# Patient Record
Sex: FEMALE | Race: ASIAN | NOT HISPANIC OR LATINO | Employment: OTHER | URBAN - METROPOLITAN AREA
[De-identification: names, ages, dates, MRNs, and addresses within clinical notes are randomized per-mention and may not be internally consistent; named-entity substitution may affect disease eponyms.]

---

## 2017-02-08 ENCOUNTER — HOSPITAL ENCOUNTER (OUTPATIENT)
Dept: NEUROLOGY | Facility: AMBULATORY SURGERY CENTER | Age: 67
Discharge: HOME | End: 2017-02-08
Payer: COMMERCIAL

## 2017-02-08 DIAGNOSIS — G56.03 CARPAL TUNNEL SYNDROME, BILATERAL: ICD-10-CM

## 2017-02-08 PROCEDURE — 95886 MUSC TEST DONE W/N TEST COMP: CPT

## 2017-02-08 PROCEDURE — 95911 NRV CNDJ TEST 9-10 STUDIES: CPT

## 2017-02-09 ENCOUNTER — TRANSCRIBE ORDERS (OUTPATIENT)
Dept: ADMINISTRATIVE | Facility: HOSPITAL | Age: 67
End: 2017-02-09

## 2017-02-09 ENCOUNTER — ALLSCRIPTS OFFICE VISIT (OUTPATIENT)
Dept: OTHER | Facility: OTHER | Age: 67
End: 2017-02-09

## 2017-02-09 DIAGNOSIS — G56.03 BILATERAL CARPAL TUNNEL SYNDROME: ICD-10-CM

## 2017-02-09 DIAGNOSIS — M48.061 SPINAL STENOSIS, LUMBAR REGION, WITHOUT NEUROGENIC CLAUDICATION: Primary | ICD-10-CM

## 2017-02-09 DIAGNOSIS — M54.50 LOW BACK PAIN: ICD-10-CM

## 2017-02-09 DIAGNOSIS — M25.512 PAIN IN LEFT SHOULDER: ICD-10-CM

## 2017-02-09 DIAGNOSIS — Z00.00 ENCOUNTER FOR GENERAL ADULT MEDICAL EXAMINATION WITHOUT ABNORMAL FINDINGS: ICD-10-CM

## 2017-02-09 DIAGNOSIS — M75.41 IMPINGEMENT SYNDROME OF RIGHT SHOULDER: ICD-10-CM

## 2017-02-09 DIAGNOSIS — M48.061 SPINAL STENOSIS OF LUMBAR REGION: ICD-10-CM

## 2017-02-09 DIAGNOSIS — M25.511 PAIN IN RIGHT SHOULDER: ICD-10-CM

## 2017-02-09 DIAGNOSIS — M51.36 OTHER INTERVERTEBRAL DISC DEGENERATION, LUMBAR REGION: ICD-10-CM

## 2017-02-17 ENCOUNTER — HOSPITAL ENCOUNTER (OUTPATIENT)
Dept: RADIOLOGY | Facility: HOSPITAL | Age: 67
Discharge: HOME | End: 2017-02-17
Attending: NEUROLOGICAL SURGERY
Payer: COMMERCIAL

## 2017-02-17 DIAGNOSIS — M48.061 SPINAL STENOSIS OF LUMBAR REGION: ICD-10-CM

## 2017-02-17 PROCEDURE — 72148 MRI LUMBAR SPINE W/O DYE: CPT

## 2017-02-21 ENCOUNTER — HOSPITAL ENCOUNTER (OUTPATIENT)
Dept: RADIOLOGY | Facility: HOSPITAL | Age: 67
Discharge: HOME | End: 2017-02-21
Attending: NEUROLOGICAL SURGERY
Payer: COMMERCIAL

## 2017-02-21 ENCOUNTER — HOSPITAL ENCOUNTER (OUTPATIENT)
Dept: RADIOLOGY | Facility: HOSPITAL | Age: 67
Discharge: HOME | End: 2017-02-21
Attending: ORTHOPAEDIC SURGERY
Payer: COMMERCIAL

## 2017-02-21 ENCOUNTER — ALLSCRIPTS OFFICE VISIT (OUTPATIENT)
Dept: OTHER | Facility: OTHER | Age: 67
End: 2017-02-21

## 2017-02-21 ENCOUNTER — TRANSCRIBE ORDERS (OUTPATIENT)
Dept: RADIOLOGY | Facility: HOSPITAL | Age: 67
End: 2017-02-21

## 2017-02-21 DIAGNOSIS — M25.511 PAIN IN RIGHT SHOULDER: ICD-10-CM

## 2017-02-21 DIAGNOSIS — M51.36 DEGENERATION OF LUMBAR INTERVERTEBRAL DISC: Primary | ICD-10-CM

## 2017-02-21 DIAGNOSIS — M25.512 PAIN IN LEFT SHOULDER: ICD-10-CM

## 2017-02-21 DIAGNOSIS — M48.061 SPINAL STENOSIS, LUMBAR REGION, WITHOUT NEUROGENIC CLAUDICATION: ICD-10-CM

## 2017-02-21 DIAGNOSIS — M48.061 SPINAL STENOSIS OF LUMBAR REGION: ICD-10-CM

## 2017-02-21 PROCEDURE — 73030 X-RAY EXAM OF SHOULDER: CPT

## 2017-02-21 PROCEDURE — 72114 X-RAY EXAM L-S SPINE BENDING: CPT

## 2017-02-23 ENCOUNTER — ALLSCRIPTS OFFICE VISIT (OUTPATIENT)
Dept: OTHER | Facility: OTHER | Age: 67
End: 2017-02-23

## 2017-03-03 ENCOUNTER — GENERIC CONVERSION - ENCOUNTER (OUTPATIENT)
Dept: OTHER | Facility: OTHER | Age: 67
End: 2017-03-03

## 2017-03-06 ENCOUNTER — APPOINTMENT (OUTPATIENT)
Dept: LAB | Facility: HOSPITAL | Age: 67
End: 2017-03-06
Attending: NEUROLOGICAL SURGERY
Payer: COMMERCIAL

## 2017-03-06 DIAGNOSIS — M51.36 OTHER INTERVERTEBRAL DISC DEGENERATION, LUMBAR REGION: ICD-10-CM

## 2017-03-06 DIAGNOSIS — M48.061 SPINAL STENOSIS OF LUMBAR REGION: ICD-10-CM

## 2017-03-06 DIAGNOSIS — Z00.00 ENCOUNTER FOR GENERAL ADULT MEDICAL EXAMINATION WITHOUT ABNORMAL FINDINGS: ICD-10-CM

## 2017-03-06 DIAGNOSIS — G56.03 BILATERAL CARPAL TUNNEL SYNDROME: ICD-10-CM

## 2017-03-06 DIAGNOSIS — M54.50 LOW BACK PAIN: ICD-10-CM

## 2017-03-06 LAB
ABO GROUP BLD: NORMAL
ALBUMIN SERPL BCP-MCNC: 3.7 G/DL (ref 3.5–5)
ALP SERPL-CCNC: 64 U/L (ref 46–116)
ALT SERPL W P-5'-P-CCNC: 25 U/L (ref 12–78)
ANION GAP SERPL CALCULATED.3IONS-SCNC: 5 MMOL/L (ref 4–13)
APTT PPP: 28 SECONDS (ref 24–36)
AST SERPL W P-5'-P-CCNC: 11 U/L (ref 5–45)
BASOPHILS # BLD AUTO: 0.02 THOUSANDS/ΜL (ref 0–0.1)
BASOPHILS NFR BLD AUTO: 0 % (ref 0–1)
BILIRUB SERPL-MCNC: 0.41 MG/DL (ref 0.2–1)
BILIRUB UR QL STRIP: NEGATIVE
BLD GP AB SCN SERPL QL: NEGATIVE
BUN SERPL-MCNC: 24 MG/DL (ref 5–25)
CALCIUM SERPL-MCNC: 9.2 MG/DL (ref 8.3–10.1)
CHLORIDE SERPL-SCNC: 108 MMOL/L (ref 100–108)
CLARITY UR: CLEAR
CO2 SERPL-SCNC: 31 MMOL/L (ref 21–32)
COLOR UR: YELLOW
CREAT SERPL-MCNC: 0.69 MG/DL (ref 0.6–1.3)
EOSINOPHIL # BLD AUTO: 0.08 THOUSAND/ΜL (ref 0–0.61)
EOSINOPHIL NFR BLD AUTO: 1 % (ref 0–6)
ERYTHROCYTE [DISTWIDTH] IN BLOOD BY AUTOMATED COUNT: 14.7 % (ref 11.6–15.1)
EST. AVERAGE GLUCOSE BLD GHB EST-MCNC: 117 MG/DL
GFR SERPL CREATININE-BSD FRML MDRD: >60 ML/MIN/1.73SQ M
GLUCOSE SERPL-MCNC: 99 MG/DL (ref 65–140)
GLUCOSE UR STRIP-MCNC: NEGATIVE MG/DL
HBA1C MFR BLD: 5.7 % (ref 4.2–6.3)
HCT VFR BLD AUTO: 41.4 % (ref 34.8–46.1)
HGB BLD-MCNC: 13.6 G/DL (ref 11.5–15.4)
HGB UR QL STRIP.AUTO: NEGATIVE
INR PPP: 0.92 (ref 0.86–1.16)
KETONES UR STRIP-MCNC: NEGATIVE MG/DL
LEUKOCYTE ESTERASE UR QL STRIP: NEGATIVE
LYMPHOCYTES # BLD AUTO: 1.41 THOUSANDS/ΜL (ref 0.6–4.47)
LYMPHOCYTES NFR BLD AUTO: 19 % (ref 14–44)
MCH RBC QN AUTO: 29.6 PG (ref 26.8–34.3)
MCHC RBC AUTO-ENTMCNC: 32.9 G/DL (ref 31.4–37.4)
MCV RBC AUTO: 90 FL (ref 82–98)
MONOCYTES # BLD AUTO: 0.33 THOUSAND/ΜL (ref 0.17–1.22)
MONOCYTES NFR BLD AUTO: 4 % (ref 4–12)
NEUTROPHILS # BLD AUTO: 5.7 THOUSANDS/ΜL (ref 1.85–7.62)
NEUTS SEG NFR BLD AUTO: 76 % (ref 43–75)
NITRITE UR QL STRIP: NEGATIVE
NRBC BLD AUTO-RTO: 0 /100 WBCS
PH UR STRIP.AUTO: 6 [PH] (ref 4.5–8)
PLATELET # BLD AUTO: 336 THOUSANDS/UL (ref 149–390)
PMV BLD AUTO: 9.8 FL (ref 8.9–12.7)
POTASSIUM SERPL-SCNC: 4.4 MMOL/L (ref 3.5–5.3)
PROT SERPL-MCNC: 7.2 G/DL (ref 6.4–8.2)
PROT UR STRIP-MCNC: NEGATIVE MG/DL
PROTHROMBIN TIME: 12.5 SECONDS (ref 12–14.3)
RBC # BLD AUTO: 4.6 MILLION/UL (ref 3.81–5.12)
RH BLD: POSITIVE
SODIUM SERPL-SCNC: 144 MMOL/L (ref 136–145)
SP GR UR STRIP.AUTO: 1.03 (ref 1–1.03)
UROBILINOGEN UR QL STRIP.AUTO: 0.2 E.U./DL
WBC # BLD AUTO: 7.55 THOUSAND/UL (ref 4.31–10.16)

## 2017-03-06 PROCEDURE — 86850 RBC ANTIBODY SCREEN: CPT

## 2017-03-06 PROCEDURE — 83036 HEMOGLOBIN GLYCOSYLATED A1C: CPT

## 2017-03-06 PROCEDURE — 80053 COMPREHEN METABOLIC PANEL: CPT

## 2017-03-06 PROCEDURE — 85730 THROMBOPLASTIN TIME PARTIAL: CPT

## 2017-03-06 PROCEDURE — 81003 URINALYSIS AUTO W/O SCOPE: CPT

## 2017-03-06 PROCEDURE — 85610 PROTHROMBIN TIME: CPT

## 2017-03-06 PROCEDURE — 87081 CULTURE SCREEN ONLY: CPT

## 2017-03-06 PROCEDURE — 86901 BLOOD TYPING SEROLOGIC RH(D): CPT

## 2017-03-06 PROCEDURE — 85025 COMPLETE CBC W/AUTO DIFF WBC: CPT

## 2017-03-06 PROCEDURE — 86900 BLOOD TYPING SEROLOGIC ABO: CPT

## 2017-03-06 PROCEDURE — 36415 COLL VENOUS BLD VENIPUNCTURE: CPT

## 2017-03-07 ENCOUNTER — GENERIC CONVERSION - ENCOUNTER (OUTPATIENT)
Dept: OTHER | Facility: OTHER | Age: 67
End: 2017-03-07

## 2017-03-07 ENCOUNTER — OFFICE VISIT (OUTPATIENT)
Dept: LAB | Facility: HOSPITAL | Age: 67
End: 2017-03-07
Attending: NEUROLOGICAL SURGERY
Payer: COMMERCIAL

## 2017-03-07 DIAGNOSIS — M51.36 DEGENERATION OF LUMBAR INTERVERTEBRAL DISC: ICD-10-CM

## 2017-03-07 DIAGNOSIS — M48.061 SPINAL STENOSIS, LUMBAR REGION, WITHOUT NEUROGENIC CLAUDICATION: ICD-10-CM

## 2017-03-07 LAB
ATRIAL RATE: 91 BPM
MRSA NOSE QL CULT: NORMAL
P AXIS: 67 DEGREES
PR INTERVAL: 168 MS
QRS AXIS: 14 DEGREES
QRSD INTERVAL: 74 MS
QT INTERVAL: 362 MS
QTC INTERVAL: 445 MS
T WAVE AXIS: 80 DEGREES
VENTRICULAR RATE: 91 BPM

## 2017-03-07 PROCEDURE — 93005 ELECTROCARDIOGRAM TRACING: CPT

## 2017-03-13 ENCOUNTER — ALLSCRIPTS OFFICE VISIT (OUTPATIENT)
Dept: OTHER | Facility: OTHER | Age: 67
End: 2017-03-13

## 2017-03-23 ENCOUNTER — TRANSCRIBE ORDERS (OUTPATIENT)
Dept: ADMINISTRATIVE | Facility: HOSPITAL | Age: 67
End: 2017-03-23

## 2017-03-23 ENCOUNTER — ALLSCRIPTS OFFICE VISIT (OUTPATIENT)
Dept: OTHER | Facility: OTHER | Age: 67
End: 2017-03-23

## 2017-03-23 DIAGNOSIS — M25.511 PAIN IN RIGHT SHOULDER: ICD-10-CM

## 2017-03-23 DIAGNOSIS — M25.511 ACUTE PAIN OF RIGHT SHOULDER: Primary | ICD-10-CM

## 2017-03-24 ENCOUNTER — HOSPITAL ENCOUNTER (OUTPATIENT)
Dept: RADIOLOGY | Facility: HOSPITAL | Age: 67
Discharge: HOME | End: 2017-03-24
Attending: ORTHOPAEDIC SURGERY
Payer: COMMERCIAL

## 2017-03-24 DIAGNOSIS — M25.511 PAIN IN RIGHT SHOULDER: ICD-10-CM

## 2017-03-24 PROCEDURE — 73221 MRI JOINT UPR EXTREM W/O DYE: CPT

## 2017-03-30 ENCOUNTER — ALLSCRIPTS OFFICE VISIT (OUTPATIENT)
Dept: OTHER | Facility: OTHER | Age: 67
End: 2017-03-30

## 2017-04-02 ENCOUNTER — ANESTHESIA EVENT (OUTPATIENT)
Dept: PERIOP | Facility: HOSPITAL | Age: 67
DRG: 460 | End: 2017-04-02
Payer: COMMERCIAL

## 2017-04-03 ENCOUNTER — HOSPITAL ENCOUNTER (INPATIENT)
Facility: HOSPITAL | Age: 67
LOS: 3 days | DRG: 460 | End: 2017-04-06
Attending: NEUROLOGICAL SURGERY | Admitting: NEUROLOGICAL SURGERY
Payer: COMMERCIAL

## 2017-04-03 ENCOUNTER — APPOINTMENT (OUTPATIENT)
Dept: RADIOLOGY | Facility: HOSPITAL | Age: 67
DRG: 460 | End: 2017-04-03
Payer: COMMERCIAL

## 2017-04-03 ENCOUNTER — ANESTHESIA (OUTPATIENT)
Dept: PERIOP | Facility: HOSPITAL | Age: 67
DRG: 460 | End: 2017-04-03
Payer: COMMERCIAL

## 2017-04-03 DIAGNOSIS — M51.36 OTHER INTERVERTEBRAL DISC DEGENERATION, LUMBAR REGION: ICD-10-CM

## 2017-04-03 DIAGNOSIS — M43.16 SPONDYLOLISTHESIS OF LUMBAR REGION: ICD-10-CM

## 2017-04-03 DIAGNOSIS — M48.061 SPINAL STENOSIS OF LUMBAR REGION: Primary | ICD-10-CM

## 2017-04-03 PROBLEM — M47.9 SPONDYLOSIS: Status: ACTIVE | Noted: 2017-04-03

## 2017-04-03 PROBLEM — M41.9 SCOLIOSIS: Status: ACTIVE | Noted: 2017-04-03

## 2017-04-03 LAB
ABO GROUP BLD: NORMAL
BLD GP AB SCN SERPL QL: NEGATIVE
GLUCOSE SERPL-MCNC: 170 MG/DL (ref 65–140)
PLATELET # BLD AUTO: 275 THOUSANDS/UL (ref 149–390)
PMV BLD AUTO: 9.6 FL (ref 8.9–12.7)
RH BLD: POSITIVE

## 2017-04-03 PROCEDURE — 85049 AUTOMATED PLATELET COUNT: CPT | Performed by: NEUROLOGICAL SURGERY

## 2017-04-03 PROCEDURE — 00NY0ZZ RELEASE LUMBAR SPINAL CORD, OPEN APPROACH: ICD-10-PCS | Performed by: NEUROLOGICAL SURGERY

## 2017-04-03 PROCEDURE — 86901 BLOOD TYPING SEROLOGIC RH(D): CPT | Performed by: NEUROLOGICAL SURGERY

## 2017-04-03 PROCEDURE — C1713 ANCHOR/SCREW BN/BN,TIS/BN: HCPCS | Performed by: NEUROLOGICAL SURGERY

## 2017-04-03 PROCEDURE — 72100 X-RAY EXAM L-S SPINE 2/3 VWS: CPT

## 2017-04-03 PROCEDURE — 86900 BLOOD TYPING SEROLOGIC ABO: CPT | Performed by: NEUROLOGICAL SURGERY

## 2017-04-03 PROCEDURE — 82948 REAGENT STRIP/BLOOD GLUCOSE: CPT

## 2017-04-03 PROCEDURE — 95940 IONM IN OPERATNG ROOM 15 MIN: CPT | Performed by: NEUROLOGICAL SURGERY

## 2017-04-03 PROCEDURE — 88304 TISSUE EXAM BY PATHOLOGIST: CPT | Performed by: NEUROLOGICAL SURGERY

## 2017-04-03 PROCEDURE — 0HB6XZZ EXCISION OF BACK SKIN, EXTERNAL APPROACH: ICD-10-PCS | Performed by: NEUROLOGICAL SURGERY

## 2017-04-03 PROCEDURE — 86850 RBC ANTIBODY SCREEN: CPT | Performed by: NEUROLOGICAL SURGERY

## 2017-04-03 PROCEDURE — 0SG10A1 FUSION 2-4 L JT W INTBD FUS DEV, POST APPR P COL, OPEN: ICD-10-PCS | Performed by: NEUROLOGICAL SURGERY

## 2017-04-03 DEVICE — SCREW 54840006545 MAS 6.5X45 CC
Type: IMPLANTABLE DEVICE | Site: SPINE LUMBAR | Status: FUNCTIONAL
Brand: CD HORIZON® SPINAL SYSTEM

## 2017-04-03 DEVICE — DBM 006005 PROGENIX PLUS 5CC
Type: IMPLANTABLE DEVICE | Site: SPINE LUMBAR | Status: FUNCTIONAL
Brand: PROGENIX® PUTTY AND PROGENIX® PLUS

## 2017-04-03 RX ORDER — CHLORHEXIDINE GLUCONATE 0.12 MG/ML
15 RINSE ORAL ONCE
Status: COMPLETED | OUTPATIENT
Start: 2017-04-03 | End: 2017-04-03

## 2017-04-03 RX ORDER — METHOCARBAMOL 750 MG/1
750 TABLET, FILM COATED ORAL 4 TIMES DAILY PRN
Status: DISCONTINUED | OUTPATIENT
Start: 2017-04-03 | End: 2017-04-06 | Stop reason: HOSPADM

## 2017-04-03 RX ORDER — SODIUM CHLORIDE, SODIUM LACTATE, POTASSIUM CHLORIDE, CALCIUM CHLORIDE 600; 310; 30; 20 MG/100ML; MG/100ML; MG/100ML; MG/100ML
100 INJECTION, SOLUTION INTRAVENOUS CONTINUOUS
Status: DISCONTINUED | OUTPATIENT
Start: 2017-04-03 | End: 2017-04-04

## 2017-04-03 RX ORDER — OXYCODONE HYDROCHLORIDE 5 MG/1
5 TABLET ORAL EVERY 4 HOURS PRN
Status: DISCONTINUED | OUTPATIENT
Start: 2017-04-03 | End: 2017-04-06 | Stop reason: HOSPADM

## 2017-04-03 RX ORDER — FENTANYL CITRATE 50 UG/ML
25 INJECTION, SOLUTION INTRAMUSCULAR; INTRAVENOUS
Status: DISCONTINUED | OUTPATIENT
Start: 2017-04-03 | End: 2017-04-03

## 2017-04-03 RX ORDER — PREGABALIN 75 MG/1
150 CAPSULE ORAL ONCE
Status: COMPLETED | OUTPATIENT
Start: 2017-04-03 | End: 2017-04-03

## 2017-04-03 RX ORDER — MELATONIN
2000 DAILY
Status: DISCONTINUED | OUTPATIENT
Start: 2017-04-04 | End: 2017-04-06 | Stop reason: HOSPADM

## 2017-04-03 RX ORDER — SODIUM CHLORIDE, SODIUM LACTATE, POTASSIUM CHLORIDE, CALCIUM CHLORIDE 600; 310; 30; 20 MG/100ML; MG/100ML; MG/100ML; MG/100ML
125 INJECTION, SOLUTION INTRAVENOUS CONTINUOUS
Status: DISCONTINUED | OUTPATIENT
Start: 2017-04-03 | End: 2017-04-04

## 2017-04-03 RX ORDER — BUPIVACAINE HYDROCHLORIDE AND EPINEPHRINE 5; 5 MG/ML; UG/ML
INJECTION, SOLUTION EPIDURAL; INTRACAUDAL; PERINEURAL AS NEEDED
Status: DISCONTINUED | OUTPATIENT
Start: 2017-04-03 | End: 2017-04-03 | Stop reason: HOSPADM

## 2017-04-03 RX ORDER — EPHEDRINE SULFATE 50 MG/ML
INJECTION, SOLUTION INTRAVENOUS AS NEEDED
Status: DISCONTINUED | OUTPATIENT
Start: 2017-04-03 | End: 2017-04-03 | Stop reason: SURG

## 2017-04-03 RX ORDER — SUCCINYLCHOLINE CHLORIDE 20 MG/ML
INJECTION INTRAMUSCULAR; INTRAVENOUS AS NEEDED
Status: DISCONTINUED | OUTPATIENT
Start: 2017-04-03 | End: 2017-04-03 | Stop reason: SURG

## 2017-04-03 RX ORDER — ALBUMIN, HUMAN INJ 5% 5 %
SOLUTION INTRAVENOUS CONTINUOUS PRN
Status: DISCONTINUED | OUTPATIENT
Start: 2017-04-03 | End: 2017-04-03

## 2017-04-03 RX ORDER — METOCLOPRAMIDE HYDROCHLORIDE 5 MG/ML
10 INJECTION INTRAMUSCULAR; INTRAVENOUS ONCE AS NEEDED
Status: DISCONTINUED | OUTPATIENT
Start: 2017-04-03 | End: 2017-04-03 | Stop reason: HOSPADM

## 2017-04-03 RX ORDER — BISACODYL 10 MG
10 SUPPOSITORY, RECTAL RECTAL DAILY PRN
Status: DISCONTINUED | OUTPATIENT
Start: 2017-04-03 | End: 2017-04-06 | Stop reason: HOSPADM

## 2017-04-03 RX ORDER — SODIUM CHLORIDE 9 MG/ML
100 INJECTION, SOLUTION INTRAVENOUS CONTINUOUS
Status: DISCONTINUED | OUTPATIENT
Start: 2017-04-03 | End: 2017-04-04

## 2017-04-03 RX ORDER — DIAZEPAM 2 MG/1
2 TABLET ORAL 3 TIMES DAILY PRN
Status: DISCONTINUED | OUTPATIENT
Start: 2017-04-03 | End: 2017-04-04

## 2017-04-03 RX ORDER — PROPOFOL 10 MG/ML
INJECTION, EMULSION INTRAVENOUS AS NEEDED
Status: DISCONTINUED | OUTPATIENT
Start: 2017-04-03 | End: 2017-04-03 | Stop reason: SURG

## 2017-04-03 RX ORDER — HEPARIN SODIUM 5000 [USP'U]/ML
5000 INJECTION, SOLUTION INTRAVENOUS; SUBCUTANEOUS EVERY 8 HOURS SCHEDULED
Status: DISCONTINUED | OUTPATIENT
Start: 2017-04-04 | End: 2017-04-06 | Stop reason: HOSPADM

## 2017-04-03 RX ORDER — CLONAZEPAM 0.5 MG/1
0.5 TABLET ORAL
Status: DISCONTINUED | OUTPATIENT
Start: 2017-04-03 | End: 2017-04-06 | Stop reason: HOSPADM

## 2017-04-03 RX ORDER — ONDANSETRON 2 MG/ML
4 INJECTION INTRAMUSCULAR; INTRAVENOUS EVERY 6 HOURS PRN
Status: DISCONTINUED | OUTPATIENT
Start: 2017-04-03 | End: 2017-04-05

## 2017-04-03 RX ORDER — LIDOCAINE HYDROCHLORIDE 10 MG/ML
INJECTION, SOLUTION INFILTRATION; PERINEURAL AS NEEDED
Status: DISCONTINUED | OUTPATIENT
Start: 2017-04-03 | End: 2017-04-03 | Stop reason: SURG

## 2017-04-03 RX ORDER — OXYCODONE HCL 10 MG/1
10 TABLET, FILM COATED, EXTENDED RELEASE ORAL ONCE
Status: COMPLETED | OUTPATIENT
Start: 2017-04-03 | End: 2017-04-03

## 2017-04-03 RX ORDER — PROPOFOL 10 MG/ML
INJECTION, EMULSION INTRAVENOUS CONTINUOUS PRN
Status: DISCONTINUED | OUTPATIENT
Start: 2017-04-03 | End: 2017-04-03

## 2017-04-03 RX ORDER — MAGNESIUM HYDROXIDE 1200 MG/15ML
LIQUID ORAL AS NEEDED
Status: DISCONTINUED | OUTPATIENT
Start: 2017-04-03 | End: 2017-04-03 | Stop reason: HOSPADM

## 2017-04-03 RX ORDER — ACETAMINOPHEN 325 MG/1
975 TABLET ORAL EVERY 8 HOURS SCHEDULED
Status: DISCONTINUED | OUTPATIENT
Start: 2017-04-03 | End: 2017-04-06 | Stop reason: HOSPADM

## 2017-04-03 RX ORDER — GABAPENTIN 100 MG/1
100 CAPSULE ORAL 3 TIMES DAILY
Status: DISCONTINUED | OUTPATIENT
Start: 2017-04-03 | End: 2017-04-06 | Stop reason: HOSPADM

## 2017-04-03 RX ORDER — PAROXETINE 10 MG/1
10 TABLET, FILM COATED ORAL EVERY MORNING
Status: DISCONTINUED | OUTPATIENT
Start: 2017-04-04 | End: 2017-04-06 | Stop reason: HOSPADM

## 2017-04-03 RX ORDER — FENTANYL CITRATE 50 UG/ML
INJECTION, SOLUTION INTRAMUSCULAR; INTRAVENOUS AS NEEDED
Status: DISCONTINUED | OUTPATIENT
Start: 2017-04-03 | End: 2017-04-03 | Stop reason: SURG

## 2017-04-03 RX ORDER — DOCUSATE SODIUM 100 MG/1
100 CAPSULE, LIQUID FILLED ORAL 2 TIMES DAILY
Status: DISCONTINUED | OUTPATIENT
Start: 2017-04-03 | End: 2017-04-06 | Stop reason: HOSPADM

## 2017-04-03 RX ORDER — SODIUM CHLORIDE 9 MG/ML
INJECTION, SOLUTION INTRAVENOUS CONTINUOUS PRN
Status: DISCONTINUED | OUTPATIENT
Start: 2017-04-03 | End: 2017-04-03

## 2017-04-03 RX ORDER — KETOROLAC TROMETHAMINE 30 MG/ML
INJECTION, SOLUTION INTRAMUSCULAR; INTRAVENOUS AS NEEDED
Status: DISCONTINUED | OUTPATIENT
Start: 2017-04-03 | End: 2017-04-03 | Stop reason: SURG

## 2017-04-03 RX ORDER — DIAZEPAM 2 MG/1
2 TABLET ORAL 3 TIMES DAILY
Status: DISCONTINUED | OUTPATIENT
Start: 2017-04-03 | End: 2017-04-03

## 2017-04-03 RX ORDER — ACETAMINOPHEN 325 MG/1
975 TABLET ORAL ONCE
Status: COMPLETED | OUTPATIENT
Start: 2017-04-03 | End: 2017-04-03

## 2017-04-03 RX ORDER — LIDOCAINE HYDROCHLORIDE AND EPINEPHRINE 10; 10 MG/ML; UG/ML
INJECTION, SOLUTION INFILTRATION; PERINEURAL AS NEEDED
Status: DISCONTINUED | OUTPATIENT
Start: 2017-04-03 | End: 2017-04-03 | Stop reason: HOSPADM

## 2017-04-03 RX ORDER — ONDANSETRON 2 MG/ML
INJECTION INTRAMUSCULAR; INTRAVENOUS AS NEEDED
Status: DISCONTINUED | OUTPATIENT
Start: 2017-04-03 | End: 2017-04-03 | Stop reason: SURG

## 2017-04-03 RX ORDER — OXYCODONE HYDROCHLORIDE 10 MG/1
10 TABLET ORAL EVERY 4 HOURS PRN
Status: DISCONTINUED | OUTPATIENT
Start: 2017-04-03 | End: 2017-04-06 | Stop reason: HOSPADM

## 2017-04-03 RX ORDER — MIDAZOLAM HYDROCHLORIDE 1 MG/ML
INJECTION INTRAMUSCULAR; INTRAVENOUS AS NEEDED
Status: DISCONTINUED | OUTPATIENT
Start: 2017-04-03 | End: 2017-04-03 | Stop reason: SURG

## 2017-04-03 RX ORDER — ONDANSETRON 2 MG/ML
4 INJECTION INTRAMUSCULAR; INTRAVENOUS EVERY 4 HOURS PRN
Status: DISCONTINUED | OUTPATIENT
Start: 2017-04-03 | End: 2017-04-03 | Stop reason: HOSPADM

## 2017-04-03 RX ORDER — SODIUM CHLORIDE, SODIUM LACTATE, POTASSIUM CHLORIDE, CALCIUM CHLORIDE 600; 310; 30; 20 MG/100ML; MG/100ML; MG/100ML; MG/100ML
INJECTION, SOLUTION INTRAVENOUS CONTINUOUS PRN
Status: DISCONTINUED | OUTPATIENT
Start: 2017-04-03 | End: 2017-04-03

## 2017-04-03 RX ADMIN — SODIUM CHLORIDE: 0.9 INJECTION, SOLUTION INTRAVENOUS at 08:00

## 2017-04-03 RX ADMIN — HYDROMORPHONE HYDROCHLORIDE 1 MG: 1 INJECTION, SOLUTION INTRAMUSCULAR; INTRAVENOUS; SUBCUTANEOUS at 09:55

## 2017-04-03 RX ADMIN — MIDAZOLAM HYDROCHLORIDE 2 MG: 1 INJECTION, SOLUTION INTRAMUSCULAR; INTRAVENOUS at 07:42

## 2017-04-03 RX ADMIN — ALBUMIN HUMAN: 0.05 INJECTION, SOLUTION INTRAVENOUS at 10:09

## 2017-04-03 RX ADMIN — SUCCINYLCHOLINE CHLORIDE 120 MG: 20 INJECTION, SOLUTION INTRAMUSCULAR; INTRAVENOUS at 07:54

## 2017-04-03 RX ADMIN — ACETAMINOPHEN 975 MG: 325 TABLET, FILM COATED ORAL at 15:23

## 2017-04-03 RX ADMIN — SODIUM CHLORIDE, SODIUM LACTATE, POTASSIUM CHLORIDE, AND CALCIUM CHLORIDE: .6; .31; .03; .02 INJECTION, SOLUTION INTRAVENOUS at 10:33

## 2017-04-03 RX ADMIN — DIAZEPAM 2 MG: 2 TABLET ORAL at 16:51

## 2017-04-03 RX ADMIN — CEFAZOLIN SODIUM 1000 MG: 1 SOLUTION INTRAVENOUS at 23:53

## 2017-04-03 RX ADMIN — ALBUMIN HUMAN: 0.05 INJECTION, SOLUTION INTRAVENOUS at 08:38

## 2017-04-03 RX ADMIN — DEXAMETHASONE SODIUM PHOSPHATE 10 MG: 10 INJECTION INTRAMUSCULAR; INTRAVENOUS at 07:54

## 2017-04-03 RX ADMIN — PREGABALIN 150 MG: 75 CAPSULE ORAL at 06:51

## 2017-04-03 RX ADMIN — DEXMEDETOMIDINE HYDROCHLORIDE 0.2 MCG/KG/HR: 100 INJECTION, SOLUTION INTRAVENOUS at 07:56

## 2017-04-03 RX ADMIN — HYDROMORPHONE HYDROCHLORIDE 0.5 MG: 1 INJECTION, SOLUTION INTRAMUSCULAR; INTRAVENOUS; SUBCUTANEOUS at 11:27

## 2017-04-03 RX ADMIN — SODIUM CHLORIDE 100 ML/HR: 0.9 INJECTION, SOLUTION INTRAVENOUS at 13:30

## 2017-04-03 RX ADMIN — SODIUM CHLORIDE, SODIUM LACTATE, POTASSIUM CHLORIDE, AND CALCIUM CHLORIDE: .6; .31; .03; .02 INJECTION, SOLUTION INTRAVENOUS at 07:15

## 2017-04-03 RX ADMIN — OXYCODONE HYDROCHLORIDE 10 MG: 10 TABLET ORAL at 23:53

## 2017-04-03 RX ADMIN — FENTANYL CITRATE 50 MCG: 50 INJECTION, SOLUTION INTRAMUSCULAR; INTRAVENOUS at 08:53

## 2017-04-03 RX ADMIN — ONDANSETRON 4 MG: 2 INJECTION INTRAMUSCULAR; INTRAVENOUS at 11:26

## 2017-04-03 RX ADMIN — PROPOFOL 100 MCG/KG/MIN: 10 INJECTION, EMULSION INTRAVENOUS at 07:56

## 2017-04-03 RX ADMIN — GABAPENTIN 100 MG: 100 CAPSULE ORAL at 21:13

## 2017-04-03 RX ADMIN — EPHEDRINE SULFATE 10 MG: 50 INJECTION, SOLUTION INTRAMUSCULAR; INTRAVENOUS; SUBCUTANEOUS at 08:06

## 2017-04-03 RX ADMIN — OXYCODONE HYDROCHLORIDE 10 MG: 10 TABLET, FILM COATED, EXTENDED RELEASE ORAL at 06:51

## 2017-04-03 RX ADMIN — ACETAMINOPHEN 975 MG: 325 TABLET, FILM COATED ORAL at 06:51

## 2017-04-03 RX ADMIN — KETOROLAC TROMETHAMINE 15 MG: 30 INJECTION, SOLUTION INTRAMUSCULAR at 11:26

## 2017-04-03 RX ADMIN — EPHEDRINE SULFATE 5 MG: 50 INJECTION, SOLUTION INTRAMUSCULAR; INTRAVENOUS; SUBCUTANEOUS at 08:27

## 2017-04-03 RX ADMIN — GABAPENTIN 100 MG: 100 CAPSULE ORAL at 16:51

## 2017-04-03 RX ADMIN — CHLORHEXIDINE GLUCONATE 15 ML: 1.2 RINSE ORAL at 06:51

## 2017-04-03 RX ADMIN — PROPOFOL 200 MG: 10 INJECTION, EMULSION INTRAVENOUS at 07:54

## 2017-04-03 RX ADMIN — CEFAZOLIN SODIUM 2000 MG: 2 SOLUTION INTRAVENOUS at 08:30

## 2017-04-03 RX ADMIN — REMIFENTANIL HYDROCHLORIDE 0.2 MCG/KG/MIN: 1 INJECTION, POWDER, LYOPHILIZED, FOR SOLUTION INTRAVENOUS at 07:56

## 2017-04-03 RX ADMIN — FENTANYL CITRATE 50 MCG: 50 INJECTION, SOLUTION INTRAMUSCULAR; INTRAVENOUS at 07:54

## 2017-04-03 RX ADMIN — CEFAZOLIN SODIUM 1000 MG: 1 SOLUTION INTRAVENOUS at 16:51

## 2017-04-03 RX ADMIN — CLONAZEPAM 0.5 MG: 0.5 TABLET ORAL at 21:13

## 2017-04-03 RX ADMIN — LIDOCAINE HYDROCHLORIDE 50 MG: 10 INJECTION, SOLUTION INFILTRATION; PERINEURAL at 07:54

## 2017-04-03 RX ADMIN — ACETAMINOPHEN 975 MG: 325 TABLET, FILM COATED ORAL at 21:13

## 2017-04-03 RX ADMIN — SODIUM CHLORIDE 100 ML/HR: 0.9 INJECTION, SOLUTION INTRAVENOUS at 22:53

## 2017-04-04 ENCOUNTER — APPOINTMENT (INPATIENT)
Dept: RADIOLOGY | Facility: HOSPITAL | Age: 67
DRG: 460 | End: 2017-04-04
Attending: NEUROLOGICAL SURGERY
Payer: COMMERCIAL

## 2017-04-04 LAB
ANION GAP SERPL CALCULATED.3IONS-SCNC: 6 MMOL/L (ref 4–13)
BUN SERPL-MCNC: 14 MG/DL (ref 5–25)
CALCIUM SERPL-MCNC: 7.9 MG/DL (ref 8.3–10.1)
CHLORIDE SERPL-SCNC: 111 MMOL/L (ref 100–108)
CO2 SERPL-SCNC: 27 MMOL/L (ref 21–32)
CREAT SERPL-MCNC: 0.56 MG/DL (ref 0.6–1.3)
ERYTHROCYTE [DISTWIDTH] IN BLOOD BY AUTOMATED COUNT: 14.6 % (ref 11.6–15.1)
GFR SERPL CREATININE-BSD FRML MDRD: >60 ML/MIN/1.73SQ M
GLUCOSE SERPL-MCNC: 131 MG/DL (ref 65–140)
HCT VFR BLD AUTO: 30.7 % (ref 34.8–46.1)
HGB BLD-MCNC: 10.1 G/DL (ref 11.5–15.4)
MCH RBC QN AUTO: 29.6 PG (ref 26.8–34.3)
MCHC RBC AUTO-ENTMCNC: 32.9 G/DL (ref 31.4–37.4)
MCV RBC AUTO: 90 FL (ref 82–98)
PLATELET # BLD AUTO: 292 THOUSANDS/UL (ref 149–390)
PMV BLD AUTO: 9.6 FL (ref 8.9–12.7)
POTASSIUM SERPL-SCNC: 4.1 MMOL/L (ref 3.5–5.3)
RBC # BLD AUTO: 3.41 MILLION/UL (ref 3.81–5.12)
SODIUM SERPL-SCNC: 144 MMOL/L (ref 136–145)
WBC # BLD AUTO: 11.55 THOUSAND/UL (ref 4.31–10.16)

## 2017-04-04 PROCEDURE — 72100 X-RAY EXAM L-S SPINE 2/3 VWS: CPT

## 2017-04-04 PROCEDURE — G8987 SELF CARE CURRENT STATUS: HCPCS

## 2017-04-04 PROCEDURE — G8988 SELF CARE GOAL STATUS: HCPCS

## 2017-04-04 PROCEDURE — 97116 GAIT TRAINING THERAPY: CPT

## 2017-04-04 PROCEDURE — 97535 SELF CARE MNGMENT TRAINING: CPT

## 2017-04-04 PROCEDURE — 80048 BASIC METABOLIC PNL TOTAL CA: CPT | Performed by: NEUROLOGICAL SURGERY

## 2017-04-04 PROCEDURE — G8979 MOBILITY GOAL STATUS: HCPCS

## 2017-04-04 PROCEDURE — 97166 OT EVAL MOD COMPLEX 45 MIN: CPT

## 2017-04-04 PROCEDURE — G8978 MOBILITY CURRENT STATUS: HCPCS

## 2017-04-04 PROCEDURE — 85027 COMPLETE CBC AUTOMATED: CPT | Performed by: NEUROLOGICAL SURGERY

## 2017-04-04 PROCEDURE — 97163 PT EVAL HIGH COMPLEX 45 MIN: CPT

## 2017-04-04 RX ADMIN — VITAMIN D, TAB 1000IU (100/BT) 2000 UNITS: 25 TAB at 09:19

## 2017-04-04 RX ADMIN — METHOCARBAMOL 750 MG: 750 TABLET ORAL at 16:23

## 2017-04-04 RX ADMIN — ACETAMINOPHEN 975 MG: 325 TABLET, FILM COATED ORAL at 21:38

## 2017-04-04 RX ADMIN — GABAPENTIN 100 MG: 100 CAPSULE ORAL at 21:38

## 2017-04-04 RX ADMIN — DOCUSATE SODIUM 100 MG: 100 CAPSULE, LIQUID FILLED ORAL at 09:19

## 2017-04-04 RX ADMIN — OXYCODONE HYDROCHLORIDE 10 MG: 10 TABLET ORAL at 16:23

## 2017-04-04 RX ADMIN — GABAPENTIN 100 MG: 100 CAPSULE ORAL at 09:18

## 2017-04-04 RX ADMIN — ACETAMINOPHEN 975 MG: 325 TABLET, FILM COATED ORAL at 05:24

## 2017-04-04 RX ADMIN — HEPARIN SODIUM 5000 UNITS: 5000 INJECTION, SOLUTION INTRAVENOUS; SUBCUTANEOUS at 21:39

## 2017-04-04 RX ADMIN — OXYCODONE HYDROCHLORIDE 10 MG: 10 TABLET ORAL at 12:18

## 2017-04-04 RX ADMIN — PAROXETINE HYDROCHLORIDE 10 MG: 10 TABLET, FILM COATED ORAL at 09:19

## 2017-04-04 RX ADMIN — ACETAMINOPHEN 975 MG: 325 TABLET, FILM COATED ORAL at 13:52

## 2017-04-04 RX ADMIN — GABAPENTIN 100 MG: 100 CAPSULE ORAL at 16:23

## 2017-04-04 RX ADMIN — OXYCODONE HYDROCHLORIDE 5 MG: 5 TABLET ORAL at 05:24

## 2017-04-04 RX ADMIN — METHOCARBAMOL 750 MG: 750 TABLET ORAL at 21:38

## 2017-04-04 RX ADMIN — CLONAZEPAM 0.5 MG: 0.5 TABLET ORAL at 21:38

## 2017-04-04 RX ADMIN — DOCUSATE SODIUM 100 MG: 100 CAPSULE, LIQUID FILLED ORAL at 21:38

## 2017-04-04 RX ADMIN — HEPARIN SODIUM 5000 UNITS: 5000 INJECTION, SOLUTION INTRAVENOUS; SUBCUTANEOUS at 13:52

## 2017-04-04 RX ADMIN — METHOCARBAMOL 750 MG: 750 TABLET ORAL at 09:19

## 2017-04-04 RX ADMIN — OXYCODONE HYDROCHLORIDE 10 MG: 10 TABLET ORAL at 23:55

## 2017-04-05 ENCOUNTER — APPOINTMENT (INPATIENT)
Dept: OCCUPATIONAL THERAPY | Facility: HOSPITAL | Age: 67
DRG: 460 | End: 2017-04-05
Payer: COMMERCIAL

## 2017-04-05 LAB
ERYTHROCYTE [DISTWIDTH] IN BLOOD BY AUTOMATED COUNT: 15.2 % (ref 11.6–15.1)
HCT VFR BLD AUTO: 33.3 % (ref 34.8–46.1)
HGB BLD-MCNC: 10.6 G/DL (ref 11.5–15.4)
MCH RBC QN AUTO: 29.1 PG (ref 26.8–34.3)
MCHC RBC AUTO-ENTMCNC: 31.8 G/DL (ref 31.4–37.4)
MCV RBC AUTO: 92 FL (ref 82–98)
PLATELET # BLD AUTO: 300 THOUSANDS/UL (ref 149–390)
PMV BLD AUTO: 9.7 FL (ref 8.9–12.7)
RBC # BLD AUTO: 3.64 MILLION/UL (ref 3.81–5.12)
WBC # BLD AUTO: 8.88 THOUSAND/UL (ref 4.31–10.16)

## 2017-04-05 PROCEDURE — 85027 COMPLETE CBC AUTOMATED: CPT | Performed by: PHYSICIAN ASSISTANT

## 2017-04-05 PROCEDURE — 97530 THERAPEUTIC ACTIVITIES: CPT

## 2017-04-05 PROCEDURE — 97116 GAIT TRAINING THERAPY: CPT

## 2017-04-05 RX ADMIN — METHOCARBAMOL 750 MG: 750 TABLET ORAL at 06:10

## 2017-04-05 RX ADMIN — OXYCODONE HYDROCHLORIDE 10 MG: 10 TABLET ORAL at 19:30

## 2017-04-05 RX ADMIN — ACETAMINOPHEN 975 MG: 325 TABLET, FILM COATED ORAL at 14:00

## 2017-04-05 RX ADMIN — METHOCARBAMOL 750 MG: 750 TABLET ORAL at 21:43

## 2017-04-05 RX ADMIN — CLONAZEPAM 0.5 MG: 0.5 TABLET ORAL at 21:40

## 2017-04-05 RX ADMIN — ACETAMINOPHEN 975 MG: 325 TABLET, FILM COATED ORAL at 06:09

## 2017-04-05 RX ADMIN — HEPARIN SODIUM 5000 UNITS: 5000 INJECTION, SOLUTION INTRAVENOUS; SUBCUTANEOUS at 13:58

## 2017-04-05 RX ADMIN — DOCUSATE SODIUM 100 MG: 100 CAPSULE, LIQUID FILLED ORAL at 08:18

## 2017-04-05 RX ADMIN — ACETAMINOPHEN 975 MG: 325 TABLET, FILM COATED ORAL at 21:39

## 2017-04-05 RX ADMIN — PAROXETINE HYDROCHLORIDE 10 MG: 10 TABLET, FILM COATED ORAL at 08:18

## 2017-04-05 RX ADMIN — OXYCODONE HYDROCHLORIDE 10 MG: 10 TABLET ORAL at 06:08

## 2017-04-05 RX ADMIN — GABAPENTIN 100 MG: 100 CAPSULE ORAL at 17:05

## 2017-04-05 RX ADMIN — DOCUSATE SODIUM 100 MG: 100 CAPSULE, LIQUID FILLED ORAL at 17:05

## 2017-04-05 RX ADMIN — HEPARIN SODIUM 5000 UNITS: 5000 INJECTION, SOLUTION INTRAVENOUS; SUBCUTANEOUS at 06:10

## 2017-04-05 RX ADMIN — GABAPENTIN 100 MG: 100 CAPSULE ORAL at 21:40

## 2017-04-05 RX ADMIN — VITAMIN D, TAB 1000IU (100/BT) 2000 UNITS: 25 TAB at 08:18

## 2017-04-05 RX ADMIN — HEPARIN SODIUM 5000 UNITS: 5000 INJECTION, SOLUTION INTRAVENOUS; SUBCUTANEOUS at 21:40

## 2017-04-05 RX ADMIN — OXYCODONE HYDROCHLORIDE 10 MG: 10 TABLET ORAL at 11:17

## 2017-04-05 RX ADMIN — GABAPENTIN 100 MG: 100 CAPSULE ORAL at 08:18

## 2017-04-06 ENCOUNTER — HOSPITAL ENCOUNTER (INPATIENT)
Facility: HOSPITAL | Age: 67
LOS: 9 days | Discharge: HOME | DRG: 949 | End: 2017-04-15
Attending: PHYSICAL MEDICINE & REHABILITATION | Admitting: PHYSICAL MEDICINE & REHABILITATION
Payer: COMMERCIAL

## 2017-04-06 VITALS
SYSTOLIC BLOOD PRESSURE: 131 MMHG | RESPIRATION RATE: 18 BRPM | TEMPERATURE: 97.9 F | DIASTOLIC BLOOD PRESSURE: 62 MMHG | WEIGHT: 192.9 LBS | HEART RATE: 81 BPM | HEIGHT: 61 IN | BODY MASS INDEX: 36.42 KG/M2 | OXYGEN SATURATION: 99 %

## 2017-04-06 DIAGNOSIS — M48.061 SPINAL STENOSIS OF LUMBAR REGION AT MULTIPLE LEVELS: Primary | ICD-10-CM

## 2017-04-06 DIAGNOSIS — D62 ACUTE BLOOD LOSS ANEMIA: ICD-10-CM

## 2017-04-06 PROCEDURE — 97535 SELF CARE MNGMENT TRAINING: CPT

## 2017-04-06 PROCEDURE — 97166 OT EVAL MOD COMPLEX 45 MIN: CPT

## 2017-04-06 PROCEDURE — 97116 GAIT TRAINING THERAPY: CPT

## 2017-04-06 PROCEDURE — 97530 THERAPEUTIC ACTIVITIES: CPT

## 2017-04-06 RX ORDER — CLONAZEPAM 0.5 MG/1
0.5 TABLET ORAL
Status: DISCONTINUED | OUTPATIENT
Start: 2017-04-06 | End: 2017-04-14

## 2017-04-06 RX ORDER — METHOCARBAMOL 750 MG/1
750 TABLET, FILM COATED ORAL 4 TIMES DAILY PRN
Status: CANCELLED | OUTPATIENT
Start: 2017-04-06

## 2017-04-06 RX ORDER — BISACODYL 10 MG
10 SUPPOSITORY, RECTAL RECTAL DAILY PRN
Status: DISCONTINUED | OUTPATIENT
Start: 2017-04-06 | End: 2017-04-14

## 2017-04-06 RX ORDER — CLONAZEPAM 0.5 MG/1
0.5 TABLET ORAL
Status: CANCELLED | OUTPATIENT
Start: 2017-04-06

## 2017-04-06 RX ORDER — HEPARIN SODIUM 5000 [USP'U]/ML
5000 INJECTION, SOLUTION INTRAVENOUS; SUBCUTANEOUS EVERY 8 HOURS SCHEDULED
Status: CANCELLED | OUTPATIENT
Start: 2017-04-06

## 2017-04-06 RX ORDER — DOCUSATE SODIUM 100 MG/1
100 CAPSULE, LIQUID FILLED ORAL 2 TIMES DAILY
Status: DISCONTINUED | OUTPATIENT
Start: 2017-04-06 | End: 2017-04-14

## 2017-04-06 RX ORDER — ACETAMINOPHEN 325 MG/1
975 TABLET ORAL EVERY 8 HOURS SCHEDULED
Status: CANCELLED | OUTPATIENT
Start: 2017-04-06

## 2017-04-06 RX ORDER — PAROXETINE 10 MG/1
10 TABLET, FILM COATED ORAL EVERY MORNING
Status: CANCELLED | OUTPATIENT
Start: 2017-04-07

## 2017-04-06 RX ORDER — DOCUSATE SODIUM 100 MG/1
100 CAPSULE, LIQUID FILLED ORAL 2 TIMES DAILY
Status: CANCELLED | OUTPATIENT
Start: 2017-04-06

## 2017-04-06 RX ORDER — BISACODYL 10 MG
10 SUPPOSITORY, RECTAL RECTAL DAILY PRN
Status: CANCELLED | OUTPATIENT
Start: 2017-04-06

## 2017-04-06 RX ORDER — ACETAMINOPHEN 325 MG/1
975 TABLET ORAL EVERY 8 HOURS SCHEDULED
Status: DISCONTINUED | OUTPATIENT
Start: 2017-04-06 | End: 2017-04-15 | Stop reason: HOSPADM

## 2017-04-06 RX ORDER — HEPARIN SODIUM 5000 [USP'U]/ML
5000 INJECTION, SOLUTION INTRAVENOUS; SUBCUTANEOUS EVERY 8 HOURS SCHEDULED
Status: DISCONTINUED | OUTPATIENT
Start: 2017-04-06 | End: 2017-04-15 | Stop reason: HOSPADM

## 2017-04-06 RX ORDER — OXYCODONE HYDROCHLORIDE 10 MG/1
10 TABLET ORAL EVERY 4 HOURS PRN
Status: DISCONTINUED | OUTPATIENT
Start: 2017-04-06 | End: 2017-04-15 | Stop reason: HOSPADM

## 2017-04-06 RX ORDER — METHOCARBAMOL 500 MG/1
500 TABLET, FILM COATED ORAL EVERY 6 HOURS PRN
Status: DISCONTINUED | OUTPATIENT
Start: 2017-04-06 | End: 2017-04-15 | Stop reason: HOSPADM

## 2017-04-06 RX ORDER — OXYCODONE HYDROCHLORIDE 10 MG/1
10 TABLET ORAL EVERY 4 HOURS PRN
Status: CANCELLED | OUTPATIENT
Start: 2017-04-06

## 2017-04-06 RX ORDER — MELATONIN
2000 DAILY
Status: DISCONTINUED | OUTPATIENT
Start: 2017-04-07 | End: 2017-04-15 | Stop reason: HOSPADM

## 2017-04-06 RX ORDER — PAROXETINE HYDROCHLORIDE 20 MG/1
10 TABLET, FILM COATED ORAL EVERY MORNING
Status: DISCONTINUED | OUTPATIENT
Start: 2017-04-07 | End: 2017-04-15 | Stop reason: HOSPADM

## 2017-04-06 RX ORDER — OXYCODONE HYDROCHLORIDE 5 MG/1
5 TABLET ORAL EVERY 4 HOURS PRN
Status: DISCONTINUED | OUTPATIENT
Start: 2017-04-06 | End: 2017-04-15 | Stop reason: HOSPADM

## 2017-04-06 RX ORDER — MELATONIN
2000 DAILY
Status: CANCELLED | OUTPATIENT
Start: 2017-04-07

## 2017-04-06 RX ORDER — GABAPENTIN 100 MG/1
100 CAPSULE ORAL 3 TIMES DAILY
Status: CANCELLED | OUTPATIENT
Start: 2017-04-06

## 2017-04-06 RX ADMIN — ACETAMINOPHEN 975 MG: 325 TABLET, FILM COATED ORAL at 05:13

## 2017-04-06 RX ADMIN — HEPARIN SODIUM 5000 UNITS: 5000 INJECTION, SOLUTION INTRAVENOUS; SUBCUTANEOUS at 05:13

## 2017-04-06 RX ADMIN — DOCUSATE SODIUM 100 MG: 100 CAPSULE, LIQUID FILLED ORAL at 07:55

## 2017-04-06 RX ADMIN — HEPARIN SODIUM 5000 UNITS: 5000 INJECTION, SOLUTION INTRAVENOUS; SUBCUTANEOUS at 15:22

## 2017-04-06 RX ADMIN — ACETAMINOPHEN 975 MG: 325 TABLET, FILM COATED ORAL at 21:45

## 2017-04-06 RX ADMIN — OXYCODONE HYDROCHLORIDE 10 MG: 10 TABLET ORAL at 19:32

## 2017-04-06 RX ADMIN — VITAMIN D, TAB 1000IU (100/BT) 2000 UNITS: 25 TAB at 07:55

## 2017-04-06 RX ADMIN — DOCUSATE SODIUM 100 MG: 100 CAPSULE, LIQUID FILLED ORAL at 17:37

## 2017-04-06 RX ADMIN — ACETAMINOPHEN 975 MG: 325 TABLET, FILM COATED ORAL at 15:22

## 2017-04-06 RX ADMIN — HEPARIN SODIUM 5000 UNITS: 5000 INJECTION, SOLUTION INTRAVENOUS; SUBCUTANEOUS at 21:44

## 2017-04-06 RX ADMIN — GABAPENTIN 100 MG: 100 CAPSULE ORAL at 07:56

## 2017-04-06 RX ADMIN — OXYCODONE HYDROCHLORIDE 5 MG: 5 TABLET ORAL at 07:55

## 2017-04-06 RX ADMIN — CLONAZEPAM 0.5 MG: 0.5 TABLET ORAL at 21:45

## 2017-04-06 RX ADMIN — PAROXETINE HYDROCHLORIDE 10 MG: 10 TABLET, FILM COATED ORAL at 07:56

## 2017-04-07 ENCOUNTER — GENERIC CONVERSION - ENCOUNTER (OUTPATIENT)
Dept: OTHER | Facility: OTHER | Age: 67
End: 2017-04-07

## 2017-04-07 PROCEDURE — 97162 PT EVAL MOD COMPLEX 30 MIN: CPT

## 2017-04-07 PROCEDURE — 97112 NEUROMUSCULAR REEDUCATION: CPT

## 2017-04-07 PROCEDURE — 97535 SELF CARE MNGMENT TRAINING: CPT

## 2017-04-07 PROCEDURE — 97110 THERAPEUTIC EXERCISES: CPT

## 2017-04-07 PROCEDURE — 97530 THERAPEUTIC ACTIVITIES: CPT

## 2017-04-07 RX ADMIN — ACETAMINOPHEN 975 MG: 325 TABLET, FILM COATED ORAL at 05:48

## 2017-04-07 RX ADMIN — HEPARIN SODIUM 5000 UNITS: 5000 INJECTION, SOLUTION INTRAVENOUS; SUBCUTANEOUS at 14:32

## 2017-04-07 RX ADMIN — OXYCODONE HYDROCHLORIDE 10 MG: 10 TABLET ORAL at 12:17

## 2017-04-07 RX ADMIN — DOCUSATE SODIUM 100 MG: 100 CAPSULE, LIQUID FILLED ORAL at 08:54

## 2017-04-07 RX ADMIN — HEPARIN SODIUM 5000 UNITS: 5000 INJECTION, SOLUTION INTRAVENOUS; SUBCUTANEOUS at 05:48

## 2017-04-07 RX ADMIN — ACETAMINOPHEN 975 MG: 325 TABLET, FILM COATED ORAL at 21:22

## 2017-04-07 RX ADMIN — OXYCODONE HYDROCHLORIDE 10 MG: 10 TABLET ORAL at 21:22

## 2017-04-07 RX ADMIN — ACETAMINOPHEN 975 MG: 325 TABLET, FILM COATED ORAL at 14:31

## 2017-04-07 RX ADMIN — CLONAZEPAM 0.5 MG: 0.5 TABLET ORAL at 21:22

## 2017-04-07 RX ADMIN — OXYCODONE HYDROCHLORIDE 10 MG: 10 TABLET ORAL at 07:14

## 2017-04-07 RX ADMIN — PAROXETINE HYDROCHLORIDE 10 MG: 20 TABLET, FILM COATED ORAL at 08:52

## 2017-04-07 RX ADMIN — DOCUSATE SODIUM 100 MG: 100 CAPSULE, LIQUID FILLED ORAL at 18:38

## 2017-04-07 RX ADMIN — HEPARIN SODIUM 5000 UNITS: 5000 INJECTION, SOLUTION INTRAVENOUS; SUBCUTANEOUS at 21:22

## 2017-04-07 RX ADMIN — VITAMIN D, TAB 1000IU (100/BT) 2000 UNITS: 25 TAB at 08:54

## 2017-04-08 PROCEDURE — 97535 SELF CARE MNGMENT TRAINING: CPT

## 2017-04-08 PROCEDURE — 97112 NEUROMUSCULAR REEDUCATION: CPT

## 2017-04-08 PROCEDURE — 97530 THERAPEUTIC ACTIVITIES: CPT

## 2017-04-08 RX ADMIN — HEPARIN SODIUM 5000 UNITS: 5000 INJECTION, SOLUTION INTRAVENOUS; SUBCUTANEOUS at 14:32

## 2017-04-08 RX ADMIN — DOCUSATE SODIUM 100 MG: 100 CAPSULE, LIQUID FILLED ORAL at 10:01

## 2017-04-08 RX ADMIN — ACETAMINOPHEN 975 MG: 325 TABLET, FILM COATED ORAL at 05:37

## 2017-04-08 RX ADMIN — DOCUSATE SODIUM 100 MG: 100 CAPSULE, LIQUID FILLED ORAL at 18:57

## 2017-04-08 RX ADMIN — ACETAMINOPHEN 975 MG: 325 TABLET, FILM COATED ORAL at 21:30

## 2017-04-08 RX ADMIN — VITAMIN D, TAB 1000IU (100/BT) 2000 UNITS: 25 TAB at 10:00

## 2017-04-08 RX ADMIN — ACETAMINOPHEN 975 MG: 325 TABLET, FILM COATED ORAL at 14:31

## 2017-04-08 RX ADMIN — HEPARIN SODIUM 5000 UNITS: 5000 INJECTION, SOLUTION INTRAVENOUS; SUBCUTANEOUS at 05:39

## 2017-04-08 RX ADMIN — CLONAZEPAM 0.5 MG: 0.5 TABLET ORAL at 21:30

## 2017-04-08 RX ADMIN — OXYCODONE HYDROCHLORIDE 5 MG: 5 TABLET ORAL at 21:31

## 2017-04-08 RX ADMIN — OXYCODONE HYDROCHLORIDE 10 MG: 10 TABLET ORAL at 12:35

## 2017-04-08 RX ADMIN — PAROXETINE HYDROCHLORIDE 10 MG: 20 TABLET, FILM COATED ORAL at 10:01

## 2017-04-08 RX ADMIN — HEPARIN SODIUM 5000 UNITS: 5000 INJECTION, SOLUTION INTRAVENOUS; SUBCUTANEOUS at 21:30

## 2017-04-08 RX ADMIN — METHOCARBAMOL 500 MG: 500 TABLET ORAL at 10:04

## 2017-04-09 PROCEDURE — 97110 THERAPEUTIC EXERCISES: CPT

## 2017-04-09 PROCEDURE — 97530 THERAPEUTIC ACTIVITIES: CPT

## 2017-04-09 PROCEDURE — 97112 NEUROMUSCULAR REEDUCATION: CPT

## 2017-04-09 RX ADMIN — ACETAMINOPHEN 975 MG: 325 TABLET, FILM COATED ORAL at 14:03

## 2017-04-09 RX ADMIN — ACETAMINOPHEN 975 MG: 325 TABLET, FILM COATED ORAL at 06:06

## 2017-04-09 RX ADMIN — METHOCARBAMOL 500 MG: 500 TABLET ORAL at 02:32

## 2017-04-09 RX ADMIN — HEPARIN SODIUM 5000 UNITS: 5000 INJECTION, SOLUTION INTRAVENOUS; SUBCUTANEOUS at 14:03

## 2017-04-09 RX ADMIN — ACETAMINOPHEN 975 MG: 325 TABLET, FILM COATED ORAL at 22:20

## 2017-04-09 RX ADMIN — HEPARIN SODIUM 5000 UNITS: 5000 INJECTION, SOLUTION INTRAVENOUS; SUBCUTANEOUS at 22:20

## 2017-04-09 RX ADMIN — DOCUSATE SODIUM 100 MG: 100 CAPSULE, LIQUID FILLED ORAL at 17:27

## 2017-04-09 RX ADMIN — DOCUSATE SODIUM 100 MG: 100 CAPSULE, LIQUID FILLED ORAL at 09:32

## 2017-04-09 RX ADMIN — METHOCARBAMOL 500 MG: 500 TABLET ORAL at 09:34

## 2017-04-09 RX ADMIN — OXYCODONE HYDROCHLORIDE 5 MG: 5 TABLET ORAL at 22:19

## 2017-04-09 RX ADMIN — VITAMIN D, TAB 1000IU (100/BT) 2000 UNITS: 25 TAB at 09:32

## 2017-04-09 RX ADMIN — METHOCARBAMOL 500 MG: 500 TABLET ORAL at 22:20

## 2017-04-09 RX ADMIN — CLONAZEPAM 0.5 MG: 0.5 TABLET ORAL at 22:19

## 2017-04-09 RX ADMIN — PAROXETINE HYDROCHLORIDE 10 MG: 20 TABLET, FILM COATED ORAL at 09:31

## 2017-04-09 RX ADMIN — HEPARIN SODIUM 5000 UNITS: 5000 INJECTION, SOLUTION INTRAVENOUS; SUBCUTANEOUS at 06:06

## 2017-04-10 LAB
ANION GAP SERPL CALCULATED.3IONS-SCNC: 7 MMOL/L (ref 4–13)
BASOPHILS # BLD AUTO: 0.02 THOUSANDS/ΜL (ref 0–0.1)
BASOPHILS NFR BLD AUTO: 0 % (ref 0–1)
BUN SERPL-MCNC: 19 MG/DL (ref 5–25)
CALCIUM SERPL-MCNC: 8.7 MG/DL (ref 8.3–10.1)
CHLORIDE SERPL-SCNC: 103 MMOL/L (ref 100–108)
CO2 SERPL-SCNC: 29 MMOL/L (ref 21–32)
CREAT SERPL-MCNC: 0.62 MG/DL (ref 0.6–1.3)
EOSINOPHIL # BLD AUTO: 0.44 THOUSAND/ΜL (ref 0–0.61)
EOSINOPHIL NFR BLD AUTO: 5 % (ref 0–6)
ERYTHROCYTE [DISTWIDTH] IN BLOOD BY AUTOMATED COUNT: 15 % (ref 11.6–15.1)
GFR SERPL CREATININE-BSD FRML MDRD: >60 ML/MIN/1.73SQ M
GLUCOSE SERPL-MCNC: 98 MG/DL (ref 65–140)
HCT VFR BLD AUTO: 34 % (ref 34.8–46.1)
HGB BLD-MCNC: 10.8 G/DL (ref 11.5–15.4)
LYMPHOCYTES # BLD AUTO: 1.59 THOUSANDS/ΜL (ref 0.6–4.47)
LYMPHOCYTES NFR BLD AUTO: 18 % (ref 14–44)
MCH RBC QN AUTO: 29.1 PG (ref 26.8–34.3)
MCHC RBC AUTO-ENTMCNC: 31.8 G/DL (ref 31.4–37.4)
MCV RBC AUTO: 92 FL (ref 82–98)
MONOCYTES # BLD AUTO: 0.86 THOUSAND/ΜL (ref 0.17–1.22)
MONOCYTES NFR BLD AUTO: 10 % (ref 4–12)
NEUTROPHILS # BLD AUTO: 5.72 THOUSANDS/ΜL (ref 1.85–7.62)
NEUTS SEG NFR BLD AUTO: 67 % (ref 43–75)
NRBC BLD AUTO-RTO: 0 /100 WBCS
PLATELET # BLD AUTO: 374 THOUSANDS/UL (ref 149–390)
PMV BLD AUTO: 9.8 FL (ref 8.9–12.7)
POTASSIUM SERPL-SCNC: 4.2 MMOL/L (ref 3.5–5.3)
RBC # BLD AUTO: 3.71 MILLION/UL (ref 3.81–5.12)
SODIUM SERPL-SCNC: 139 MMOL/L (ref 136–145)
WBC # BLD AUTO: 8.68 THOUSAND/UL (ref 4.31–10.16)

## 2017-04-10 PROCEDURE — 80048 BASIC METABOLIC PNL TOTAL CA: CPT | Performed by: PHYSICIAN ASSISTANT

## 2017-04-10 PROCEDURE — 85025 COMPLETE CBC W/AUTO DIFF WBC: CPT | Performed by: PHYSICIAN ASSISTANT

## 2017-04-10 PROCEDURE — 97110 THERAPEUTIC EXERCISES: CPT

## 2017-04-10 PROCEDURE — 97112 NEUROMUSCULAR REEDUCATION: CPT

## 2017-04-10 PROCEDURE — 97110 THERAPEUTIC EXERCISES: CPT | Performed by: STUDENT IN AN ORGANIZED HEALTH CARE EDUCATION/TRAINING PROGRAM

## 2017-04-10 PROCEDURE — 97530 THERAPEUTIC ACTIVITIES: CPT | Performed by: PHYSICAL THERAPIST

## 2017-04-10 PROCEDURE — 97116 GAIT TRAINING THERAPY: CPT

## 2017-04-10 PROCEDURE — 97535 SELF CARE MNGMENT TRAINING: CPT

## 2017-04-10 PROCEDURE — 97116 GAIT TRAINING THERAPY: CPT | Performed by: PHYSICAL THERAPIST

## 2017-04-10 PROCEDURE — 97530 THERAPEUTIC ACTIVITIES: CPT | Performed by: STUDENT IN AN ORGANIZED HEALTH CARE EDUCATION/TRAINING PROGRAM

## 2017-04-10 PROCEDURE — 97530 THERAPEUTIC ACTIVITIES: CPT

## 2017-04-10 PROCEDURE — 97110 THERAPEUTIC EXERCISES: CPT | Performed by: PHYSICAL THERAPIST

## 2017-04-10 RX ADMIN — METHOCARBAMOL 500 MG: 500 TABLET ORAL at 20:40

## 2017-04-10 RX ADMIN — DOCUSATE SODIUM 100 MG: 100 CAPSULE, LIQUID FILLED ORAL at 09:53

## 2017-04-10 RX ADMIN — ACETAMINOPHEN 975 MG: 325 TABLET, FILM COATED ORAL at 21:53

## 2017-04-10 RX ADMIN — ACETAMINOPHEN 975 MG: 325 TABLET, FILM COATED ORAL at 05:38

## 2017-04-10 RX ADMIN — OXYCODONE HYDROCHLORIDE 5 MG: 5 TABLET ORAL at 12:13

## 2017-04-10 RX ADMIN — CLONAZEPAM 0.5 MG: 0.5 TABLET ORAL at 21:52

## 2017-04-10 RX ADMIN — PAROXETINE HYDROCHLORIDE 10 MG: 20 TABLET, FILM COATED ORAL at 09:53

## 2017-04-10 RX ADMIN — DOCUSATE SODIUM 100 MG: 100 CAPSULE, LIQUID FILLED ORAL at 17:33

## 2017-04-10 RX ADMIN — HEPARIN SODIUM 5000 UNITS: 5000 INJECTION, SOLUTION INTRAVENOUS; SUBCUTANEOUS at 21:52

## 2017-04-10 RX ADMIN — HEPARIN SODIUM 5000 UNITS: 5000 INJECTION, SOLUTION INTRAVENOUS; SUBCUTANEOUS at 13:49

## 2017-04-10 RX ADMIN — VITAMIN D, TAB 1000IU (100/BT) 2000 UNITS: 25 TAB at 09:53

## 2017-04-10 RX ADMIN — METHOCARBAMOL 500 MG: 500 TABLET ORAL at 06:45

## 2017-04-10 RX ADMIN — HEPARIN SODIUM 5000 UNITS: 5000 INJECTION, SOLUTION INTRAVENOUS; SUBCUTANEOUS at 05:38

## 2017-04-10 RX ADMIN — OXYCODONE HYDROCHLORIDE 10 MG: 10 TABLET ORAL at 20:40

## 2017-04-10 RX ADMIN — ACETAMINOPHEN 975 MG: 325 TABLET, FILM COATED ORAL at 13:48

## 2017-04-11 PROCEDURE — 97110 THERAPEUTIC EXERCISES: CPT

## 2017-04-11 PROCEDURE — 97535 SELF CARE MNGMENT TRAINING: CPT

## 2017-04-11 PROCEDURE — 97116 GAIT TRAINING THERAPY: CPT

## 2017-04-11 PROCEDURE — 97530 THERAPEUTIC ACTIVITIES: CPT

## 2017-04-11 RX ORDER — GABAPENTIN 100 MG/1
100 CAPSULE ORAL
Status: DISCONTINUED | OUTPATIENT
Start: 2017-04-11 | End: 2017-04-14

## 2017-04-11 RX ADMIN — HEPARIN SODIUM 5000 UNITS: 5000 INJECTION, SOLUTION INTRAVENOUS; SUBCUTANEOUS at 21:36

## 2017-04-11 RX ADMIN — PAROXETINE HYDROCHLORIDE 10 MG: 20 TABLET, FILM COATED ORAL at 09:08

## 2017-04-11 RX ADMIN — HEPARIN SODIUM 5000 UNITS: 5000 INJECTION, SOLUTION INTRAVENOUS; SUBCUTANEOUS at 06:26

## 2017-04-11 RX ADMIN — DOCUSATE SODIUM 100 MG: 100 CAPSULE, LIQUID FILLED ORAL at 17:36

## 2017-04-11 RX ADMIN — CLONAZEPAM 0.5 MG: 0.5 TABLET ORAL at 21:36

## 2017-04-11 RX ADMIN — GABAPENTIN 100 MG: 100 CAPSULE ORAL at 21:36

## 2017-04-11 RX ADMIN — VITAMIN D, TAB 1000IU (100/BT) 2000 UNITS: 25 TAB at 09:09

## 2017-04-11 RX ADMIN — HEPARIN SODIUM 5000 UNITS: 5000 INJECTION, SOLUTION INTRAVENOUS; SUBCUTANEOUS at 14:34

## 2017-04-11 RX ADMIN — METHOCARBAMOL 500 MG: 500 TABLET ORAL at 21:42

## 2017-04-11 RX ADMIN — METHOCARBAMOL 500 MG: 500 TABLET ORAL at 12:34

## 2017-04-11 RX ADMIN — OXYCODONE HYDROCHLORIDE 5 MG: 5 TABLET ORAL at 14:33

## 2017-04-11 RX ADMIN — ACETAMINOPHEN 975 MG: 325 TABLET, FILM COATED ORAL at 06:26

## 2017-04-11 RX ADMIN — DOCUSATE SODIUM 100 MG: 100 CAPSULE, LIQUID FILLED ORAL at 09:09

## 2017-04-11 RX ADMIN — ACETAMINOPHEN 975 MG: 325 TABLET, FILM COATED ORAL at 14:33

## 2017-04-11 RX ADMIN — ACETAMINOPHEN 975 MG: 325 TABLET, FILM COATED ORAL at 21:36

## 2017-04-12 PROCEDURE — 97530 THERAPEUTIC ACTIVITIES: CPT | Performed by: OCCUPATIONAL THERAPY ASSISTANT

## 2017-04-12 PROCEDURE — 97110 THERAPEUTIC EXERCISES: CPT | Performed by: OCCUPATIONAL THERAPY ASSISTANT

## 2017-04-12 PROCEDURE — 97112 NEUROMUSCULAR REEDUCATION: CPT

## 2017-04-12 PROCEDURE — 97530 THERAPEUTIC ACTIVITIES: CPT

## 2017-04-12 PROCEDURE — 97116 GAIT TRAINING THERAPY: CPT

## 2017-04-12 RX ADMIN — ACETAMINOPHEN 975 MG: 325 TABLET, FILM COATED ORAL at 05:48

## 2017-04-12 RX ADMIN — HEPARIN SODIUM 5000 UNITS: 5000 INJECTION, SOLUTION INTRAVENOUS; SUBCUTANEOUS at 05:48

## 2017-04-12 RX ADMIN — METHOCARBAMOL 500 MG: 500 TABLET ORAL at 22:04

## 2017-04-12 RX ADMIN — ACETAMINOPHEN 975 MG: 325 TABLET, FILM COATED ORAL at 14:01

## 2017-04-12 RX ADMIN — OXYCODONE HYDROCHLORIDE 10 MG: 10 TABLET ORAL at 22:04

## 2017-04-12 RX ADMIN — CLONAZEPAM 0.5 MG: 0.5 TABLET ORAL at 21:18

## 2017-04-12 RX ADMIN — GABAPENTIN 100 MG: 100 CAPSULE ORAL at 21:18

## 2017-04-12 RX ADMIN — OXYCODONE HYDROCHLORIDE 5 MG: 5 TABLET ORAL at 08:23

## 2017-04-12 RX ADMIN — HEPARIN SODIUM 5000 UNITS: 5000 INJECTION, SOLUTION INTRAVENOUS; SUBCUTANEOUS at 21:19

## 2017-04-12 RX ADMIN — DOCUSATE SODIUM 100 MG: 100 CAPSULE, LIQUID FILLED ORAL at 08:24

## 2017-04-12 RX ADMIN — VITAMIN D, TAB 1000IU (100/BT) 2000 UNITS: 25 TAB at 08:24

## 2017-04-12 RX ADMIN — HEPARIN SODIUM 5000 UNITS: 5000 INJECTION, SOLUTION INTRAVENOUS; SUBCUTANEOUS at 14:02

## 2017-04-12 RX ADMIN — METHOCARBAMOL 500 MG: 500 TABLET ORAL at 14:01

## 2017-04-12 RX ADMIN — ACETAMINOPHEN 975 MG: 325 TABLET, FILM COATED ORAL at 21:17

## 2017-04-12 RX ADMIN — PAROXETINE HYDROCHLORIDE 10 MG: 20 TABLET, FILM COATED ORAL at 08:24

## 2017-04-13 PROCEDURE — 97112 NEUROMUSCULAR REEDUCATION: CPT

## 2017-04-13 PROCEDURE — 97530 THERAPEUTIC ACTIVITIES: CPT

## 2017-04-13 PROCEDURE — 97110 THERAPEUTIC EXERCISES: CPT

## 2017-04-13 PROCEDURE — 97537 COMMUNITY/WORK REINTEGRATION: CPT

## 2017-04-13 RX ADMIN — CLONAZEPAM 0.5 MG: 0.5 TABLET ORAL at 21:32

## 2017-04-13 RX ADMIN — VITAMIN D, TAB 1000IU (100/BT) 2000 UNITS: 25 TAB at 08:48

## 2017-04-13 RX ADMIN — OXYCODONE HYDROCHLORIDE 5 MG: 5 TABLET ORAL at 10:10

## 2017-04-13 RX ADMIN — HEPARIN SODIUM 5000 UNITS: 5000 INJECTION, SOLUTION INTRAVENOUS; SUBCUTANEOUS at 05:24

## 2017-04-13 RX ADMIN — PAROXETINE HYDROCHLORIDE 10 MG: 20 TABLET, FILM COATED ORAL at 08:48

## 2017-04-13 RX ADMIN — OXYCODONE HYDROCHLORIDE 10 MG: 10 TABLET ORAL at 21:37

## 2017-04-13 RX ADMIN — ACETAMINOPHEN 975 MG: 325 TABLET, FILM COATED ORAL at 21:32

## 2017-04-13 RX ADMIN — HEPARIN SODIUM 5000 UNITS: 5000 INJECTION, SOLUTION INTRAVENOUS; SUBCUTANEOUS at 21:33

## 2017-04-13 RX ADMIN — DOCUSATE SODIUM 100 MG: 100 CAPSULE, LIQUID FILLED ORAL at 08:48

## 2017-04-13 RX ADMIN — GABAPENTIN 100 MG: 100 CAPSULE ORAL at 21:32

## 2017-04-13 RX ADMIN — METHOCARBAMOL 500 MG: 500 TABLET ORAL at 16:18

## 2017-04-13 RX ADMIN — HEPARIN SODIUM 5000 UNITS: 5000 INJECTION, SOLUTION INTRAVENOUS; SUBCUTANEOUS at 13:56

## 2017-04-13 RX ADMIN — METHOCARBAMOL 500 MG: 500 TABLET ORAL at 10:10

## 2017-04-13 RX ADMIN — ACETAMINOPHEN 975 MG: 325 TABLET, FILM COATED ORAL at 13:56

## 2017-04-13 RX ADMIN — ACETAMINOPHEN 975 MG: 325 TABLET, FILM COATED ORAL at 05:24

## 2017-04-14 PROCEDURE — 97110 THERAPEUTIC EXERCISES: CPT | Performed by: OCCUPATIONAL THERAPY ASSISTANT

## 2017-04-14 PROCEDURE — 97535 SELF CARE MNGMENT TRAINING: CPT | Performed by: OCCUPATIONAL THERAPY ASSISTANT

## 2017-04-14 PROCEDURE — 97110 THERAPEUTIC EXERCISES: CPT

## 2017-04-14 PROCEDURE — 97530 THERAPEUTIC ACTIVITIES: CPT

## 2017-04-14 RX ORDER — CLONAZEPAM 0.5 MG/1
0.5 TABLET ORAL
COMMUNITY
End: 2017-04-15 | Stop reason: HOSPADM

## 2017-04-14 RX ORDER — CLONAZEPAM 0.5 MG/1
0.5 TABLET ORAL
Status: COMPLETED | OUTPATIENT
Start: 2017-04-14 | End: 2017-04-14

## 2017-04-14 RX ORDER — OXYCODONE HYDROCHLORIDE 5 MG/1
TABLET ORAL
Qty: 20 TABLET | Refills: 0 | Status: SHIPPED | OUTPATIENT
Start: 2017-04-15 | End: 2018-02-08

## 2017-04-14 RX ORDER — GABAPENTIN 100 MG/1
100 CAPSULE ORAL
Status: COMPLETED | OUTPATIENT
Start: 2017-04-14 | End: 2017-04-14

## 2017-04-14 RX ADMIN — OXYCODONE HYDROCHLORIDE 5 MG: 5 TABLET ORAL at 14:56

## 2017-04-14 RX ADMIN — HEPARIN SODIUM 5000 UNITS: 5000 INJECTION, SOLUTION INTRAVENOUS; SUBCUTANEOUS at 14:56

## 2017-04-14 RX ADMIN — CLONAZEPAM 0.5 MG: 0.5 TABLET ORAL at 21:20

## 2017-04-14 RX ADMIN — OXYCODONE HYDROCHLORIDE 5 MG: 5 TABLET ORAL at 09:51

## 2017-04-14 RX ADMIN — ACETAMINOPHEN 975 MG: 325 TABLET, FILM COATED ORAL at 14:55

## 2017-04-14 RX ADMIN — HEPARIN SODIUM 5000 UNITS: 5000 INJECTION, SOLUTION INTRAVENOUS; SUBCUTANEOUS at 05:33

## 2017-04-14 RX ADMIN — DOCUSATE SODIUM 100 MG: 100 CAPSULE, LIQUID FILLED ORAL at 09:51

## 2017-04-14 RX ADMIN — ACETAMINOPHEN 975 MG: 325 TABLET, FILM COATED ORAL at 21:20

## 2017-04-14 RX ADMIN — GABAPENTIN 100 MG: 100 CAPSULE ORAL at 21:20

## 2017-04-14 RX ADMIN — HEPARIN SODIUM 5000 UNITS: 5000 INJECTION, SOLUTION INTRAVENOUS; SUBCUTANEOUS at 21:20

## 2017-04-14 RX ADMIN — METHOCARBAMOL 500 MG: 500 TABLET ORAL at 21:20

## 2017-04-14 RX ADMIN — VITAMIN D, TAB 1000IU (100/BT) 2000 UNITS: 25 TAB at 09:51

## 2017-04-14 RX ADMIN — PAROXETINE HYDROCHLORIDE 10 MG: 20 TABLET, FILM COATED ORAL at 09:51

## 2017-04-14 RX ADMIN — OXYCODONE HYDROCHLORIDE 10 MG: 10 TABLET ORAL at 21:21

## 2017-04-14 RX ADMIN — ACETAMINOPHEN 975 MG: 325 TABLET, FILM COATED ORAL at 05:33

## 2017-04-15 VITALS
SYSTOLIC BLOOD PRESSURE: 121 MMHG | HEART RATE: 84 BPM | HEIGHT: 61 IN | DIASTOLIC BLOOD PRESSURE: 58 MMHG | WEIGHT: 181.44 LBS | RESPIRATION RATE: 20 BRPM | TEMPERATURE: 98 F | BODY MASS INDEX: 34.26 KG/M2 | OXYGEN SATURATION: 97 %

## 2017-04-15 RX ADMIN — PAROXETINE HYDROCHLORIDE 10 MG: 20 TABLET, FILM COATED ORAL at 08:10

## 2017-04-15 RX ADMIN — HEPARIN SODIUM 5000 UNITS: 5000 INJECTION, SOLUTION INTRAVENOUS; SUBCUTANEOUS at 05:39

## 2017-04-15 RX ADMIN — ACETAMINOPHEN 975 MG: 325 TABLET, FILM COATED ORAL at 05:39

## 2017-04-15 RX ADMIN — VITAMIN D, TAB 1000IU (100/BT) 2000 UNITS: 25 TAB at 08:11

## 2017-04-17 ENCOUNTER — APPOINTMENT (OUTPATIENT)
Dept: PHYSICAL THERAPY | Facility: REHABILITATION | Age: 67
End: 2017-04-17
Payer: COMMERCIAL

## 2017-04-17 ENCOUNTER — ALLSCRIPTS OFFICE VISIT (OUTPATIENT)
Dept: OTHER | Facility: OTHER | Age: 67
End: 2017-04-17

## 2017-04-17 DIAGNOSIS — M75.41 IMPINGEMENT SYNDROME OF RIGHT SHOULDER: ICD-10-CM

## 2017-04-17 PROCEDURE — 97110 THERAPEUTIC EXERCISES: CPT

## 2017-04-17 PROCEDURE — 97162 PT EVAL MOD COMPLEX 30 MIN: CPT

## 2017-04-20 ENCOUNTER — GENERIC CONVERSION - ENCOUNTER (OUTPATIENT)
Dept: OTHER | Facility: OTHER | Age: 67
End: 2017-04-20

## 2017-04-20 ENCOUNTER — OFFICE VISIT (OUTPATIENT)
Dept: PHYSICAL THERAPY | Facility: REHABILITATION | Age: 67
End: 2017-04-20
Payer: COMMERCIAL

## 2017-04-20 PROCEDURE — 97140 MANUAL THERAPY 1/> REGIONS: CPT

## 2017-04-20 PROCEDURE — 97112 NEUROMUSCULAR REEDUCATION: CPT

## 2017-04-23 LAB
ATRIAL RATE: 91 BPM
P AXIS: 67 DEGREES
PR INTERVAL: 168 MS
QRS AXIS: 14 DEGREES
QRSD INTERVAL: 74 MS
QT INTERVAL: 362 MS
QTC INTERVAL: 445 MS
T WAVE AXIS: 80 DEGREES
VENTRICULAR RATE: 91 BPM

## 2017-04-24 ENCOUNTER — APPOINTMENT (OUTPATIENT)
Dept: PHYSICAL THERAPY | Facility: REHABILITATION | Age: 67
End: 2017-04-24
Payer: COMMERCIAL

## 2017-04-24 PROCEDURE — 97112 NEUROMUSCULAR REEDUCATION: CPT

## 2017-04-27 ENCOUNTER — APPOINTMENT (OUTPATIENT)
Dept: PHYSICAL THERAPY | Facility: REHABILITATION | Age: 67
End: 2017-04-27
Payer: COMMERCIAL

## 2017-04-27 PROCEDURE — 97112 NEUROMUSCULAR REEDUCATION: CPT

## 2017-05-01 ENCOUNTER — APPOINTMENT (OUTPATIENT)
Dept: PHYSICAL THERAPY | Facility: REHABILITATION | Age: 67
End: 2017-05-01
Payer: COMMERCIAL

## 2017-05-01 PROCEDURE — 97112 NEUROMUSCULAR REEDUCATION: CPT

## 2017-05-04 ENCOUNTER — APPOINTMENT (OUTPATIENT)
Dept: PHYSICAL THERAPY | Facility: REHABILITATION | Age: 67
End: 2017-05-04
Payer: COMMERCIAL

## 2017-05-04 PROCEDURE — 97110 THERAPEUTIC EXERCISES: CPT

## 2017-05-04 PROCEDURE — 97112 NEUROMUSCULAR REEDUCATION: CPT

## 2017-05-08 ENCOUNTER — APPOINTMENT (OUTPATIENT)
Dept: PHYSICAL THERAPY | Facility: REHABILITATION | Age: 67
End: 2017-05-08
Payer: COMMERCIAL

## 2017-05-08 PROCEDURE — 97112 NEUROMUSCULAR REEDUCATION: CPT

## 2017-05-08 PROCEDURE — 97110 THERAPEUTIC EXERCISES: CPT

## 2017-05-11 ENCOUNTER — APPOINTMENT (OUTPATIENT)
Dept: PHYSICAL THERAPY | Facility: REHABILITATION | Age: 67
End: 2017-05-11
Payer: COMMERCIAL

## 2017-05-11 PROCEDURE — 97112 NEUROMUSCULAR REEDUCATION: CPT

## 2017-05-11 PROCEDURE — 97110 THERAPEUTIC EXERCISES: CPT

## 2017-05-15 ENCOUNTER — APPOINTMENT (OUTPATIENT)
Dept: LAB | Facility: HOSPITAL | Age: 67
End: 2017-05-15
Attending: PHYSICAL MEDICINE & REHABILITATION
Payer: COMMERCIAL

## 2017-05-15 ENCOUNTER — TRANSCRIBE ORDERS (OUTPATIENT)
Dept: LAB | Facility: HOSPITAL | Age: 67
End: 2017-05-15

## 2017-05-15 ENCOUNTER — HOSPITAL ENCOUNTER (OUTPATIENT)
Dept: RADIOLOGY | Facility: HOSPITAL | Age: 67
Discharge: HOME | End: 2017-05-15
Attending: PHYSICAL MEDICINE & REHABILITATION
Payer: COMMERCIAL

## 2017-05-15 ENCOUNTER — APPOINTMENT (OUTPATIENT)
Dept: PHYSICAL THERAPY | Facility: REHABILITATION | Age: 67
End: 2017-05-15
Payer: COMMERCIAL

## 2017-05-15 DIAGNOSIS — Z98.890 S/P LUMBAR LAMINECTOMY: Primary | ICD-10-CM

## 2017-05-15 DIAGNOSIS — Z98.890 S/P LUMBAR LAMINECTOMY: ICD-10-CM

## 2017-05-15 DIAGNOSIS — D62 ACUTE BLOOD LOSS ANEMIA: ICD-10-CM

## 2017-05-15 LAB
ERYTHROCYTE [DISTWIDTH] IN BLOOD BY AUTOMATED COUNT: 14.1 % (ref 11.6–15.1)
HCT VFR BLD AUTO: 40.1 % (ref 34.8–46.1)
HGB BLD-MCNC: 12.7 G/DL (ref 11.5–15.4)
MCH RBC QN AUTO: 29.1 PG (ref 26.8–34.3)
MCHC RBC AUTO-ENTMCNC: 31.7 G/DL (ref 31.4–37.4)
MCV RBC AUTO: 92 FL (ref 82–98)
PLATELET # BLD AUTO: 417 THOUSANDS/UL (ref 149–390)
PMV BLD AUTO: 9.9 FL (ref 8.9–12.7)
RBC # BLD AUTO: 4.36 MILLION/UL (ref 3.81–5.12)
WBC # BLD AUTO: 7.27 THOUSAND/UL (ref 4.31–10.16)

## 2017-05-15 PROCEDURE — 97112 NEUROMUSCULAR REEDUCATION: CPT

## 2017-05-15 PROCEDURE — 36415 COLL VENOUS BLD VENIPUNCTURE: CPT

## 2017-05-15 PROCEDURE — 85027 COMPLETE CBC AUTOMATED: CPT

## 2017-05-15 PROCEDURE — 97110 THERAPEUTIC EXERCISES: CPT

## 2017-05-15 PROCEDURE — 72100 X-RAY EXAM L-S SPINE 2/3 VWS: CPT

## 2017-05-18 ENCOUNTER — ALLSCRIPTS OFFICE VISIT (OUTPATIENT)
Dept: OTHER | Facility: OTHER | Age: 67
End: 2017-05-18

## 2017-05-18 ENCOUNTER — APPOINTMENT (OUTPATIENT)
Dept: PHYSICAL THERAPY | Facility: REHABILITATION | Age: 67
End: 2017-05-18
Payer: COMMERCIAL

## 2017-05-18 DIAGNOSIS — Z48.89 OTHER SPECIFIED AFTERCARE FOLLOWING SURGERY: ICD-10-CM

## 2017-05-18 PROCEDURE — 97112 NEUROMUSCULAR REEDUCATION: CPT

## 2017-05-18 PROCEDURE — 97110 THERAPEUTIC EXERCISES: CPT

## 2017-05-22 ENCOUNTER — APPOINTMENT (OUTPATIENT)
Dept: PHYSICAL THERAPY | Facility: REHABILITATION | Age: 67
End: 2017-05-22
Payer: COMMERCIAL

## 2017-05-22 PROCEDURE — 97112 NEUROMUSCULAR REEDUCATION: CPT

## 2017-05-22 PROCEDURE — 97110 THERAPEUTIC EXERCISES: CPT

## 2017-05-25 ENCOUNTER — APPOINTMENT (OUTPATIENT)
Dept: PHYSICAL THERAPY | Facility: REHABILITATION | Age: 67
End: 2017-05-25
Payer: COMMERCIAL

## 2017-05-25 PROCEDURE — 97110 THERAPEUTIC EXERCISES: CPT

## 2017-05-25 PROCEDURE — 97112 NEUROMUSCULAR REEDUCATION: CPT

## 2017-05-30 ENCOUNTER — APPOINTMENT (OUTPATIENT)
Dept: PHYSICAL THERAPY | Facility: REHABILITATION | Age: 67
End: 2017-05-30
Payer: COMMERCIAL

## 2017-05-30 PROCEDURE — 97110 THERAPEUTIC EXERCISES: CPT

## 2017-05-30 PROCEDURE — 97112 NEUROMUSCULAR REEDUCATION: CPT

## 2017-06-01 ENCOUNTER — APPOINTMENT (OUTPATIENT)
Dept: PHYSICAL THERAPY | Facility: REHABILITATION | Age: 67
End: 2017-06-01
Payer: COMMERCIAL

## 2017-06-01 PROCEDURE — 97112 NEUROMUSCULAR REEDUCATION: CPT

## 2017-06-01 PROCEDURE — 97110 THERAPEUTIC EXERCISES: CPT

## 2017-06-05 ENCOUNTER — APPOINTMENT (OUTPATIENT)
Dept: PHYSICAL THERAPY | Facility: REHABILITATION | Age: 67
End: 2017-06-05
Payer: COMMERCIAL

## 2017-06-05 PROCEDURE — 97112 NEUROMUSCULAR REEDUCATION: CPT

## 2017-06-05 PROCEDURE — 97110 THERAPEUTIC EXERCISES: CPT

## 2017-06-08 ENCOUNTER — APPOINTMENT (OUTPATIENT)
Dept: PHYSICAL THERAPY | Facility: REHABILITATION | Age: 67
End: 2017-06-08
Payer: COMMERCIAL

## 2017-06-08 PROCEDURE — 97110 THERAPEUTIC EXERCISES: CPT

## 2017-06-08 PROCEDURE — 97112 NEUROMUSCULAR REEDUCATION: CPT

## 2017-06-12 ENCOUNTER — APPOINTMENT (OUTPATIENT)
Dept: PHYSICAL THERAPY | Facility: REHABILITATION | Age: 67
End: 2017-06-12
Payer: COMMERCIAL

## 2017-06-12 PROCEDURE — 97110 THERAPEUTIC EXERCISES: CPT

## 2017-06-12 PROCEDURE — 97112 NEUROMUSCULAR REEDUCATION: CPT

## 2017-06-15 ENCOUNTER — APPOINTMENT (OUTPATIENT)
Dept: PHYSICAL THERAPY | Facility: REHABILITATION | Age: 67
End: 2017-06-15
Payer: COMMERCIAL

## 2017-06-15 PROCEDURE — 97112 NEUROMUSCULAR REEDUCATION: CPT

## 2017-06-15 PROCEDURE — 97110 THERAPEUTIC EXERCISES: CPT

## 2017-06-19 ENCOUNTER — APPOINTMENT (OUTPATIENT)
Dept: PHYSICAL THERAPY | Facility: REHABILITATION | Age: 67
End: 2017-06-19
Payer: COMMERCIAL

## 2017-06-19 PROCEDURE — 97110 THERAPEUTIC EXERCISES: CPT

## 2017-06-19 PROCEDURE — 97112 NEUROMUSCULAR REEDUCATION: CPT

## 2017-06-22 ENCOUNTER — APPOINTMENT (OUTPATIENT)
Dept: PHYSICAL THERAPY | Facility: REHABILITATION | Age: 67
End: 2017-06-22
Payer: COMMERCIAL

## 2017-06-22 ENCOUNTER — ALLSCRIPTS OFFICE VISIT (OUTPATIENT)
Dept: OTHER | Facility: OTHER | Age: 67
End: 2017-06-22

## 2017-06-22 DIAGNOSIS — Z48.89 OTHER SPECIFIED AFTERCARE FOLLOWING SURGERY: ICD-10-CM

## 2017-06-22 PROCEDURE — 97110 THERAPEUTIC EXERCISES: CPT

## 2017-06-22 PROCEDURE — 97112 NEUROMUSCULAR REEDUCATION: CPT

## 2017-06-26 ENCOUNTER — APPOINTMENT (OUTPATIENT)
Dept: PHYSICAL THERAPY | Facility: REHABILITATION | Age: 67
End: 2017-06-26
Payer: COMMERCIAL

## 2017-06-26 PROCEDURE — 97110 THERAPEUTIC EXERCISES: CPT

## 2017-06-26 PROCEDURE — 97112 NEUROMUSCULAR REEDUCATION: CPT

## 2017-06-28 ENCOUNTER — GENERIC CONVERSION - ENCOUNTER (OUTPATIENT)
Dept: OTHER | Facility: OTHER | Age: 67
End: 2017-06-28

## 2017-06-29 ENCOUNTER — APPOINTMENT (OUTPATIENT)
Dept: PHYSICAL THERAPY | Facility: REHABILITATION | Age: 67
End: 2017-06-29
Payer: COMMERCIAL

## 2017-06-29 PROCEDURE — 97110 THERAPEUTIC EXERCISES: CPT

## 2017-06-29 PROCEDURE — 97112 NEUROMUSCULAR REEDUCATION: CPT

## 2017-06-30 ENCOUNTER — GENERIC CONVERSION - ENCOUNTER (OUTPATIENT)
Dept: OTHER | Facility: OTHER | Age: 67
End: 2017-06-30

## 2017-07-03 ENCOUNTER — GENERIC CONVERSION - ENCOUNTER (OUTPATIENT)
Dept: OTHER | Facility: OTHER | Age: 67
End: 2017-07-03

## 2017-07-03 ENCOUNTER — APPOINTMENT (OUTPATIENT)
Dept: PHYSICAL THERAPY | Facility: REHABILITATION | Age: 67
End: 2017-07-03
Payer: COMMERCIAL

## 2017-07-03 PROCEDURE — 97112 NEUROMUSCULAR REEDUCATION: CPT

## 2017-07-03 PROCEDURE — 97110 THERAPEUTIC EXERCISES: CPT

## 2017-07-05 ENCOUNTER — APPOINTMENT (OUTPATIENT)
Dept: PHYSICAL THERAPY | Facility: CLINIC | Age: 67
End: 2017-07-05
Payer: COMMERCIAL

## 2017-07-05 PROCEDURE — L3010 FOOT LONGITUDINAL ARCH SUPPO: HCPCS

## 2017-07-06 ENCOUNTER — APPOINTMENT (OUTPATIENT)
Dept: PHYSICAL THERAPY | Facility: REHABILITATION | Age: 67
End: 2017-07-06
Payer: COMMERCIAL

## 2017-07-06 PROCEDURE — 97164 PT RE-EVAL EST PLAN CARE: CPT

## 2017-07-06 PROCEDURE — 97112 NEUROMUSCULAR REEDUCATION: CPT

## 2017-07-06 PROCEDURE — 97110 THERAPEUTIC EXERCISES: CPT

## 2017-07-10 ENCOUNTER — APPOINTMENT (OUTPATIENT)
Dept: PHYSICAL THERAPY | Facility: REHABILITATION | Age: 67
End: 2017-07-10
Payer: COMMERCIAL

## 2017-07-10 PROCEDURE — 97140 MANUAL THERAPY 1/> REGIONS: CPT

## 2017-07-10 PROCEDURE — 97112 NEUROMUSCULAR REEDUCATION: CPT

## 2017-07-10 PROCEDURE — 97110 THERAPEUTIC EXERCISES: CPT

## 2017-07-13 ENCOUNTER — APPOINTMENT (OUTPATIENT)
Dept: PHYSICAL THERAPY | Facility: REHABILITATION | Age: 67
End: 2017-07-13
Payer: COMMERCIAL

## 2017-07-13 PROCEDURE — 97110 THERAPEUTIC EXERCISES: CPT

## 2017-07-13 PROCEDURE — 97112 NEUROMUSCULAR REEDUCATION: CPT

## 2017-07-17 ENCOUNTER — APPOINTMENT (OUTPATIENT)
Dept: PHYSICAL THERAPY | Facility: REHABILITATION | Age: 67
End: 2017-07-17
Payer: COMMERCIAL

## 2017-07-17 PROCEDURE — 97112 NEUROMUSCULAR REEDUCATION: CPT

## 2017-07-17 PROCEDURE — 97110 THERAPEUTIC EXERCISES: CPT

## 2017-07-17 PROCEDURE — 97530 THERAPEUTIC ACTIVITIES: CPT

## 2017-07-20 ENCOUNTER — APPOINTMENT (OUTPATIENT)
Dept: PHYSICAL THERAPY | Facility: REHABILITATION | Age: 67
End: 2017-07-20
Payer: COMMERCIAL

## 2017-07-20 PROCEDURE — 97140 MANUAL THERAPY 1/> REGIONS: CPT

## 2017-07-20 PROCEDURE — 97110 THERAPEUTIC EXERCISES: CPT

## 2017-07-20 PROCEDURE — 97530 THERAPEUTIC ACTIVITIES: CPT

## 2017-07-20 PROCEDURE — 97112 NEUROMUSCULAR REEDUCATION: CPT

## 2017-07-24 ENCOUNTER — APPOINTMENT (OUTPATIENT)
Dept: PHYSICAL THERAPY | Facility: REHABILITATION | Age: 67
End: 2017-07-24
Payer: COMMERCIAL

## 2017-07-24 PROCEDURE — 97112 NEUROMUSCULAR REEDUCATION: CPT

## 2017-07-24 PROCEDURE — 97140 MANUAL THERAPY 1/> REGIONS: CPT

## 2017-07-24 PROCEDURE — 97110 THERAPEUTIC EXERCISES: CPT

## 2017-07-24 PROCEDURE — 97530 THERAPEUTIC ACTIVITIES: CPT

## 2017-07-27 ENCOUNTER — APPOINTMENT (OUTPATIENT)
Dept: PHYSICAL THERAPY | Facility: CLINIC | Age: 67
End: 2017-07-27
Payer: COMMERCIAL

## 2017-07-27 ENCOUNTER — APPOINTMENT (OUTPATIENT)
Dept: PHYSICAL THERAPY | Facility: REHABILITATION | Age: 67
End: 2017-07-27
Payer: COMMERCIAL

## 2017-07-31 ENCOUNTER — APPOINTMENT (OUTPATIENT)
Dept: PHYSICAL THERAPY | Facility: REHABILITATION | Age: 67
End: 2017-07-31
Payer: COMMERCIAL

## 2017-07-31 PROCEDURE — 97112 NEUROMUSCULAR REEDUCATION: CPT

## 2017-07-31 PROCEDURE — 97110 THERAPEUTIC EXERCISES: CPT

## 2017-07-31 PROCEDURE — 97530 THERAPEUTIC ACTIVITIES: CPT

## 2017-08-02 ENCOUNTER — APPOINTMENT (OUTPATIENT)
Dept: LAB | Facility: HOSPITAL | Age: 67
End: 2017-08-02
Payer: COMMERCIAL

## 2017-08-02 ENCOUNTER — TRANSCRIBE ORDERS (OUTPATIENT)
Dept: LAB | Facility: HOSPITAL | Age: 67
End: 2017-08-02

## 2017-08-02 DIAGNOSIS — Z00.8 HEALTH EXAMINATION IN POPULATION SURVEY: Primary | ICD-10-CM

## 2017-08-02 DIAGNOSIS — Z00.8 HEALTH EXAMINATION IN POPULATION SURVEY: ICD-10-CM

## 2017-08-02 LAB
CHOLEST SERPL-MCNC: 183 MG/DL (ref 50–200)
EST. AVERAGE GLUCOSE BLD GHB EST-MCNC: 128 MG/DL
HBA1C MFR BLD: 6.1 % (ref 4.2–6.3)
HDLC SERPL-MCNC: 51 MG/DL (ref 40–60)
LDLC SERPL CALC-MCNC: 110 MG/DL (ref 0–100)
TRIGL SERPL-MCNC: 110 MG/DL

## 2017-08-02 PROCEDURE — 36415 COLL VENOUS BLD VENIPUNCTURE: CPT

## 2017-08-02 PROCEDURE — 83036 HEMOGLOBIN GLYCOSYLATED A1C: CPT

## 2017-08-02 PROCEDURE — 80061 LIPID PANEL: CPT

## 2017-08-07 ENCOUNTER — APPOINTMENT (OUTPATIENT)
Dept: PHYSICAL THERAPY | Facility: REHABILITATION | Age: 67
End: 2017-08-07
Payer: COMMERCIAL

## 2017-08-07 PROCEDURE — 97112 NEUROMUSCULAR REEDUCATION: CPT

## 2017-08-07 PROCEDURE — 97530 THERAPEUTIC ACTIVITIES: CPT

## 2017-08-07 PROCEDURE — 97110 THERAPEUTIC EXERCISES: CPT

## 2017-08-10 ENCOUNTER — APPOINTMENT (OUTPATIENT)
Dept: PHYSICAL THERAPY | Facility: REHABILITATION | Age: 67
End: 2017-08-10
Payer: COMMERCIAL

## 2017-08-10 PROCEDURE — 97110 THERAPEUTIC EXERCISES: CPT

## 2017-08-10 PROCEDURE — 97112 NEUROMUSCULAR REEDUCATION: CPT

## 2017-08-21 ENCOUNTER — APPOINTMENT (OUTPATIENT)
Dept: PHYSICAL THERAPY | Facility: REHABILITATION | Age: 67
End: 2017-08-21
Payer: COMMERCIAL

## 2017-08-21 PROCEDURE — 97112 NEUROMUSCULAR REEDUCATION: CPT

## 2017-08-21 PROCEDURE — 97110 THERAPEUTIC EXERCISES: CPT

## 2017-08-24 ENCOUNTER — APPOINTMENT (OUTPATIENT)
Dept: PHYSICAL THERAPY | Facility: REHABILITATION | Age: 67
End: 2017-08-24
Payer: COMMERCIAL

## 2017-08-28 ENCOUNTER — APPOINTMENT (OUTPATIENT)
Dept: PHYSICAL THERAPY | Facility: REHABILITATION | Age: 67
End: 2017-08-28
Payer: COMMERCIAL

## 2017-08-28 ENCOUNTER — GENERIC CONVERSION - ENCOUNTER (OUTPATIENT)
Dept: OTHER | Facility: OTHER | Age: 67
End: 2017-08-28

## 2017-08-28 PROCEDURE — 97110 THERAPEUTIC EXERCISES: CPT

## 2017-08-31 ENCOUNTER — APPOINTMENT (OUTPATIENT)
Dept: PHYSICAL THERAPY | Facility: REHABILITATION | Age: 67
End: 2017-08-31
Payer: COMMERCIAL

## 2017-09-22 ENCOUNTER — ALLSCRIPTS OFFICE VISIT (OUTPATIENT)
Dept: OTHER | Facility: OTHER | Age: 67
End: 2017-09-22

## 2017-09-22 DIAGNOSIS — Z12.31 ENCOUNTER FOR SCREENING MAMMOGRAM FOR MALIGNANT NEOPLASM OF BREAST: ICD-10-CM

## 2017-10-06 ENCOUNTER — ALLSCRIPTS OFFICE VISIT (OUTPATIENT)
Dept: OTHER | Facility: OTHER | Age: 67
End: 2017-10-06

## 2017-12-07 ENCOUNTER — ALLSCRIPTS OFFICE VISIT (OUTPATIENT)
Dept: OTHER | Facility: OTHER | Age: 67
End: 2017-12-07

## 2017-12-07 DIAGNOSIS — E78.5 HYPERLIPIDEMIA: ICD-10-CM

## 2017-12-07 DIAGNOSIS — M62.838 OTHER MUSCLE SPASM: ICD-10-CM

## 2017-12-07 DIAGNOSIS — M46.1 SACROILIITIS, NOT ELSEWHERE CLASSIFIED (HCC): ICD-10-CM

## 2017-12-07 DIAGNOSIS — Z98.1 ARTHRODESIS STATUS: ICD-10-CM

## 2017-12-07 DIAGNOSIS — Z12.11 ENCOUNTER FOR SCREENING FOR MALIGNANT NEOPLASM OF COLON: ICD-10-CM

## 2017-12-07 DIAGNOSIS — E66.3 OVERWEIGHT: ICD-10-CM

## 2017-12-07 DIAGNOSIS — M45.9 ANKYLOSING SPONDYLITIS OF SITE IN SPINE (HCC): ICD-10-CM

## 2017-12-07 DIAGNOSIS — M79.7 FIBROMYALGIA: ICD-10-CM

## 2017-12-18 ENCOUNTER — TRANSCRIBE ORDERS (OUTPATIENT)
Dept: RADIOLOGY | Facility: HOSPITAL | Age: 67
End: 2017-12-18

## 2017-12-18 ENCOUNTER — HOSPITAL ENCOUNTER (OUTPATIENT)
Dept: RADIOLOGY | Facility: HOSPITAL | Age: 67
Discharge: HOME | End: 2017-12-18
Attending: NEUROLOGICAL SURGERY
Payer: COMMERCIAL

## 2017-12-18 DIAGNOSIS — Z48.89 OTHER SPECIFIED AFTERCARE FOLLOWING SURGERY: ICD-10-CM

## 2017-12-18 PROCEDURE — 72114 X-RAY EXAM L-S SPINE BENDING: CPT

## 2017-12-21 ENCOUNTER — ALLSCRIPTS OFFICE VISIT (OUTPATIENT)
Dept: OTHER | Facility: OTHER | Age: 67
End: 2017-12-21

## 2017-12-22 ENCOUNTER — APPOINTMENT (OUTPATIENT)
Dept: LAB | Facility: HOSPITAL | Age: 67
End: 2017-12-22
Attending: PHYSICAL MEDICINE & REHABILITATION
Payer: COMMERCIAL

## 2017-12-22 ENCOUNTER — GENERIC CONVERSION - ENCOUNTER (OUTPATIENT)
Dept: OTHER | Facility: OTHER | Age: 67
End: 2017-12-22

## 2017-12-22 DIAGNOSIS — Z98.1 ARTHRODESIS STATUS: ICD-10-CM

## 2017-12-22 DIAGNOSIS — M62.838 OTHER MUSCLE SPASM: ICD-10-CM

## 2017-12-22 DIAGNOSIS — M45.9 ANKYLOSING SPONDYLITIS OF SITE IN SPINE (HCC): ICD-10-CM

## 2017-12-22 DIAGNOSIS — M79.7 FIBROMYALGIA: ICD-10-CM

## 2017-12-22 DIAGNOSIS — E78.5 HYPERLIPIDEMIA: ICD-10-CM

## 2017-12-22 DIAGNOSIS — Z12.11 ENCOUNTER FOR SCREENING FOR MALIGNANT NEOPLASM OF COLON: ICD-10-CM

## 2017-12-22 DIAGNOSIS — E66.3 OVERWEIGHT: ICD-10-CM

## 2017-12-22 LAB
ALBUMIN SERPL BCP-MCNC: 3.6 G/DL (ref 3.5–5)
ALP SERPL-CCNC: 53 U/L (ref 46–116)
ALT SERPL W P-5'-P-CCNC: 24 U/L (ref 12–78)
ANION GAP SERPL CALCULATED.3IONS-SCNC: 5 MMOL/L (ref 4–13)
AST SERPL W P-5'-P-CCNC: 18 U/L (ref 5–45)
BASOPHILS # BLD AUTO: 0.03 THOUSANDS/ΜL (ref 0–0.1)
BASOPHILS NFR BLD AUTO: 1 % (ref 0–1)
BILIRUB SERPL-MCNC: 0.41 MG/DL (ref 0.2–1)
BILIRUB UR QL STRIP: NEGATIVE
BUN SERPL-MCNC: 24 MG/DL (ref 5–25)
CALCIUM SERPL-MCNC: 8.7 MG/DL (ref 8.3–10.1)
CHLORIDE SERPL-SCNC: 110 MMOL/L (ref 100–108)
CHOLEST SERPL-MCNC: 163 MG/DL (ref 50–200)
CK MB SERPL-MCNC: 2 % (ref 0–2.5)
CK MB SERPL-MCNC: 3.4 NG/ML (ref 0–5)
CK SERPL-CCNC: 172 U/L (ref 26–192)
CLARITY UR: CLEAR
CO2 SERPL-SCNC: 29 MMOL/L (ref 21–32)
COLOR UR: YELLOW
CREAT SERPL-MCNC: 0.64 MG/DL (ref 0.6–1.3)
EOSINOPHIL # BLD AUTO: 0.15 THOUSAND/ΜL (ref 0–0.61)
EOSINOPHIL NFR BLD AUTO: 3 % (ref 0–6)
ERYTHROCYTE [DISTWIDTH] IN BLOOD BY AUTOMATED COUNT: 14.7 % (ref 11.6–15.1)
GFR SERPL CREATININE-BSD FRML MDRD: 94 ML/MIN/1.73SQ M
GLUCOSE P FAST SERPL-MCNC: 87 MG/DL (ref 65–99)
GLUCOSE UR STRIP-MCNC: NEGATIVE MG/DL
HCT VFR BLD AUTO: 40.7 % (ref 34.8–46.1)
HDLC SERPL-MCNC: 54 MG/DL (ref 40–60)
HGB BLD-MCNC: 13.1 G/DL (ref 11.5–15.4)
HGB UR QL STRIP.AUTO: NEGATIVE
KETONES UR STRIP-MCNC: NEGATIVE MG/DL
LDLC SERPL CALC-MCNC: 96 MG/DL (ref 0–100)
LEUKOCYTE ESTERASE UR QL STRIP: NEGATIVE
LYMPHOCYTES # BLD AUTO: 1.29 THOUSANDS/ΜL (ref 0.6–4.47)
LYMPHOCYTES NFR BLD AUTO: 23 % (ref 14–44)
MCH RBC QN AUTO: 28.7 PG (ref 26.8–34.3)
MCHC RBC AUTO-ENTMCNC: 32.2 G/DL (ref 31.4–37.4)
MCV RBC AUTO: 89 FL (ref 82–98)
MONOCYTES # BLD AUTO: 0.3 THOUSAND/ΜL (ref 0.17–1.22)
MONOCYTES NFR BLD AUTO: 5 % (ref 4–12)
NEUTROPHILS # BLD AUTO: 3.81 THOUSANDS/ΜL (ref 1.85–7.62)
NEUTS SEG NFR BLD AUTO: 68 % (ref 43–75)
NITRITE UR QL STRIP: NEGATIVE
NRBC BLD AUTO-RTO: 0 /100 WBCS
PH UR STRIP.AUTO: 6.5 [PH] (ref 4.5–8)
PLATELET # BLD AUTO: 336 THOUSANDS/UL (ref 149–390)
PMV BLD AUTO: 9.9 FL (ref 8.9–12.7)
POTASSIUM SERPL-SCNC: 4.3 MMOL/L (ref 3.5–5.3)
PROT SERPL-MCNC: 7.1 G/DL (ref 6.4–8.2)
PROT UR STRIP-MCNC: NEGATIVE MG/DL
RBC # BLD AUTO: 4.57 MILLION/UL (ref 3.81–5.12)
SODIUM SERPL-SCNC: 144 MMOL/L (ref 136–145)
SP GR UR STRIP.AUTO: 1.03 (ref 1–1.03)
TRIGL SERPL-MCNC: 66 MG/DL
UROBILINOGEN UR QL STRIP.AUTO: 0.2 E.U./DL
WBC # BLD AUTO: 5.59 THOUSAND/UL (ref 4.31–10.16)

## 2017-12-22 PROCEDURE — 85025 COMPLETE CBC W/AUTO DIFF WBC: CPT

## 2017-12-22 PROCEDURE — 82085 ASSAY OF ALDOLASE: CPT

## 2017-12-22 PROCEDURE — 36415 COLL VENOUS BLD VENIPUNCTURE: CPT

## 2017-12-22 PROCEDURE — 82550 ASSAY OF CK (CPK): CPT

## 2017-12-22 PROCEDURE — 82553 CREATINE MB FRACTION: CPT

## 2017-12-22 PROCEDURE — 81003 URINALYSIS AUTO W/O SCOPE: CPT

## 2017-12-22 PROCEDURE — 80053 COMPREHEN METABOLIC PANEL: CPT

## 2017-12-22 PROCEDURE — 80061 LIPID PANEL: CPT

## 2017-12-26 LAB — ALDOLASE SERPL-CCNC: 8.4 U/L (ref 3.3–10.3)

## 2017-12-27 ENCOUNTER — ALLSCRIPTS OFFICE VISIT (OUTPATIENT)
Dept: OTHER | Facility: OTHER | Age: 67
End: 2017-12-27

## 2017-12-27 ENCOUNTER — GENERIC CONVERSION - ENCOUNTER (OUTPATIENT)
Dept: OTHER | Facility: OTHER | Age: 67
End: 2017-12-27

## 2018-01-04 NOTE — PROGRESS NOTES
Assessment   1  Sacroiliitis (720 2) (M46 1)    Plan    · *1 - [facility]  *  Status: Active  Requested for: [id number]   Ordered; For: Sacroiliitis; Ordered By: Romana Boozer Performed:  Due: [id number]  Care Summary provided  : Yes    Discussion/Summary      The patient is overall improving with physical therapy but this continues to be necessary  She does have left SI joint pain consistent with sacroiliitis  If physical therapy alone is insufficient in addressing for him, then as our joint injections would be appropriate  therapy injections if not better with PT  Chief Complaint   Patient presents for 6 month follow up with Lspine Xrays s/p Open L2-5 Decompressive lumbar laminectomy with pedicle screw and parish fixation fusion With intraoperative monitoring by Dr Brayan Carrington on 4/3/17 (8 months out)      History of Present Illness   Patient is a 14-year-old female who presented on 2/9/17 for evaluation of back pain  The patient has been previously evaluated for her severe back pain in the past with our office  Past evaluation has included spine MRI  Past treatment has included physical therapy  Past procedures have included epidural injection  had severe intractable right leg pain and increasing discomfort with walking giving her the diagnosis of neurogenic claudication  She had spinal stenosis as well as foraminal stenosis with scoliosis and mechanical back pain  She had failed conservative measures  It is for each of these reasons that Dr Brayan Carrington recommended surgical intervention  4/3/17, patient underwent an Open L2-5 Decompressive lumbar laminectomy with pedicle screw and parish fixation fusion With intraoperative monitoring by Dr Brayan Carrington  seen for 2 weeks POV on 4/17/17 with nurse for incision check  She reported that she had some occasional pain across her low back and into her buttocks and down her legs  Overall, she felt much better since the surgery   She was just d/c'd from [facility] on Saturday 4/15/17 and was about to start outpatient PT  She is compliant with her brace and is walking with a roller walker  She denies any incisional issues, other than a scant amount of serosanguineous drainage, or fevers  was seen on 5/18/17 by Dr Juan Jose Carrera for 6 weeks POV  She was doing well  Her ambulation still required a walker  Dr Juan Jose Carrera recommended course of physical therapy to work on her gait  presented for follow up after PT on 6/22/17  She was continuing to improve  We recommended she continue PT and follow up in 6 months with Xrays  Rodolfo Mcfarlane presents with complaints of occasional episodes of left lower back pain Episodes started about 3 weeks ago (into the groin area)      Associated symptoms include no spasm,-- no stiffness,-- no fever,-- no night sweats,-- no abdominal pain,-- no general malaise,-- no weight loss,-- no arm numbness,-- no arm weakness,-- no leg weakness,-- no urinary incontinence,-- no fecal incontinence,-- no urinary retention-- and-- no rash localized to the area of pain       leg numbness (anterior right leg)  Review of Systems        Constitutional: No fever, no chills, feels well, no tiredness, no recent weight gain or weight loss  Eyes: No complaints of eye pain, no red eyes, no eyesight problems, no discharge, no dry eyes, no itching of eyes  ENT: no complaints of earache, no loss of hearing, no nose bleeds, no nasal discharge, no sore throat, no hoarseness  Cardiovascular: No complaints of slow heart rate, no fast heart rate, no chest pain, no palpitations, no leg claudication, no lower extremity edema  Respiratory: No complaints of shortness of breath, no wheezing, no cough, no SOB on exertion, no orthopnea, no PND  Gastrointestinal: No complaints of abdominal pain, no constipation, no nausea or vomiting, no diarrhea, no bloody stools        Genitourinary: No complaints of dysuria, no incontinence, no pelvic pain, no dysmenorrhea, no vaginal discharge or bleeding  Musculoskeletal: lower back pain, but-- as noted in HPI  Integumentary: No complaints of skin rash or lesions, no itching, no skin wounds, no breast pain or lump  Neurological: numbness-- and-- tingling, but-- as noted in HPI,-- no limb weakness-- and-- no difficulty walking  Psychiatric: Not suicidal, no sleep disturbance, no anxiety or depression, no change in personality, no emotional problems  Endocrine: No complaints of proptosis, no hot flashes, no muscle weakness, no deepening of the voice, no feelings of weakness  Hematologic/Lymphatic: No complaints of swollen glands, no swollen glands in the neck, does not bleed easily, does not bruise easily  ROS reviewed  Active Problems   1  Ankylosing spondylitis (720 0) (M45 9)   2  Chronic bilateral low back pain without sciatica (724 2,338 29) (M54 5,G89 29)   3  Colon cancer screening (V76 51) (Z12 11)   4  Degeneration of intervertebral disc of lumbar region (722 52) (M51 36)   5  Disability examination (V68 01) (Z02 71)   6  Encounter for gynecological examination without abnormal finding (V72 31) (Z01 419)   7  Encounter for postoperative care related to surgical joint fusion (V58 78,V45 4)     (Z48 89,Z98 1)   8  Encounter for screening mammogram for breast cancer (V76 12) (Z12 31)   9  Fibromyalgia (729 1) (M79 7)   10  Generalized anxiety disorder (300 02) (F41 1)   11  Left shoulder pain (719 41) (M25 512)   12  Lumbar neuritis (724 4) (M54 16)   13  Muscle spasm (728 85) (M62 838)   14  Neurogenic claudication (724 03) (M48 062)   15  Right shoulder pain (719 41) (M25 511)   16  Spinal stenosis, lumbar region, with neurogenic claudication (724 03) (M48 062)   17  Spondylolisthesis of lumbar region (738 4) (M43 16)    Past Medical History   1  Fibromyalgia (729 1) (M79 7)   2  History of Heme positive stool (792 1) (R19 5)   3   History of High cholesterol (272 0) (E78 00)   4  History of Hip pain (719 45) (M25 559)   5  History of bronchitis (V12 69) (Z87 09)   6  History of low back pain (V13 59) (Z87 39)   7  History of spinal stenosis (V13 59) (Z87 39)   8  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)   9  History of vitamin D deficiency (V12 1) (Z86 39)   10  History of Impingement syndrome of left shoulder (726 2) (M75 42)   11  History of Impingement syndrome of right shoulder (726 2) (M75 41)   12  History of Left leg pain (729 5) (M79 605)   13  History of Long term use of drug (V58 69) (Z79 899)   14  No pertinent past medical history   15  History of Screening for breast cancer (V76 10) (Z12 31)   16  History of Visit for routine gyn exam (V72 31) (Z01 419)     The active problems and past medical history were reviewed and updated today  Surgical History   1  History of Cervical Conization   2  History of  Section   3  History of Dilation And Curettage     The surgical history was reviewed and updated today  Family History   Mother    1  Family history of malignant neoplasm (V16 9) (Z80 9)   2  Family history of malignant neoplasm of female breast (V16 3) (Z80 3)   3  Family history of myocardial infarction (V17 3) (Z82 49)  Father    4  Family history of cerebrovascular accident (CVA) (V17 1) (Z82 3)  Son    5  Family history of depression (V17 0) (Z81 8)  Sister    10  Family history of Endometrial cancer   7  Family history of malignant neoplasm of uterus (V16 49) (Z80 49)  Brother    8  Family history of malignant neoplasm (V16 9) (Z80 9)  Maternal Aunt    9  Family history of malignant neoplasm of female breast (V16 3) (Z80 3)  Other    10  Family history of malignant neoplasm of female breast (V16 3) (Z80 3)   11  Family history of malignant neoplasm of uterus (V16 49) (Z80 49)  Family History    12  Family history of arthritis (V17 7) (Z82 61)   13  Family history of High cholesterol     The family history was reviewed and updated today  Social History    · Caffeine use (V49 89) (F15 90)   · Currently sexually active   · Does not exercise (V69 0) (Z72 3)   · Denied: History of Drug use   · Employed   ·    · Never a smoker   · No alcohol use   · No drug use   · Non-smoker (V49 89) (Z78 9)   · Denied: History of Social alcohol use   · Three children  The social history was reviewed and updated today  Current Meds    1  Aspirin 81 MG TABS; 1 TAB DAILY; Therapy: (Ольга Sanchez) to Recorded   2  Gabapentin 100 MG Oral Capsule; TAKE 1 CAPSULE 3 times daily; Therapy: 20Mdb9566 to (Last Aditi Laws)  Requested for: 84OWG7415 Ordered   3  KlonoPIN TABS; TAKE 1 TABLET Bedtime PRN; Therapy: (Ольга Sanchez) to Recorded   4  Methocarbamol 750 MG Oral Tablet; TAKE 1 TABLET Every 6 hours PRN muscle     spasms; Therapy: 10Tzw2489 to (Evaluate:10Jun2017)  Requested for: 47AQS6402; Last     Rx:[id number] Ordered   5  Mobic 15 MG Oral Tablet; TAKE 1 TABLET DAILY; Therapy: (Ольга Sanchez) to Recorded   6  PARoxetine HCl - 10 MG Oral Tablet; TAKE 1 TABLET DAILY; Therapy: 24Tah7416 to (Evaluate:23Mar2018)  Requested for: 52Jrc2966; Last     Rx:04Qik8254 Ordered   7  TraMADol HCl - 50 MG Oral Tablet; TAKE 1 TABLET TWICE DAILY AS NEEDED; Therapy: 32Wpm0481 to (Evaluate:98Eoz3350); Last Rx:99Atx1690 Ordered   8  Vitamin D CAPS; TAKE 1 CAPSULE Daily Recorded     The medication list was reviewed and updated today  Allergies   1  No Known Drug Allergies    Vitals   Vital Signs    Recorded: 21Dec2017 04:29PM   Temperature 98 1 F   Heart Rate 72   Respiration 16   Systolic 622   Diastolic 69   Height 5 ft 1 in   Weight 190 lb    BMI Calculated 35 9   BSA Calculated 1 85   Pain Scale 0     Physical Exam    (pain to palpation over the sacroiliac joint on the left  This reproduces the pain that she complains of)      Mental Status: Alert and Oriented x3  -- Memory is intact  -- Attentive   -- Speech is articulate and fluent  -- Knowledge and vocabulary consistent with education  -- Grossly nonfocal  -- Judgment and insight: Normal  -- Mood and affect: Normal        Cranial Nerve Exam:  2nd cranial nerve: Normal with no noted deficit  -- 3rd, 4th, and 6th cranial nerves: PERRLA and EOMI without later gaze nystagmus  -- 7th cranial nerve: Face symmetrical at grimace and at rest  -- 8th cranial nerve: Grossly intact to finger rub bilaterally  -- 11th cranial nerve: Shoulder shrug equal bilaterally  Motor System - Upper Extremities: Muscle strength: 5/5 bilaterally  Motor System - Lower Extremities:      Muscle strength: Abnormal  -- 5/5 except in the left lower extremity which appears to be pain limited  -- Muscle tone: Normal bilaterally  -- Muscle Bulk: Normal Muscle bulk bilaterally  Reflexes:      Reflexes: Abnormal  -- Absent in the lower extremities  Coordination:      Coordination: Abnormal  -- Poor  Involuntary movements: Abnormal involuntary movements were observed  -- there is none on exam today  Gait and Station:      Gait and station: Abnormal  -- She ambulates with a hobbled gait But this is improved  Constitutional General appearance: No acute distress, well appearing and well nourished  Results/Data   Diagnostic Studies Reviewed [facility]:      X-ray Review x-rays of the lumbar sacral spine demonstrates the instrumentation to be a good position there is no abnormal movement on flexion and extension films  Future Appointments      Date/Time Provider Specialty Site   02/08/2018 04:00 PM Cortes Yap Me, DO [place] [address]   12/28/2018 08:00 AM TRIXIE Jacobson   Internal Medicine [facility] INTERNAL MED     Signatures    Electronically signed by : TRIXIE Yusuf ; Darren  3 2018 11:47PM EST                       (Author)

## 2018-01-10 NOTE — MISCELLANEOUS
History of Present Illness  TCM Communication [facility]: The patient is being contacted for follow-up after hospitalization  She was hospitalized at [facility]  The dates of hospitalization:, date of admission: 4/3/17, date of discharge: 4/6/17  Diagnosis: Lumbar Stenosis  She was discharged to a rehabilitation center  She did not schedule a follow up appointment  Communication performed and completed by Raquel Elizabeth   HPI: This is a duplicate VENANCIO      Active Problems    1  Ankylosing spondylitis (720 0) (M45 9)   2  Anxiety (300 00) (F41 9)   3  Carpal tunnel syndrome, bilateral upper limbs (354 0) (G56 03)   4  Chronic bilateral low back pain without sciatica (724 2,338 29) (M54 5,G89 29)   5  Degeneration of intervertebral disc of lumbar region (722 52) (M51 36)   6  Fibromyalgia (729 1) (M79 7)   7  Generalized anxiety disorder (300 02) (F41 1)   8  Hyperlipidemia (272 4) (E78 5)   9  Left shoulder pain (719 41) (M25 512)   10  Neurogenic claudication (724 03) (M48 06)   11  Overweight (278 02) (E66 3)   12  Right shoulder pain (719 41) (M25 511)   13  Spinal stenosis, lumbar region, with neurogenic claudication (724 03) (M48 06)   14  Spondylolisthesis of lumbar region (738 4) (M43 16)   15  Tear of right supraspinatus tendon, subsequent encounter (V58 89,840 6) (N40 803M)    Past Medical History    1  History of Encounter for gynecological examination without abnormal finding (V72 31)   (Z01 419)   2  Fibromyalgia (729 1) (M79 7)   3  History of Heme positive stool (792 1) (R19 5)   4  History of High cholesterol (272 0) (E78 00)   5  History of Hip pain (719 45) (M25 559)   6  History of bronchitis (V12 69) (Z87 09)   7  History of low back pain (V13 59) (Z87 39)   8  History of spinal stenosis (V13 59) (Z87 39)   9  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)   10  History of vitamin D deficiency (V12 1) (Z86 39)   11  History of Impingement syndrome of left shoulder (726 2) (M75 42)   12  History of Impingement syndrome of right shoulder (726 2) (M75 41)   13  History of Left leg pain (729 5) (M79 605)   14  History of Long term use of drug (V58 69) (Z79 899)   15  No pertinent past medical history   16  History of Screening for breast cancer (V76 10) (Z12 39)   17  History of Visit for routine gyn exam (V72 31) (Z01 419)    Surgical History    1  History of Cervical Conization   2  History of  Section   3  History of Dilation And Curettage    Family History  Mother    1  Family history of malignant neoplasm (V16 9) (Z80 9)   2  Family history of malignant neoplasm of female breast (V16 3) (Z80 3)   3  Family history of myocardial infarction (V17 3) (Z82 49)  Father    4  Family history of cerebrovascular accident (CVA) (V17 1) (Z82 3)  Son    5  Family history of depression (V17 0) (Z81 8)  Sister    10  Family history of Endometrial cancer   7  Family history of malignant neoplasm of uterus (V16 49) (Z80 49)  Brother    8  Family history of malignant neoplasm (V16 9) (Z80 9)  Maternal Aunt    9  Family history of malignant neoplasm of female breast (V16 3) (Z80 3)  Other    10  Family history of malignant neoplasm of female breast (V16 3) (Z80 3)   11  Family history of malignant neoplasm of uterus (V16 49) (Z80 49)  Family History    12  Family history of arthritis (V17 7) (Z82 61)   13  Family history of High cholesterol    Social History    · Caffeine use (V49 89) (F15 90)   · Currently sexually active   · Does not exercise (V69 0) (Z72 3)   · Employed   ·    · Never a smoker   · No drug use   · Non-smoker (V49 89) (Z78 9)   · Denied: History of Social alcohol use   · Three children    Current Meds   1  Aspirin 81 MG Oral Tablet Delayed Release; TAKE 1 TABLET DAILY; Therapy: (Recorded:93Bqh1974) to Recorded   2  KlonoPIN TABS; TAKE 1 TABLET 3 TIMES DAILY AS NEEDED; Therapy: (Recorded:2017) to Recorded   3  Mobic 15 MG Oral Tablet; TAKE 1 TABLET DAILY;    Therapy: (Recorded:12Aob8468) to Recorded   4  PARoxetine HCl - 10 MG Oral Tablet; TAKE 1 TABLET DAILY; Therapy: 97Wgz3298 to (Evaluate:09Wsn3709)  Requested for: 62Kfz7581; Last   Rx:21Jhn1396 Ordered   5  TraMADol HCl - 50 MG Oral Tablet; TAKE 1 TABLET TWICE DAILY AS NEEDED; Therapy: 75Pmk8294 to (Evaluate:02Lml9296); Last Rx:04Cnf9108 Ordered   6  Vitamin D CAPS; TAKE 1 CAPSULE Daily Recorded    Allergies    1  No Known Drug Allergies    Future Appointments    Date/Time Provider Specialty Site   09/22/2017 09:00 AM TRIXIE Weiss  Obstetrics/Gynecology [facility] AT [facility]   12/27/2017 08:00 AM TRIXIE Norman  Internal Medicine [facility] INTERNAL MED   05/18/2017 09:30 AM TRIXEI Armas  Neurosurgery [facility]     Signatures   Electronically signed by :  Emir Roman, ; Apr 13 2017 10:25AM EST                       (Author)    Electronically signed by : TRIXIE Daigle ; May  1 2017 12:08PM EST                       (Author)

## 2018-01-10 NOTE — MISCELLANEOUS
Message  Return to work or school:   Jen Rdz is under my professional care  She was seen in my office on October 6th 2017  She is able to return to work on  as scheduled    She is able to perform activities of daily living without limitations  Weight Bearing Status: Full Weight-Bearing  No bending over pts  in procedure room due to lumbar fusuion  No lifting of linen bags,until next evaluation, unlimited sitting with breaks 40 to 45 min as needed        Signatures   Electronically signed by : Nicole Rubi DO; Oct  6 2017  3:05PM EST                       (Author)

## 2018-01-11 ENCOUNTER — HOSPITAL ENCOUNTER (OUTPATIENT)
Dept: RADIOLOGY | Age: 68
Discharge: HOME | End: 2018-01-11
Payer: COMMERCIAL

## 2018-01-11 DIAGNOSIS — Z12.31 ENCOUNTER FOR SCREENING MAMMOGRAM FOR MALIGNANT NEOPLASM OF BREAST: ICD-10-CM

## 2018-01-11 PROCEDURE — 77067 SCR MAMMO BI INCL CAD: CPT

## 2018-01-11 PROCEDURE — 77063 BREAST TOMOSYNTHESIS BI: CPT

## 2018-01-11 NOTE — PROGRESS NOTES
Assessment    1  Anxiety (300 00) (F41 9)   2  Chronic bilateral low back pain without sciatica (724 2,338 29) (M54 5,G89 29)   3  Carpal tunnel syndrome, bilateral upper limbs (354 0) (G56 03)   4  Overweight (278 02) (E66 3)   5  Right shoulder pain (719 41) (M25 511)   6  Left shoulder pain (719 41) (M25 512)   7  Fibromyalgia (729 1) (M79 7)   8  Generalized anxiety disorder (300 02) (F41 1)    Plan  Carpal tunnel syndrome, bilateral upper limbs    · EMG TWO EXTREMITIES WITH OR W/O RELATED PARASPINAL AREAS; Status:Hold  For - Scheduling; Requested for:23Fxg1846;   TWO : RUE  ONE : LUE  Chronic bilateral low back pain without sciatica    · 1 - Cedrick WEBSTER, Ene Camilo  (Neurosurgery) Physician Referral  Consult Only: the expectation  is that the referring provider will communicate back to the patient on treatment options  Evaluation and Treatment: the expectation is that the referred to provider will  communicate back to the patient on treatment options  Status: Active  Requested for:  39Tbd8576  Care Summary provided  : Yes  Fibromyalgia    · TraMADol HCl - 50 MG Oral Tablet; TAKE 1 TABLET TWICE DAILY AS NEEDED  Health Maintenance    · EKG/ECG- POC; Status:Active - Perform Order; Requested for:54Itg8844;   Long term use of drug    · (1) URINALYSIS w URINE C/S REFLEX (will reflex a microscopy if leukocytes, occult  blood, or nitrites are not within normal limits); Status:Active; Requested for:91Whe7811;   Right shoulder pain    · 1 - Kelvin WEBSTER, Katty Torres  (Orthopedic Surgery) Physician Referral  Consult Only: the  expectation is that the referring provider will communicate back to the patient on  treatment options  Evaluation and Treatment: the expectation is that the referred to  provider will communicate back to the patient on treatment options    Status: Active   Requested for: 67Kdw2051  Care Summary provided  : Yes  Unlinked    · Paxil TABS (PARoxetine HCl)    I've asked the patient to have an EMG study of both hands to evaluate for possible carpal tunnel  In addition I've asked her to have a urinalysis to come plead her laboratory testing for this he examination  She is recently had a CBC chemistry profile lipid profile and hemoglobin A1c value  Placed a consultation and to Dr Forrest Flowers neurosurgical department at [place] [address] for evaluation of her low back pain and also placed a consultation for Dr Duke Morrison the orthopedic department to evaluate the patient's shoulder pain bilaterally  Chief Complaint  patient is here for a yearly physical       History of Present Illness  HM, Adult Female: The patient is being seen for a health maintenance evaluation  The last health maintenance visit was 1 year(s) ago  Social History: Household members include spouse  She is   Work status: working full time  The patient has never smoked cigarettes  She reports rare alcohol use  She has never used illicit drugs  General Health: The patient's health since the last visit is described as good  She has regular dental visits  She denies vision problems  She denies hearing loss  Immunizations status: up to date  Lifestyle:  She consumes a diverse and healthy diet  She has weight concerns  She exercises regularly  She does not use tobacco  She denies alcohol use  She denies drug use  Reproductive health: the patient is postmenopausal    Screening: cancer screening reviewed and current  metabolic screening reviewed and current  risk screening reviewed and current  HPI: This 71-year-old female patient presents today for an annual health maintenance examination  She has symptoms of numbness pins and needles in her hands bilaterally  She is been experiences for a period approximately 2 months  It is frequently worse in the morning when she awakens  She also has bilateral shoulder pain which appears to be arthritic in nature  She is using Mobic 15 mg daily without much benefit   She did have injections in her shoulders in the past by Dr Brenie Boss with good results however her symptoms have returned  She's been successful at losing approximately 12 pounds since her physical last year  She has a history of bilateral retinal tears and is followed by Dr Kristen Bill at the [facility] retinal group  She continues to have fibromyalgia symptoms  She also continues to have symptoms of insomnia  She tried melatonin without any benefit  I've recommended that she try 25 mg of Benadryl at bedtime  She also continues to have chronic low back pain and has previously seen Dr Nataliia Hale of the neurosurgical department at [facility]  He is interested in seeing him for a follow-up visit and evaluation  Review of Systems    Constitutional: recent 12 lb weight loss  ENT: no complaints of earache, no loss of hearing, no nose bleeds, no nasal discharge, no sore throat, no hoarseness  Cardiovascular: No complaints of slow heart rate, no fast heart rate, no chest pain, no palpitations, no leg claudication, no lower extremity edema  Respiratory: No complaints of shortness of breath, no wheezing, no cough, no SOB on exertion, no orthopnea, no PND  Neurological: numbness, tingling and Bilateral numbness and tingling in the hands, but as noted in HPI  Psychiatric: Not suicidal, no sleep disturbance, no anxiety or depression, no change in personality, no emotional problems  Endocrine: No complaints of proptosis, no hot flashes, no muscle weakness, no deepening of the voice, no feelings of weakness  Hematologic/Lymphatic: No complaints of swollen glands, no swollen glands in the neck, does not bleed easily, does not bruise easily  Active Problems    1  Ankylosing spondylitis (720 0) (M45 9)   2  Bronchitis (490) (J40)   3  Fibromyalgia (729 1) (M79 7)   4  Generalized anxiety disorder (300 02) (F41 1)   5  Hyperlipidemia (272 4) (E78 5)   6  Overweight (278 02) (E66 3)   7   Right shoulder pain (719 41) (M25 511)    Past Medical History    · History of Encounter for gynecological examination without abnormal finding (V72 31)  (Z01 419)   · Fibromyalgia (729 1) (M79 7)   · History of Heme positive stool (792 1) (R19 5)   · History of High cholesterol (272 0) (E78 00)   · History of Hip pain (719 45) (M25 559)   · History of low back pain (V13 59) (Z87 39)   · History of spinal stenosis (V13 59) (Z87 39)   · 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)   · History of vitamin D deficiency (V12 1) (Z86 39)   · History of Left leg pain (729 5) (M79 605)   · No pertinent past medical history   · History of Screening for breast cancer (V76 10) (Z12 39)   · History of Visit for routine gyn exam (V72 31) (Z01 419)    Surgical History    · History of Cervical Conization   · History of  Section    Family History  Mother    · Family history of malignant neoplasm (V16 9) (Z80 9)   · Family history of malignant neoplasm of female breast (V16 3) (Z80 3)   · Family history of myocardial infarction (V17 3) (Z80 55)  Father    · Family history of cerebrovascular accident (CVA) (V17 1) (Z80 1)  Son    · Family history of depression (V17 0) (Z81 8)  Sister    · Family history of Endometrial cancer   · Family history of malignant neoplasm of uterus (V16 49) (Z80 49)  Brother    · Family history of malignant neoplasm (V16 9) (Z80 9)  Maternal Aunt    · Family history of malignant neoplasm of female breast (V16 3) (Z80 3)  Other    · Family history of malignant neoplasm of female breast (V16 3) (Z80 3)   · Family history of malignant neoplasm of uterus (V16 49) (Z80 49)  Family History    · Family history of arthritis (V17 7) (Z82 61)   · Family history of High cholesterol    Social History    · Caffeine use (V49 89) (F15 90)   · Currently sexually active   · Does not exercise (V69 0) (Z72 3)   · Employed   · R N    · Four children   ·    · No drug use   · Non-smoker (V49 89) (Z78 9)   · Social alcohol use (Z78 9)    Current Meds   1  KlonoPIN TABS; Therapy: (Recorded:30Jan2016) to Recorded   2  Mobic 15 MG Oral Tablet; Therapy: (Recorded:16Mar2016) to Recorded   3  MoviPrep 100 GM Oral Solution Reconstituted; USE AS DIRECTED; Therapy: 09FGI4916 to (Evaluate:27Sep2016)  Requested for: 40RTG6179; Last   Rx:26Sep2016 Ordered   4  Paxil TABS; Therapy: (Recorded:30Jan2016) to Recorded   5  TraMADol HCl - 50 MG Oral Tablet; TAKE 1 TABLET TWICE DAILY AS NEEDED; Last   Rx:21Nov2016 Ordered   6  Vitamin D CAPS; Therapy: (Recorded:21Sep2016) to Recorded    Allergies    1  No Known Drug Allergies    Vitals   Recorded: 20Dec2016 08:00AM   Temperature 97 4 F   Heart Rate 84   Respiration 16   Systolic 697   Diastolic 84   Height 5 ft 1 in   Weight 175 lb 6 08 oz   BMI Calculated 33 14   BSA Calculated 1 78   O2 Saturation 98     Physical Exam    Constitutional   General appearance: No acute distress, well appearing and well nourished  Head and Face   Head and face: Normal     Eyes   Conjunctiva and lids: No swelling, erythema or discharge  Pupils and irises: Equal, round, reactive to light  Ophthalmoscopic examination: Normal fundi and optic discs  Ears, Nose, Mouth, and Throat   External inspection of ears and nose: Normal     Otoscopic examination: Tympanic membranes translucent with normal light reflex  Canals patent without erythema  Nasal mucosa, septum, and turbinates: Normal without edema or erythema  Lips, teeth, and gums: Normal, good dentition  Oropharynx: Normal with no erythema, edema, exudate or lesions  Neck   Neck: Supple, symmetric, trachea midline, no masses  Thyroid: Normal, no thyromegaly  Pulmonary   Respiratory effort: No increased work of breathing or signs of respiratory distress  Percussion of chest: Normal     Auscultation of lungs: Clear to auscultation  Cardiovascular   Auscultation of heart: Normal rate and rhythm, normal S1 and S2, no murmurs      Carotid pulses: 2+ bilaterally  Pedal pulses: 2+ bilaterally  Peripheral vascular exam: Normal     Examination of extremities for edema and/or varicosities: Normal     Abdomen   Abdomen: Non-tender, no masses  Liver and spleen: No hepatomegaly or splenomegaly  Lymphatic   Palpation of lymph nodes in neck: No lymphadenopathy  Musculoskeletal   Gait and station: Normal     Digits and nails: Normal without clubbing or cyanosis  Joints, bones, and muscles: Normal     Range of motion: Normal     Stability: Normal     Muscle strength/tone: Normal     Skin   Skin and subcutaneous tissue: Normal without rashes or lesions  Neurologic   Cranial nerves: Cranial nerves II-XII intact  Cortical function: Normal mental status  Reflexes: 2+ and symmetric  Coordination: Normal finger to nose and heel to shin  Psychiatric   Judgment and insight: Normal     Orientation to person, place, and time: Normal     Recent and remote memory: Intact  Mood and affect: Normal        Future Appointments    Date/Time Provider Specialty Site   09/22/2017 09:00 AM TRIXIE Collins  Obstetrics/Gynecology [facility] OB/GYN AT [facility]   12/27/2017 08:00 AM TRIXIE Hoskins   Internal Medicine [address] MED     Signatures   Electronically signed by : TRIXIE Moses ; Dec 20 2016  1:59PM EST                       (Author)

## 2018-01-12 ENCOUNTER — GENERIC CONVERSION - ENCOUNTER (OUTPATIENT)
Dept: GYNECOLOGY | Facility: CLINIC | Age: 68
End: 2018-01-12

## 2018-01-12 ENCOUNTER — APPOINTMENT (OUTPATIENT)
Dept: PHYSICAL THERAPY | Facility: REHABILITATION | Age: 68
End: 2018-01-12
Payer: COMMERCIAL

## 2018-01-12 VITALS
OXYGEN SATURATION: 96 % | WEIGHT: 178.25 LBS | TEMPERATURE: 98.6 F | RESPIRATION RATE: 16 BRPM | HEIGHT: 61 IN | DIASTOLIC BLOOD PRESSURE: 62 MMHG | BODY MASS INDEX: 33.65 KG/M2 | SYSTOLIC BLOOD PRESSURE: 108 MMHG | HEART RATE: 109 BPM

## 2018-01-12 VITALS
HEART RATE: 100 BPM | TEMPERATURE: 98.8 F | BODY MASS INDEX: 35.75 KG/M2 | DIASTOLIC BLOOD PRESSURE: 88 MMHG | WEIGHT: 189.2 LBS | RESPIRATION RATE: 16 BRPM | SYSTOLIC BLOOD PRESSURE: 132 MMHG

## 2018-01-12 VITALS — SYSTOLIC BLOOD PRESSURE: 123 MMHG | HEART RATE: 103 BPM | DIASTOLIC BLOOD PRESSURE: 88 MMHG

## 2018-01-12 DIAGNOSIS — M46.1 SACROILIITIS, NOT ELSEWHERE CLASSIFIED (HCC): ICD-10-CM

## 2018-01-12 PROCEDURE — 97112 NEUROMUSCULAR REEDUCATION: CPT

## 2018-01-12 PROCEDURE — G8991 OTHER PT/OT GOAL STATUS: HCPCS

## 2018-01-12 PROCEDURE — 97140 MANUAL THERAPY 1/> REGIONS: CPT

## 2018-01-12 PROCEDURE — 97161 PT EVAL LOW COMPLEX 20 MIN: CPT

## 2018-01-12 PROCEDURE — G8990 OTHER PT/OT CURRENT STATUS: HCPCS

## 2018-01-13 VITALS
DIASTOLIC BLOOD PRESSURE: 83 MMHG | WEIGHT: 178.5 LBS | SYSTOLIC BLOOD PRESSURE: 130 MMHG | HEART RATE: 76 BPM | HEIGHT: 61 IN | BODY MASS INDEX: 33.7 KG/M2

## 2018-01-13 VITALS
WEIGHT: 191.25 LBS | SYSTOLIC BLOOD PRESSURE: 118 MMHG | BODY MASS INDEX: 36.11 KG/M2 | HEIGHT: 61 IN | DIASTOLIC BLOOD PRESSURE: 62 MMHG

## 2018-01-13 VITALS
RESPIRATION RATE: 16 BRPM | HEART RATE: 92 BPM | HEIGHT: 61 IN | BODY MASS INDEX: 33.07 KG/M2 | TEMPERATURE: 97.9 F | SYSTOLIC BLOOD PRESSURE: 121 MMHG | DIASTOLIC BLOOD PRESSURE: 77 MMHG | WEIGHT: 175.13 LBS

## 2018-01-13 NOTE — CONSULTS
Assessment    1  Neurogenic claudication (724 03) (M48 06)    Plan  Neurogenic claudication    · * MRI LUMBAR SPINE WO CONTRAST; Status:Need Information - Financial  Authorization; Requested for:23Gij7105;    Perform:[facility]; IAR:65NHS7111; Last Updated By:Rhina Clemens; 2/9/2017 9:29:34 AM;Ordered;  For:Neurogenic claudication; Ordered By:Catracho Berrios;   · XR SPINE LUMBAR COMPLETE W BENDING MINIMUM 6 VIEWS; Status:Active; Requested for:56Shl3190;    Perform:[facility]; DTQ:27IDT0064; Ordered;  For:Neurogenic claudication; Ordered By:Catracho Berrios;   · Follow Up After Tests Complete Evaluation and Treatment  Follow-up  Status: Hold For -  Scheduling  Requested for: 48CVW2469   Ordered; For: Neurogenic claudication; Ordered By: Shauna Castellano Performed:  Due: 60BKL3964    Discussion/Summary    This is a 79-year-old female with neurogenic claudication and spinal stenosis  She has had relatively positive results from epidural steroid injections  She has had no recent studies  Contemporaneous imaging studies are necessary in the evaluation of persons with neurogenic claudication and chronic low back pain  I've ordered the studies and we'll see her back following the completion of these studies  The patient has the current Goals: Improve ambulation and reduce pain  The patent has the current Barriers: No recent studies  Neurogenic claudication  Spondylolisthesis in the past       Chief Complaint  Patient presents for initial consult regarding Lower Back Pain      History of Present Illness  The patient is being seen for an initial evaluation of back pain  The patient has been previously evaluated for her severe back pain in the past with our office  Past evaluation has included spine MRI  Past treatment has included physical therapy  Past procedures have included epidural injection   (She was last seen in our office back on 7/20/2009 at which point she had tried the physical therapy and injections but she had recurrence of her symptoms  MRI Lspine reported there had been some moderate progression of both her spondylolisthesis and the resultant foraminal and central canal narrowing  There was little doubt that this abnormalities were the cause of her discomfort  There appeared to be disk protrusion at L3-4 to the left side, but she had no specific symptoms related to this  We had discussed several options for her like trying injections again  We did tough lightly on surgical interventions  The intervention approach would be different between Dr Torrie Mckeon and Dr Wes Stanford so she was advised to speak with Dr Wes Stanford as well  At that time she decided to try the epidural injections and at the conclusion she would return for a visit) (She has been taking Tramadol 50 mg BID and Mobic 15 mg QD which takes the edge off)     Symptoms:  back stiffness, but no decreased spine range of motion, no lower extremity numbness, no lower extremity tingling and no lower extremity weakness    The patient presents with complaints of constant episodes of bilateral lower back pain, radiating to the right buttock, bilateral thigh and bilateral lower leg  Symptoms are worsening  Review of Systems    Constitutional: No fever, no chills, feels well, no tiredness, no recent weight gain or weight loss  Eyes: No complaints of eye pain, no red eyes, no eyesight problems, no discharge, no dry eyes, no itching of eyes  ENT: no complaints of earache, no loss of hearing, no nose bleeds, no nasal discharge, no sore throat, no hoarseness  Cardiovascular: No complaints of slow heart rate, no fast heart rate, no chest pain, no palpitations, no leg claudication, no lower extremity edema  Respiratory: No complaints of shortness of breath, no wheezing, no cough, no SOB on exertion, no orthopnea, no PND  Gastrointestinal: No complaints of abdominal pain, no constipation, no nausea or vomiting, no diarrhea, no bloody stools  Genitourinary: urinary urgency, but no incontinence (only if she does not make it in time)  Musculoskeletal: limb pain and lower back pain, but as noted in HPI  Integumentary: No complaints of skin rash or lesions, no itching, no skin wounds, no breast pain or lump  Neurological: No complaints of headache, no confusion, no convulsions, no numbness, no dizziness or fainting, no tingling, no limb weakness, no difficulty walking  Psychiatric: sleep disturbances (part of it because of the pain)  Endocrine: No complaints of proptosis, no hot flashes, no muscle weakness, no deepening of the voice, no feelings of weakness  Hematologic/Lymphatic: No complaints of swollen glands, no swollen glands in the neck, does not bleed easily, does not bruise easily  Active Problems    1  Ankylosing spondylitis (720 0) (M45 9)   2  Anxiety (300 00) (F41 9)   3  Bronchitis (490) (J40)   4  Carpal tunnel syndrome, bilateral upper limbs (354 0) (G56 03)   5  Chronic bilateral low back pain without sciatica (724 2,338 29) (M54 5,G89 29)   6  Fibromyalgia (729 1) (M79 7)   7  Generalized anxiety disorder (300 02) (F41 1)   8  Hyperlipidemia (272 4) (E78 5)   9  Left shoulder pain (719 41) (M25 512)   10  Long term use of drug (V58 69) (Z79 899)   11  Overweight (278 02) (E66 3)   12  Right shoulder pain (719 41) (M25 511)    Past Medical History    1  History of Encounter for gynecological examination without abnormal finding (V72 31)   (Z01 419)   2  Fibromyalgia (729 1) (M79 7)   3  History of Heme positive stool (792 1) (R19 5)   4  History of High cholesterol (272 0) (E78 00)   5  History of Hip pain (719 45) (M25 559)   6  History of low back pain (V13 59) (Z87 39)   7  History of spinal stenosis (V13 59) (Z87 39)   8  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)   9  History of vitamin D deficiency (V12 1) (Z86 39)   10  History of Left leg pain (729 5) (M79 605)   11  No pertinent past medical history   12  History of Screening for breast cancer (V76 10) (Z12 39)   13  History of Visit for routine gyn exam (V72 31) (Z01 419)    Surgical History    1  History of Cervical Conization   2  History of  Section   3  History of Dilation And Curettage    Family History  Mother    1  Family history of malignant neoplasm (V16 9) (Z80 9)   2  Family history of malignant neoplasm of female breast (V16 3) (Z80 3)   3  Family history of myocardial infarction (V17 3) (Z82 49)  Father    4  Family history of cerebrovascular accident (CVA) (V17 1) (Z82 3)  Son    5  Family history of depression (V17 0) (Z81 8)  Sister    10  Family history of Endometrial cancer   7  Family history of malignant neoplasm of uterus (V16 49) (Z80 49)  Brother    8  Family history of malignant neoplasm (V16 9) (Z80 9)  Maternal Aunt    9  Family history of malignant neoplasm of female breast (V16 3) (Z80 3)  Other    10  Family history of malignant neoplasm of female breast (V16 3) (Z80 3)   11  Family history of malignant neoplasm of uterus (V16 49) (Z80 49)  Family History    12  Family history of arthritis (V17 7) (Z82 61)   13  Family history of High cholesterol    Social History    · Caffeine use (V49 89) (F15 90)   · Currently sexually active   · Does not exercise (V69 0) (Z72 3)   · Employed   ·    · No drug use   · Non-smoker (V49 89) (Z78 9)   · Denied: History of Social alcohol use   · Three children    Current Meds   1  Aspirin 81 MG Oral Tablet Delayed Release; TAKE 1 TABLET DAILY; Therapy: (Recorded:2017) to Recorded   2  KlonoPIN TABS; TAKE 1 TABLET 3 TIMES DAILY AS NEEDED; Therapy: (Recorded:2017) to Recorded   3  Mobic 15 MG Oral Tablet; TAKE 1 TABLET DAILY; Therapy: (Recorded:2017) to Recorded   4  PARoxetine HCl - 10 MG Oral Tablet; TAKE 1 TABLET DAILY; Therapy: 79Amd3579 to (Evaluate:73Prr7170)  Requested for: 07Jnq5651; Last   Rx:61Wzq0552 Ordered   5   TraMADol HCl - 50 MG Oral Tablet; TAKE 1 TABLET TWICE DAILY AS NEEDED; Therapy: 86Exw0818 to (Evaluate:10Its0488); Last Rx:69Dhq5766 Ordered   6  Vitamin D CAPS; Therapy: (Recorded:59Jlz2786) to Recorded    Allergies    1  No Known Drug Allergies    Vitals  Vital Signs    Recorded: 44RVL3047 08:41AM   Temperature 97 9 F   Heart Rate 92   Respiration 16   Systolic 542   Diastolic 77   Height 5 ft 1 in   Weight 175 lb 2 oz   BMI Calculated 33 09   BSA Calculated 1 79   Pain Scale 3     Physical Exam     Mental Status: Alert and Oriented x3  Memory is intact  Attentive  Speech is articulate and fluent  Knowledge and vocabulary consistent with education  Grossly nonfocal   Judgment and insight: Normal   Mood and affect: Normal     Cranial Nerve Exam:  2nd cranial nerve: Normal with no noted deficit  3rd, 4th, and 6th cranial nerves: PERRLA and EOMI without later gaze nystagmus  5th cranial nerve: Normal with no noted deficit  7th cranial nerve: Face symmetrical at grimace and at rest   8th cranial nerve: Grossly intact to finger rub bilaterally  11th cranial nerve: Shoulder shrug equal bilaterally  Motor System - Upper Extremities: Muscle strength: 5/5 bilaterally  Muscle tone: Normal bilaterally  Muscle Bulk: Normal Muscle bulk bilaterally  Motor System - Lower Extremities:   Muscle strength: Abnormal   4/5 globally  Muscle tone: Normal bilaterally  Muscle Bulk: Normal Muscle bulk bilaterally  Reflexes:   Reflexes: Abnormal   Absent in the lower extremities  Coordination:   Coordination: Abnormal   Poor  Involuntary movements: Abnormal involuntary movements were observed  Complains of restless leg syndrome and abnormal leg cramps  Sensory:   Sensation: Abnormal   Patchy sensory losses with the right side being worse than the left deep vibratory is reduced globally in the lower extremities but worse on the right than the left    Gait and Station:   Gait and station: Abnormal   She ambulates with a hobbled gait she has difficulty stepping onto a foot stool  Constitutional General appearance: No acute distress, well appearing and well nourished  Results/Data    I personally reviewed the She has no recent studies a study from 2009 is reviewed  in detail with the patient  MRI Review An MRI of the LS spine from 2009 is carefully reviewed  This demonstrates spondylolisthesis at the L3-4 and L4-5 levels with done and see of the nerve roots consistent with symptomatic spinal stenosis  There are no recent studies  Future Appointments    Date/Time Provider Specialty Site   09/22/2017 09:00 AM TRIXIE Hermosillo  Obstetrics/Gynecology [facility] OB[facility]GYN AT [facility]   12/27/2017 08:00 AM TRIXIE Young   Internal Medicine [facility] INTERNAL MED     Signatures   Electronically signed by : Gerhardt Matt, M D ; Feb 9 2017  9:33AM EST                       (Author)

## 2018-01-13 NOTE — CONSULTS
Assessment   1  Tear of right supraspinatus tendon, subsequent encounter (V58 89,840 6) (S46 811D)  2  Spondylolisthesis of lumbar region (738 4) (M43 16)  3  Disability examination (V68 01) (Z02 71)  4  S/P lumbar spinal fusion (V45 4) (Z98 1)    S/P Lumbar fusion L-2-5 with permanent impairments of LROM,  Mild residual Right L-5 radiculopathy with sensory changes but intact motor  Plan  S/P lumbar spinal fusion    · Follow-up visit in 2 months Evaluation and Treatment  Follow-up  Status: Hold For -  Scheduling  Requested for: 61FIV1424    Discussion/Summary  Impression: low back pain, radiculopathy, spondylolisthesis and s/p fusion  Treatment plan includes activity modification and exercise regimen  Patient discussion: discussed with the patient  Chief Complaint  10/6 consult form NS for functio eval       History of Present Illness  10/6 73 yo female s/p lumbar fusion L-2 to L-5 fusion for stenosis with RLE pain April 2017  Reports RLE pain improved but persists with numbness from right knee distally   reports gait has improved with PTx  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 [employer]   normal duty includes pushing stretchers, pt  boosts , some light materials handeling, does not include assisting with procedures but has been asked to work in [employer]   but needs some formal restrictions, she is concerned about prolonged forward flexion  Currently has 20 # lifting restriction from Neurosurgery  Formal PTx has finished  incidentally has CTS bilat and complete right RTC tear both of which surgery has been recommended  Currently she reports she is doing nothing different than she did before surgery but not lifting linen bags which others do   concern is for prolonged bending over pts  in proc rooms  She does NOT lift 75# as her job description lists  1        1 Amended By: Odell Charles; Oct 06 2017 3:08 PM EST    Active Problems   1  Ankylosing spondylitis (720 0) (M45 9)  2   Anxiety (300 00) (F41 9)  3  Carpal tunnel syndrome, bilateral upper limbs (354 0) (G56 03)  4  Chronic bilateral low back pain without sciatica (724 2,338 29) (M54 5,G89 29)  5  Degeneration of intervertebral disc of lumbar region (722 52) (M51 36)  6  Encounter for gynecological examination without abnormal finding (V72 31) (Z01 419)  7  Encounter for postoperative care related to surgical joint fusion (V58 78,V45 4)   (Z48 89,Z98 1)  8  Encounter for screening mammogram for breast cancer (V76 12) (Z12 31)  9  Fibromyalgia (729 1) (M79 7)  10  Generalized anxiety disorder (300 02) (F41 1)  11  Hyperlipidemia (272 4) (E78 5)  12  Left shoulder pain (719 41) (M25 512)  13  Muscle spasm (728 85) (M62 838)  14  Neurogenic claudication (724 03) (M48 062)  15  Overweight (278 02) (E66 3)  16  Right shoulder pain (719 41) (M25 511)  17  Spinal stenosis, lumbar region, with neurogenic claudication (724 03) (M48 062)  18  Spondylolisthesis of lumbar region (738 4) (M43 16)  19  Tear of right supraspinatus tendon, subsequent encounter (V58 89,840 6) (K83 259F)    Past Medical History   1  Fibromyalgia (729 1) (M79 7)  2  History of Heme positive stool (792 1) (R19 5)  3  History of High cholesterol (272 0) (E78 00)  4  History of Hip pain (719 45) (M25 559)  5  History of bronchitis (V12 69) (Z87 09)  6  History of low back pain (V13 59) (Z87 39)  7  History of spinal stenosis (V13 59) (Z87 39)  8  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)  9  History of vitamin D deficiency (V12 1) (Z86 39)  10  History of Impingement syndrome of left shoulder (726 2) (M75 42)  11  History of Impingement syndrome of right shoulder (726 2) (M75 41)  12  History of Left leg pain (729 5) (M79 605)  13  History of Long term use of drug (V58 69) (Z79 317)  14  No pertinent past medical history  15  History of Screening for breast cancer (V76 10) (Z12 31)  16   History of Visit for routine gyn exam (V72 31) (Z01 008)    The active problems and past medical history were reviewed and updated today  Surgical History   1  History of Cervical Conization  2  History of  Section  3  History of Dilation And Curettage    The surgical history was reviewed and updated today  Family History  Mother   1  Family history of malignant neoplasm (V16 9) (Z80 9)  2  Family history of malignant neoplasm of female breast (V16 3) (Z80 3)  3  Family history of myocardial infarction (V17 3) (Z82 49)  Father   4  Family history of cerebrovascular accident (CVA) (V17 1) (Z82 3)  Son   5  Family history of depression (V17 0) (Z81 8)  Sister   10  Family history of Endometrial cancer  7  Family history of malignant neoplasm of uterus (V16 49) (Z80 49)  Brother   8  Family history of malignant neoplasm (V16 9) (Z80 9)  Maternal Aunt   9  Family history of malignant neoplasm of female breast (V16 3) (Z80 3)  Other   10  Family history of malignant neoplasm of female breast (V16 3) (Z80 3)  11  Family history of malignant neoplasm of uterus (V16 49) (Z80 49)  Family History   12  Family history of arthritis (V17 7) (Z82 61)  13  Family history of High cholesterol    The family history was reviewed and updated today  Social History    · Caffeine use (V49 89) (F15 90)   · Currently sexually active   · Does not exercise (V69 0) (Z72 3)   · Denied: History of Drug use   · Employed   ·    · Never a smoker   · No alcohol use   · No drug use   · Non-smoker (V49 89) (Z78 9)   · Denied: History of Social alcohol use   · Three children  The social history was reviewed and updated today  Current Meds  1  Aspirin 81 MG TABS; Therapy: (Recorded:2017) to Recorded  2  Gabapentin 100 MG Oral Capsule; TAKE 1 CAPSULE 3 times daily; Therapy: 74Luv2369 to (Last Krzysztofmijohn Morales)  Requested for: 83NYG2765 Ordered  3  KlonoPIN TABS; TAKE 1 TABLET 3 TIMES DAILY AS NEEDED; Therapy: (Recorded:05Lof4610) to Recorded  4   Methocarbamol 750 MG Oral Tablet; TAKE 1 TABLET Every 6 hours PRN muscle   spasms; Therapy: 54Bli8173 to (Evaluate:10Jun2017)  Requested for: 72ZTI0142; Last   Rx:90Cxm1292 Ordered  5  PARoxetine HCl - 10 MG Oral Tablet; TAKE 1 TABLET DAILY; Therapy: 46Yun1128 to (Evaluate:23Mar2018)  Requested for: 30Eja3305; Last   Rx:50Jyz4997 Ordered  6  TraMADol HCl - 50 MG Oral Tablet; TAKE 1 TABLET TWICE DAILY AS NEEDED; Therapy: 33Cgi8145 to (Evaluate:72Lbn3779); Last Rx:48Qli5994 Ordered  7  Vitamin D CAPS; TAKE 1 CAPSULE Daily Recorded    The medication list was reviewed and updated today  Allergies   1  No Known Drug Allergies    Vitals  Signs   Recorded: 01VYR1112 96:57LV   Systolic: 643  Diastolic: 62  Height: 5 ft 1 in  Weight: 191 lb 4 00 oz  BMI Calculated: 36 14  BSA Calculated: 1 85    Physical Exam      Lumbosacral Spine:   ROM: true flexion about 5 degrees, rotation nuil, extension 10 degrees  Flexion was restricted  Extension was restricted  Rotation to the left was restricted  Rotation to the right was restricted  Special Tests:  negative Straight Leg Raise and negative Mathew test    Constitutional - General appearance: Abnormal  overweight  Neurologic - Cranial nerves: Normal  Reflexes: Abnormal  Deep tendon reflexes: 1+ right biceps, 1+ left biceps, 1+ right triceps, 1+ left triceps, 1+ right brachioradialis, 1+ left brachioradialis, 1+ right patella, 1+ left patella, 1+ right ankle jerk, 1+ left ankle jerk and Montesinos negative  no ankle clonus on the right and no ankle clonus on the left  Sensation: Abnormal diminished right L-5 dermatome  Psychiatric - Orientation to person, place, and time: Normal  Mood and affect: Normal       Results/Data    X-ray Review stable plain films  Provider Comments    Pt  advised she has to "forever squat: not bend trunk  Future Appointments    Date/Time Provider Specialty Site   12/27/2017 08:00 AM TRIXIE Hoskins   Internal Medicine [facility] INTERNAL MED   12/21/2017 04:00 PM Yash TRIXIE Espinal   Neurosurgery [facility]     Signatures   Electronically signed by : Chintan Robertson DO; Oct  6 2017  2:57PM EST                       (Author)    Electronically signed by : Chintan Robertson DO; Oct  6 2017  3:08PM EST                       (Author)

## 2018-01-14 VITALS
DIASTOLIC BLOOD PRESSURE: 64 MMHG | BODY MASS INDEX: 35.54 KG/M2 | OXYGEN SATURATION: 96 % | WEIGHT: 188.25 LBS | HEART RATE: 76 BPM | HEIGHT: 61 IN | SYSTOLIC BLOOD PRESSURE: 108 MMHG

## 2018-01-14 VITALS
HEART RATE: 73 BPM | WEIGHT: 181.38 LBS | BODY MASS INDEX: 34.27 KG/M2 | SYSTOLIC BLOOD PRESSURE: 131 MMHG | DIASTOLIC BLOOD PRESSURE: 77 MMHG

## 2018-01-15 VITALS
RESPIRATION RATE: 16 BRPM | WEIGHT: 184 LBS | HEIGHT: 61 IN | BODY MASS INDEX: 34.74 KG/M2 | HEART RATE: 83 BPM | SYSTOLIC BLOOD PRESSURE: 131 MMHG | DIASTOLIC BLOOD PRESSURE: 73 MMHG | TEMPERATURE: 98.2 F

## 2018-01-15 NOTE — MISCELLANEOUS
History of Present Illness  TCM Communication [facility]: The patient is being contacted for follow-up after hospitalization  She was hospitalized at [facility]  The dates of hospitalization:, date of admission: 4/3/17, date of discharge: 4/6/17  Diagnosis: Lumbar Stenosis  She was discharged to a rehabilitation center  She did not schedule a follow up appointment  Communication performed and completed by Narcisa Glez   HPI: Patient transferred to [facility] for inpatient rehab  Active Problems    1  Ankylosing spondylitis (720 0) (M45 9)   2  Anxiety (300 00) (F41 9)   3  Carpal tunnel syndrome, bilateral upper limbs (354 0) (G56 03)   4  Chronic bilateral low back pain without sciatica (724 2,338 29) (M54 5,G89 29)   5  Degeneration of intervertebral disc of lumbar region (722 52) (M51 36)   6  Fibromyalgia (729 1) (M79 7)   7  Generalized anxiety disorder (300 02) (F41 1)   8  Hyperlipidemia (272 4) (E78 5)   9  Left shoulder pain (719 41) (M25 512)   10  Neurogenic claudication (724 03) (M48 06)   11  Overweight (278 02) (E66 3)   12  Right shoulder pain (719 41) (M25 511)   13  Spinal stenosis, lumbar region, with neurogenic claudication (724 03) (M48 06)   14  Spondylolisthesis of lumbar region (738 4) (M43 16)   15  Tear of right supraspinatus tendon, subsequent encounter (V58 89,840 6) (C79 198D)    Past Medical History    1  History of Encounter for gynecological examination without abnormal finding (V72 31)   (Z01 419)   2  Fibromyalgia (729 1) (M79 7)   3  History of Heme positive stool (792 1) (R19 5)   4  History of High cholesterol (272 0) (E78 00)   5  History of Hip pain (719 45) (M25 559)   6  History of bronchitis (V12 69) (Z87 09)   7  History of low back pain (V13 59) (Z87 39)   8  History of spinal stenosis (V13 59) (Z87 39)   9  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)   10  History of vitamin D deficiency (V12 1) (Z86 39)   11   History of Impingement syndrome of left shoulder (726 2) (M75 42)   12  History of Impingement syndrome of right shoulder (726 2) (M75 41)   13  History of Left leg pain (729 5) (M79 605)   14  History of Long term use of drug (V58 69) (Z79 899)   15  No pertinent past medical history   16  History of Screening for breast cancer (V76 10) (Z12 39)   17  History of Visit for routine gyn exam (V72 31) (Z01 419)    Surgical History    1  History of Cervical Conization   2  History of  Section   3  History of Dilation And Curettage    Family History  Mother    1  Family history of malignant neoplasm (V16 9) (Z80 9)   2  Family history of malignant neoplasm of female breast (V16 3) (Z80 3)   3  Family history of myocardial infarction (V17 3) (Z82 49)  Father    4  Family history of cerebrovascular accident (CVA) (V17 1) (Z82 3)  Son    5  Family history of depression (V17 0) (Z81 8)  Sister    10  Family history of Endometrial cancer   7  Family history of malignant neoplasm of uterus (V16 49) (Z80 49)  Brother    8  Family history of malignant neoplasm (V16 9) (Z80 9)  Maternal Aunt    9  Family history of malignant neoplasm of female breast (V16 3) (Z80 3)  Other    10  Family history of malignant neoplasm of female breast (V16 3) (Z80 3)   11  Family history of malignant neoplasm of uterus (V16 49) (Z80 49)  Family History    12  Family history of arthritis (V17 7) (Z82 61)   13  Family history of High cholesterol    Social History    · Caffeine use (V49 89) (F15 90)   · Currently sexually active   · Does not exercise (V69 0) (Z72 3)   · Employed   ·    · Never a smoker   · No drug use   · Non-smoker (V49 89) (Z78 9)   · Denied: History of Social alcohol use   · Three children    Current Meds   1  Aspirin 81 MG Oral Tablet Delayed Release; TAKE 1 TABLET DAILY; Therapy: (Recorded:70Zxm1244) to Recorded   2  KlonoPIN TABS; TAKE 1 TABLET 3 TIMES DAILY AS NEEDED; Therapy: (Recorded:2017) to Recorded   3   Mobic 15 MG Oral Tablet; TAKE 1 TABLET DAILY; Therapy: (Recorded:57Ldk7019) to Recorded   4  PARoxetine HCl - 10 MG Oral Tablet; TAKE 1 TABLET DAILY; Therapy: 14Gts6557 to (Evaluate:49Qdu1979)  Requested for: 99Ttg3491; Last   Rx:42Qpi6863 Ordered   5  TraMADol HCl - 50 MG Oral Tablet; TAKE 1 TABLET TWICE DAILY AS NEEDED; Therapy: 18Gfy9945 to (Evaluate:30Srw5976); Last Rx:28Hek6967 Ordered   6  Vitamin D CAPS; TAKE 1 CAPSULE Daily Recorded    Allergies    1  No Known Drug Allergies    Future Appointments    Date/Time Provider Specialty Site   09/22/2017 09:00 AM TRIXIE Sears  Obstetrics/Gynecology [facility]   12/27/2017 08:00 AM TRIXIE Tobar  Internal Medicine [facility] INTERNAL MED   05/18/2017 09:30 AM TRIXIE Hurt  Neurosurgery [facility]     Signatures   Electronically signed by :  Saray Roth, ; Apr 13 2017 10:22AM EST                       (Author)    Electronically signed by : TRIXIE Clarke ; May  1 2017 12:08PM EST                       (Author)

## 2018-01-15 NOTE — PROGRESS NOTES
Chief Complaint  Patient presents post L2-L5 open decompressive lumbar laminectomy with pedicle screw and parish fixation fusion; removal skin tag mid back  History of Present Illness  HPI: Patient presents for 2 week POV for incision check with her   She reports that she has some occasional pain across her low back and into her buttocks and down her legs  Overall, she feels much better since the surgery  She was just [facility]c'd from [facility] on Saturday and is about to start outpatient PT today  She is compliant with her brace and is walking with a roller walker  She denies any incisional issues, other than a scant amount of serosanguineous drainage, or fevers  Active Problems    1  Ankylosing spondylitis (720 0) (M45 9)   2  Anxiety (300 00) (F41 9)   3  Carpal tunnel syndrome, bilateral upper limbs (354 0) (G56 03)   4  Chronic bilateral low back pain without sciatica (724 2,338 29) (M54 5,G89 29)   5  Degeneration of intervertebral disc of lumbar region (722 52) (M51 36)   6  Fibromyalgia (729 1) (M79 7)   7  Generalized anxiety disorder (300 02) (F41 1)   8  Hyperlipidemia (272 4) (E78 5)   9  Left shoulder pain (719 41) (M25 512)   10  Neurogenic claudication (724 03) (M48 06)   11  Overweight (278 02) (E66 3)   12  Right shoulder pain (719 41) (M25 511)   13  Spinal stenosis, lumbar region, with neurogenic claudication (724 03) (M48 06)   14  Spondylolisthesis of lumbar region (738 4) (M43 16)   15  Tear of right supraspinatus tendon, subsequent encounter (V58 89,840 6) (O58 116W)    Current Meds   1  KlonoPIN TABS; TAKE 1 TABLET 3 TIMES DAILY AS NEEDED; Therapy: (Recorded:35Oat2057) to Recorded   2  PARoxetine HCl - 10 MG Oral Tablet; TAKE 1 TABLET DAILY; Therapy: 73Flv4030 to (Evaluate:53Tdd1771)  Requested for: 36Loo8283; Last   Rx:65Wop3703 Ordered   3  TraMADol HCl - 50 MG Oral Tablet; TAKE 1 TABLET TWICE DAILY AS NEEDED; Therapy: 40Kik5863 to (Evaluate:22Cky2982);  Last Rx:95Zlr2019 Ordered   4  Vitamin D CAPS; TAKE 1 CAPSULE Daily Recorded    Allergies    1  No Known Drug Allergies    Vitals  Signs    Temperature: 98 4 F  Respiration: 16  Systolic: 480  Diastolic: 82  Height: 5 ft 1 in  Weight: 184 lb   BMI Calculated: 34 77  BSA Calculated: 1 82    Procedure  Procedure: suture removal  The wound was located on the (low back)  Wound Exam: There was mild erythema around the wound edges  Procedure Note: 1 sutures were removed, 33 staples were removed  Dressing: a sterile dressing was placed  Complications:  there were no complications  Plan  Muscle spasm    · Gabapentin 100 MG Oral Capsule; TAKE 1 CAPSULE 3 times daily   · Methocarbamol 750 MG Oral Tablet; TAKE 1 TABLET Every 6 hours PRN muscle  spasms    Discussion/Summary    Reviewed incision care with patient and  including daily observation for s/s infection including: increased erythema, edema, drainage, dehiscence of incision or fever >101  Advised to continue cleansing area with mild soap and water and pat dry  Not to apply any creams, lotions or ointments and not to submerge in any water  She is to continue wearing the brace and maintaining activity restrictions until cleared by the surgeon  She was educated on the importance of using proper body mechanics  Incision with scant amount of dried blood  Area was cleansed with NSS and was not actively bleeding  Advised them to keep an eye on the incision and call if anything worsens or changes  They are to call the office with any questions or concerns or if any incisional issues or fevers arise  Verified date/time/location of her upcoming POV and reminded her to complete x-ray 2-3 days prior  They verbalized understanding  Future Appointments    Date/Time Provider Specialty Site   09/22/2017 09:00 AM TRIXIE Das  Obstetrics/Gynecology [facility] AT [facility]   12/27/2017 08:00 AM TRIXIE Mayers   Internal Medicine [facility] INTERNAL [facility] 05/18/2017 09:30 AM TRIXIE Armas   Neurosurgery [facility]     Signatures   Electronically signed by : Shirley Cam RN; Apr 17 2017  9:42AM EST                       (Author)    Electronically signed by : TRIXIE Carrillo ; Apr 20 2017  2:17PM EST                       (Author)

## 2018-01-15 NOTE — PROGRESS NOTES
Assessment    1  Ankylosing spondylitis (720 0) (M45 9)   2  Neurogenic claudication (724 03) (M48 06)   3  Spinal stenosis, lumbar region, with neurogenic claudication (724 03) (M48 06)   4  Degeneration of intervertebral disc of lumbar region (722 52) (M51 36)    Plan   Degeneration of intervertebral disc of lumbar region (722 52) (M51 36)        Schedule Surgery Treatment  Procedure Open decompressive lumbar laminectomy L2-L5 with pedicle screw and parish fixation fusion  Status: Hold For - Scheduling  Requested for: [id number]  Ordered; For: Degeneration of intervertebral disc of lumbar region;  Ordered By: Jennifer Mayorga  Performed:   Due: 07NZU0758     Discussion/Summary    This is a 73-year-old female with worsening, symptomatic spinal stenosis  She has failed conservative measures  I recommended the following surgical intervention: Open decompressive lumbar laminectomy L2-L5 with pedicle screw and parish fixation fusion  The patient has the current Goals: Ambulate without pain  The patent has the current Barriers: Severe lumbar spinal stenosis and spondylolisthesis  In a detailed way, I spoke about the risks, possibility of complication, indications and alternatives to care  The patient asked informed questions reflecting understanding  All questions were answered  They stated a desire to proceed with the above plan  Chief Complaint  Patient presents for routine follow up with MRI Lspine and Lspine Xrays  Dx: Neurogenic claudication      History of Present Illness  Patient is a 73-year-old female who presented on 2/9/17 for evaluation of back pain  The patient has been previously evaluated for her severe back pain in the past with our office  Past evaluation has included spine MRI  Past treatment has included physical therapy  Past procedures have included epidural injection      She was last seen in our office back on 7/20/2009 at which point she had tried the physical therapy and injections but she had recurrence of her symptoms  MRI Lspine reported there had been some moderate progression of both her spondylolisthesis and the resultant foraminal and central canal narrowing  There was little doubt that this abnormalities were the cause of her discomfort  There appeared to be disk protrusion at L3-4 to the left side, but she had no specific symptoms related to this  We had discussed several options for her like trying injections again  We did tough lightly on surgical interventions  The intervention approach would be different between Dr Carmelo Walters and Dr Hortensia Ramos so she was advised to speak with Dr Hortensia Ramos as well  At that time she decided to try the epidural injections and at the conclusion she would return for a visit) (She has been taking Tramadol 50 mg BID and Mobic 15 mg QD which takes the edge off  She has neurogenic claudication and spinal stenosis  She has had relatively positive results from epidural steroid injections  There was no updated study  Contemporaneous imaging studies are necessary in the evaluation of persons with neurogenic claudication and chronic low back pain  Today, she complaints of intermittent lower back pain in the center going into the buttocks  She reports a lot of stiffness in her lower back  No pain down the leg but she does have weakness of the right leg  Denies numbness and tingling  Review of Systems    Constitutional: No fever, no chills, feels well, no tiredness, no recent weight gain or weight loss  Eyes: No complaints of eye pain, no red eyes, no eyesight problems, no discharge, no dry eyes, no itching of eyes  ENT: no complaints of earache, no loss of hearing, no nose bleeds, no nasal discharge, no sore throat, no hoarseness  Cardiovascular: No complaints of slow heart rate, no fast heart rate, no chest pain, no palpitations, no leg claudication, no lower extremity edema     Respiratory: No complaints of shortness of breath, no wheezing, no cough, no SOB on exertion, no orthopnea, no PND  Gastrointestinal: No complaints of abdominal pain, no constipation, no nausea or vomiting, no diarrhea, no bloody stools  Genitourinary: No complaints of dysuria, no incontinence, no pelvic pain, no dysmenorrhea, no vaginal discharge or bleeding  Musculoskeletal: lower back pain, but as noted in HPI and no limb pain  Integumentary: No complaints of skin rash or lesions, no itching, no skin wounds, no breast pain or lump  Neurological: limb weakness (right leg), but as noted in HPI, no numbness, no tingling and no difficulty walking  Psychiatric: Not suicidal, no sleep disturbance, no anxiety or depression, no change in personality, no emotional problems  Endocrine: No complaints of proptosis, no hot flashes, no muscle weakness, no deepening of the voice, no feelings of weakness  Hematologic/Lymphatic: No complaints of swollen glands, no swollen glands in the neck, does not bleed easily, does not bruise easily  Active Problems     1  Anxiety (300 00) (F41 9)   2  Carpal tunnel syndrome, bilateral upper limbs (354 0) (G56 03)   3  Fibromyalgia (729 1) (M79 7)   4  Hyperlipidemia (272 4) (E78 5)   5  Left shoulder pain (719 41) (M25 512)   6  Neurogenic claudication (724 03) (M48 06)    Generalized anxiety disorder (300 02) (F41 1)       Ankylosing spondylitis (720 0) (M45 9)       Overweight (278 02) (E66 3)       Bronchitis (490) (J40)       Chronic bilateral low back pain without sciatica (724 2) (M54 5)       Long term use of drug (V58 69) (Z79 899)       Impingement syndrome of left shoulder (726 2) (M75 42)       Impingement syndrome of right shoulder (726 2) (M75 41)        Right shoulder pain (719 41) (M25 511)          Past Medical History    1  History of Encounter for gynecological examination without abnormal finding (V72 31)   (Z01 419)   2  Fibromyalgia (729 1) (M79 7)   3  History of Heme positive stool (792 1) (R19 5)   4   History of High cholesterol (272 0) (E78 00)   5  History of Hip pain (719 45) (M25 559)   6  History of low back pain (V13 59) (Z87 39)   7  History of spinal stenosis (V13 59) (Z87 39)   8  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)   9  History of vitamin D deficiency (V12 1) (Z86 39)   10  History of Left leg pain (729 5) (M79 605)   11  No pertinent past medical history   12  History of Screening for breast cancer (V76 10) (Z12 39)   13  History of Visit for routine gyn exam (V72 31) (Z01 419)    The active problems and past medical history were reviewed and updated today  Surgical History    1  History of Cervical Conization   2  History of  Section   3  History of Dilation And Curettage    The surgical history was reviewed and updated today  Family History  Mother    1  Family history of malignant neoplasm (V16 9) (Z80 9)   2  Family history of malignant neoplasm of female breast (V16 3) (Z80 3)   3  Family history of myocardial infarction (V17 3) (Z82 49)  Father    4  Family history of cerebrovascular accident (CVA) (V17 1) (Z82 3)  Son    5  Family history of depression (V17 0) (Z81 8)  Sister    10  Family history of Endometrial cancer   7  Family history of malignant neoplasm of uterus (V16 49) (Z80 49)  Brother    8  Family history of malignant neoplasm (V16 9) (Z80 9)  Maternal Aunt    9  Family history of malignant neoplasm of female breast (V16 3) (Z80 3)  Other    10  Family history of malignant neoplasm of female breast (V16 3) (Z80 3)   11  Family history of malignant neoplasm of uterus (V16 49) (Z80 49)  Family History    12  Family history of arthritis (V17 7) (Z82 61)   13  Family history of High cholesterol    The family history was reviewed and updated today         Social History    · Caffeine use (V49 89) (F15 90)   · Currently sexually active   · Does not exercise (V69 0) (Z72 3)   · Employed   ·    · No drug use   · Non-smoker (V49 89) (Z78 9)   · Denied: History of Social alcohol use   · Three children  The social history was reviewed and updated today  Current Meds   1  Aspirin 81 MG Oral Tablet Delayed Release; TAKE 1 TABLET DAILY; Therapy: (Recorded:09Feb2017) to Recorded   2  KlonoPIN TABS; TAKE 1 TABLET 3 TIMES DAILY AS NEEDED; Therapy: (Recorded:09Feb2017) to Recorded   3  Mobic 15 MG Oral Tablet; TAKE 1 TABLET DAILY; Therapy: (Recorded:09Feb2017) to Recorded   4  PARoxetine HCl - 10 MG Oral Tablet; TAKE 1 TABLET DAILY; Therapy: 51Mdt1775 to (Evaluate:77Cpd5239)  Requested for: 99Dqk1396; Last   Rx:69Vpn2005 Ordered   5  TraMADol HCl - 50 MG Oral Tablet; TAKE 1 TABLET TWICE DAILY AS NEEDED; Therapy: 20Dec2016 to (Evaluate:12Ntk6527); Last Rx:75Fpc0849 Ordered   6  Vitamin D CAPS; TAKE 1 CAPSULE Daily Recorded    The medication list was reviewed and updated today  Allergies    1  No Known Drug Allergies    Vitals  Vital Signs    Recorded: 23Feb2017 11:33AM   Temperature 98 4 F   Heart Rate 88   Respiration 16   Systolic 476   Diastolic 70   Height 5 ft 1 in   Weight 176 lb    BMI Calculated 33 25   BSA Calculated 1 78   O2 Saturation 0   Pain Scale 0     Physical Exam   (Copied from previous exam for completeness of note)   Mental Status: Alert and Oriented x3  Memory is intact  Attentive  Speech is articulate and fluent  Knowledge and vocabulary consistent with education  Grossly nonfocal   Judgment and insight: Normal   Mood and affect: Normal     Cranial Nerve Exam:  2nd cranial nerve: Normal with no noted deficit  3rd, 4th, and 6th cranial nerves: PERRLA and EOMI without later gaze nystagmus  5th cranial nerve: Normal with no noted deficit  7th cranial nerve: Face symmetrical at grimace and at rest   8th cranial nerve: Grossly intact to finger rub bilaterally  11th cranial nerve: Shoulder shrug equal bilaterally  Motor System - Upper Extremities: Muscle strength: 5/5 bilaterally  Muscle tone: Normal bilaterally    Muscle Bulk: Normal Muscle bulk bilaterally  Motor System - Lower Extremities:   Muscle strength: Abnormal   4/5 globally  Muscle tone: Normal bilaterally  Muscle Bulk: Normal Muscle bulk bilaterally  Reflexes:   Reflexes: Abnormal   Absent in the lower extremities  Coordination:   Coordination: Abnormal   Poor  Involuntary movements: Abnormal involuntary movements were observed  Complains of restless leg syndrome and abnormal leg cramps  Sensory:   Sensation: Abnormal   Patchy sensory losses with the right side being worse than the left deep vibratory is reduced globally in the lower extremities but worse on the right than the left  Gait and Station:   Gait and station: Abnormal   She ambulates with a hobbled gait she has difficulty stepping onto a foot stool  Constitutional General appearance: No acute distress, well appearing and well nourished  Results/Data  * MRI LUMBAR SPINE WO CONTRAST [id number] 05:19PM Otto Mead Order Number: [id number]    - Patient Instructions: To schedule this appointment, please contact Central Scheduling at [phone]  SCHEDULED TYRA ORA  [facility] [place] 2/17/17 @@ 5:30PM  PLEASE ARRIVE 15 MINUTES EARLY  SG10     Test Name Result Flag Reference   MRI LUMBAR SPINE WO CONTRAST (Report)     This is a summary report  The complete report is available in the patient's medical record  If you cannot access the medical record, please contact the sending organization for a detailed fax or copy  MRI LUMBAR SPINE WITHOUT CONTRAST     INDICATION: 72-year-old female, chronic low back and right leg pain and weakness   COMPARISON: 5/28/2009 MRI     TECHNIQUE: Sagittal T1, sagittal T2, sagittal inversion recovery, axial T1 and axial T2, coronal T2       IMAGE QUALITY: Diagnostic     FINDINGS:     ALIGNMENT:    Interval development of minimal dextroscoliosis, apex at L3-4  No compression fracture  No spondylolysis or spondylolisthesis        MARROW SIGNAL: Normal marrow signal is identified within the visualized bony structures  No discrete marrow lesion  DISTAL CORD AND CONUS: Normal size and signal within the distal cord and conus  The conus ends at the L1 level  There is increased degree of redundancy of the nerve roots of the cauda equina related to multilevel spinal stenosis which is most    pronounced at L4-5     PARASPINAL SOFT TISSUES: Paraspinal soft tissues are unremarkable  SACRUM: Normal signal within the sacrum  No evidence of insufficiency or stress fracture  LOWER THORACIC DISC SPACES: Normal disc height and signal  No disc herniation, canal stenosis or foraminal narrowing  LUMBAR DISC SPACES:        L1-L2: Mild degenerative disc and facet disease and bulging annulus, no stenosis     L2-L3: Progressive moderate degenerative disc and facet disease, grade 1 anterolisthesis, moderate central canal stenosis, small to moderate broad left central disc herniation, probable left L3 nerve root encroachment     L3-L4: Progressive moderate to severe degenerative disc and facet disease, persistent grade 1 anterolisthesis, diminished broad central disc protrusion, persistent moderate central canal and bilateral foraminal stenosis with possible bilateral L3 and L4   nerve root encroachment     L4-L5: Progressive moderate to severe degenerative disc and facet disease, persistent grade 1 anterolisthesis, progressive moderate to severe central canal and bilateral foraminal stenosis, moderate bilateral lateral recess stenosis, persistent but    diminished small broad left foraminal disc protrusion   Combination producing possible bilateral L4 and L5 nerve root encroachment     L5-S1: Persistent mild degenerative disc and facet disease, no stenosis       IMPRESSION:   Progressive multilevel degenerative spondylosis     Development of grade 1 anterolisthesis, progressive moderate central canal stenosis, development of small to moderate broad left central disc herniation L2-3     Persisting grade 1 anterolisthesis, persistent moderate central canal and bilateral foraminal stenosis, diminished broad central disc protrusion L3-4     Persistent grade 1 anterolisthesis, progressive moderate to severe central canal and bilateral foraminal stenosis, moderate bilateral lateral recess stenosis, diminished small broad left foraminal disc protrusion L4-5       ##sigslh##sigslh       Workstation performed: [id number]     Signed by:   Edson Bergeron MD   2/20/17         Diagnostic Studies Reviewed [facility]:   I personally reviewed the MRI of the lumbosacral spine in detail with the patient  MRI Review A current MRI of the LS spine is reviewed and compared with a study from 2009  The current study demonstrates worsening of severe degenerative disc disease at the L34 and L4-5 regions  There is degenerative disc disease with facet hypertrophy and consequent spinal and lateral recess stenosis at L2-3 also  Each of these has cause redundancy of the nerve roots cephalad to the region of severe compression  Future Appointments    Date/Time Provider Specialty Site   09/22/2017 09:00 AM TRIXIE Macdonald  Obstetrics/Gynecology [facility] OB/GYN AT [facility]   03/30/2017 02:40 PM TRIXIE Anthony  Orthopedic Surgery [facility] ORTH [facility]   12/27/2017 08:00 AM TRIXIE Larsen  Internal Medicine [facility] INTERNAL MED   04/17/2017 09:00 AM NeuroSX, Nursetwo Schedule  [facility] NEUROSURGICAL [facility]   03/28/2017 04:00 PM TRIXIE Hurd  Neurosurgery [facility] NEUROSURGICAL [facility]   04/03/2017 07:45 AM TRIXIE Hurd  [address]   05/18/2017 09:30 AM TRIXIE Hurd  Neurosurgery [address] [facility]     Surgery Scheduling Form    Location: [place]   Confirmation Number:   Procedure Date: 4/3/17   Requested Time:   Surgeon: Linden Whitmore  Co-Surgeon:   [facility] Required: Yes   Bed: Med/Surg Bed Required  Anesthesia: General      PROCEDURE DETAILS   Procedure: Open decompressive lumbar laminectomy L2-L5 with pedicle screw and parish fixation fusion  Laterality/Level: Level: L 2-5  Anticipated frozen section: NO    Pathology: No specimen  CPT Code(s): D4901109, [id number], P2839166 (X2), Z3537327, [id number], 42790   Pre-Op Diagnosis: spinal stenosis, lumbar, neurogenic claudication, degeneration of intervertebral disc of lumbar region  Dx Code(s): M48 06, M51 36    Length of Procedure: 150 mins  Equipment: Microscope, Midas Manan and Aquamantis  Equipment Needs: Motors (tceMEP), EMG, SSEP, Impulse Monitoring Confirmation # : [id number]  Implants/Representative: [employer]   Is the patient able to walk up a flight of stairs, walk up a hill or do heavy housework WITHOUT having chest pain or shortness of breath? REGISTRATION & FINANCIAL CLEARANCE    FA Initials:   Insurance: [employer] Number: [id number] Group Number:     PRE-ADMISSION TESTING/CLINICAL INFORMATION   PAT Location:   [facility] Comments: [facility] - WEEK OF 3/13/17  Type & Screen needed  Communication Barrier:   Primary Care Physician: CONSTANTINE  CONSULTS NEEDED:   Anesthesia Consult: No Anesthesia consult needed   Medical Consult: YES,Medical consult with CONSTANTINE    Cardiac Consult: No Cardiac consult needed No Other consult needed     ALLERGIES AND ALERTS   Latex Allergy: NO   Penicillin Allergy: NO   Malignant Hyperthermia: NO   Diabetic Patient: NO   Height: 5'1"  Weight: 79 83 KG  COMMENTS   Scheduling Information Provided by: OSMANI  IN OFFICE USE   Urgency:   Craniotomy Details:   Lab Studies Ordered: Full Labs Ordered, Medically Cleared   [place] - WEEK OF 3/20/17  Films   Bracing: Reinforced Corset     Bone Stimulator   Signatures   Electronically signed by : TRIXIE Thompson ; Mar 27 2017  4:06PM EST                       (Author)

## 2018-01-16 ENCOUNTER — APPOINTMENT (OUTPATIENT)
Dept: PHYSICAL THERAPY | Facility: REHABILITATION | Age: 68
End: 2018-01-16
Payer: COMMERCIAL

## 2018-01-16 NOTE — RESULT NOTES
Verified Results  (1) THIN PREP PAP WITH IMAGING [id number] 12:01PM Kelsea Cordova     Test Name Result Flag Reference   LAB AP CASE REPORT (Report)     Gynecologic Cytology Report            Case: [id number]                  Authorizing Provider: Blake Nguyen MD     Collected:      09/21/2016 1201        First Screen:     FARAZ Rubio    Received:      09/23/2016 1201        Specimen:  LIQUID-BASED PAP, SCREENING, Cervix   HPV HIGH RISK RESULT (Report)     HPV, High Risk: HPV NEG, HPV16 NEG, HPV18 NEG      Other High Risk HPV Negative, HPV 16 Negative, HPV 18 Negative  HPV types: 16,18,31,33,35,39,45,51,52,56,58,59,66 and 68 DNA are undetectable or below the pre-set threshold  Roche?s FDA approved Vishal 4800 is utilized with strict adherence to the ?s instruction  manual to test for the presence of High-Risk HPV DNA, as well as HPV 16 and HPV 18  This instrument  has been validated by our laboratory and/or by the   A negative result does not preclude the presence of HPV infection because results depend on adequate  specimen collection, absence of inhibitors and sufficient DNA to be detected  Additionally, HPV negative  results are not intended to prevent women from proceeding to colposcopy if clinically warranted  Positive HPV test results indicate the presence of any one or more of the high risk types, but since patients  are often co-infected with low-risk types it does not rule out the presence of low-risk types in patients  with mixed infections  LAB AP GYN PRIMARY INTERPRETATION      Negative for intraepithelial lesion or malignancy  Electronically signed by FARAZ Rubio on 9/27/2016 at 1:07 PM   LAB AP GYN SPECIMEN ADEQUACY      Satisfactory for evaluation  (See note)   LAB AP GYN NOTE      No endocervical cells identified   It is difficult to differentiate between   squamous metaplastic cells and parabasal cells in atrophic specimens due   to numerous causes including menopause, postpartum changes and   progestational agents  LAB AP GYN ADDITIONAL INFORMATION (Report)     [facility] FDA approved ,  and ThinPrep Imaging System are   utilized with strict adherence to the 's instruction manual to   prepare gynecologic and non-gynecologic cytology specimens for the   production of ThinPrep slides as well as for gynecologic ThinPrep imaging  These processes have been validated by our laboratory and/or by the     The Pap test is not a diagnostic procedure and should not be used as the   sole means to detect cervical cancer  It is only a screening procedure to   aid in the detection of cervical cancer and its precursors  Both   false-negative and false-positive results have been experienced  Your   patient's test result should be interpreted in this context together with   the history and clinical findings     LAB AP LMP

## 2018-01-16 NOTE — RESULT NOTES
Verified Results  MAMMO SCREENING BILATERAL W 3D & CAD [id number] 07:20AM [facility] Order Number: [id number]    - Patient Instructions: To schedule this appointment, please contact Central Scheduling at [phone]  Do not wear any perfume, powder, lotion or deodorant on breast or underarm area  Please bring your doctors order, referral (if needed) and insurance information with you on the day of the test  Failure to bring this information may result in this test being rescheduled  Arrive 15 minutes prior to your appointment time to register  On the day of your test, please bring any prior mammogram or breast studies with you that were not performed at a [facility]  Failure to bring prior exams may result in your test needing to be rescheduled   Order Number: [id number]    - Patient Instructions: To schedule this appointment, please contact Central Scheduling at [phone]  Do not wear any perfume, powder, lotion or deodorant on breast or underarm area  Please bring your doctors order, referral (if needed) and insurance information with you on the day of the test  Failure to bring this information may result in this test being rescheduled  Arrive 15 minutes prior to your appointment time to register  On the day of your test, please bring any prior mammogram or breast studies with you that were not performed at a [facility]  Failure to bring prior exams may result in your test needing to be rescheduled  Test Name Result Flag Reference   MAMMO SCREENING BILATERAL W 3D & CAD (Report)     Patient History:   Patient is postmenopausal    Family history of premenopausal breast cancer in maternal aunt at   age 36, breast cancer in mother at age 67, endometrial cancer in   sister at age 61, prostate cancer in brother at age 58, and    breast cancer in niece at age 46  Patient has never smoked  Patient's BMI is 35 9       Reason for exam: screening (asymptomatic)  Mammo Screening Bilateral W DBT and CAD: December 27, 2016 -    Check In #: [de-identified]   2D/3D Procedure   3D views: Bilateral MLO view(s) were taken  2D views: Bilateral CC view(s) were taken  Technologist: Ashok Burkitt, R T (R)(M)   Prior study comparison: December 7, 2015, digital bilateral    screening mammogram, performed at [address] [place] [address]  December 5, 2014, digital bilateral screening mammogram,    performed at [facility]  December 5, 2013,    digital bilateral screening mammogram, performed at [facility]  November 30, 2012, digital bilateral screening   mammogram, performed at [facility]  November 30, 2011, digital bilateral screening mammogram, performed at [address] [place] [address]  November 29, 2010, digital bilateral    screening mammogram, performed at [facility]  The breast tissue is almost entirely fat  A combination of    mediolateral oblique 3D tomographic slices as well as standard    two-dimensional orthogonal images were obtained  No new dominant soft tissue mass, architectural distortion or    suspicious calcifications are noted  The skin and nipple    contours are within normal limits  No evidence of malignancy  No significant changes   when compared with prior studies  ASSESSMENT: BiRad:1 - Negative     Recommendation:   Routine screening mammogram of both breasts in 1 year  8-10% of cancers will be missed on mammography  Management of a    palpable abnormality must be based on clinical grounds  Patients    will be notified of their results via letter from our facility  Accredited by [employer] of [employer] and FDA       Transcription Location: LITO Liu 98: [id number]     Risk Value(s):   Tyrer-Cuzick 10 Year: 11 027%, Tyrer-Cuzick Lifetime: 22 769%,    Myriad Table: 2 6%, BLUE 5 Year: 3 6%, NCI Lifetime: 13 8%, MRS    : Based on personal and/or family history,    consideration of hereditary risk assessment may be warranted     Signed by:   Zeke Chisholm MD   12/27/16

## 2018-01-17 NOTE — MISCELLANEOUS
Message   Recorded as Task   Date: 06/28/2017 12:10 PM, Created By: Matias Rivera   Task Name: Call Back   Assigned To: Ольга Coronado   Regarding Patient: Coty Erwin, Status: Active   Comment:    Ольга Coronado - 28 Jun 2017 12:10 PM     TASK CREATED  Pt inquired if she had any restrictions with her return to work  she is following [employer] gradual return schedule but was not sure if she had limitations  Verified with Dilip Marshallberg that there are no restrictions  she was sent back to regular duties just on a graduated return  This was revealed to her in a message and she was encouraged to call back with any questions          Signatures   Electronically signed by : Edith Gaffney, ; Jun 28 2017 12:11PM EST                       (Author)

## 2018-01-17 NOTE — RESULT NOTES
Verified Results  (1) TISSUE EXAM [id number] 12:41PM Jessa Patrick     Test Name Result Flag Reference   LAB AP CASE REPORT (Report)     Surgical Pathology Report             Case: [id number]                   Authorizing Provider: Deb Mcfarland MD      Collected:      10/07/2016 1241        Ordering Location:   [address] [place] [address]   Received:      10/10/2016 [facility]                               Pathologist:      Mony Maya MD                                Specimens:  A) - Stomach, Antrum Biopsy - Gastritis                                B) - Polyp, Colorectal, Ascending colon polyp                             C) - Polyp, Colorectal, Sigmoid polyp   LAB AP FINAL DIAGNOSIS (Report)     A  Stomach, Antrum Biopsy - Gastritis, biopsy:   -Benign gastric-oxyntoantral type mucosa with mild chronic inflammation   and reactive surface foveolar epithelial changes consistent with reactive/   chemical gastritis   -No evidence of ulceration   -No evidence of dysplasia or malignancy   -No H Pylori organisms identified on immunohistochemical stained slide  Control reacted appropriately  B  Polyp, Colorectal, Ascending colon polyp:  -Tubular adenoma  -No evidence of high grade dysplasia or malignancy seen  C  Polyp, Colorectal, Sigmoid polyp :  -Hyperplastic polyp, mildly inflammed  Electronically signed by Mony Maya MD on 10/11/2016 at 2:09 PM   LAB AP NOTE      Interpretation performed at [address] [place] [address]   LAB [address] [place] [address] (Report)     These tests were developed and their performance characteristics   determined by [facility] or [facility]  They may not be cleared or approved by the [place]  Food and   Drug Administration  The FDA has determined that such clearance or   approval is not necessary  These tests are used for clinical purposes     They should not be regarded as investigational or for research  This   laboratory has been approved by [facility], designated as a high-complexity   laboratory and is qualified to perform these tests  LAB AP GROSS DESCRIPTION (Report)     A  The specimen is received in formalin, labeled with the patient's name   and hospital number, and is designated antrum biopsy-gastritis, is a   single irregularly shaped fragment of tan soft tissue measuring 0 4 cm in   greatest dimension  Entirely submitted  One cassette  B  The specimen is received in formalin, labeled with the patient's name   and hospital number, and is designated ascending colon polyp, are two   irregularly shaped fragments of tan soft tissue each measuring 0 3 cm in   greatest dimension  Entirely submitted  One cassette  C  The specimen is received in formalin, labeled with the patient's name   and hospital number, and is designated sigmoid polyp, is a single   irregularly shaped fragment of tan soft tissue measuring 0 3 cm in   greatest dimension  Entirely submitted  One cassette  Note: The estimated total formalin fixation time based upon information   provided by the submitting clinician and the standard processing schedule   is over 72 hours        RLR   LAB AP CLINICAL INFORMATION Cold biopsy     Cold biopsy       Plan  Heme positive stool    · Endoscopy Capsule Esophagus Through Ileum; Status:Hold For - Scheduling;  Requested for:22Oct2016;

## 2018-01-18 ENCOUNTER — APPOINTMENT (OUTPATIENT)
Dept: PHYSICAL THERAPY | Facility: REHABILITATION | Age: 68
End: 2018-01-18
Payer: COMMERCIAL

## 2018-01-18 PROCEDURE — 97110 THERAPEUTIC EXERCISES: CPT

## 2018-01-18 PROCEDURE — 97112 NEUROMUSCULAR REEDUCATION: CPT

## 2018-01-18 NOTE — PROGRESS NOTES
Assessment    1  Ankylosing spondylitis (720 0) (M45 9)   2  Spondylolisthesis of lumbar region (738 4) (M43 16)   3  Spinal stenosis, lumbar region, with neurogenic claudication (724 03) (M48 06)    Plan    · Schedule Surgery Treatment  Procedure Open L2-5 Decompressive laminectomy  andpedicle screw fixation fusion With intraoperative monitoring  Status: Hold For  - Scheduling  Requested for: 67Nmo3871   Ordered; For: Spinal stenosis, lumbar region, with neurogenic claudication, Spondylolisthesis of lumbar region; Ordered By: Karthikeyan Tee Performed:  Due: 21Apr2017    Discussion/Summary    This is a 68-year-old female with severe intractable right leg pain and increasing discomfort with walking giving her the diagnosis of neurogenic claudication  She has spinal stenosis as well as foraminal stenosis with scoliosis and mechanical back pain  She has failed conservative measures  It is for each of these reasons that I've recommended the following surgical intervention: Open L2-5 Decompressive laminectomy andpedicle screw fixation fusion With intraoperative monitoring  In a detailed way, I spoke about the risks, possibility of complication, indications and alternatives to care  The patient asked informed questions reflecting understanding  All questions were answered  They stated a desire to proceed with the above plan  Chief Complaint  Patient presents for follow up to rediscuss surgical procedure with MRI Lspine and Lspine Xrays  Dx: Degeneration of intervertebral disc of lumbar region      History of Present Illness  Patient is a 68-year-old female who presented on 2/9/17 for evaluation of back pain  The patient has been previously evaluated for her severe back pain in the past with our office  Past evaluation has included spine MRI  Past treatment has included physical therapy  Past procedures have included epidural injection      She was last seen in our office back on 7/20/2009 at which point she had tried the physical therapy and injections but she had recurrence of her symptoms  MRI Lspine reported there had been some moderate progression of both her spondylolisthesis and the resultant foraminal and central canal narrowing  There was little doubt that this abnormalities were the cause of her discomfort  There appeared to be disk protrusion at L3-4 to the left side, but she had no specific symptoms related to this  We had discussed several options for her like trying injections again  We did tough lightly on surgical interventions  The intervention approach would be different between Dr Scarlett Myles and Dr Rom Rey so she was advised to speak with Dr Rom Rey as well  At that time she decided to try the epidural injections and at the conclusion she would return for a visit  She has been taking Tramadol 50 mg BID and Mobic 15 mg QD which takes the edge off  She has neurogenic claudication and spinal stenosis  She has had relatively positive results from epidural steroid injections  There was no updated study  Contemporaneous imaging studies are necessary in the evaluation of persons with neurogenic claudication and chronic low back pain  Because of patientÃ¢â¬â¢s worsening spinal stenosis, recommended an Open decompressive lumbar laminectomy L2-L5 with pedicle screw and parish fixation fusion  Today, she complains of intermittent lower back pain in the center going into the buttocks  She reports a lot of stiffness in her lower back  No pain down the leg but she does have weakness of the right leg  Denies numbness and tingling  The only new problem she reports is that of a torn rotator cuff on the right  It is to be noted that during surgery her arm is to be laid on her side and not above her head  Review of Systems    Constitutional: No fever, no chills, feels well, no tiredness, no recent weight gain or weight loss     Eyes: No complaints of eye pain, no red eyes, no eyesight problems, no discharge, no dry eyes, no itching of eyes  ENT: no complaints of earache, no loss of hearing, no nose bleeds, no nasal discharge, no sore throat, no hoarseness  Cardiovascular: No complaints of slow heart rate, no fast heart rate, no chest pain, no palpitations, no leg claudication, no lower extremity edema  Respiratory: No complaints of shortness of breath, no wheezing, no cough, no SOB on exertion, no orthopnea, no PND  Gastrointestinal: No complaints of abdominal pain, no constipation, no nausea or vomiting, no diarrhea, no bloody stools  Genitourinary: No complaints of dysuria, no incontinence, no pelvic pain, no dysmenorrhea, no vaginal discharge or bleeding  Musculoskeletal: lower back pain, but as noted in HPI and no limb pain  Integumentary: No complaints of skin rash or lesions, no itching, no skin wounds, no breast pain or lump  Neurological: limb weakness, but as noted in HPI, no numbness, no tingling and no difficulty walking  Psychiatric: Not suicidal, no sleep disturbance, no anxiety or depression, no change in personality, no emotional problems  Endocrine: No complaints of proptosis, no hot flashes, no muscle weakness, no deepening of the voice, no feelings of weakness  Hematologic/Lymphatic: No complaints of swollen glands, no swollen glands in the neck, does not bleed easily, does not bruise easily  Active Problems    1  Ankylosing spondylitis (720 0) (M45 9)   2  Anxiety (300 00) (F41 9)   3  Carpal tunnel syndrome, bilateral upper limbs (354 0) (G56 03)   4  Chronic bilateral low back pain without sciatica (724 2,338 29) (M54 5,G89 29)   5  Degeneration of intervertebral disc of lumbar region (722 52) (M51 36)   6  Fibromyalgia (729 1) (M79 7)   7  Generalized anxiety disorder (300 02) (F41 1)   8  Hyperlipidemia (272 4) (E78 5)   9  Left shoulder pain (719 41) (M25 512)   10  Neurogenic claudication (724 03) (M48 06)   11  Overweight (278 02) (E66 3)   12   Right shoulder pain (719 41) (M25 511)   13  Spinal stenosis, lumbar region, with neurogenic claudication (724 03) (M48 06)   14  Tear of right supraspinatus tendon, subsequent encounter (V58 89,840 6) (V08 140W)    Past Medical History    1  History of Encounter for gynecological examination without abnormal finding (V72 31)   (Z01 419)   2  Fibromyalgia (729 1) (M79 7)   3  History of Heme positive stool (792 1) (R19 5)   4  History of High cholesterol (272 0) (E78 00)   5  History of Hip pain (719 45) (M25 559)   6  History of bronchitis (V12 69) (Z87 09)   7  History of low back pain (V13 59) (Z87 39)   8  History of spinal stenosis (V13 59) (Z87 39)   9  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)   10  History of vitamin D deficiency (V12 1) (Z86 39)   11  History of Impingement syndrome of left shoulder (726 2) (M75 42)   12  History of Impingement syndrome of right shoulder (726 2) (M75 41)   13  History of Left leg pain (729 5) (M79 605)   14  History of Long term use of drug (V58 69) (Z79 899)   15  No pertinent past medical history   16  History of Screening for breast cancer (V76 10) (Z12 39)   17  History of Visit for routine gyn exam (V72 31) (Z01 419)    The active problems and past medical history were reviewed and updated today  Surgical History    1  History of Cervical Conization   2  History of  Section   3  History of Dilation And Curettage    The surgical history was reviewed and updated today  Family History  Mother    1  Family history of malignant neoplasm (V16 9) (Z80 9)   2  Family history of malignant neoplasm of female breast (V16 3) (Z80 3)   3  Family history of myocardial infarction (V17 3) (Z82 49)  Father    4  Family history of cerebrovascular accident (CVA) (V17 1) (Z82 3)  Son    5  Family history of depression (V17 0) (Z81 8)  Sister    10  Family history of Endometrial cancer   7  Family history of malignant neoplasm of uterus (V16 49) (Z80 49)  Brother    8  Family history of malignant neoplasm (V16 9) (Z80 9)  Maternal Aunt    9  Family history of malignant neoplasm of female breast (V16 3) (Z80 3)  Other    10  Family history of malignant neoplasm of female breast (V16 3) (Z80 3)   11  Family history of malignant neoplasm of uterus (V16 49) (Z80 49)  Family History    12  Family history of arthritis (V17 7) (Z82 61)   13  Family history of High cholesterol    The family history was reviewed and updated today  Social History    · Caffeine use (V49 89) (F15 90)   · Currently sexually active   · Does not exercise (V69 0) (Z72 3)   · Employed   ·    · Never a smoker   · No drug use   · Non-smoker (V49 89) (Z78 9)   · Denied: History of Social alcohol use   · Three children    Current Meds   1  Aspirin 81 MG Oral Tablet Delayed Release; TAKE 1 TABLET DAILY; Therapy: (Recorded:19Prk8711) to Recorded   2  KlonoPIN TABS; TAKE 1 TABLET 3 TIMES DAILY AS NEEDED; Therapy: (Recorded:09Feb2017) to Recorded   3  Mobic 15 MG Oral Tablet; TAKE 1 TABLET DAILY; Therapy: (Recorded:09Feb2017) to Recorded   4  PARoxetine HCl - 10 MG Oral Tablet; TAKE 1 TABLET DAILY; Therapy: 51Gan0512 to (Evaluate:39Uav5899)  Requested for: 51Dgd2471; Last   Rx:32Abh2967 Ordered   5  TraMADol HCl - 50 MG Oral Tablet; TAKE 1 TABLET TWICE DAILY AS NEEDED; Therapy: 30Nfw1108 to (Evaluate:44Kgs1460); Last Rx:53Eul1101 Ordered   6  Vitamin D CAPS; TAKE 1 CAPSULE Daily Recorded    The medication list was reviewed and updated today  Allergies    1  No Known Drug Allergies    Vitals  Vital Signs    Recorded: 94VPO8698 04:32PM   Temperature 98 5 F   Heart Rate 91   Respiration 16   Systolic 650   Diastolic 601   Height 5 ft 1 in   Weight 181 lb    BMI Calculated 34 2   BSA Calculated 1 81   Pain Scale 6     Physical Exam   (Copied from previous exam for completeness of note)   Mental Status: Alert and Oriented x3  Memory is intact  Attentive  Speech is articulate and fluent  Knowledge and vocabulary consistent with education  Grossly nonfocal   Judgment and insight: Normal   Mood and affect: Normal     Cranial Nerve Exam:  2nd cranial nerve: Normal with no noted deficit  3rd, 4th, and 6th cranial nerves: PERRLA and EOMI without later gaze nystagmus  5th cranial nerve: Normal with no noted deficit  7th cranial nerve: Face symmetrical at grimace and at rest   8th cranial nerve: Grossly intact to finger rub bilaterally  11th cranial nerve: Shoulder shrug equal bilaterally  Motor System - Upper Extremities: Muscle strength: 5/5 bilaterally  Muscle tone: Normal bilaterally  Muscle Bulk: Normal Muscle bulk bilaterally  Motor System - Lower Extremities:   Muscle strength: Abnormal   4/5 globally  Muscle tone: Normal bilaterally  Muscle Bulk: Normal Muscle bulk bilaterally  Reflexes:   Reflexes: Abnormal   Absent in the lower extremities  Coordination:   Coordination: Abnormal   Poor  Involuntary movements: Abnormal involuntary movements were observed  Complains of restless leg syndrome and abnormal leg cramps  Sensory:   Sensation: Abnormal   Patchy sensory losses with the right side being worse than the left deep vibratory is reduced globally in the lower extremities but worse on the right than the left  Gait and Station:   Gait and station: Abnormal   She ambulates with a hobbled gait she has difficulty stepping onto a foot stool  Constitutional General appearance: No acute distress, well appearing and well nourished  Results/Data  Diagnostic Studies Reviewed Neurosurger [facility]:   I personally reviewed the X-rays of the lumbosacral spine in detail with the patient  X-ray Review X-rays of the lumbosacral spine are carefully reviewed  These demonstrate A levoscoliosis with small degrees of spondylolisthesis nerve which are severe  Future Appointments    Date/Time Provider Specialty Site   09/22/2017 09:00 AM TRIXIE Barker   Obstetrics/Gynecology [facility]   12/27/2017 08:00 AM TRIXIE Kim  Internal Medicine [facility] INTERNAL MED   04/17/2017 09:00 AM NeuroSX, Nursetwo Schedule  [facility] NEUROSURGICAL [facility]   05/18/2017 09:30 AM TRIXIE Cao   Neurosurgery [facility]     Signatures   Electronically signed by : TRIXIE Tineo ; Apr 11 2017  9:38AM EST                       (Author)

## 2018-01-22 VITALS
RESPIRATION RATE: 16 BRPM | HEIGHT: 61 IN | TEMPERATURE: 98.4 F | SYSTOLIC BLOOD PRESSURE: 138 MMHG | DIASTOLIC BLOOD PRESSURE: 82 MMHG | WEIGHT: 184 LBS | BODY MASS INDEX: 34.74 KG/M2

## 2018-01-22 VITALS
HEIGHT: 61 IN | BODY MASS INDEX: 33.23 KG/M2 | WEIGHT: 176 LBS | SYSTOLIC BLOOD PRESSURE: 122 MMHG | DIASTOLIC BLOOD PRESSURE: 70 MMHG | TEMPERATURE: 98.4 F | RESPIRATION RATE: 16 BRPM | HEART RATE: 88 BPM

## 2018-01-22 VITALS
RESPIRATION RATE: 16 BRPM | HEART RATE: 91 BPM | WEIGHT: 181 LBS | SYSTOLIC BLOOD PRESSURE: 151 MMHG | BODY MASS INDEX: 34.17 KG/M2 | HEIGHT: 61 IN | DIASTOLIC BLOOD PRESSURE: 100 MMHG | TEMPERATURE: 98.5 F

## 2018-01-22 VITALS
WEIGHT: 189 LBS | HEIGHT: 61 IN | SYSTOLIC BLOOD PRESSURE: 132 MMHG | HEART RATE: 81 BPM | OXYGEN SATURATION: 98 % | BODY MASS INDEX: 35.68 KG/M2 | DIASTOLIC BLOOD PRESSURE: 80 MMHG | TEMPERATURE: 97.7 F

## 2018-01-22 VITALS
HEART RATE: 104 BPM | HEIGHT: 61 IN | DIASTOLIC BLOOD PRESSURE: 90 MMHG | BODY MASS INDEX: 37.19 KG/M2 | WEIGHT: 197 LBS | SYSTOLIC BLOOD PRESSURE: 140 MMHG

## 2018-01-23 ENCOUNTER — APPOINTMENT (OUTPATIENT)
Dept: PHYSICAL THERAPY | Facility: REHABILITATION | Age: 68
End: 2018-01-23
Payer: COMMERCIAL

## 2018-01-23 VITALS
TEMPERATURE: 98.1 F | RESPIRATION RATE: 16 BRPM | SYSTOLIC BLOOD PRESSURE: 129 MMHG | WEIGHT: 190 LBS | BODY MASS INDEX: 35.87 KG/M2 | HEIGHT: 61 IN | DIASTOLIC BLOOD PRESSURE: 69 MMHG | HEART RATE: 72 BPM

## 2018-01-23 PROCEDURE — 97112 NEUROMUSCULAR REEDUCATION: CPT

## 2018-01-23 PROCEDURE — 97110 THERAPEUTIC EXERCISES: CPT

## 2018-01-23 NOTE — RESULT NOTES
Verified Results  (1) CK (CPK) 77Isl0843 10:10AM Jen Martinez Order Number: [id number]     Test Name Result Flag Reference   CK (CPK) 172 U/L     CK MB FRACTION 3 4 ng/mL  0 0-5 0   CK-MB INDEX 2 0 %  0 0-2 5

## 2018-01-23 NOTE — MISCELLANEOUS
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

## 2018-01-23 NOTE — PROGRESS NOTES
Assessment    1  S/P lumbar spinal fusion (V45 4) (Z98 1)   2  Lumbar neuritis (724 4) (M54 16)   3  Muscle spasm (728 85) (R60 250)    Plan  Muscle spasm    · (1) ALDOLASE; Status:Active; Requested for:44Khd6667;    · (1) CK (CPK); Status:Active; Requested for:88Mfk6351;   S/P lumbar spinal fusion    · Follow-up visit in 2 months Evaluation and Treatment  Follow-up  Status: Hold For -  Scheduling  Requested for: 99XCK7409    Discussion/Summary  The patient has the current Goals: Lose weight  Impression: low back pain and radiculopathy  Currently, the condition is unchanged  The diagnostic plan includes blood tests for cramps   will call if abnormal  There are no changes in medication management  Treatment plan includes activity modification and advise losing weight  Patient discussion: discussed with the patient, Look into the [address]  Susie Verdin MD book is "Eat [employer] Live"  Chief Complaint  10/6 consult form NS for functio eval    12/7 fu for functional assessment  History of Present Illness  10/6 75 yo female s/p lumbar fusion L-2 to L-5 fusion for stenosis with RLE pain April 2017  Reports RLE pain improved but persists with numbness from right knee distally   reports gait has improved with PTx  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 [employer]   normal duty includes pushing stretchers, pt  boosts , some light materials handeling, does not include assisting with procedures but has been asked to work in [employer]   but needs some formal restrictions, she is concerned about prolonged forward flexion  Currently has 20 # lifting restriction from Neurosurgery  Formal PTx has finished  incidentally has CTS bilat and complete right RTC tear both of which surgery has been recommended  Currently she reports she is doing nothing different than she did before surgery but not lifting linen bags which others do   concern is for prolonged bending over pts  in proc rooms   She does NOT lift 75# as her job description lists  12/7 /17 fu   question has arisen regarding work restrictions length   she reports she is doing her usual job as "clinical coordinator" has some back pain if on feet long periods, also having vague LLQ numbness and tingling  Continues with right anterior lowr leg numbness    NOT in calf  Seen by [facility] at [facility] and physician agreed with restrictions  Review of Systems    Gastrointestinal: no complaints of abdominal pain, no constipation, no nausea or diarrhea, no vomiting, no bloody stools  Genitourinary: no complaints of dysuria, no incontinence  Musculoskeletal: as noted in HPI  Neurological: nocturnal cramps  ROS reviewed  Active Problems    1  Ankylosing spondylitis (720 0) (M45 9)   2  Anxiety (300 00) (F41 9)   3  Carpal tunnel syndrome, bilateral upper limbs (354 0) (G56 03)   4  Chronic bilateral low back pain without sciatica (724 2,338 29) (M54 5,G89 29)   5  Degeneration of intervertebral disc of lumbar region (722 52) (M51 36)   6  Disability examination (V68 01) (Z02 71)   7  Encounter for gynecological examination without abnormal finding (V72 31) (Z01 419)   8  Encounter for postoperative care related to surgical joint fusion (V58 78,V45 4)   (Z48 89,Z98 1)   9  Encounter for screening mammogram for breast cancer (V76 12) (Z12 31)   10  Fibromyalgia (729 1) (M79 7)   11  Generalized anxiety disorder (300 02) (F41 1)   12  Hyperlipidemia (272 4) (E78 5)   13  Left shoulder pain (719 41) (M25 512)   14  Muscle spasm (728 85) (M62 838)   15  Neurogenic claudication (724 03) (M48 062)   16  Overweight (278 02) (E66 3)   17  Right shoulder pain (719 41) (M25 511)   18  S/P lumbar spinal fusion (V45 4) (Z98 1)   19  Spinal stenosis, lumbar region, with neurogenic claudication (724 03) (M48 062)   20  Spondylolisthesis of lumbar region (738 4) (M43 16)   21   Tear of right supraspinatus tendon, subsequent encounter (V58 89,840 6) (P04 025O)    Past Medical History    1  Fibromyalgia (729 1) (M79 7)   2  History of Heme positive stool (792 1) (R19 5)   3  History of High cholesterol (272 0) (E78 00)   4  History of Hip pain (719 45) (M25 559)   5  History of bronchitis (V12 69) (Z87 09)   6  History of low back pain (V13 59) (Z87 39)   7  History of spinal stenosis (V13 59) (Z87 39)   8  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)   9  History of vitamin D deficiency (V12 1) (Z86 39)   10  History of Impingement syndrome of left shoulder (726 2) (M75 42)   11  History of Impingement syndrome of right shoulder (726 2) (M75 41)   12  History of Left leg pain (729 5) (M79 605)   13  History of Long term use of drug (V58 69) (Z79 899)   14  No pertinent past medical history   15  History of Screening for breast cancer (V76 10) (Z12 31)   16  History of Visit for routine gyn exam (V72 31) (Z01 419)    The active problems and past medical history were reviewed and updated today  Surgical History    1  History of Cervical Conization   2  History of  Section   3  History of Dilation And Curettage    The surgical history was reviewed and updated today  Family History  Mother    1  Family history of malignant neoplasm (V16 9) (Z80 9)   2  Family history of malignant neoplasm of female breast (V16 3) (Z80 3)   3  Family history of myocardial infarction (V17 3) (Z82 49)  Father    4  Family history of cerebrovascular accident (CVA) (V17 1) (Z82 3)  Son    5  Family history of depression (V17 0) (Z81 8)  Sister    10  Family history of Endometrial cancer   7  Family history of malignant neoplasm of uterus (V16 49) (Z80 49)  Brother    8  Family history of malignant neoplasm (V16 9) (Z80 9)  Maternal Aunt    9  Family history of malignant neoplasm of female breast (V16 3) (Z80 3)  Other    10  Family history of malignant neoplasm of female breast (V16 3) (Z80 3)   11  Family history of malignant neoplasm of uterus (V16 49) (Z80 49)  Family History    12  Family history of arthritis (V17 7) (Z82 61)   13  Family history of High cholesterol    The family history was reviewed and updated today  Social History    · Caffeine use (V49 89) (F15 90)   · Currently sexually active   · Does not exercise (V69 0) (Z72 3)   · Denied: History of Drug use   · Employed   ·    · Never a smoker   · No alcohol use   · No drug use   · Non-smoker (V49 89) (Z78 9)   · Denied: History of Social alcohol use   · Three children  The social history was reviewed and is unchanged  Current Meds   1  Aspirin 81 MG TABS; Therapy: (Recorded:06Oct2017) to Recorded   2  Gabapentin 100 MG Oral Capsule; TAKE 1 CAPSULE 3 times daily; Therapy: 01Ffa1032 to (Last Shital Miramontes)  Requested for: 50ZPX0040 Ordered   3  KlonoPIN TABS; TAKE 1 TABLET 3 TIMES DAILY AS NEEDED; Therapy: (Recorded:92Mfh3668) to Recorded   4  Methocarbamol 750 MG Oral Tablet; TAKE 1 TABLET Every 6 hours PRN muscle   spasms; Therapy: 86Qkn8409 to (Evaluate:10Jun2017)  Requested for: 54ZZZ0220; Last   Rx:57Awb1050 Ordered   5  PARoxetine HCl - 10 MG Oral Tablet; TAKE 1 TABLET DAILY; Therapy: 33Kff3204 to (Evaluate:23Mar2018)  Requested for: 04Enc7398; Last   Rx:26Yhs4298 Ordered   6  TraMADol HCl - 50 MG Oral Tablet; TAKE 1 TABLET TWICE DAILY AS NEEDED; Therapy: 75Ykg9637 to (Evaluate:59Bwb5410); Last Rx:86Tcy8081 Ordered   7  Vitamin D CAPS; TAKE 1 CAPSULE Daily Recorded    The medication list was reviewed and updated today  Allergies    1  No Known Drug Allergies    Vitals  Signs   Recorded: 49UCO1449 03:32PM   Heart Rate: 915  Systolic: 897  Diastolic: 90  Height: 5 ft 1 in  Weight: 197 lb   BMI Calculated: 37 22  BSA Calculated: 1 88    Physical Exam    Constitutional   General appearance: Abnormal   obese  Lumbar/Sacral Spine examination demonstrates Lumbosacral Spine:       Results/Data  I personally reviewed the films/images/results in the office today  My interpretation follows     X-ray Review has right hip OA  Future Appointments    Date/Time Provider Specialty Site   12/27/2017 08:00 AM TRIXIE Godinez  Internal Medicine [facility] INTERNAL MED   12/21/2017 04:00 PM TRIXIE Hathaway   Neurosurgery [facility]     Signatures   Electronically signed by : Althea Spear DO; Dec  7 2017  3:58PM EST                       (Author)

## 2018-01-23 NOTE — RESULT NOTES
Verified Results  (1) ALDOLASE 07Xbe3226 10:10AM Herrera Jovel   TW Order Number: [id number]     Test Name Result Flag Reference   ALDOLASE 8 4 U/L  3 3 - 10 3   Performed at:  [address]  [id number]  : Shwetha Moreno MD, Phone:  [phone]

## 2018-01-23 NOTE — PROGRESS NOTES
Assessment    1  Overweight (278 02) (E66 3)   2  S/P lumbar spinal fusion (V45 4) (Z98 1)   3  Sacroiliitis (720 2) (M46 1)   · left   4  Hyperlipidemia (272 4) (E78 5)   5  Carpal tunnel syndrome, bilateral upper limbs (354 0) (G56 03)   6  Tear of right supraspinatus tendon, subsequent encounter (V58 89,840 6) (S46 811D)   7  Myalgia (729 1) (M79 1)   8  Anxiety (300 00) (F41 9)    Plan  Carpal tunnel syndrome, bilateral upper limbs    · 1 - Bon Ferrer MD  (Orthopedic Surgery) Co-Management  *bilateral capel tunnel   Status: Hold For - Scheduling  Requested for: 59Ilh1300  Care Summary provided  : Yes    1  Status post lumbar spinal fusion surgery with sacral ileitis symptoms at this point  Follow up with physiatry for symptom management  Patient is encouraged to continue with regular walking exercise and consideration of aquatic therapy  2   Patient is overweight recommend consideration for weight reduction through calorie consumption reduction and regular exercise of walking nature  3   Hyperlipidemia good control of a lipid profile utilizing lifestyle management  Patient is presently on no statin medications  4   Myalgias symptoms suspect this could be due to physical deconditioning or possibly a coenzyme Q10 deficiency of asked the patient to start coenzyme Q10 100 milligrams daily for period of 30 days  5   Bilateral carpal tunnel syndrome with decreased sensation in both hands and slight decrease in muscle strength recommend orthopedic consultation with Dr Mark García for consideration of surgical correction  6   Right shoulder rotator cuff muscle tear recommend consideration for surgical intervention once low back pain has improved  7   History of anxiety disorder continue on paroxetine 10 milligrams daily symptoms are controlled with this dose of medication no apparent side effects       Discussion/Summary    In summary the patient presents today for an annual health maintenance examination we performed an exam +reviewed her most recent blood work  In addition reviewed the appropriate screening tests including mammogram annual pelvic examination and colonoscopy every 10 years  Patient is advised to continue on her present medications with a follow-up complete physical in 1 year  She was provided with a consultation for Dr Michael sadler the Orthopedic Department at [facility] for consideration of correction of her bilateral carpal tunnel syndrome  Chief Complaint  Pt is here today for a physical w/ labs and EKG  History of Present Illness  HM, Adult Female: The patient is being seen for a health maintenance evaluation  The last health maintenance visit was 1 year(s) ago  Social History: Household members include spouse  She is   Work status: working full time  The patient has never smoked cigarettes  She reports rare alcohol use  She has never used illicit drugs  General Health: The patient's health since the last visit is described as good  She has regular dental visits  She denies vision problems  She denies hearing loss  Immunizations status: up to date  Lifestyle:  She consumes a diverse and healthy diet  She has weight concerns  She does not exercise regularly  She does not use tobacco  She denies alcohol use  She denies drug use  Screening:   HPI: This is a routine health maintenance examination for this 68-year-old female patient  She presents today having undergone lumbar spinal fusion surgery earlier this year  The outcome was quite satisfactory until she started developing some low back pain again  This has been diagnosed as sacral ileitis  She is under treatment by physiatry for the symptoms  She also has a rotator cuff tear in the right shoulder and hand anticipates the need for surgical repair of this in the future  She also has bilateral carpal tunnel syndrome with decreased sensation in both hands   She is trying to return to work 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  She is having difficulty moving patient has on and off stretchers due to her back discomfort  Review of Systems    Constitutional: recent 14 14 pounds over the past year lb weight gain  Eyes: No complaints of eye pain, no red eyes, no eyesight problems, no discharge, no dry eyes, no itching of eyes  ENT: no complaints of earache, no loss of hearing, no nose bleeds, no nasal discharge, no sore throat, no hoarseness  Cardiovascular: No complaints of slow heart rate, no fast heart rate, no chest pain, no palpitations, no leg claudication, no lower extremity edema  Respiratory: No complaints of shortness of breath, no wheezing, no cough, no SOB on exertion, no orthopnea, no PND  Gastrointestinal: No complaints of abdominal pain, no constipation, no nausea or vomiting, no diarrhea, no bloody stools  Genitourinary: No complaints of dysuria, no incontinence, no pelvic pain, no dysmenorrhea, no vaginal discharge or bleeding  Musculoskeletal: Low back pain, but as noted in HPI  Integumentary: No complaints of skin rash or lesions, no itching, no skin wounds, no breast pain or lump  Neurological: as noted in HPI  Endocrine: No complaints of proptosis, no hot flashes, no muscle weakness, no deepening of the voice, no feelings of weakness  Hematologic/Lymphatic: No complaints of swollen glands, no swollen glands in the neck, does not bleed easily, does not bruise easily  Active Problems    1  Ankylosing spondylitis (720 0) (M45 9)   2  Carpal tunnel syndrome, bilateral upper limbs (354 0) (G56 03)   3  Chronic bilateral low back pain without sciatica (724 2,338 29) (M54 5,G89 29)   4  Colon cancer screening (V76 51) (Z12 11)   5  Degeneration of intervertebral disc of lumbar region (722 52) (M51 36)   6  Disability examination (V68 01) (Z02 71)   7   Encounter for gynecological examination without abnormal finding (V72 31) (Z01 419)   8  Encounter for postoperative care related to surgical joint fusion (V58 78,V45 4)   (Z48 89,Z98 1)   9  Encounter for screening mammogram for breast cancer (V76 12) (Z12 31)   10  Fibromyalgia (729 1) (M79 7)   11  Generalized anxiety disorder (300 02) (F41 1)   12  Hyperlipidemia (272 4) (E78 5)   13  Left shoulder pain (719 41) (M25 512)   14  Lumbar neuritis (724 4) (M54 16)   15  Muscle spasm (728 85) (M62 838)   16  Neurogenic claudication (724 03) (M48 062)   17  Overweight (278 02) (E66 3)   18  Right shoulder pain (719 41) (M25 511)   19  S/P lumbar spinal fusion (V45 4) (Z98 1)   20  Sacroiliitis (720 2) (M46 1)   21  Spinal stenosis, lumbar region, with neurogenic claudication (724 03) (M48 062)   22  Spondylolisthesis of lumbar region (738 4) (M43 16)   23   Tear of right supraspinatus tendon, subsequent encounter (V58 89,840 6) (D73 418L)    Past Medical History    · History of Anxiety (300 00) (F41 9)   · Fibromyalgia (729 1) (M79 7)   · History of Heme positive stool (792 1) (R19 5)   · History of High cholesterol (272 0) (E78 00)   · History of Hip pain (719 45) (M25 559)   · History of bronchitis (V12 69) (Z87 09)   · History of low back pain (V13 59) (Z87 39)   · History of spinal stenosis (V13 59) (Z87 39)   · 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)   · History of vitamin D deficiency (V12 1) (Z86 39)   · History of Impingement syndrome of left shoulder (726 2) (M75 42)   · History of Impingement syndrome of right shoulder (726 2) (M75 41)   · History of Left leg pain (729 5) (M79 605)   · History of Long term use of drug (V58 69) (Z79 899)   · No pertinent past medical history   · History of Screening for breast cancer (V76 10) (Z12 31)   · History of Visit for routine gyn exam (V72 31) (Z01 419)    Surgical History    · History of Cervical Conization   · History of  Section   · History of Dilation And Curettage    Family History  Mother    · Family history of malignant neoplasm (V16 9) (Z80 9)   · Family history of malignant neoplasm of female breast (V16 3) (Z80 3)   · Family history of myocardial infarction (V17 3) (Z80 55)  Father    · Family history of cerebrovascular accident (CVA) (V17 1) (Z80 1)  Son    · Family history of depression (V17 0) (Z81 8)  Sister    · Family history of Endometrial cancer   · Family history of malignant neoplasm of uterus (V16 49) (Z80 49)  Brother    · Family history of malignant neoplasm (V16 9) (Z80 9)  Maternal Aunt    · Family history of malignant neoplasm of female breast (V16 3) (Z80 3)  Other    · Family history of malignant neoplasm of female breast (V16 3) (Z80 3)   · Family history of malignant neoplasm of uterus (V16 49) (Z80 49)  Family History    · Family history of arthritis (V17 7) (Z82 61)   · Family history of High cholesterol    Social History    · Caffeine use (V49 89) (F15 90)   · Currently sexually active   · Does not exercise (V69 0) (Z72 3)   · Denied: History of Drug use   · Employed   · R N    ·    · Never a smoker   · No alcohol use   · No drug use   · Non-smoker (V49 89) (Z78 9)   · Denied: History of Social alcohol use   · Three children    Current Meds   1  Aspirin 81 MG TABS; 1 TAB DAILY; Therapy: (Marva Perez) to Recorded   2  Gabapentin 100 MG Oral Capsule; TAKE 1 CAPSULE 3 times daily; Therapy: 02Ksu7824 to (Last Sorin Decent)  Requested for: 78BLL0388 Ordered   3  KlonoPIN TABS; TAKE 1 TABLET Bedtime PRN; Therapy: (Marva Perez) to Recorded   4  Methocarbamol 750 MG Oral Tablet; TAKE 1 TABLET Every 6 hours PRN muscle   spasms; Therapy: 52Ofr7429 to (Evaluate:10Jun2017)  Requested for: 11ION2636; Last   Rx:08Ylv0523 Ordered   5  Mobic 15 MG Oral Tablet; TAKE 1 TABLET DAILY; Therapy: (Marva Perez) to Recorded   6  PARoxetine HCl - 10 MG Oral Tablet; TAKE 1 TABLET DAILY;    Therapy: 13Jxx6982 to (Evaluate:23Mar2018)  Requested for: 44Swx5019; Last   Lew Youssef Ordered   7  TraMADol HCl - 50 MG Oral Tablet; TAKE 1 TABLET TWICE DAILY AS NEEDED; Therapy: 40Tya0301 to (Evaluate:03Mrp0932); Last Rx:83Qya1681 Ordered   8  Vitamin D CAPS; TAKE 1 CAPSULE Daily Recorded    Allergies    1  No Known Drug Allergies    Vitals   Recorded: 08Xcd6166 08:14AM Recorded: 19AIH5117 07:52AM   Temperature  97 7 F   Heart Rate  81   Systolic 111 636   Diastolic 80 90   Height  5 ft 1 in   Weight  189 lb    BMI Calculated  35 71   BSA Calculated  1 84   O2 Saturation  98     Physical Exam    Constitutional   General appearance: No acute distress, well appearing and well nourished  Head and Face   Head and face: Normal     Eyes   Conjunctiva and lids: No swelling, erythema or discharge  Pupils and irises: Equal, round, reactive to light  Ophthalmoscopic examination: Normal fundi and optic discs  Ears, Nose, Mouth, and Throat   External inspection of ears and nose: Normal     Otoscopic examination: Tympanic membranes translucent with normal light reflex  Canals patent without erythema  Nasal mucosa, septum, and turbinates: Normal without edema or erythema  Lips, teeth, and gums: Normal, good dentition  Oropharynx: Normal with no erythema, edema, exudate or lesions  Neck   Neck: Supple, symmetric, trachea midline, no masses  Thyroid: Normal, no thyromegaly  Pulmonary   Respiratory effort: No increased work of breathing or signs of respiratory distress  Percussion of chest: Normal     Auscultation of lungs: Clear to auscultation  Cardiovascular   Auscultation of heart: Normal rate and rhythm, normal S1 and S2, no murmurs  Carotid pulses: 2+ bilaterally  Abdominal aorta: Normal     Pedal pulses: 2+ bilaterally  Peripheral vascular exam: Normal     Examination of extremities for edema and/or varicosities: Normal     Abdomen   Abdomen: Non-tender, no masses  Liver and spleen: No hepatomegaly or splenomegaly     Lymphatic   Palpation of lymph nodes in neck: No lymphadenopathy  Musculoskeletal   Gait and station: Normal     Digits and nails: Normal without clubbing or cyanosis  Joints, bones, and muscles: Normal     Stability: Normal     Muscle strength/tone: Normal     Skin   Skin and subcutaneous tissue: Normal without rashes or lesions  Palpation of skin and subcutaneous tissue: Normal turgor  Neurologic   Cranial nerves: Cranial nerves II-XII intact  Cortical function: Normal mental status  Reflexes: Abnormal   Decreased deep tendon reflexes in both lower extremities  Coordination: Normal finger to nose and heel to shin  Psychiatric   Judgment and insight: Normal     Orientation to person, place, and time: Normal     Recent and remote memory: Intact  Mood and affect: Normal        Future Appointments    Date/Time Provider Specialty Site   02/08/2018 04:00 PM Kyra Yap DO [facility] 82   12/28/2018 08:00 AM TRIXIE Hoskins   Internal Medicine [facility] INTERNAL MED     Signatures   Electronically signed by : TRIXIE Moses ; Dec 27 2017  1:00PM EST                       (Author)

## 2018-01-25 ENCOUNTER — OFFICE VISIT (OUTPATIENT)
Dept: PHYSICAL THERAPY | Facility: REHABILITATION | Age: 68
End: 2018-01-25
Payer: COMMERCIAL

## 2018-01-25 DIAGNOSIS — M46.1 SACROILIITIS, NOT ELSEWHERE CLASSIFIED (HCC): Primary | ICD-10-CM

## 2018-01-25 PROCEDURE — 97140 MANUAL THERAPY 1/> REGIONS: CPT | Performed by: PHYSICAL THERAPIST

## 2018-01-25 PROCEDURE — 97110 THERAPEUTIC EXERCISES: CPT | Performed by: PHYSICAL THERAPIST

## 2018-01-25 PROCEDURE — 97112 NEUROMUSCULAR REEDUCATION: CPT | Performed by: PHYSICAL THERAPIST

## 2018-01-25 NOTE — PROGRESS NOTES
Daily Note     Today's date: 2018  Patient name: Caleb Lowe  : 1950  MRN: [id number]  Referring provider: Doug Pérez MD  Dx:   Encounter Diagnosis   Name Primary?  Sacroiliitis, not elsewhere classified ([facility] 75 ) Yes                  Subjective: Patient reports that her left lower lumbar region is continuing to be irritating  Objective: See treatment diary below  Precautions: L2-5 laminectomy with fusion, R RTC tear, fibromyalgia    Daily Treatment Diary     Manual  18            Gr  5 upslip correction x1            Gr  5 left hip distraction x1            Left PI correction x1                                          Exercise Diary  18          bike 10 min  TA with cuff 10 x 10 sec  TA cuff BKFO 2 x 10          TA cuff march 2 x 10          right side glide wall 1 x 10          Supine hip abd  2x10, 5 sec OTB                                                          Modalities                                                         Assessment: Tolerated treatment fair  Patient demonstrated fatigue post treatment  She continues to present with left lateral shift and pain when leaning to her left  Slight improvement in symptoms with manual intervention  Plan: Continue per plan of care

## 2018-01-30 ENCOUNTER — OFFICE VISIT (OUTPATIENT)
Dept: PHYSICAL THERAPY | Facility: REHABILITATION | Age: 68
End: 2018-01-30
Payer: COMMERCIAL

## 2018-01-30 DIAGNOSIS — M46.1 SACROILIITIS, NOT ELSEWHERE CLASSIFIED (HCC): Primary | ICD-10-CM

## 2018-01-30 PROCEDURE — 97530 THERAPEUTIC ACTIVITIES: CPT

## 2018-01-30 PROCEDURE — 97110 THERAPEUTIC EXERCISES: CPT

## 2018-01-30 PROCEDURE — 97112 NEUROMUSCULAR REEDUCATION: CPT

## 2018-01-30 NOTE — PROGRESS NOTES
Daily Note     Today's date: 2018  Patient name: Char Jensen  : 1950  MRN: [id number]  Referring provider: Michael Gasca MD  Dx:   Encounter Diagnosis   Name Primary?  Sacroiliitis, not elsewhere classified ([facility] ) Yes                  Subjective: Patient reports that she is feeling much better than LV  Objective: See treatment diary below  Precautions: L2-5 laminectomy with fusion, R RTC tear, fibromyalgia    Daily Treatment Diary     Manual  18          Gr  5 upslip correction NP          Gr  5 left hip distraction NP          Left PI correction NP                                    Exercise Diary  18          bike 10 min  TA with cuff 10 x 10 sec  TA cuff BKFO 2 x 10          TA cuff march 2 x 10          right side glide wall 1 x 10          Supine hip abd  2x10, 5 sec OTB          sidelying hip hikers 5" 2x10          Pball marching 2x10                                    Modalities                                                 Assessment: No increase in pain during or post tx  Pt did well with new TE added today  Plan: Continue per plan of care

## 2018-02-01 ENCOUNTER — OFFICE VISIT (OUTPATIENT)
Dept: PHYSICAL THERAPY | Facility: REHABILITATION | Age: 68
End: 2018-02-01
Payer: COMMERCIAL

## 2018-02-01 DIAGNOSIS — M46.1 SACROILIITIS, NOT ELSEWHERE CLASSIFIED (HCC): Primary | ICD-10-CM

## 2018-02-01 PROCEDURE — 97112 NEUROMUSCULAR REEDUCATION: CPT

## 2018-02-01 PROCEDURE — 97110 THERAPEUTIC EXERCISES: CPT

## 2018-02-01 NOTE — PROGRESS NOTES
Daily Note     Today's date: 2018  Patient name: Eladio Parks  : 1950  MRN: [id number]  Referring provider: Jeannette Zamudio MD  Dx:   Encounter Diagnosis   Name Primary?  Sacroiliitis, not elsewhere classified ([facility] [place] [facility] ) Yes                  Subjective: Patient reports that she worked today, states her back feels tired but not painful  1:1 with PTA 30 min indep TE remaining    Objective: See treatment diary below  Precautions: L2-5 laminectomy with fusion, R RTC tear, fibromyalgia    Daily Treatment Diary     Manual  18 2/        Gr  5 upslip correction NP NP        Gr  5 left hip distraction NP NP        Left PI correction NP NP                                Exercise Diary  18       bike 10 min  10 min       TA with cuff 10 x 10 sec  10"x10       TA cuff BKFO 2 x 10 2x10       TA cuff march 2 x 10 2x10       LEFT side glide wall 1 x 10 10x       Supine hip abd  2x10, 5 sec OTB 5" 2x10  OTB       sidelying hip hikers 5" 2x10 5" 2x10       Pball marching 2x10 30                             Modalities                                                 Assessment:  No increased pain during or post tx  Plan: Continue per plan of care

## 2018-02-05 ENCOUNTER — OFFICE VISIT (OUTPATIENT)
Dept: PHYSICAL THERAPY | Facility: REHABILITATION | Age: 68
End: 2018-02-05
Payer: COMMERCIAL

## 2018-02-05 DIAGNOSIS — M46.1 SACROILIITIS, NOT ELSEWHERE CLASSIFIED (HCC): Primary | ICD-10-CM

## 2018-02-05 PROCEDURE — 97110 THERAPEUTIC EXERCISES: CPT

## 2018-02-05 PROCEDURE — 97112 NEUROMUSCULAR REEDUCATION: CPT

## 2018-02-05 NOTE — PROGRESS NOTES
Daily Note     Today's date: 2018  Patient name: Char Jensen  : 1950  MRN: [id number]  Referring provider: Michael Gasca MD  Dx:   Encounter Diagnosis   Name Primary?  [facility] elsewhere [facility] ([facility] Yes                  Subjective: Patient reports that her back has been feeling really good  Objective: See treatment diary below  Precautions: L2-5 laminectomy with fusion, R RTC tear, fibromyalgia    Daily Treatment Diary     Manual  18        Gr  5 upslip correction NP NP        Gr  5 left hip distraction NP NP        Left PI correction NP NP                                Exercise Diary  18 2 2/5      bike 10 min  10 min 10 min      TA with cuff 10 x 10 sec  10"x10 10"x10      TA cuff BKFO 2 x 10 2x10 2x10      TA cuff march 2 x 10 2x10 2x10      LEFT side glide wall 1 x 10 10x 10x      Supine hip abd  2x10, 5 sec OTB 5" 2x10  OTB 5" 2x10 OTB      sidelying hip hikers 5" 2x10 5" 2x10 5" 2x10      Pball marching 2x10 30       Multifidus press   OTB 3" 2x10                   Modalities                                                 Assessment:  Pt is progressing well  Plan: Continue per plan of care

## 2018-02-06 ENCOUNTER — OFFICE VISIT (OUTPATIENT)
Dept: PHYSICAL THERAPY | Facility: REHABILITATION | Age: 68
End: 2018-02-06
Payer: COMMERCIAL

## 2018-02-06 DIAGNOSIS — M46.1 SACROILIITIS, NOT ELSEWHERE CLASSIFIED (HCC): Primary | ICD-10-CM

## 2018-02-06 PROCEDURE — 97112 NEUROMUSCULAR REEDUCATION: CPT

## 2018-02-06 PROCEDURE — 97110 THERAPEUTIC EXERCISES: CPT

## 2018-02-06 NOTE — PROGRESS NOTES
Daily Note     Today's date: 2018  Patient name: Rogelio Sykes  : 1950  MRN: [id number]  Referring provider: Trent Tam MD  Dx:   Encounter Diagnosis   Name Primary?  [facility]roili[facility], not elsewhere classified ([facility] ) Yes                  Subjective: Patient states that overall is feeling pretty good  Objective: See treatment diary below  Precautions: L2-5 laminectomy with fusion, R RTC tear, fibromyalgia    Daily Treatment Diary     Manual  18        Gr  5 upslip correction NP NP        Gr  5 left hip distraction NP NP        Left PI correction NP NP                                Exercise Diary  18 2 26     bike 10 min  10 min 10 min 10 min     TA with cuff 10 x 10 sec  10"x10 10"x10 10"x10     TA cuff BKFO 2 x 10 2x10 2x10 2x10     TA cuff march 2 x 10 2x10 2x10 2x10     LEFT side glide wall 1 x 10 10x 10x NP     Supine hip abd  2x10, 5 sec OTB 5" 2x10  OTB 5" 2x10 OTB 5" 2x10 OTB     sidelying hip hikers 5" 2x10 5" 2x10 5" 2x10 5" 2x10     Pball marching 2x10 30  30     Multifidus press   OTB 3" 2x10 OTB 3" 2x10                  Modalities                                                 Assessment:  VCs to correct TE form  Plan: Continue per plan of care

## 2018-02-08 ENCOUNTER — OFFICE VISIT (OUTPATIENT)
Dept: PAIN MEDICINE | Facility: CLINIC | Age: 68
End: 2018-02-08
Payer: COMMERCIAL

## 2018-02-08 ENCOUNTER — APPOINTMENT (OUTPATIENT)
Dept: PHYSICAL THERAPY | Facility: REHABILITATION | Age: 68
End: 2018-02-08
Payer: COMMERCIAL

## 2018-02-08 VITALS
DIASTOLIC BLOOD PRESSURE: 88 MMHG | HEART RATE: 100 BPM | WEIGHT: 194 LBS | SYSTOLIC BLOOD PRESSURE: 138 MMHG | HEIGHT: 61 IN | BODY MASS INDEX: 36.63 KG/M2

## 2018-02-08 DIAGNOSIS — M96.1 LUMBAR POST-LAMINECTOMY SYNDROME: ICD-10-CM

## 2018-02-08 DIAGNOSIS — M43.16 SPONDYLOLISTHESIS OF LUMBAR REGION: Primary | ICD-10-CM

## 2018-02-08 DIAGNOSIS — Z02.71 DISABILITY EXAMINATION: ICD-10-CM

## 2018-02-08 PROCEDURE — 99213 OFFICE O/P EST LOW 20 MIN: CPT | Performed by: PHYSICAL MEDICINE & REHABILITATION

## 2018-02-08 RX ORDER — TRAMADOL HYDROCHLORIDE 50 MG/1
50 TABLET ORAL 2 TIMES DAILY PRN
Refills: 4 | COMMUNITY
Start: 2018-01-07 | End: 2018-07-19 | Stop reason: SDUPTHER

## 2018-02-08 RX ORDER — MELOXICAM 15 MG/1
1 TABLET ORAL DAILY
COMMUNITY
End: 2020-12-02 | Stop reason: SDUPTHER

## 2018-02-08 RX ORDER — GABAPENTIN 100 MG/1
100 CAPSULE ORAL 3 TIMES DAILY
Refills: 5 | COMMUNITY
Start: 2018-01-18 | End: 2018-08-03 | Stop reason: SDUPTHER

## 2018-02-08 RX ORDER — METHOCARBAMOL 750 MG/1
TABLET, FILM COATED ORAL EVERY 6 HOURS
COMMUNITY
Start: 2017-04-17 | End: 2018-10-11 | Stop reason: SDUPTHER

## 2018-02-08 RX ORDER — CLONAZEPAM 0.5 MG/1
0.5 TABLET ORAL
Refills: 4 | COMMUNITY
Start: 2017-12-07

## 2018-02-08 NOTE — PROGRESS NOTES
Assessment/Plan:      Diagnoses and all orders for this visit:    Spondylolisthesis of lumbar region    Disability examination          Subjective:     Patient ID: Pineda Hill is a 74 y o  female  HPI  Allscripts notes:  1  S/P lumbar spinal fusion (V45 4) (Z98 1)   2  Lumbar neuritis (724 4) (M54 16)   3  Muscle spasm (728 85) (R26 581)     Plan  Muscle spasm    · (1) ALDOLASE; Status:Active; Requested for:[id number];    · (1) CK (CPK); Status:Active; Requested for:[id number];   S/P lumbar spinal fusion    · Follow-up visit in 2 months Evaluation and Treatment  Follow-up  Status: Hold For -  Scheduling  Requested for: [id number]     Discussion/Summary  The patient has the current Goals: Lose weight  Impression: low back pain and radiculopathy  Currently, the condition is unchanged  The diagnostic plan includes blood tests for cramps   will call if abnormal  There are no changes in medication management  Treatment plan includes activity modification and advise losing weight  Patient discussion: discussed with the patient, Look into the [address]  Kel Linda MD book is "Eat [employer] Live"  Chief Complaint  10/6 consult form [facility] for functio eval    12/7 fu for functional assessment  History of Present Illness  10/6 75 yo female s/p lumbar fusion L-2 to L-5 fusion for stenosis with RLE pain April 2017  Reports RLE pain improved but persists with numbness from right knee distally   reports gait has improved with PTx  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 [employer]   normal duty includes pushing stretchers, pt  boosts , some light materials handeling, does not include assisting with procedures but has been asked to work in [employer]   but needs some formal restrictions, she is concerned about prolonged forward flexion  Currently has 20 # lifting restriction from Neurosurgery  Formal PTx has finished   incidentally has CTS bilat and complete right RTC tear both of which surgery has been recommended  Currently she reports she is doing nothing different than she did before surgery but not lifting linen bags which others do   concern is for prolonged bending over pts  in proc rooms  She does NOT lift 75# as her job description lists  12/7 /17 fu   question has arisen regarding work restrictions length   she reports she is doing her usual job as "clinical coordinator" has some back pain if on feet long periods, also having vague LLQ numbness and tingling  Continues with right anterior lowr leg numbness    NOT in calf  Seen by [facility] at [facility] and physician agreed with restrictions  2/8/18 fu after blood tests   all normal has gabapentin and uses methocarbamol littlle, her restrictions have been officially accepted ta work  Review of Systems   Respiratory: Negative  Gastrointestinal: Negative  Musculoskeletal: Positive for back pain and myalgias  Neurological: Positive for numbness  Objective:     Physical Exam   Constitutional: She is oriented to person, place, and time  She appears well-developed and well-nourished  No distress  Neurological: She is alert and oriented to person, place, and time  Reflex Scores:       Tricep reflexes are 1+ on the right side and 1+ on the left side  Bicep reflexes are 1+ on the right side and 1+ on the left side  Brachioradialis reflexes are 1+ on the right side and 1+ on the left side  Patellar reflexes are 1+ on the right side and 1+ on the left side  Achilles reflexes are 1+ on the right side and 1+ on the left side

## 2018-02-08 NOTE — LETTER
February 8, 2018     Wale Colindres MD  [address]  [address] [facility], [address] [place] [address],[address]    Patient: Shalom Jaquez   YOB: 1950   Date of Visit: 2/8/2018       Dear Dr Veronica Ortiz:    Thank you for referring Shalom Jaquez to me for evaluation  Below are the relevant portions of my assessment and plan of care  Diagnoses and all orders for this visit:    Spondylolisthesis of lumbar region    Disability examination        If you have questions, please do not hesitate to call me  I look forward to following [facility] along with you           Sincerely,        Alcon Casey, DO        CC: No Recipients

## 2018-02-12 ENCOUNTER — OFFICE VISIT (OUTPATIENT)
Dept: PHYSICAL THERAPY | Facility: REHABILITATION | Age: 68
End: 2018-02-12
Payer: COMMERCIAL

## 2018-02-12 DIAGNOSIS — M46.1 SACROILIITIS, NOT ELSEWHERE CLASSIFIED (HCC): Primary | ICD-10-CM

## 2018-02-12 PROCEDURE — 97112 NEUROMUSCULAR REEDUCATION: CPT | Performed by: PHYSICAL THERAPIST

## 2018-02-12 PROCEDURE — 97110 THERAPEUTIC EXERCISES: CPT | Performed by: PHYSICAL THERAPIST

## 2018-02-12 NOTE — PROGRESS NOTES
Daily Note     Today's date: 2018  Patient name: Umang Pacheco  : 1950  MRN: [id number]  Referring provider: Owen Cheadle, MD  Dx:   Encounter Diagnosis   Name Primary?  Sacroiliitis, not elsewhere classified ([facility] ) Yes       Start Time: 0730          Subjective: Patient states that her back has been doing better but she did not do as much walking over the weekend as she usually does  Objective: See treatment diary below  Precautions: L2-5 laminectomy with fusion, R RTC tear, fibromyalgia    Daily Treatment Diary     Manual  18        Gr  5 upslip correction NP NP        Gr  5 left hip distraction NP NP        Left PI correction NP NP                                Exercise Diary  18 2    bike 10 min  10 min 10 min 10 min 10 min  TA with cuff 10 x 10 sec  10"x10 10"x10 10"x10 10x 10"    TA BKFO, 1 leg straight 2 x 10 2x10 2x10 2x10 2 x 10    TA cuff march 2 x 10 2x10 2x10 2x10 3 x 10    LEFT side glide wall (r  Hip to wall) 1 x 10 10x 10x NP 10x    Supine hip abd  2x10, 5 sec OTB 5" 2x10  OTB 5" 2x10 OTB 5" 2x10 OTB 2 x 10, 5" GTB    sidelying hip hikers 5" 2x10 5" 2x10 5" 2x10 5" 2x10 RESUME    Pball marching 2x10 30 NP 30 RESUME    Multifidus press   OTB 3" 2x10 OTB 3" 2x10 OTB, 2 x 10, 3 "    Back walk CC     2x10, 20#    Clam shell LLE     2 x 10, 5"        Modalities                                 Assessment: Patient reported achiness in left lumbar region with multifidus press  Good tolerance to progression in standing strengthening exercises  Plan: Continue per plan of care

## 2018-02-13 ENCOUNTER — APPOINTMENT (OUTPATIENT)
Dept: PHYSICAL THERAPY | Facility: REHABILITATION | Age: 68
End: 2018-02-13
Payer: COMMERCIAL

## 2018-02-15 ENCOUNTER — OFFICE VISIT (OUTPATIENT)
Dept: PHYSICAL THERAPY | Facility: REHABILITATION | Age: 68
End: 2018-02-15
Payer: COMMERCIAL

## 2018-02-15 DIAGNOSIS — M46.1 SACROILIITIS, NOT ELSEWHERE CLASSIFIED (HCC): Primary | ICD-10-CM

## 2018-02-15 PROCEDURE — 97140 MANUAL THERAPY 1/> REGIONS: CPT

## 2018-02-15 PROCEDURE — 97112 NEUROMUSCULAR REEDUCATION: CPT

## 2018-02-15 PROCEDURE — 97110 THERAPEUTIC EXERCISES: CPT

## 2018-02-15 NOTE — PROGRESS NOTES
Daily Note     Today's date: 2/15/2018  Patient name: Seth Briseno  : 1950  MRN: [id number]  Referring provider: Jennifer Mayorga MD  Dx:   Encounter Diagnosis   Name Primary?  [facility]roili[facility], not elsewhere classified ([facility] ) Yes                  Subjective: Patient reports left glute med pain due to sleeping on the couch  Objective: See treatment diary below  Precautions: L2-5 laminectomy with fusion, R RTC tear, fibromyalgia    Daily Treatment Diary     Manual  1/30/18 2/1 2/15       Gr  5 upslip correction NP NP        Gr  5 left hip distraction NP NP        Left PI correction NP NP        IASTM   5min       IR/ER   manual           Exercise Diary  2/6 2/12 2/15     bike 10 min 10 min  10 min     TA with cuff 10"x10 10x 10" 10" x10     TA BKFO, 1 leg straight 2x10 2 x 10 2x10     TA cuff march 2x10 3 x 10 3x10     LEFT side glide wall (r  Hip to wall) NP 10x 10x     Supine hip abd  5" 2x10 OTB 2 x 10, 5" GTB NP     sidelying hip hikers 5" 2x10 RESUME 5" 2x10     Pball marching 30 RESUME 30     Multifidus press OTB 3" 2x10 OTB, 2 x 10, 3 " NP     Back walk CC  2x10, 20# 20# 2x10     Clam shell LLE  2 x 10, 5" 5" 2x10         Modalities                                 Assessment: Pt reports increased pain with CC walking  Plan: Continue per plan of care

## 2018-02-20 ENCOUNTER — OFFICE VISIT (OUTPATIENT)
Dept: PHYSICAL THERAPY | Facility: REHABILITATION | Age: 68
End: 2018-02-20
Payer: COMMERCIAL

## 2018-02-20 DIAGNOSIS — M46.1 SACROILIITIS, NOT ELSEWHERE CLASSIFIED (HCC): Primary | ICD-10-CM

## 2018-02-20 PROCEDURE — 97140 MANUAL THERAPY 1/> REGIONS: CPT | Performed by: PHYSICAL MEDICINE & REHABILITATION

## 2018-02-20 PROCEDURE — 97112 NEUROMUSCULAR REEDUCATION: CPT | Performed by: PHYSICAL MEDICINE & REHABILITATION

## 2018-02-20 PROCEDURE — 97110 THERAPEUTIC EXERCISES: CPT | Performed by: PHYSICAL MEDICINE & REHABILITATION

## 2018-02-20 NOTE — PROGRESS NOTES
Daily Note     Today's date: 2018  Patient name: Carlos Enrique Aj  : 1950  MRN: [id number]  Referring provider: Wilbert Campa MD  Dx:   Encounter Diagnosis   Name Primary?  Sacroiliitis, not elsewhere classified ([facility] ) Yes                  Subjective: Patient presents with soreness through left SI area, intermittent extension to left groin  Objective: See treatment diary below  Precautions: L2-5 laminectomy with fusion, R RTC tear, fibromyalgia    Daily Treatment Diary     Manual  1/30/18 2/1 2/15 2/20      Gr  5 upslip correction NP NP        Gr  5 left hip distraction NP NP        Left PI correction NP NP        IASTM   5min 8' manual      IR/ER   manual           Exercise Diary  2/6 2/12 2/15 2/20    bike 10 min 10 min  10 min 10'    TA with cuff 10"x10 10x 10" 10" x10 10x10"    TA BKFO, 1 leg straight 2x10 2 x 10 2x10 2x10 ea    TA cuff march 2x10 3 x 10 3x10 3x10 ea    LEFT side glide wall (r  Hip to wall) NP 10x 10x 10x    Supine hip abd  5" 2x10 OTB 2 x 10, 5" GTB NP NP    sidelying hip hikers 5" 2x10 RESUME 5" 2x10 20x5"    Pball marching 30 RESUME 30 30x    Multifidus press OTB 3" 2x10 OTB, 2 x 10, 3 " NP OTB 2x10, 3"    Back walk CC  2x10, 20# 20# 2x10 NP    Clam shell LLE  2 x 10, 5" 5" 2x10 20x5"        Modalities                                 Assessment: Patient demonstrates fair tolerance to intervention as charted, increased pain with transfers, standing multifidus press provokes left groin pain despite pelvic stabilization cueing  Increased TTP and irritation through piriformis muscle belly  Plan: Continue per plan of care

## 2018-02-22 ENCOUNTER — OFFICE VISIT (OUTPATIENT)
Dept: PHYSICAL THERAPY | Facility: REHABILITATION | Age: 68
End: 2018-02-22
Payer: COMMERCIAL

## 2018-02-22 DIAGNOSIS — M46.1 SACROILIITIS, NOT ELSEWHERE CLASSIFIED (HCC): Primary | ICD-10-CM

## 2018-02-22 PROCEDURE — 97140 MANUAL THERAPY 1/> REGIONS: CPT

## 2018-02-22 PROCEDURE — 97112 NEUROMUSCULAR REEDUCATION: CPT

## 2018-02-22 PROCEDURE — 97110 THERAPEUTIC EXERCISES: CPT

## 2018-02-22 NOTE — PROGRESS NOTES
Daily Note     Today's date: 2018  Patient name: Jun Pinedo  : 1950  MRN: [id number]  Referring provider: Castillo Willis MD  Dx:   Encounter Diagnosis   Name Primary?  Sacroiliitis, not elsewhere classified ([facility] ) Yes                  Subjective: Patient has c/o left groin pain  Objective: See treatment diary below  Precautions: L2-5 laminectomy with fusion, R RTC tear, fibromyalgia    Daily Treatment Diary     Manual  1/30/18 2/1 2/15 2/20 2/22     Gr  5 upslip correction NP NP   SIJ grade 5 FB     Gr  5 left hip distraction NP NP   LAD grade 5 FB     Left PI correction NP NP        IASTM   5min 8' manual      IR/ER   manual           Exercise Diary  2/6 2/12 2/15 2/20 2/22   bike 10 min 10 min  10 min 10' 10'   TA with cuff 10"x10 10x 10" 10" x10 10x10" 10"x10   TA BKFO, 1 leg straight 2x10 2 x 10 2x10 2x10 ea 2x10 ea   TA cuff march 2x10 3 x 10 3x10 3x10 ea 3x10ea   LEFT side glide wall (r  Hip to wall) NP 10x 10x 10x NP   Supine hip abd  5" 2x10 OTB 2 x 10, 5" GTB NP NP NP   sidelying hip hikers 5" 2x10 RESUME 5" 2x10 20x5" 5" x20   Pball marching 30 RESUME 30 30x NP   Multifidus press OTB 3" 2x10 OTB, 2 x 10, 3 " NP OTB 2x10, 3" NP   Back walk CC  2x10, 20# 20# 2x10 NP HOLD   Clam shell LLE  2 x 10, 5" 5" 2x10 20x5" 5" x20   Prone ER     2x10   Prone heel squeezes     5" 2x10   Seated ER     5" 2x10       Modalities                                 Assessment: Patient reports minor posterior low back pain post tx, pain in her groin is decreased post session  Plan: Continue per plan of care

## 2018-02-27 ENCOUNTER — OFFICE VISIT (OUTPATIENT)
Dept: PHYSICAL THERAPY | Facility: REHABILITATION | Age: 68
End: 2018-02-27
Payer: COMMERCIAL

## 2018-02-27 DIAGNOSIS — M46.1 SACROILIITIS, NOT ELSEWHERE CLASSIFIED (HCC): Primary | ICD-10-CM

## 2018-02-27 PROCEDURE — 97140 MANUAL THERAPY 1/> REGIONS: CPT | Performed by: PHYSICAL THERAPIST

## 2018-02-27 PROCEDURE — 97112 NEUROMUSCULAR REEDUCATION: CPT | Performed by: PHYSICAL THERAPIST

## 2018-02-27 NOTE — PROGRESS NOTES
Daily Note     Today's date: 2018  Patient name: Carolina Arshad  : 1950  MRN: [id number]  Referring provider: Grupo Watson MD  Dx:   Encounter Diagnosis   Name Primary?  Sacroiliitis, not elsewhere classified ([facility] ) Yes     Start Time: 1700  Stop Time: 1800  Total time in clinic (min): 60 minutes    Subjective: Patient reports slight gluteal discomfort entering treatment today  She reports being on her feet more than usual     Objective: See treatment diary below  Precautions: L2-5 laminectomy with fusion, R RTC tear, fibromyalgia    Daily Treatment Diary     Manual  1/30/18 2/1 2/15 2/20 2/22 2/27    Gr  5 upslip correction NP NP   SIJ grade 5 FB     Gr  5 left hip distraction NP NP   LAD grade 5 FB     Left PI correction NP NP        IASTM   5min 8' manual  8' glute    IR/ER   manual   5 min  Exercise Diary  2/6 2/12 2/15 2/20 2/22 2/27   bike 10 min 10 min  10 min 10' 10' 10'   TA with cuff 10"x10 10x 10" 10" x10 10x10" 10"x10 HEP   TA BKFO, 1 leg straight 2x10 2 x 10 2x10 2x10 ea 2x10 ea 2 x 10   TA cuff march 2x10 3 x 10 3x10 3x10 ea 3x10ea 3 x 10   LEFT side glide wall (r  Hip to wall) NP 10x 10x 10x NP    Supine hip abd  5" 2x10 OTB 2 x 10, 5" GTB NP NP NP    sidelying hip hikers 5" 2x10 RESUME 5" 2x10 20x5" 5" x20 5" x 20   Pball marching 30 RESUME 30 30x NP NP   Multifidus press OTB 3" 2x10 OTB, 2 x 10, 3 " NP OTB 2x10, 3" NP NP   Back walk CC  2x10, 20# 20# 2x10 NP HOLD HELD   Clam shell LLE  2 x 10, 5" 5" 2x10 20x5" 5" x20 5" x 20   Prone ER     2x10 2 x 10   Prone hip extn, knee bent     5" 2x10 5" 2x10   Seated ER     5" 2x10 5" 2x10   Standing RLE elevated left glute squeeze      4 x 20"       Modalities                                 Assessment: Patient reported slight ache with treatment today  Moderate tissue irritation left glute medius today  Plan: Continue per plan of care

## 2018-02-28 NOTE — RESULT NOTES
Verified Results  MAMMO SCREENING BILATERAL W 3D & CAD [id number] 03:01PM Crystal Cook Order Number: [id number]    - Patient Instructions: To schedule this appointment, please contact Central Scheduling at [phone]  Do not wear any perfume, powder, lotion or deodorant on breast or underarm area  Please bring your doctors order, referral (if needed) and insurance information with you on the day of the test  Failure to bring this information may result in this test being rescheduled  Arrive 15 minutes prior to your appointment time to register  On the day of your test, please bring any prior mammogram or breast studies with you that were not performed at a [facility]  Failure to bring prior exams may result in your test needing to be rescheduled  Test Name Result Flag Reference   MAMMO SCREENING BILATERAL W 3D & CAD (Report)     Patient History:   Patient is postmenopausal    Family history of breast cancer at age 67 in mother,    premenopausal breast cancer at age 36 in maternal aunt, breast    cancer at age 46 in niece, prostate cancer at age 58 in brother,    endometrial cancer at age 61 in sister  No Hormone Replacement Therapy   Patient has never smoked  Patient's BMI is 35 9  Reason for exam: screening, asymptomatic  Mammo Screening Bilateral W DBT and CAD: January 11, 2018 - Check   In #: [de-identified]   2D/3D Procedure   3D views: Bilateral MLO view(s) were taken  2D views: Bilateral CC view(s) were taken  Technologist: RT Almas(LITO)(M)   Prior study comparison: December 27, 2016, mammo screening    bilateral W DBT and CAD, performed at [place] [address]  December 7, 2015, digital bilateral screening    mammogram performed at [address] [place] [address]  December 5, 2014, digital bilateral screening mammogram performed at [address] [place] [address]   December 5, 2013, digital bilateral    screening mammogram performed at [facility]  [facility]  November 30, 2012, digital bilateral screening mammogram    performed at [address]  The breast tissue is almost entirely fat  No dominant soft tissue mass, architectural distortion or    suspicious calcifications are noted  The skin and nipple    structures are within normal limits  Scattered benign appearing    calcifications are noted  No mammographic evidence of malignancy  No    significant changes when compared with prior studies  ACR BI-RADSï¾® Assessments: BiRad:2 - Benign     Recommendation:   Routine screening mammogram of both breasts in 1 year  A    reminder letter will be scheduled  The patient is scheduled in a reminder system for screening    mammography  8-10% of cancers will be missed on mammography  Management of a    palpable abnormality must be based on clinical grounds  Patients    will be notified of their results via letter from our facility  Accredited by [employer] of Radiology and FDA  Transcription Location: [address] [place] [address]: [id number]     Risk Value(s):   Tyrer-Cuzick 10 Year: 10 700%, Tyrer-Cuzick Lifetime: 21 400%,    Myriad Table: 2 6%, BLUE 5 Year: 3 7%, NCI Lifetime: 13 4%, MRS    : Based on personal and/or family history,    consideration of hereditary risk assessment may be warranted     Signed by:   Facundo Nelson MD   1/12/18

## 2018-03-01 ENCOUNTER — OFFICE VISIT (OUTPATIENT)
Dept: PHYSICAL THERAPY | Facility: REHABILITATION | Age: 68
End: 2018-03-01
Payer: COMMERCIAL

## 2018-03-01 DIAGNOSIS — M46.1 SACROILIITIS, NOT ELSEWHERE CLASSIFIED (HCC): Primary | ICD-10-CM

## 2018-03-01 PROCEDURE — 97110 THERAPEUTIC EXERCISES: CPT

## 2018-03-01 PROCEDURE — 97530 THERAPEUTIC ACTIVITIES: CPT

## 2018-03-01 PROCEDURE — 97140 MANUAL THERAPY 1/> REGIONS: CPT

## 2018-03-01 PROCEDURE — 97112 NEUROMUSCULAR REEDUCATION: CPT

## 2018-03-01 NOTE — PROGRESS NOTES
Daily Note     Today's date: 3/1/2018  Patient name: Miley Cross  : 1950  MRN: [id number]  Referring provider: Dhiraj Bowman MD  Dx:   Encounter Diagnosis   Name Primary?  Sacroiliitis, not elsewhere classified ([facility] ) Yes                Subjective: Patient reports slight gluteal discomfort entering treatment today  She reports being on her feet more than usual     Objective: See treatment diary below  Precautions: L2-5 laminectomy with fusion, R RTC tear, fibromyalgia    Daily Treatment Diary     Manual  2/20 2/22 2/27 3/1     Gr  5 upslip correction  SIJ grade 5 FB       Gr  5 left hip distraction  LAD grade 5 FB       Left PI correction         IASTM 8' manual  8' glute      IR/ER   5 min  Exercise Diary  2/20 2/22 2/27 3/1     bike 10' 10' 10' 10'     TA with cuff 10x10" 10"x10 HEP      TA BKFO, 1 leg straight 2x10 ea 2x10 ea 2 x 10 2x10     TA cuff march 3x10 ea 3x10ea 3 x 10 3x10     LEFT side glide wall (r  Hip to wall) 10x NP  NP     Supine hip abd  NP NP  NP     sidelying hip hikers 20x5" 5" x20 5" x 20      Pball marching 30x NP NP NP     Multifidus press OTB 2x10, 3" NP NP NP     Clam shell LLE 20x5" 5" x20 5" x 20 5" x20     Prone ER  2x10 2 x 10      Prone hip extn, knee bent  5" 2x10 5" 2x10      Seated ER  5" 2x10 5" 2x10 5" 2x10     Standing RLE elevated left glute squeeze   4 x 20" 20" x4     Seated piriformis stretch    20"x4         Modalities                                 Assessment: Patient reports soreness post TE relieved by piriformis stretch    Plan: Continue per plan of care

## 2018-03-06 ENCOUNTER — OFFICE VISIT (OUTPATIENT)
Dept: PHYSICAL THERAPY | Facility: REHABILITATION | Age: 68
End: 2018-03-06
Payer: COMMERCIAL

## 2018-03-06 DIAGNOSIS — M46.1 SACROILIITIS, NOT ELSEWHERE CLASSIFIED (HCC): Primary | ICD-10-CM

## 2018-03-06 PROCEDURE — 97110 THERAPEUTIC EXERCISES: CPT | Performed by: PHYSICAL THERAPIST

## 2018-03-06 PROCEDURE — 97112 NEUROMUSCULAR REEDUCATION: CPT | Performed by: PHYSICAL THERAPIST

## 2018-03-06 NOTE — PROGRESS NOTES
Daily Note     Today's date: 3/6/2018  Patient name: Jordyn Ellison  : 1950  MRN: [id number]  Referring provider: Endy Lopez MD  Dx:   Encounter Diagnosis     ICD-10-CM    1  Sacroiliitis, not elsewhere classified ([facility] ) M46 1      1 on 1 with PT for 40 minutes  Start Time: 1700  Stop Time: 1800  Total time in clinic (min): 60 minutes    Subjective: Patient reports that her back has been doing better and is definitely better over the weekend but she has noticed a slight improvement in her symptoms with change in shoes  Shoes reports occasional numb feeling in her left groin with walking that stays for only a short period of time  Objective: See treatment diary below      Assessment: Tolerated treatment well  Patient demonstrated fatigue post treatment  Improved tolerance to treatment with reporting of increased gluteal activation  Plan: Continue PT as per plan of care and patient tolerance  Precautions: L2-5 laminectomy with fusion, R RTC tear, fibromyalgia     Daily Treatment Diary      Manual   3/6       Gr  5 upslip correction   SIJ grade 5 FB           Gr  5 left hip distraction   LAD grade 5 FB           Left PI correction               IASTM 8' manual   8' glute         IR/ER     5 min                Exercise Diary  2/20 2/22 2/27 3/1  3/6     bike 10' 10' 10' 10'  10'     TA with cuff 10x10" 10"x10 HEP    4x30"     TA cuff march 3x10 ea 3x10ea 3 x 10 3x10  NP     LEFT side glide wall (r  Hip to wall) 10x NP   NP HEP     Supine hip abd   NP NP   NP   2x10, 5" OTB    sidelying hip hikers 20x5" 5" x20 5" x 20        Pball marching 30x NP NP NP       Multifidus press OTB 2x10, 3" NP NP NP       Clam shell LLE 20x5" 5" x20 5" x 20 5" x20       Prone ER   2x10 2 x 10         Prone hip extn, knee bent   5" 2x10 5" 2x10    2x10 with glute set     Seated ER   5" 2x10 5" 2x10 5" 2x10  5" 2x10     Standing RLE elevated left glute squeeze     4 x 20" 20" x4  NP    Seated piriformis stretch       20"x4 HEP      Supine SLR abd      3x10    Self MFR     NV          Modalities

## 2018-03-08 ENCOUNTER — EVALUATION (OUTPATIENT)
Dept: PHYSICAL THERAPY | Facility: REHABILITATION | Age: 68
End: 2018-03-08
Payer: COMMERCIAL

## 2018-03-08 DIAGNOSIS — M46.1 SACROILIITIS, NOT ELSEWHERE CLASSIFIED (HCC): Primary | ICD-10-CM

## 2018-03-08 PROCEDURE — 97112 NEUROMUSCULAR REEDUCATION: CPT | Performed by: PHYSICAL THERAPIST

## 2018-03-08 PROCEDURE — 97140 MANUAL THERAPY 1/> REGIONS: CPT | Performed by: PHYSICAL THERAPIST

## 2018-03-08 PROCEDURE — 97110 THERAPEUTIC EXERCISES: CPT | Performed by: PHYSICAL THERAPIST

## 2018-03-08 NOTE — PROGRESS NOTES
Daily Note     Today's date: 3/8/2018  Patient name: Pineda Hill  : 1950  MRN: [id number]  Referring provider: Jason Herrmann MD  Dx:   Encounter Diagnosis     ICD-10-CM    1  Sacroiliitis, not elsewhere classified ([facility] ) M46 1                 Subjective: Patient reports slight achiness today in groin region  Objective: See treatment diary below      Assessment: Absence of symptoms reported post treatment  Moderate Left iliopsoas restriction present  Plan: Continue PT as per plan of care and patient tolerance  Precautions: L2-5 laminectomy with fusion, R RTC tear, fibromyalgia     Daily Treatment Diary      Manual  2/20 2/22 2/27 3/6  3/8     Gr  5 upslip correction   SIJ grade 5 FB           Gr  5 left hip distraction   LAD grade 5 FB           Left Iliopsoas release         5 min      IASTM 8' manual   8' glute   5 min  glute     IR/ER     5 min     5 min            Exercise Diary  2/20 2/22 2/27 3/1  3/6  3/8   bike 10' 10' 10' 10'  10'  NP   TA with cuff 10x10" 10"x10 HEP    4x30"     TA cuff march 3x10 ea 3x10ea 3 x 10 3x10  NP  3x10   LEFT side glide wall (r  Hip to wall) 10x NP   NP HEP     Supine hip abd  NP NP   NP   2x10, 5" OTB   2x10, 5" OTB   sidelying hip hikers 20x5" 5" x20 5" x 20        Treadmill 5 min   30x NP NP NP   Left glute focus   Multifidus press OTB 2x10, 3" NP NP NP       Clam shell LLE 20x5" 5" x20 5" x 20 5" x20       Prone ER   2x10 2 x 10         Prone hip extn, knee bent   5" 2x10 5" 2x10    2x10 with glute set   2x10 with glute set   Seated ER   5" 2x10 5" 2x10 5" 2x10  5" 2x10  5", 2 x 10   Standing RLE elevated left glute squeeze     4 x 20" 20" x4  NP 5x15"   Seated piriformis stretch       20"x4 HEP      Supine SLR abd      3x10 3x10   Self MFR     NV          Modalities

## 2018-03-13 ENCOUNTER — EVALUATION (OUTPATIENT)
Dept: PHYSICAL THERAPY | Facility: REHABILITATION | Age: 68
End: 2018-03-13
Payer: COMMERCIAL

## 2018-03-13 DIAGNOSIS — M46.1 SACROILIITIS, NOT ELSEWHERE CLASSIFIED (HCC): Primary | ICD-10-CM

## 2018-03-13 PROCEDURE — 97140 MANUAL THERAPY 1/> REGIONS: CPT | Performed by: PHYSICAL THERAPIST

## 2018-03-13 PROCEDURE — 97110 THERAPEUTIC EXERCISES: CPT | Performed by: PHYSICAL THERAPIST

## 2018-03-13 PROCEDURE — 97112 NEUROMUSCULAR REEDUCATION: CPT | Performed by: PHYSICAL THERAPIST

## 2018-03-13 NOTE — PROGRESS NOTES
Daily Note     Today's date: 3/13/2018  Patient name: Char Jensen  : 1950  MRN: [id number]  Referring provider: Quinn Brown MD  Dx:   Encounter Diagnosis     ICD-10-CM    1  Sacroiliitis, not elsewhere classified ([facility] ) M46 1      Start Time: 1167  Stop Time: 1805  Total time in clinic (min): 50 minutes  Subjective Evaluation    Pain  Current pain ratin  At best pain ratin  At worst pain rating: 3  Quality: tight          Subjective: Patient reports doing well today  Reports slight gluteal ache but no pain  Objective: See treatment diary below      Precautions: L2-5 laminectomy with fusion, R RTC tear, fibromyalgia     Daily Treatment Diary      Manual  2/20 2/22 2/27 3/6  3/8  3/13   Gr  5 upslip correction   SIJ grade 5 FB           Gr  5 left hip distraction   LAD grade 5 FB           Left Iliopsoas release         5 min      IASTM 8' manual   8' glute   5 min  glute FB   IR/ER     5 min     5 min            Exercise Diary  2/20 2/22 2/27 3/1  3/6  3/8 3/13   bike 10' 10' 10' 10'  10'  NP 5 min  TA with cuff 10x10" 10"x10 HEP    4x30"   4x20"   TA cuff march 3x10 ea 3x10ea 3 x 10 3x10  NP  3x10    LEFT side glide wall (r  Hip to wall) 10x NP   NP HEP      Supine hip abd  NP NP   NP   2x10, 5" OTB   2x10, 5" OTB    sidelying hip hikers 20x5" 5" x20 5" x 20         Treadmill 5 min  30x NP NP NP   Left glute focus 5'   Multifidus press OTB 2x10, 3" NP NP NP        Clam shell LLE 20x5" 5" x20 5" x 20 5" x20        Prone ER/IR   2x10 2 x 10       2x10   Prone hip extn, knee bent   5" 2x10 5" 2x10    2x10 with glute set   2x10 with glute set    Seated ER   5" 2x10 5" 2x10 5" 2x10  5" 2x10  5", 2 x 10 5", 2 x 10   Lateral step down     4 x 20" 20" x4  NP 5x15" 2x10   Seated piriformis stretch       20"x4 HEP       Stand hip abd  3x10 3x10 2x10 ea  YTB   Ant   Step up     NV  2x5       LE exam    GAIT: Compensated trendelenberg LLE  Squat assess: Good           MMT         AROM    Hip L       R        L           R   Flex  4+ 5 WNL WNL   Extn  4 5 WNL WNL   Abd  4 5 WNL WNL   Add  5 5 WNL WNL   IR  4 5 WNL WNL   ER  4 5 WNL WNL          G  Max 4 5     G  Med  Iliop  MMT    Knee         L        R   Flex  5 5   Extn  5 5                     MMT    Ankle       L        R   PF 5 5   DF  5 5   DF bent knee     EHL 5 5     Palpation: Glute med  TTP       Hip:    hip abd/lat rot = +  Transverse abdominis: Bent knee fall out= Fair         Assessment: Good tolerance to treatment today  Improved gluteal activation noted and patient was able to ascend stairs reciprocally with slight difficulty  She is making good gains and will continue to benefit from treatment to address functional deficits  Plan: Continue PT for 4 weeks, 2 visits per week progressing to HEP only

## 2018-03-13 NOTE — LETTER
April 3, 2018    Clarissa Castrejon MD   [address] [address]    Patient: Koby Santana   YOB: 1950   Date of Visit: 3/13/2018     Encounter Diagnosis     ICD-10-CM    1  Sacroiliitis, not elsewhere classified [facility]) M46 1        Dear Dr Pelayo Loud:    Please review the attached Plan of Care from [facility] recent visit  Please verify that you agree therapy should continue by signing the attached document and sending it back to our office  If you have any questions or concerns, please don't hesitate to call  Sincerely,    Brenna Fields, PT      Referring Provider:      I certify that I have read the below Plan of Care and certify the need for these services furnished under this plan of treatment while under my care  Clarissa Castrejon MD  [address]: [phone]          Daily Note     Today's date: 3/13/2018  Patient name: Koby Santana  : 1950  MRN: [id number]  Referring provider: Barry Pickard MD  Dx:   Encounter Diagnosis     ICD-10-CM    1  Sacroili[facility] not elsewhere [facility] ) M46 1      Start Time: 5466  Stop Time: 1805  Total time in clinic (min): 50 minutes  Subjective Evaluation    Pain  Current pain ratin  At best pain ratin  At worst pain rating: 3  Quality: tight          Subjective: Patient reports doing well today  Reports slight gluteal ache but no pain  Objective: See treatment diary below      Precautions: L2-5 laminectomy with fusion, R RTC tear, fibromyalgia     Daily Treatment Diary      Manual   3/6  3/8  3/13   Gr  5 upslip correction   SIJ grade 5 FB           Gr  5 left hip distraction   LAD grade 5 FB           Left Iliopsoas release         5 min      IASTM 8' manual   8' glute   5 min  glute FB   IR/ER     5 min     5 min            Exercise Diary  2/20 2/22 2/27 3/1  3/6  3/8 3/13   bike 10' 10' 10' 10'  10'  NP 5 min     TA with cuff 10x10" 10"x10 HEP    4x30"   4x20"   TA cuff march 3x10 ea 3x10ea 3 x 10 3x10  NP  3x10    LEFT side glide wall (r  Hip to wall) 10x NP   NP HEP      Supine hip abd  NP NP   NP   2x10, 5" OTB   2x10, 5" OTB    sidelying hip hikers 20x5" 5" x20 5" x 20         Treadmill 5 min  30x NP NP NP   Left glute focus 5'   Multifidus press OTB 2x10, 3" NP NP NP        Clam shell LLE 20x5" 5" x20 5" x 20 5" x20        Prone ER/IR   2x10 2 x 10       2x10   Prone hip extn, knee bent   5" 2x10 5" 2x10    2x10 with glute set   2x10 with glute set    Seated ER   5" 2x10 5" 2x10 5" 2x10  5" 2x10  5", 2 x 10 5", 2 x 10   Lateral step down     4 x 20" 20" x4  NP 5x15" 2x10   Seated piriformis stretch       20"x4 HEP       Stand hip abd  3x10 3x10 2x10 ea  YTB   Ant  Step up     NV  2x5       LE exam    GAIT: Compensated trendelenberg LLE  Squat assess: Good           MMT         AROM    Hip       L       R        L           R   Flex  4+ 5 WNL WNL   Extn  4 5 WNL WNL   Abd  4 5 WNL WNL   Add  5 5 WNL WNL   IR  4 5 WNL WNL   ER  4 5 WNL WNL          G  Max 4 5     G  Med  Iliop  MMT    Knee         L        R   Flex  5 5   Extn  5 5                     MMT    Ankle       L        R   PF 5 5   DF  5 5   DF bent knee     EHL 5 5     Palpation: Glute med  TTP       Hip:    hip abd/lat rot = +  Transverse abdominis: Bent knee fall out= Fair         Assessment: Good tolerance to treatment today  Improved gluteal activation noted and patient was able to ascend stairs reciprocally with slight difficulty  She is making good gains and will continue to benefit from treatment to address functional deficits  Plan: Continue PT for 4 weeks, 2 visits per week progressing to HEP only

## 2018-03-15 ENCOUNTER — OFFICE VISIT (OUTPATIENT)
Dept: PHYSICAL THERAPY | Facility: REHABILITATION | Age: 68
End: 2018-03-15
Payer: COMMERCIAL

## 2018-03-15 DIAGNOSIS — M46.1 SACROILIITIS, NOT ELSEWHERE CLASSIFIED (HCC): Primary | ICD-10-CM

## 2018-03-15 PROCEDURE — 97140 MANUAL THERAPY 1/> REGIONS: CPT

## 2018-03-15 PROCEDURE — 97110 THERAPEUTIC EXERCISES: CPT

## 2018-03-15 PROCEDURE — 97112 NEUROMUSCULAR REEDUCATION: CPT

## 2018-03-15 NOTE — PROGRESS NOTES
Daily Note     Today's date: 3/15/2018  Patient name: Rogelio Sykes  : 1950  MRN: [id number]  Referring provider: Emerita Alejandra MD  Dx:   Encounter Diagnosis     ICD-10-CM    1  Sacroiliitis, not elsewhere classified ([facility] ) M46 1                   Subjective: Patient reports having some posterior Right knee pain today  1:1 40 min indep TE remaining    Objective: See treatment diary below    Precautions: L2-5 laminectomy with fusion, R RTC tear, fibromyalgia     Daily Treatment Diary      Manual  2/27 3/6  3/8  3/13    Gr  5 upslip correction            Gr  5 left hip distraction            Left Iliopsoas release     5 min       IASTM 8' glute   5 min  glute FB    IR/ER 5 min     5 min             Exercise Diary  2/27 3/1  3/6  3/8 3/13 3/15   bike 10' 10'  10'  NP 5 min  5 min   TA with cuff HEP    4x30"   4x20" 5" 2x10   TA cuff march 3 x 10 3x10  NP  3x10      LEFT side glide wall (r  Hip to wall)   NP HEP    HEP HEP   Supine hip abd    NP   2x10, 5" OTB   2x10, 5" OTB      sidelying hip hikers 5" x 20        5"x20 5" x20   Treadmill 5 min  NP NP   Left glute focus 5' 5'   Multifidus press NP NP          Clam shell LLE 5" x 20 5" x20      2x10 2x10   Prone ER/IR 2 x 10       2x10 2x10   Prone hip extn, knee bent 5" 2x10    2x10 with glute set   2x10 with glute set   2x10 w/ glute set   Seated ER 5" 2x10 5" 2x10  5" 2x10  5", 2 x 10 5", 2 x 10 5" 2x10   Lateral step down 4 x 20" 20" x4  NP 5x15" 2x10 NP   Seated piriformis stretch   20"x4 HEP      NP   Stand hip abd      3x10 3x10 2x10 ea  YTB 2x10 YTB   Ant  Step up     NV   2x5 NP            Assessment: No increased pain during or post tx  Plan: Continue per plan of care

## 2018-03-16 PROCEDURE — G8991 OTHER PT/OT GOAL STATUS: HCPCS | Performed by: PHYSICAL THERAPIST

## 2018-03-16 PROCEDURE — G8990 OTHER PT/OT CURRENT STATUS: HCPCS | Performed by: PHYSICAL THERAPIST

## 2018-03-27 ENCOUNTER — APPOINTMENT (OUTPATIENT)
Dept: PHYSICAL THERAPY | Facility: REHABILITATION | Age: 68
End: 2018-03-27
Payer: COMMERCIAL

## 2018-03-27 PROCEDURE — G8991 OTHER PT/OT GOAL STATUS: HCPCS | Performed by: PHYSICAL THERAPIST

## 2018-03-27 PROCEDURE — G8992 OTHER PT/OT  D/C STATUS: HCPCS | Performed by: PHYSICAL THERAPIST

## 2018-03-29 ENCOUNTER — OFFICE VISIT (OUTPATIENT)
Dept: PAIN MEDICINE | Facility: CLINIC | Age: 68
End: 2018-03-29
Payer: COMMERCIAL

## 2018-03-29 VITALS
BODY MASS INDEX: 35.87 KG/M2 | DIASTOLIC BLOOD PRESSURE: 80 MMHG | WEIGHT: 190 LBS | HEIGHT: 61 IN | SYSTOLIC BLOOD PRESSURE: 130 MMHG | HEART RATE: 88 BPM

## 2018-03-29 DIAGNOSIS — Z02.71 DISABILITY EXAMINATION: Primary | ICD-10-CM

## 2018-03-29 DIAGNOSIS — M48.061 SPINAL STENOSIS OF LUMBAR REGION AT MULTIPLE LEVELS: ICD-10-CM

## 2018-03-29 DIAGNOSIS — M43.16 SPONDYLOLISTHESIS OF LUMBAR REGION: ICD-10-CM

## 2018-03-29 PROCEDURE — 99213 OFFICE O/P EST LOW 20 MIN: CPT | Performed by: PHYSICAL MEDICINE & REHABILITATION

## 2018-03-29 NOTE — LETTER
March 29, 2018     Jayda Alvarez MD  [address]  [address] [place] [address], [address] [place] [address],[address]    Patient: Ryder Beck   YOB: 1950   Date of Visit: 3/29/2018       Dear Dr Verena Santizo:    Thank you for referring Ryder Beck to me for evaluation  Below are the relevant portions of my assessment and plan of care  Diagnoses and all orders for this visit:    Disability examination    Spinal stenosis of lumbar region at multiple levels    Spondylolisthesis of lumbar region        If you have questions, please do not hesitate to call me  I look forward to following Leonidas Baca along with you           Sincerely,        Katt Bah,         CC: No Recipients

## 2018-03-29 NOTE — PROGRESS NOTES
Assessment/Plan:      Diagnoses and all orders for this visit:    Disability examination    Spinal stenosis of lumbar region at multiple levels    Spondylolisthesis of lumbar region          Subjective:     Patient ID: Umang Pacheco is a 74 y o  female  HPI 3/29/18 fu for functional eval / work restrictions, last seen 2/8/18  Has completed Ptx  Review of Systems   Respiratory: Negative  Cardiovascular: Negative  Gastrointestinal: Negative  Genitourinary: Negative  Neurological: Positive for numbness  Objective:    Ptx Notes:   Progress Notes   Stanley Saint, PT (Physical Therapist) [facility]      Patient reports feeling better and that she is ready to be discharged           Assessment: No increased pain during or post tx          Plan: Continue per plan of care           Physical Exam   Musculoskeletal: She exhibits no deformity  Loss of lumbar flexibility did order   Neurological:   Reflex Scores:       Tricep reflexes are 1+ on the right side and 1+ on the left side  Bicep reflexes are 1+ on the right side and 1+ on the left side  Brachioradialis reflexes are 1+ on the right side and 1+ on the left side  Patellar reflexes are 1+ on the right side and 1+ on the left side  Achilles reflexes are 1+ on the right side and 1+ on the left side

## 2018-04-03 ENCOUNTER — EVALUATION (OUTPATIENT)
Dept: PHYSICAL THERAPY | Facility: REHABILITATION | Age: 68
End: 2018-04-03

## 2018-04-04 ENCOUNTER — OFFICE VISIT (OUTPATIENT)
Dept: OBGYN CLINIC | Facility: HOSPITAL | Age: 68
End: 2018-04-04
Payer: COMMERCIAL

## 2018-04-04 VITALS
HEIGHT: 61 IN | HEART RATE: 69 BPM | WEIGHT: 192.8 LBS | SYSTOLIC BLOOD PRESSURE: 120 MMHG | BODY MASS INDEX: 36.4 KG/M2 | DIASTOLIC BLOOD PRESSURE: 87 MMHG

## 2018-04-04 DIAGNOSIS — G56.03 CARPAL TUNNEL SYNDROME, BILATERAL: Primary | ICD-10-CM

## 2018-04-04 PROCEDURE — 99203 OFFICE O/P NEW LOW 30 MIN: CPT | Performed by: ORTHOPAEDIC SURGERY

## 2018-04-04 RX ORDER — LIDOCAINE HYDROCHLORIDE AND EPINEPHRINE 10; 10 MG/ML; UG/ML
20 INJECTION, SOLUTION INFILTRATION; PERINEURAL ONCE
Status: CANCELLED | OUTPATIENT
Start: 2018-04-26 | End: 2018-04-26

## 2018-04-04 RX ORDER — LIDOCAINE HYDROCHLORIDE AND EPINEPHRINE 10; 10 MG/ML; UG/ML
20 INJECTION, SOLUTION INFILTRATION; PERINEURAL ONCE
Status: CANCELLED | OUTPATIENT
Start: 2018-04-04 | End: 2018-04-04

## 2018-04-04 NOTE — H&P
ASSESSMENT/PLAN:    There are no diagnoses linked to this encounter  Assessment:   Bilateral carpal tunnel syndrome    Plan:   Bilateral endoscopic carpal tunnel release under local left then right    Follow Up: After surgery    To Do Next Visit:  Sutures out    General Discussions:       Operative Discussions:     Endoscopic Carpal Tunnel Release: The anatomy and physiology of carpal tunnel syndrome was discussed with the patient today  Increase pressure localized under the transverse carpal ligament can cause pain, numbness, tingling, or dysesthesias within the median nerve distribution as well as feelings of fatigue, clumsiness, or awkwardness  These symptoms typically occur at night and worse in the morning upon waking  Eventually, untreated carpal tunnel syndrome can result in weakness and permanent loss of muscle within the thenar compartment of the hand  Treatment options were discussed with the patient  Conservative treatment includes nocturnal resting splints to keep the nerve in a neutral position, ergonomic changes within the work or home environment, activity modification, and tendon gliding exercises  Steroid injections within the carpal canal can help a majority of patients, however this is often self-limited in a majority of patients  Surgical intervention to divide the transverse carpal ligament typically results in a long-lasting relief of the patient's complaints, with the recurrence rate of less than 1%  The patient has elected to undergo an endoscopic carpal tunnel release  The 2 incision technique was discussed with the patient, which results in approximately a two-week less recovery time, and less wound complications  In the postoperative period, light activities are allowed immediately, driving is allowed when narcotic medication has stopped, and the bandages may be removed and incision may get wet after 2 days    Heavy activities (lifting more than approximately 10 pounds) will be allowed after follow up appointment in 1-2 weeks  While the pain and discomfort in the hands generally improves rapidly, the numbness and tingling as well as the strength will slowly improve over weeks to months depending on the severity of the carpal tunnel syndrome  Pillar pain was discussed with the patient, which is typically a common but self-limiting condition  The risks of bleeding and infection from the surgery are less than 1%  Risk of recurrence is approximately 0 5%  The risks of nerve injury or nerve damage or damage to the blood vessels is approximately 1 in 1200  The patient has an understanding of the above mentioned discussion  The risks and benefits of the procedure were explained to the patient, which include, but are not limited to: Bleeding, infection, recurrence, pain, scar, damage to tendons, damage to nerves, and damage to blood vessels, failure to give desired results and complications related to anesthesia   These risks, along with alternative conservative treatment options, and postoperative protocols were voiced back and understood by the patient   All questions were answered to the patient's satisfaction   The patient agrees to comply with a standard postoperative protocol, and is willing to proceed   Education was provided via written and auditory forms   There were no barriers to learning  Written handouts regarding wound care, incision and scar care, and general preoperative information was provided to the patient   Prior to surgery, the patient may be requested to stop all anti-inflammatory medications   Prophylactic aspirin, Plavix, and Coumadin may be allowed to be continued   Medications including vitamin E , ginkgo, and fish oil are requested to be stopped approximately one week prior to surgery   Hypertensive medications and beta blockers, if taken, should be continued        _____________________________________________________  CHIEF COMPLAINT:  Chief Complaint   Patient presents with    Left Hand - Numbness, Pain    Right Hand - Pain, Numbness         SUBJECTIVE:  Joshua Henley is a 74y o  year old female who presents for evaluation bilateral hand numbness and tingling which left greater than right for approximately the last 2 years  She has not tried braces or supports, does awaken her from sleep and is worse at work  She is dropping objects  No fevers chills constitutional symptoms, numbness extends into the thumb, index, long finger  PAST MEDICAL HISTORY:  Past Medical History:   Diagnosis Date    [facility]     Anxiety     Arthritis     Back pain     Carpal tunnel syndrome     Fibromyalgia     Fibromyalgia, primary     Hyperlipidemia     under control since weight loss    RLS (restless legs syndrome)     Rotator cuff injury     right    Spinal stenosis     Spondylitis ([facility] )        PAST SURGICAL HISTORY:  Past Surgical History:   Procedure Laterality Date     SECTION      COLONOSCOPY      DILATION AND CURETTAGE OF UTERUS      MD ARTHRODESIS POSTERIOR/POSTEROLATERAL LUMBAR N/A 4/3/2017    Procedure: L2-L5 OPEN DECOMPRESSIVE LUMBAR LAMINECTOMY W/ PEDICLE SCREW AND NILDA FIXATION FUSION (IMPULSE); REMOVAL OF SKIN TAG MID BACK;  Surgeon: Gosia Holbrook MD;  [facility]: [facility];  Service: Neurosurgery    MD COLONOSCOPY FLX DX W/COLLJ SPEC WHEN PFRMD N/A 10/7/2016    Procedure: EGD AND COLONOSCOPY;  Surgeon: Anthony Giron MD;  Location: [facility];   Service: Gastroenterology    RETINAL LASER PROCEDURE      TUBAL LIGATION         FAMILY HISTORY:  Family History   Problem Relation Age of Onset    Arthritis Mother     Breast cancer Mother     Hypertension Mother     Heart attack Mother     Heart attack Father     Hypertension Father     Stroke Father     Arthritis Sister     Uterine cancer Sister     Heart attack Brother     Prostate cancer Brother     Arthritis Brother     Melanoma Brother        SOCIAL HISTORY:  Social History   Substance Use Topics    Smoking status: Never Smoker    Smokeless tobacco: Never Used    Alcohol use No       MEDICATIONS:    Current Outpatient Prescriptions:     aspirin 81 MG tablet, Take 1 tablet by mouth daily, Disp: , Rfl:     Cholecalciferol (VITAMIN D) 2000 UNITS CAPS, Take 1 tablet by mouth daily, Disp: , Rfl:     clonazePAM (KlonoPIN) 0 5 mg tablet, Take 0 5 mg by mouth daily at bedtime, Disp: , Rfl: 4    gabapentin (NEURONTIN) 100 mg capsule, 100 mg 3 (three) times a day, Disp: , Rfl: 5    meloxicam (MOBIC) 15 mg tablet, Take 1 tablet by mouth daily, Disp: , Rfl:     methocarbamol (ROBAXIN) 750 mg tablet, Take by mouth every 6 (six) hours, Disp: , Rfl:     PARoxetine (PAXIL) 10 mg tablet, Take 10 mg by mouth every morning, Disp: , Rfl:     traMADol (ULTRAM) 50 mg tablet, Take 50 mg by mouth 2 (two) times a day as needed, Disp: , Rfl: 4    ALLERGIES:  No Known Allergies    REVIEW OF SYSTEMS:  Pertinent items are noted in HPI  A comprehensive review of systems was negative      LABS:  HgA1c:   Lab Results   Component Value Date    HGBA1C 6 1 08/02/2017     BMP:   Lab Results   Component Value Date    GLUCOSE 98 04/10/2017    CALCIUM 8 7 12/22/2017     12/22/2017    K 4 3 12/22/2017    CO2 29 12/22/2017     (H) 12/22/2017    BUN 24 12/22/2017    CREATININE 0 64 12/22/2017       _____________________________________________________  PHYSICAL EXAMINATION:  General: well developed and well nourished, alert, oriented times 3 and appears comfortable  Psychiatric: Normal  HEENT: Trachea Midline, No torticollis  Cardiovascular: No discernable arrhythmia  Pulmonary: No wheezing or stridor  Skin: No masses, erthema, lacerations, fluctation, ulcerations  Neurovascular: Sensation intact to the Ulnar Nerve, Sensation Intact to the Radial Nerve, Decreased Sensation to  the Median Nerve, Motor Intact to the Ulnar Nerve, Motor Intact to the Radial Nerve, Weakness to the Median Nerve Abductor pollicis brevis rated as 4/5 with positive Tinel's and carpal tunnel compression test bilateral hands no evidence of atrophy and Pulses Intact    MUSCULOSKELETAL EXAMINATION:  Extremities:  Full range of motion, negative Spurling's, negative Lhermitte's, full strength C5 through T1 with full strength ulnar and radial nerve       _____________________________________________________  STUDIES REVIEWED:  EMG:  Bilateral hands reviewed demonstrates moderate carpal tunnel syndrome        PROCEDURES PERFORMED:  Procedures  No Procedures performed today

## 2018-04-04 NOTE — PROGRESS NOTES
ASSESSMENT/PLAN:    There are no diagnoses linked to this encounter  Assessment:   Bilateral carpal tunnel syndrome    Plan:   Bilateral endoscopic carpal tunnel release under local left then right    Follow Up: After surgery    To Do Next Visit:  Sutures out    General Discussions:       Operative Discussions:     Endoscopic Carpal Tunnel Release: The anatomy and physiology of carpal tunnel syndrome was discussed with the patient today  Increase pressure localized under the transverse carpal ligament can cause pain, numbness, tingling, or dysesthesias within the median nerve distribution as well as feelings of fatigue, clumsiness, or awkwardness  These symptoms typically occur at night and worse in the morning upon waking  Eventually, untreated carpal tunnel syndrome can result in weakness and permanent loss of muscle within the thenar compartment of the hand  Treatment options were discussed with the patient  Conservative treatment includes nocturnal resting splints to keep the nerve in a neutral position, ergonomic changes within the work or home environment, activity modification, and tendon gliding exercises  Steroid injections within the carpal canal can help a majority of patients, however this is often self-limited in a majority of patients  Surgical intervention to divide the transverse carpal ligament typically results in a long-lasting relief of the patient's complaints, with the recurrence rate of less than 1%  The patient has elected to undergo an endoscopic carpal tunnel release  The 2 incision technique was discussed with the patient, which results in approximately a two-week less recovery time, and less wound complications  In the postoperative period, light activities are allowed immediately, driving is allowed when narcotic medication has stopped, and the bandages may be removed and incision may get wet after 2 days    Heavy activities (lifting more than approximately 10 pounds) will be allowed after follow up appointment in 1-2 weeks  While the pain and discomfort in the hands generally improves rapidly, the numbness and tingling as well as the strength will slowly improve over weeks to months depending on the severity of the carpal tunnel syndrome  Pillar pain was discussed with the patient, which is typically a common but self-limiting condition  The risks of bleeding and infection from the surgery are less than 1%  Risk of recurrence is approximately 0 5%  The risks of nerve injury or nerve damage or damage to the blood vessels is approximately 1 in 1200  The patient has an understanding of the above mentioned discussion  The risks and benefits of the procedure were explained to the patient, which include, but are not limited to: Bleeding, infection, recurrence, pain, scar, damage to tendons, damage to nerves, and damage to blood vessels, failure to give desired results and complications related to anesthesia   These risks, along with alternative conservative treatment options, and postoperative protocols were voiced back and understood by the patient   All questions were answered to the patient's satisfaction   The patient agrees to comply with a standard postoperative protocol, and is willing to proceed   Education was provided via written and auditory forms   There were no barriers to learning  Written handouts regarding wound care, incision and scar care, and general preoperative information was provided to the patient   Prior to surgery, the patient may be requested to stop all anti-inflammatory medications   Prophylactic aspirin, Plavix, and Coumadin may be allowed to be continued   Medications including vitamin E , ginkgo, and fish oil are requested to be stopped approximately one week prior to surgery   Hypertensive medications and beta blockers, if taken, should be continued        _____________________________________________________  CHIEF COMPLAINT:  Chief Complaint   Patient presents with    Left Hand - Numbness, Pain    Right Hand - Pain, Numbness         SUBJECTIVE:  Britni Ortiz is a 74y o  year old female who presents for evaluation bilateral hand numbness and tingling which left greater than right for approximately the last 2 years  She has not tried braces or supports, does awaken her from sleep and is worse at work  She is dropping objects  No fevers chills constitutional symptoms, numbness extends into the thumb, index, long finger  PAST MEDICAL HISTORY:  Past Medical History:   Diagnosis Date    [facility]ylosing spondylitis ([facility] [address] )     Anxiety     Arthritis     Back pain     Carpal tunnel syndrome     Fibromyalgia     Fibromyalgia, primary     Hyperlipidemia     under control since weight loss    RLS (restless legs syndrome)     Rotator cuff injury     right    Spinal stenosis     Spondylitis ([facility] [address] )        PAST SURGICAL HISTORY:  Past Surgical History:   Procedure Laterality Date     SECTION      COLONOSCOPY      DILATION AND CURETTAGE OF UTERUS      TN ARTHRODESIS POSTERIOR/POSTEROLATERAL LUMBAR N/A 4/3/2017    Procedure: L2-L5 OPEN DECOMPRESSIVE LUMBAR LAMINECTOMY W/ PEDICLE SCREW AND NILDA FIXATION FUSION (IMPULSE); REMOVAL OF SKIN TAG MID BACK;  Surgeon: Michelle Shipman MD;  [facility];  Service: Neurosurgery    TN COLONOSCOPY FLX DX W/COLLJ SPEC WHEN PFRMD N/A 10/7/2016    Procedure: EGD AND COLONOSCOPY;  Surgeon: Lazara Lobo MD;  Location: [facility];   Service: Gastroenterology    RETINAL LASER PROCEDURE      TUBAL LIGATION         FAMILY HISTORY:  Family History   Problem Relation Age of Onset    Arthritis Mother     Breast cancer Mother     Hypertension Mother     Heart attack Mother     Heart attack Father     Hypertension Father     Stroke Father     Arthritis Sister     Uterine cancer Sister     Heart attack Brother     Prostate cancer Brother     Arthritis Brother     Melanoma Brother        SOCIAL HISTORY:  Social History   Substance Use Topics    Smoking status: Never Smoker    Smokeless tobacco: Never Used    Alcohol use No       MEDICATIONS:    Current Outpatient Prescriptions:     aspirin 81 MG tablet, Take 1 tablet by mouth daily, Disp: , Rfl:     Cholecalciferol (VITAMIN D) 2000 UNITS CAPS, Take 1 tablet by mouth daily, Disp: , Rfl:     clonazePAM (KlonoPIN) 0 5 mg tablet, Take 0 5 mg by mouth daily at bedtime, Disp: , Rfl: 4    gabapentin (NEURONTIN) 100 mg capsule, 100 mg 3 (three) times a day, Disp: , Rfl: 5    meloxicam (MOBIC) 15 mg tablet, Take 1 tablet by mouth daily, Disp: , Rfl:     methocarbamol (ROBAXIN) 750 mg tablet, Take by mouth every 6 (six) hours, Disp: , Rfl:     PARoxetine (PAXIL) 10 mg tablet, Take 10 mg by mouth every morning, Disp: , Rfl:     traMADol (ULTRAM) 50 mg tablet, Take 50 mg by mouth 2 (two) times a day as needed, Disp: , Rfl: 4    ALLERGIES:  No Known Allergies    REVIEW OF SYSTEMS:  Pertinent items are noted in HPI  A comprehensive review of systems was negative      LABS:  HgA1c:   Lab Results   Component Value Date    HGBA1C 6 1 08/02/2017     BMP:   Lab Results   Component Value Date    GLUCOSE 98 04/10/2017    CALCIUM 8 7 12/22/2017     12/22/2017    K 4 3 12/22/2017    CO2 29 12/22/2017     (H) 12/22/2017    BUN 24 12/22/2017    CREATININE 0 64 12/22/2017       _____________________________________________________  PHYSICAL EXAMINATION:  General: well developed and well nourished, alert, oriented times 3 and appears comfortable  Psychiatric: Normal  HEENT: Trachea Midline, No torticollis  Cardiovascular: No discernable arrhythmia  Pulmonary: No wheezing or stridor  Skin: No masses, erthema, lacerations, fluctation, ulcerations  Neurovascular: Sensation intact to the Ulnar Nerve, Sensation Intact to the Radial Nerve, Decreased Sensation to  the Median Nerve, Motor Intact to the Ulnar Nerve, Motor Intact to the Radial Nerve, Weakness to the Median Nerve Abductor pollicis brevis rated as 4/5 with positive Tinel's and carpal tunnel compression test bilateral hands no evidence of atrophy and Pulses Intact    MUSCULOSKELETAL EXAMINATION:  Extremities:  Full range of motion, negative Spurling's, negative Lhermitte's, full strength C5 through T1 with full strength ulnar and radial nerve       _____________________________________________________  STUDIES REVIEWED:  EMG:  Bilateral hands reviewed demonstrates moderate carpal tunnel syndrome        PROCEDURES PERFORMED:  Procedures  No Procedures performed today

## 2018-04-23 NOTE — PRE-PROCEDURE INSTRUCTIONS
Pre-Surgery Instructions:   Medication Instructions    Cholecalciferol (VITAMIN D) 2000 UNITS CAPS Patient was instructed by Physician and understands   clonazePAM (KlonoPIN) 0 5 mg tablet Patient was instructed by Physician and understands   gabapentin (NEURONTIN) 100 mg capsule Patient was instructed by Physician and understands   meloxicam (MOBIC) 15 mg tablet Patient was instructed by Physician and understands   methocarbamol (ROBAXIN) 750 mg tablet Patient was instructed by Physician and understands   PARoxetine (PAXIL) 10 mg tablet Patient was instructed by Physician and understands   traMADol (ULTRAM) 50 mg tablet Patient was instructed by Physician and understands  Pre shower with hibiclens as instructed by surgeon  You may drive yourself to the hospital   You may take your medications day of surgery  You may eat on the day of your surgery  You may have a dressing, please wear something that will fit over it

## 2018-04-25 RX ORDER — LIDOCAINE HYDROCHLORIDE AND EPINEPHRINE 10; 10 MG/ML; UG/ML
20 INJECTION, SOLUTION INFILTRATION; PERINEURAL ONCE
Status: CANCELLED | OUTPATIENT
Start: 2018-04-26

## 2018-04-26 ENCOUNTER — HOSPITAL ENCOUNTER (OUTPATIENT)
Facility: HOSPITAL | Age: 68
Setting detail: OUTPATIENT SURGERY
Discharge: HOME | End: 2018-04-26
Attending: ORTHOPAEDIC SURGERY | Admitting: ORTHOPAEDIC SURGERY
Payer: COMMERCIAL

## 2018-04-26 VITALS
TEMPERATURE: 99.5 F | SYSTOLIC BLOOD PRESSURE: 120 MMHG | OXYGEN SATURATION: 94 % | RESPIRATION RATE: 20 BRPM | HEART RATE: 85 BPM | DIASTOLIC BLOOD PRESSURE: 59 MMHG

## 2018-04-26 DIAGNOSIS — G56.03 CARPAL TUNNEL SYNDROME, BILATERAL: Primary | ICD-10-CM

## 2018-04-26 PROCEDURE — 29848 WRIST ENDOSCOPY/SURGERY: CPT | Performed by: ORTHOPAEDIC SURGERY

## 2018-04-26 RX ORDER — MAGNESIUM HYDROXIDE 1200 MG/15ML
LIQUID ORAL AS NEEDED
Status: DISCONTINUED | OUTPATIENT
Start: 2018-04-26 | End: 2018-04-26 | Stop reason: HOSPADM

## 2018-04-26 RX ORDER — HYDROCODONE BITARTRATE AND ACETAMINOPHEN 5; 325 MG/1; MG/1
1 TABLET ORAL EVERY 6 HOURS PRN
Qty: 10 TABLET | Refills: 0 | Status: SHIPPED | OUTPATIENT
Start: 2018-04-26 | End: 2018-05-04 | Stop reason: ALTCHOICE

## 2018-04-26 RX ORDER — LIDOCAINE HYDROCHLORIDE AND EPINEPHRINE 10; 10 MG/ML; UG/ML
20 INJECTION, SOLUTION INFILTRATION; PERINEURAL ONCE
Status: COMPLETED | OUTPATIENT
Start: 2018-04-26 | End: 2018-04-26

## 2018-04-26 RX ADMIN — LIDOCAINE HYDROCHLORIDE,EPINEPHRINE BITARTRATE 18 ML: 10; .01 INJECTION, SOLUTION INFILTRATION; PERINEURAL at 12:05

## 2018-04-26 NOTE — OP NOTE
OPERATIVE REPORT  PATIENT NAME: Abbey Martinez  :  1950  MRN: [id number]  Pt Location: [facility]    SURGERY DATE: 18    Surgeon(s) and Role:     * Micah Elena MD - Primary     * Felicia Plascencia PA-C - Assisting    Pre-Op Diagnosis:  Carpal tunnel syndrome, bilateral [G56 03]    Post-Op Diagnosis Codes:     * Carpal tunnel syndrome, bilateral [G56 03]    Procedure(s):  RELEASE CARPAL TUNNEL ENDOSCOPIC (Left)    Specimen(s):  * No orders in the log *    Estimated Blood Loss:   Minimal      Anesthesia Type:   Local    Operative Indications: The patient has a history of Carpal Tunnel Syndrome  left that was recalcitrant to conservative management  The decision was made to bring the patient to the operating room for Endoscopic Carpal Tunnel Release  left  Risks of the procedure were explained which include, but are not limited to bleeding; infection; damage to nerves, arteries,veins, tendons; scar; pain; need for reoperation; failure to give desired result; and risks of anaesthesia  All questions were answered to satisfaction and they were willing to proceed  Operative Findings:  Left carpal tunnel    Complications:   None    Procedure and Technique:  After the patient, site, and procedure were identified, the patient was brought into the operating room in a supine position  Local anaesthesia was adminstered in the preoperative holding area  A tourniquet was not used  The  left upper extremity was then prepped and drapped in a normal, sterile, orthopedic fashion  After reconfirmation of the patient, site, and surgical procedure, which was agreed upon by the entire surgical team, attention was turned to the left wrist   The sites of the proximal and distal incisions were marked    The erik of the proximal incision was placed horizontally at the midline of the wrist   The distal incision erik was longitudinal extending distally from the point of intersection of the line between the long finger and ring finger and the line along the distal border of the fully abducted thumb  The proximal incision was performed  Subcutaneous tissues were dissected  Then the transverse volar antebrachial fascia was perforated with a scalpel  The edges of the skin incision where retracted and the forearm fascia was incised for approximately 1 5 cm proximally with care taken to identify and protect the median nerve  Retractors were used to inspect the transverse carpal ligament distally  A curved Sharp dissector was used to glide under the transverse carpal ligament and superficial to the median nerve with confirmation via the washboard feeling  Then the curved Sharp was pushed into the palm toward the distal incision site  When the location of the distal skin erik was adequate, the distal incision was made  Then with retraction of the skin, further dissection and perforation of the palmar fascia was performed with the use of tenotomy scissors  The curved Sharp was guided from proximal to distal out the distal incisions without any twisting to allow for dilation of the tract  The curved Sharp was removed, and the cannula for the camera was inserted along the same tract, making sure to keep the alignment post on the cannula perpendicular to the plane of the hand without twisting  Then while keeping the wrist in extension, and holding the cannula of the camera in place, the wrist was placed on the hyperextension board  The scope was inserted distally, and a cotton-tip applicator was used proximally to clean the tract as well as the scope  A curved cutting knife was introduced from proximal to distal while keeping visualization with the use of the camera  Without twisting of the canula, the knife was used to cut the transverse carpal ligament completely, making sure there were no remnant fibers  Then after this was accomplished, the hand was removed from the extension block    Three maneuvers were used to confirm the full release of the transverse carpal ligament  First, the ease of twisting the trocar of the camera confirmed the release of the ligament  Second, the curved Sharp was introduced to make sure there were no remnant fibers that could be felt palmarly  Third, the scope was introduced again to visualize that the whole ligament was released proximally to distally  Additional confirmation of full release included retraction and inspection in the distal and proximal incisions to make sure there were no remnant fibers distally or proximally respectively  At the completion of the procedure, hemostasis was obtained with cautery and direct pressure  The wounds were copiously irrigated with sterile solution  The wounds were closed with Prolene  Sterile dressings were applied, including Xeroform, gauze, tweeners, webril, ACE  Please note, all sponge, needle, and instrument counts were correct prior to closure  Loupe magnification was utilized  The patient tolerated the procedure well       I was present for the entire procedure and A qualified resident physician was not available    Patient Disposition:  APU and hemodynamically stable    SIGNATURE: Steven Farrell MD  DATE: 04/26/18  TIME: 12:54 PM

## 2018-05-04 ENCOUNTER — OFFICE VISIT (OUTPATIENT)
Dept: OBGYN CLINIC | Facility: HOSPITAL | Age: 68
End: 2018-05-04

## 2018-05-04 VITALS
BODY MASS INDEX: 36.55 KG/M2 | WEIGHT: 193.6 LBS | SYSTOLIC BLOOD PRESSURE: 128 MMHG | HEIGHT: 61 IN | HEART RATE: 101 BPM | DIASTOLIC BLOOD PRESSURE: 85 MMHG

## 2018-05-04 DIAGNOSIS — G56.03 CARPAL TUNNEL SYNDROME, BILATERAL: Primary | ICD-10-CM

## 2018-05-04 PROCEDURE — 99024 POSTOP FOLLOW-UP VISIT: CPT | Performed by: ORTHOPAEDIC SURGERY

## 2018-05-04 NOTE — LETTER
May 4, 2018     Patient: Seamus Grant   YOB: 1950   Date of Visit: 5/4/2018       To Whom it May Concern:    Seamus Grant is under my professional care  She was seen in my office on 5/4/2018  Please excuse Anne Tamayo from work from the date of surgery of 4/26/18 until 5/7/18  Pt should abide by a 5lb lifting restriction with her L hand until her follow up appointment in 4 weeks  If you have any questions or concerns, please don't hesitate to call           Sincerely,          Glendale Apley, MD        CC: No Recipients

## 2018-05-04 NOTE — PROGRESS NOTES
Diagnoses and all orders for this visit:    Carpal tunnel syndrome, bilateral        Assessment:   s/p L ECTR on 4/26/18    Plan:   Pt was given a note to return to work on 5/7/18 with a 5 lb lifting restriction on her L hand  Pt was instructed that she may begin scar tissue massage on her L hand with lotion enriched with vitamin E  Follow Up:  4  week(s)    To Do Next Visit:         CHIEF COMPLAINT:  Chief Complaint   Patient presents with    Left Hand - Post-op         SUBJECTIVE:  Kimberley Grande is a 74y o  year old female who presents for follow up after Endoscopic Carpal Tunnel Release  left  Today patient has No Complaints  Pt states that her night pain has resolved since surgery as well as a majority of her numbness and tingling  She only c/o occasionnaly will still get "zinging" into her fingers but notices that this is less frequent         PHYSICAL EXAMINATION:  General: well developed and well nourished, alert, oriented times 3 and appears comfortable  Psychiatric: Normal    MUSCULOSKELETAL EXAMINATION:  Incision: Clean, dry, intact  Range of Motion: As expected, opposition intact and full composite fist possible  Neurovascular status: Neuro intact, good cap refill  Activity Restrictions: Restrictions: 5lb lifting restriction on L hand  Done today: Sutures out      STUDIES REVIEWED:  No Studies to review      PROCEDURES PERFORMED:  Procedures  No Procedures performed today   Scribe Attestation    I,:   Soren Hope am acting as a scribe while in the presence of the attending physician :        I,:   Shamir Aleman MD personally performed the services described in this documentation    as scribed in my presence :

## 2018-05-23 DIAGNOSIS — F32.A DEPRESSION, UNSPECIFIED DEPRESSION TYPE: Primary | ICD-10-CM

## 2018-05-23 RX ORDER — PAROXETINE 10 MG/1
TABLET, FILM COATED ORAL
Qty: 30 TABLET | Refills: 6 | Status: SHIPPED | OUTPATIENT
Start: 2018-05-23 | End: 2019-02-10 | Stop reason: SDUPTHER

## 2018-06-11 NOTE — PRE-PROCEDURE INSTRUCTIONS
Pre-Surgery Instructions:   Medication Instructions    aspirin 81 MG tablet Patient was instructed by Physician and understands   Cholecalciferol (VITAMIN D) 2000 UNITS CAPS Patient was instructed by Physician and understands   clonazePAM (KlonoPIN) 0 5 mg tablet Patient was instructed by Physician and understands   meloxicam (MOBIC) 15 mg tablet Patient was instructed by Physician and understands   methocarbamol (ROBAXIN) 750 mg tablet Patient was instructed by Physician and understands   PARoxetine (PAXIL) 10 mg tablet Patient was instructed by Physician and understands   traMADol (ULTRAM) 50 mg tablet Patient was instructed by Physician and understands  Pre shower with hibiclens as instructed by surgeon  You may drive yourself to the hospital   You may take your medications day of surgery  You may eat on the day of your surgery  You may have a dressing, please wear something that will fit over it

## 2018-06-14 ENCOUNTER — HOSPITAL ENCOUNTER (OUTPATIENT)
Facility: HOSPITAL | Age: 68
Setting detail: OUTPATIENT SURGERY
Discharge: HOME | End: 2018-06-14
Attending: ORTHOPAEDIC SURGERY | Admitting: ORTHOPAEDIC SURGERY
Payer: COMMERCIAL

## 2018-06-14 VITALS
RESPIRATION RATE: 18 BRPM | OXYGEN SATURATION: 97 % | TEMPERATURE: 97.7 F | SYSTOLIC BLOOD PRESSURE: 126 MMHG | HEART RATE: 85 BPM | DIASTOLIC BLOOD PRESSURE: 64 MMHG

## 2018-06-14 DIAGNOSIS — G56.03 CARPAL TUNNEL SYNDROME, BILATERAL: Primary | ICD-10-CM

## 2018-06-14 PROBLEM — Z98.890 S/P ENDOSCOPIC CARPAL TUNNEL RELEASE: Status: ACTIVE | Noted: 2018-04-04

## 2018-06-14 PROCEDURE — 29848 WRIST ENDOSCOPY/SURGERY: CPT | Performed by: ORTHOPAEDIC SURGERY

## 2018-06-14 RX ORDER — LIDOCAINE HYDROCHLORIDE AND EPINEPHRINE 10; 10 MG/ML; UG/ML
20 INJECTION, SOLUTION INFILTRATION; PERINEURAL ONCE
Status: COMPLETED | OUTPATIENT
Start: 2018-06-14 | End: 2018-06-14

## 2018-06-14 RX ORDER — HYDROCODONE BITARTRATE AND ACETAMINOPHEN 5; 325 MG/1; MG/1
1 TABLET ORAL EVERY 6 HOURS PRN
Qty: 10 TABLET | Refills: 0 | Status: SHIPPED | OUTPATIENT
Start: 2018-06-14 | End: 2018-12-28

## 2018-06-14 RX ORDER — HYDROCODONE BITARTRATE AND ACETAMINOPHEN 5; 325 MG/1; MG/1
1 TABLET ORAL EVERY 6 HOURS PRN
Qty: 10 TABLET | Refills: 0 | Status: SHIPPED | OUTPATIENT
Start: 2018-06-14 | End: 2018-06-14

## 2018-06-14 RX ADMIN — LIDOCAINE HYDROCHLORIDE,EPINEPHRINE BITARTRATE 16 ML: 10; .01 INJECTION, SOLUTION INFILTRATION; PERINEURAL at 13:05

## 2018-06-14 NOTE — OP NOTE
OPERATIVE REPORT  PATIENT NAME: Brittany Riley  :  1950  MRN: [id number]  Pt Location: [facility]    SURGERY DATE: 18    Surgeon(s) and Role:     * Neil Valiente MD - Primary     * Charlene Bailey - Assisting    Pre-Op Diagnosis:  Carpal tunnel syndrome, bilateral [G56 03]    Post-Op Diagnosis Codes:     * Carpal tunnel syndrome, bilateral [G56 03]    Procedure(s):  RELEASE CARPAL TUNNEL ENDOSCOPIC (Right)    Specimen(s):  * No orders in the log *    Estimated Blood Loss:   Minimal      Anesthesia Type:   Local    Operative Indications: The patient has a history of Carpal Tunnel Syndrome  right that was recalcitrant to conservative management  The decision was made to bring the patient to the operating room for Endoscopic Carpal Tunnel Release  right  Risks of the procedure were explained which include, but are not limited to bleeding; infection; damage to nerves, arteries,veins, tendons; scar; pain; need for reoperation; failure to give desired result; and risks of anaesthesia  All questions were answered to satisfaction and they were willing to proceed  Operative Findings:  Right carpal tunnel syndrome    Complications:   None    Procedure and Technique:  After the patient, site, and procedure were identified, the patient was brought into the operating room in a supine position  Local anaesthesia was adminstered in the preoperative holding area  A tourniquet was not used  The  right upper extremity was then prepped and drapped in a normal, sterile, orthopedic fashion  After reconfirmation of the patient, site, and surgical procedure, which was agreed upon by the entire surgical team, attention was turned to the right wrist   The sites of the proximal and distal incisions were marked    The erik of the proximal incision was placed horizontally at the midline of the wrist   The distal incision erik was longitudinal extending distally from the point of intersection of the line between the long finger and ring finger and the line along the distal border of the fully abducted thumb  The proximal incision was performed  Subcutaneous tissues were dissected  Then the transverse volar antebrachial fascia was perforated with a scalpel  The edges of the skin incision where retracted and the forearm fascia was incised for approximately 1 5 cm proximally with care taken to identify and protect the median nerve  Retractors were used to inspect the transverse carpal ligament distally  A curved Sharp dissector was used to glide under the transverse carpal ligament and superficial to the median nerve with confirmation via the washboard feeling  Then the curved Sharp was pushed into the palm toward the distal incision site  When the location of the distal skin erik was adequate, the distal incision was made  Then with retraction of the skin, further dissection and perforation of the palmar fascia was performed with the use of tenotomy scissors  The curved Sharp was guided from proximal to distal out the distal incisions without any twisting to allow for dilation of the tract  The curved Sharp was removed, and the cannula for the camera was inserted along the same tract, making sure to keep the alignment post on the cannula perpendicular to the plane of the hand without twisting  Then while keeping the wrist in extension, and holding the cannula of the camera in place, the wrist was placed on the hyperextension board  The scope was inserted distally, and a cotton-tip applicator was used proximally to clean the tract as well as the scope  A curved cutting knife was introduced from proximal to distal while keeping visualization with the use of the camera  Without twisting of the canula, the knife was used to cut the transverse carpal ligament completely, making sure there were no remnant fibers  Then after this was accomplished, the hand was removed from the extension block    Three maneuvers were used to confirm the full release of the transverse carpal ligament  First, the ease of twisting the trocar of the camera confirmed the release of the ligament  Second, the curved Sharp was introduced to make sure there were no remnant fibers that could be felt palmarly  Third, the scope was introduced again to visualize that the whole ligament was released proximally to distally  Additional confirmation of full release included retraction and inspection in the distal and proximal incisions to make sure there were no remnant fibers distally or proximally respectively  At the completion of the procedure, hemostasis was obtained with cautery and direct pressure  The wounds were copiously irrigated with sterile solution  The wounds were closed with Prolene  Sterile dressings were applied, including Xeroform, gauze, tweeners, webril, ACE  Please note, all sponge, needle, and instrument counts were correct prior to closure  Loupe magnification was utilized  The patient tolerated the procedure well       I was present for the entire procedure    Patient Disposition:  PACU  and hemodynamically stable    SIGNATURE: Pablito Bass MD  DATE: 06/14/18  TIME: 1:46 PM

## 2018-06-14 NOTE — H&P
H&P Exam - Orthopedics   Marcelle Oscar 74 y o  female MRN: [id number]  Unit/Bed#: OR POOL    Assessment/Plan   Assessment:  Right CTS  Plan:  Right ECTR to be performed today under local anesthesia  History of Present Illness   HPI:  Marcelle Oscar is a 74 y o  y o  female who presents with continued n/t in the right hand for ~2 years  + nocturnal symptoms  Worse at work  + weakness with dropping objects  Numbness extends to thumb, index and long finger      Historical Information  Review Of Systems:   · Skin: Normal  · Neuro: See HPI  · Musculoskeletal: See HPI  · 14 point review of systems negative except as stated above     Past Medical History:   Past Medical History:   Diagnosis Date    [facility]ylosing [facility]ondylitis ([facility] [address] )     Anxiety     LAST ASSESSED: 16    Arthritis     Back pain     Carpal tunnel syndrome     Fibromyalgia     LAST ASSESSED: 16    Fibromyalgia, primary     Heme positive stool     LAST ASSESSED: 10/22/16    High cholesterol     Hyperlipidemia     under control since weight loss    Impingement syndrome of left shoulder     LAST ASSESSED: 17    Impingement syndrome of right shoulder     LAST ASSESSED:     Long term use of drug     LAST ASSESSED: 16    No known health problems     NO PERTINENT PAST MEDICAL HX    RLS (restless legs syndrome)     Rotator cuff injury     right    Spinal stenosis     Spinal stenosis     Spondylitis ([facility]     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[facility] )     RESOLVED: 16    Vitamin D deficiency        Past Surgical History:   Past Surgical History:   Procedure Laterality Date    BACK SURGERY      CARPAL TUNNEL RELEASE Left     CERVICAL CONIZATION   W/ LASER      CERVICAL CONIZATION     SECTION      COLONOSCOPY      DILATION AND CURETTAGE OF UTERUS      HAND SURGERY      [place] ARTHRODESIS POSTERIOR/POSTEROLATERAL LUMBAR N/A 4/3/2017    Procedure: L2-L5 OPEN DECOMPRESSIVE LUMBAR LAMINECTOMY W/ PEDICLE SCREW AND NILDA FIXATION FUSION (IMPULSE); REMOVAL OF SKIN TAG MID BACK;  Surgeon: Millicent Broussard MD;  [facility];  Service: Neurosurgery    [place] COLONOSCOPY FLX DX W/COLLJ SPEC WHEN PFRMD N/A 10/7/2016    Procedure: EGD AND COLONOSCOPY;  Surgeon: Chely Schaffer MD;  [facility];   Service: Gastroenterology    [place] WRIST Claudia Credit LIG Left 4/26/2018    Procedure: RELEASE CARPAL TUNNEL ENDOSCOPIC;  Surgeon: Adam Moreno MD;  [facility];  Service: Orthopedics    RETINAL LASER PROCEDURE      TUBAL LIGATION         Family History:  Family history reviewed and non-contributory  Family History   Problem Relation Age of Onset    Arthritis Mother    Stephane Crump Breast cancer Mother     Hypertension Mother     Heart attack Mother         MYOCARDIAL INFARCTION    Cancer Mother     Heart attack Father     Hypertension Father     Stroke Father         Peggy Dumas (CVA)    Arthritis Sister     Uterine cancer Sister     Endometrial cancer Sister     Heart attack Brother     Prostate cancer Brother     Arthritis Brother     Melanoma Brother     Cancer Brother     Arthritis Family     Hyperlipidemia Family     Depression Son     Breast cancer Maternal Aunt     Breast cancer Other     Uterine cancer Other        Social History:  Social History     Social History    Marital status: /Civil Union     Spouse name: N/A    Number of children: 3    Years of education: N/A     Occupational History    R N       EMPLOYED     Social History Main Topics    Smoking status: Never Smoker    Smokeless tobacco: Never Used    Alcohol use No    Drug use: No    Sexual activity: Yes     Other Topics Concern    None     Social History Narrative    CAFFEINE USE    DOES NOT EXERCISE           Allergies:   No Known Allergies        Labs:    0  Lab Value Date/Time   HCT 40 7 12/22/2017 1010   HCT 40 1 05/15/2017 1135   HCT 34 0 (L) 04/10/2017 0543   HCT 39 9 11/28/2015 1120   HCT 41 0 11/28/2014 0851   HGB 13 1 12/22/2017 1010   HGB 12 7 05/15/2017 1135   HGB 10 8 (L) 04/10/2017 0543   HGB 12 8 11/28/2015 1120   HGB 13 6 11/28/2014 0851   INR 0 92 03/06/2017 1111   WBC 5 59 12/22/2017 1010   WBC 7 27 05/15/2017 1135   WBC 8 68 04/10/2017 0543   WBC 5 04 11/28/2015 1120   WBC 6 85 11/28/2014 0851   ESR 19 10/24/2016 1021   ESR 18 10/19/2015 1001   CRP <3 0 10/24/2016 1021   CRP <3 0 10/19/2015 1001       Meds:    Current Facility-Administered Medications:     lidocaine-epinephrine (XYLOCAINE/EPINEPHRINE) 1 %-1:100,000 injection 20 mL, 20 mL, Infiltration, Once, Sallie Alva MD    Blood Culture:   No results found for: BLOODCX    Wound Culture:   No results found for: WOUNDCULT    Ins and Outs:  No intake/output data recorded  Physical Exam  /84   Pulse 66   Temp 97 7 °F (36 5 °C) (Oral)   Resp 18   SpO2 97%   Gen: Alert and oriented to person, place, time  HEENT: EOMI, eyes clear, moist mucus membranes, hearing intact  Respiratory: Bilateral chest rise  No audible wheezing found  Cardiovascular: Regular Rate and Rhythm  Abdomen: soft nontender/nondistended  Ortho Exam: FROM wrist  Neuro Exam: + tinels and + compression testing at right carpal tunnel   4/5 APB weakness    Lab Results: Reviewed  Imaging: Reviewed

## 2018-06-22 ENCOUNTER — OFFICE VISIT (OUTPATIENT)
Dept: OBGYN CLINIC | Facility: HOSPITAL | Age: 68
End: 2018-06-22

## 2018-06-22 VITALS
SYSTOLIC BLOOD PRESSURE: 122 MMHG | HEIGHT: 61 IN | HEART RATE: 83 BPM | DIASTOLIC BLOOD PRESSURE: 87 MMHG | WEIGHT: 193.4 LBS | BODY MASS INDEX: 36.51 KG/M2

## 2018-06-22 DIAGNOSIS — Z98.890 S/P ENDOSCOPIC CARPAL TUNNEL RELEASE: Primary | ICD-10-CM

## 2018-06-22 PROCEDURE — 99024 POSTOP FOLLOW-UP VISIT: CPT | Performed by: ORTHOPAEDIC SURGERY

## 2018-06-22 NOTE — LETTER
June 22, 2018     Patient: Brea Simpson   YOB: 1950   Date of Visit: 6/22/2018       To Whom it May Concern:    Brea Simpson is under my professional care  She was seen in my office on 6/22/2018  She may return to work with limitations on 6/25/18  Pt has a 5 lb lifting restriction in regards to her R hand until 7/9/18  If you have any questions or concerns, please don't hesitate to call           Sincerely,          Pablito Bass MD        CC: No Recipients

## 2018-06-22 NOTE — PROGRESS NOTES
Diagnoses and all orders for this visit:    S/P endoscopic carpal tunnel release        Assessment:   S/p Endoscopic Carpal Tunnel Syndrome  Right on 6/14/18    Plan:   Resume activities as tolerated    Follow Up:  PRN    To Do Next Visit:         CHIEF COMPLAINT:  Chief Complaint   Patient presents with    Right Hand - Post-op         SUBJECTIVE:  Modesta Marroquin is a 74y o  year old female who presents for follow up after Endoscopic Carpal Tunnel Release  Right on 6/14/18  Today patient has No Complaints  Pt states that her numbness and tingling and pain has completely resolved since sx         PHYSICAL EXAMINATION:  General: well developed and well nourished, alert, oriented times 3 and appears comfortable  Psychiatric: Normal    MUSCULOSKELETAL EXAMINATION:  Incision: healed  Range of Motion: opposition intact and full composite fist possible  Neurovascular status: Neuro intact, good cap refill  Activity Restrictions: No restrictions  Done today: Sutures out      STUDIES REVIEWED:  No Studies to review      PROCEDURES PERFORMED:  Procedures  No Procedures performed today   Scribe Attestation    I,:   Abel Vidal am acting as a scribe while in the presence of the attending physician :        I,:   Edward Eagle MD personally performed the services described in this documentation    as scribed in my presence :

## 2018-07-05 ENCOUNTER — OFFICE VISIT (OUTPATIENT)
Dept: PAIN MEDICINE | Facility: CLINIC | Age: 68
End: 2018-07-05
Payer: COMMERCIAL

## 2018-07-05 VITALS
HEART RATE: 88 BPM | DIASTOLIC BLOOD PRESSURE: 90 MMHG | WEIGHT: 193 LBS | HEIGHT: 61 IN | BODY MASS INDEX: 36.44 KG/M2 | SYSTOLIC BLOOD PRESSURE: 120 MMHG

## 2018-07-05 DIAGNOSIS — Z02.71 DISABILITY EXAMINATION: ICD-10-CM

## 2018-07-05 DIAGNOSIS — E55.9 HYPOVITAMINOSIS D: ICD-10-CM

## 2018-07-05 DIAGNOSIS — M47.816 SPONDYLOSIS OF LUMBAR REGION WITHOUT MYELOPATHY OR RADICULOPATHY: Primary | ICD-10-CM

## 2018-07-05 PROBLEM — M43.16 SPONDYLOLISTHESIS OF LUMBAR REGION: Status: RESOLVED | Noted: 2017-04-03 | Resolved: 2018-07-05

## 2018-07-05 PROBLEM — M45.9 ANKYLOSING SPONDYLITIS (HCC): Status: RESOLVED | Noted: 2018-07-05 | Resolved: 2018-07-05

## 2018-07-05 PROBLEM — M45.9 ANKYLOSING SPONDYLITIS (HCC): Status: ACTIVE | Noted: 2018-07-05

## 2018-07-05 PROCEDURE — 99213 OFFICE O/P EST LOW 20 MIN: CPT | Performed by: PHYSICAL MEDICINE & REHABILITATION

## 2018-07-05 NOTE — PATIENT INSTRUCTIONS
1  Blood test need to be fasting will call with results  2  :Eat To Live" diet by Dr Kacey Betancur

## 2018-07-05 NOTE — LETTER
July 5, 2018     Joseph Hobson MD  [address]  [address] [place] [address], [address] [place] [address],[address]    Patient: Devi Benson   YOB: 1950   Date of Visit: 7/5/2018       Dear Dr Aleisha Palacios:    Thank you for referring Devi Benson to me for evaluation  Below are the relevant portions of my assessment and plan of care  Diagnoses and all orders for this visit:    Spondylosis of lumbar region without myelopathy or radiculopathy    Disability examination        If you have questions, please do not hesitate to call me  I look forward to following Yifan Rivas along with you           Sincerely,        Joseline Morgan DO        CC: No Recipients

## 2018-07-05 NOTE — PROGRESS NOTES
Assessment/Plan:      Diagnoses and all orders for this visit:    Spondylosis of lumbar region without myelopathy or radiculopathy    Disability examination          Subjective:     Patient ID: Zoila Pierre is a 79 y o  female  HPI  80 yo female last seen 3/29/18  Has returned to work after ECTR surgery  Report LBP after walking several blocks, tolerates shopping, steps bother her  Review of Systems   Respiratory: Positive for cough  Gastrointestinal: Negative  Genitourinary: Negative  Musculoskeletal: Positive for back pain  Neurological: Negative for numbness  Objective:  Ortho notes:  Assessment:   s/p L ECTR on 4/26/18     Plan:   Pt was given a note to return to work on 5/7/18 with a 5 lb lifting restriction on her L hand  Pt was instructed that she may begin scar tissue massage on her L hand with lotion enriched with vitamin E           Physical Exam   Constitutional: She appears well-developed  HENT:   Head: Normocephalic  Eyes: Pupils are equal, round, and reactive to light  Musculoskeletal:        Lumbar back: She exhibits decreased range of motion, tenderness and bony tenderness (Left sacroiliac)  Neurological: No cranial nerve deficit  Coordination normal    Reflex Scores:       Tricep reflexes are 1+ on the right side and 1+ on the left side  Bicep reflexes are 1+ on the right side and 1+ on the left side  Brachioradialis reflexes are 1+ on the right side and 1+ on the left side  Patellar reflexes are 1+ on the right side and 1+ on the left side  Achilles reflexes are 1+ on the right side and 1+ on the left side

## 2018-07-13 ENCOUNTER — OFFICE VISIT (OUTPATIENT)
Dept: OBGYN CLINIC | Facility: HOSPITAL | Age: 68
End: 2018-07-13

## 2018-07-13 VITALS
DIASTOLIC BLOOD PRESSURE: 78 MMHG | BODY MASS INDEX: 35.16 KG/M2 | HEIGHT: 61 IN | HEART RATE: 91 BPM | WEIGHT: 186.2 LBS | SYSTOLIC BLOOD PRESSURE: 116 MMHG

## 2018-07-13 DIAGNOSIS — Z98.890 S/P ENDOSCOPIC CARPAL TUNNEL RELEASE: Primary | ICD-10-CM

## 2018-07-13 PROCEDURE — 99024 POSTOP FOLLOW-UP VISIT: CPT | Performed by: ORTHOPAEDIC SURGERY

## 2018-07-13 NOTE — PROGRESS NOTES
Assessment:   S/P: Status post bilateral endoscopic carpal tunnel release doing well    Plan: Today, the patient is doing well  The patient has no active restrictions, can return back to all activities to their tolerance  I will be happy to see the patient back on an as-needed basis if any problems or issues happen to arise  To Do Next Visit:          CHIEF COMPLAINT:  Chief Complaint   Patient presents with    Right Hand - Post-op    Left Hand - Post-op         SUBJECTIVE:  Cate Gray is a 79y o  year old female who presents for follow up after  Bilateral endoscopic carpal tunnel release with complete resolution of symptoms    She is happy    PHYSICAL EXAMINATION:  General: well developed and well nourished, alert, oriented times 3 and appears comfortable  Psychiatric: Normal    MUSCULOSKELETAL EXAMINATION:  Incision: healed  Range of Motion: As expected, opposition intact and full composite fist possible  Neurovascular status: Neuro intact, good cap refill         STUDIES REVIEWED:  No Studies to review      PROCEDURES PERFORMED:  Procedures  No Procedures performed today

## 2018-07-19 DIAGNOSIS — Z78.9: Primary | ICD-10-CM
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
## 2018-07-19 DIAGNOSIS — G89.29 CHRONIC BACK PAIN, UNSPECIFIED BACK LOCATION, UNSPECIFIED BACK PAIN LATERALITY: Primary | ICD-10-CM

## 2018-07-19 DIAGNOSIS — M54.9 CHRONIC BACK PAIN, UNSPECIFIED BACK LOCATION, UNSPECIFIED BACK PAIN LATERALITY: Primary | ICD-10-CM

## 2018-07-19 RX ORDER — AMOXICILLIN AND CLAVULANATE POTASSIUM 875; 125 MG/1; MG/1
1 TABLET, FILM COATED ORAL EVERY 12 HOURS SCHEDULED
Qty: 14 TABLET | Refills: 0 | Status: SHIPPED | OUTPATIENT
Start: 2018-07-19 | End: 2018-07-26

## 2018-07-19 RX ORDER — TRAMADOL HYDROCHLORIDE 50 MG/1
50 TABLET ORAL 2 TIMES DAILY PRN
Qty: 30 TABLET | Refills: 0 | Status: CANCELLED | OUTPATIENT
Start: 2018-07-19

## 2018-07-19 RX ORDER — TRAMADOL HYDROCHLORIDE 50 MG/1
50 TABLET ORAL 2 TIMES DAILY PRN
Qty: 30 TABLET | Refills: 0 | Status: SHIPPED | OUTPATIENT
Start: 2018-07-19 | End: 2018-10-09 | Stop reason: SDUPTHER

## 2018-08-03 DIAGNOSIS — G62.9 NEUROPATHY: Primary | ICD-10-CM

## 2018-08-03 RX ORDER — GABAPENTIN 100 MG/1
CAPSULE ORAL
Qty: 90 CAPSULE | Refills: 5 | Status: SHIPPED | OUTPATIENT
Start: 2018-08-03 | End: 2019-02-10 | Stop reason: SDUPTHER

## 2018-09-08 ENCOUNTER — APPOINTMENT (OUTPATIENT)
Dept: LAB | Facility: HOSPITAL | Age: 68
End: 2018-09-08
Attending: PHYSICAL MEDICINE & REHABILITATION
Payer: COMMERCIAL

## 2018-09-08 ENCOUNTER — APPOINTMENT (OUTPATIENT)
Dept: LAB | Facility: HOSPITAL | Age: 68
End: 2018-09-08
Payer: COMMERCIAL

## 2018-09-08 ENCOUNTER — TRANSCRIBE ORDERS (OUTPATIENT)
Dept: LAB | Facility: HOSPITAL | Age: 68
End: 2018-09-08

## 2018-09-08 DIAGNOSIS — E55.9 HYPOVITAMINOSIS D: ICD-10-CM

## 2018-09-08 DIAGNOSIS — Z00.8 HEALTH EXAMINATION IN POPULATION SURVEY: Primary | ICD-10-CM

## 2018-09-08 DIAGNOSIS — Z00.8 HEALTH EXAMINATION IN POPULATION SURVEY: ICD-10-CM

## 2018-09-08 LAB
25(OH)D3 SERPL-MCNC: 18.7 NG/ML (ref 30–100)
CHOLEST SERPL-MCNC: 167 MG/DL (ref 50–200)
EST. AVERAGE GLUCOSE BLD GHB EST-MCNC: 120 MG/DL
HBA1C MFR BLD: 5.8 % (ref 4.2–6.3)
HDLC SERPL-MCNC: 56 MG/DL (ref 40–60)
LDLC SERPL CALC-MCNC: 99 MG/DL (ref 0–100)
NONHDLC SERPL-MCNC: 111 MG/DL
TRIGL SERPL-MCNC: 61 MG/DL

## 2018-09-08 PROCEDURE — 82306 VITAMIN D 25 HYDROXY: CPT

## 2018-09-08 PROCEDURE — 80061 LIPID PANEL: CPT

## 2018-09-08 PROCEDURE — 83036 HEMOGLOBIN GLYCOSYLATED A1C: CPT

## 2018-09-08 PROCEDURE — 36415 COLL VENOUS BLD VENIPUNCTURE: CPT

## 2018-09-10 DIAGNOSIS — E55.9 VITAMIN D DEFICIENCY: Primary | ICD-10-CM

## 2018-09-10 RX ORDER — CHOLECALCIFEROL (VITAMIN D3) 1250 MCG
50000 CAPSULE ORAL WEEKLY
Qty: 8 CAPSULE | Refills: 0 | Status: SHIPPED | OUTPATIENT
Start: 2018-09-10 | End: 2018-12-28

## 2018-10-03 PROBLEM — E55.9 VITAMIN D DEFICIENCY: Status: ACTIVE | Noted: 2018-10-03

## 2018-10-09 DIAGNOSIS — G89.29 CHRONIC BACK PAIN, UNSPECIFIED BACK LOCATION, UNSPECIFIED BACK PAIN LATERALITY: ICD-10-CM

## 2018-10-09 DIAGNOSIS — M54.9 CHRONIC BACK PAIN, UNSPECIFIED BACK LOCATION, UNSPECIFIED BACK PAIN LATERALITY: ICD-10-CM

## 2018-10-09 RX ORDER — TRAMADOL HYDROCHLORIDE 50 MG/1
50 TABLET ORAL 2 TIMES DAILY PRN
Qty: 30 TABLET | Refills: 0 | Status: SHIPPED | OUTPATIENT
Start: 2018-10-09 | End: 2018-12-02 | Stop reason: SDUPTHER

## 2018-10-09 NOTE — PROGRESS NOTES
Assessment/Plan:      Diagnoses and all orders for this visit:    Disability examination    S/P endoscopic carpal tunnel release    Spinal stenosis of lumbar region at multiple levels        M*Modal software was used to dictate this note  It may contain errors with dictating incorrect words/spelling  Please contact provider directly with any questions  Subjective:     Patient ID: Meka Betancur is a 79 y o  female  HPI Last seen 18  Presents today for follow-up and to review lab work ordered at last visit  Has since been placed on Vit D supplementation  Has been standing more and having increased cramps I legs and takes methocarbamol for this  Notes new Sx of burning pain in left groin with standing       PAST MEDICAL HISTORY  Past Medical History:   Diagnosis Date    [facility] [address] [facility]     Anxiety     LAST ASSESSED: 16    Arthritis     Back pain     Carpal tunnel syndrome     Fibromyalgia     LAST ASSESSED: 16    Fibromyalgia, primary     Heme positive stool     LAST ASSESSED: 10/22/16    High cholesterol     Hyperlipidemia     under control since weight loss    Impingement syndrome of left shoulder     LAST ASSESSED: 17    Impingement syndrome of right shoulder     LAST ASSESSED:     Long term use of drug     LAST ASSESSED: 16    No known health problems     NO PERTINENT PAST MEDICAL HX    RLS (restless legs syndrome)     Rotator cuff injury     right    Spinal stenosis     Spinal stenosis     Spondylitis ([facility] [address] [facility]     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[facility] [address] )     RESOLVED: 16    Vitamin D deficiency      PAST SURGICAL HISTORY  Past Surgical History:   Procedure Laterality Date    BACK SURGERY      CARPAL TUNNEL RELEASE Left     CERVICAL CONIZATION   W/ LASER      CERVICAL CONIZATION     SECTION      COLONOSCOPY      DILATION AND CURETTAGE OF UTERUS      HAND SURGERY      [place] ARTHRODESIS POSTERIOR/POSTEROLATERAL LUMBAR N/A 4/3/2017 Procedure: L2-L5 OPEN DECOMPRESSIVE LUMBAR LAMINECTOMY W/ PEDICLE SCREW AND NILDA FIXATION FUSION (IMPULSE); REMOVAL OF SKIN TAG MID BACK;  Surgeon: Yolanda Courtney MD;  [facility];  Service: Neurosurgery    [place] COLONOSCOPY FLX DX W/COLLJ SPEC WHEN PFRMD N/A 10/7/2016    Procedure: EGD AND COLONOSCOPY;  Surgeon: Jaxson Amato MD;  [facility];   Service: Gastroenterology    LA WRIST Charmel Jad LIG Left 4/26/2018    Procedure: RELEASE CARPAL TUNNEL ENDOSCOPIC;  Surgeon: Riddhi Mendoza MD;  [facility];  Service: Orthopedics    LA WRIST Charmel Jad LIG Right 6/14/2018    Procedure: RELEASE CARPAL TUNNEL ENDOSCOPIC;  Surgeon: Riddhi Mendoza MD;  [facility];  Service: Orthopedics    RETINAL LASER PROCEDURE      TUBAL LIGATION         FAMILY HISTORY  Family History   Problem Relation Age of Onset    Arthritis Mother     Breast cancer Mother     Hypertension Mother     Heart attack Mother         MYOCARDIAL INFARCTION    Cancer Mother     Heart attack Father     Hypertension Father     Stroke Father         Sung Specking (CVA)    Arthritis Sister     Uterine cancer Sister     Endometrial cancer Sister     Heart attack Brother     Prostate cancer Brother     Arthritis Brother     Melanoma Brother     Cancer Brother     Arthritis Family     Hyperlipidemia Family     Depression Son     Breast cancer Maternal Aunt     Breast cancer Other     Uterine cancer Other      HOME MEDICATIONS  Current Outpatient Prescriptions   Medication Sig Dispense Refill    aspirin 81 MG tablet Take 1 tablet by mouth daily      Cholecalciferol (VITAMIN D) 2000 UNITS CAPS Take 1 tablet by mouth daily      Cholecalciferol (VITAMIN D3) 72252 units CAPS Take 1 capsule (50,000 Units total) by mouth once a week for 8 doses 8 capsule 0    clonazePAM (KlonoPIN) 0 5 mg tablet Take 0 5 mg by mouth daily at bedtime  4    gabapentin (NEURONTIN) 100 mg capsule TAKE 1 CAPSULE 3 TIMES DAILY 90 capsule 5    HYDROcodone-acetaminophen (NORCO) 5-325 mg per tablet Take 1 tablet by mouth every 6 (six) hours as needed for pain for up to 10 doses Max Daily Amount: 4 tablets 10 tablet 0    meloxicam (MOBIC) 15 mg tablet Take 1 tablet by mouth daily      methocarbamol (ROBAXIN) 750 mg tablet Take by mouth every 6 (six) hours      PARoxetine (PAXIL) 10 mg tablet TAKE 1 TABLET DAILY  30 tablet 6    traMADol (ULTRAM) 50 mg tablet Take 1 tablet (50 mg total) by mouth 2 (two) times a day as needed for moderate pain 30 tablet 0     No current facility-administered medications for this visit  ALLERGIES  Patient has no known allergies  SOCIAL HISTORY  History   Alcohol Use No     History   Drug Use No     History   Smoking Status    Never Smoker   Smokeless Tobacco    Never Used     Review of Systems   Respiratory: Negative  Cardiovascular: Negative  Gastrointestinal: Negative  Genitourinary: Negative  Musculoskeletal: Positive for myalgias (Calf cramps with standing)  Neurological: Positive for numbness ( sensory disturbance in the left groin with standing)  Objective: There were no vitals filed for this visit  Labs:  Appointment on 09/08/2018   Component Date Value Ref Range Status    Vit D, 25-Hydroxy 09/08/2018 18 7* 30 0 - 100 0 ng/mL Final    Cholesterol 09/08/2018 167  50 - 200 mg/dL Final      Cholesterol:       Desirable         <200 mg/dl       Borderline         200-239 mg/dl       High              >239           Triglycerides 09/08/2018 61  <=150 mg/dL Final      Triglyceride:     Normal          <150 mg/dl     Borderline High 150-199 mg/dl     High            200-499 mg/dl        Very High       >499 mg/dl    Specimen collection should occur prior to N-Acetylcysteine or Metamizole administration due to the potential for falsely depressed results      HDL, Direct 09/08/2018 56  40 - 60 mg/dL Final      HDL Cholesterol:     High    >60 mg/dL       Low     <41 mg/dL  Specimen collection should occur prior to Metamizole administration due to the potential for falsley depressed results   LDL Calculated 09/08/2018 99  0 - 100 mg/dL Final      LDL Cholesterol:     Optimal           <100 mg/dl     Near Optimal      100-129 mg/dl     Above Optimal       Borderline High 130-159 mg/dl       High            160-189 mg/dl       Very High       >189 mg/dl         This screening LDL is a calculated result  It does not have the accuracy of the Direct Measured LDL in the monitoring of patients with hyperlipidemia and/or statin therapy  Direct Measure LDL (XHJ077) must be ordered separately in these patients   Non-HDL-Chol (CHOL-HDL) 09/08/2018 111  mg/dl Final    Hemoglobin A1C 09/08/2018 5 8  4 2 - 6 3 % Final    EAG 09/08/2018 120  mg/dl Final      Physical Exam   Constitutional: She appears well-developed and well-nourished  No distress  HENT:   Head: Normocephalic  Musculoskeletal:        Right shoulder: She exhibits decreased range of motion, tenderness and deformity  She exhibits no bony tenderness and no swelling  Erich's maneuver difficult to perform does reproduce some back pain but there is marked hip flexor tightness adductor tightness rectus femoris tightness and external rotator tightness  Neurological: She is alert  She displays no atrophy and no tremor  No cranial nerve deficit  She exhibits normal muscle tone  She displays no seizure activity  Reflex Scores:       Tricep reflexes are 1+ on the right side and 1+ on the left side  Bicep reflexes are 1+ on the right side and 1+ on the left side  Brachioradialis reflexes are 1+ on the right side and 1+ on the left side  Patellar reflexes are 1+ on the right side and 1+ on the left side  Achilles reflexes are 1+ on the right side and 1+ on the left side  Skin: She is not diaphoretic

## 2018-10-11 ENCOUNTER — OFFICE VISIT (OUTPATIENT)
Dept: PAIN MEDICINE | Facility: CLINIC | Age: 68
End: 2018-10-11
Payer: COMMERCIAL

## 2018-10-11 VITALS
BODY MASS INDEX: 36.25 KG/M2 | WEIGHT: 192 LBS | SYSTOLIC BLOOD PRESSURE: 140 MMHG | HEIGHT: 61 IN | DIASTOLIC BLOOD PRESSURE: 80 MMHG | HEART RATE: 96 BPM

## 2018-10-11 DIAGNOSIS — Z02.71 DISABILITY EXAMINATION: Primary | ICD-10-CM

## 2018-10-11 DIAGNOSIS — M79.18 MYOFASCIAL PAIN SYNDROME: ICD-10-CM

## 2018-10-11 DIAGNOSIS — Z98.890 S/P ENDOSCOPIC CARPAL TUNNEL RELEASE: ICD-10-CM

## 2018-10-11 DIAGNOSIS — M48.061 SPINAL STENOSIS OF LUMBAR REGION AT MULTIPLE LEVELS: ICD-10-CM

## 2018-10-11 PROCEDURE — 99213 OFFICE O/P EST LOW 20 MIN: CPT | Performed by: PHYSICAL MEDICINE & REHABILITATION

## 2018-10-11 RX ORDER — METHOCARBAMOL 750 MG/1
750 TABLET, FILM COATED ORAL EVERY 6 HOURS PRN
Qty: 100 TABLET | Refills: 0 | Status: SHIPPED | OUTPATIENT
Start: 2018-10-11 | End: 2019-04-02 | Stop reason: SDUPTHER

## 2018-10-11 NOTE — PATIENT INSTRUCTIONS
1   Physical therapy  2  Renew methocarbamol as needed for muscle relaxation      3   Finish all a doses of vitamin D follow that with 2000 units vitamin-D daily

## 2018-10-11 NOTE — LETTER
October 11, 2018     Isaias Daniel MD  [address]  [address] [facility], [address] [place] [address]    Patient: Prince Aguilar   YOB: 1950   Date of Visit: 10/11/2018       Dear Dr Kendell Mccollum:    Thank you for referring Prince Aguilar to me for evaluation  Below are the relevant portions of my assessment and plan of care  If you have questions, please do not hesitate to call me  I look forward to following Juan Alberto Lopez along with you           Sincerely,        Angel Roa,         CC: No Recipients

## 2018-11-05 ENCOUNTER — EVALUATION (OUTPATIENT)
Dept: PHYSICAL THERAPY | Facility: REHABILITATION | Age: 68
End: 2018-11-05
Payer: COMMERCIAL

## 2018-11-05 DIAGNOSIS — M54.16 LUMBAR RADICULOPATHY: Primary | ICD-10-CM

## 2018-11-05 PROCEDURE — G8991 OTHER PT/OT GOAL STATUS: HCPCS | Performed by: PHYSICAL THERAPIST

## 2018-11-05 PROCEDURE — 97161 PT EVAL LOW COMPLEX 20 MIN: CPT | Performed by: PHYSICAL THERAPIST

## 2018-11-05 PROCEDURE — 97110 THERAPEUTIC EXERCISES: CPT | Performed by: PHYSICAL THERAPIST

## 2018-11-05 PROCEDURE — 97140 MANUAL THERAPY 1/> REGIONS: CPT | Performed by: PHYSICAL THERAPIST

## 2018-11-05 PROCEDURE — G8990 OTHER PT/OT CURRENT STATUS: HCPCS | Performed by: PHYSICAL THERAPIST

## 2018-11-05 NOTE — PROGRESS NOTES
PT Evaluation     Today's date: 2018  Patient name: Dalton Mcclain  : 1950  MRN: [id number]  Referring provider: Odell Charles DO  Dx:   Encounter Diagnosis     ICD-10-CM    1  Lumbar radiculopathy M54 16                   Assessment  Impairments: abnormal coordination, abnormal muscle firing, abnormal muscle tone, abnormal or restricted ROM, abnormal movement, activity intolerance, difficulty understanding, impaired physical strength, lacks appropriate home exercise program and pain with function    Symptom irritability: moderate  Assessment details: Dalton Mcclain is a pleasant 79 y o  female who presents with B/L back pain history of L2-L5 fusion  Patient reported that she feels that the R leg is getting tight  And stiff  She reported difficulty with stairs  Patient  Will be getting a hip Xray in the near future  patient is taking Vit D supplements  Mms relaxer and tradol PRN  ONLY ABLE TO WALK SHORT DISTANCES  She reported AM stiffness and difficulty walking but after 30min she improved  Patent reported that she would like to have less pain and will further distances and have improved mobility of the R hip and leg     Understanding of Dx/Px/POC: good   Prognosis: good    Goals  Short Term:  Pt will report decreased levels of pain by at least 2 subjective ratings in 4 weeks  Pt will demonstrate improved ROM by at least 10 degrees in 4 weeks  Pt will demonstrate improved strength by 1/2 grade  Long Term:   Pt will be independent in their HEP in 8 weeks  Pt will be be pain free with IADL's  Pt will demonstrate improved FOTO, >65    Plan  Patient would benefit from: skilled PT  Planned modality interventions: thermotherapy: hydrocollator packs  Planned therapy interventions: activity modification, joint mobilization, manual therapy, motor coordination training, neuromuscular re-education, patient education, self care, therapeutic activities, therapeutic exercise, graded activity, home exercise program and behavior modification  Treatment plan discussed with: patient  Plan details: Prognosis above is given PT services 2x/week tapering to 1x/week over the next 2 months and home program adherence  Subjective Evaluation    Pain  At best pain ratin  At worst pain ratin  Location: L-S  pain           Objective     General Comments     Lumbar Comments  Special test                Hip/SI joint:   scour=    -   tani = -     capsular mobility= -          long axis distraction (hip) = decreased        SLS=  -      obers= medial hip pain           piriformis test=-            P4 test=             Gaenslen's test=-       Distraction=   -           Active SLR= -           Active SLR with stabilization =     -       Leg length =               Sacral Thrust=-   fifi finger=  - S/L si joint compression= -       LUMBAR tests:  SLR = -  slump= -  PA mob= not tested     traction= -     prone instability test=  -           femoral nerve traction=  -  No TTP L-S or SIJ     Hip MMT 4- flex 3/5 abd  TTP along the ITB      HIP OA pattern tightness loss of IR/flex and abd  Painful hip adduction  Reflexes absent B/L LE   Lower extremity reflexes:   Lower extremity myotomes intact B/L LE   Sensation:  Altered medial shin                 Precautions: L-S fusion L2-L5    Daily Treatment Diary     Manual  11  5 18            ISTM wit hthe stick ITB  MJ                                                                    Exercise Diary  11 5            Bike  5'min            hamstring stretch  10''10            Hip ER  10''x10                                                                                                                                                                                                                                             Modalities

## 2018-11-08 ENCOUNTER — OFFICE VISIT (OUTPATIENT)
Dept: PHYSICAL THERAPY | Facility: OTHER | Age: 68
End: 2018-11-08
Payer: COMMERCIAL

## 2018-11-08 DIAGNOSIS — M54.16 LUMBAR RADICULOPATHY: Primary | ICD-10-CM

## 2018-11-08 PROCEDURE — 97110 THERAPEUTIC EXERCISES: CPT | Performed by: PHYSICAL THERAPIST

## 2018-11-08 PROCEDURE — G8990 OTHER PT/OT CURRENT STATUS: HCPCS | Performed by: PHYSICAL THERAPIST

## 2018-11-08 PROCEDURE — 97112 NEUROMUSCULAR REEDUCATION: CPT | Performed by: PHYSICAL THERAPIST

## 2018-11-08 PROCEDURE — G8991 OTHER PT/OT GOAL STATUS: HCPCS | Performed by: PHYSICAL THERAPIST

## 2018-11-08 PROCEDURE — 97140 MANUAL THERAPY 1/> REGIONS: CPT | Performed by: PHYSICAL THERAPIST

## 2018-11-08 NOTE — PROGRESS NOTES
Daily Note     Today's date: 2018  Patient name: Prince Aguilar  : 1950  MRN: [id number]  Referring provider: Daquan Douglas DO  Dx:   Encounter Diagnosis     ICD-10-CM    1  Lumbar radiculopathy M54 16                   Subjective: good tolerance to initial treatment slow profession is the plan to strengthen the hips  Objective: See treatment diary below      Assessment: Tolerated treatment well  Patient demonstrated fatigue post treatment      Plan: Continue per plan of care                Precautions: L-S fusion L2-L5    Daily Treatment Diary     Manual  11 8 18            ISTM wit hthe stick ITB  MJ                                                                    Exercise Diary  11 8            Bike  5'min            hamstring stretch  10''10            Hip ER  10''x10            Hip ADD 5''x10             minisquats  10x2             SLR std  15 each abd ext             Std hip flexor stretch  5''x10            clamshell 20                                                                                                                                                                             Modalities

## 2018-11-12 ENCOUNTER — OFFICE VISIT (OUTPATIENT)
Dept: PHYSICAL THERAPY | Facility: REHABILITATION | Age: 68
End: 2018-11-12
Payer: COMMERCIAL

## 2018-11-12 DIAGNOSIS — M54.16 LUMBAR RADICULOPATHY: Primary | ICD-10-CM

## 2018-11-12 PROCEDURE — 97110 THERAPEUTIC EXERCISES: CPT

## 2018-11-12 PROCEDURE — 97112 NEUROMUSCULAR REEDUCATION: CPT

## 2018-11-12 PROCEDURE — 97140 MANUAL THERAPY 1/> REGIONS: CPT

## 2018-11-12 NOTE — PROGRESS NOTES
Daily Note     Today's date: 2018  Patient name: Prince Aguilar  : 1950  MRN: [id number]  Referring provider: Daquan Douglas DO  Dx:   Encounter Diagnosis     ICD-10-CM    1  Lumbar radiculopathy M54 16               Subjective: Pt reports that she notes decreased but continued stiffness in R>L hip since LV  She reports daily compliance to HEP  She reports significant relief following TPR to glutes  Objective: See treatment diary below    Precautions: L-S fusion L2-L5    Daily Treatment Diary   Manual             IASTM with the stick ITB  MJ SH           TPR glute/piriformis  SH             Exercise Diary             Bike  5'min 10'           hamstring stretch  10''10 longsit 30"x3 ea           Hip ER  10''x10 ---           Hip ADD 5''x10  5"x15           minisquats  10x2  20x           Standing hip abd and ext 15x ea 20x ea           Std hip flexor stretch  5''x10 20"x5 ea           clamshell 20  5"x20 ea           Piriformis stretch  30"x3 ea           Reverse clams  5"x20 ea           Prone quad stretch  30"x3 ea                                       Modalities                             Assessment: Tolerated treatment well  Patient demonstrated fatigue post treatment, exhibited good technique with therapeutic exercises and would benefit from continued PT to improve hip extensibility and decrease pain  Plan: Continue per plan of care  Progress treatment as tolerated        Modesta Montana PTA

## 2018-11-15 ENCOUNTER — APPOINTMENT (OUTPATIENT)
Dept: PHYSICAL THERAPY | Facility: OTHER | Age: 68
End: 2018-11-15
Payer: COMMERCIAL

## 2018-11-19 ENCOUNTER — OFFICE VISIT (OUTPATIENT)
Dept: PHYSICAL THERAPY | Facility: OTHER | Age: 68
End: 2018-11-19
Payer: COMMERCIAL

## 2018-11-19 DIAGNOSIS — M54.16 LUMBAR RADICULOPATHY: Primary | ICD-10-CM

## 2018-11-19 PROCEDURE — 97140 MANUAL THERAPY 1/> REGIONS: CPT | Performed by: PHYSICAL THERAPIST

## 2018-11-19 PROCEDURE — 97112 NEUROMUSCULAR REEDUCATION: CPT | Performed by: PHYSICAL THERAPIST

## 2018-11-19 PROCEDURE — 97110 THERAPEUTIC EXERCISES: CPT | Performed by: PHYSICAL THERAPIST

## 2018-11-19 NOTE — PROGRESS NOTES
Daily Note     Today's date: 2018  Patient name: Michaelle Llamas  : 1950  MRN: [id number]  Referring provider: Albino Stephenson DO  Dx:   Encounter Diagnosis     ICD-10-CM    1  Lumbar radiculopathy M54 16               Subjective: patient continues to progress well  Mild pan today  Added step ups which she is challeged with at home  Objective: See treatment diary below    Precautions: L-S fusion L2-L5    Daily Treatment Diary   Manual            IASTM with the stick ITB  MJ SH MJ          TPR glute/piriformis  SH MJ            Exercise Diary   11 19          Bike  5'min 10' 8min          hamstring stretch  10''10 longsit 30"x3 ea 30''x3           Hip ER  10''x10 ---           Hip ADD 5''x10  5"x15 5''x15           minisquats  10x2  20x 20x           Standing hip abd and ext 15x ea 20x ea 20x           Std hip flexor stretch  5''x10 20"x5 ea 20''x5           clamshell 20  5"x20 ea 5;;x20x          Piriformis stretch  30"x3 ea 30''x          Reverse clams  5"x20 ea           Prone quad stretch  30"x3 ea Manual           LAQ    #2 25x           4in    10x             Modalities                             Assessment: Tolerated treatment well  Patient demonstrated fatigue post treatment, exhibited good technique with therapeutic exercises and would benefit from continued PT to improve hip extensibility and decrease pain  Plan: Continue per plan of care  Progress treatment as tolerated        Chikis Conner, PT

## 2018-11-23 ENCOUNTER — OFFICE VISIT (OUTPATIENT)
Dept: PHYSICAL THERAPY | Facility: OTHER | Age: 68
End: 2018-11-23
Payer: COMMERCIAL

## 2018-11-23 DIAGNOSIS — M54.16 LUMBAR RADICULOPATHY: Primary | ICD-10-CM

## 2018-11-23 PROCEDURE — 97110 THERAPEUTIC EXERCISES: CPT | Performed by: PHYSICAL THERAPIST

## 2018-11-23 PROCEDURE — 97140 MANUAL THERAPY 1/> REGIONS: CPT | Performed by: PHYSICAL THERAPIST

## 2018-11-23 PROCEDURE — 97112 NEUROMUSCULAR REEDUCATION: CPT | Performed by: PHYSICAL THERAPIST

## 2018-11-23 NOTE — PROGRESS NOTES
Daily Note     Today's date: 2018  Patient name: Nina Arevalo  : 1950  MRN: [id number]  Referring provider: Soham Oakes DO  Dx:   Encounter Diagnosis     ICD-10-CM    1  Lumbar radiculopathy M54 16               Subjective: patient continues to progress well  Mild pan today  Added step ups which she is challeged with at home  Objective: See treatment diary below    Precautions: L-S fusion L2-L5    Daily Treatment Diary   Manual   11 23         IASTM with the stick ITB  MJ SH MJ MJ         TPR glute/piriformis  SH MJ MJ           Exercise Diary   11 23         Bike  5'min 10' 8min 10min          hamstring stretch  10''10 longsit 30"x3 ea 30''x3  30''x4  Seated          Hip ER  10''x10 ---  resume NV          Hip ADD 5''x10  5"x15 5''x15  5''x15          minisquats  10x2  20x 20x  20x         Standing hip abd and ext 15x ea 20x ea 20x  20         Std hip flexor stretch  5''x10 20"x5 ea 20''x5  20''x5          clamshell 20  5"x20 ea 5;;x20x ytb 20ea          Piriformis stretch  30"x3 ea 30''x 30''x4          Reverse clams  5"x20 ea           Prone quad stretch  30"x3 ea Manual  Manual          LAQ    #2 25x                        4in    10x  6in 10x each            Modalities                             Assessment: Tolerated treatment well  Patient demonstrated fatigue post treatment, exhibited good technique with therapeutic exercises and would benefit from continued PT to improve hip extensibility and decrease pain  Plan: Continue per plan of care  Progress treatment as tolerated        Ailyn Stone, PT

## 2018-11-26 ENCOUNTER — OFFICE VISIT (OUTPATIENT)
Dept: PHYSICAL THERAPY | Facility: REHABILITATION | Age: 68
End: 2018-11-26
Payer: COMMERCIAL

## 2018-11-26 DIAGNOSIS — M54.16 LUMBAR RADICULOPATHY: Primary | ICD-10-CM

## 2018-11-26 PROCEDURE — 97110 THERAPEUTIC EXERCISES: CPT | Performed by: PHYSICAL THERAPIST

## 2018-11-26 PROCEDURE — 97140 MANUAL THERAPY 1/> REGIONS: CPT | Performed by: PHYSICAL THERAPIST

## 2018-11-26 PROCEDURE — 97112 NEUROMUSCULAR REEDUCATION: CPT | Performed by: PHYSICAL THERAPIST

## 2018-11-26 NOTE — PROGRESS NOTES
Daily Note     Today's date: 2018  Patient name: Rogelio Sykes  : 1950  MRN: [id number]  Referring provider: Elke Garcia DO  Dx:   Encounter Diagnosis     ICD-10-CM    1  Lumbar radiculopathy M54 16               Subjective: patient continues to progress well  Mild osborn today  [progressed SLR std very fatguied with std exercises/     Objective: See treatment diary below    Precautions: history  L-S fusion L2-L5    Daily Treatment Diary   Manual   11  11 23 11 26        IASTM with the stick ITB  MJ SH MJ MJ MJ        TPR glute/piriformis  SH MJ MJ MJ          Exercise Diary   11  11 23 11 26        Bike  5'min 10' 8min 10min  10min         hamstring stretch  10''10 longsit 30"x3 ea 30''x3  30''x4  Seated  30'x4         Hip ER  10''x10 ---  resume NV          Hip ADD 5''x10  5"x15 5''x15  5''x15  5''x145         minisquats  10x2  20x 20x  20x 20         Standing hip abd and ext and flex  15x ea 20x ea 20x  20 15ea flex/abd/ext         Std hip flexor stretch  5''x10 20"x5 ea 20''x5  20''x5  20''x5         clamshell 20  5"x20 ea 5;;x20x ytb 20ea  otb 20ea         Piriformis stretch  30"x3 ea 30''x 30''x4  30''x4         Reverse clams  5"x20 ea           Prone quad stretch  30"x3 ea Manual  Manual          LAQ    #2 25x   #225                      4in    10x  6in 10x each  4in 15x           Modalities                             Assessment: Tolerated treatment well  Patient demonstrated fatigue post treatment, exhibited good technique with therapeutic exercises and would benefit from continued PT to improve hip extensibility and decrease pain  Plan: Continue per plan of care  Progress treatment as tolerated        Jethro Calvillo PT

## 2018-11-29 ENCOUNTER — OFFICE VISIT (OUTPATIENT)
Dept: PHYSICAL THERAPY | Facility: REHABILITATION | Age: 68
End: 2018-11-29
Payer: COMMERCIAL

## 2018-11-29 DIAGNOSIS — M54.16 LUMBAR RADICULOPATHY: Primary | ICD-10-CM

## 2018-11-29 PROCEDURE — 97110 THERAPEUTIC EXERCISES: CPT | Performed by: PHYSICAL THERAPIST

## 2018-11-29 PROCEDURE — 97140 MANUAL THERAPY 1/> REGIONS: CPT | Performed by: PHYSICAL THERAPIST

## 2018-11-29 PROCEDURE — 97112 NEUROMUSCULAR REEDUCATION: CPT | Performed by: PHYSICAL THERAPIST

## 2018-11-29 NOTE — PROGRESS NOTES
Daily Note     Today's date: 2018  Patient name: Micah Jackson  : 1950  MRN: [id number]  Referring provider: Luis Alberto Willingham DO  Dx:   Encounter Diagnosis     ICD-10-CM    1  Lumbar radiculopathy M54 16               Subjective: patient continues to progress well  Mild osborn today  [progressed SLR std very fatguied with std exercises/ added multifitus pressess today  Objective: See treatment diary below    Precautions: history  L-S fusion L2-L5    Daily Treatment Diary   Manual  11  11 19 11 23 11 26 11 28       IASTM with the stick ITB  MJ SH MJ MJ MJ MJ       TPR glute/piriformis  SH MJ MJ MJ NP          Exercise Diary   11 19 11 23 11 26 1 28       Bike  5'min 10' 8min 10min  10min  10min        hamstring stretch  10''10 longsit 30"x3 ea 30''x3  30''x4  Seated  30'x4  30''x4        Hip ER  10''x10 ---  resume NV   rsume       Hip ADD 5''x10  5"x15 5''x15  5''x15  5''x145  5''x15        minisquats  10x2  20x 20x  20x 20  20        Standing hip abd and ext and flex  15x ea 20x ea 20x  20 15ea flex/abd/ext  15ea flex/abd/ext        Std hip flexor stretch  5''x10 20"x5 ea 20''x5  20''x5  20''x5         clamshell 20  5"x20 ea 5;;x20x ytb 20ea  otb 20ea         Piriformis stretch  30"x3 ea 30''x 30''x4  30''x4         Reverse clams  5"x20 ea           Prone quad stretch  30"x3 ea Manual  Manual          LAQ    #2 25x   #225  #3 25                     4in    10x  6in 10x each  4in 15x  6in 10x          Modalities                             Assessment: Tolerated treatment well  Patient demonstrated fatigue post treatment, exhibited good technique with therapeutic exercises and would benefit from continued PT to improve hip extensibility and decrease pain  Plan: Continue per plan of care  Progress treatment as tolerated        Shayy Mittal, PT

## 2018-12-02 DIAGNOSIS — M54.9 CHRONIC BACK PAIN, UNSPECIFIED BACK LOCATION, UNSPECIFIED BACK PAIN LATERALITY: ICD-10-CM

## 2018-12-02 DIAGNOSIS — G89.29 CHRONIC BACK PAIN, UNSPECIFIED BACK LOCATION, UNSPECIFIED BACK PAIN LATERALITY: ICD-10-CM

## 2018-12-03 ENCOUNTER — OFFICE VISIT (OUTPATIENT)
Dept: PHYSICAL THERAPY | Facility: REHABILITATION | Age: 68
End: 2018-12-03
Payer: COMMERCIAL

## 2018-12-03 DIAGNOSIS — M54.16 LUMBAR RADICULOPATHY: Primary | ICD-10-CM

## 2018-12-03 PROCEDURE — 97110 THERAPEUTIC EXERCISES: CPT | Performed by: PHYSICAL THERAPIST

## 2018-12-03 PROCEDURE — 97112 NEUROMUSCULAR REEDUCATION: CPT | Performed by: PHYSICAL THERAPIST

## 2018-12-03 PROCEDURE — 97140 MANUAL THERAPY 1/> REGIONS: CPT | Performed by: PHYSICAL THERAPIST

## 2018-12-03 RX ORDER — TRAMADOL HYDROCHLORIDE 50 MG/1
50 TABLET ORAL 2 TIMES DAILY PRN
Qty: 30 TABLET | Refills: 0 | Status: SHIPPED | OUTPATIENT
Start: 2018-12-03 | End: 2018-12-05 | Stop reason: SDUPTHER

## 2018-12-03 NOTE — PROGRESS NOTES
Daily Note     Today's date: 12/3/2018  Patient name: Kleber Maurer  : 1950  MRN: [id number]  Referring provider: Aurea Shipman DO  Dx:   Encounter Diagnosis     ICD-10-CM    1  Lumbar radiculopathy M54 16               Subjective:back to work today some increased pain  Progress NV if able  Objective: See treatment diary below    Precautions: history  L-S fusion L2-L5    Daily Treatment Diary   Manual  11  11 19 11 23 11 26 11 28 12 3      IASTM with the stick ITB  MJ SH MJ MJ MJ MJ MJ      TPR glute/piriformis  SH MJ MJ MJ NP  MJ        Exercise Diary   11 19 11 23 11 26 11 28 12 3       Bike  5'min 10' 8min 10min  10min  10min  10min        hamstring stretch  10''10 longsit 30"x3 ea 30''x3  30''x4  Seated  30'x4  30''x4  30''x4        Hip ER  10''x10 ---  resume NV   rsume rsume       Hip ADD 5''x10  5"x15 5''x15  5''x15  5''x145  5''x15  5''x15        minisquats  10x2  20x 20x  20x 20  20  20        Standing hip abd and ext and flex  15x ea 20x ea 20x  20 15ea flex/abd/ext  15ea flex/abd/ext  10 #3 flex/abd         Std hip flexor stretch  5''x10 20"x5 ea 20''x5  20''x5  20''x5   20''x5        clamshell 20  5"x20 ea 5;;x20x ytb 20ea  otb 20ea   gtb 20        Piriformis stretch  30"x3 ea 30''x 30''x4  30''x4   30'';x4        Reverse clams  5"x20 ea            Prone quad stretch  30"x3 ea Manual  Manual           LAQ    #2 25x   #225  #3 25  #3 25                      4in    10x  6in 10x each  4in 15x  6in 10x  4in 10x          Modalities                           Assessment: Tolerated treatment well  Patient demonstrated fatigue post treatment, exhibited good technique with therapeutic exercises and would benefit from continued PT to improve hip extensibility and decrease pain  Plan: Continue per plan of care  Progress treatment as tolerated        Wilian Reeder, PT

## 2018-12-05 DIAGNOSIS — M54.9 CHRONIC BACK PAIN, UNSPECIFIED BACK LOCATION, UNSPECIFIED BACK PAIN LATERALITY: ICD-10-CM

## 2018-12-05 DIAGNOSIS — G89.29 CHRONIC BACK PAIN, UNSPECIFIED BACK LOCATION, UNSPECIFIED BACK PAIN LATERALITY: ICD-10-CM

## 2018-12-05 RX ORDER — TRAMADOL HYDROCHLORIDE 50 MG/1
50 TABLET ORAL 2 TIMES DAILY PRN
Qty: 30 TABLET | Refills: 0 | Status: SHIPPED | OUTPATIENT
Start: 2018-12-05 | End: 2019-02-13 | Stop reason: SDUPTHER

## 2018-12-06 ENCOUNTER — APPOINTMENT (OUTPATIENT)
Dept: PHYSICAL THERAPY | Facility: OTHER | Age: 68
End: 2018-12-06
Payer: COMMERCIAL

## 2018-12-10 ENCOUNTER — OFFICE VISIT (OUTPATIENT)
Dept: PHYSICAL THERAPY | Facility: REHABILITATION | Age: 68
End: 2018-12-10
Payer: COMMERCIAL

## 2018-12-10 ENCOUNTER — TELEPHONE (OUTPATIENT)
Dept: NEUROSURGERY | Facility: CLINIC | Age: 68
End: 2018-12-10

## 2018-12-10 DIAGNOSIS — M54.16 LUMBAR RADICULOPATHY: Primary | ICD-10-CM

## 2018-12-10 PROCEDURE — 97140 MANUAL THERAPY 1/> REGIONS: CPT | Performed by: PHYSICAL THERAPIST

## 2018-12-10 PROCEDURE — 97112 NEUROMUSCULAR REEDUCATION: CPT | Performed by: PHYSICAL THERAPIST

## 2018-12-10 PROCEDURE — 97110 THERAPEUTIC EXERCISES: CPT | Performed by: PHYSICAL THERAPIST

## 2018-12-10 NOTE — PROGRESS NOTES
Daily Note     Today's date: 2018  Patient name: Jade Cordero  : 1950  MRN: [id number]  Referring provider: Love Boykin DO  Dx:   Encounter Diagnosis     ICD-10-CM    1  Lumbar radiculopathy M54 16               Subjective:improved symptoms today  Some clicking noted in the back not present during PT  Not felt with marching in place or ambulation today  Objective: See treatment diary below    Precautions: history  L-S fusion L2-L5    Daily Treatment Diary   Manual  11  18  11 19 11 23 11 26 11 28 12 10      IASTM with the stick ITB  MJ SH MJ MJ MJ MJ MJ      TPR glute/piriformis  SH MJ MJ MJ NP  MJ        Exercise Diary   11 19 11 23 11 26 11 28 12 10       Bike  5'min 10' 8min 10min  10min  10min  10min        hamstring stretch  10''10 longsit 30"x3 ea 30''x3  30''x4  Seated  30'x4  30''x4  30''x4        Hip ER  10''x10 ---  resume NV   rsume rsume       Hip ADD 5''x10  5"x15 5''x15  5''x15  5''x145  5''x15  5''x15        minisquats  10x2  20x 20x  20x 20  20  20        Standing hip abd and ext and flex  15x ea 20x ea 20x  20 15ea flex/abd/ext  15ea flex/abd/ext  10 #3 flex/abd         Std hip flexor stretch  5''x10 20"x5 ea 20''x5  20''x5  20''x5   20''x5        clamshell 20  5"x20 ea 5;;x20x ytb 20ea  otb 20ea   gtb 20        Piriformis stretch  30"x3 ea 30''x 30''x4  30''x4   30'';x4        Reverse clams  5"x20 ea            Prone quad stretch  30"x3 ea Manual  Manual           LAQ    #2 25x   #225  #3 25  #3 25                      4in    10x  6in 10x each  4in 15x  6in 10x  4in 10x          Modalities                           Assessment: Tolerated treatment well  Patient demonstrated fatigue post treatment, exhibited good technique with therapeutic exercises and would benefit from continued PT to improve hip extensibility and decrease pain  Plan: Continue per plan of care  Progress treatment as tolerated        Flaquita Pate, PT

## 2018-12-10 NOTE — TELEPHONE ENCOUNTER
Pt questions a click she hears in her back at times, but does not experience pain  Discussed with Luis Amador, and in formed her it is most likely a muscle or ligament sliding over a bony protuberance or the hardware  She stated an understanding

## 2018-12-13 ENCOUNTER — OFFICE VISIT (OUTPATIENT)
Dept: PHYSICAL THERAPY | Facility: OTHER | Age: 68
End: 2018-12-13
Payer: COMMERCIAL

## 2018-12-13 DIAGNOSIS — M54.16 LUMBAR RADICULOPATHY: Primary | ICD-10-CM

## 2018-12-13 PROCEDURE — 97140 MANUAL THERAPY 1/> REGIONS: CPT

## 2018-12-13 PROCEDURE — 97112 NEUROMUSCULAR REEDUCATION: CPT

## 2018-12-13 PROCEDURE — 97110 THERAPEUTIC EXERCISES: CPT

## 2018-12-13 NOTE — PROGRESS NOTES
Daily Note     Today's date: 2018  Patient name: Umang Pacheco  : 1950  MRN: [id number]  Referring provider: Julieth Glasgow DO  Dx:   Encounter Diagnosis     ICD-10-CM    1  Lumbar radiculopathy M54 16                   Subjective: Pt reports feeling stiff this evening  Denies radicular pain at arrival        Objective: See treatment diary below      Precautions: history  L-S fusion L2-L5     Daily Treatment Diary   Manual  11 8 18  11 19 11 23 11 26 11 28 12 10  12 13       IASTM with the stick ITB  MJ SH MJ MJ MJ MJ MJ  JULISSA       TPR glute/piriformis   SH MJ MJ MJ NP  MJ  JULISSA          Exercise Diary  11 8  11 19 11 23 11 26 11 28 12 10  12 13         Bike  5'min 10' 8min 10min  10min  10min  10min  10 min          hamstring stretch  10''10 longsit 30"x3 ea 30''x3  30''x4  Seated  30'x4  30''x4  30''x4  30''x4          Hip ER  10''x10 ---   resume NV    rsume rsume esume nv          Hip ADD 5''x10  5"x15 5''x15  5''x15  5''x145  5''x15  5''x15   5''x15         minisquats  10x2  20x 20x  20x 20  20  20   20         Standing hip abd and ext and flex  15x ea 20x ea 20x  20 15ea flex/abd/ext  15ea flex/abd/ext  10 #3 flex/abd   10 3# flex/abd          Std hip flexor stretch  5''x10 20"x5 ea 20''x5  20''x5  20''x5    20''x5  20''x5           clamshell 20  5"x20 ea 5;;x20x ytb 20ea  otb 20ea    gtb 20   gtb 20         Piriformis stretch   30"x3 ea 30''x 30''x4  30''x4    30'';x4   30''x4         Reverse clams   5"x20 ea                     Prone quad stretch   30"x3 ea Manual  Manual                  LAQ      #2 25x    #225  #3 25  #3 25  3# 25                                    4in      10x  6in 10x each  4in 15x  6in 10x  4in 10x   4in x 10            Modalities                                                ,           Assessment: Tolerated treatment well  Patient would benefit from continued PT For improved strength, flexibility and overall function  Less stiffness noted post treatment  Challenged appropriately with TE program        Plan: Continue per plan of care

## 2018-12-17 ENCOUNTER — OFFICE VISIT (OUTPATIENT)
Dept: PHYSICAL THERAPY | Facility: REHABILITATION | Age: 68
End: 2018-12-17
Payer: COMMERCIAL

## 2018-12-17 DIAGNOSIS — M54.16 LUMBAR RADICULOPATHY: Primary | ICD-10-CM

## 2018-12-17 PROCEDURE — 97110 THERAPEUTIC EXERCISES: CPT | Performed by: PHYSICAL THERAPIST

## 2018-12-17 PROCEDURE — 97112 NEUROMUSCULAR REEDUCATION: CPT | Performed by: PHYSICAL THERAPIST

## 2018-12-17 PROCEDURE — 97140 MANUAL THERAPY 1/> REGIONS: CPT | Performed by: PHYSICAL THERAPIST

## 2018-12-17 NOTE — PROGRESS NOTES
Daily Note     Today's date: 2018  Patient name: Micah Jackson  : 1950  MRN: [id number]  Referring provider: Luis Alberto Willingham DO  Dx:   Encounter Diagnosis     ICD-10-CM    1  Lumbar radiculopathy M54 16                   Subjective: Pt reports feeling stiff this evening  Denies radicular pain at arrival        Objective: See treatment diary below      Precautions: history  L-S fusion L2-L5     Daily Treatment Diary   Manual  11  18  11 19 11 23 11 26 11 28 12 10  12 13       IASTM with the stick ITB  MJ SH MJ MJ MJ MJ MJ  JULISSA       TPR glute/piriformis   SH MJ MJ MJ NP  MJ  JULISSA          Exercise Diary  11 8  11 19 11 23 11 26 11 28 12 10  12 17         Bike  5'min 10' 8min 10min  10min  10min  10min  10 min          hamstring stretch  10''10 longsit 30"x3 ea 30''x3  30''x4  Seated  30'x4  30''x4  30''x4  30''x4          multifitus press        15 gtb          Hip ADD 5''x10  5"x15 5''x15  5''x15  5''x145  5''x15  5''x15           minisquats  10x2  20x 20x  20x 20  20  20   20         Standing hip abd and ext and flex  15x ea 20x ea 20x  20 15ea flex/abd/ext  15ea flex/abd/ext  10 #3 flex/abd   10 3# flex/abd          Std hip flexor stretch  5''x10 20"x5 ea 20''x5  20''x5  20''x5    20''x5  NP           clamshell 20  5"x20 ea 5;;x20x ytb 20ea  otb 20ea    gtb 20   gtb 20         Piriformis stretch   30"x3 ea 30''x 30''x4  30''x4    30'';x4   30''x4         Reverse clams   5"x20 ea                     Prone quad stretch   30"x3 ea Manual  Manual                  LAQ      #2 25x    #225  #3 25  #3 25  3# 25          Rows/ LPD                gtb 20ea          4in      10x  6in 10x each  4in 15x  6in 10x  4in 10x   6in x 10            Modalities                                                ,           Assessment: Tolerated treatment well  Patient would benefit from continued PT For improved strength, flexibility and overall function  Less stiffness noted post treatment   Challenged appropriately with TE program        Plan: Continue per plan of care

## 2018-12-20 ENCOUNTER — OFFICE VISIT (OUTPATIENT)
Dept: PHYSICAL THERAPY | Facility: OTHER | Age: 68
End: 2018-12-20
Payer: COMMERCIAL

## 2018-12-20 DIAGNOSIS — M54.16 LUMBAR RADICULOPATHY: Primary | ICD-10-CM

## 2018-12-20 PROCEDURE — 97112 NEUROMUSCULAR REEDUCATION: CPT

## 2018-12-20 PROCEDURE — 97140 MANUAL THERAPY 1/> REGIONS: CPT

## 2018-12-20 PROCEDURE — 97110 THERAPEUTIC EXERCISES: CPT

## 2018-12-20 NOTE — PROGRESS NOTES
Daily Note     Today's date: 2018  Patient name: Umang Pacheco  : 1950  MRN: [id number]  Referring provider: Julieth Glasgow DO  Dx:   Encounter Diagnosis     ICD-10-CM    1  Lumbar radiculopathy M54 16               1 on 1 with PTA    Subjective: Pt reports 3/10 LBP and glute pain  Objective: See treatment diary below      Precautions: history  L-S fusion L2-L5     Daily Treatment Diary   Manual  11 8 18  11 19 11 23 11 26 11 28 12 10  12 13     IASTM with the stick ITB  MJ SH MJ MJ MJ MJ MJ [facility]    TPR glute/piriformis   SH MJ MJ MJ NP  MJ  JULISSA  MP      Exercise Diary  11 8  11 19 11 23 11 26 11 28 12 10  12 17     Bike  5'min 10' 8min 10min  10min  10min  10min  10 min      hamstring stretch  10''10 longsit 30"x3 ea 30''x3  30''x4  Seated  30'x4  30''x4  30''x4  30''x4   30"x4 ea    multifitus press        15 gtb      Hip ADD 5''x10  5"x15 5''x15  5''x15  5''x145  5''x15  5''x15       minisquats  10x2  20x 20x  20x 20  20  20   20 20    Standing hip abd and ext and flex  15x ea 20x ea 20x  20 15ea flex/abd/ext  15ea flex/abd/ext  10 #3 flex/abd   10 3# flex/abd  10x 3# b/l    Std hip flexor stretch  5''x10 20"x5 ea 20''x5  20''x5  20''x5    20''x5  NP       clamshell 20  5"x20 ea 5;;x20x ytb 20ea  otb 20ea    gtb 20   gtb 20 gtb x20    Piriformis stretch   30"x3 ea 30''x 30''x4  30''x4    30'';x4   30''x4  30"x4   Reverse clams   5"x20 ea                 Prone quad stretch   30"x3 ea Manual  Manual              LAQ      #2 25x    #225  #3 25  #3 25  3# 25      Rows/ LPD                gtb 20ea  GTB x20 ea     4in      10x  6in 10x each  4in 15x  6in 10x  4in 10x   6in x 10  6"x10      Modalities                                                      Assessment: Tolerated treatment well  Patient continues to have TTP throughout b/l glut med during TPR  Good tolerance to current POC, however fatigued   Patient would benefit from continued PT       Plan: Continue per plan of care

## 2018-12-24 PROCEDURE — G8991 OTHER PT/OT GOAL STATUS: HCPCS | Performed by: PHYSICAL THERAPIST

## 2018-12-24 PROCEDURE — G8990 OTHER PT/OT CURRENT STATUS: HCPCS | Performed by: PHYSICAL THERAPIST

## 2018-12-26 PROCEDURE — G8990 OTHER PT/OT CURRENT STATUS: HCPCS | Performed by: PHYSICAL THERAPIST

## 2018-12-26 PROCEDURE — G8991 OTHER PT/OT GOAL STATUS: HCPCS | Performed by: PHYSICAL THERAPIST

## 2018-12-27 ENCOUNTER — OFFICE VISIT (OUTPATIENT)
Dept: PHYSICAL THERAPY | Facility: OTHER | Age: 68
End: 2018-12-27
Payer: COMMERCIAL

## 2018-12-27 ENCOUNTER — APPOINTMENT (OUTPATIENT)
Dept: PHYSICAL THERAPY | Facility: REHABILITATION | Age: 68
End: 2018-12-27
Payer: COMMERCIAL

## 2018-12-27 DIAGNOSIS — M54.16 LUMBAR RADICULOPATHY: Primary | ICD-10-CM

## 2018-12-27 PROCEDURE — 97110 THERAPEUTIC EXERCISES: CPT | Performed by: PHYSICAL THERAPIST

## 2018-12-27 PROCEDURE — 97140 MANUAL THERAPY 1/> REGIONS: CPT | Performed by: PHYSICAL THERAPIST

## 2018-12-27 PROCEDURE — 97112 NEUROMUSCULAR REEDUCATION: CPT | Performed by: PHYSICAL THERAPIST

## 2018-12-27 NOTE — PROGRESS NOTES
Daily Note     Today's date: 2018  Patient name: Rogelio Sykes  : 1950  MRN: [id number]  Referring provider: Elke Garcia DO  Dx:   Encounter Diagnosis     ICD-10-CM    1  Lumbar radiculopathy M54 16               1 on 1 with PT      Subjective: all goals met at this time D/C from PT at this time  Reviewed HEP and  Goals  FOTO improved  Objective: See treatment diary below      Precautions: history  L-S fusion L2-L5     Daily Treatment Diary   Manual  11  18  11 19 11 23 11 26 11 28 12 10  12 13     IASTM with the stick ITB  MJ SH MJ MJ MJ MJ MJ  JULISSA MMJ   TPR glute/piriformis   SH MJ MJ MJ NP  MJ  JULISSA  MMJ      Exercise Diary   11 19 11 23 11 26 11 28 12 10  12 17     Bike  5'min 10' 8min 10min  10min  10min  10min  10 min      hamstring stretch  10''10 longsit 30"x3 ea 30''x3  30''x4  Seated  30'x4  30''x4  30''x4  30''x4   30"x4 ea    multifitus press        15 gtb      Hip ADD 5''x10  5"x15 5''x15  5''x15  5''x145  5''x15  5''x15       minisquats  10x2  20x 20x  20x 20  20  20   20 20    Standing hip abd and ext and flex  15x ea 20x ea 20x  20 15ea flex/abd/ext  15ea flex/abd/ext  10 #3 flex/abd   10 3# flex/abd  10x 3# b/l    Std hip flexor stretch  5''x10 20"x5 ea 20''x5  20''x5  20''x5    20''x5  NP       clamshell 20  5"x20 ea 5;;x20x ytb 20ea  otb 20ea    gtb 20   gtb 20 gtb x20    Piriformis stretch   30"x3 ea 30''x 30''x4  30''x4    30'';x4   30''x4  30"x4   Reverse clams   5"x20 ea                 Prone quad stretch   30"x3 ea Manual  Manual              LAQ      #2 25x    #225  #3 25  #3 25  3# 25      Rows/ LPD                gtb 20ea  GTB x20 ea     4in      10x  6in 10x each  4in 15x  6in 10x  4in 10x   6in x 10  6"x10      Modalities                                                      Assessment: Tolerated treatment well  Patient continues to have TTP throughout b/l glut med during TPR  Good tolerance to current POC, however fatigued  Patient would benefit from continued PT     Subjective Evaluation    Pain  At best pain ratin  At worst pain rating: 3  Stiffness in am or after sitting  Location: L-S  pain           Objective     General Comments     Lumbar Comments  Special test                Hip/SI joint:   scour=    -   tani = -     capsular mobility= -          long axis distraction (hip) = decreased        SLS=  -      obers= medial hip pain           piriformis test=-            P4 test=             Gaenslen's test=-       Distraction=   -           Active SLR= -           Active SLR with stabilization =     -       Leg length =               Sacral Thrust=-   fifi finger=  - S/L si joint compression= -       LUMBAR tests:  SLR = -  slump= -  PA mob= not tested     traction= -     prone instability test=  -           femoral nerve traction=  -  No TTP L-S or SIJ     Hip MMT 4 flex 4/5 abd  TTP along the ITB      HIP OA pattern tightness loss of IR/flex and abd  Painful hip adduction  Reflexes absent B/L LE   Lower extremity reflexes:   Lower extremity myotomes intact B/L LE   Sensation:  Altered medial shin         Knee ext R 4/5 L 5/5               Plan: Continue per plan of care

## 2018-12-28 ENCOUNTER — OFFICE VISIT (OUTPATIENT)
Dept: INTERNAL MEDICINE CLINIC | Facility: CLINIC | Age: 68
End: 2018-12-28
Payer: COMMERCIAL

## 2018-12-28 VITALS
HEART RATE: 84 BPM | TEMPERATURE: 98.5 F | DIASTOLIC BLOOD PRESSURE: 82 MMHG | OXYGEN SATURATION: 98 % | SYSTOLIC BLOOD PRESSURE: 134 MMHG | HEIGHT: 61 IN | WEIGHT: 190 LBS | BODY MASS INDEX: 35.87 KG/M2

## 2018-12-28 DIAGNOSIS — R00.2 PALPITATIONS: ICD-10-CM

## 2018-12-28 DIAGNOSIS — M54.50 CHRONIC BILATERAL LOW BACK PAIN WITHOUT SCIATICA: Primary | ICD-10-CM

## 2018-12-28 DIAGNOSIS — Z12.11 COLON CANCER SCREENING: ICD-10-CM

## 2018-12-28 DIAGNOSIS — Z23 NEED FOR PNEUMOCOCCAL VACCINATION: ICD-10-CM

## 2018-12-28 DIAGNOSIS — Z79.899 LONG TERM USE OF DRUG: ICD-10-CM

## 2018-12-28 DIAGNOSIS — G89.29 CHRONIC BILATERAL LOW BACK PAIN WITHOUT SCIATICA: Primary | ICD-10-CM

## 2018-12-28 DIAGNOSIS — E55.9 VITAMIN D DEFICIENCY: ICD-10-CM

## 2018-12-28 PROCEDURE — 99397 PER PM REEVAL EST PAT 65+ YR: CPT | Performed by: INTERNAL MEDICINE

## 2018-12-28 PROCEDURE — 90670 PCV13 VACCINE IM: CPT | Performed by: INTERNAL MEDICINE

## 2018-12-28 PROCEDURE — 93000 ELECTROCARDIOGRAM COMPLETE: CPT | Performed by: INTERNAL MEDICINE

## 2018-12-28 PROCEDURE — 90471 IMMUNIZATION ADMIN: CPT | Performed by: INTERNAL MEDICINE

## 2018-12-29 NOTE — ASSESSMENT & PLAN NOTE
Documented history of vitamin-D deficiency the patient has taken 01164 units of vitamin-D weekly for period of 8 weeks follow-up testing has been requested

## 2018-12-29 NOTE — ASSESSMENT & PLAN NOTE
Chronic low back pain recommend continuation of medications including Neurontin 100 mg 3 times a day tramadol twice a day p r n  and meloxicam 15 mg daily  She also occasionally uses Robaxin 750 mg p r n  for muscle spasms  Consideration for aqua therapy was discussed with the patient I think would be most likely very beneficial if she gets regular exercise in this form

## 2018-12-29 NOTE — PROGRESS NOTES
Assessment/Plan:    Chronic bilateral low back pain  Chronic low back pain recommend continuation of medications including Neurontin 100 mg 3 times a day tramadol twice a day p r n  and meloxicam 15 mg daily  She also occasionally uses Robaxin 750 mg p r n  for muscle spasms  Consideration for aqua therapy was discussed with the patient I think would be most likely very beneficial if she gets regular exercise in this form  Vitamin D deficiency  Documented history of vitamin-D deficiency the patient has taken 77882 units of vitamin-D weekly for period of 8 weeks follow-up testing has been requested  Diagnoses and all orders for this visit:    Long term use of drug  -     CBC and differential; Future  -     Comprehensive metabolic panel; Future  -     UA w Reflex to Microscopic w Reflex to Culture -Lab Collect; Future    Colon cancer screening  -     Occult Blood, Fecal Immunochemical; Future    Vitamin D deficiency  -     Vitamin D 25 hydroxy; Future    Need for pneumococcal vaccination  -     PNEUMOCOCCAL CONJUGATE VACCINE 13-VALENT GREATER THAN 6 MONTHS    Palpitations  -     POCT ECG    Chronic bilateral low back pain without sciatica        Subjective:      Patient ID: Michaelle Llamas is a 79 y o  female  This is an annual physical examination review of blood work and electrocardiogram for this 63-year-old female patient  She is now retired from her profession of nursing and enjoying life with her family  She has had ongoing problems with low back pain having had surgery in the past year for this condition  She continues on medications for pain control  There is no indication of any abuse of these medications at the present time  We did discuss physical therapy in the form of aqua therapy which may be beneficial for her to work on exercising her muscles and conditioning her lower back muscles      Routine blood work has been requested including a CBC metabolic profile and follow-up vitamin-D level   Will review the results of the studies with the patient when they are completed  The following portions of the patient's history were reviewed and updated as appropriate:   She  has a past medical history of Ankylosing spondylitis ([facility] [address] ); Anxiety; Arthritis; Back pain; Carpal tunnel syndrome; Fibromyalgia; Fibromyalgia, primary; Heme positive stool; High cholesterol; Hyperlipidemia; Impingement syndrome of left shoulder; Impingement syndrome of right shoulder; Long term use of drug; No known health problems; RLS (restless legs syndrome); Rotator cuff injury; Spinal stenosis; Spinal stenosis; Spondylitis ([facility] [address] ); Spondyloarthritis; and Vitamin D deficiency  She   Patient Active Problem List    Diagnosis Date Noted    Chronic bilateral low back pain 2018    Vitamin D deficiency 10/03/2018    S/P endoscopic carpal tunnel release 2018    Spinal stenosis of lumbar region at multiple levels 2017    Spondylosis 2017    Scoliosis 2017     She  has a past surgical history that includes  section; Tubal ligation; Dilation and curettage of uterus; Retinal laser procedure; Colonoscopy; pr colonoscopy flx dx w/collj spec when pfrmd (N/A, 10/7/2016); pr arthrodesis posterior/posterolateral lumbar (N/A, 4/3/2017); pr wrist arthroscop,release xvers lig (Left, 2018); Cervical conization w/ laser; Back surgery; Hand surgery; Carpal tunnel release (Left); and pr wrist arthroscop,release xvers lig (Right, 2018)  Her family history includes Arthritis in her brother, family, mother, and sister; Breast cancer in her maternal aunt, mother, and other; Cancer in her brother and mother; Depression in her son; Endometrial cancer in her sister; Heart attack in her brother, father, and mother; Hyperlipidemia in her family;  Hypertension in her father and mother; Melanoma in her brother; Prostate cancer in her brother; Stroke in her father; Uterine cancer in her other and sister  She  reports that she has never smoked  She has never used smokeless tobacco  She reports that she does not drink alcohol or use drugs  Current Outpatient Prescriptions   Medication Sig Dispense Refill    aspirin 81 MG tablet Take 1 tablet by mouth daily      Cholecalciferol (VITAMIN D) 2000 UNITS CAPS Take 1 tablet by mouth daily      clonazePAM (KlonoPIN) 0 5 mg tablet Take 0 5 mg by mouth daily at bedtime  4    gabapentin (NEURONTIN) 100 mg capsule TAKE 1 CAPSULE 3 TIMES DAILY 90 capsule 5    meloxicam (MOBIC) 15 mg tablet Take 1 tablet by mouth daily      methocarbamol (ROBAXIN) 750 mg tablet Take 1 tablet (750 mg total) by mouth every 6 (six) hours as needed for muscle spasms 100 tablet 0    PARoxetine (PAXIL) 10 mg tablet TAKE 1 TABLET DAILY  30 tablet 6    traMADol (ULTRAM) 50 mg tablet Take 1 tablet (50 mg total) by mouth 2 (two) times a day as needed for moderate pain 30 tablet 0     No current facility-administered medications for this visit       Review of Systems   Musculoskeletal: Positive for back pain  All other systems reviewed and are negative  Objective:      /82   Pulse 84   Temp 98 5 °F (36 9 °C) (Tympanic)   Ht 5' 1" (1 549 m)   Wt 86 2 kg (190 lb)   SpO2 98%   BMI 35 90 kg/m²          Physical Exam   Constitutional: She is oriented to person, place, and time  Vital signs are normal  She appears well-developed and well-nourished  She is cooperative  No distress  HENT:   Right Ear: Hearing, tympanic membrane, external ear and ear canal normal    Left Ear: Hearing, tympanic membrane, external ear and ear canal normal    Nose: Nose normal  No mucosal edema  Mouth/Throat: Uvula is midline, oropharynx is clear and moist and mucous membranes are normal    Eyes: Pupils are equal, round, and reactive to light  Conjunctivae and lids are normal  Right eye exhibits no discharge  Left eye exhibits no discharge  Neck: No JVD present   Carotid bruit is not present  No tracheal deviation present  No thyromegaly present  Cardiovascular: Normal rate, regular rhythm, normal heart sounds and intact distal pulses  No murmur heard  Pulmonary/Chest: Effort normal and breath sounds normal  No respiratory distress  She has no wheezes  She has no rales  She exhibits no tenderness  Abdominal: Soft  Normal appearance and bowel sounds are normal  She exhibits no distension and no mass  There is no tenderness  There is no rebound and no guarding  Musculoskeletal: Normal range of motion  She exhibits no edema  Lymphadenopathy:     She has no cervical adenopathy  Neurological: She is alert and oriented to person, place, and time  She displays abnormal reflex  Skin: Skin is warm, dry and intact  No rash noted  She is not diaphoretic  No erythema  No pallor  Psychiatric: She has a normal mood and affect  Her speech is normal and behavior is normal  Judgment and thought content normal  Cognition and memory are normal    Vitals reviewed

## 2018-12-31 ENCOUNTER — HOSPITAL ENCOUNTER (OUTPATIENT)
Dept: RADIOLOGY | Facility: HOSPITAL | Age: 68
Discharge: HOME | End: 2018-12-31
Attending: INTERNAL MEDICINE
Payer: COMMERCIAL

## 2018-12-31 ENCOUNTER — APPOINTMENT (OUTPATIENT)
Dept: LAB | Facility: HOSPITAL | Age: 68
End: 2018-12-31
Attending: INTERNAL MEDICINE
Payer: COMMERCIAL

## 2018-12-31 ENCOUNTER — TRANSCRIBE ORDERS (OUTPATIENT)
Dept: RADIOLOGY | Facility: HOSPITAL | Age: 68
End: 2018-12-31

## 2018-12-31 DIAGNOSIS — M54.40 LOW BACK PAIN WITH SCIATICA, SCIATICA LATERALITY UNSPECIFIED, UNSPECIFIED BACK PAIN LATERALITY, UNSPECIFIED CHRONICITY: Primary | ICD-10-CM

## 2018-12-31 DIAGNOSIS — E55.9 VITAMIN D DEFICIENCY, UNSPECIFIED: ICD-10-CM

## 2018-12-31 DIAGNOSIS — M25.559 ARTHRALGIA OF HIP, UNSPECIFIED LATERALITY: ICD-10-CM

## 2018-12-31 DIAGNOSIS — R53.83 OTHER FATIGUE: ICD-10-CM

## 2018-12-31 DIAGNOSIS — M54.40 LOW BACK PAIN WITH SCIATICA, SCIATICA LATERALITY UNSPECIFIED, UNSPECIFIED BACK PAIN LATERALITY, UNSPECIFIED CHRONICITY: ICD-10-CM

## 2018-12-31 DIAGNOSIS — M25.569 ARTHRALGIA OF KNEE, UNSPECIFIED LATERALITY: ICD-10-CM

## 2018-12-31 DIAGNOSIS — E55.9 VITAMIN D DEFICIENCY: ICD-10-CM

## 2018-12-31 DIAGNOSIS — Z79.899 LONG TERM USE OF DRUG: ICD-10-CM

## 2018-12-31 LAB
25(OH)D3 SERPL-MCNC: 49.9 NG/ML (ref 30–100)
ALBUMIN SERPL BCP-MCNC: 3.7 G/DL (ref 3.5–5)
ALP SERPL-CCNC: 65 U/L (ref 46–116)
ALT SERPL W P-5'-P-CCNC: 25 U/L (ref 12–78)
ANION GAP SERPL CALCULATED.3IONS-SCNC: 6 MMOL/L (ref 4–13)
AST SERPL W P-5'-P-CCNC: 16 U/L (ref 5–45)
BASOPHILS # BLD AUTO: 0.03 THOUSANDS/ΜL (ref 0–0.1)
BASOPHILS NFR BLD AUTO: 1 % (ref 0–1)
BILIRUB SERPL-MCNC: 0.51 MG/DL (ref 0.2–1)
BILIRUB UR QL STRIP: NEGATIVE
BUN SERPL-MCNC: 19 MG/DL (ref 5–25)
CALCIUM SERPL-MCNC: 9.1 MG/DL (ref 8.3–10.1)
CHLORIDE SERPL-SCNC: 106 MMOL/L (ref 100–108)
CLARITY UR: CLEAR
CO2 SERPL-SCNC: 28 MMOL/L (ref 21–32)
COLOR UR: YELLOW
CREAT SERPL-MCNC: 0.75 MG/DL (ref 0.6–1.3)
CRP SERPL QL: 4.2 MG/L
EOSINOPHIL # BLD AUTO: 0.14 THOUSAND/ΜL (ref 0–0.61)
EOSINOPHIL NFR BLD AUTO: 2 % (ref 0–6)
ERYTHROCYTE [DISTWIDTH] IN BLOOD BY AUTOMATED COUNT: 13.7 % (ref 11.6–15.1)
ERYTHROCYTE [SEDIMENTATION RATE] IN BLOOD: 16 MM/HOUR (ref 0–20)
GFR SERPL CREATININE-BSD FRML MDRD: 83 ML/MIN/1.73SQ M
GLUCOSE P FAST SERPL-MCNC: 98 MG/DL (ref 65–99)
GLUCOSE UR STRIP-MCNC: NEGATIVE MG/DL
HCT VFR BLD AUTO: 43.2 % (ref 34.8–46.1)
HGB BLD-MCNC: 13.9 G/DL (ref 11.5–15.4)
HGB UR QL STRIP.AUTO: NEGATIVE
IMM GRANULOCYTES # BLD AUTO: 0.02 THOUSAND/UL (ref 0–0.2)
IMM GRANULOCYTES NFR BLD AUTO: 0 % (ref 0–2)
KETONES UR STRIP-MCNC: NEGATIVE MG/DL
LEUKOCYTE ESTERASE UR QL STRIP: NEGATIVE
LYMPHOCYTES # BLD AUTO: 1.28 THOUSANDS/ΜL (ref 0.6–4.47)
LYMPHOCYTES NFR BLD AUTO: 22 % (ref 14–44)
MCH RBC QN AUTO: 29 PG (ref 26.8–34.3)
MCHC RBC AUTO-ENTMCNC: 32.2 G/DL (ref 31.4–37.4)
MCV RBC AUTO: 90 FL (ref 82–98)
MONOCYTES # BLD AUTO: 0.4 THOUSAND/ΜL (ref 0.17–1.22)
MONOCYTES NFR BLD AUTO: 7 % (ref 4–12)
NEUTROPHILS # BLD AUTO: 3.85 THOUSANDS/ΜL (ref 1.85–7.62)
NEUTS SEG NFR BLD AUTO: 68 % (ref 43–75)
NITRITE UR QL STRIP: NEGATIVE
NRBC BLD AUTO-RTO: 0 /100 WBCS
PH UR STRIP.AUTO: 5 [PH] (ref 4.5–8)
PLATELET # BLD AUTO: 367 THOUSANDS/UL (ref 149–390)
PMV BLD AUTO: 9.8 FL (ref 8.9–12.7)
POTASSIUM SERPL-SCNC: 4.2 MMOL/L (ref 3.5–5.3)
PROT SERPL-MCNC: 7.4 G/DL (ref 6.4–8.2)
PROT UR STRIP-MCNC: NEGATIVE MG/DL
RBC # BLD AUTO: 4.79 MILLION/UL (ref 3.81–5.12)
SODIUM SERPL-SCNC: 140 MMOL/L (ref 136–145)
SP GR UR STRIP.AUTO: 1.02 (ref 1–1.03)
UROBILINOGEN UR QL STRIP.AUTO: 0.2 E.U./DL
WBC # BLD AUTO: 5.72 THOUSAND/UL (ref 4.31–10.16)

## 2018-12-31 PROCEDURE — 85652 RBC SED RATE AUTOMATED: CPT

## 2018-12-31 PROCEDURE — 73521 X-RAY EXAM HIPS BI 2 VIEWS: CPT

## 2018-12-31 PROCEDURE — 81003 URINALYSIS AUTO W/O SCOPE: CPT

## 2018-12-31 PROCEDURE — 73564 X-RAY EXAM KNEE 4 OR MORE: CPT

## 2018-12-31 PROCEDURE — 36415 COLL VENOUS BLD VENIPUNCTURE: CPT

## 2018-12-31 PROCEDURE — 82306 VITAMIN D 25 HYDROXY: CPT

## 2018-12-31 PROCEDURE — 86140 C-REACTIVE PROTEIN: CPT

## 2018-12-31 PROCEDURE — 80053 COMPREHEN METABOLIC PANEL: CPT

## 2018-12-31 PROCEDURE — 85025 COMPLETE CBC W/AUTO DIFF WBC: CPT

## 2019-02-10 DIAGNOSIS — G62.9 NEUROPATHY: ICD-10-CM

## 2019-02-10 DIAGNOSIS — F32.A DEPRESSION, UNSPECIFIED DEPRESSION TYPE: ICD-10-CM

## 2019-02-11 RX ORDER — PAROXETINE 10 MG/1
TABLET, FILM COATED ORAL
Qty: 30 TABLET | Refills: 6 | Status: SHIPPED | OUTPATIENT
Start: 2019-02-11 | End: 2019-09-20 | Stop reason: SDUPTHER

## 2019-02-11 RX ORDER — GABAPENTIN 100 MG/1
CAPSULE ORAL
Qty: 90 CAPSULE | Refills: 5 | Status: SHIPPED | OUTPATIENT
Start: 2019-02-11 | End: 2019-08-07 | Stop reason: SDUPTHER

## 2019-02-13 DIAGNOSIS — G89.29 CHRONIC BACK PAIN, UNSPECIFIED BACK LOCATION, UNSPECIFIED BACK PAIN LATERALITY: ICD-10-CM

## 2019-02-13 DIAGNOSIS — M54.9 CHRONIC BACK PAIN, UNSPECIFIED BACK LOCATION, UNSPECIFIED BACK PAIN LATERALITY: ICD-10-CM

## 2019-02-13 RX ORDER — TRAMADOL HYDROCHLORIDE 50 MG/1
50 TABLET ORAL 2 TIMES DAILY PRN
Qty: 60 TABLET | Refills: 0 | Status: SHIPPED | OUTPATIENT
Start: 2019-02-13 | End: 2019-03-29 | Stop reason: SDUPTHER

## 2019-03-29 DIAGNOSIS — M54.9 CHRONIC BACK PAIN, UNSPECIFIED BACK LOCATION, UNSPECIFIED BACK PAIN LATERALITY: ICD-10-CM

## 2019-03-29 DIAGNOSIS — G89.29 CHRONIC BACK PAIN, UNSPECIFIED BACK LOCATION, UNSPECIFIED BACK PAIN LATERALITY: ICD-10-CM

## 2019-04-01 RX ORDER — TRAMADOL HYDROCHLORIDE 50 MG/1
50 TABLET ORAL 2 TIMES DAILY PRN
Qty: 60 TABLET | Refills: 0 | Status: SHIPPED | OUTPATIENT
Start: 2019-04-01 | End: 2019-05-29 | Stop reason: SDUPTHER

## 2019-04-02 ENCOUNTER — TELEPHONE (OUTPATIENT)
Dept: OBGYN CLINIC | Facility: HOSPITAL | Age: 69
End: 2019-04-02

## 2019-04-02 ENCOUNTER — OFFICE VISIT (OUTPATIENT)
Dept: PAIN MEDICINE | Facility: CLINIC | Age: 69
End: 2019-04-02
Payer: COMMERCIAL

## 2019-04-02 VITALS — HEIGHT: 61 IN | WEIGHT: 190 LBS | HEART RATE: 80 BPM | BODY MASS INDEX: 35.87 KG/M2

## 2019-04-02 DIAGNOSIS — M48.061 SPINAL STENOSIS OF LUMBAR REGION AT MULTIPLE LEVELS: Primary | ICD-10-CM

## 2019-04-02 DIAGNOSIS — G89.29 CHRONIC PAIN OF RIGHT KNEE: ICD-10-CM

## 2019-04-02 DIAGNOSIS — M79.18 MYOFASCIAL PAIN SYNDROME: ICD-10-CM

## 2019-04-02 DIAGNOSIS — M25.561 CHRONIC PAIN OF RIGHT KNEE: ICD-10-CM

## 2019-04-02 DIAGNOSIS — M53.3 SACROILIAC JOINT PAIN: ICD-10-CM

## 2019-04-02 DIAGNOSIS — M25.551 PAIN OF RIGHT HIP JOINT: ICD-10-CM

## 2019-04-02 DIAGNOSIS — M16.11 OSTEOARTHRITIS OF RIGHT HIP, UNSPECIFIED OSTEOARTHRITIS TYPE: ICD-10-CM

## 2019-04-02 PROBLEM — E55.9 VITAMIN D DEFICIENCY: Status: RESOLVED | Noted: 2018-10-03 | Resolved: 2019-04-02

## 2019-04-02 PROCEDURE — 99213 OFFICE O/P EST LOW 20 MIN: CPT | Performed by: PHYSICIAN ASSISTANT

## 2019-04-02 RX ORDER — METHOCARBAMOL 750 MG/1
750 TABLET, FILM COATED ORAL EVERY 6 HOURS PRN
Qty: 100 TABLET | Refills: 0 | Status: SHIPPED | OUTPATIENT
Start: 2019-04-02 | End: 2019-05-08 | Stop reason: SDUPTHER

## 2019-04-16 ENCOUNTER — OFFICE VISIT (OUTPATIENT)
Dept: OBGYN CLINIC | Facility: HOSPITAL | Age: 69
End: 2019-04-16
Payer: COMMERCIAL

## 2019-04-16 VITALS
WEIGHT: 190 LBS | HEART RATE: 92 BPM | SYSTOLIC BLOOD PRESSURE: 119 MMHG | BODY MASS INDEX: 35.87 KG/M2 | HEIGHT: 61 IN | DIASTOLIC BLOOD PRESSURE: 77 MMHG

## 2019-04-16 DIAGNOSIS — M16.11 PRIMARY OSTEOARTHRITIS OF ONE HIP, RIGHT: Primary | ICD-10-CM

## 2019-04-16 PROCEDURE — 99203 OFFICE O/P NEW LOW 30 MIN: CPT | Performed by: ORTHOPAEDIC SURGERY

## 2019-04-16 PROCEDURE — 20610 DRAIN/INJ JOINT/BURSA W/O US: CPT | Performed by: ORTHOPAEDIC SURGERY

## 2019-04-16 RX ORDER — BETAMETHASONE SODIUM PHOSPHATE AND BETAMETHASONE ACETATE 3; 3 MG/ML; MG/ML
12 INJECTION, SUSPENSION INTRA-ARTICULAR; INTRALESIONAL; INTRAMUSCULAR; SOFT TISSUE
Status: COMPLETED | OUTPATIENT
Start: 2019-04-16 | End: 2019-04-16

## 2019-04-16 RX ADMIN — BETAMETHASONE SODIUM PHOSPHATE AND BETAMETHASONE ACETATE 12 MG: 3; 3 INJECTION, SUSPENSION INTRA-ARTICULAR; INTRALESIONAL; INTRAMUSCULAR; SOFT TISSUE at 09:07

## 2019-04-24 ENCOUNTER — TRANSCRIBE ORDERS (OUTPATIENT)
Dept: RADIOLOGY | Facility: HOSPITAL | Age: 69
End: 2019-04-24

## 2019-04-24 ENCOUNTER — HOSPITAL ENCOUNTER (OUTPATIENT)
Dept: RADIOLOGY | Facility: HOSPITAL | Age: 69
Discharge: HOME | End: 2019-04-24
Payer: COMMERCIAL

## 2019-04-24 DIAGNOSIS — M16.11 PRIMARY OSTEOARTHRITIS OF ONE HIP, RIGHT: ICD-10-CM

## 2019-04-24 PROCEDURE — 20610 DRAIN/INJ JOINT/BURSA W/O US: CPT

## 2019-04-24 PROCEDURE — 77002 NEEDLE LOCALIZATION BY XRAY: CPT

## 2019-04-24 RX ORDER — LIDOCAINE HYDROCHLORIDE 10 MG/ML
20 INJECTION, SOLUTION INFILTRATION; PERINEURAL
Status: COMPLETED | OUTPATIENT
Start: 2019-04-24 | End: 2019-04-24

## 2019-04-24 RX ORDER — BUPIVACAINE HYDROCHLORIDE 2.5 MG/ML
10 INJECTION, SOLUTION EPIDURAL; INFILTRATION; INTRACAUDAL
Status: COMPLETED | OUTPATIENT
Start: 2019-04-24 | End: 2019-04-24

## 2019-04-24 RX ORDER — METHYLPREDNISOLONE ACETATE 80 MG/ML
80 INJECTION, SUSPENSION INTRA-ARTICULAR; INTRALESIONAL; INTRAMUSCULAR; SOFT TISSUE
Status: COMPLETED | OUTPATIENT
Start: 2019-04-24 | End: 2019-04-24

## 2019-04-24 RX ADMIN — LIDOCAINE HYDROCHLORIDE 2 ML: 10 INJECTION, SOLUTION INFILTRATION; PERINEURAL at 13:07

## 2019-04-24 RX ADMIN — IOHEXOL 3 ML: 300 INJECTION, SOLUTION INTRAVENOUS at 13:05

## 2019-04-24 RX ADMIN — BUPIVACAINE HYDROCHLORIDE 3 ML: 2.5 INJECTION, SOLUTION EPIDURAL; INFILTRATION; INTRACAUDAL; PERINEURAL at 13:06

## 2019-04-24 RX ADMIN — METHYLPREDNISOLONE ACETATE 80 MG: 80 INJECTION, SUSPENSION INTRA-ARTICULAR; INTRALESIONAL; INTRAMUSCULAR; SOFT TISSUE at 13:08

## 2019-05-01 ENCOUNTER — HOSPITAL ENCOUNTER (OUTPATIENT)
Dept: RADIOLOGY | Age: 69
Discharge: HOME | End: 2019-05-01
Payer: COMMERCIAL

## 2019-05-01 VITALS — HEIGHT: 61 IN | WEIGHT: 189 LBS | BODY MASS INDEX: 35.68 KG/M2

## 2019-05-01 DIAGNOSIS — Z12.31 ENCOUNTER FOR SCREENING MAMMOGRAM FOR MALIGNANT NEOPLASM OF BREAST: ICD-10-CM

## 2019-05-01 PROCEDURE — 77063 BREAST TOMOSYNTHESIS BI: CPT

## 2019-05-01 PROCEDURE — 77067 SCR MAMMO BI INCL CAD: CPT

## 2019-05-08 DIAGNOSIS — M48.061 SPINAL STENOSIS OF LUMBAR REGION AT MULTIPLE LEVELS: ICD-10-CM

## 2019-05-08 DIAGNOSIS — M79.18 MYOFASCIAL PAIN SYNDROME: ICD-10-CM

## 2019-05-09 RX ORDER — METHOCARBAMOL 750 MG/1
750 TABLET, FILM COATED ORAL EVERY 6 HOURS PRN
Qty: 100 TABLET | Refills: 0 | Status: SHIPPED | OUTPATIENT
Start: 2019-05-09 | End: 2019-06-25 | Stop reason: SDUPTHER

## 2019-05-21 PROBLEM — Z12.31 ENCOUNTER FOR SCREENING MAMMOGRAM FOR MALIGNANT NEOPLASM OF BREAST: Status: ACTIVE | Noted: 2019-05-21

## 2019-05-21 PROBLEM — Z01.419 ENCOUNTER FOR ANNUAL ROUTINE GYNECOLOGICAL EXAMINATION: Status: ACTIVE | Noted: 2019-05-21

## 2019-05-22 ENCOUNTER — ANNUAL EXAM (OUTPATIENT)
Dept: GYNECOLOGY | Facility: CLINIC | Age: 69
End: 2019-05-22
Payer: MEDICARE

## 2019-05-22 VITALS
WEIGHT: 189 LBS | BODY MASS INDEX: 35.68 KG/M2 | SYSTOLIC BLOOD PRESSURE: 122 MMHG | DIASTOLIC BLOOD PRESSURE: 70 MMHG | HEIGHT: 61 IN | HEART RATE: 73 BPM

## 2019-05-22 DIAGNOSIS — Z12.31 ENCOUNTER FOR SCREENING MAMMOGRAM FOR MALIGNANT NEOPLASM OF BREAST: ICD-10-CM

## 2019-05-22 DIAGNOSIS — Z01.419 ENCOUNTER FOR ANNUAL ROUTINE GYNECOLOGICAL EXAMINATION: Primary | ICD-10-CM

## 2019-05-22 PROCEDURE — G0101 CA SCREEN;PELVIC/BREAST EXAM: HCPCS | Performed by: OBSTETRICS & GYNECOLOGY

## 2019-05-29 DIAGNOSIS — M54.9 CHRONIC BACK PAIN, UNSPECIFIED BACK LOCATION, UNSPECIFIED BACK PAIN LATERALITY: ICD-10-CM

## 2019-05-29 DIAGNOSIS — G89.29 CHRONIC BACK PAIN, UNSPECIFIED BACK LOCATION, UNSPECIFIED BACK PAIN LATERALITY: ICD-10-CM

## 2019-05-29 RX ORDER — TRAMADOL HYDROCHLORIDE 50 MG/1
50 TABLET ORAL 2 TIMES DAILY PRN
Qty: 60 TABLET | Refills: 0 | Status: SHIPPED | OUTPATIENT
Start: 2019-05-29 | End: 2019-08-02 | Stop reason: SDUPTHER

## 2019-06-25 DIAGNOSIS — M48.061 SPINAL STENOSIS OF LUMBAR REGION AT MULTIPLE LEVELS: ICD-10-CM

## 2019-06-25 DIAGNOSIS — M79.18 MYOFASCIAL PAIN SYNDROME: ICD-10-CM

## 2019-06-25 RX ORDER — METHOCARBAMOL 750 MG/1
750 TABLET, FILM COATED ORAL EVERY 6 HOURS PRN
Qty: 100 TABLET | Refills: 0 | Status: SHIPPED | OUTPATIENT
Start: 2019-06-25

## 2019-06-26 ENCOUNTER — OFFICE VISIT (OUTPATIENT)
Dept: OBGYN CLINIC | Facility: HOSPITAL | Age: 69
End: 2019-06-26
Payer: COMMERCIAL

## 2019-06-26 VITALS
HEART RATE: 112 BPM | BODY MASS INDEX: 35.67 KG/M2 | SYSTOLIC BLOOD PRESSURE: 114 MMHG | DIASTOLIC BLOOD PRESSURE: 75 MMHG | HEIGHT: 61 IN | WEIGHT: 188.93 LBS

## 2019-06-26 DIAGNOSIS — M16.11 PRIMARY OSTEOARTHRITIS OF ONE HIP, RIGHT: Primary | ICD-10-CM

## 2019-06-26 DIAGNOSIS — M17.11 PRIMARY OSTEOARTHRITIS OF RIGHT KNEE: ICD-10-CM

## 2019-06-26 PROCEDURE — 99213 OFFICE O/P EST LOW 20 MIN: CPT | Performed by: ORTHOPAEDIC SURGERY

## 2019-07-24 ENCOUNTER — TRANSCRIBE ORDERS (OUTPATIENT)
Dept: RADIOLOGY | Facility: HOSPITAL | Age: 69
End: 2019-07-24

## 2019-07-24 ENCOUNTER — HOSPITAL ENCOUNTER (OUTPATIENT)
Dept: RADIOLOGY | Facility: HOSPITAL | Age: 69
Discharge: HOME | End: 2019-07-24
Attending: ORTHOPAEDIC SURGERY | Admitting: RADIOLOGY
Payer: COMMERCIAL

## 2019-07-24 DIAGNOSIS — M16.11 PRIMARY OSTEOARTHRITIS OF ONE HIP, RIGHT: ICD-10-CM

## 2019-07-24 PROCEDURE — 20610 DRAIN/INJ JOINT/BURSA W/O US: CPT

## 2019-07-24 PROCEDURE — 77002 NEEDLE LOCALIZATION BY XRAY: CPT

## 2019-07-24 RX ORDER — BUPIVACAINE HYDROCHLORIDE 2.5 MG/ML
30 INJECTION, SOLUTION EPIDURAL; INFILTRATION; INTRACAUDAL
Status: COMPLETED | OUTPATIENT
Start: 2019-07-24 | End: 2019-07-24

## 2019-07-24 RX ORDER — METHYLPREDNISOLONE ACETATE 80 MG/ML
80 INJECTION, SUSPENSION INTRA-ARTICULAR; INTRALESIONAL; INTRAMUSCULAR; SOFT TISSUE
Status: COMPLETED | OUTPATIENT
Start: 2019-07-24 | End: 2019-07-24

## 2019-07-24 RX ORDER — LIDOCAINE HYDROCHLORIDE 10 MG/ML
20 INJECTION, SOLUTION INFILTRATION; PERINEURAL
Status: COMPLETED | OUTPATIENT
Start: 2019-07-24 | End: 2019-07-24

## 2019-07-24 RX ADMIN — METHYLPREDNISOLONE ACETATE 80 MG: 80 INJECTION, SUSPENSION INTRA-ARTICULAR; INTRALESIONAL; INTRAMUSCULAR; SOFT TISSUE at 15:00

## 2019-07-24 RX ADMIN — BUPIVACAINE HYDROCHLORIDE 2 ML: 2.5 INJECTION, SOLUTION EPIDURAL; INFILTRATION; INTRACAUDAL at 15:00

## 2019-07-24 RX ADMIN — IOHEXOL 3 ML: 300 INJECTION, SOLUTION INTRAVENOUS at 15:00

## 2019-07-24 RX ADMIN — LIDOCAINE HYDROCHLORIDE 2 ML: 10 INJECTION, SOLUTION INFILTRATION; PERINEURAL at 15:00

## 2019-07-26 ENCOUNTER — TELEPHONE (OUTPATIENT)
Dept: PAIN MEDICINE | Facility: CLINIC | Age: 69
End: 2019-07-26

## 2019-07-26 NOTE — TELEPHONE ENCOUNTER
Patient left voice message on call center answering machine, to schedule appt, [facility] for patient to return call to office    cb # [phone]

## 2019-08-02 DIAGNOSIS — G89.29 CHRONIC BACK PAIN, UNSPECIFIED BACK LOCATION, UNSPECIFIED BACK PAIN LATERALITY: ICD-10-CM

## 2019-08-02 DIAGNOSIS — M54.9 CHRONIC BACK PAIN, UNSPECIFIED BACK LOCATION, UNSPECIFIED BACK PAIN LATERALITY: ICD-10-CM

## 2019-08-02 NOTE — PROGRESS NOTES
Assessment/Plan:      Diagnoses and all orders for this visit:    Chronic bilateral low back pain without sciatica    Neuropathy  -     gabapentin (NEURONTIN) 100 mg capsule; Take 1 capsule (100 mg total) by mouth 3 (three) times a day    Spinal stenosis of lumbar region at multiple levels      Discussion: 80 yo female presenting for follow up of chronic back pain  Was encouraged to continue HEP and weight loss as well as follow up with orthopedics as scheduled  Will refill gabapentin 100 mg t i d  as advised of therapeutic dosing rage should neuropathic pain worsen we could consider an increase  She will call for methocarbamol refills as needed  She will follow up in 6 months or sooner if needed  The patient was advised that muscle relaxant medications have very important potential side effects including dry mouth, dizziness, and drowsiness/fatigue  She was asked to stop medication if she experiences any adverse side effects, and additionally advised to avoid alcohol, and driving/operating heavy machinery while taking this medication  The patient expresses understanding of these issues and questions were answered  Subjective:     Patient ID: Zoila Pierre is a 81 y o  female  HPI 3 month follow up for chronic back pain  At last visit ordered aqua therapy for complaints of hip and knee pain which was completed through [facility]  Additionally saw Dr Alex Briseno for complaints of joint pain with known OA and completed right hip injection with benefit  Was advised she would be candidate for surgery but is planning on holding off until the fall  Has been actively losing weight through HEP and diet modification  Low back is 'okay' continues to complain of AM stiffness improved with movement  Right knee continues to be bothersome  Currently taking methocarbamol as needed for spasm but does not take daily and does not require refill today   Additionally taking gabapentin 300 mg daily for neuropathy and requests refill today  PAST MEDICAL HISTORY  Past Medical History:   Diagnosis Date    Ankylosing spondylitis ([facility] [address] )     Anxiety     LAST ASSESSED: 16    Arthritis     Back pain     Carpal tunnel syndrome     Fibromyalgia     LAST ASSESSED: 16    Fibromyalgia, primary     Heme positive stool     LAST ASSESSED: 10/22/16    High cholesterol     Hyperlipidemia     under control since weight loss    Impingement syndrome of left shoulder     LAST ASSESSED: 17    Impingement syndrome of right shoulder     LAST ASSESSED:     Long term use of drug     LAST ASSESSED: 16    No known health problems     NO PERTINENT PAST MEDICAL HX    RLS (restless legs syndrome)     Rotator cuff injury     right    Spinal stenosis     Spinal stenosis     Spondylitis ([facility] [address] )     Spondyloarthritis     RESOLVED: 16    Vitamin D deficiency      PAST SURGICAL HISTORY  Past Surgical History:   Procedure Laterality Date    BACK SURGERY      CARPAL TUNNEL RELEASE Left     CERVICAL CONIZATION   W/ LASER      CERVICAL CONIZATION     SECTION      COLONOSCOPY      DILATION AND CURETTAGE OF UTERUS      FL INJECTION RIGHT HIP (NON ARTHROGRAM)  2019    FL INJECTION RIGHT HIP (NON ARTHROGRAM)  2019    HAND SURGERY      WI ARTHRODESIS POSTERIOR/POSTEROLATERAL LUMBAR N/A 4/3/2017    Procedure: L2-L5 OPEN DECOMPRESSIVE LUMBAR LAMINECTOMY W/ PEDICLE SCREW AND NILDA FIXATION FUSION (IMPULSE); REMOVAL OF SKIN TAG MID BACK;  Surgeon: Silvia Guerin MD;  [facility];  Service: Neurosurgery    WI COLONOSCOPY FLX DX W/COLLJ SPEC WHEN PFRMD N/A 10/7/2016    Procedure: EGD AND COLONOSCOPY;  Surgeon: Christian Corrales MD;  Location: [facility];   Service: Gastroenterology    WI WRIST Murleen Salm LIG Left 2018    Procedure: RELEASE CARPAL TUNNEL ENDOSCOPIC;  Surgeon: Neil Valiente MD;  [facility];  Service: Orthopedics    WI WRIST [facility] Antoinette Guajardo LIG Right 6/14/2018    Procedure: RELEASE CARPAL TUNNEL ENDOSCOPIC;  Surgeon: Claus Ogden MD;  [facility];  Service: Orthopedics    RETINAL LASER PROCEDURE      TUBAL LIGATION         FAMILY HISTORY  Family History   Problem Relation Age of Onset    Arthritis Mother     Breast cancer Mother 67    Hypertension Mother     Heart attack Mother         MYOCARDIAL INFARCTION    Heart attack Father     Hypertension Father     Stroke Father         Gennaro Dapper (CVA)    Arthritis Sister     Uterine cancer Sister     Endometrial cancer Sister 61    Heart attack Brother     Prostate cancer Brother 58    Arthritis Brother     Melanoma Brother     Cancer Brother     Arthritis Family     Hyperlipidemia Family     Depression Son     Breast cancer Maternal Aunt 36    No Known Problems Daughter     Other Brother         hunting accident (shot)    Melanoma Brother     Prostate cancer Brother     Heart attack Brother     Stroke Brother     Arthritis Sister     Heart attack Sister     No Known Problems Son     Lung cancer Maternal Uncle      HOME MEDICATIONS  Current Outpatient Medications   Medication Sig Dispense Refill    aspirin 81 MG tablet Take 1 tablet by mouth daily      Cholecalciferol (VITAMIN D) 2000 UNITS CAPS Take 1 tablet by mouth daily      clonazePAM (KlonoPIN) 0 5 mg tablet Take 0 5 mg by mouth daily at bedtime  4    clotrimazole 1 % external solution 5 drops to the left ear BID for 7 days 30 mL 0    gabapentin (NEURONTIN) 100 mg capsule Take 1 capsule (100 mg total) by mouth 3 (three) times a day 90 capsule 2    meloxicam (MOBIC) 15 mg tablet Take 1 tablet by mouth daily      methocarbamol (ROBAXIN) 750 mg tablet TAKE 1 TABLET (750 MG TOTAL) BY MOUTH EVERY 6 (SIX) HOURS AS NEEDED FOR MUSCLE SPASMS 100 tablet 0    PARoxetine (PAXIL) 10 mg tablet TAKE 1 TABLET DAILY   30 tablet 6    traMADol (ULTRAM) 50 mg tablet Take 1 tablet (50 mg total) by mouth 2 (two) times a day as needed for moderate pain 60 tablet 0     No current facility-administered medications for this visit  ALLERGIES  Patient has no known allergies  SOCIAL HISTORY  Social History     Substance and Sexual Activity   Alcohol Use No     Social History     Substance and Sexual Activity   Drug Use No     Social History     Tobacco Use   Smoking Status Never Smoker   Smokeless Tobacco Never Used       Review of Systems   Constitutional: Negative for chills, diaphoresis, fever and unexpected weight change  HENT: Negative for trouble swallowing  Eyes: Negative for visual disturbance  Respiratory: Negative for shortness of breath  Cardiovascular: Negative for chest pain and leg swelling  Gastrointestinal: Negative for constipation and diarrhea  Endocrine: Negative for cold intolerance and heat intolerance  Genitourinary: Negative for decreased urine volume, difficulty urinating and urgency  Musculoskeletal: Positive for arthralgias, back pain and gait problem  Negative for myalgias, neck pain and neck stiffness  Skin: Negative for color change and rash  Allergic/Immunologic: Negative for immunocompromised state  Neurological: Negative for dizziness, syncope and light-headedness  Psychiatric/Behavioral: Positive for sleep disturbance (restless leg )  Objective:  Vitals:    08/07/19 1146   BP: 112/80   Pulse: 84       Imaging:  No images are attached to the encounter      Labs:  Appointment on 12/31/2018   Component Date Value Ref Range Status    WBC 12/31/2018 5 72  4 31 - 10 16 Thousand/uL Final    RBC 12/31/2018 4 79  3 81 - 5 12 Million/uL Final    Hemoglobin 12/31/2018 13 9  11 5 - 15 4 g/dL Final    Hematocrit 12/31/2018 43 2  34 8 - 46 1 % Final    MCV 12/31/2018 90  82 - 98 fL Final    MCH 12/31/2018 29 0  26 8 - 34 3 pg Final    MCHC 12/31/2018 32 2  31 4 - 37 4 g/dL Final    RDW 12/31/2018 13 7  11 6 - 15 1 % Final    MPV 12/31/2018 9 8 8 9 - 12 7 fL Final    Platelets 71/78/3516 367  149 - 390 Thousands/uL Final    nRBC 12/31/2018 0  /100 WBCs Final    Neutrophils Relative 12/31/2018 68  43 - 75 % Final    Immat GRANS % 12/31/2018 0  0 - 2 % Final    Lymphocytes Relative 12/31/2018 22  14 - 44 % Final    Monocytes Relative 12/31/2018 7  4 - 12 % Final    Eosinophils Relative 12/31/2018 2  0 - 6 % Final    Basophils Relative 12/31/2018 1  0 - 1 % Final    Neutrophils Absolute 12/31/2018 3 85  1 85 - 7 62 Thousands/µL Final    Immature Grans Absolute 12/31/2018 0 02  0 00 - 0 20 Thousand/uL Final    Lymphocytes Absolute 12/31/2018 1 28  0 60 - 4 47 Thousands/µL Final    Monocytes Absolute 12/31/2018 0 40  0 17 - 1 22 Thousand/µL Final    Eosinophils Absolute 12/31/2018 0 14  0 00 - 0 61 Thousand/µL Final    Basophils Absolute 12/31/2018 0 03  0 00 - 0 10 Thousands/µL Final    Sodium 12/31/2018 140  136 - 145 mmol/L Final    Potassium 12/31/2018 4 2  3 5 - 5 3 mmol/L Final    Chloride 12/31/2018 106  100 - 108 mmol/L Final    CO2 12/31/2018 28  21 - 32 mmol/L Final    ANION GAP 12/31/2018 6  4 - 13 mmol/L Final    BUN 12/31/2018 19  5 - 25 mg/dL Final    Creatinine 12/31/2018 0 75  0 60 - 1 30 mg/dL Final    Standardized to IDMS reference method    Glucose, Fasting 12/31/2018 98  65 - 99 mg/dL Final      Specimen collection should occur prior to Sulfasalazine administration due to the potential for falsely depressed results  Specimen collection should occur prior to Sulfapyridine administration due to the potential for falsely elevated results   Calcium 12/31/2018 9 1  8 3 - 10 1 mg/dL Final    AST 12/31/2018 16  5 - 45 U/L Final      Specimen collection should occur prior to Sulfasalazine administration due to the potential for falsely depressed results       ALT 12/31/2018 25  12 - 78 U/L Final      Specimen collection should occur prior to Sulfasalazine and/or Sulfapyridine administration due to the potential for falsely depressed results   Alkaline Phosphatase 12/31/2018 65  46 - 116 U/L Final    Total Protein 12/31/2018 7 4  6 4 - 8 2 g/dL Final    Albumin 12/31/2018 3 7  3 5 - 5 0 g/dL Final    Total Bilirubin 12/31/2018 0 51  0 20 - 1 00 mg/dL Final    eGFR 12/31/2018 83  ml/min/1 73sq m Final    Color, UA 12/31/2018 Yellow   Final    Clarity, UA 12/31/2018 Clear   Final    Specific Gravity, UA 12/31/2018 1 020  1 003 - 1 030 Final    pH, UA 12/31/2018 5 0  4 5 - 8 0 Final    Leukocytes, UA 12/31/2018 Negative  Negative Final    Nitrite, UA 12/31/2018 Negative  Negative Final    Protein, UA 12/31/2018 Negative  Negative mg/dl Final    Glucose, UA 12/31/2018 Negative  Negative mg/dl Final    Ketones, UA 12/31/2018 Negative  Negative mg/dl Final    Urobilinogen, UA 12/31/2018 0 2  0 2, 1 0 E U /dl E U /dl Final    Bilirubin, UA 12/31/2018 Negative  Negative Final    Blood, UA 12/31/2018 Negative  Negative Final    Vit D, 25-Hydroxy 12/31/2018 49 9  30 0 - 100 0 ng/mL Final    Sed Rate 12/31/2018 16  0 - 20 mm/hour Final    CRP 12/31/2018 4 2* <3 0 mg/L Final        Physical Exam   Constitutional: She is oriented to person, place, and time  She appears well-developed and well-nourished  No distress  HENT:   Head: Normocephalic and atraumatic  Eyes: Pupils are equal, round, and reactive to light  Conjunctivae are normal    Neck: Neck supple  Cardiovascular: Intact distal pulses  Pulmonary/Chest: Effort normal  No respiratory distress  Musculoskeletal:        Lumbar back: She exhibits decreased range of motion, tenderness and bony tenderness  TTP R>L SIJ    Neurological: She is alert and oriented to person, place, and time  She has normal strength  She displays no atrophy and normal reflexes  No cranial nerve deficit or sensory deficit  She exhibits normal muscle tone  Gait abnormal  Coordination normal    Skin: Skin is warm, dry and intact  No rash noted  She is not diaphoretic  No cyanosis   No pallor  Nails show no clubbing  Psychiatric: She has a normal mood and affect  Her behavior is normal  Judgment and thought content normal  She is attentive  Vitals reviewed

## 2019-08-04 RX ORDER — TRAMADOL HYDROCHLORIDE 50 MG/1
50 TABLET ORAL 2 TIMES DAILY PRN
Qty: 60 TABLET | Refills: 0 | Status: SHIPPED | OUTPATIENT
Start: 2019-08-04 | End: 2019-08-26 | Stop reason: SDUPTHER

## 2019-08-07 ENCOUNTER — OFFICE VISIT (OUTPATIENT)
Dept: PAIN MEDICINE | Facility: CLINIC | Age: 69
End: 2019-08-07
Payer: COMMERCIAL

## 2019-08-07 VITALS
DIASTOLIC BLOOD PRESSURE: 80 MMHG | HEIGHT: 61 IN | HEART RATE: 84 BPM | WEIGHT: 177 LBS | SYSTOLIC BLOOD PRESSURE: 112 MMHG | BODY MASS INDEX: 33.42 KG/M2

## 2019-08-07 DIAGNOSIS — G62.9 NEUROPATHY: ICD-10-CM

## 2019-08-07 DIAGNOSIS — M48.061 SPINAL STENOSIS OF LUMBAR REGION AT MULTIPLE LEVELS: ICD-10-CM

## 2019-08-07 DIAGNOSIS — G89.29 CHRONIC BILATERAL LOW BACK PAIN WITHOUT SCIATICA: Primary | ICD-10-CM

## 2019-08-07 DIAGNOSIS — M54.50 CHRONIC BILATERAL LOW BACK PAIN WITHOUT SCIATICA: Primary | ICD-10-CM

## 2019-08-07 PROCEDURE — 99214 OFFICE O/P EST MOD 30 MIN: CPT | Performed by: PHYSICIAN ASSISTANT

## 2019-08-07 RX ORDER — GABAPENTIN 100 MG/1
100 CAPSULE ORAL 3 TIMES DAILY
Qty: 90 CAPSULE | Refills: 2 | Status: SHIPPED | OUTPATIENT
Start: 2019-08-07 | End: 2019-11-27 | Stop reason: SDUPTHER

## 2019-08-07 NOTE — PATIENT INSTRUCTIONS
Gabapentin (By mouth)   Gabapentin (yumiko-a-PEN-tin)  Treats seizures and pain caused by shingles  Brand Name(s): ACTIVE-PAC with Gabapentin, Convenience Jovany, Cyclo/Jered 10/300 Pack, FusePaq Fanatrex, Jered-V, Gralise, [address] Pack, Neurontin, SmartRx Jered Kit   There may be other brand names for this medicine  When This Medicine Should Not Be Used: This medicine is not right for everyone  Do not use it if you had an allergic reaction to gabapentin  How to Use This Medicine:   Capsule, Liquid, Tablet  · Take your medicine as directed  Your dose may need to be changed several times to find what works best for you  If you have epilepsy, do not allow more than 12 hours to pass between doses  · Capsule: Swallow the capsule whole with plenty of water  Do not open, crush, or chew it  · Gralise® tablet: Swallow the tablet whole   Do not crush, break, or chew it  · Neurontin® tablet: If you break a tablet into 2 pieces, use the second half as your next dose  If you don't use it within 28 days, throw it away  · Measure the oral liquid medicine with a marked measuring spoon, oral syringe, or medicine cup  · This medicine should come with a Medication Guide  Ask your pharmacist for a copy if you do not have one  · Missed dose: Take a dose as soon as you remember  If it is almost time for your next dose, wait until then and take a regular dose  Do not take extra medicine to make up for a missed dose  · Store the medicine in a closed container at room temperature, away from heat, moisture, and direct light  Store the Neurontin® oral liquid in the refrigerator  Do not freeze  Drugs and Foods to Avoid:   Ask your doctor or pharmacist before using any other medicine, including over-the-counter medicines, vitamins, and herbal products  · Some medicines can affect how gabapentin works   Tell your doctor if you also use any of the following:   ¨ Hydrocodone  ¨ Morphine  · If you take an antacid, wait at least 2 hours before you take gabapentin  · Tell your doctor if you use anything else that makes you sleepy  Some examples are allergy medicine, narcotic pain medicine, and alcohol  Warnings While Using This Medicine:   · Tell your doctor if you are pregnant or breastfeeding, or if you have kidney problems or are receiving dialysis  Tell your doctor if you have a history of depression or mental health problems  · This medicine may increase depression or thoughts of suicide  Tell your doctor right away if you start to feel more depressed or think about hurting yourself  · This medicine may cause a serious allergic reaction called multiorgan hypersensitivity, which can damage organs and be life-threatening  · Do not stop using this medicine suddenly  Your doctor will need to slowly decrease your dose before you stop it completely  If you take this medicine to prevent seizures, your seizures may return or occur more often if you stop this medicine suddenly  · This medicine may make you dizzy or drowsy  Do not drive or do anything else that could be dangerous until you know how this medicine affects you  · Tell any doctor or dentist who treats you that you are using this medicine  This medicine may affect certain medical test results  · Your doctor will check your progress and the effects of this medicine at regular visits  Keep all appointments  · Keep all medicine out of the reach of children  Never share your medicine with anyone    Possible Side Effects While Using This Medicine:   Call your doctor right away if you notice any of these side effects:  · Allergic reaction: Itching or hives, swelling in your face or hands, swelling or tingling in your mouth or throat, chest tightness, trouble breathing  · Behavior problems, aggression, restlessness, trouble concentrating, moodiness (especially in children)  · Blistering, peeling, red skin rash  · Change in how much or how often you urinate, bloody or cloudy urine,  · Chest pain, fast heartbeat, trouble breathing  · Dark urine or pale stools, nausea, vomiting, loss of appetite, stomach pain, yellow skin or eyes  · Fever, rash, swollen or tender glands in the neck, armpit, or groin  · Problems with coordination, shakiness, unsteadiness  · Rapid weight gain, swelling in your hands, ankles, or feet  · Unusual moods or behaviors, thoughts of hurting yourself, feeling depressed  If you notice these less serious side effects, talk with your doctor:   · Dizziness, drowsiness, sleepiness, tiredness  If you notice other side effects that you think are caused by this medicine, tell your doctor  Call your doctor for medical advice about side effects  You may report side effects to FDA at 0-904-FDA-1396  © 2017 [employer] Information is for End User's use only and may not be sold, redistributed or otherwise used for commercial purposes  The above information is an  only  It is not intended as medical advice for individual conditions or treatments  Talk to your doctor, nurse or pharmacist before following any medical regimen to see if it is safe and effective for you

## 2019-08-26 DIAGNOSIS — G89.29 CHRONIC BACK PAIN, UNSPECIFIED BACK LOCATION, UNSPECIFIED BACK PAIN LATERALITY: ICD-10-CM

## 2019-08-26 DIAGNOSIS — M54.9 CHRONIC BACK PAIN, UNSPECIFIED BACK LOCATION, UNSPECIFIED BACK PAIN LATERALITY: ICD-10-CM

## 2019-08-26 RX ORDER — TRAMADOL HYDROCHLORIDE 50 MG/1
50 TABLET ORAL 2 TIMES DAILY PRN
Qty: 80 TABLET | Refills: 0 | Status: SHIPPED | OUTPATIENT
Start: 2019-08-26 | End: 2019-10-23 | Stop reason: SDUPTHER

## 2019-09-20 DIAGNOSIS — F32.A DEPRESSION, UNSPECIFIED DEPRESSION TYPE: ICD-10-CM

## 2019-09-20 RX ORDER — PAROXETINE 10 MG/1
TABLET, FILM COATED ORAL
Qty: 30 TABLET | Refills: 6 | Status: SHIPPED | OUTPATIENT
Start: 2019-09-20 | End: 2020-09-11 | Stop reason: SDUPTHER

## 2019-10-03 PROCEDURE — 87186 SC STD MICRODIL/AGAR DIL: CPT | Performed by: PHYSICIAN ASSISTANT

## 2019-10-03 PROCEDURE — 87077 CULTURE AEROBIC IDENTIFY: CPT | Performed by: PHYSICIAN ASSISTANT

## 2019-10-03 PROCEDURE — 87205 SMEAR GRAM STAIN: CPT | Performed by: PHYSICIAN ASSISTANT

## 2019-10-03 PROCEDURE — 87102 FUNGUS ISOLATION CULTURE: CPT | Performed by: PHYSICIAN ASSISTANT

## 2019-10-03 PROCEDURE — 87070 CULTURE OTHR SPECIMN AEROBIC: CPT | Performed by: PHYSICIAN ASSISTANT

## 2019-10-03 PROCEDURE — 87147 CULTURE TYPE IMMUNOLOGIC: CPT | Performed by: PHYSICIAN ASSISTANT

## 2019-10-08 ENCOUNTER — OFFICE VISIT (OUTPATIENT)
Dept: OBGYN CLINIC | Facility: HOSPITAL | Age: 69
End: 2019-10-08
Payer: COMMERCIAL

## 2019-10-08 VITALS
HEART RATE: 101 BPM | DIASTOLIC BLOOD PRESSURE: 84 MMHG | WEIGHT: 177.03 LBS | SYSTOLIC BLOOD PRESSURE: 138 MMHG | HEIGHT: 61 IN | BODY MASS INDEX: 33.42 KG/M2

## 2019-10-08 DIAGNOSIS — M17.11 PRIMARY OSTEOARTHRITIS OF RIGHT KNEE: ICD-10-CM

## 2019-10-08 DIAGNOSIS — M16.11 PRIMARY OSTEOARTHRITIS OF ONE HIP, RIGHT: Primary | ICD-10-CM

## 2019-10-08 DIAGNOSIS — Z51.89 AFTER CARE: Primary | ICD-10-CM

## 2019-10-08 PROCEDURE — 20610 DRAIN/INJ JOINT/BURSA W/O US: CPT | Performed by: ORTHOPAEDIC SURGERY

## 2019-10-08 PROCEDURE — 99213 OFFICE O/P EST LOW 20 MIN: CPT | Performed by: ORTHOPAEDIC SURGERY

## 2019-10-08 RX ORDER — FERROUS SULFATE TAB EC 324 MG (65 MG FE EQUIVALENT) 324 (65 FE) MG
324 TABLET DELAYED RESPONSE ORAL
Qty: 60 TABLET | Refills: 0 | Status: SHIPPED | OUTPATIENT
Start: 2019-10-08 | End: 2019-12-12 | Stop reason: ALTCHOICE

## 2019-10-08 RX ORDER — LIDOCAINE HYDROCHLORIDE 10 MG/ML
2 INJECTION, SOLUTION INFILTRATION; PERINEURAL
Status: COMPLETED | OUTPATIENT
Start: 2019-10-08 | End: 2019-10-08

## 2019-10-08 RX ORDER — FOLIC ACID 1 MG/1
1 TABLET ORAL DAILY
Qty: 30 TABLET | Refills: 0 | Status: SHIPPED | OUTPATIENT
Start: 2019-10-08 | End: 2019-12-12 | Stop reason: ALTCHOICE

## 2019-10-08 RX ORDER — BUPIVACAINE HYDROCHLORIDE 2.5 MG/ML
2 INJECTION, SOLUTION INFILTRATION; PERINEURAL
Status: COMPLETED | OUTPATIENT
Start: 2019-10-08 | End: 2019-10-08

## 2019-10-08 RX ORDER — GABAPENTIN 300 MG/1
300 CAPSULE ORAL ONCE
Status: CANCELLED | OUTPATIENT
Start: 2019-10-08 | End: 2019-10-08

## 2019-10-08 RX ORDER — BETAMETHASONE SODIUM PHOSPHATE AND BETAMETHASONE ACETATE 3; 3 MG/ML; MG/ML
12 INJECTION, SUSPENSION INTRA-ARTICULAR; INTRALESIONAL; INTRAMUSCULAR; SOFT TISSUE
Status: COMPLETED | OUTPATIENT
Start: 2019-10-08 | End: 2019-10-08

## 2019-10-08 RX ORDER — SODIUM CHLORIDE, SODIUM LACTATE, POTASSIUM CHLORIDE, CALCIUM CHLORIDE 600; 310; 30; 20 MG/100ML; MG/100ML; MG/100ML; MG/100ML
125 INJECTION, SOLUTION INTRAVENOUS CONTINUOUS
Status: CANCELLED | OUTPATIENT
Start: 2019-10-08

## 2019-10-08 RX ORDER — CEFAZOLIN SODIUM 2 G/50ML
2000 SOLUTION INTRAVENOUS ONCE
Status: CANCELLED | OUTPATIENT
Start: 2019-10-08 | End: 2019-10-08

## 2019-10-08 RX ORDER — ACETAMINOPHEN 325 MG/1
975 TABLET ORAL ONCE
Status: CANCELLED | OUTPATIENT
Start: 2019-10-08 | End: 2019-10-08

## 2019-10-08 RX ORDER — CHLORHEXIDINE GLUCONATE 0.12 MG/ML
15 RINSE ORAL ONCE
Status: CANCELLED | OUTPATIENT
Start: 2019-10-08 | End: 2019-10-08

## 2019-10-08 RX ORDER — ASCORBIC ACID 500 MG
500 TABLET ORAL DAILY
Qty: 30 TABLET | Refills: 0 | Status: SHIPPED | OUTPATIENT
Start: 2019-10-08 | End: 2019-12-12 | Stop reason: ALTCHOICE

## 2019-10-08 RX ADMIN — BUPIVACAINE HYDROCHLORIDE 2 ML: 2.5 INJECTION, SOLUTION INFILTRATION; PERINEURAL at 09:14

## 2019-10-08 RX ADMIN — LIDOCAINE HYDROCHLORIDE 2 ML: 10 INJECTION, SOLUTION INFILTRATION; PERINEURAL at 09:14

## 2019-10-08 RX ADMIN — BETAMETHASONE SODIUM PHOSPHATE AND BETAMETHASONE ACETATE 12 MG: 3; 3 INJECTION, SUSPENSION INTRA-ARTICULAR; INTRALESIONAL; INTRAMUSCULAR; SOFT TISSUE at 09:14

## 2019-10-08 NOTE — PROGRESS NOTES
Assessment:  1  Primary osteoarthritis of one hip, right     2  Primary osteoarthritis of right knee         Plan:  The patient was provided with right knee steroid injection  The patient will be scheduled for right total hip arthroplasty  We feel the patient will find significant relief per current symptoms, findings on imaging and exam   Risks, benefits, precautions and expectations were discussed  Risks include blood loss, potential infection and blood clots  Preoperative vitamins were prescribed  The patient should follow up after surgery  To do next visit:  Return for after surgery   The above stated was discussed in layman's terms and the patient expressed understanding  All questions were answered to the patient's satisfaction  Scribe Attestation    I,:   Jermaine Hutson am acting as a scribe while in the presence of the attending physician :        I,:   Paras Gordillo MD personally performed the services described in this documentation    as scribed in my presence :              Subjective:   Sumaya Olmedo is a 81 y o  female who presents for follow up of right hip pain and knee  She is s/p right IA hip steroid injection 7/24/19 with benefit for 2 months  Today she complains of right posterior, lateral and anterior groin and entire right knee pain  She rates the hip at 0-9/10 and right knee 0-9/10  Walking aggravates and can be difficult  Sitting alleviates  She does use tramadol 50mg 2x/day through Dr Montesinos  She is s/p lumbar surgery several years ago          Review of systems negative unless otherwise specified in HPI    Past Medical History:   Diagnosis Date    Ankylosing spondylitis ([facility] [address] )     Anxiety     LAST ASSESSED: 12/20/16    Arthritis     Back pain     Carpal tunnel syndrome     Fibromyalgia     LAST ASSESSED: 12/20/16    Fibromyalgia, primary     Heme positive stool     LAST ASSESSED: 10/22/16    High cholesterol     Hyperlipidemia     under control since weight loss    Impingement syndrome of left shoulder     LAST ASSESSED: 17    Impingement syndrome of right shoulder     LAST ASSESSED:     Long term use of drug     LAST ASSESSED: 16    No known health problems     NO PERTINENT PAST MEDICAL HX    RLS (restless legs syndrome)     Rotator cuff injury     right    Spinal stenosis     Spinal stenosis     Spondylitis (HCC)     Spondyloarthritis     RESOLVED: 16    Vitamin D deficiency        Past Surgical History:   Procedure Laterality Date    BACK SURGERY      CARPAL TUNNEL RELEASE Left     CERVICAL CONIZATION   W/ LASER      CERVICAL CONIZATION     SECTION      COLONOSCOPY      DILATION AND CURETTAGE OF UTERUS      FL INJECTION RIGHT HIP (NON ARTHROGRAM)  2019    FL INJECTION RIGHT HIP (NON ARTHROGRAM)  2019    HAND SURGERY      [place] ARTHRODESIS POSTERIOR/POSTEROLATERAL LUMBAR N/A 4/3/2017    Procedure: L2-L5 OPEN DECOMPRESSIVE LUMBAR LAMINECTOMY W/ PEDICLE SCREW AND NILDA FIXATION FUSION (IMPULSE); REMOVAL OF SKIN TAG MID BACK;  Surgeon: Deon Dolan MD;  [facility];  Service: Neurosurgery    [place] COLONOSCOPY FLX DX W/COLLJ SPEC WHEN PFRMD N/A 10/7/2016    Procedure: EGD AND COLONOSCOPY;  Surgeon: Catracho Valerio MD;  Location: [facility];   Service: Gastroenterology    [place] WRIST [place] LIG Left 2018    Procedure: RELEASE CARPAL TUNNEL ENDOSCOPIC;  Surgeon: Betina Gomez MD;  [facility];  Service: Orthopedics    AZ WRIST [place] LIG Right 2018    Procedure: RELEASE CARPAL TUNNEL ENDOSCOPIC;  Surgeon: Betina Gomez MD;  [facility];  Service: Orthopedics    RETINAL LASER PROCEDURE      TUBAL LIGATION         Family History   Problem Relation Age of Onset    Arthritis Mother     Breast cancer Mother 67    Hypertension Mother     Heart attack Mother         MYOCARDIAL INFARCTION    Heart attack Father     Hypertension Father     Stroke Father         Marcellus Spangler (CVA)    Arthritis Sister     Uterine cancer Sister     Endometrial cancer Sister 61    Heart attack Brother     Prostate cancer Brother 58    Arthritis Brother     Melanoma Brother     Cancer Brother     Arthritis Family     Hyperlipidemia Family     Depression Son     Breast cancer Maternal Aunt 36    No Known Problems Daughter     Other Brother         hunting accident (shot)    Melanoma Brother     Prostate cancer Brother     Heart attack Brother     Stroke Brother     Arthritis Sister     Heart attack Sister     No Known Problems Son     Lung cancer Maternal Uncle        Social History     Occupational History    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  Comment: EMPLOYED   Tobacco Use    Smoking status: Never Smoker    Smokeless tobacco: Never Used   Substance and Sexual Activity    Alcohol use: No    Drug use: No    Sexual activity: Yes     Partners: Male     Birth control/protection: None         Current Outpatient Medications:     aspirin 81 MG tablet, Take 1 tablet by mouth daily, Disp: , Rfl:     Cholecalciferol (VITAMIN D) 2000 UNITS CAPS, Take 1 tablet by mouth daily, Disp: , Rfl:     clonazePAM (KlonoPIN) 0 5 mg tablet, Take 0 5 mg by mouth daily at bedtime, Disp: , Rfl: 4    clotrimazole 1 % external solution, 5 drops to the left ear BID for 7 days, Disp: 30 mL, Rfl: 0    gabapentin (NEURONTIN) 100 mg capsule, Take 1 capsule (100 mg total) by mouth 3 (three) times a day, Disp: 90 capsule, Rfl: 2    meloxicam (MOBIC) 15 mg tablet, Take 1 tablet by mouth daily, Disp: , Rfl:     methocarbamol (ROBAXIN) 750 mg tablet, TAKE 1 TABLET (750 MG TOTAL) BY MOUTH EVERY 6 (SIX) HOURS AS NEEDED FOR MUSCLE SPASMS, Disp: 100 tablet, Rfl: 0    ofloxacin (FLOXIN) 0 3 % otic solution, Administer 5 drops into the left ear 2 (two) times a day, Disp: 5 mL, Rfl: 1    PARoxetine (PAXIL) 10 mg tablet, TAKE 1 TABLET DAILY  , Disp: 30 tablet, Rfl: 6   traMADol (ULTRAM) 50 mg tablet, Take 1 tablet (50 mg total) by mouth 2 (two) times a day as needed for moderate pain, Disp: 80 tablet, Rfl: 0    No Known Allergies         Vitals:    10/08/19 0857   BP: 138/84   Pulse: 101       Objective:  Physical exam  · General: Awake, Alert, Oriented  · Eyes: Pupils equal, round and reactive to light  · Heart: regular rate and rhythm  · Lungs: No audible wheezing  · Abdomen: soft                    Ortho Exam   Right hip:  No erythema or ecchymosis  No effusion or swelling  Normal strength  Apprehension with ROM  Pain with IR      Right knee:  TTP lateral joint line  Valgus alignment   No erythema or ecchymosis  No effusion or swelling  Normal strength  Good ROM with crepitus   Calf compartments soft and supple  Sensation intact  Toes are warm sensate and mobile        Diagnostics, reviewed and taken today if performed as documented:    None performed     Procedures, if performed today:    Large joint arthrocentesis: R knee  Date/Time: 10/8/2019 9:14 AM  Consent given by: patient  Site marked: site marked  Supporting Documentation  Indications: pain   Procedure Details  Location: knee - R knee  Preparation: Patient was prepped and draped in the usual sterile fashion  Needle size: 22 G  Ultrasound guidance: no  Approach: anterolateral  Medications administered: 12 mg betamethasone acetate-betamethasone sodium phosphate 6 (3-3) mg/mL; 2 mL bupivacaine 0 25 %; 2 mL lidocaine 1 %    Patient tolerance: patient tolerated the procedure well with no immediate complications  Dressing:  Sterile dressing applied            Portions of the record may have been created with voice recognition software  Occasional wrong word or "sound a like" substitutions may have occurred due to the inherent limitations of voice recognition software  Read the chart carefully and recognize, using context, where substitutions have occurred

## 2019-10-15 ENCOUNTER — EVALUATION (OUTPATIENT)
Dept: PHYSICAL THERAPY | Facility: REHABILITATION | Age: 69
End: 2019-10-15
Payer: MEDICARE

## 2019-10-15 ENCOUNTER — APPOINTMENT (OUTPATIENT)
Dept: LAB | Facility: HOSPITAL | Age: 69
End: 2019-10-15
Attending: ORTHOPAEDIC SURGERY
Payer: MEDICARE

## 2019-10-15 ENCOUNTER — PREP FOR PROCEDURE (OUTPATIENT)
Dept: OBGYN CLINIC | Facility: HOSPITAL | Age: 69
End: 2019-10-15

## 2019-10-15 DIAGNOSIS — M16.11 PRIMARY OSTEOARTHRITIS OF ONE HIP, RIGHT: ICD-10-CM

## 2019-10-15 DIAGNOSIS — M16.11 PRIMARY OSTEOARTHRITIS OF ONE HIP, RIGHT: Primary | ICD-10-CM

## 2019-10-15 DIAGNOSIS — Z01.812 PRE-OPERATIVE LABORATORY EXAMINATION: ICD-10-CM

## 2019-10-15 DIAGNOSIS — Z01.812 PRE-OPERATIVE LABORATORY EXAMINATION: Primary | ICD-10-CM

## 2019-10-15 LAB
ABO GROUP BLD: NORMAL
ALBUMIN SERPL BCP-MCNC: 3.5 G/DL (ref 3.5–5)
ALP SERPL-CCNC: 62 U/L (ref 46–116)
ALT SERPL W P-5'-P-CCNC: 18 U/L (ref 12–78)
ANION GAP SERPL CALCULATED.3IONS-SCNC: 10 MMOL/L (ref 4–13)
APTT PPP: 28 SECONDS (ref 23–37)
AST SERPL W P-5'-P-CCNC: 14 U/L (ref 5–45)
ATRIAL RATE: 65 BPM
BASOPHILS # BLD AUTO: 0.02 THOUSANDS/ΜL (ref 0–0.1)
BASOPHILS NFR BLD AUTO: 0 % (ref 0–1)
BILIRUB SERPL-MCNC: 0.3 MG/DL (ref 0.2–1)
BLD GP AB SCN SERPL QL: NEGATIVE
BUN SERPL-MCNC: 26 MG/DL (ref 5–25)
CALCIUM SERPL-MCNC: 9.3 MG/DL (ref 8.3–10.1)
CHLORIDE SERPL-SCNC: 105 MMOL/L (ref 100–108)
CO2 SERPL-SCNC: 24 MMOL/L (ref 21–32)
CREAT SERPL-MCNC: 0.74 MG/DL (ref 0.6–1.3)
CRP SERPL QL: 11.4 MG/L
EOSINOPHIL # BLD AUTO: 0.35 THOUSAND/ΜL (ref 0–0.61)
EOSINOPHIL NFR BLD AUTO: 4 % (ref 0–6)
ERYTHROCYTE [DISTWIDTH] IN BLOOD BY AUTOMATED COUNT: 14.6 % (ref 11.6–15.1)
EST. AVERAGE GLUCOSE BLD GHB EST-MCNC: 114 MG/DL
FERRITIN SERPL-MCNC: 134 NG/ML (ref 8–388)
GFR SERPL CREATININE-BSD FRML MDRD: 84 ML/MIN/1.73SQ M
GLUCOSE SERPL-MCNC: 73 MG/DL (ref 65–140)
HBA1C MFR BLD: 5.6 % (ref 4.2–6.3)
HCT VFR BLD AUTO: 42.9 % (ref 34.8–46.1)
HGB BLD-MCNC: 13.4 G/DL (ref 11.5–15.4)
IMM GRANULOCYTES # BLD AUTO: 0.04 THOUSAND/UL (ref 0–0.2)
IMM GRANULOCYTES NFR BLD AUTO: 1 % (ref 0–2)
INR PPP: 0.94 (ref 0.84–1.19)
IRON SATN MFR SERPL: 23 %
IRON SERPL-MCNC: 57 UG/DL (ref 50–170)
LYMPHOCYTES # BLD AUTO: 1.58 THOUSANDS/ΜL (ref 0.6–4.47)
LYMPHOCYTES NFR BLD AUTO: 19 % (ref 14–44)
MCH RBC QN AUTO: 28.8 PG (ref 26.8–34.3)
MCHC RBC AUTO-ENTMCNC: 31.2 G/DL (ref 31.4–37.4)
MCV RBC AUTO: 92 FL (ref 82–98)
MONOCYTES # BLD AUTO: 0.66 THOUSAND/ΜL (ref 0.17–1.22)
MONOCYTES NFR BLD AUTO: 8 % (ref 4–12)
NEUTROPHILS # BLD AUTO: 5.54 THOUSANDS/ΜL (ref 1.85–7.62)
NEUTS SEG NFR BLD AUTO: 68 % (ref 43–75)
NRBC BLD AUTO-RTO: 0 /100 WBCS
P AXIS: 55 DEGREES
PLATELET # BLD AUTO: 366 THOUSANDS/UL (ref 149–390)
PMV BLD AUTO: 10.4 FL (ref 8.9–12.7)
POTASSIUM SERPL-SCNC: 4.3 MMOL/L (ref 3.5–5.3)
PR INTERVAL: 160 MS
PROT SERPL-MCNC: 6.7 G/DL (ref 6.4–8.2)
PROTHROMBIN TIME: 12.2 SECONDS (ref 11.6–14.5)
QRS AXIS: -47 DEGREES
QRSD INTERVAL: 76 MS
QT INTERVAL: 388 MS
QTC INTERVAL: 403 MS
RBC # BLD AUTO: 4.66 MILLION/UL (ref 3.81–5.12)
RH BLD: POSITIVE
SODIUM SERPL-SCNC: 139 MMOL/L (ref 136–145)
SPECIMEN EXPIRATION DATE: NORMAL
T WAVE AXIS: 78 DEGREES
TIBC SERPL-MCNC: 246 UG/DL (ref 250–450)
VENTRICULAR RATE: 65 BPM
WBC # BLD AUTO: 8.19 THOUSAND/UL (ref 4.31–10.16)

## 2019-10-15 PROCEDURE — 93010 ELECTROCARDIOGRAM REPORT: CPT | Performed by: INTERNAL MEDICINE

## 2019-10-15 PROCEDURE — 86140 C-REACTIVE PROTEIN: CPT

## 2019-10-15 PROCEDURE — 36415 COLL VENOUS BLD VENIPUNCTURE: CPT

## 2019-10-15 PROCEDURE — 97161 PT EVAL LOW COMPLEX 20 MIN: CPT | Performed by: PHYSICAL THERAPIST

## 2019-10-15 PROCEDURE — 85610 PROTHROMBIN TIME: CPT

## 2019-10-15 PROCEDURE — 93005 ELECTROCARDIOGRAM TRACING: CPT

## 2019-10-15 PROCEDURE — 86901 BLOOD TYPING SEROLOGIC RH(D): CPT

## 2019-10-15 PROCEDURE — 86850 RBC ANTIBODY SCREEN: CPT

## 2019-10-15 PROCEDURE — 86900 BLOOD TYPING SEROLOGIC ABO: CPT

## 2019-10-15 PROCEDURE — 80053 COMPREHEN METABOLIC PANEL: CPT

## 2019-10-15 PROCEDURE — 83540 ASSAY OF IRON: CPT

## 2019-10-15 PROCEDURE — 85025 COMPLETE CBC W/AUTO DIFF WBC: CPT

## 2019-10-15 PROCEDURE — 82728 ASSAY OF FERRITIN: CPT

## 2019-10-15 PROCEDURE — 83036 HEMOGLOBIN GLYCOSYLATED A1C: CPT

## 2019-10-15 PROCEDURE — 85730 THROMBOPLASTIN TIME PARTIAL: CPT

## 2019-10-15 PROCEDURE — 83550 IRON BINDING TEST: CPT

## 2019-10-15 NOTE — PROGRESS NOTES
PT Evaluation     Today's date: 10/15/2019  Patient name: Temitope Dorsey  : 1950  MRN: [id number]  Referring provider: Susie Salvador MD  Dx:   Encounter Diagnosis     ICD-10-CM    1  Primary osteoarthritis of one hip, right M16 11 Ambulatory referral to Physical Therapy                  Assessment  Assessment details: Patient presents with pain, decreased hip ROM, decreased LE strength, and decreased function secondary to R hip OA  Pt is scheduled for R SHARIF on 19  Patient would benefit from skilled PT intervention post-operatively to address these issues and to maximize function  Thank you for the referral   Impairments: abnormal gait, abnormal or restricted ROM, activity intolerance, impaired balance, impaired physical strength, lacks appropriate home exercise program, pain with function and weight-bearing intolerance  Understanding of Dx/Px/POC: good   Prognosis: good    Goals  Short Term:  Pt will report decreased levels of pain by at least 2 subjective ratings in 4 weeks  Pt will demonstrate improved ROM by at least 10 degrees in 4 weeks  Pt will demonstrate improved strength by 1/2 grade MMT in 4 weeks  Long Term:   Pt will be independent in their HEP in 8 weeks  Pt will demonstrate improved FOTO, > (to be determined post-op)  Pt will be independent with all ADL's  Pt will be able to ambulate community distances with AD PRN  Plan  Plan details: Patient was educated in [employer]  All questions were answered to pt's satisfaction  Risk Assessment and Prediction Tool results reviewed  Patient reported functional outcome scores reviewed  Discussed DOS and patients questions were answered to patients satisfaction  Mobility/ROM results per above  Strength results per above  Balance/Gait (including Timed Up & Go) per above  Virtual Home Assessment was reviewed with patient   Home Preparation Checklist was reviewed with patient including identification of care partner and encouragement of single level set-up  Post-operative pain management expectations discussed to the patients satisfaction  Post-operative gait training for level ground, stairs, and car transfers was performed  Patient demonstrated competence with immediate post-operative home exercise program   Patient would benefit from: skilled physical therapy  Planned modality interventions: cryotherapy  Planned therapy interventions: manual therapy, neuromuscular re-education, balance/weight bearing training, patient education, flexibility, therapeutic exercise, therapeutic activities, gait training, home exercise program and transfer training  Frequency: 2x week  Duration in weeks: 12  Plan of Care beginning date: 10/15/2019  Plan of Care expiration date: 2020  Treatment plan discussed with: patient        Subjective Evaluation    History of Present Illness  Mechanism of injury: Pt is a 81 y o female with a c/o R hip pain for at least 6 months or more of progressive insidious onset  Pt had radiographs, which showed OA  Pt had 2 injections, which only provided relief for about 2 months  Pt has elected to have surgery 19  Pt is having the posterior approach SHARIF with Dr Yuni Braun  Pt reports pain/diffiuclty with ambulation (limited tolerance), squatting, donning socks/shoes on left, standing (limited tolerance), household chores (decreased tolerance), shopping (limited tolerance), steps (step to pattern), sleep, driving      Pain  At best pain ratin  At worst pain ratin  Location: right hip  Relieving factors: medications and rest    Treatments  Previous treatment: injection treatment  Patient Goals  Patient goals for therapy: decreased pain, increased motion, increased strength, independence with ADLs/IADLs, return to sport/leisure activities and improved balance          Objective     Active Range of Motion     Right Hip   Flexion: 60 degrees   Extension: 10 degrees   Abduction: 15 degrees     Additional Active Range of Motion Details  AROM measured in standing  Passive Range of Motion     Right Hip   Flexion: 90 degrees with pain  Abduction: 10 degrees with pain  External rotation (90/90): 35 degrees   Internal rotation (90/90): 10 degrees with pain    Strength/Myotome Testing     Right Hip   Planes of Motion   Flexion: 4    Right Knee   Flexion: 5  Extension: 4+    General Comments:      Hip Comments   TUG=11"  RAPT=9/12  Reviewed hip precautions for posterior approach and pt demonstrates a good understanding  Precautions: OA, Fibromyalgia, ankylosing spondylitis, spinal stenosis, lumbar fusion, follow hip precautions for posterior approach (No flexion past 90*, adduction or IR)        Daily Treatment Diary     Manual              R Hip PROM within precautions (No flex past 90*, ADD, or IR)                                                                     Exercise Diary              bike             Heel slides             SLR             bridges             Supine hip add squeeze             Clamshells w/pillow             Side stepping             Standing hip 3 way             Heel raises             Step ups             laq             HS curls             Gait training PRN             Biodex: LOS             Biodex:  RC                                                                                  Modalities              CP PRN

## 2019-10-15 NOTE — PRE-PROCEDURE INSTRUCTIONS
Pre-Surgery Instructions:   Medication Instructions    ascorbic acid (VITAMIN C) 500 mg tablet Instructed patient per Anesthesia Guidelines   aspirin 81 MG tablet Instructed patient per Anesthesia Guidelines   Cholecalciferol (VITAMIN D) 2000 UNITS CAPS Instructed patient per Anesthesia Guidelines   clonazePAM (KlonoPIN) 0 5 mg tablet Instructed patient per Anesthesia Guidelines   clotrimazole 1 % external solution Instructed patient per Anesthesia Guidelines   enoxaparin (LOVENOX) 40 mg/0 4 mL Instructed patient per Anesthesia Guidelines   ferrous sulfate 324 (65 Fe) mg Instructed patient per Anesthesia Guidelines   folic acid (FOLVITE) 1 mg tablet Instructed patient per Anesthesia Guidelines   gabapentin (NEURONTIN) 100 mg capsule Instructed patient per Anesthesia Guidelines   meloxicam (MOBIC) 15 mg tablet Instructed patient per Anesthesia Guidelines   methocarbamol (ROBAXIN) 750 mg tablet Instructed patient per Anesthesia Guidelines   PARoxetine (PAXIL) 10 mg tablet Instructed patient per Anesthesia Guidelines   polymyxin b-trimethoprim (POLYTRIM) ophthalmic solution Instructed patient per Anesthesia Guidelines   traMADol (ULTRAM) 50 mg tablet Instructed patient per Anesthesia Guidelines  REVIEWED  PRINTED SURGICAL INSTRUCTIONS WITH PATIENT , PATIENT VERBALIZED UNDERSTANDING  MEDICATIONS REVIEWED  NO VITAMINS OR NSAIDS ONE WEEK BEFORE SURGERY, NPO AFTER MIDNIGHT  SOAP AND SHOWER INSTRUCTIONS REVIEWED  Antidepressant Med Class     Continue to take this medication on your normal schedule  If this is an oral medication and you take it in the morning, then you may take this medicine with a sip of water  Antiepileptic Med Class     Continue to take this medication on your normal schedule  If this is an oral medication and you take it in the morning, then you may take this medicine with a sip of water    Benzodiazepine antagonist Med Class     If this medication is needed please continue to take on your normal schedule  If you take it in the morning, then you may take this medicine with a sip of water  ASA Med Class: Aspirin     Should be discontinued at least one week prior to planned operation, unless specifically stated otherwise by surgical service  Your Surgeon may have patient stop taking aspirin up to a week before surgery if having intracranial, middle ear, posterior eye, spine surgery or prostate surgery  [Patients taking aspirin for coronary stents should be reviewed by an anesthesiologist in the optimization clinic  Please do not discontinue aspirin in patients with coronary stents unless given specific permission to do so by the cardiologist who prescribed medication ]   If your surgeon approves please continue to take this medication on your normal schedule  You may take this medication on the morning of your surgery with a sip of water  Low Molecular Weight Heparin Med Class     Stop taking this medication at least 12-24 hours prior to surgery/procedure with prescribing Physician and Surgeon consultation  NSAID Med Class     Stop taking this medication at least 3 days prior to surgery/procedure  Opioid Med Class     Continue to take this medication on your normal schedule  If this is an oral medication and you take it in the morning, then you may take this medicine with a sip of water  Vitamin Med Class     You may continue to take any vitamin that your surgeon has prescribed to you up to the day before surgery  If your surgeon has not specifically prescribed this vitamin or instructed you to continue then stop taking 7 days prior to surgery

## 2019-10-17 ENCOUNTER — TELEPHONE (OUTPATIENT)
Dept: OBGYN CLINIC | Facility: HOSPITAL | Age: 69
End: 2019-10-17

## 2019-10-17 ENCOUNTER — OFFICE VISIT (OUTPATIENT)
Dept: INTERNAL MEDICINE CLINIC | Facility: CLINIC | Age: 69
End: 2019-10-17
Payer: COMMERCIAL

## 2019-10-17 VITALS
TEMPERATURE: 98.4 F | DIASTOLIC BLOOD PRESSURE: 82 MMHG | WEIGHT: 167 LBS | RESPIRATION RATE: 16 BRPM | HEART RATE: 93 BPM | BODY MASS INDEX: 31.53 KG/M2 | OXYGEN SATURATION: 98 % | HEIGHT: 61 IN | SYSTOLIC BLOOD PRESSURE: 128 MMHG

## 2019-10-17 DIAGNOSIS — Z01.818 PREOP GENERAL PHYSICAL EXAM: Primary | ICD-10-CM

## 2019-10-17 DIAGNOSIS — Z23 ENCOUNTER FOR IMMUNIZATION: ICD-10-CM

## 2019-10-17 DIAGNOSIS — M16.11 PRIMARY OSTEOARTHRITIS OF ONE HIP, RIGHT: ICD-10-CM

## 2019-10-17 DIAGNOSIS — R94.31 ABNORMAL EKG: ICD-10-CM

## 2019-10-17 PROCEDURE — 99214 OFFICE O/P EST MOD 30 MIN: CPT | Performed by: INTERNAL MEDICINE

## 2019-10-17 PROCEDURE — 90662 IIV NO PRSV INCREASED AG IM: CPT

## 2019-10-17 PROCEDURE — G0008 ADMIN INFLUENZA VIRUS VAC: HCPCS

## 2019-10-17 NOTE — ASSESSMENT & PLAN NOTE
This patient has advanced osteoarthritis of the right hip  The pain in limitation in ambulation have significantly affected the quality of her life  She is interested in having a right total hip replacement  The surgery has been tentatively scheduled pending the outcome of her preoperative clearance

## 2019-10-17 NOTE — ASSESSMENT & PLAN NOTE
Abnormal electrocardiogram referral to Cardiology for further evaluation and preoperative clearance  Abnormalities include a left anterior fascicular block and poor R-wave progression suggestive of previous anterior myocardial infarction

## 2019-10-17 NOTE — PROGRESS NOTES
Assessment/Plan:    Primary osteoarthritis of one hip, right  This patient has advanced osteoarthritis of the right hip  The pain in limitation in ambulation have significantly affected the quality of her life  She is interested in having a right total hip replacement  The surgery has been tentatively scheduled pending the outcome of her preoperative clearance  Preop general physical exam  On today's examination I find the patient to have an essentially normal examination with the exception of the advanced arthritis in her right hip  Review of her blood work does not reveal any indication of any issues that would complicate surgery  Her pre-admission EKG is abnormal   This abnormality has developed over the time frame since her last electrocardiogram tracing in 2017  The tracing shows a left anterior fascicular block as well as poor R-wave progression over the anterior leads  The patient does not have any history of any prolonged chest discomfort indicative of angina  In view of the electrocardiogram changes she will need a cardiology clearance prior to surgery  Abnormal EKG  Abnormal electrocardiogram referral to Cardiology for further evaluation and preoperative clearance  Abnormalities include a left anterior fascicular block and poor R-wave progression suggestive of previous anterior myocardial infarction  Diagnoses and all orders for this visit:    Primary osteoarthritis of one hip, right  -     Ambulatory referral to [facility]    Encounter for immunization  -     influenza vaccine, [id number], high-dose, PF 0 5 mL (FLUZONE HIGH-DOSE)    Abnormal EKG  -     Ambulatory referral to Cardiology; Future    Preop general physical exam        Subjective:      Patient ID: Brenda Boo is a 81 y o  female  This is a preoperative visit for this 26-year-old female patient who has a right total hip surgery scheduled in the near future  She has advanced osteoarthritis    The discomfort in her hip is limiting her ambulatory skills in quality of her life  Preoperative blood work and EKG were both reviewed during today's visit  She denies any recent colds or infections  She has not had any classic angina type of chest discomfort nor any palpitations or shortness of breath  The patient has been seen and cleared by cardiology services at [facility] and is now cleared for her right total hip surgery  The following portions of the patient's history were reviewed and updated as appropriate:   She  has a past medical history of Ankylosing spondylitis ([facility] [address] ), Anxiety, Arthritis, Back pain, Carpal tunnel syndrome, Fibromyalgia, Fibromyalgia, primary, Heme positive stool, High cholesterol, Hyperlipidemia, Impingement syndrome of left shoulder, Impingement syndrome of right shoulder, Long term use of drug, No known health problems, RLS (restless legs syndrome), Rotator cuff injury, Spinal stenosis, Spinal stenosis, Spondylitis ([facility] [address] ), Spondyloarthritis, and Vitamin D deficiency  She   Patient Active Problem List    Diagnosis Date Noted    Abnormal EKG 10/17/2019    Preop general physical exam 10/17/2019    Primary osteoarthritis of one hip, right 2019    Primary osteoarthritis of right knee 2019    Encounter for annual routine gynecological examination 2019    Encounter for screening mammogram for malignant neoplasm of breast 2019    Chronic bilateral low back pain 2018    S/P endoscopic carpal tunnel release 2018    Spinal stenosis of lumbar region at multiple levels 2017    Spondylosis 2017    Scoliosis 2017     She  has a past surgical history that includes  section; Tubal ligation; Dilation and curettage of uterus;  Retinal laser procedure; Colonoscopy; pr colonoscopy flx dx w/collj spec when pfrmd (N/A, 10/7/2016); pr arthrodesis posterior/posterolateral lumbar (N/A, 4/3/2017); pr wrist arthroscop,release xvers lig (Left, 4/26/2018); Cervical conization w/ laser; Back surgery; Hand surgery; Carpal tunnel release (Left); pr wrist arthroscop,release xvers lig (Right, 6/14/2018); FL injection right hip (non arthrogram) (4/24/2019); and FL injection right hip (non arthrogram) (7/24/2019)  Her family history includes Arthritis in her brother, family, mother, sister, and sister; Breast cancer (age of onset: 36) in her maternal aunt; Breast cancer (age of onset: 67) in her mother; Cancer in her brother; Depression in her son; Endometrial cancer (age of onset: 61) in her sister; Heart attack in her brother, brother, father, mother, and sister; Hyperlipidemia in her family; Hypertension in her father and mother; Lung cancer in her maternal uncle; Melanoma in her brother and brother; No Known Problems in her daughter and son; Other in her brother; Prostate cancer in her brother; Prostate cancer (age of onset: 58) in her brother; Stroke in her brother and father; Uterine cancer in her sister  She  reports that she has never smoked  She has never used smokeless tobacco  She reports that she does not drink alcohol or use drugs    Current Outpatient Medications   Medication Sig Dispense Refill    ascorbic acid (VITAMIN C) 500 mg tablet Take 1 tablet (500 mg total) by mouth daily Start 30 days prior to surgery 30 tablet 0    aspirin 81 MG tablet Take 1 tablet by mouth daily      Cholecalciferol (VITAMIN D) 2000 UNITS CAPS Take 1 tablet by mouth daily      clonazePAM (KlonoPIN) 0 5 mg tablet Take 0 5 mg by mouth daily at bedtime  4    clotrimazole 1 % external solution 5 drops to the left ear BID for 7 days 30 mL 0    enoxaparin (LOVENOX) 40 mg/0 4 mL Inject 0 4 mL (40 mg total) under the skin daily in the early morning for 28 doses 28 Syringe 0    ferrous sulfate 324 (65 Fe) mg Take 1 tablet (324 mg total) by mouth 2 (two) times a day before meals Start 30 days prior to surgery 60 tablet 0    folic acid (FOLVITE) 1 mg tablet Take 1 tablet (1 mg total) by mouth daily Start 30 days prior to surgery 30 tablet 0    gabapentin (NEURONTIN) 100 mg capsule Take 1 capsule (100 mg total) by mouth 3 (three) times a day 90 capsule 2    meloxicam (MOBIC) 15 mg tablet Take 1 tablet by mouth daily      methocarbamol (ROBAXIN) 750 mg tablet TAKE 1 TABLET (750 MG TOTAL) BY MOUTH EVERY 6 (SIX) HOURS AS NEEDED FOR MUSCLE SPASMS 100 tablet 0    PARoxetine (PAXIL) 10 mg tablet TAKE 1 TABLET DAILY  30 tablet 6    polymyxin b-trimethoprim (POLYTRIM) ophthalmic solution 5 drops to the affected EAR twice a day for 7 days 10 mL 0    traMADol (ULTRAM) 50 mg tablet Take 1 tablet (50 mg total) by mouth 2 (two) times a day as needed for moderate pain 80 tablet 0     No current facility-administered medications for this visit       Review of Systems   Musculoskeletal: Positive for arthralgias and gait problem  All other systems reviewed and are negative  Objective:      /82   Pulse 93   Temp 98 4 °F (36 9 °C)   Resp 16   Ht 5' 1" (1 549 m)   Wt 75 8 kg (167 lb)   LMP  (LMP Unknown)   SpO2 98%   BMI 31 55 kg/m²          Physical Exam   Constitutional: She is oriented to person, place, and time  Vital signs are normal  She appears well-developed and well-nourished  She is cooperative  HENT:   Right Ear: Hearing, tympanic membrane, external ear and ear canal normal    Left Ear: Hearing, tympanic membrane, external ear and ear canal normal    Nose: Nose normal  No mucosal edema  Mouth/Throat: Uvula is midline, oropharynx is clear and moist and mucous membranes are normal    Eyes: Pupils are equal, round, and reactive to light  Conjunctivae and lids are normal    Neck: No JVD present  Carotid bruit is not present  No thyromegaly present  Cardiovascular: Normal rate, regular rhythm and intact distal pulses  Murmur heard  Pulmonary/Chest: Effort normal and breath sounds normal  No stridor  No respiratory distress  She has no wheezes   She has no rales    Abdominal: Soft  Normal appearance and bowel sounds are normal    Musculoskeletal: Normal range of motion  She exhibits no edema  Lymphadenopathy:     She has no cervical adenopathy  Neurological: She is alert and oriented to person, place, and time  She has normal reflexes  Skin: Skin is warm, dry and intact  Psychiatric: She has a normal mood and affect  Her speech is normal and behavior is normal  Judgment and thought content normal  Cognition and memory are normal    Vitals reviewed

## 2019-10-17 NOTE — ASSESSMENT & PLAN NOTE
On today's examination I find the patient to have an essentially normal examination with the exception of the advanced arthritis in her right hip  Review of her blood work does not reveal any indication of any issues that would complicate surgery  Her pre-admission EKG is abnormal   This abnormality has developed over the time frame since her last electrocardiogram tracing in 2017  The tracing shows a left anterior fascicular block as well as poor R-wave progression over the anterior leads  The patient does not have any history of any prolonged chest discomfort indicative of angina  In view of the electrocardiogram changes she will need a cardiology clearance prior to surgery

## 2019-10-22 ENCOUNTER — TELEPHONE (OUTPATIENT)
Dept: OBGYN CLINIC | Facility: HOSPITAL | Age: 69
End: 2019-10-22

## 2019-10-23 DIAGNOSIS — M54.9 CHRONIC BACK PAIN, UNSPECIFIED BACK LOCATION, UNSPECIFIED BACK PAIN LATERALITY: ICD-10-CM

## 2019-10-23 DIAGNOSIS — G89.29 CHRONIC BACK PAIN, UNSPECIFIED BACK LOCATION, UNSPECIFIED BACK PAIN LATERALITY: ICD-10-CM

## 2019-10-23 RX ORDER — TRAMADOL HYDROCHLORIDE 50 MG/1
50 TABLET ORAL 2 TIMES DAILY PRN
Qty: 80 TABLET | Refills: 0 | Status: SHIPPED | OUTPATIENT
Start: 2019-10-23 | End: 2019-11-08 | Stop reason: HOSPADM

## 2019-10-23 RX ORDER — TRAMADOL HYDROCHLORIDE 50 MG/1
50 TABLET ORAL 2 TIMES DAILY PRN
Qty: 80 TABLET | Refills: 0 | Status: CANCELLED | OUTPATIENT
Start: 2019-10-23

## 2019-10-24 ENCOUNTER — DOCUMENTATION (OUTPATIENT)
Dept: OBGYN CLINIC | Facility: HOSPITAL | Age: 69
End: 2019-10-24

## 2019-10-24 NOTE — PROGRESS NOTES
Pharmacy: [facility] [id number]   Insurance: [id number] to keep medication on hold  Was Authorization Required for: No Auth   Copayment: $10  Was patient advised when to pick RX up from Pharmacy: Yes     Physician: Dr Maame Hinojosa  Surgery Date: 11/7/19

## 2019-10-29 NOTE — TELEPHONE ENCOUNTER
Preoperative Elective Admission Assessment-Spoke with pt  Living Situation: Pt reports she lives in a multi-level home with her , Nova Taylor and her son, Bassam Long  Home Layout: Multi-level home with a walk-in shower                     Steps: #2 steps to enter, #14 steps to the 2nd level  First Floor Setup: Yes with a BSC  There is no bathroom on the 1st floor  Post-op Caregiver: , Nova Taylor, is taking 1 week off work  Son, Bassam Long, is back up caregiver  Post-op Transport:  or son  Outpatient Physical Therapy Site:  [facility]    DME: Pt has a BSC, RW and cane  Patient's Current Level of Function: Pt currently ambulates independently and is independent with her ADLs  Medication Management: Pt self manages her meds                     [facility] [id number] [facility]                    Blood Management Vitamins: Pt confirms she is taking her oral iron, MV, folic acid and vitamin C                     Post-op anticoagulant: Pt has filled and picked up her post-op lovenox, educated this is for after surgery use only  DC Plan: Pt plans for DC to home and plans to attend outpatient PT                     Barriers to DC identified preoperatively: None  BMI: 33 47 at [place] 10/8    Caresense: Pt enrolled                     RAPT: Score 9, already in caresense                     ACE/ARB Form: N/A GFR 84                    HOOS/KOOS: Score [id number], already in caresense  Patient Education:  Pt educated on post-op pain, early mobilization (POD0), indication for/use of incentive spirometer (10x/hour while awake) and indication for/use of foot/leg pumps (18 hours/day)  educated that our goal, if at all possible, is to appropriately discharge patient based off their post-op function while striving to maintain maximal independence  If possible, the goal is to discharge patient to home and for them to attend outpatient physical therapy   I educated patient on the many benefits of outpt PT(Including maintaining independence, additional resources at outpt site, better outcomes etc  )  Also educated on how home PT vs  outpt PT is determined (while inpt)  Pt denies having questions at this time  Pt encouraged to call me with any questions, concerns or issues

## 2019-10-30 ENCOUNTER — CONSULT (OUTPATIENT)
Dept: CARDIOLOGY CLINIC | Facility: CLINIC | Age: 69
End: 2019-10-30
Payer: COMMERCIAL

## 2019-10-30 VITALS
HEIGHT: 61 IN | OXYGEN SATURATION: 98 % | SYSTOLIC BLOOD PRESSURE: 110 MMHG | BODY MASS INDEX: 31.91 KG/M2 | DIASTOLIC BLOOD PRESSURE: 80 MMHG | WEIGHT: 169 LBS | HEART RATE: 68 BPM

## 2019-10-30 DIAGNOSIS — R94.31 ABNORMAL EKG: ICD-10-CM

## 2019-10-30 DIAGNOSIS — Z01.810 PRE-OPERATIVE CARDIOVASCULAR EXAMINATION: Primary | ICD-10-CM

## 2019-10-30 PROCEDURE — 93000 ELECTROCARDIOGRAM COMPLETE: CPT | Performed by: INTERNAL MEDICINE

## 2019-10-30 PROCEDURE — 99244 OFF/OP CNSLTJ NEW/EST MOD 40: CPT | Performed by: INTERNAL MEDICINE

## 2019-10-30 NOTE — ASSESSMENT & PLAN NOTE
Pleasant 73-year-old female with risk factors of age and family history of heart disease is being considered for right total hip replacement surgery, a procedure with inherent intermediate cardiac risk  At this time she has no active cardiac conditions  She does report episodic chest discomfort in the past   Her blood pressure is well controlled  Physical examination does not suggest significant valvular heart disease or signs of congestive heart failure  Her exercise tolerance is limited  Her ECG is abnormal with suggestion of possible prior anterior infarction  Her overall risk for major adverse cardiac event relating to planned procedure is low to moderate  Given her risk factors and abnormality on the ECG a nuclear stress test is recommended prior to undergoing this planned procedure  - [facility] nuclear stress test to be arranged for as soon as possible  - she is advised to continue her current medical therapy  - an echocardiogram may be considered in the future if needed as her brother had history of aortic valve disease and underwent TAVR recently  - she is advised to continue follow-up with her family physician Dr Laury Braun on a regular basis  - cardiology for follow-up in future as needed

## 2019-10-30 NOTE — PATIENT INSTRUCTIONS
CARDIOLOGY ASSESSMENT & PLAN:  Pre-operative cardiovascular examination  Pleasant 53-year-old female with risk factors of age and family history of heart disease is being considered for right total hip replacement surgery, a procedure with inherent intermediate cardiac risk  At this time she has no active cardiac conditions  She does report episodic chest discomfort in the past   Her blood pressure is well controlled  Physical examination does not suggest significant valvular heart disease or signs of congestive heart failure  Her exercise tolerance is limited  Her ECG is abnormal with suggestion of possible prior anterior infarction  Her overall risk for major adverse cardiac event relating to planned procedure is low to moderate  Given her risk factors and abnormality on the ECG a nuclear stress test is recommended prior to undergoing this planned procedure  - [facility] nuclear stress test to be arranged for as soon as possible  - she is advised to continue her current medical therapy  - an echocardiogram may be considered in the future if needed as her brother had history of aortic valve disease and underwent TAVR recently  - she is advised to continue follow-up with her family physician Dr Lisandra Smith on a regular basis  - cardiology for follow-up in future as needed  Abnormal EKG  Evaluation and plan as noted above

## 2019-10-30 NOTE — PROGRESS NOTES
[facility]  [facility] [id number], [place] [address]  Phone#  [phone]  Fax#  [phone]  *-*-*-*-*-*-*-*-*-*-*-*-*-*-*-*-*-*-*-*-*-*-*-*-*-*-*-*-*-*-*-*-*-*-*-*-*-*-*-*-*-*-*-*-*-*-*-*-*-*-*-*-*-*    Joya Hartman DATE: 10/30/19 3:23 PM  PATIENT NAME: Maximiliano Reyes   1950    [id number]  Age: 81 y o  Sex: female  AUTHOR: Doyle Castaneda MD  PRIMARYCARE PHYSICIAN: Yareli Rodriguez MD  REFERRING PHYSICIAN: Yareli Rodriguez MD  [address]  2nd Floor, [facility], [address] [facility] [place], *   *-*-*-*-*-*-*-*-*-*-*-*-*-*-*-*-*-*-*-*-*-*-*-*-*-*-*-*-*-*-*-*-*-*-*-*-*-*-*-*-*-*-*-*-*-*-*-*-*-*-*-*-*-*-  REASON FOR REFERRAL:  * preoperative cardiac risk assessment in the setting of abnormal ECG     *-*-*-*-*-*-*-*-*-*-*-*-*-*-*-*-*-*-*-*-*-*-*-*-*-*-*-*-*-*-*-*-*-*-*-*-*-*-*-*-*-*-*-*-*-*-*-*-*-*-*-*-*-*-  CARDIOLOGY ASSESSMENT & PLAN:  Pre-operative cardiovascular examination  Pleasant 73-year-old female with risk factors of age and family history of heart disease is being considered for right total hip replacement surgery, a procedure with inherent intermediate cardiac risk  At this time she has no active cardiac conditions  She does report episodic chest discomfort in the past   Her blood pressure is well controlled  Physical examination does not suggest significant valvular heart disease or signs of congestive heart failure  Her exercise tolerance is limited  Her ECG is abnormal with suggestion of possible prior anterior infarction  Her overall risk for major adverse cardiac event relating to planned procedure is low to moderate  Given her risk factors and abnormality on the ECG a nuclear stress test is recommended prior to undergoing this planned procedure  - [facility] nuclear stress test to be arranged for as soon as possible  - she is advised to continue her current medical therapy    - an echocardiogram may be considered in the future if needed as her brother had history of aortic valve disease and underwent TAVR recently  - she is advised to continue follow-up with her family physician Dr Demetra Teixeira on a regular basis  - cardiology for follow-up in future as needed  Abnormal EKG  Evaluation and plan as noted above  *-*-*-*-*-*-*-*-*-*-*-*-*-*-*-*-*-*-*-*-*-*-*-*-*-*-*-*-*-*-*-*-*-*-*-*-*-*-*-*-*-*-*-*-*-*-*-*-*-*-*-*-*-*-  CURRENT ECG:  Results for orders placed or performed in visit on 10/30/19   POCT ECG    Narrative    Sinus rhythm with left axis deviation, normal intervals, delayed R-wave transition, cannot definitively rule out prior anterior infarction, borderline low QRS voltages  HR 68 beats per minute  *-*-*-*-*-*-*-*-*-*-*-*-*-*-*-*-*-*-*-*-*-*-*-*-*-*-*-*-*-*-*-*-*-*-*-*-*-*-*-*-*-*-*-*-*-*-*-*-*-*-*-*-*-*-  HISTORY OF PRESENT ILLNESS:  Patient is a pleasant 62-year-old  female with medical history significant for:  1  History of ankylosing spondylosis with significant joint deformities and spinal stenosis, status post r L2-L laminectomy and  Screw and parish placement in the back approximately 3 years back  2  History of dyslipidemia  4  Fibromyalgia  5  Carpal tunnel syndrome status post release surgery  6  History of heme-positive stools  7  History of rotator cuff injury    She is being considered for right total hip replacement procedure which is planned for November 7, 2019  As part of pre-surgical workup she had an ECG and that was noted to be abnormal with suggestion of possible prior anterior infarction  She is now referred for further evaluation  From a symptom perspective she denies any typical symptoms of angina  She does however report 3 occurrences of some discomfort in her chest felt as heaviness in her upper chest close to shoulder blades radiating to her back  Symptoms occurred in the last 5 months and occurred spontaneously and lasted about 30 seconds or so    She her exercise tolerance is very limited due to her back problem  She does use a cane  She denies any symptoms with normal ambulation  She denies any palpitations, orthopnea, PND or worsening pedal edema  She does report that long time back she used to have palpitations and at that time she had cardiac evaluation including stress test  This was several years ago  She has had no recent hospitalizations or other illnesses      *-*-*-*-*-*-*-*-*-*-*-*-*-*-*-*-*-*-*-*-*-*-*-*-*-*-*-*-*-*-*-*-*-*-*-*-*-*-*-*-*-*-*-*-*-*-*-*-*-*-*-*-*-*  PAST MEDICAL HISTORY:   Past Medical History:   Diagnosis Date    Ankylosing spondylitis ([facility] [address] )     Anxiety     LAST ASSESSED: 16    Arthritis     Back pain     Carpal tunnel syndrome     Fibromyalgia     LAST ASSESSED: 16    Fibromyalgia, primary     Heme positive stool     LAST ASSESSED: 10/22/16    High cholesterol     Hyperlipidemia     under control since weight loss    Impingement syndrome of left shoulder     LAST ASSESSED: 17    Impingement syndrome of right shoulder     LAST ASSESSED:     Long term use of drug     LAST ASSESSED: 16    No known health problems     NO PERTINENT PAST MEDICAL HX    RLS (restless legs syndrome)     Rotator cuff injury     right    Spinal stenosis     Spinal stenosis     Spondylitis (HCC)     Spondyloarthritis     RESOLVED: 16    Vitamin D deficiency        PAST SURGICAL HISTORY:   Past Surgical History:   Procedure Laterality Date    BACK SURGERY      CARPAL TUNNEL RELEASE Left     CERVICAL CONIZATION   W/ LASER      CERVICAL CONIZATION     SECTION      COLONOSCOPY      DILATION AND CURETTAGE OF UTERUS      FL INJECTION RIGHT HIP (NON ARTHROGRAM)  2019    FL INJECTION RIGHT HIP (NON ARTHROGRAM)  2019    HAND SURGERY      OH ARTHRODESIS POSTERIOR/POSTEROLATERAL LUMBAR N/A 4/3/2017    Procedure: L2-L5 OPEN DECOMPRESSIVE LUMBAR LAMINECTOMY W/ PEDICLE SCREW AND NILDA FIXATION FUSION (IMPULSE); REMOVAL OF SKIN TAG MID BACK;  Surgeon: Anoop Lopez MD;  [facility];  Service: Neurosurgery    TX COLONOSCOPY FLX DX W/COLLJ SPEC WHEN PFRMD N/A 10/7/2016    Procedure: EGD AND COLONOSCOPY;  Surgeon: Rachel Huerta MD;  Location: [facility]; Service: Gastroenterology    TX WRIST Mayra Beau LIG Left 4/26/2018    Procedure: RELEASE CARPAL TUNNEL ENDOSCOPIC;  Surgeon: Matthew Kaur MD;  [facility];  Service: Orthopedics    TX WRIST Mayra Beau LIG Right 6/14/2018    Procedure: RELEASE CARPAL TUNNEL ENDOSCOPIC;  Surgeon: Matthew Kaur MD;  [facility];  Service: Orthopedics    RETINAL LASER PROCEDURE      TUBAL LIGATION         FAMILY HISTORY:  Family History   Problem Relation Age of Onset    Arthritis Mother    Charlene Nam Breast cancer Mother 67    Hypertension Mother     Heart attack Mother         MYOCARDIAL INFARCTION    Heart attack Father     Hypertension Father     Stroke Father         Jai Sanchristopher (CVA)    Arthritis Sister     Uterine cancer Sister     Endometrial cancer Sister 61    Heart attack Brother     Prostate cancer Brother 58    Arthritis Brother     Melanoma Brother     Cancer Brother     Arthritis Family     Hyperlipidemia Family     Depression Son     Breast cancer Maternal Aunt 36    No Known Problems Daughter     Other Brother         hunting accident (shot)    Melanoma Brother     Prostate cancer Brother     Heart attack Brother     Stroke Brother     Arthritis Sister     Heart attack Sister     No Known Problems Son     Lung cancer Maternal Uncle      Her dad passed away of sudden cardiac death due to MI at age 79 years  Her brother had MI at age 79 and underwent PCI  He recently had TAVR procedure      SOCIAL HISTORY:  [unfilled]  Social History     Tobacco Use   Smoking Status Never Smoker   Smokeless Tobacco Never Used     Social History     Substance and Sexual Activity   Alcohol Use No     Social History Substance and Sexual Activity   Drug Use No     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  *-*-*-*-*-*-*-*-*-*-*-*-*-*-*-*-*-*-*-*-*-*-*-*-*-*-*-*-*-*-*-*-*-*-*-*-*-*-*-*-*-*-*-*-*-*-*-*-*-*-*-*-*-*  ALLERGIES:  No Known Allergies  *-*-*-*-*-*-*-*-*-*-*-*-*-*-*-*-*-*-*-*-*-*-*-*-*-*-*-*-*-*-*-*-*-*-*-*-*-*-*-*-*-*-*-*-*-*-*-*-*-*-*-*-*-*  CURRENT OUTPATIENT MEDICATIONS:     Current Outpatient Medications:     ascorbic acid (VITAMIN C) 500 mg tablet, Take 1 tablet (500 mg total) by mouth daily Start 30 days prior to surgery, Disp: 30 tablet, Rfl: 0    aspirin 81 MG tablet, Take 1 tablet by mouth daily, Disp: , Rfl:     Cholecalciferol (VITAMIN D) 2000 UNITS CAPS, Take 1 tablet by mouth daily, Disp: , Rfl:     clonazePAM (KlonoPIN) 0 5 mg tablet, Take 0 5 mg by mouth daily at bedtime, Disp: , Rfl: 4    clotrimazole 1 % external solution, 5 drops to the left ear BID for 7 days, Disp: 30 mL, Rfl: 0    enoxaparin (LOVENOX) 40 mg/0 4 mL, Inject 0 4 mL (40 mg total) under the skin daily in the early morning for 28 doses, Disp: 28 Syringe, Rfl: 0    ferrous sulfate 324 (65 Fe) mg, Take 1 tablet (324 mg total) by mouth 2 (two) times a day before meals Start 30 days prior to surgery, Disp: 60 tablet, Rfl: 0    folic acid (FOLVITE) 1 mg tablet, Take 1 tablet (1 mg total) by mouth daily Start 30 days prior to surgery, Disp: 30 tablet, Rfl: 0    gabapentin (NEURONTIN) 100 mg capsule, Take 1 capsule (100 mg total) by mouth 3 (three) times a day, Disp: 90 capsule, Rfl: 2    meloxicam (MOBIC) 15 mg tablet, Take 1 tablet by mouth daily, Disp: , Rfl:     methocarbamol (ROBAXIN) 750 mg tablet, TAKE 1 TABLET (750 MG TOTAL) BY MOUTH EVERY 6 (SIX) HOURS AS NEEDED FOR MUSCLE SPASMS, Disp: 100 tablet, Rfl: 0    PARoxetine (PAXIL) 10 mg tablet, TAKE 1 TABLET DAILY  , Disp: 30 tablet, Rfl: 6    polymyxin b-trimethoprim (POLYTRIM) ophthalmic solution, 5 drops to the affected EAR twice a day for 7 days, Disp: 10 mL, Rfl: 0    traMADol (ULTRAM) 50 mg tablet, Take 1 tablet (50 mg total) by mouth 2 (two) times a day as needed for moderate pain, Disp: 80 tablet, Rfl: 0    *-*-*-*-*-*-*-*-*-*-*-*-*-*-*-*-*-*-*-*-*-*-*-*-*-*-*-*-*-*-*-*-*-*-*-*-*-*-*-*-*-*-*-*-*-*-*-*-*-*-*-*-*-*  REVIEW OF SYMPTOMS:    Positive for:  Occasional atypical chest pain, limited exercise tolerance  Negative for: All remaining as reviewed below and in HPI  SYSTEM SYMPTOMS REVIEWED:  General--weight change, fever, night sweats  Respirato  Cardiovascular--chest pain, syncope, dyspnea on exertion, edema, decline in exercise tolerance, claudication   Gastrointestinal--persistent vomiting, diarrhea, abdominal distention, blood in stool   Muscular or skeletal--joint pain or swelling   Neurologic--headaches, syncope, abnormal movement  Hematologic--history of easy bruising and bleeding   Endocrine--thyroid enlargement, heat or cold intolerance, polyuria   Psychiatric--anxiety, depression     *-*-*-*-*-*-*-*-*-*-*-*-*-*-*-*-*-*-*-*-*-*-*-*-*-*-*-*-*-*-*-*-*-*-*-*-*-*-*-*-*-*-*-*-*-*-*-*-*-*-*-*-*-*-  VITAL SIGNS:  Vitals:    10/30/19 1447   BP: 110/80   Pulse: 68   SpO2: 98%   Weight: 76 7 kg (169 lb)   Height: 5' 1" (1 549 m)     Weight (last 2 days)     Date/Time   Weight    10/30/19 1447   76 7 (169)           ,   Wt Readings from Last 3 Encounters:   10/30/19 76 7 kg (169 lb)   10/17/19 75 8 kg (167 lb)   10/17/19 75 8 kg (167 lb)    , Body mass index is 31 93 kg/m²  *-*-*-*-*-*-*-*-*-*-*-*-*-*-*-*-*-*-*-*-*-*-*-*-*-*-*-*-*-*-*-*-*-*-*-*-*-*-*-*-*-*-*-*-*-*-*-*-*-*-*-*-*-*-  PHYSICAL EXAM:  General Appearance:    Alert, cooperative, no distress, appears stated age   Head, Eyes, ENT:    No obvious abnormality, moist mucous mebranes  Neck:   Supple, no carotid bruit or JVD   Back:     Symmetric, no curvature  Lungs:     Respirations unlabored   Clear to auscultation bilaterally,    Chest wall:    No tenderness or deformity   Heart:    Regular rate and rhythm, slightly distant heart sounds, no murmur, rub  or gallop  Abdomen:     Soft, non-tender, No obvious masses, or organomegaly   Extremities:   Extremities normal, no cyanosis or edema    Skin:   Skin color, texture, turgor normal, no rashes or lesions     *-*-*-*-*-*-*-*-*-*-*-*-*-*-*-*-*-*-*-*-*-*-*-*-*-*-*-*-*-*-*-*-*-*-*-*-*-*-*-*-*-*-*-*-*-*-*-*-*-*-*-*-*-*-  LABORATORY DATA:  I have personally reviewed pertinent labs      Sodium   Date Value Ref Range Status   11/28/2015 143 136 - 145 mmol/L Final   10/19/2015 144 136 - 145 mmol/L Final   11/28/2014 138 136 - 145 mmol/L Final     Potassium   Date Value Ref Range Status   10/15/2019 4 3 3 5 - 5 3 mmol/L Final   12/31/2018 4 2 3 5 - 5 3 mmol/L Final   12/22/2017 4 3 3 5 - 5 3 mmol/L Final   11/28/2015 4 0 3 5 - 5 3 mmol/L Final   10/19/2015 4 5 3 5 - 5 3 mmol/L Final   11/28/2014 4 2 3 5 - 5 3 mmol/L Final     Chloride   Date Value Ref Range Status   10/15/2019 105 100 - 108 mmol/L Final   12/31/2018 106 100 - 108 mmol/L Final   12/22/2017 110 (H) 100 - 108 mmol/L Final   11/28/2015 108 100 - 108 mmol/L Final   10/19/2015 110 (H) 100 - 108 mmol/L Final   11/28/2014 105 100 - 108 mmol/L Final     CO2   Date Value Ref Range Status   10/15/2019 24 21 - 32 mmol/L Final   12/31/2018 28 21 - 32 mmol/L Final   12/22/2017 29 21 - 32 mmol/L Final   11/28/2015 29 21 0 - 32 0 mmol/L Final   10/19/2015 27 21 0 - 32 0 mmol/L Final   11/28/2014 29 23 - 33 mmol/L Final     Anion Gap   Date Value Ref Range Status   11/28/2015 6 4 - 13 mmol/L Final   10/19/2015 7 4 - 13 mmol/L Final   11/28/2014 4 4 - 13 mmol/L Final     BUN   Date Value Ref Range Status   10/15/2019 26 (H) 5 - 25 mg/dL Final   12/31/2018 19 5 - 25 mg/dL Final   12/22/2017 24 5 - 25 mg/dL Final   11/28/2015 19 5 - 25 mg/dL Final   10/19/2015 29 (H) 5 - 25 mg/dL Final   11/28/2014 22 5 - 25 mg/dL Final     Creatinine   Date Value Ref Range Status   10/15/2019 0 74 0 60 - 1 30 mg/dL Final     Comment:     Standardized to IDMS reference method   12/31/2018 0 75 0 60 - 1 30 mg/dL Final     Comment:     Standardized to IDMS reference method   12/22/2017 0 64 0 60 - 1 30 mg/dL Final     Comment:     Standardized to IDMS reference method   11/28/2015 0 76 0 60 - 1 30 mg/dL Final     Comment:     Standardized to IDMS reference method   10/19/2015 0 71 0 60 - 1 30 mg/dL Final     Comment:     Standardized to IDMS reference method   11/28/2014 0 78 0 60 - 1 30 mg/dL Final     Comment:     Standardized to IDMS reference method     eGFR   Date Value Ref Range Status   10/15/2019 84 ml/min/1 73sq m Final   12/31/2018 83 ml/min/1 73sq m Final   12/22/2017 94 ml/min/1 73sq m Final     Glucose   Date Value Ref Range Status   11/28/2015 89 65 - 140 mg/dL Final     Comment:     If patient is fasting, the ADA then defines impaired fasting glucose as  >100 mg/dl and diabetes as  >or equal to 126 mg/dl  10/19/2015 96 65 - 140 mg/dL Final     Comment:     If patient is fasting, the ADA then defines impaired fasting glucose as  >100 mg/dl and diabetes as  >or equal to 126 mg/dl  11/28/2014 100 65 - 140 mg/dL Final     Comment:     If patient is fasting, the ADA then defines impaired fasting glucose as  >100 mg/dl and diabetes as  >or equal to 126 mg/dl  Calcium   Date Value Ref Range Status   10/15/2019 9 3 8 3 - 10 1 mg/dL Final   12/31/2018 9 1 8 3 - 10 1 mg/dL Final   12/22/2017 8 7 8 3 - 10 1 mg/dL Final   11/28/2015 8 7 8 3 - 10 1 mg/dL Final   10/19/2015 9 1 8 3 - 10 1 mg/dL Final   11/28/2014 9 1 8 3 - 10 1 mg/dL Final     AST   Date Value Ref Range Status   10/15/2019 14 5 - 45 U/L Final     Comment:       Specimen collection should occur prior to Sulfasalazine administration due to the potential for falsely depressed results      12/31/2018 16 5 - 45 U/L Final     Comment:       Specimen collection should occur prior to Sulfasalazine administration due to the potential for falsely depressed results  12/22/2017 18 5 - 45 U/L Final     Comment:       Specimen collection should occur prior to Sulfasalazine administration due to the potential for falsely depressed results  11/28/2015 17 5 - 45 U/L Final   10/19/2015 17 5 - 45 U/L Final   11/28/2014 20 0 - 45 U/L Final     ALT   Date Value Ref Range Status   10/15/2019 18 12 - 78 U/L Final     Comment:       Specimen collection should occur prior to Sulfasalazine and/or Sulfapyridine administration due to the potential for falsely depressed results  12/31/2018 25 12 - 78 U/L Final     Comment:       Specimen collection should occur prior to Sulfasalazine and/or Sulfapyridine administration due to the potential for falsely depressed results  12/22/2017 24 12 - 78 U/L Final     Comment:       Specimen collection should occur prior to Sulfasalazine and/or Sulfapyridine administration due to the potential for falsely depressed results      11/28/2015 24 12 - 78 U/L Final   10/19/2015 28 12 - 78 U/L Final   11/28/2014 25 14 - 59 U/L Final     Alkaline Phosphatase   Date Value Ref Range Status   10/15/2019 62 46 - 116 U/L Final   12/31/2018 65 46 - 116 U/L Final   12/22/2017 53 46 - 116 U/L Final   11/28/2015 60 46 - 116 U/L Final   10/19/2015 71 46 - 116 U/L Final   11/28/2014 82 50 - 136 U/L Final     Total Protein   Date Value Ref Range Status   11/28/2015 6 7 6 4 - 8 2 g/dL Final   10/19/2015 7 4 6 4 - 8 2 g/dL Final   11/28/2014 7 0 6 4 - 8 2 g/dL Final     Total Bilirubin   Date Value Ref Range Status   11/28/2015 0 50 0 20 - 1 00 mg/dL Final   10/19/2015 0 37 0 20 - 1 00 mg/dL Final   11/28/2014 0 35 0 20 - 1 00 mg/dL Final     WBC   Date Value Ref Range Status   10/15/2019 8 19 4 31 - 10 16 Thousand/uL Final   12/31/2018 5 72 4 31 - 10 16 Thousand/uL Final   12/22/2017 5 59 4 31 - 10 16 Thousand/uL Final   11/28/2015 5 04 4 31 - 10 16 Thousand/uL Final   11/28/2014 6 85 4 31 - 10 16 Thousand/uL Final     Hemoglobin   Date Value Ref Range Status   10/15/2019 13 4 11 5 - 15 4 g/dL Final   12/31/2018 13 9 11 5 - 15 4 g/dL Final   12/22/2017 13 1 11 5 - 15 4 g/dL Final   11/28/2015 12 8 11 5 - 15 4 g/dL Final   11/28/2014 13 6 11 5 - 15 4 g/dL Final     Platelets   Date Value Ref Range Status   10/15/2019 366 149 - 390 Thousands/uL Final   12/31/2018 367 149 - 390 Thousands/uL Final   12/22/2017 336 149 - 390 Thousands/uL Final   11/28/2015 296 149 - 390 Thousand/uL Final   11/28/2014 348 149 - 390 Thousand/uL Final     PTT   Date Value Ref Range Status   10/15/2019 28 23 - 37 seconds Final     Comment:     Therapeutic Heparin Range =  60-90 seconds   03/06/2017 28 24 - 36 seconds Final     INR   Date Value Ref Range Status   10/15/2019 0 94 0 84 - 1 19 Final   03/06/2017 0 92 0 86 - 1 16 Final     CK-MB   Date Value Ref Range Status   12/22/2017 3 4 0 0 - 5 0 ng/mL Final     No results found for: TSH  Cholesterol   Date Value Ref Range Status   11/28/2015 176 mg/dL Final     Comment:     CHOLESTEROL:       Desirable        <200 mg/dl       Borderline High  200-239 mg/dl       High             >239 mg/dl  ____________________________________       HDL   Date Value Ref Range Status   11/28/2015 54 mg/dL Final     Comment:     HDL:       High       >59 mg/dl       Low        <41 mg/dl  ______________________________       HDL, Direct   Date Value Ref Range Status   09/08/2018 56 40 - 60 mg/dL Final     Comment:       HDL Cholesterol:       High    >60 mg/dL       Low     <41 mg/dL  Specimen collection should occur prior to Metamizole administration due to the potential for falsley depressed results       Triglycerides   Date Value Ref Range Status   09/08/2018 61 <=150 mg/dL Final     Comment:       Triglyceride:     Normal          <150 mg/dl     Borderline High 150-199 mg/dl     High            200-499 mg/dl        Very High       >499 mg/dl    Specimen collection should occur prior to N-Acetylcysteine or Metamizole administration due to the potential for falsely depressed results  11/28/2015 112 mg/dL Final     Comment:     TRIGLYCERIDE:       Normal              <150 mg/dl       Borderline High    150-199 mg/dl       High               200-499 mg/dl       Very High          >499 mg/dl  _______________________________________        Hemoglobin A1C   Date Value Ref Range Status   10/15/2019 5 6 4 2 - 6 3 % Final   08/01/2015 5 7 (H) 4 0 - 5 6 % Final     Comment:     5 7-6 4% impaired fasting glucose  >=6 5% diagnosis of diabetes  Falsely low levels are seen in conditions linked to short RBC life span  ï¿½ hemolytic anemia, and splenomegaly  Falsely elevated levels are seen in situations where there is an  increased production of RBC ï¿½ receipt of erythropoietin or blood  transfusions  Adopted from ADA-Clinical Practice Recommendations       Gram Stain Result   Date Value Ref Range Status   10/03/2019 1+ Polys (A)  Final   10/03/2019 3+ Gram negative rods (A)  Final   10/03/2019 3+ Gram positive cocci in pairs (A)  Final     Wound Culture   Date Value Ref Range Status   10/03/2019 2+ Growth of Stenotrophomonas maltophilia (A)  Final   10/03/2019 2+ Growth of Beta Hemolytic Streptococcus Group B (A)  Final     Comment: This organism is intrinisically susceptible to Penicillin  If sensitivites to other antibiotics are required, please call the Microbiology Department at [phone] within 5 days  10/03/2019 2+ Growth of   Final     Comment:     Mixed Skin Giuliana     MRSA Culture Only   Date Value Ref Range Status   03/06/2017   Final    No Methicillin Resistant Staphlyococcus aureus (MRSA) isolated       *-*-*-*-*-*-*-*-*-*-*-*-*-*-*-*-*-*-*-*-*-*-*-*-*-*-*-*-*-*-*-*-*-*-*-*-*-*-*-*-*-*-*-*-*-*-*-*-*-*-*-*-*-*-  RADIOLOGY RESULTS:  Fl Injection Right Hip (non Arthrogram)    Result Date: 7/24/2019  Impression: Technically successful right hip joint  anesthetic and steroid injection  I reviewed the above findings and procedure with Dr Sharon Bailey  PERFORMED, DICTATED AND SIGNED BY: Ariana Kramer PA-C Workstation performed: [id number]       *-*-*-*-*-*-*-*-*-*-*-*-*-*-*-*-*-*-*-*-*-*-*-*-*-*-*-*-*-*-*-*-*-*-*-*-*-*-*-*-*-*-*-*-*-*-*-*-*-*-*-*-*-*-  ECHOCARDIOGRAM AND OTHER CARDIOLOGY RESULTS:  No results found for this or any previous visit  No results found for this or any previous visit  No results found for this or any previous visit  No results found for this or any previous visit       *-*-*-*-*-*-*-*-*-*-*-*-*-*-*-*-*-*-*-*-*-*-*-*-*-*-*-*-*-*-*-*-*-*-*-*-*-*-*-*-*-*-*-*-*-*-*-*-*-*-*-*-*-*-  SIGNATURES:   [unfilled]   Mo Anton MD     CC:   MD Crystal Wright, *

## 2019-11-01 ENCOUNTER — HOSPITAL ENCOUNTER (OUTPATIENT)
Dept: RADIOLOGY | Facility: HOSPITAL | Age: 69
Discharge: HOME | End: 2019-11-01
Attending: INTERNAL MEDICINE
Payer: COMMERCIAL

## 2019-11-01 ENCOUNTER — ANESTHESIA EVENT (OUTPATIENT)
Dept: PERIOP | Facility: HOSPITAL | Age: 69
DRG: 470 | End: 2019-11-01
Payer: COMMERCIAL

## 2019-11-01 ENCOUNTER — HOSPITAL ENCOUNTER (OUTPATIENT)
Dept: NON INVASIVE DIAGNOSTICS | Facility: HOSPITAL | Age: 69
Discharge: HOME | End: 2019-11-01
Attending: INTERNAL MEDICINE
Payer: COMMERCIAL

## 2019-11-01 ENCOUNTER — TELEPHONE (OUTPATIENT)
Dept: CARDIOLOGY CLINIC | Facility: CLINIC | Age: 69
End: 2019-11-01

## 2019-11-01 DIAGNOSIS — R94.31 ABNORMAL EKG: ICD-10-CM

## 2019-11-01 DIAGNOSIS — Z01.810 PRE-OPERATIVE CARDIOVASCULAR EXAMINATION: ICD-10-CM

## 2019-11-01 PROCEDURE — 93017 CV STRESS TEST TRACING ONLY: CPT

## 2019-11-01 PROCEDURE — 93018 CV STRESS TEST I&R ONLY: CPT | Performed by: INTERNAL MEDICINE

## 2019-11-01 PROCEDURE — A9502 TC99M TETROFOSMIN: HCPCS

## 2019-11-01 PROCEDURE — 78452 HT MUSCLE IMAGE SPECT MULT: CPT | Performed by: INTERNAL MEDICINE

## 2019-11-01 PROCEDURE — 78452 HT MUSCLE IMAGE SPECT MULT: CPT

## 2019-11-01 PROCEDURE — 93016 CV STRESS TEST SUPVJ ONLY: CPT | Performed by: INTERNAL MEDICINE

## 2019-11-01 RX ADMIN — REGADENOSON 0.4 MG: 0.08 INJECTION, SOLUTION INTRAVENOUS at 09:13

## 2019-11-01 NOTE — TELEPHONE ENCOUNTER
----- Message from Chrissy Jimenez MD sent at 11/1/2019  2:52 PM EDT -----  Please inform patient that her nuclear stress test done today was normal and the ascending clearance for her surgery to her physicians  Thank you      AM

## 2019-11-01 NOTE — TELEPHONE ENCOUNTER
Left message patient voice mail per Dr Jason Butler  Stress test normal   Cleared for surgery  Dr Jason Butler will notifiy surgeon

## 2019-11-04 ENCOUNTER — TELEPHONE (OUTPATIENT)
Dept: INTERNAL MEDICINE CLINIC | Facility: CLINIC | Age: 69
End: 2019-11-04

## 2019-11-04 LAB
CHEST PAIN STATEMENT: NORMAL
MAX DIASTOLIC BP: 74 MMHG
MAX HEART RATE: 106 BPM
MAX PREDICTED HEART RATE: 153 BPM
MAX. SYSTOLIC BP: 123 MMHG
PROTOCOL NAME: NORMAL
REASON FOR TERMINATION: NORMAL
TARGET HR FORMULA: NORMAL
TIME IN EXERCISE PHASE: NORMAL

## 2019-11-04 NOTE — TELEPHONE ENCOUNTER
Please addend OV note from 10/17 to state she is cleared  Cardiology cleared her 10/30            [phone] with any questions

## 2019-11-07 ENCOUNTER — ANESTHESIA (OUTPATIENT)
Dept: PERIOP | Facility: HOSPITAL | Age: 69
DRG: 470 | End: 2019-11-07
Payer: COMMERCIAL

## 2019-11-07 ENCOUNTER — HOSPITAL ENCOUNTER (INPATIENT)
Facility: HOSPITAL | Age: 69
LOS: 1 days | Discharge: HOME | DRG: 470 | End: 2019-11-08
Attending: ORTHOPAEDIC SURGERY | Admitting: ORTHOPAEDIC SURGERY
Payer: COMMERCIAL

## 2019-11-07 DIAGNOSIS — Z96.641 STATUS POST TOTAL HIP REPLACEMENT, RIGHT: ICD-10-CM

## 2019-11-07 DIAGNOSIS — M16.11 PRIMARY OSTEOARTHRITIS OF ONE HIP, RIGHT: Primary | ICD-10-CM

## 2019-11-07 PROCEDURE — C1776 JOINT DEVICE (IMPLANTABLE): HCPCS | Performed by: ORTHOPAEDIC SURGERY

## 2019-11-07 PROCEDURE — G8987 SELF CARE CURRENT STATUS: HCPCS

## 2019-11-07 PROCEDURE — 0SR902A REPLACEMENT OF RIGHT HIP JOINT WITH METAL ON POLYETHYLENE SYNTHETIC SUBSTITUTE, UNCEMENTED, OPEN APPROACH: ICD-10-PCS | Performed by: ORTHOPAEDIC SURGERY

## 2019-11-07 PROCEDURE — 97167 OT EVAL HIGH COMPLEX 60 MIN: CPT

## 2019-11-07 PROCEDURE — NC001 PR NO CHARGE: Performed by: ORTHOPAEDIC SURGERY

## 2019-11-07 PROCEDURE — C1713 ANCHOR/SCREW BN/BN,TIS/BN: HCPCS | Performed by: ORTHOPAEDIC SURGERY

## 2019-11-07 PROCEDURE — G8978 MOBILITY CURRENT STATUS: HCPCS

## 2019-11-07 PROCEDURE — 27130 TOTAL HIP ARTHROPLASTY: CPT | Performed by: ORTHOPAEDIC SURGERY

## 2019-11-07 PROCEDURE — G8988 SELF CARE GOAL STATUS: HCPCS

## 2019-11-07 PROCEDURE — G8979 MOBILITY GOAL STATUS: HCPCS

## 2019-11-07 PROCEDURE — 97163 PT EVAL HIGH COMPLEX 45 MIN: CPT

## 2019-11-07 DEVICE — ARTICUL/EZE FEMORAL HEAD DIAMETER 32MM +9 12/14 TAPER
Type: IMPLANTABLE DEVICE | Site: HIP | Status: FUNCTIONAL
Brand: ARTICUL/EZE

## 2019-11-07 DEVICE — PINNACLE CANCELLOUS BONE SCREW 6.5MM X 25MM
Type: IMPLANTABLE DEVICE | Site: HIP | Status: FUNCTIONAL
Brand: PINNACLE

## 2019-11-07 DEVICE — CORAIL HIP SYSTEM CEMENTLESS FEMORAL STEM 12/14 AMT 135 DEGREES KHO SIZE 10 HA COATED HIGH OFFSET NO COLLAR
Type: IMPLANTABLE DEVICE | Site: HIP | Status: FUNCTIONAL
Brand: CORAIL

## 2019-11-07 DEVICE — PINNACLE POROCOAT ACETABULAR SHELL SECTOR II 48MM OD
Type: IMPLANTABLE DEVICE | Site: HIP | Status: FUNCTIONAL
Brand: PINNACLE POROCOAT

## 2019-11-07 DEVICE — PINNACLE HIP SOLUTIONS ALTRX POLYETHYLENE ACETABULAR LINER +4 10 DEGREES 32MM ID 48MM OD
Type: IMPLANTABLE DEVICE | Site: HIP | Status: FUNCTIONAL
Brand: PINNACLE ALTRX

## 2019-11-07 RX ORDER — KETAMINE HCL IN NACL, ISO-OSM 100MG/10ML
SYRINGE (ML) INJECTION AS NEEDED
Status: DISCONTINUED | OUTPATIENT
Start: 2019-11-07 | End: 2019-11-07 | Stop reason: SURG

## 2019-11-07 RX ORDER — LIDOCAINE HYDROCHLORIDE 10 MG/ML
INJECTION, SOLUTION INFILTRATION; PERINEURAL AS NEEDED
Status: DISCONTINUED | OUTPATIENT
Start: 2019-11-07 | End: 2019-11-07 | Stop reason: SURG

## 2019-11-07 RX ORDER — ACETAMINOPHEN 325 MG/1
975 TABLET ORAL EVERY 8 HOURS
Status: DISCONTINUED | OUTPATIENT
Start: 2019-11-07 | End: 2019-11-08 | Stop reason: HOSPADM

## 2019-11-07 RX ORDER — METHOCARBAMOL 500 MG/1
500 TABLET, FILM COATED ORAL EVERY 6 HOURS SCHEDULED
Status: DISCONTINUED | OUTPATIENT
Start: 2019-11-07 | End: 2019-11-08 | Stop reason: HOSPADM

## 2019-11-07 RX ORDER — MIDAZOLAM HYDROCHLORIDE 2 MG/2ML
INJECTION, SOLUTION INTRAMUSCULAR; INTRAVENOUS AS NEEDED
Status: DISCONTINUED | OUTPATIENT
Start: 2019-11-07 | End: 2019-11-07 | Stop reason: SURG

## 2019-11-07 RX ORDER — DEXAMETHASONE SODIUM PHOSPHATE 10 MG/ML
INJECTION, SOLUTION INTRAMUSCULAR; INTRAVENOUS AS NEEDED
Status: DISCONTINUED | OUTPATIENT
Start: 2019-11-07 | End: 2019-11-07 | Stop reason: SURG

## 2019-11-07 RX ORDER — ONDANSETRON 2 MG/ML
INJECTION INTRAMUSCULAR; INTRAVENOUS AS NEEDED
Status: DISCONTINUED | OUTPATIENT
Start: 2019-11-07 | End: 2019-11-07 | Stop reason: SURG

## 2019-11-07 RX ORDER — SODIUM CHLORIDE, SODIUM LACTATE, POTASSIUM CHLORIDE, CALCIUM CHLORIDE 600; 310; 30; 20 MG/100ML; MG/100ML; MG/100ML; MG/100ML
INJECTION, SOLUTION INTRAVENOUS CONTINUOUS PRN
Status: DISCONTINUED | OUTPATIENT
Start: 2019-11-07 | End: 2019-11-07

## 2019-11-07 RX ORDER — ACETAMINOPHEN 325 MG/1
975 TABLET ORAL ONCE
Status: COMPLETED | OUTPATIENT
Start: 2019-11-07 | End: 2019-11-07

## 2019-11-07 RX ORDER — GABAPENTIN 300 MG/1
300 CAPSULE ORAL ONCE
Status: COMPLETED | OUTPATIENT
Start: 2019-11-07 | End: 2019-11-07

## 2019-11-07 RX ORDER — CEFAZOLIN SODIUM 2 G/50ML
2000 SOLUTION INTRAVENOUS EVERY 8 HOURS
Status: COMPLETED | OUTPATIENT
Start: 2019-11-07 | End: 2019-11-08

## 2019-11-07 RX ORDER — OXYCODONE HYDROCHLORIDE 5 MG/1
5 TABLET ORAL EVERY 4 HOURS PRN
Status: DISCONTINUED | OUTPATIENT
Start: 2019-11-07 | End: 2019-11-08 | Stop reason: HOSPADM

## 2019-11-07 RX ORDER — PROPOFOL 10 MG/ML
INJECTION, EMULSION INTRAVENOUS AS NEEDED
Status: DISCONTINUED | OUTPATIENT
Start: 2019-11-07 | End: 2019-11-07 | Stop reason: SURG

## 2019-11-07 RX ORDER — ONDANSETRON 2 MG/ML
4 INJECTION INTRAMUSCULAR; INTRAVENOUS ONCE AS NEEDED
Status: DISCONTINUED | OUTPATIENT
Start: 2019-11-07 | End: 2019-11-07 | Stop reason: HOSPADM

## 2019-11-07 RX ORDER — PROMETHAZINE HYDROCHLORIDE 25 MG/ML
12.5 INJECTION, SOLUTION INTRAMUSCULAR; INTRAVENOUS ONCE AS NEEDED
Status: DISCONTINUED | OUTPATIENT
Start: 2019-11-07 | End: 2019-11-07 | Stop reason: HOSPADM

## 2019-11-07 RX ORDER — ONDANSETRON 2 MG/ML
4 INJECTION INTRAMUSCULAR; INTRAVENOUS EVERY 6 HOURS PRN
Status: DISCONTINUED | OUTPATIENT
Start: 2019-11-07 | End: 2019-11-08 | Stop reason: HOSPADM

## 2019-11-07 RX ORDER — CALCIUM CARBONATE 200(500)MG
1000 TABLET,CHEWABLE ORAL DAILY PRN
Status: DISCONTINUED | OUTPATIENT
Start: 2019-11-07 | End: 2019-11-08 | Stop reason: HOSPADM

## 2019-11-07 RX ORDER — CEFAZOLIN SODIUM 2 G/50ML
2000 SOLUTION INTRAVENOUS ONCE
Status: COMPLETED | OUTPATIENT
Start: 2019-11-07 | End: 2019-11-07

## 2019-11-07 RX ORDER — PAROXETINE 10 MG/1
10 TABLET, FILM COATED ORAL DAILY
Status: DISCONTINUED | OUTPATIENT
Start: 2019-11-07 | End: 2019-11-08 | Stop reason: HOSPADM

## 2019-11-07 RX ORDER — FENTANYL CITRATE 50 UG/ML
INJECTION, SOLUTION INTRAMUSCULAR; INTRAVENOUS AS NEEDED
Status: DISCONTINUED | OUTPATIENT
Start: 2019-11-07 | End: 2019-11-07 | Stop reason: SURG

## 2019-11-07 RX ORDER — HYDROMORPHONE HCL/PF 1 MG/ML
0.2 SYRINGE (ML) INJECTION
Status: COMPLETED | OUTPATIENT
Start: 2019-11-07 | End: 2019-11-07

## 2019-11-07 RX ORDER — GLYCOPYRROLATE 0.2 MG/ML
INJECTION INTRAMUSCULAR; INTRAVENOUS AS NEEDED
Status: DISCONTINUED | OUTPATIENT
Start: 2019-11-07 | End: 2019-11-07 | Stop reason: SURG

## 2019-11-07 RX ORDER — TRAMADOL HYDROCHLORIDE 50 MG/1
50 TABLET ORAL EVERY 6 HOURS PRN
Qty: 60 TABLET | Refills: 0 | Status: SHIPPED | OUTPATIENT
Start: 2019-11-07 | End: 2021-05-03 | Stop reason: SDUPTHER

## 2019-11-07 RX ORDER — OXYCODONE HYDROCHLORIDE 5 MG/1
TABLET ORAL
Qty: 30 TABLET | Refills: 0 | Status: SHIPPED | OUTPATIENT
Start: 2019-11-07 | End: 2022-01-03 | Stop reason: ALTCHOICE

## 2019-11-07 RX ORDER — HYDROMORPHONE HCL 110MG/55ML
PATIENT CONTROLLED ANALGESIA SYRINGE INTRAVENOUS AS NEEDED
Status: DISCONTINUED | OUTPATIENT
Start: 2019-11-07 | End: 2019-11-07 | Stop reason: SURG

## 2019-11-07 RX ORDER — GABAPENTIN 100 MG/1
100 CAPSULE ORAL 3 TIMES DAILY
Status: DISCONTINUED | OUTPATIENT
Start: 2019-11-07 | End: 2019-11-08 | Stop reason: HOSPADM

## 2019-11-07 RX ORDER — OXYCODONE HYDROCHLORIDE 5 MG/1
10 TABLET ORAL EVERY 4 HOURS PRN
Status: DISCONTINUED | OUTPATIENT
Start: 2019-11-07 | End: 2019-11-08 | Stop reason: HOSPADM

## 2019-11-07 RX ORDER — CHLORHEXIDINE GLUCONATE 0.12 MG/ML
15 RINSE ORAL ONCE
Status: COMPLETED | OUTPATIENT
Start: 2019-11-07 | End: 2019-11-07

## 2019-11-07 RX ORDER — NEOSTIGMINE METHYLSULFATE 1 MG/ML
INJECTION INTRAVENOUS AS NEEDED
Status: DISCONTINUED | OUTPATIENT
Start: 2019-11-07 | End: 2019-11-07 | Stop reason: SURG

## 2019-11-07 RX ORDER — SODIUM CHLORIDE, SODIUM LACTATE, POTASSIUM CHLORIDE, CALCIUM CHLORIDE 600; 310; 30; 20 MG/100ML; MG/100ML; MG/100ML; MG/100ML
1.5 INJECTION, SOLUTION INTRAVENOUS CONTINUOUS
Status: DISCONTINUED | OUTPATIENT
Start: 2019-11-07 | End: 2019-11-08 | Stop reason: HOSPADM

## 2019-11-07 RX ORDER — SODIUM CHLORIDE, SODIUM LACTATE, POTASSIUM CHLORIDE, CALCIUM CHLORIDE 600; 310; 30; 20 MG/100ML; MG/100ML; MG/100ML; MG/100ML
125 INJECTION, SOLUTION INTRAVENOUS CONTINUOUS
Status: DISCONTINUED | OUTPATIENT
Start: 2019-11-07 | End: 2019-11-07

## 2019-11-07 RX ORDER — ROCURONIUM BROMIDE 10 MG/ML
INJECTION, SOLUTION INTRAVENOUS AS NEEDED
Status: DISCONTINUED | OUTPATIENT
Start: 2019-11-07 | End: 2019-11-07 | Stop reason: SURG

## 2019-11-07 RX ORDER — SODIUM CHLORIDE, SODIUM LACTATE, POTASSIUM CHLORIDE, CALCIUM CHLORIDE 600; 310; 30; 20 MG/100ML; MG/100ML; MG/100ML; MG/100ML
INJECTION, SOLUTION INTRAVENOUS CONTINUOUS PRN
Status: DISCONTINUED | OUTPATIENT
Start: 2019-11-07 | End: 2019-11-07 | Stop reason: SURG

## 2019-11-07 RX ORDER — HYDROMORPHONE HCL/PF 1 MG/ML
0.5 SYRINGE (ML) INJECTION EVERY 2 HOUR PRN
Status: DISCONTINUED | OUTPATIENT
Start: 2019-11-07 | End: 2019-11-08 | Stop reason: HOSPADM

## 2019-11-07 RX ORDER — MAGNESIUM HYDROXIDE 1200 MG/15ML
LIQUID ORAL AS NEEDED
Status: DISCONTINUED | OUTPATIENT
Start: 2019-11-07 | End: 2019-11-07 | Stop reason: HOSPADM

## 2019-11-07 RX ORDER — FENTANYL CITRATE/PF 50 MCG/ML
25 SYRINGE (ML) INJECTION
Status: DISCONTINUED | OUTPATIENT
Start: 2019-11-07 | End: 2019-11-07 | Stop reason: HOSPADM

## 2019-11-07 RX ORDER — CLONAZEPAM 0.5 MG/1
0.5 TABLET ORAL
Status: DISCONTINUED | OUTPATIENT
Start: 2019-11-07 | End: 2019-11-08 | Stop reason: HOSPADM

## 2019-11-07 RX ORDER — SENNOSIDES 8.6 MG
1 TABLET ORAL DAILY
Status: DISCONTINUED | OUTPATIENT
Start: 2019-11-08 | End: 2019-11-08 | Stop reason: HOSPADM

## 2019-11-07 RX ORDER — DOCUSATE SODIUM 100 MG/1
100 CAPSULE, LIQUID FILLED ORAL 2 TIMES DAILY
Status: DISCONTINUED | OUTPATIENT
Start: 2019-11-07 | End: 2019-11-08 | Stop reason: HOSPADM

## 2019-11-07 RX ADMIN — ACETAMINOPHEN 975 MG: 325 TABLET ORAL at 21:44

## 2019-11-07 RX ADMIN — ENOXAPARIN SODIUM 40 MG: 40 INJECTION SUBCUTANEOUS at 21:44

## 2019-11-07 RX ADMIN — GLYCOPYRROLATE 0.4 MG: 0.2 INJECTION, SOLUTION INTRAMUSCULAR; INTRAVENOUS at 08:45

## 2019-11-07 RX ADMIN — LIDOCAINE HYDROCHLORIDE 50 MG: 10 INJECTION, SOLUTION INFILTRATION; PERINEURAL at 07:35

## 2019-11-07 RX ADMIN — PHENYLEPHRINE HYDROCHLORIDE 100 MCG: 10 INJECTION INTRAVENOUS at 08:05

## 2019-11-07 RX ADMIN — CEFAZOLIN SODIUM 2000 MG: 2 SOLUTION INTRAVENOUS at 16:42

## 2019-11-07 RX ADMIN — PHENYLEPHRINE HYDROCHLORIDE 100 MCG: 10 INJECTION INTRAVENOUS at 08:17

## 2019-11-07 RX ADMIN — HYDROMORPHONE HYDROCHLORIDE 0.2 MG: 1 INJECTION, SOLUTION INTRAMUSCULAR; INTRAVENOUS; SUBCUTANEOUS at 10:15

## 2019-11-07 RX ADMIN — FENTANYL CITRATE 25 MCG: 50 INJECTION, SOLUTION INTRAMUSCULAR; INTRAVENOUS at 09:38

## 2019-11-07 RX ADMIN — HYDROMORPHONE HYDROCHLORIDE 0.2 MG: 1 INJECTION, SOLUTION INTRAMUSCULAR; INTRAVENOUS; SUBCUTANEOUS at 09:55

## 2019-11-07 RX ADMIN — HYDROMORPHONE HYDROCHLORIDE 0.25 MG: 2 INJECTION, SOLUTION INTRAMUSCULAR; INTRAVENOUS; SUBCUTANEOUS at 08:12

## 2019-11-07 RX ADMIN — CHLORHEXIDINE GLUCONATE 0.12% ORAL RINSE 15 ML: 1.2 LIQUID ORAL at 06:07

## 2019-11-07 RX ADMIN — ONDANSETRON 4 MG: 2 INJECTION INTRAMUSCULAR; INTRAVENOUS at 07:31

## 2019-11-07 RX ADMIN — GABAPENTIN 300 MG: 300 CAPSULE ORAL at 06:06

## 2019-11-07 RX ADMIN — HYDROMORPHONE HYDROCHLORIDE 0.2 MG: 1 INJECTION, SOLUTION INTRAMUSCULAR; INTRAVENOUS; SUBCUTANEOUS at 10:21

## 2019-11-07 RX ADMIN — Medication 25 MG: at 07:35

## 2019-11-07 RX ADMIN — SODIUM CHLORIDE, SODIUM LACTATE, POTASSIUM CHLORIDE, AND CALCIUM CHLORIDE 1.5 ML/KG/HR: .6; .31; .03; .02 INJECTION, SOLUTION INTRAVENOUS at 23:27

## 2019-11-07 RX ADMIN — FENTANYL CITRATE 25 MCG: 50 INJECTION, SOLUTION INTRAMUSCULAR; INTRAVENOUS at 09:35

## 2019-11-07 RX ADMIN — PHENYLEPHRINE HYDROCHLORIDE 100 MCG: 10 INJECTION INTRAVENOUS at 07:47

## 2019-11-07 RX ADMIN — PHENYLEPHRINE HYDROCHLORIDE 100 MCG: 10 INJECTION INTRAVENOUS at 07:53

## 2019-11-07 RX ADMIN — CEFAZOLIN SODIUM 2000 MG: 2 SOLUTION INTRAVENOUS at 23:27

## 2019-11-07 RX ADMIN — NEOSTIGMINE METHYLSULFATE 3 MG: 1 INJECTION, SOLUTION INTRAVENOUS at 08:45

## 2019-11-07 RX ADMIN — METHOCARBAMOL TABLETS 500 MG: 500 TABLET, COATED ORAL at 11:45

## 2019-11-07 RX ADMIN — GABAPENTIN 100 MG: 100 CAPSULE ORAL at 16:43

## 2019-11-07 RX ADMIN — OXYCODONE HYDROCHLORIDE 10 MG: 5 TABLET ORAL at 14:10

## 2019-11-07 RX ADMIN — CEFAZOLIN SODIUM 2000 MG: 2 SOLUTION INTRAVENOUS at 07:31

## 2019-11-07 RX ADMIN — FENTANYL CITRATE 50 MCG: 50 INJECTION, SOLUTION INTRAMUSCULAR; INTRAVENOUS at 07:35

## 2019-11-07 RX ADMIN — SODIUM CHLORIDE, SODIUM LACTATE, POTASSIUM CHLORIDE, AND CALCIUM CHLORIDE 1.5 ML/KG/HR: .6; .31; .03; .02 INJECTION, SOLUTION INTRAVENOUS at 10:38

## 2019-11-07 RX ADMIN — SODIUM CHLORIDE, SODIUM LACTATE, POTASSIUM CHLORIDE, AND CALCIUM CHLORIDE: .6; .31; .03; .02 INJECTION, SOLUTION INTRAVENOUS at 07:42

## 2019-11-07 RX ADMIN — METHOCARBAMOL TABLETS 500 MG: 500 TABLET, COATED ORAL at 17:11

## 2019-11-07 RX ADMIN — PAROXETINE 10 MG: 10 TABLET, FILM COATED ORAL at 14:15

## 2019-11-07 RX ADMIN — GABAPENTIN 100 MG: 100 CAPSULE ORAL at 21:44

## 2019-11-07 RX ADMIN — Medication 25 MG: at 08:26

## 2019-11-07 RX ADMIN — HYDROMORPHONE HYDROCHLORIDE 0.2 MG: 1 INJECTION, SOLUTION INTRAMUSCULAR; INTRAVENOUS; SUBCUTANEOUS at 10:00

## 2019-11-07 RX ADMIN — IRON SUCROSE 300 MG: 20 INJECTION, SOLUTION INTRAVENOUS at 14:10

## 2019-11-07 RX ADMIN — METHOCARBAMOL TABLETS 500 MG: 500 TABLET, COATED ORAL at 23:27

## 2019-11-07 RX ADMIN — PROPOFOL 150 MG: 10 INJECTION, EMULSION INTRAVENOUS at 07:35

## 2019-11-07 RX ADMIN — TRANEXAMIC ACID 1000 MG: 1 INJECTION, SOLUTION INTRAVENOUS at 07:49

## 2019-11-07 RX ADMIN — ACETAMINOPHEN 975 MG: 325 TABLET ORAL at 14:14

## 2019-11-07 RX ADMIN — FENTANYL CITRATE 25 MCG: 50 INJECTION, SOLUTION INTRAMUSCULAR; INTRAVENOUS at 09:48

## 2019-11-07 RX ADMIN — ROCURONIUM BROMIDE 50 MG: 50 INJECTION, SOLUTION INTRAVENOUS at 07:35

## 2019-11-07 RX ADMIN — GABAPENTIN 100 MG: 100 CAPSULE ORAL at 11:45

## 2019-11-07 RX ADMIN — DOCUSATE SODIUM 100 MG: 100 CAPSULE, LIQUID FILLED ORAL at 17:11

## 2019-11-07 RX ADMIN — FENTANYL CITRATE 25 MCG: 50 INJECTION, SOLUTION INTRAMUSCULAR; INTRAVENOUS at 09:25

## 2019-11-07 RX ADMIN — CLONAZEPAM 0.5 MG: 0.5 TABLET ORAL at 21:44

## 2019-11-07 RX ADMIN — ACETAMINOPHEN 975 MG: 325 TABLET ORAL at 06:06

## 2019-11-07 RX ADMIN — FENTANYL CITRATE 25 MCG: 50 INJECTION, SOLUTION INTRAMUSCULAR; INTRAVENOUS at 09:30

## 2019-11-07 RX ADMIN — SODIUM CHLORIDE, SODIUM LACTATE, POTASSIUM CHLORIDE, AND CALCIUM CHLORIDE: .6; .31; .03; .02 INJECTION, SOLUTION INTRAVENOUS at 07:17

## 2019-11-07 RX ADMIN — SODIUM CHLORIDE, SODIUM LACTATE, POTASSIUM CHLORIDE, AND CALCIUM CHLORIDE: .6; .31; .03; .02 INJECTION, SOLUTION INTRAVENOUS at 08:40

## 2019-11-07 RX ADMIN — DEXAMETHASONE SODIUM PHOSPHATE 10 MG: 10 INJECTION, SOLUTION INTRAMUSCULAR; INTRAVENOUS at 07:51

## 2019-11-07 RX ADMIN — HYDROMORPHONE HYDROCHLORIDE 0.2 MG: 1 INJECTION, SOLUTION INTRAMUSCULAR; INTRAVENOUS; SUBCUTANEOUS at 10:10

## 2019-11-07 RX ADMIN — MIDAZOLAM 2 MG: 1 INJECTION INTRAMUSCULAR; INTRAVENOUS at 07:31

## 2019-11-07 NOTE — INTERVAL H&P NOTE
H&P reviewed  After examining the patient I find no changes in the patients condition since the H&P had been written      Vitals:    11/07/19 0641   BP: 102/76   Pulse: 96   Resp: 20   Temp: 99 °F (37 2 °C)   SpO2: 98%       Preop for right total hip arthroplasty

## 2019-11-07 NOTE — SOCIAL WORK
CM met with pt to discuss CM role in D/C planning  Pt's spouse, Emeli Lowe at pt's bedside  Pt reported the following:    Emergency Contact: Spouse, Philip Mcdonough ([phone])  POA/LW: None  Level of assist with ADL's PTA: IND  House or Apt: Multi Level House  VI to enter: 1 9" VI and 1 7" VI to enter; 14 VI to 2nd floor  BATH on 1st floor: no- Pt will use BSC on 1st floor  DME: BSC, RW and Cane  VNA: None  SNF/Rehab: [facility]    Transportation: Drives Self   Help at home: Spouse took off from work to assist; son will provide back up coverage  Pharmacy/Rx Coverage: [facility]  Name of PCP: Dr Lolis Patel tx for MH/SA: None    Employment/Income: Retired    Anticipated D/C Plan: Pt anticipates return home with spouse to transport and assist  Pt reported Lovenox is already filled at home and pt will attend OP therapy at [facility] on Monday  CM reviewed d/c planning process including the following: identifying help at home, patient preference for d/c planning needs, Discharge Lounge, [facility] Meds to Bed program, availability of treatment team to discuss questions or concerns patient and/or family may have regarding understanding medications and recognizing signs and symptoms once discharged  CM also encouraged patient to follow up with all recommended appointments after discharge  Patient advised of importance for patient and family to participate in managing patients medical well being

## 2019-11-07 NOTE — PLAN OF CARE
Problem: OCCUPATIONAL THERAPY ADULT  Goal: Performs self-care activities at highest level of function for planned discharge setting  See evaluation for individualized goals  Description  Treatment Interventions: ADL retraining, Functional transfer training, Endurance training, Patient/family training, Equipment evaluation/education, Compensatory technique education, Activityengagement          See flowsheet documentation for full assessment, interventions and recommendations  Note:   Limitation: Decreased ADL status, Decreased endurance, Decreased self-care trans, Decreased high-level ADLs  Prognosis: Good  Assessment: Pt is a 81 y o  female who was admitted to [facility] on 11/7/2019 with Primary osteoarthritis of one hip, right s/p R posterior THR  Pt's problem list also includes PMH of previous surgery and ankylosing spondylitis, anxiety, arthritis, back pain CTS, carpal tunnel, fibromyalgia, hypercholesterolemia, hld, impingement syndrome B shld, spinal stenosis  At baseline pt was completing adls and mobility independently - I iadls - shares homemaking with spouse  Pt lives with spouse in 2 story home with first floor setup using BSC PRN   Currently pt requires moderate assist for overall ADLS and moderate assist for functional mobility/transfers  Pt currently presents with impairments in the following categories -steps to enter environment, difficulty performing ADLS and difficulty performing IADLS  activity tolerance, endurance and standing balance/tolerance   These impairments, as well as pt's fatigue, pain, hip precautions, orthopedic restricitions , WBS  and risk for falls  limit pt's ability to safely engage in all baseline areas of occupation, includingbathing, dressing, toileting, functional mobility/transfers, community mobility, laundry , driving, house maintenance, meal prep, cleaning, social participation  and leisure activities  From OT standpoint, recommend home with family support upon D/C  OT will continue to follow to address the below stated goals        OT Discharge Recommendation: Home with family support

## 2019-11-07 NOTE — ANESTHESIA POSTPROCEDURE EVALUATION
Post-Op Assessment Note    CV Status:  Stable  Pain Score: 0    Pain management: adequate     Mental Status:  Awake and sleepy   Hydration Status:  Euvolemic   PONV Controlled:  Controlled   Airway Patency:  Patent   Post Op Vitals Reviewed: Yes      Staff: CRNA           BP   127/78   Temp (!) 97 2 °F (36 2 °C) (11/07/19 0856)    Pulse  76   Resp   14   SpO2   96%

## 2019-11-07 NOTE — ANESTHESIA PREPROCEDURE EVALUATION
Review of Systems/Medical History  Patient summary reviewed  Chart reviewed  No history of anesthetic complications     Cardiovascular  EKG reviewed, Hyperlipidemia,   Comment:  Stress results 11/1/2019: There was no chest pain during stress      IMPRESSIONS: 1  Negative pharmacologic stress test with regadenoson for symptoms of angina pectoris and negative for ECG evidence of ischemia  2  Tomographic perfusion series consistent with normal perfusion without definite ischemia or prior infarction  3  Normal left ventricular cavity size with normal left ventricular systolic function and wall motion  EF determined as 67 %  4  Normal resting blood pressure and appropriate blood pressure response noted during the stress test   5  No chest pain was reported during the stress test   6  Baseline sinus bradycardia rhythm with occasional isolated premature ventricular contractions noted during the stress test     Cardiology overall risk for major adverse cardiac event relating to planned procedure is low to moderate,  Pulmonary  Negative pulmonary ROS        GI/Hepatic  Negative GI/hepatic ROS          Negative  ROS        Endo/Other  Negative endo/other ROS      GYN       Hematology  Negative hematology ROS      Musculoskeletal    Comment: Ankylosing spond  Rotator cuff injury Arthritis     Neurology    Fibromyalgia   Psychology   Anxiety,              Physical Exam    Airway    Mallampati score: I  TM Distance: >3 FB  Neck ROM: full     Dental   No notable dental hx     Cardiovascular  Rhythm: regular, Rate: normal,     Pulmonary  Breath sounds clear to auscultation,     Other Findings        Anesthesia Plan  ASA Score- 3     Anesthesia Type- general with ASA Monitors  Additional Monitors:   Airway Plan: ETT  Comment: Patient is s/p lumbar fusion L2-L5  Will avoid intervention at this level and provide general anesthesia  Plan discussed with patient who agrees       Plan Factors-    Induction- intravenous  Postoperative Plan- Plan for postoperative opioid use  Planned trial extubation    Informed Consent- Anesthetic plan and risks discussed with patient  I personally reviewed this patient with the CRNA  Discussed and agreed on the Anesthesia Plan with the GALILEO Alexander

## 2019-11-07 NOTE — PHYSICAL THERAPY NOTE
PHYSICAL THERAPY EVALUATION  NAME:  Doretha Kayser  DATE: 19    AGE:   81 y o  Mrn:   [id number]  ADMIT DX:  Primary osteoarthritis of one hip, right [M16 11]    Past Medical History:   Diagnosis Date    Ankylosing spondylitis ([facility] [address] )     Anxiety     LAST ASSESSED: 16    Arthritis     Back pain     Carpal tunnel syndrome     Fibromyalgia     LAST ASSESSED: 16    Fibromyalgia, primary     Heme positive stool     LAST ASSESSED: 10/22/16    High cholesterol     Hyperlipidemia     under control since weight loss    Impingement syndrome of left shoulder     LAST ASSESSED: 17    Impingement syndrome of right shoulder     LAST ASSESSED:     Long term use of drug     LAST ASSESSED: 16    No known health problems     NO PERTINENT PAST MEDICAL HX    RLS (restless legs syndrome)     Rotator cuff injury     right    Spinal stenosis     Spinal stenosis     Spondylitis (HCC)     Spondyloarthritis     RESOLVED: 16    Vitamin D deficiency        Past Surgical History:   Procedure Laterality Date    BACK SURGERY      CARPAL TUNNEL RELEASE Left     CERVICAL CONIZATION   W/ LASER      CERVICAL CONIZATION     SECTION      COLONOSCOPY      DILATION AND CURETTAGE OF UTERUS      FL INJECTION RIGHT HIP (NON ARTHROGRAM)  2019    FL INJECTION RIGHT HIP (NON ARTHROGRAM)  2019    HAND SURGERY      CO ARTHRODESIS POSTERIOR/POSTEROLATERAL LUMBAR N/A 4/3/2017    Procedure: L2-L5 OPEN DECOMPRESSIVE LUMBAR LAMINECTOMY W/ PEDICLE SCREW AND NILDA FIXATION FUSION (IMPULSE); REMOVAL OF SKIN TAG MID BACK;  Surgeon: Derrek Leon MD;  [facility];  Service: Neurosurgery    CO COLONOSCOPY FLX DX W/COLLJ SPEC WHEN PFRMD N/A 10/7/2016    Procedure: EGD AND COLONOSCOPY;  Surgeon: Arleen Lomeli MD;  [facility];   Service: Gastroenterology    CO WRIST Prosper Miranda LIG Left 2018    Procedure: RELEASE CARPAL TUNNEL ENDOSCOPIC; Surgeon: Scooby Phipps MD;  [facility];  Service: Orthopedics    MA WRIST Susanaashwini Morejonner LIG Right 6/14/2018    Procedure: RELEASE CARPAL TUNNEL ENDOSCOPIC;  Surgeon: Scooby Phipps MD;  [facility];  Service: Orthopedics    RETINAL LASER PROCEDURE      TUBAL LIGATION         Length Of Stay: 0    PHYSICAL THERAPY EVALUATION:       11/07/19 1330   Note Type   Note type Eval only   Pain Assessment   Pain Assessment 0-10   Pain Score 6   Pain Type Acute pain   Pain Location Hip   Pain Orientation Right   Pain Descriptors Aching   Pain Frequency Constant/continuous   Pain Onset Ongoing   Clinical Progression Gradually improving   Effect of Pain on Daily Activities increased pain with activity    Patient's Stated Pain Goal No pain   Hospital Pain Intervention(s) Ambulation/increased activity;Repositioned   Response to Interventions tolerated    Home Living   Type of [address] [place] [address] Two level;Stairs to enter with rails; Able to live on main level with bedroom/bathroom  (2 VI,  able to have first floor setup )   Home Equipment Walker;Cane   Additional Comments Pt reports living with spouse who is able to assist pt if needed    Prior Function   Level of [place] Independent with ADLs and functional mobility   Lives With Spouse   Receives Help From Family;Friend(s)   ADL Assistance Independent   Falls in the last 6 months 0   Comments Pt reports the use of a RW for ambulation PTA    Restrictions/Precautions   Weight Bearing Precautions Per Order Yes   RLE Weight Bearing Per Order WBAT   Braces or Orthoses   (hip abduction wedge )   Other Precautions Chair Alarm;Multiple lines; Fall Risk;Pain;THR  (posterolateral hip precautions)   General   Additional Pertinent History Pt with posterolateral hip precautions    Family/Caregiver Present Yes   Cognition   Overall Cognitive Status WFL   Arousal/Participation Alert   Orientation Level Oriented X4   Memory Within functional limits   Following Commands Follows one step commands without difficulty   RUE Assessment   RUE Assessment WFL   LUE Assessment   LUE Assessment WFL   RLE Assessment   RLE Assessment X  (decreased AROM 2* to pain )   Strength RLE   RLE Overall Strength 3+/5   LLE Assessment   LLE Assessment WFL   Strength LLE   LLE Overall Strength 4/5   Bed Mobility   Supine to Sit 3  Moderate assistance   Additional items Assist x 1; Increased time required;Verbal cues   Transfers   Sit to Stand 3  Moderate assistance   Additional items Assist x 1; Increased time required;Verbal cues   Stand to Sit 3  Moderate assistance   Additional items Assist x 1; Increased time required;Verbal cues   Additional Comments VC and TC needed for hand placement and safety    Ambulation/Elevation   Gait pattern Excessively slow; Short stride; Foward flexed; Inconsistent zan   Gait Assistance 3  Moderate assist   Additional items Assist x 1   Assistive Device Rolling walker   Distance 6ft   (limited by fatigue and pain )   Balance   Static Sitting Fair   Static Standing Poor +   Ambulatory Poor   Endurance Deficit   Endurance Deficit Yes   Endurance Deficit Description fatigue and pain    Activity Tolerance   Activity Tolerance Patient limited by fatigue;Patient limited by pain   Medical Staff Made Aware Aileen Cesar OT    Nurse Made Aware Pt appropriate to be seen and mobilize per nsg    Assessment   Prognosis Good   Problem List Decreased strength;Decreased range of motion;Decreased endurance; Impaired balance;Decreased mobility;Pain;Orthopedic restrictions   Assessment Pt is 81 y o  female seen for PT evaluation s/p admit to [facility] on 11/7/2019  Two pt identifiers were used to confirm  Pt presented s/p R THR posterolateral approach which was performed on 11/7/2019  Pt was admitted with a primary dx of: primary OA of R hip  PT now consulted for assessment of mobility and d/c needs  Pt with Activity as tolerated orders    Pts current co morbidities effecting treatment include: ankylosing spondylitis, fibromyalgia, HLD, anxiety, spinal stenosis, and personal factors including steps to negotiate at home  Pts current clinical presentation is Unstable/ Unpredictable (high complexity) due to Ongoing medical management for primary dx, Increased reliance on more restrictive AD compared to baseline, Decreased activity tolerance compared to baseline, Fall risk, Increased assistance needed from caregiver at current time, s/p post op day 0, Hip precautions, Continuous pulse oximetry monitoring     Prior to admission, pt was I with ambulation with the use of a RW as per pt  Upon evaluation, pt currently is requiring mod A for bed mobility; mod A for transfers and mod A for ambulation w/ RW   Pt denies any lightheadedness or dizziness with ambulation  Pt presents at PT eval functioning below baseline and currently w/ overall mobility deficits 2* to: BLE weakness, decreased ROM, impaired balance, decreased endurance, gait deviations, pain, decreased activity tolerance compared to baseline, fall risk, orthopedic restrictions  Pt currently at a fall risk 2* to impairments listed above  Based on the aforementioned PT evaluation, pt will continue to benefit from skilled Acute PT interventions to address stated impairments; to maximize functional mobility; for ongoing pt/ family training; and DME needs  At conclusion of PT session pt returned back in chair with phone and call bell within reach  Pt denies any further questions at this time  PT is currently recommending home with family support, OPPT  Pt/ family agreeable to plan and goals as stated on evaluation  PT will continue to follow during hospital stay     Barriers to Discharge Inaccessible home environment   Barriers to Discharge Comments VI home    Goals   Patient Goals " to go home"   STG Expiration Date 11/17/19   Short Term Goal #1 In 10 days pt will complete: 1) Bed mobility skills with S to increase safety and independence as well as decrease caregiver burden  2) Functional transfers with S to promote increased independence, safety, and QOL in the home environment  3) Ambulate 150' using least restrictive AD with S without LOB and stable vitals so that pt can negotiate home environment safely and promote independence with functional mobility and return to PLOF  4) Stair training up/ down 2 step/s using rail/s with S so that pt can enter/negotiate home environment safely and decrease fall risk  5) Improve balance grades to Good to increase safety with all mobility and decrease fall risk  6) Improve BLE strength by 1/2 grade to help increase overall functional mobility and decrease fall risk  7) PT for ongoing pt and family education; DME needs and D/C planning to promote highest level of function in least restrictive environment  Plan   Treatment/Interventions Functional transfer training;LE strengthening/ROM; Elevations; Therapeutic exercise; Endurance training;Patient/family training;Equipment eval/education; Bed mobility;Gait training;Spoke to nursing;OT;Family   PT Frequency 7x/wk; Twice a day   Recommendation   Recommendation Outpatient PT; Home with family support   Equipment Recommended Walker  (RW)   PT - OK to Discharge No  (need to increase ambulation distances and attempt stairs )   Modified Merle Scale   Modified Bingen Scale 4   Barthel Index   Feeding 10   Bathing 0   Grooming Score 5   Dressing Score 0   Bladder Score 0   Bowels Score 10   Toilet Use Score 5   Transfers (Bed/Chair) Score 5   Mobility (Level Surface) Score 0   Stairs Score 0   Barthel Index Score 35   Doris Martinez, PT

## 2019-11-07 NOTE — CONSULTS
Office Visit     10/8/2019  [address] [facility] Nnamdi Becker MD   Orthopedic Surgery   Primary osteoarthritis of one hip, right +1 more   Dx   Right Hip - Follow-up , Right Knee - Follow-up ; Referred by April aHrdy MD   Reason for Visit    Progress Notes     Procedure Orders   1  Large joint arthrocentesis: R knee [[id number]] ordered by Army Rosita MD at 10/08/19 0914    Expand All Collapse All    Assessment:  1  Primary osteoarthritis of one hip, right      2  Primary osteoarthritis of right knee            Plan:  The patient was provided with right knee steroid injection  The patient will be scheduled for right total hip arthroplasty  We feel the patient will find significant relief per current symptoms, findings on imaging and exam   Risks, benefits, precautions and expectations were discussed  Risks include blood loss, potential infection and blood clots  Preoperative vitamins were prescribed  The patient should follow up after surgery           To do next visit:  Return for after surgery       The above stated was discussed in layman's terms and the patient expressed understanding  All questions were answered to the patient's satisfaction          Scribe Attestation    I,:   Rachel Levy am acting as a scribe while in the presence of the attending physician :        I,:   Army Rosita MD personally performed the services described in this documentation    as scribed in my presence  :                  Subjective:   Sanchez Judge is a 81 y o  female who presents for follow up of right hip pain and knee  She is s/p right IA hip steroid injection 7/24/19 with benefit for 2 months  Today she complains of right posterior, lateral and anterior groin and entire right knee pain  She rates the hip at 0-9/10 and right knee 0-9/10  Walking aggravates and can be difficult  Sitting alleviates  She does use tramadol 50mg 2x/day through Dr Montesinos    She is s/p lumbar surgery several years ago           Review of systems negative unless otherwise specified in HPI     Medical History        Past Medical History:   Diagnosis Date    Ankylosing spondylitis ([address] )      Anxiety       LAST ASSESSED: 16    Arthritis      Back pain      Carpal tunnel syndrome      Fibromyalgia       LAST ASSESSED: 16    Fibromyalgia, primary      Heme positive stool       LAST ASSESSED: 10/22/16    High cholesterol      Hyperlipidemia       under control since weight loss    Impingement syndrome of left shoulder       LAST ASSESSED: 17    Impingement syndrome of right shoulder       LAST ASSESSED:     Long term use of drug       LAST ASSESSED: 16    No known health problems       NO PERTINENT PAST MEDICAL HX    RLS (restless legs syndrome)      Rotator cuff injury       right    Spinal stenosis      Spinal stenosis      Spondylitis (HCC)      Spondyloarthritis       RESOLVED: 16    Vitamin D deficiency              Surgical History         Past Surgical History:   Procedure Laterality Date    BACK SURGERY        CARPAL TUNNEL RELEASE Left      CERVICAL CONIZATION   W/ LASER         CERVICAL CONIZATION     SECTION        COLONOSCOPY        DILATION AND CURETTAGE OF UTERUS        FL INJECTION RIGHT HIP (NON ARTHROGRAM)   2019    FL INJECTION RIGHT HIP (NON ARTHROGRAM)   2019    HAND SURGERY        NH ARTHRODESIS POSTERIOR/POSTEROLATERAL LUMBAR N/A 4/3/2017     Procedure: L2-L5 OPEN DECOMPRESSIVE LUMBAR LAMINECTOMY W/ PEDICLE SCREW AND NILDA FIXATION FUSION (IMPULSE); REMOVAL OF SKIN TAG MID BACK;  Surgeon: Rafael Catalan MD;  [facility];  Service: Neurosurgery    NH COLONOSCOPY FLX DX W/COLLJ SPEC WHEN PFRMD N/A 10/7/2016     Procedure: EGD AND COLONOSCOPY;  Surgeon: Aga Almeida MD;  [facility];   Service: Gastroenterology    NH WRIST Jose Destinyler LIG Left 2018     Procedure: RELEASE CARPAL TUNNEL ENDOSCOPIC;  Surgeon: Karissa Culp MD;  [facility];  Service: Orthopedics    GA WRIST [place] LIG Right 6/14/2018     Procedure: RELEASE CARPAL TUNNEL ENDOSCOPIC;  Surgeon: Karissa Culp MD;  [facility];  Service: Orthopedics    RETINAL LASER PROCEDURE        TUBAL LIGATION                      Family History   Problem Relation Age of Onset    Arthritis Mother      Breast cancer Mother 67    Hypertension Mother      Heart attack Mother           MYOCARDIAL INFARCTION    Heart attack Father      Hypertension Father      Stroke Father           [address] [place] ([place])    Arthritis Sister      Uterine cancer Sister      Endometrial cancer Sister 61    Heart attack Brother      Prostate cancer Brother 58    Arthritis Brother      Melanoma Brother      Cancer Brother      Arthritis Family      Hyperlipidemia Family      Depression Son      Breast cancer Maternal Aunt 36    No Known Problems Daughter      Other Brother           hunting accident (shot)    Melanoma Brother      Prostate cancer Brother      Heart attack Brother      Stroke Brother      Arthritis Sister      Heart attack Sister      No Known Problems Son      Lung cancer Maternal Uncle           Social History            Occupational History    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        Comment: EMPLOYED   Tobacco Use    Smoking status: Never Smoker    Smokeless tobacco: Never Used   Substance and Sexual Activity    Alcohol use: No    Drug use: No    Sexual activity: Yes       Partners: Male       Birth control/protection: None            Current Outpatient Medications:     aspirin 81 MG tablet, Take 1 tablet by mouth daily, Disp: , Rfl:     Cholecalciferol (VITAMIN D) 2000 UNITS CAPS, Take 1 tablet by mouth daily, Disp: , Rfl:     clonazePAM (KlonoPIN) 0 5 mg tablet, Take 0 5 mg by mouth daily at bedtime, Disp: , Rfl: 4    clotrimazole 1 % external solution, 5 drops to the left ear BID for 7 days, Disp: 30 mL, Rfl: 0    gabapentin (NEURONTIN) 100 mg capsule, Take 1 capsule (100 mg total) by mouth 3 (three) times a day, Disp: 90 capsule, Rfl: 2    meloxicam (MOBIC) 15 mg tablet, Take 1 tablet by mouth daily, Disp: , Rfl:     methocarbamol (ROBAXIN) 750 mg tablet, TAKE 1 TABLET (750 MG TOTAL) BY MOUTH EVERY 6 (SIX) HOURS AS NEEDED FOR MUSCLE SPASMS, Disp: 100 tablet, Rfl: 0    ofloxacin (FLOXIN) 0 3 % otic solution, Administer 5 drops into the left ear 2 (two) times a day, Disp: 5 mL, Rfl: 1    PARoxetine (PAXIL) 10 mg tablet, TAKE 1 TABLET DAILY  , Disp: 30 tablet, Rfl: 6    traMADol (ULTRAM) 50 mg tablet, Take 1 tablet (50 mg total) by mouth 2 (two) times a day as needed for moderate pain, Disp: 80 tablet, Rfl: 0     No Known Allergies               Vitals:     10/08/19 0857   BP: 138/84   Pulse: 101         Objective:  Physical exam  · General: Awake, Alert, Oriented  · Eyes: Pupils equal, round and reactive to light  · Heart: regular rate and rhythm  · Lungs: No audible wheezing  · Abdomen: soft                     Ortho Exam   Right hip:  No erythema or ecchymosis  No effusion or swelling  Normal strength  Apprehension with ROM  Pain with IR        Right knee:  TTP lateral joint line  Valgus alignment   No erythema or ecchymosis  No effusion or swelling  Normal strength  Good ROM with crepitus   Calf compartments soft and supple  Sensation intact  Toes are warm sensate and mobile           Diagnostics, reviewed and taken today if performed as documented:     None performed      Procedures, if performed today:     Large joint arthrocentesis: R knee  Date/Time: 10/8/2019 9:14 AM  Consent given by: patient  Site marked: site marked  Supporting Documentation  Indications: pain   Procedure Details  Location: knee - R knee  Preparation: Patient was prepped and draped in the usual sterile fashion  Needle size: 22 G  Ultrasound guidance: no  Approach: anterolateral  Medications administered: 12 mg betamethasone acetate-betamethasone sodium phosphate 6 (3-3) mg/mL; 2 mL bupivacaine 0 25 %; 2 mL lidocaine 1 %     Patient tolerance: patient tolerated the procedure well with no immediate complications  Dressing:  Sterile dressing applied                Portions of the record may have been created with voice recognition software   Occasional wrong word or "sound a like" substitutions may have occurred due to the inherent limitations of voice recognition software   Read the chart carefully and recognize, using context, where substitutions have occurred          Instructions         Return for after surgery   Additional Documentation     Vitals:    /84    Pulse 101    Ht 5' 0 98" (1 549 m)    Wt 80 3 kg (177 lb 0 5 oz)    LMP  (LMP Unknown)    BMI 33 47 kg/m²    BSA 1 79 m²       More Vitals    SmartForms:     [facility] PRE-CHARTING Marybeth Crawford [facility]/PCSP WRAP UP REQUIREMENTS ADVANCED      [facility] SCRIBE ATTESTATION       (2 more)   Encounter Info:    Billing Info,    History,    Allergies,    Detailed Report       Media     Forms Ext - Document on 10/30/2019 12:48 PM: PATIENT DISABILITY AND FMLA AUTHORIZATION    Orders Placed        Labs       C-reactive protein       CBC and differential       Comprehensive metabolic panel       Iron Panel (Includes Iron Saturation, Iron, and TIBC)       APTT       Protime-INR       Type and screen      Hemoglobin A1C W/EAG Estimation      Sofi Given  (6 more)     Other Orders       Large joint arthrocentesis: R knee      Ambulatory referral to Family Practice Pending Review      Ambulatory referral to Physical Therapy Authorized      Case request operating room: ARTHROPLASTY HIP TOTAL Once         All Encounter Results    Medication Changes         Ascorbic Acid 500 mg Oral Daily, Start 30 days prior to surgery       Ferrous Sulfate 324 mg Oral 2 times daily before meals, Start 30 days prior to surgery       Folic Acid 1 mg Oral Daily, Start 30 days prior to surgery      Medication List    Medications Administered      Betamethasone Sod Phos & Acet 12 mg    Bupivacaine HCl 2 mL    Lidocaine HCl 2 mL   Visit Diagnoses         Primary osteoarthritis of one hip, right      Primary osteoarthritis of right knee      Problem List

## 2019-11-07 NOTE — PLAN OF CARE
Problem: PAIN - ADULT  Goal: Verbalizes/displays adequate comfort level or baseline comfort level  Description  Interventions:  - Encourage patient to monitor pain and request assistance  - Assess pain using appropriate pain scale  - Administer analgesics based on type and severity of pain and evaluate response  - Implement non-pharmacological measures as appropriate and evaluate response  - Consider cultural and social influences on pain and pain management  - Notify physician/advanced practitioner if interventions unsuccessful or patient reports new pain  Outcome: Progressing     Problem: INFECTION - ADULT  Goal: Absence or prevention of progression during hospitalization  Description  INTERVENTIONS:  - Assess and monitor for signs and symptoms of infection  - Monitor lab/diagnostic results  - Monitor all insertion sites, i e  indwelling lines, tubes, and drains  - Monitor endotracheal if appropriate and nasal secretions for changes in amount and color  - [place] appropriate cooling/warming therapies per order  - Administer medications as ordered  - Instruct and encourage patient and family to use good hand hygiene technique  - Identify and instruct in appropriate isolation precautions for identified infection/condition  Outcome: Progressing     Problem: SAFETY ADULT  Goal: Patient will remain free of falls  Description  INTERVENTIONS:  - Assess patient frequently for physical needs  -  Identify cognitive and physical deficits and behaviors that affect risk of falls    -  [place] fall precautions as indicated by assessment   - Educate patient/family on patient safety including physical limitations  - Instruct patient to call for assistance with activity based on assessment  - Modify environment to reduce risk of injury  - Consider OT/PT consult to assist with strengthening/mobility  Outcome: Progressing  Goal: Maintain or return to baseline ADL function  Description  INTERVENTIONS:  -  Assess patient's ability to carry out ADLs; assess patient's baseline for ADL function and identify physical deficits which impact ability to perform ADLs (bathing, care of mouth/teeth, toileting, grooming, dressing, etc )  - Assess/evaluate cause of self-care deficits   - Assess range of motion  - Assess patient's mobility; develop plan if impaired  - Assess patient's need for assistive devices and provide as appropriate  - Encourage maximum independence but intervene and supervise when necessary  - Involve family in performance of ADLs  - Assess for home care needs following discharge   - Consider OT consult to assist with ADL evaluation and planning for discharge  - Provide patient education as appropriate  Outcome: Progressing  Goal: Maintain or return mobility status to optimal level  Description  INTERVENTIONS:  - Assess patient's baseline mobility status (ambulation, transfers, stairs, etc )    - Identify cognitive and physical deficits and behaviors that affect mobility  - Identify mobility aids required to assist with transfers and/or ambulation (gait belt, sit-to-stand, lift, walker, cane, etc )  - [place] fall precautions as indicated by assessment  - Record patient progress and toleration of activity level on Mobility SBAR; progress patient to next Phase/Stage  - Instruct patient to call for assistance with activity based on assessment  - Consider rehabilitation consult to assist with strengthening/weightbearing, etc   Outcome: Progressing     Problem: DISCHARGE PLANNING  Goal: Discharge to home or other facility with appropriate resources  Description  INTERVENTIONS:  - Identify barriers to discharge w/patient and caregiver  - Arrange for needed discharge resources and transportation as appropriate  - Identify discharge learning needs (meds, wound care, etc )  - Arrange for interpretive services to assist at discharge as needed  - Refer to Case Management Department for coordinating discharge planning if the patient needs post-hospital services based on physician/advanced practitioner order or complex needs related to functional status, cognitive ability, or social support system  Outcome: Progressing

## 2019-11-07 NOTE — OP NOTE
OPERATIVE REPORT  PATIENT NAME: Berna Pierson    :  1950  MRN: [id number]  Pt Location: [facility] 04    SURGERY DATE: 2019    Surgeon(s) and Role:     * Zia Lucia MD - Primary     * Margarita Glez PA-C - Assisting     * Tab Knox MD - Assisting    Preop Diagnosis:  Primary osteoarthritis of one hip, right [M16 11]    Post-Op Diagnosis Codes:     * Primary osteoarthritis of one hip, right [M16 11]    Procedure(s) (LRB):  ARTHROPLASTY HIP TOTAL Posterior (Right)    Specimen(s):  * No specimens in log *    Estimated Blood Loss:   200 mL    Drains:  Urethral Catheter Latex 16 Fr  (Active)   Number of days: 0       Anesthesia Type:   Spinal    Operative Indications:  Primary osteoarthritis of one hip, right [M16 11]      Operative Findings:  depuy   Cup-48mm metal   Liner-10 degree lipped poly   Head/neck-32 +9   OEGUU-15 HO    Complications:   None    Procedure and Technique: Following the induction of adequate level of spinal anesthesia, Ramsey catheter sterilely introduced into this patient's bladder  Antibiotics were administered  She was then placed in the left leg is, right-side-up position  An axillary roll was placed underneath the left axilla  The right hip and lateral thigh were then prepped draped sterilely  A posterior-lateral approach was created order gain access the hip  Full-thickness flaps raised in order to access the tensor fascia  This was split, expose the deep layer the hip  With the hip in internal rotation, the piriformis tendon was identified, transected, and retracted in a posterior fashion  The remainder of the short external rotators were sectioned as well  A T-type capsulotomy was used open the hip  The femoral head was then delivered posteriorly  Utilizing the femoral neck osteotomy guide, the proper femoral neck cut was then made  A posterior capsulectomy, anterior capsulotomy was then performed in order to circumferentially expose the acetabulum  Reaming was 1 past with the 48 mm Reamer  A bed of bleeding cancellous bone countered  The 40 trial was inserted and noted to fit well  The 48 mm insert was then hammered into place  A single screw was then placed within the posterior superior quadrant for additional fixation  The 10 degree lip liner was snapped into position  The proximal femur was then prepared for insertion of Press-Fit component  The box osteotome was used of the proximal femur  The patient's canal sequentially broached  With a 10  High offset, and a 32+ 9 femoral head and neck, hip was located, taken through range of motion, found to be quite stable  The trial components removed if the hip was carefully dislocated  The insert components were assembled and introduced the patient's right hip in standard fashion  Hip was once again located, taken through range of motion, found to be quite stable  Satisfied with the extent of surgery, the wounds then flushed with saline and closed  A Betadine soak was initiated  The piriformis tendon was reapproximated to the greater trochanter number Vicryl suture  The tensor fascia was then closed number Vicryl suture  The subcu tissue closed mixture 1  For deep layer, 2 O Vicryl for the subcutaneous tissues, skin staples the skin  Sterile dressings were applied and abduction pillow placed    She was then awakened from general anesthesia, taken recovery room stable condition with plans to include physical therapy weight-bearing to tolerance, she will require DVT prophylaxis with Lovenox     I was present for the entire procedure    Patient Disposition:  PACU     SIGNATURE: Durga Bryson MD  DATE: November 7, 2019  TIME: 8:39 AM

## 2019-11-07 NOTE — OCCUPATIONAL THERAPY NOTE
[address] Evaluation     Patient Name: Vicki Delgado  Today's Date: 2019  Problem List  Principal Problem:    Primary osteoarthritis of one hip, right    Past Medical History  Past Medical History:   Diagnosis Date    Ankylosing spondylitis ([facility] [address] )     Anxiety     LAST ASSESSED: 16    Arthritis     Back pain     Carpal tunnel syndrome     Fibromyalgia     LAST ASSESSED: 16    Fibromyalgia, primary     Heme positive stool     LAST ASSESSED: 10/22/16    High cholesterol     Hyperlipidemia     under control since weight loss    Impingement syndrome of left shoulder     LAST ASSESSED: 17    Impingement syndrome of right shoulder     LAST ASSESSED:     Long term use of drug     LAST ASSESSED: 16    No known health problems     NO PERTINENT PAST MEDICAL HX    RLS (restless legs syndrome)     Rotator cuff injury     right    Spinal stenosis     Spinal stenosis     Spondylitis ([facility] [address] )     Spondyloarthritis     RESOLVED: 16    Vitamin D deficiency      Past Surgical History  Past Surgical History:   Procedure Laterality Date    BACK SURGERY      CARPAL TUNNEL RELEASE Left     CERVICAL CONIZATION   W/ LASER      CERVICAL CONIZATION     SECTION      COLONOSCOPY      DILATION AND CURETTAGE OF UTERUS      FL INJECTION RIGHT HIP (NON ARTHROGRAM)  2019    FL INJECTION RIGHT HIP (NON ARTHROGRAM)  2019    HAND SURGERY      IN ARTHRODESIS POSTERIOR/POSTEROLATERAL LUMBAR N/A 4/3/2017    Procedure: L2-L5 OPEN DECOMPRESSIVE LUMBAR LAMINECTOMY W/ PEDICLE SCREW AND NLIDA FIXATION FUSION (IMPULSE); REMOVAL OF SKIN TAG MID BACK;  Surgeon: Ashley Santos MD;  [facility];  Service: Neurosurgery    IN COLONOSCOPY FLX DX W/COLLJ SPEC WHEN PFRMD N/A 10/7/2016    Procedure: EGD AND COLONOSCOPY;  Surgeon: Cierra Campoverde MD;  [facility];   Service: Gastroenterology    IN WRIST Priya Heading LIG Left 2018 Procedure: RELEASE CARPAL TUNNEL ENDOSCOPIC;  Surgeon: Vandy Runner, MD;  [facility];  Service: Orthopedics    [place] WRIST Gemma Quarry LIG Right 6/14/2018    Procedure: RELEASE CARPAL TUNNEL ENDOSCOPIC;  Surgeon: Vandy Runner, MD;  [facility];  Service: Orthopedics    RETINAL LASER PROCEDURE      TUBAL LIGATION             11/07/19 1320   Note Type   Note type Eval/Treat   Restrictions/Precautions   Weight Bearing Precautions Per Order Yes   RUE Weight Bearing Per Order WBAT   LUE Weight Bearing Per Order WBAT   RLE Weight Bearing Per Order WBAT   LLE Weight Bearing Per Order WBAT   Braces or Orthoses   (abduction wedge)   Other Precautions Chair Alarm; Fall Risk;Pain;THR;WBS   Pain Assessment   Pain Assessment 0-10   Pain Score 3   Pain Type Acute pain   Pain Location Hip   Hospital Pain Intervention(s) Repositioned; Ambulation/increased activity; Emotional support   Home Living   Type of [address] [place] [address] Two level; Able to live on main level with bedroom/bathroom;Stairs to enter with rails   Prior Function   Level of [place] Independent with ADLs and functional mobility   Lives With Spouse   Receives Help From Family   ADL Assistance Independent   IADLs Independent   Falls in the last 6 months 0   Vocational Retired   [address] and mobility- i iadls - shares homemaking with spouse   Reciprocal Relationships supportive family - reports spouse is able to assist prm   Service to Others retired   Intrinsic Gratification active pta   Subjective   Subjective "I didn't believe them when they said i would get up after sx"   ADL   Eating Assistance 7  Independent   Grooming Assistance 5  Supervision/Setup   UB Bathing Assistance 4  Minimal Assistance   LB [address] 3  Moderate Assistance   [address] 4  Minimal Assistance    [address] 3  Moderate Assistance   Bed Mobility   Supine to Sit 3  Moderate assistance   Transfers Sit to Stand 3  Moderate assistance   Stand to Sit 3  Moderate assistance   Stand pivot 3  Moderate assistance   Balance   Static Sitting Fair +   Dynamic Sitting Fair   Static Standing Fair -   Dynamic Standing Poor +   Ambulatory Poor   Activity Tolerance   Activity Tolerance Patient limited by fatigue;Patient limited by pain   RUE Assessment   RUE Assessment WFL   LUE Assessment   LUE Assessment WFL   Cognition   Overall Cognitive Status WFL   Assessment   Limitation Decreased ADL status; Decreased endurance;Decreased self-care trans;Decreased high-level ADLs   Prognosis Good   Assessment Pt is a 81 y o  female who was admitted to [facility] on 11/7/2019 with Primary osteoarthritis of one hip, right s/p R posterior THR  Pt's problem list also includes PMH of previous surgery and ankylosing spondylitis, anxiety, arthritis, back pain CTS, carpal tunnel, fibromyalgia, hypercholesterolemia, hld, impingement syndrome B shld, spinal stenosis  At baseline pt was completing adls and mobility independently - I iadls - shares homemaking with spouse  Pt lives with spouse in 2 story home with first floor setup using BSC PRN   Currently pt requires moderate assist for overall ADLS and moderate assist for functional mobility/transfers  Pt currently presents with impairments in the following categories -steps to enter environment, difficulty performing ADLS and difficulty performing IADLS  activity tolerance, endurance and standing balance/tolerance  These impairments, as well as pt's fatigue, pain, hip precautions, orthopedic restricitions , WBS  and risk for falls  limit pt's ability to safely engage in all baseline areas of occupation, includingbathing, dressing, toileting, functional mobility/transfers, community mobility, laundry , driving, house maintenance, meal prep, cleaning, social participation  and leisure activities  From OT standpoint, recommend home with family support  upon D/C   OT will continue to follow to address the below stated goals  Goals   Patient Goals go home    STG Time Frame 3-5   Short Term Goal #1 refer to established goals below   Plan   Treatment Interventions ADL retraining;Functional transfer training; Endurance training;Patient/family training;Equipment evaluation/education; Compensatory technique education; Activityengagement   Goal Expiration Date 11/12/19   OT Frequency 3-5x/wk   Recommendation   OT Discharge Recommendation Home with family support   Barthel Index   Feeding 10   Bathing 0   Grooming Score 5   Dressing Score 0   Bladder Score 0   Bowels Score 10   Toilet Use Score 5   Transfers (Bed/Chair) Score 5   Mobility (Level Surface) Score 0   Stairs Score 0   Barthel Index Score 35       OCCUPATIONAL THERAPY GOALS:    *Mod I adls after setup with use of AE    *Mod I toileting and clothing management   *Mod I functional mobility and transfers to/from all surfaces with good dynamic balance and safety for participation in dynamic adls and iadl tasks   *Demonstrate good carryover with safe use of RW, THR prec and use of LHAE during functional tasks   *Assess DME needs   *Increase activity tolerance to 35-40 minutes for participation in adls and enjoyable activities  *Assist with safe d/c recommendations     Cody Francisco, OT

## 2019-11-07 NOTE — CONSULTS
Internal Medicine Consultation Note    Patient: Prince Driver  Age/sex: 81 y o  female  Medical Record #: [id number]    Assessment/Plan    R SHARIF  ·   Post operative management per orthopedics;   · monitor cbc/bmp and treat accordingly;   · encourage incentive spirometry and monitor fever curve;   · avoid post operative opioid induced constipation with use of stool softeners and laxatives;   · DVT prophylaxis=lovenox    Depression/Anxiety  · Cont current medications uninterupted    Post operative blood loss anemia  · IV iron infusion ordered by orthopedics; will recheck cbc in a m  Subjective/ HPI: Patient seen and examined  Denies any acute pain at present, has some residual numbness noted  ROS:   A 10 point ROS was performed; negative except as noted above       Social History:    Substance Use History:   Social History     Substance and Sexual Activity   Alcohol Use Never    Frequency: Never     Social History     Tobacco Use   Smoking Status Never Smoker   Smokeless Tobacco Never Used     Social History     Substance and Sexual Activity   Drug Use No       Family History:    Family History   Problem Relation Age of Onset    Arthritis Mother    [facility] Breast cancer Mother 67    Hypertension Mother     Heart attack Mother         MYOCARDIAL INFARCTION    Heart attack Father     Hypertension Father     Stroke Father         Shirley Kehr (CVA)    Arthritis Sister     Uterine cancer Sister     Endometrial cancer Sister 61    Heart attack Brother     Prostate cancer Brother 58    Arthritis Brother     Melanoma Brother     Cancer Brother     Arthritis Family     Hyperlipidemia Family     Depression Son     Breast cancer Maternal Aunt 36    No Known Problems Daughter     Other Brother         hunting accident (shot)    Melanoma Brother     Prostate cancer Brother     Heart attack Brother     Stroke Brother     Arthritis Sister     Heart attack Sister     No Known Problems Son  Lung cancer Maternal Uncle          Review of Scheduled Meds:    Current Facility-Administered Medications:  acetaminophen 975 mg Oral Q8H Albaro Jones MD    calcium carbonate 1,000 mg Oral Daily PRN Albaro Jones MD    cefazolin 2,000 mg Intravenous Ketan Hnedrix MD    clonazePAM 0 5 mg Oral HS Albaro Jones MD    docusate sodium 100 mg Oral BID Albaro Jones MD    enoxaparin 40 mg Subcutaneous Q24H Albaro Jones MD    gabapentin 100 mg Oral TID Albaro Jones MD    HYDROmorphone 0 5 mg Intravenous Q2H PRN Albaro Jones MD    iron sucrose 300 mg Intravenous Q24H Albaro Jones MD    lactated ringers 1,000 mL Intravenous Once PRN Albaro Jones MD    And        lactated ringers 1,000 mL Intravenous Once PRN Albaro Jones MD    lactated ringers 1 5 mL/kg/hr Intravenous Continuous Albaro Jones MD Last Rate: 1 5 mL/kg/hr (11/07/19 1038)   methocarbamol 500 mg Oral Q6H [address] Albaro Jones MD    ondansetron 4 mg Intravenous Q6H PRN Albaro Jones MD    oxyCODONE 10 mg Oral Q4H PRN Albaro Jones MD    oxyCODONE 5 mg Oral Q4H PRN Albaro Jones MD    PARoxetine 10 mg Oral Daily Albaro Jones MD    [START ON 11/8/2019] senna 1 tablet Oral Daily Albaro Jones MD    sodium chloride 1,000 mL Intravenous Once PRN Albaro Jones MD    And        sodium chloride 1,000 mL Intravenous Once PRN Albaro Jones MD        Labs: Invalid input(s): LABGLOM, CMP                   Lab Results   Component Value Date    WOUNDCULT 2+ Growth of Stenotrophomonas maltophilia (A) 10/03/2019    WOUNDCULT 2+ Growth of Beta Hemolytic Streptococcus Group B (A) 10/03/2019    WOUNDCULT 2+ Growth of  10/03/2019       Input and Output Summary (last 24 hours):        Intake/Output Summary (Last 24 hours) at 11/7/2019 1244  Last data filed at 11/7/2019 0853  Gross per 24 hour   Intake 1300 ml   Output 250 ml   Net 1050 ml       Imaging:     No orders to display       [place] studiesReviewed  *Medications reviewed and reconciled as needed  *Please refer to order section for additional ordered labs studies  *Case discussed with primary attending during morning huddle case rounds    Vitals:   Temp (24hrs), Av 6 °F (36 4 °C), Min:97 °F (36 1 °C), Max:99 °F (37 2 °C)    Temp:  [97 °F (36 1 °C)-99 °F (37 2 °C)] 97 4 °F (36 3 °C)  HR:  [50-96] 65  Resp:  [12-20] 18  BP: (102-143)/(64-97) 143/97  SpO2:  [94 %-100 %] 100 %  Body mass index is 30 8 kg/m²  Physical Exam:    General Appearance: no distress, conversive  HEENT: PERRLA, conjuctiva normal; oropharynx clear; mucous membranes moist;   Neck:  Supple, no lymphadenopathy or thyromegaly  Lungs: CTA, normal respiratory effort, no retractions, expiratory effort normal  CV: regular rate and rhythm , PMI normal   ABD: obese, soft non tender, no masses , no hepatic or splenomegaly  EXT: DP pulses intact, no lymphadenopathy, no edema, right hip dressing intact  Genitourinary: Ramsey draining clear yellow urine  Skin: normal turgor, normal texture, no rash;   Psych: affect normal, mood normal  Neuro: AAOx3          Invasive Devices     Peripheral Intravenous Line            Peripheral IV 19 Left Hand less than 1 day    Peripheral IV 19 Right Hand less than 1 day          Drain            Urethral Catheter Latex 16 Fr  less than 1 day                   Code Status: Level 1 - Full Code  Current Length of Stay: 0 day(s)    Total floor / unit time spent today 45 minutes with more than 50% spent counseling/coordinating care  Counseling includes discussion with patient re: progress  and discussion with patient of his/her current medical state/information  Coordination of patient's care was performed in conjunction with primary service  Time invested included review of patient's labs, vitals, and management of their comorbidities with continued monitoring   In addition, this patient was discussed with medical team including physician and advanced extenders  The care of the patient was extensively discussed and appropriate treatment plan was formulated unique for this patient  ** Please Note: Fluency Direct voice to text software may have been used in the creation of this document   Audio transcription errors may occur**

## 2019-11-07 NOTE — H&P (VIEW-ONLY)
Office Visit     10/8/2019  [address] [facility] Nnamdi Lucia MD   Orthopedic Surgery   Primary osteoarthritis of one hip, right +1 more   Dx   Right Hip - Follow-up , Right Knee - Follow-up ; Referred by Cherelle Vance MD   Reason for Visit    Progress Notes     Procedure Orders   1  Large joint arthrocentesis: R knee [[id number]] ordered by Zia Lucia MD at 10/08/19 0914    Expand All Collapse All    Assessment:  1  Primary osteoarthritis of one hip, right      2  Primary osteoarthritis of right knee            Plan:  The patient was provided with right knee steroid injection  The patient will be scheduled for right total hip arthroplasty  We feel the patient will find significant relief per current symptoms, findings on imaging and exam   Risks, benefits, precautions and expectations were discussed  Risks include blood loss, potential infection and blood clots  Preoperative vitamins were prescribed  The patient should follow up after surgery           To do next visit:  Return for after surgery       The above stated was discussed in layman's terms and the patient expressed understanding  All questions were answered to the patient's satisfaction          Scribe Attestation    I,:   Maisha Davies am acting as a scribe while in the presence of the attending physician :        I,:   Zia Lucia MD personally performed the services described in this documentation    as scribed in my presence  :                  Subjective:   Berna Pierson is a 81 y o  female who presents for follow up of right hip pain and knee  She is s/p right IA hip steroid injection 7/24/19 with benefit for 2 months  Today she complains of right posterior, lateral and anterior groin and entire right knee pain  She rates the hip at 0-9/10 and right knee 0-9/10  Walking aggravates and can be difficult  Sitting alleviates  She does use tramadol 50mg 2x/day through Dr Montesinos    She is s/p lumbar surgery several years ago           Review of systems negative unless otherwise specified in HPI     Medical History        Past Medical History:   Diagnosis Date    Ankylosing spondylitis ([address] )      Anxiety       LAST ASSESSED: 16    Arthritis      Back pain      Carpal tunnel syndrome      Fibromyalgia       LAST ASSESSED: 16    Fibromyalgia, primary      Heme positive stool       LAST ASSESSED: 10/22/16    High cholesterol      Hyperlipidemia       under control since weight loss    Impingement syndrome of left shoulder       LAST ASSESSED: 17    Impingement syndrome of right shoulder       LAST ASSESSED:     Long term use of drug       LAST ASSESSED: 16    No known health problems       NO PERTINENT PAST MEDICAL HX    RLS (restless legs syndrome)      Rotator cuff injury       right    Spinal stenosis      Spinal stenosis      Spondylitis (HCC)      Spondyloarthritis       RESOLVED: 16    Vitamin D deficiency              Surgical History         Past Surgical History:   Procedure Laterality Date    BACK SURGERY        CARPAL TUNNEL RELEASE Left      CERVICAL CONIZATION   W/ LASER         CERVICAL CONIZATION     SECTION        COLONOSCOPY        DILATION AND CURETTAGE OF UTERUS        FL INJECTION RIGHT HIP (NON ARTHROGRAM)   2019    FL INJECTION RIGHT HIP (NON ARTHROGRAM)   2019    HAND SURGERY        AL ARTHRODESIS POSTERIOR/POSTEROLATERAL LUMBAR N/A 4/3/2017     Procedure: L2-L5 OPEN DECOMPRESSIVE LUMBAR LAMINECTOMY W/ PEDICLE SCREW AND NILDA FIXATION FUSION (IMPULSE); REMOVAL OF SKIN TAG MID BACK;  Surgeon: Christos Santo MD;  [facility];  Service: Neurosurgery    AL COLONOSCOPY FLX DX W/COLLJ SPEC WHEN PFRMD N/A 10/7/2016     Procedure: EGD AND COLONOSCOPY;  Surgeon: Sallye Olszewski, MD;  [facility];   Service: Gastroenterology    AL WRIST Vanessa Khoi LIG Left 2018     Procedure: RELEASE CARPAL TUNNEL ENDOSCOPIC;  Surgeon: Carlton Stokes MD;  [facility];  Service: Orthopedics    [place] WRIST Adore Hewitt LIG Right 6/14/2018     Procedure: RELEASE CARPAL TUNNEL ENDOSCOPIC;  Surgeon: Carlton Stokes MD;  [facility];  Service: Orthopedics    RETINAL LASER PROCEDURE        TUBAL LIGATION                      Family History   Problem Relation Age of Onset    Arthritis Mother      Breast cancer Mother 67    Hypertension Mother      Heart attack Mother           MYOCARDIAL INFARCTION    Heart attack Father      Hypertension Father      Stroke Father           [address] (CVA)    Arthritis Sister      Uterine cancer Sister      Endometrial cancer Sister 61    Heart attack Brother      Prostate cancer Brother 58    Arthritis Brother      Melanoma Brother      Cancer Brother      Arthritis Family      Hyperlipidemia Family      Depression Son      Breast cancer Maternal Aunt 36    No Known Problems Daughter      Other Brother           hunting accident (shot)    Melanoma Brother      Prostate cancer Brother      Heart attack Brother      Stroke Brother      Arthritis Sister      Heart attack Sister      No Known Problems Son      Lung cancer Maternal Uncle           Social History            Occupational History    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        Comment: EMPLOYED   Tobacco Use    Smoking status: Never Smoker    Smokeless tobacco: Never Used   Substance and Sexual Activity    Alcohol use: No    Drug use: No    Sexual activity: Yes       Partners: Male       Birth control/protection: None            Current Outpatient Medications:     aspirin 81 MG tablet, Take 1 tablet by mouth daily, Disp: , Rfl:     Cholecalciferol (VITAMIN D) 2000 UNITS CAPS, Take 1 tablet by mouth daily, Disp: , Rfl:     clonazePAM (KlonoPIN) 0 5 mg tablet, Take 0 5 mg by mouth daily at bedtime, Disp: , Rfl: 4    clotrimazole 1 % external solution, 5 drops to the left ear BID for 7 days, Disp: 30 mL, Rfl: 0    gabapentin (NEURONTIN) 100 mg capsule, Take 1 capsule (100 mg total) by mouth 3 (three) times a day, Disp: 90 capsule, Rfl: 2    meloxicam (MOBIC) 15 mg tablet, Take 1 tablet by mouth daily, Disp: , Rfl:     methocarbamol (ROBAXIN) 750 mg tablet, TAKE 1 TABLET (750 MG TOTAL) BY MOUTH EVERY 6 (SIX) HOURS AS NEEDED FOR MUSCLE SPASMS, Disp: 100 tablet, Rfl: 0    ofloxacin (FLOXIN) 0 3 % otic solution, Administer 5 drops into the left ear 2 (two) times a day, Disp: 5 mL, Rfl: 1    PARoxetine (PAXIL) 10 mg tablet, TAKE 1 TABLET DAILY  , Disp: 30 tablet, Rfl: 6    traMADol (ULTRAM) 50 mg tablet, Take 1 tablet (50 mg total) by mouth 2 (two) times a day as needed for moderate pain, Disp: 80 tablet, Rfl: 0     No Known Allergies               Vitals:     10/08/19 0857   BP: 138/84   Pulse: 101         Objective:  Physical exam  · General: Awake, Alert, Oriented  · Eyes: Pupils equal, round and reactive to light  · Heart: regular rate and rhythm  · Lungs: No audible wheezing  · Abdomen: soft                     Ortho Exam   Right hip:  No erythema or ecchymosis  No effusion or swelling  Normal strength  Apprehension with ROM  Pain with IR        Right knee:  TTP lateral joint line  Valgus alignment   No erythema or ecchymosis  No effusion or swelling  Normal strength  Good ROM with crepitus   Calf compartments soft and supple  Sensation intact  Toes are warm sensate and mobile           Diagnostics, reviewed and taken today if performed as documented:     None performed      Procedures, if performed today:     Large joint arthrocentesis: R knee  Date/Time: 10/8/2019 9:14 AM  Consent given by: patient  Site marked: site marked  Supporting Documentation  Indications: pain   Procedure Details  Location: knee - R knee  Preparation: Patient was prepped and draped in the usual sterile fashion  Needle size: 22 G  Ultrasound guidance: no  Approach: anterolateral  Medications administered: 12 mg betamethasone acetate-betamethasone sodium phosphate 6 (3-3) mg/mL; 2 mL bupivacaine 0 25 %; 2 mL lidocaine 1 %     Patient tolerance: patient tolerated the procedure well with no immediate complications  Dressing:  Sterile dressing applied                Portions of the record may have been created with voice recognition software   Occasional wrong word or "sound a like" substitutions may have occurred due to the inherent limitations of voice recognition software   Read the chart carefully and recognize, using context, where substitutions have occurred          Instructions         Return for after surgery   Additional Documentation     Vitals:    /84    Pulse 101    Ht 5' 0 98" (1 549 m)    Wt 80 3 kg (177 lb 0 5 oz)    LMP  (LMP Unknown)    BMI 33 47 kg/m²    BSA 1 79 m²       More Vitals    SmartForms:     [facility] PRE-CHARTING Mitcheal Bi PCMH/PCSP WRAP UP REQUIREMENTS ADVANCED      [facility] SCRIBE ATTESTATION       (2 more)   Encounter Info:    Billing Info,    History,    Allergies,    Detailed Report       Media     Forms Ext - Document on 10/30/2019 12:48 PM: PATIENT DISABILITY AND FMLA AUTHORIZATION    Orders Placed        Labs       C-reactive protein       CBC and differential       Comprehensive metabolic panel       Iron Panel (Includes Iron Saturation, Iron, and TIBC)       APTT       Protime-INR       Type and screen      Hemoglobin A1C W/EAG Estimation      Unk Anne  (6 more)     Other Orders       Large joint arthrocentesis: R knee      Ambulatory referral to Family Practice Pending Review      Ambulatory referral to Physical Therapy Authorized      Case request operating room: ARTHROPLASTY HIP TOTAL Once         All Encounter Results    Medication Changes         Ascorbic Acid 500 mg Oral Daily, Start 30 days prior to surgery       Ferrous Sulfate 324 mg Oral 2 times daily before meals, Start 30 days prior to surgery       Folic Acid 1 mg Oral Daily, Start 30 days prior to surgery      Medication List    Medications Administered      Betamethasone Sod Phos & Acet 12 mg    Bupivacaine HCl 2 mL    Lidocaine HCl 2 mL   Visit Diagnoses         Primary osteoarthritis of one hip, right      Primary osteoarthritis of right knee      Problem List

## 2019-11-07 NOTE — PLAN OF CARE
Problem: PHYSICAL THERAPY ADULT  Goal: Performs mobility at highest level of function for planned discharge setting  See evaluation for individualized goals  Description  Treatment/Interventions: Functional transfer training, LE strengthening/ROM, Elevations, Therapeutic exercise, Endurance training, Patient/family training, Equipment eval/education, Bed mobility, Gait training, Spoke to nursing, OT, Family  Equipment Recommended: Walker(RW)       See flowsheet documentation for full assessment, interventions and recommendations  Note:   Prognosis: Good  Problem List: Decreased strength, Decreased range of motion, Decreased endurance, Impaired balance, Decreased mobility, Pain, Orthopedic restrictions  Assessment: Pt is 81 y o  female seen for PT evaluation s/p admit to [facility] on 11/7/2019  Two pt identifiers were used to confirm  Pt presented s/p R THR posterolateral approach which was performed on 11/7/2019  Pt was admitted with a primary dx of: primary OA of R hip  PT now consulted for assessment of mobility and d/c needs  Pt with Activity as tolerated orders  Pts current co morbidities effecting treatment include: ankylosing spondylitis, fibromyalgia, HLD, anxiety, spinal stenosis, and personal factors including steps to negotiate at home  Pts current clinical presentation is Unstable/ Unpredictable (high complexity) due to Ongoing medical management for primary dx, Increased reliance on more restrictive AD compared to baseline, Decreased activity tolerance compared to baseline, Fall risk, Increased assistance needed from caregiver at current time, s/p post op day 0, Hip precautions, Continuous pulse oximetry monitoring     Prior to admission, pt was I with ambulation with the use of a RW as per pt  Upon evaluation, pt currently is requiring mod A for bed mobility; mod A for transfers and mod A for ambulation w/ RW   Pt denies any lightheadedness or dizziness with ambulation   Pt presents at PT eval functioning below baseline and currently w/ overall mobility deficits 2* to: BLE weakness, decreased ROM, impaired balance, decreased endurance, gait deviations, pain, decreased activity tolerance compared to baseline, fall risk, orthopedic restrictions  Pt currently at a fall risk 2* to impairments listed above  Based on the aforementioned PT evaluation, pt will continue to benefit from skilled Acute PT interventions to address stated impairments; to maximize functional mobility; for ongoing pt/ family training; and DME needs  At conclusion of PT session pt returned back in chair with phone and call bell within reach  Pt denies any further questions at this time  PT is currently recommending home with family support, OPPT  Pt/ family agreeable to plan and goals as stated on evaluation  PT will continue to follow during hospital stay  Barriers to Discharge: Inaccessible home environment  Barriers to Discharge Comments: VI home   Recommendation: Outpatient PT, Home with family support     PT - OK to Discharge: No(need to increase ambulation distances and attempt stairs )    See flowsheet documentation for full assessment

## 2019-11-08 VITALS
SYSTOLIC BLOOD PRESSURE: 116 MMHG | DIASTOLIC BLOOD PRESSURE: 85 MMHG | HEART RATE: 99 BPM | WEIGHT: 163.14 LBS | RESPIRATION RATE: 17 BRPM | HEIGHT: 61 IN | TEMPERATURE: 101.1 F | OXYGEN SATURATION: 98 % | BODY MASS INDEX: 30.8 KG/M2

## 2019-11-08 DIAGNOSIS — M16.11 PRIMARY OSTEOARTHRITIS OF ONE HIP, RIGHT: ICD-10-CM

## 2019-11-08 PROBLEM — Z96.641 STATUS POST TOTAL HIP REPLACEMENT, RIGHT: Status: ACTIVE | Noted: 2019-11-08

## 2019-11-08 LAB
ANION GAP SERPL CALCULATED.3IONS-SCNC: 7 MMOL/L (ref 4–13)
BUN SERPL-MCNC: 15 MG/DL (ref 5–25)
CALCIUM SERPL-MCNC: 8.9 MG/DL (ref 8.3–10.1)
CHLORIDE SERPL-SCNC: 106 MMOL/L (ref 100–108)
CO2 SERPL-SCNC: 28 MMOL/L (ref 21–32)
CREAT SERPL-MCNC: 0.73 MG/DL (ref 0.6–1.3)
ERYTHROCYTE [DISTWIDTH] IN BLOOD BY AUTOMATED COUNT: 14.3 % (ref 11.6–15.1)
GFR SERPL CREATININE-BSD FRML MDRD: 85 ML/MIN/1.73SQ M
GLUCOSE SERPL-MCNC: 144 MG/DL (ref 65–140)
HCT VFR BLD AUTO: 40.7 % (ref 34.8–46.1)
HGB BLD-MCNC: 13 G/DL (ref 11.5–15.4)
MCH RBC QN AUTO: 28.9 PG (ref 26.8–34.3)
MCHC RBC AUTO-ENTMCNC: 31.9 G/DL (ref 31.4–37.4)
MCV RBC AUTO: 90 FL (ref 82–98)
PLATELET # BLD AUTO: 409 THOUSANDS/UL (ref 149–390)
PMV BLD AUTO: 10.8 FL (ref 8.9–12.7)
POTASSIUM SERPL-SCNC: 4.2 MMOL/L (ref 3.5–5.3)
RBC # BLD AUTO: 4.5 MILLION/UL (ref 3.81–5.12)
SODIUM SERPL-SCNC: 141 MMOL/L (ref 136–145)
WBC # BLD AUTO: 13.02 THOUSAND/UL (ref 4.31–10.16)

## 2019-11-08 PROCEDURE — 97530 THERAPEUTIC ACTIVITIES: CPT

## 2019-11-08 PROCEDURE — 97535 SELF CARE MNGMENT TRAINING: CPT

## 2019-11-08 PROCEDURE — 85027 COMPLETE CBC AUTOMATED: CPT | Performed by: NURSE PRACTITIONER

## 2019-11-08 PROCEDURE — 97116 GAIT TRAINING THERAPY: CPT

## 2019-11-08 PROCEDURE — 99024 POSTOP FOLLOW-UP VISIT: CPT | Performed by: PHYSICIAN ASSISTANT

## 2019-11-08 PROCEDURE — 80048 BASIC METABOLIC PNL TOTAL CA: CPT | Performed by: ORTHOPAEDIC SURGERY

## 2019-11-08 PROCEDURE — 99024 POSTOP FOLLOW-UP VISIT: CPT | Performed by: ORTHOPAEDIC SURGERY

## 2019-11-08 RX ORDER — ASCORBIC ACID 500 MG
500 TABLET ORAL DAILY
Qty: 30 TABLET | Refills: 0 | OUTPATIENT
Start: 2019-11-08

## 2019-11-08 RX ADMIN — METHOCARBAMOL TABLETS 500 MG: 500 TABLET, COATED ORAL at 17:25

## 2019-11-08 RX ADMIN — OXYCODONE HYDROCHLORIDE 10 MG: 5 TABLET ORAL at 17:25

## 2019-11-08 RX ADMIN — OXYCODONE HYDROCHLORIDE 5 MG: 5 TABLET ORAL at 05:40

## 2019-11-08 RX ADMIN — OXYCODONE HYDROCHLORIDE 10 MG: 5 TABLET ORAL at 12:12

## 2019-11-08 RX ADMIN — GABAPENTIN 100 MG: 100 CAPSULE ORAL at 15:23

## 2019-11-08 RX ADMIN — ACETAMINOPHEN 975 MG: 325 TABLET ORAL at 13:25

## 2019-11-08 RX ADMIN — GABAPENTIN 100 MG: 100 CAPSULE ORAL at 09:07

## 2019-11-08 RX ADMIN — SENNOSIDES 8.6 MG: 8.6 TABLET, FILM COATED ORAL at 09:07

## 2019-11-08 RX ADMIN — METHOCARBAMOL TABLETS 500 MG: 500 TABLET, COATED ORAL at 05:41

## 2019-11-08 RX ADMIN — DOCUSATE SODIUM 100 MG: 100 CAPSULE, LIQUID FILLED ORAL at 09:07

## 2019-11-08 RX ADMIN — ACETAMINOPHEN 975 MG: 325 TABLET ORAL at 05:41

## 2019-11-08 RX ADMIN — IRON SUCROSE 300 MG: 20 INJECTION, SOLUTION INTRAVENOUS at 15:17

## 2019-11-08 RX ADMIN — PAROXETINE 10 MG: 10 TABLET, FILM COATED ORAL at 09:07

## 2019-11-08 RX ADMIN — METHOCARBAMOL TABLETS 500 MG: 500 TABLET, COATED ORAL at 11:26

## 2019-11-08 NOTE — PLAN OF CARE
Problem: PHYSICAL THERAPY ADULT  Goal: Performs mobility at highest level of function for planned discharge setting  See evaluation for individualized goals  Description  Treatment/Interventions: Functional transfer training, LE strengthening/ROM, Elevations, Therapeutic exercise, Endurance training, Patient/family training, Equipment eval/education, Bed mobility, Gait training, Spoke to nursing, OT, Family  Equipment Recommended: Walker(RW)       See flowsheet documentation for full assessment, interventions and recommendations  Outcome: Progressing  Note:   Prognosis: Good  Problem List: Decreased strength, Decreased range of motion, Decreased endurance, Impaired balance, Decreased mobility, Pain, Orthopedic restrictions  Assessment: Pt demonstrated improved mobiilty, performing all transfers with decreased assist after instructions given for sequencing of tasks  Required increased time to do so due to pain & RLE weakness at this time  Ambulated up to household distances with standing rests in between to recover  Refused seated rest until she returned to room  ambulated with short, step to stride, but no LOB noted  Upon return to room, pt remained in chair with all needs in reach & no new complaints  Educated pt on hip precuations & given education regarding safe mobilty  Will benefit from further therapy services to improve functional transfers, ambulation distances, pacing, and trial steps to allow safe return home when appropriate  Barriers to Discharge: Inaccessible home environment  Barriers to Discharge Comments: VI home   Recommendation: Outpatient PT, Home with family support     PT - OK to Discharge: No    See flowsheet documentation for full assessment

## 2019-11-08 NOTE — PLAN OF CARE
Problem: OCCUPATIONAL THERAPY ADULT  Goal: Performs self-care activities at highest level of function for planned discharge setting  See evaluation for individualized goals  Description  Treatment Interventions: ADL retraining, Functional transfer training, Endurance training, Patient/family training, Equipment evaluation/education, Compensatory technique education, Activityengagement          See flowsheet documentation for full assessment, interventions and recommendations  Outcome: Progressing  Note:   Limitation: Decreased ADL status, Decreased endurance, Decreased self-care trans, Decreased high-level ADLs  Prognosis: Good  Assessment: pt seen for OT session focusing on LB dressing with use of LHAE - pt instructed in use of reacher, dressing stick, sock aide, sponge, shoehorn and utlilized same to complete LB dressing with SBA and min cues - family present for same - provided pt with handouts for THR prec and use of LHAE - spouse reports he will purchase hip kit in pharmacy - reviewed THR prec with adls and iadl tasks with good understanding and min cues for carryover - recommend home with family support - OT to continue to follow to address goals as stated on eval     OT Discharge Recommendation: Home with family support  OT - OK to Discharge:  Yes

## 2019-11-08 NOTE — DISCHARGE SUMMARY
ORTHOPEDICS DISCHARGE SUMMARY   Leslee Medrano 81 y o  female MRN: [id number]  Unit/Bed#: -01      Attending Physician: Rhoda Velasquez    Admitting diagnosis: Primary osteoarthritis of one hip, right [M16 11]    Discharge diagnosis: Primary osteoarthritis of one hip, right [M16 11]    Date of admission: 11/7/2019    Date of discharge: 11/08/19    Procedure: Right total hip arthroplasty    HPI  This is a 81y o  year old female that presented to the office with signs and symptoms of right hip osteoarthritis  They tried and failed conservative treatment measures and wished to proceed with surgical intervention  The risks, benefits, and complications of the procedure were discussed with the patient and informed consent was obtained  Hospital Course: The patient was admitted to the hospital on 11/7/2019 and underwent an uncomplicated right total hip arthroplasty  They were transferred to the floor after a brief stay in the post-anesthesia care unit  Their pain was well managed with IV and oral pain medications  They began therapy on post operative day #1  Lovenox was also started for DVT prophylaxis 12 hours post op  On discharge date pt was cleared by PT and the medicine team and determined to be safe for discharge  Daily discussion was had with the patient, nursing staff, orthopaedic team, and family members if present  All questions were answered to the patients satisifaction  0   Lab Value Date/Time    HGB 13 0 11/08/2019 0454    HGB 13 4 10/15/2019 1513    HGB 13 9 12/31/2018 1104    HGB 13 1 12/22/2017 1010    HGB 12 7 05/15/2017 1135    HGB 10 8 (L) 04/10/2017 0543    HGB 10 6 (L) 04/05/2017 1155    HGB 10 1 (L) 04/04/2017 0500    HGB 13 6 03/06/2017 1111    HGB 13 5 10/24/2016 1021    HGB 12 8 11/28/2015 1120    HGB 13 6 11/28/2014 0851        Body mass index is 30 83 kg/m²  mildly obese  Recommend behavior modifications, nutrition and physical activity  Discharge Instructions:   The patient was discharged weight bearing as tolerated to the right lower extremity  Lovenox will be continued for 28 days  Continue PT/OT  Take pain medications as instructed  Discharge Medications: For the complete list of discharge medications, please refer to the patient's medication reconciliation

## 2019-11-08 NOTE — PLAN OF CARE
Problem: PHYSICAL THERAPY ADULT  Goal: Performs mobility at highest level of function for planned discharge setting  See evaluation for individualized goals  Description  Treatment/Interventions: Functional transfer training, LE strengthening/ROM, Elevations, Therapeutic exercise, Endurance training, Patient/family training, Equipment eval/education, Bed mobility, Gait training, Spoke to nursing, OT, Family  Equipment Recommended: Walker(RW)       See flowsheet documentation for full assessment, interventions and recommendations  11/8/2019 1607 by Tonya Young PTA  Outcome: Progressing  Note:   Prognosis: Good  Problem List: Decreased strength, Decreased range of motion, Decreased endurance, Impaired balance, Decreased mobility, Pain, Orthopedic restrictions  Assessment: pt continues to make steady progress with functional mobitly  continues to perform transfers without assist, but requires increased time to do so due to pain  Able to do so with improved UE support after instructions given during session  Demonstrated improved pacing & step length by end of session, but continues to remain UE reliant at this time due to pain RLE  Instructed in & performed curb steps as noted above as she reports she has 1 step into door, then short breezeway, then 2nd step into home  pt able to successfully perform curb steps forwards & backwards ascending, reporting more confidence with backwards ascent at this time  Pt &  verbalized understanding of instructions for sequencing & guarding at this time  Upon return to room, pt instructed in simulated car transfer & given further education for home management & importance of mobitily upon return home  Pt verbalized understanding at this time  pt has demonstrated sufficient progress to return home with family support and follow up outpatient PT to progress to PLOF    Barriers to Discharge: None  Barriers to Discharge Comments: VI home   Recommendation: Home with family support, Outpatient PT     PT - OK to Discharge: Yes    See flowsheet documentation for full assessment  11/8/2019 1554 by Ashok Ferguson PTA  Outcome: Progressing  Note:   Prognosis: Good  Problem List: Decreased strength, Decreased range of motion, Decreased endurance, Impaired balance, Decreased mobility, Pain, Orthopedic restrictions  Assessment: Pt demonstrated improved mobiilty, performing all transfers with decreased assist after instructions given for sequencing of tasks  Required increased time to do so due to pain & RLE weakness at this time  Ambulated up to household distances with standing rests in between to recover  Refused seated rest until she returned to room  ambulated with short, step to stride, but no LOB noted  Upon return to room, pt remained in chair with all needs in reach & no new complaints  Educated pt on hip precuations & given education regarding safe mobilty  Will benefit from further therapy services to improve functional transfers, ambulation distances, pacing, and trial steps to allow safe return home when appropriate  Barriers to Discharge: Inaccessible home environment  Barriers to Discharge Comments: VI home   Recommendation: Outpatient PT, Home with family support     PT - OK to Discharge: No    See flowsheet documentation for full assessment

## 2019-11-08 NOTE — PLAN OF CARE
Problem: PAIN - ADULT  Goal: Verbalizes/displays adequate comfort level or baseline comfort level  Description  Interventions:  - Encourage patient to monitor pain and request assistance  - Assess pain using appropriate pain scale  - Administer analgesics based on type and severity of pain and evaluate response  - Implement non-pharmacological measures as appropriate and evaluate response  - Consider cultural and social influences on pain and pain management  - Notify physician/advanced practitioner if interventions unsuccessful or patient reports new pain  Outcome: Adequate for Discharge     Problem: INFECTION - ADULT  Goal: Absence or prevention of progression during hospitalization  Description  INTERVENTIONS:  - Assess and monitor for signs and symptoms of infection  - Monitor lab/diagnostic results  - Monitor all insertion sites, i e  indwelling lines, tubes, and drains  - Monitor endotracheal if appropriate and nasal secretions for changes in amount and color  - [place] appropriate cooling/warming therapies per order  - Administer medications as ordered  - Instruct and encourage patient and family to use good hand hygiene technique  - Identify and instruct in appropriate isolation precautions for identified infection/condition  Outcome: Adequate for Discharge     Problem: SAFETY ADULT  Goal: Patient will remain free of falls  Description  INTERVENTIONS:  - Assess patient frequently for physical needs  -  Identify cognitive and physical deficits and behaviors that affect risk of falls    -  [place] fall precautions as indicated by assessment   - Educate patient/family on patient safety including physical limitations  - Instruct patient to call for assistance with activity based on assessment  - Modify environment to reduce risk of injury  - Consider OT/PT consult to assist with strengthening/mobility  Outcome: Adequate for Discharge  Goal: Maintain or return to baseline ADL function  Description  INTERVENTIONS:  -  Assess patient's ability to carry out ADLs; assess patient's baseline for ADL function and identify physical deficits which impact ability to perform ADLs (bathing, care of mouth/teeth, toileting, grooming, dressing, etc )  - Assess/evaluate cause of self-care deficits   - Assess range of motion  - Assess patient's mobility; develop plan if impaired  - Assess patient's need for assistive devices and provide as appropriate  - Encourage maximum independence but intervene and supervise when necessary  - Involve family in performance of ADLs  - Assess for home care needs following discharge   - Consider OT consult to assist with ADL evaluation and planning for discharge  - Provide patient education as appropriate  Outcome: Adequate for Discharge  Goal: Maintain or return mobility status to optimal level  Description  INTERVENTIONS:  - Assess patient's baseline mobility status (ambulation, transfers, stairs, etc )    - Identify cognitive and physical deficits and behaviors that affect mobility  - Identify mobility aids required to assist with transfers and/or ambulation (gait belt, sit-to-stand, lift, walker, cane, etc )  - [place] fall precautions as indicated by assessment  - Record patient progress and toleration of activity level on Mobility SBAR; progress patient to next Phase/Stage  - Instruct patient to call for assistance with activity based on assessment  - Consider rehabilitation consult to assist with strengthening/weightbearing, etc   Outcome: Adequate for Discharge     Problem: DISCHARGE PLANNING  Goal: Discharge to home or other facility with appropriate resources  Description  INTERVENTIONS:  - Identify barriers to discharge w/patient and caregiver  - Arrange for needed discharge resources and transportation as appropriate  - Identify discharge learning needs (meds, wound care, etc )  - Arrange for interpretive services to assist at discharge as needed  - Refer to Case Management Department for coordinating discharge planning if the patient needs post-hospital services based on physician/advanced practitioner order or complex needs related to functional status, cognitive ability, or social support system  Outcome: Adequate for Discharge     Problem: Potential for Falls  Goal: Patient will remain free of falls  Description  INTERVENTIONS:  - Assess patient frequently for physical needs  -  Identify cognitive and physical deficits and behaviors that affect risk of falls    -  [place] fall precautions as indicated by assessment   - Educate patient/family on patient safety including physical limitations  - Instruct patient to call for assistance with activity based on assessment  - Modify environment to reduce risk of injury  - Consider OT/PT consult to assist with strengthening/mobility  Outcome: Adequate for Discharge

## 2019-11-08 NOTE — PHYSICAL THERAPY NOTE
Physical Therapy Progress Note     11/08/19 0857   Pain Assessment   Pain Assessment 0-10   Pain Score 2   Pain Location Hip   Pain Orientation Right   Hospital Pain Intervention(s) Repositioned; Ambulation/increased activity; Rest   Response to Interventions tolerated   Restrictions/Precautions   RLE Weight Bearing Per Order WBAT   Other Precautions WBS;THR;Pain; Fall Risk   Subjective   Subjective Pt encountered supine in bed , pleasant and agreeable to treatment  Reports controlled pain & good sleep last night  Bed Mobility   Supine to Sit 4  Minimal assistance   Additional items Assist x 1; Increased time required;LE management   Transfers   Sit to Stand 5  Supervision   Additional items Assist x 1; Armrests; Increased time required   Stand to Sit 5  Supervision   Additional items Assist x 1; Armrests; Increased time required   Ambulation/Elevation   Gait pattern Excessively slow; Step to;Short stride; Inconsistent zan;Decreased R stance;Decreased foot clearance; Antalgic; Improper Weight shift   Gait Assistance 5  Supervision   Additional items Assist x 1   Assistive Device Rolling walker   Distance 40', 60', 40'   Balance   Static Sitting Fair   Static Standing Fair   Ambulatory Fair -   Endurance Deficit   Endurance Deficit Yes   Endurance Deficit Description pain, fatigue   Activity Tolerance   Activity Tolerance Patient tolerated treatment well;Patient limited by fatigue;Patient limited by pain   Nurse Made Aware ERIKA Weeks   Assessment   Prognosis Good   Problem List Decreased strength;Decreased range of motion;Decreased endurance; Impaired balance;Decreased mobility;Pain;Orthopedic restrictions   Assessment Pt demonstrated improved mobiilty, performing all transfers with decreased assist after instructions given for sequencing of tasks  Required increased time to do so due to pain & RLE weakness at this time  Ambulated up to household distances with standing rests in between to recover    Refused seated rest until she returned to room  ambulated with short, step to stride, but no LOB noted  Upon return to room, pt remained in chair with all needs in reach & no new complaints  Educated pt on hip precuations & given education regarding safe mobilty  Will benefit from further therapy services to improve functional transfers, ambulation distances, pacing, and trial steps to allow safe return home when appropriate  Barriers to Discharge Inaccessible home environment   Goals   Patient Goals to get better before going home   STG Expiration Date 11/17/19   PT Treatment Day 1   Plan   Treatment/Interventions Functional transfer training;LE strengthening/ROM; Elevations; Therapeutic exercise; Endurance training;Patient/family training;Equipment eval/education; Bed mobility;Gait training   Progress Progressing toward goals   PT Frequency 7x/wk; Twice a day   Recommendation   Recommendation Outpatient PT; Home with family support   Equipment Recommended [place]   PT - OK to Discharge No     Dudley Osman, PTA

## 2019-11-08 NOTE — PROGRESS NOTES
Internal Medicine Progress Note  Patient: Alonzo Mckeon  Age/sex: 81 y o  female  Medical Record #: [id number]      ASSESSMENT/PLAN: (Interval History)  Alonzo Mckeon is seen and examined and management for following issues:    R SHARIF    Pain controlled  Continue encourage incentive spirometry; monitor fever curve  DVT prophylaxis in place and reviewed  Results from last 7 days   Lab Units 11/08/19  0454   WBC Thousand/uL 13 02*   HEMOGLOBIN g/dL 13 0   HEMATOCRIT % 40 7   PLATELETS Thousands/uL 409*     ·      Depression/Anxiety  · Cont current medications uninterupted     Post operative blood loss anemia  · IV iron infusion ordered by orthopedics  · Hgb 13  Leukocytosis  · Mild  Likely reactive  No signs of infx  The above assessment and plan was reviewed and updated as determined by my evaluation of the patient on 11/8/2019      Labs:   Results from last 7 days   Lab Units 11/08/19  0454   WBC Thousand/uL 13 02*   HEMOGLOBIN g/dL 13 0   HEMATOCRIT % 40 7   PLATELETS Thousands/uL 409*     Results from last 7 days   Lab Units 11/08/19  0454   SODIUM mmol/L 141   POTASSIUM mmol/L 4 2   CHLORIDE mmol/L 106   CO2 mmol/L 28   BUN mg/dL 15   CREATININE mg/dL 0 73   CALCIUM mg/dL 8 9                   Review of Scheduled Meds:    Current Facility-Administered Medications:  acetaminophen 975 mg Oral Q8H Kendra Lawrence MD    calcium carbonate 1,000 mg Oral Daily PRN Kendra Lawrence MD    clonazePAM 0 5 mg Oral HS Kendra Lawrence MD    docusate sodium 100 mg Oral BID Kendra Lawrence MD    enoxaparin 40 mg Subcutaneous Q24H Kendra Lawrence MD    gabapentin 100 mg Oral TID Kendra Lawrence MD    HYDROmorphone 0 5 mg Intravenous Q2H PRN Kendra Lawrence MD    iron sucrose 300 mg Intravenous Q24H Kendra Lawrence MD    lactated ringers 1,000 mL Intravenous Once PRN Kendra Lawrence MD    And        lactated ringers 1,000 mL Intravenous Once PRN Kendra Lawrence MD    lactated ringers 1 5 mL/kg/hr Intravenous Continuous Bhumika Hogan MD Last Rate: Stopped (11/08/19 0532)   methocarbamol 500 mg Oral Q6H [address] Bhumika Hogan MD    ondansetron 4 mg Intravenous Q6H PRN Bhumika Hogan MD    oxyCODONE 10 mg Oral Q4H PRN Bhumika Hogan MD    oxyCODONE 5 mg Oral Q4H PRN Bhumika Hogan MD    PARoxetine 10 mg Oral Daily Bhumika Hogan MD    senna 1 tablet Oral Daily Bhumika Hogan MD    sodium chloride 1,000 mL Intravenous Once PRN Bhumika Hogan MD    And        sodium chloride 1,000 mL Intravenous Once PRN Bhumika Hogan MD        Subjective/ HPI: Patient seen and examined  Patients overnight issues or events were reviewed with nursing or staff during rounds or morning huddle session  New or overnight issues include the following:  No overnight events  Pain adequately controlled  Patient working with physical therapy adequately  ROS:   A 10 point ROS was performed; negative except as noted above         Imaging:     No orders to display       *Labs /Radiology studiesReviewed  *Medications Reviewed and reconciled as needed  *Please refer to order section for additional ordered labs studies  *Case discussed with primary attending during morning huddle case rounds    Physical Examination:  Vitals:   Vitals:    11/07/19 2327 11/08/19 0310 11/08/19 0539 11/08/19 0707   BP: 109/70   105/69   Pulse: 94   93   Resp:    16   Temp:    98 °F (36 7 °C)   TempSrc:       SpO2: 94% 90%  99%   Weight:   74 kg (163 lb 2 3 oz)    Height:           General Appearance: no distress, conversive  HEENT: PERRLA, conjuctiva normal; oropharynx clear; mucous membranes moist;   Neck:  Supple, no lymphadenopathy or thyromegaly  Lungs: CTA, normal respiratory effort, no retractions, expiratory effort normal  CV: regular rate and rhythm , PMI normal   ABD: soft non tender, no masses , no hepatic or splenomegaly  EXT: DP pulses intact, no lymphadenopathy, no edema; hip bandage in place  Skin: normal turgor, normal texture, no rash  Psych: affect normal, mood normal  Neuro: AAOx3        The above physical exam was reviewed and updated as determined by my evaluation of the patient on 11/8/2019  Invasive Devices     Peripheral Intravenous Line            Peripheral IV 11/07/19 Left Hand 1 day    Peripheral IV 11/07/19 Right Hand 1 day                 Code Status: Level 1 - Full Code  Current Length of Stay: 1 day(s)      Total time spent:  30 minutes with more than 50% spent counseling/coordinating care  Counseling includes discussion with patient re: progress  and discussion with patient of his/her current medical state/information  Coordination of patient's care was performed in conjunction with primary service  Time invested included review of patient's labs, vitals, and management of their comorbidities with continued monitoring  In addition, this patient was discussed with medical team including physician and advanced extenders  The care of the patient was extensively discussed and appropriate treatment plan was formulated unique for this patient  ** Please Note:  voice to text software may have been used in the creation of this document   Although proof errors in transcription or interpretation are a potential of such software**

## 2019-11-08 NOTE — OCCUPATIONAL THERAPY NOTE
OccupationalTherapy Progress Note     Patient Name: Prince Driver  Today's Date: 11/8/2019  Problem List  Principal Problem:    Status post total hip replacement, right          11/08/19 1020   Restrictions/Precautions   Weight Bearing Precautions Per Order Yes   RUE Weight Bearing Per Order WBAT   LUE Weight Bearing Per Order WBAT   RLE Weight Bearing Per Order WBAT   LLE Weight Bearing Per Order WBAT   Other Precautions WBS;THR   Lifestyle   Autonomy I adls and mobility- i iadls - shares homemaking with spouse   Reciprocal Relationships supportive family - reports spouse is able to assist prm   Service to Others retired   Intrinsic Gratification active pta   Pain Assessment   Pain Assessment 0-10   Pain Score 5   Pain Type Acute pain   Pain Location Hip   Pain Orientation Right   Hospital Pain Intervention(s) Repositioned; Ambulation/increased activity; Emotional support   ADL   Eating Assistance 7  Independent   LB Dressing Assistance 5  Supervision/Setup   LB Dressing Deficit Requires assistive device for steadying;Verbal cueing; Increased time to complete; Don/doff R sock; Don/doff L sock; Thread RLE into underwear; Thread LLE into underwear;Use of adaptive equipment   Toileting Assistance  5  Supervision/Setup   Transfers   Sit to Stand 5  Supervision   Stand to Sit 5  Supervision   Stand pivot 5  Supervision   Toilet transfer 5  Supervision   Functional Mobility   Functional Mobility 5  Supervision   Additional items Rolling walker   Toilet Transfers   Toilet Transfer Type To and from   Toilet Transfer to Standard bedside commode   Toilet Transfer Technique Ambulating   Toilet Transfers Supervision   Activity Tolerance   Activity Tolerance Patient limited by fatigue;Patient limited by pain   Assessment   Assessment pt seen for OT session focusing on LB dressing with use of LHAE - pt instructed in use of reacher, dressing stick, sock aide, sponge, shoehorn and utlilized same to complete LB dressing with SBA and min cues - family present for same - provided pt with handouts for THR prec and use of LHAE - spouse reports he will purchase hip kit in pharmacy - reviewed THR prec with adls and iadl tasks with good understanding and min cues for carryover - recommend home with family support - OT to continue to follow to address goals as stated on eval   Plan   Treatment Interventions ADL retraining;Functional transfer training; Endurance training;Patient/family training;Equipment evaluation/education; Compensatory technique education; Activityengagement   Goal Expiration Date 11/12/19   OT Treatment Day 1   OT Frequency 3-5x/wk   Recommendation   OT Discharge Recommendation Home with family support   OT - OK to Discharge Yes   Barthel Index   Feeding 10   Bathing 0   Grooming Score 5   Dressing Score 5   Bladder Score 10   Bowels Score 10   Toilet Use Score 5   Transfers (Bed/Chair) Score 10   Mobility (Level Surface) Score 0   Stairs Score 0   Barthel Index Score 55     Sandra Justin

## 2019-11-08 NOTE — UTILIZATION REVIEW
Initial Clinical Review    Elective Inpatient surgical procedure  Age/Sex: 81 y o  female  Surgery Date: 11/7  Procedure: S/P ARTHROPLASTY HIP TOTAL Posterior (Right)  Anesthesia: Spinal    Admission Orders: Date/Time/Statement: Inpatient Admission Orders (From admission, onward)     Ordered        11/07/19 0855  Inpatient Admission  Once                       Inpatient Admission     Standing Status:   Standing     Number of Occurrences:   1     Order Specific Question:   Admitting Physician     Answer:   Ormsby Evangelina [[id number]]     Order Specific Question:   Level of Care     Answer:   Med Surg [[id number]]     Order Specific Question:   Estimated length of stay     Answer:   Inpatient Only Surgery     Vital Signs: /69   Pulse 93   Temp 98 °F (36 7 °C)   Resp 16   Ht 5' 1" (1 549 m)   Wt 74 kg (163 lb 2 3 oz)   LMP  (LMP Unknown)   SpO2 99%   BMI 30 83 kg/m²      Diet: Regular  Mobility: Activity as tolerated  DVT Prophylaxis: Sequential compression device    Medications/Pain Control:   Scheduled Medications:  Medications:  acetaminophen 975 mg Oral Q8H   clonazePAM 0 5 mg Oral HS   docusate sodium 100 mg Oral BID   enoxaparin 40 mg Subcutaneous Q24H   gabapentin 100 mg Oral TID   iron sucrose 300 mg Intravenous Q24H   methocarbamol 500 mg Oral Q6H OPAL   PARoxetine 10 mg Oral Daily   senna 1 tablet Oral Daily     Continuous IV Infusions:  lactated ringers 1 5 mL/kg/hr Intravenous Continuous     PRN Meds:  calcium carbonate 1,000 mg Oral Daily PRN   HYDROmorphone 0 5 mg Intravenous Q2H PRN   ondansetron 4 mg Intravenous Q6H PRN   oxyCODONE 10 mg Oral Q4H PRN  11/7 x1   oxyCODONE 5 mg Oral Q4H PRN 11/8 x1     Network Utilization Review Department  [email or web address]  ATTENTION: Please call with any questions or concerns to [phone] and carefully listen to the prompts so that you are directed to the right person   All voicemails are confidential   Kat Salvador all requests for admission clinical reviews, approved or denied determinations and any other requests to dedicated fax number below belonging to the campus where the patient is receiving treatment    FACILITY NAME UR FAX NUMBER   ADMISSION DENIALS (Administrative/Medical Necessity) [address] [facility] (Maternity/NICU/Pediatrics) [phone]   [facility] [address] [facility] [address] [facility] [phone]   [address] [phone]   Estelle Allison [address] [phone]

## 2019-11-08 NOTE — DISCHARGE INSTRUCTIONS
Discharge Instructions - [address] 81 y o  female MRN: [id number]  Unit/Bed#: PACU 13    Weight Bearing Status:                                           Weight Bearing as tolerated to the right lower extremity  DVT prophylaxis:  Complete course of Lovenox as directed    Pain:  Continue analgesics as directed    Showering Instructions:   Do not shower until follow-up appointment    Dressing Instructions:   Keep dressing clean, dry and intact until follow up appointment  Driving Instructions:  No driving until cleared by Orthopaedic Surgery  PT/OT:  Continue PT/OT on outpatient basis as directed    Appt Instructions:    If you do not have your appointment, please call the clinic at [phone]  Otherwise followup as scheduled below:

## 2019-11-08 NOTE — SOCIAL WORK
[place] [facility]  CM spoke with pt who had no DME company pref and chose [facility]    CM sent referral

## 2019-11-08 NOTE — PHYSICAL THERAPY NOTE
Physical Therapy Progress Note     11/08/19 6971   Pain Assessment   Pain Assessment 0-10   Pain Score 5   Pain Location Hip   Pain Orientation Right   Hospital Pain Intervention(s) Cold applied;Repositioned; Ambulation/increased activity; Rest   Response to Interventions tolerated   Restrictions/Precautions   RLE Weight Bearing Per Order WBAT   Other Precautions WBS;THR;Pain; Fall Risk   Subjective   Subjective pt encountered seated in recliner with daughter &  present  Rerpots controlled pain, but increased sitiffness initially upon standing  Improved with activity  pt apprehensive about discharging despite progress & encouragement by staff & family  Family very supportive & confident in ability to assist pt at home  Transfers   Sit to Stand 5  Supervision   Additional items Assist x 1; Armrests; Increased time required;Verbal cues   Stand to Sit 5  Supervision   Additional items Assist x 1; Armrests; Increased time required   Ambulation/Elevation   Gait pattern Excessively slow; Step to;Short stride;Decreased R stance;Decreased foot clearance; Antalgic; Improper Weight shift   Gait Assistance 5  Supervision   Additional items Assist x 1   Assistive Device Rolling walker   Distance 80' x 2   Curbs x2 curb steps forward ascending/descending, x1 backwards ascending/forwards descending, all with RW & min A   Balance   Static Sitting Fair +   Static Standing Fair   Ambulatory Fair -   Endurance Deficit   Endurance Deficit Yes   Endurance Deficit Description pain, fatigue   Activity Tolerance   Activity Tolerance Patient tolerated treatment well;Patient limited by fatigue;Patient limited by pain   Nurse Made Aware ERIKA Weeks   Assessment   Prognosis Good   Problem List Decreased strength;Decreased range of motion;Decreased endurance; Impaired balance;Decreased mobility;Pain;Orthopedic restrictions   Assessment pt continues to make steady progress with functional mobitly    continues to perform transfers without assist, but requires increased time to do so due to pain  Able to do so with improved UE support after instructions given during session  Demonstrated improved pacing & step length by end of session, but continues to remain UE reliant at this time due to pain RLE  Instructed in & performed curb steps as noted above as she reports she has 1 step into door, then short breezeway, then 2nd step into home  pt able to successfully perform curb steps forwards & backwards ascending, reporting more confidence with backwards ascent at this time  Pt &  verbalized understanding of instructions for sequencing & guarding at this time  Upon return to room, pt instructed in simulated car transfer & given further education for home management & importance of mobitily upon return home  Pt verbalized understanding at this time  pt has demonstrated sufficient progress to return home with family support and follow up outpatient PT to progress to PLOF  Barriers to Discharge None   Goals   Patient Goals to be safe when she goes home   STG Expiration Date 11/17/19   PT Treatment Day 2   Plan   Treatment/Interventions Functional transfer training;LE strengthening/ROM; Elevations; Therapeutic exercise; Endurance training;Patient/family training;Equipment eval/education; Bed mobility;Gait training   Progress Progressing toward goals   PT Frequency 7x/wk; Twice a day   Recommendation   Recommendation Home with family support; Outpatient PT   Equipment Recommended [facility]   PT - OK to Discharge Yes     Harkins Cuff, PTA

## 2019-11-08 NOTE — PROGRESS NOTES
Orthopedics   Manuel Correa 81 y o  female MRN: [id number]  Unit/Bed#: -01      Subjective:  81 y  o female post operative day 1 right total hip arthroplasty  Pt doing well  Pain controlled      Labs:  0   Lab Value Date/Time    HCT 40 7 11/08/2019 0454    HCT 42 9 10/15/2019 1513    HCT 43 2 12/31/2018 1104    HCT 39 9 11/28/2015 1120    HCT 41 0 11/28/2014 0851    HGB 13 0 11/08/2019 0454    HGB 13 4 10/15/2019 1513    HGB 13 9 12/31/2018 1104    HGB 12 8 11/28/2015 1120    HGB 13 6 11/28/2014 0851    INR 0 94 10/15/2019 1513    WBC 13 02 (H) 11/08/2019 0454    WBC 8 19 10/15/2019 1513    WBC 5 72 12/31/2018 1104    WBC 5 04 11/28/2015 1120    WBC 6 85 11/28/2014 0851    ESR 16 12/31/2018 1104    ESR 18 10/19/2015 1001    CRP 11 4 (H) 10/15/2019 1513    CRP <3 0 10/19/2015 1001       Meds:    Current Facility-Administered Medications:     acetaminophen (TYLENOL) tablet 975 mg, 975 mg, Oral, Q8H, Bhumika Hogan MD, 975 mg at 11/08/19 0541    calcium carbonate (TUMS) chewable tablet 1,000 mg, 1,000 mg, Oral, Daily PRN, Bhumika Hogan MD    clonazePAM (KlonoPIN) tablet 0 5 mg, 0 5 mg, Oral, HS, Bhumika Hogan MD, 0 5 mg at 11/07/19 2144    docusate sodium (COLACE) capsule 100 mg, 100 mg, Oral, BID, Bhumika Hogan MD, 100 mg at 11/07/19 1711    enoxaparin (LOVENOX) subcutaneous injection 40 mg, 40 mg, Subcutaneous, Q24H, Bhumika Hogan MD, 40 mg at 11/07/19 2144    gabapentin (NEURONTIN) capsule 100 mg, 100 mg, Oral, TID, Bhumika Hogan MD, 100 mg at 11/07/19 2144    HYDROmorphone (DILAUDID) injection 0 5 mg, 0 5 mg, Intravenous, Q2H PRN, Bhumika Hogan MD    iron sucrose (VENOFER) 300 mg in sodium chloride 0 9 % 250 mL IVPB, 300 mg, Intravenous, Q24H, Bhumika Hogan MD, 300 mg at 11/07/19 1410    lactated ringers bolus 1,000 mL, 1,000 mL, Intravenous, Once PRN **AND** lactated ringers bolus 1,000 mL, 1,000 mL, Intravenous, Once PRN, Bhumika Hogan MD    lactated ringers infusion, 1 5 mL/kg/hr, Intravenous, Continuous, Dawood Gordon MD, Stopped at 11/08/19 0532    methocarbamol (ROBAXIN) tablet 500 mg, 500 mg, Oral, Q6H [address], Dawood Gordon MD, 500 mg at 11/08/19 0541    ondansetron (ZOFRAN) injection 4 mg, 4 mg, Intravenous, Q6H PRN, Daowod Gordon MD    oxyCODONE (ROXICODONE) IR tablet 10 mg, 10 mg, Oral, Q4H PRN, Dawood Gordon MD, 10 mg at 11/07/19 1410    oxyCODONE (ROXICODONE) IR tablet 5 mg, 5 mg, Oral, Q4H PRN, Dawood Gordon MD, 5 mg at 11/08/19 0540    PARoxetine (PAXIL) tablet 10 mg, 10 mg, Oral, Daily, Dawood Gordon MD, 10 mg at 11/07/19 1415    senna (SENOKOT) tablet 8 6 mg, 1 tablet, Oral, Daily, Dawood Gordon MD    sodium chloride 0 9 % bolus 1,000 mL, 1,000 mL, Intravenous, Once PRN **AND** sodium chloride 0 9 % bolus 1,000 mL, 1,000 mL, Intravenous, Once PRN, Dawood Gordon MD    Blood Culture:   No results found for: BLOODCX    Wound Culture:   Lab Results   Component Value Date    WOUNDCULT 2+ Growth of Stenotrophomonas maltophilia (A) 10/03/2019    WOUNDCULT 2+ Growth of Beta Hemolytic Streptococcus Group B (A) 10/03/2019    WOUNDCULT 2+ Growth of  10/03/2019       Ins and Outs:  I/O last 24 hours: In: 3864 6 [P O :840; I V :2974 6; IV Piggyback:50]  Out: 3300 [Urine:3100; Blood:200]          Physical Exam:  Vitals:    11/08/19 0310   BP:    Pulse:    Resp:    Temp:    SpO2: 90%     right lower extremity:  · Dressings C/D/I  · Sensation intact L2-S1  · Motor intact L2-S1  · 2+ dorsalis pedis     _*_*_*_*_*_*_*_*_*_*_*_*_*_*_*_*_*_*_*_*_*_*_*_*_*_*_*_*_*_*_*_*_*_*_*_*_*_*_*_*_*    Assessment: 81 y  o female post operative day 1 right total hip arthroplasty   Doing well    Plan:  · Weight Bearing as tolerated  · Up and out of bed  · Posterior total hip precautions  · Abduction pillow while in bed  · DVT prophylaxis  · Analgesics  · PT/OT  · Will continue to assess for acute blood loss anemia    Pritesh Cevallos PA-C

## 2019-11-11 ENCOUNTER — TRANSITIONAL CARE MANAGEMENT (OUTPATIENT)
Dept: INTERNAL MEDICINE CLINIC | Facility: CLINIC | Age: 69
End: 2019-11-11

## 2019-11-11 ENCOUNTER — TELEPHONE (OUTPATIENT)
Dept: OBGYN CLINIC | Facility: HOSPITAL | Age: 69
End: 2019-11-11

## 2019-11-11 ENCOUNTER — OFFICE VISIT (OUTPATIENT)
Dept: PHYSICAL THERAPY | Facility: REHABILITATION | Age: 69
End: 2019-11-11
Payer: COMMERCIAL

## 2019-11-11 DIAGNOSIS — Z96.641 STATUS POST TOTAL REPLACEMENT OF RIGHT HIP: Primary | ICD-10-CM

## 2019-11-11 PROCEDURE — 97140 MANUAL THERAPY 1/> REGIONS: CPT | Performed by: PHYSICAL THERAPIST

## 2019-11-11 PROCEDURE — 97164 PT RE-EVAL EST PLAN CARE: CPT | Performed by: PHYSICAL THERAPIST

## 2019-11-11 RX ORDER — ACETAMINOPHEN 160 MG
TABLET,DISINTEGRATING ORAL
COMMUNITY

## 2019-11-11 NOTE — PROGRESS NOTES
PT Re-Evaluation     Today's date: 2019  Patient name: Shelley Heck  : 1950  MRN: [id number]  Referring provider: Sunny Godoy MD  Dx:   Encounter Diagnosis     ICD-10-CM    1  Status post total replacement of right hip Z96 641                   Assessment  Assessment details: Patient presents with pain, decreased hip ROM, decreased LE flexibility, decreased LE strength, and decreased function secondary to s/p R SHARIF (posterior approach)  Patient would benefit from skilled PT intervention to address these issues and to maximize function  Thank you for the referral   Impairments: abnormal gait, abnormal or restricted ROM, activity intolerance, impaired balance, impaired physical strength, lacks appropriate home exercise program, pain with function and weight-bearing intolerance  Understanding of Dx/Px/POC: good   Prognosis: good    Goals  Short Term:  Pt will report decreased levels of pain by at least 2 subjective ratings in 4 weeks  Pt will demonstrate improved ROM by at least 10 degrees in 4 weeks  Pt will demonstrate improved strength by 1/2 grade MMT in 4 weeks  Long Term:   Pt will be independent in their HEP in 8 weeks  Pt will demonstrate improved FOTO, > 58  Pt will be independent with all ADL's  Pt will be able to ambulate community distances with AD PRN  Pt will perform steps in reciprocal pattern without hip pain    Plan  Plan details: Patient was educated in [address]  All questions were answered to pt's satisfaction      Patient would benefit from: skilled physical therapy  Planned modality interventions: cryotherapy  Planned therapy interventions: manual therapy, neuromuscular re-education, patient education, therapeutic activities, therapeutic exercise, gait training, balance/weight bearing training, flexibility and home exercise program  Frequency: 2x week  Duration in weeks: 12  Plan of Care beginning date: 2019  Plan of Care expiration date: 2/3/2020  Treatment plan discussed with: patient        Subjective Evaluation    History of Present Illness  Mechanism of injury: Pt is a 81 y o female who underwent R SHARIF, posterior approach, on 11/7/19  Pt denies complications with the surgery and was d/c home the next day  Pt is scheduled to f/u with MD on 11/14  Pt reports pain/difficulty with FWB on R LE (also c/o R knee pain secondary to OA), ambulation (RW at all times), sit to stand transfers, steps (3 to enter house and performs in step to pattern; has not done full flight up to her bedroom), showering (currently sponge bathing), toilet transfers (using commode currently)  Pt currently sleeping in recliner  Pain  Location: R hip 6/10 at worse and 0/10 at best;  R knee 8/10 at worse and 3/10 at best    Treatments  Discharged from (in last 30 days): inpatient hospitalization  Patient Goals  Patient goals for therapy: decreased pain, increased motion, increased strength, improved balance, independence with ADLs/IADLs and return to sport/leisure activities          Objective     Tenderness     Additional Tenderness Details  None noted        Neurological Testing     Sensation     Hip     Right Hip   Intact: light touch    Additional Neurological Details  Pt denies N/T    Active Range of Motion     Right Hip   Flexion: 40 degrees   Extension: 8 degrees   Abduction: 12 degrees     Additional Active Range of Motion Details  AROM measured in standing    Passive Range of Motion     Right Hip   Flexion: 70 degrees   Extension: 0 degrees   Abduction: 18 degrees   External rotation (90/90): 15 degrees     Strength/Myotome Testing     Right Hip   Planes of Motion   Flexion: 3-  Abduction: 2+    Right Knee   Flexion: 4  Extension: 3+    Right Ankle/Foot   Dorsiflexion: 5  Plantar flexion: 5 (seated)    Tests     Additional Tests Details  (-)homans    General Comments:      Hip Comments   Reviewed hip precautions for posterolateral approach and pt demonstrates a good understanding  Pt using RW correctly and is the correct height for her  Precautions: OA, Fibromyalgia, ankylosing spondylitis, spinal stenosis, lumbar fusion, follow hip precautions for posterior approach (No flexion past 90*, adduction or IR)        Manual   11/11                     R Hip PROM within precautions (No flex past 90*, ADD, or IR)  TP 10 mins                                                                                                                           Exercise Diary                        Bike or Nustep                       Heel slides                       SLR                       bridges                       Supine hip add squeeze                       Clamshells w/pillow                       Side stepping                       Standing hip 3 way                       Heel raises                       Step ups                       laq                       HS curls                       Gait training PRN                       Biodex: LOS                       Biodex:  RC                                                                                                                                                     Modalities                        CP PRN

## 2019-11-11 NOTE — UTILIZATION REVIEW
Notification of Discharge  This is a Notification of Discharge from our facility [address]  Please be advised that this patient has been discharge from our facility  Below you will find the admission and discharge date and time including the patients disposition  PRESENTATION DATE: 11/7/2019  5:35 AM  OBS ADMISSION DATE:   IP ADMISSION DATE: 11/7/19 0855   DISCHARGE DATE: 11/8/2019  5:30 PM  DISPOSITION: Home/Self Care Home/Self Care   Admission Orders listed below:  Admission Orders (From admission, onward)     Ordered        11/07/19 0857  Inpatient Admission  Once         11/07/19 0855  Inpatient Admission  Once                   Please contact the UR Department if additional information is required to close this patient's authorization/case  [address] Utilization Review Department  Main: [phone] x carefully listen to the prompts  All voicemails are confidential   [email or web address]  Send all requests for admission clinical reviews, approved or denied determinations and any other requests to dedicated fax number below belonging to the campus where the patient is receiving treatment    List of dedicated fax numbers:  [address] DENIALS (Administrative/Medical Necessity) [phone]   [address]  (Maternity/NICU/Pediatrics) [phone] 5400 [facility] [phone]   [facility] [phone]   [facility] [phone]   Dudley Gamble [facility] 1525  [phone]   Pardeep Jackson [facility] 443 [facility] [phone]

## 2019-11-12 ENCOUNTER — TELEPHONE (OUTPATIENT)
Dept: OBGYN CLINIC | Facility: HOSPITAL | Age: 69
End: 2019-11-12

## 2019-11-12 NOTE — UTILIZATION REVIEW
Notification of Discharge  This is a Notification of Discharge from our facility [address]  Please be advised that this patient has been discharge from our facility  Below you will find the admission and discharge date and time including the patients disposition  PRESENTATION DATE: 11/7/2019  5:35 AM  OBS ADMISSION DATE:   IP ADMISSION DATE: 11/7/19 0855   DISCHARGE DATE: 11/8/2019  5:30 PM  DISPOSITION: Home/Self Care Home/Self Care   Admission Orders listed below:  Admission Orders (From admission, onward)     Ordered        11/07/19 0857  Inpatient Admission  Once         11/07/19 0855  Inpatient Admission  Once                   Please contact the UR Department if additional information is required to close this patient's authorization/case  [address] Utilization Review Department  Main: [phone] x carefully listen to the prompts  All voicemails are confidential   [email or web address]  Send all requests for admission clinical reviews, approved or denied determinations and any other requests to dedicated fax number below belonging to the campus where the patient is receiving treatment    List of dedicated fax numbers:  [address] DENIALS (Administrative/Medical Necessity) [phone]   [address] (Maternity/NICU/Pediatrics) [phone]   Shellia Lang [phone]   Levon Roch [phone]   Evert Ramirez [phone]   [address] [place], [place] [address] [facility] [phone]   [facility] [address] [phone]

## 2019-11-12 NOTE — TELEPHONE ENCOUNTER
Patient contacted to complete a postoperative follow-up call assessment  Patient reports hanging in there, and and current 2/10 pain  Patient after visit summary and after visit summary medication list were reviewed  Patient reports taking Robaxin every 6-8 hours, Tylenol 500 mg twice a day, or a 1000 before PT  Patient also taking gabapentin 100 mg 3 times a day as prescribed  Patient reports taking oxycodone pills for before therapy  Patient reports hip pain is okay, reports majority of her pain is in her knee  Patient reports taking nothing yet for pain today  Patient reports taking Lovenox daily and reports that going well, and and she is doing them to herself  Patient reports last administration of Lovenox with this morning  Patient denies taking any stool softener stating no am not, patient reports having for bowel movement since being home and last bowel movement was this morning  Patient denies any nausea, vomiting, and/or abdominal pain  Patient did question whether she can restart her Mobic  Patient's Mobic as prescribed by rheumatologist, I did refer patient to contact her rheumatologist for advice on when to restart Mobic as she is currently on blood thinning medication  Patient also declines taking any tramadol at this time and reports she is holding off for now      Patient reports her lower leg is very swollen    Patient denies any redness, or tenderness to that area and was advised to continue to monitor  Patient reports she has not been icing, but was educated to ice every 2-3 hours for 20 minutes on and off on areas of pain and swelling  Patient reports dressing was changed at physical therapy yesterday and reports the therapist stated it looked good    Patient reports dressing is currently good and dry  Patient reports ambulating with a walker and doing pretty good    Patient reports using the bedside commode because bathroom is on the 2nd floor, patient reports moving the bedside commode on the other side of the home that she has to get up and ambulate around the home to go to the bathroom  Patient denies any chest pain, shortness of breath, dizziness, or calf pain  Patient reports monitoring temperature and reports last temp was 99 6° yesterday  Patient denies any fevers today  Patient reports she is having some nasal congestion, and is still continuing to do the incentive spirometer  Patient was recommended to contact PCP and have postoperative appointment with PCP moved up to be evaluated for nasal congestion  Patient was agreeable and understanding  Patient aware of surgeons appointment on November 14th and 21st     pt encouraged to call me with questions, concerns or issues

## 2019-11-14 ENCOUNTER — OFFICE VISIT (OUTPATIENT)
Dept: PHYSICAL THERAPY | Facility: REHABILITATION | Age: 69
End: 2019-11-14
Payer: COMMERCIAL

## 2019-11-14 ENCOUNTER — OFFICE VISIT (OUTPATIENT)
Dept: INTERNAL MEDICINE CLINIC | Facility: CLINIC | Age: 69
End: 2019-11-14
Payer: COMMERCIAL

## 2019-11-14 ENCOUNTER — OFFICE VISIT (OUTPATIENT)
Dept: OBGYN CLINIC | Facility: HOSPITAL | Age: 69
End: 2019-11-14

## 2019-11-14 ENCOUNTER — HOSPITAL ENCOUNTER (OUTPATIENT)
Dept: RADIOLOGY | Facility: HOSPITAL | Age: 69
Discharge: HOME | End: 2019-11-14
Attending: ORTHOPAEDIC SURGERY
Payer: COMMERCIAL

## 2019-11-14 VITALS
HEIGHT: 61 IN | SYSTOLIC BLOOD PRESSURE: 126 MMHG | DIASTOLIC BLOOD PRESSURE: 80 MMHG | BODY MASS INDEX: 31.91 KG/M2 | HEART RATE: 108 BPM | WEIGHT: 169 LBS

## 2019-11-14 VITALS
SYSTOLIC BLOOD PRESSURE: 120 MMHG | HEART RATE: 99 BPM | TEMPERATURE: 98 F | WEIGHT: 169 LBS | HEIGHT: 61 IN | DIASTOLIC BLOOD PRESSURE: 76 MMHG | OXYGEN SATURATION: 98 % | BODY MASS INDEX: 31.91 KG/M2

## 2019-11-14 DIAGNOSIS — M25.561 ACUTE PAIN OF RIGHT KNEE: ICD-10-CM

## 2019-11-14 DIAGNOSIS — J01.00 ACUTE NON-RECURRENT MAXILLARY SINUSITIS: Primary | ICD-10-CM

## 2019-11-14 DIAGNOSIS — M17.11 PRIMARY OSTEOARTHRITIS OF RIGHT KNEE: ICD-10-CM

## 2019-11-14 DIAGNOSIS — Z47.1 AFTERCARE FOLLOWING RIGHT HIP JOINT REPLACEMENT SURGERY: ICD-10-CM

## 2019-11-14 DIAGNOSIS — Z96.641 STATUS POST TOTAL REPLACEMENT OF RIGHT HIP: Primary | ICD-10-CM

## 2019-11-14 DIAGNOSIS — Z96.641 AFTERCARE FOLLOWING RIGHT HIP JOINT REPLACEMENT SURGERY: Primary | ICD-10-CM

## 2019-11-14 DIAGNOSIS — Z47.1 AFTERCARE FOLLOWING RIGHT HIP JOINT REPLACEMENT SURGERY: Primary | ICD-10-CM

## 2019-11-14 DIAGNOSIS — Z96.641 AFTERCARE FOLLOWING RIGHT HIP JOINT REPLACEMENT SURGERY: ICD-10-CM

## 2019-11-14 DIAGNOSIS — M16.11 PRIMARY OSTEOARTHRITIS OF ONE HIP, RIGHT: ICD-10-CM

## 2019-11-14 PROCEDURE — 97112 NEUROMUSCULAR REEDUCATION: CPT

## 2019-11-14 PROCEDURE — 99214 OFFICE O/P EST MOD 30 MIN: CPT | Performed by: INTERNAL MEDICINE

## 2019-11-14 PROCEDURE — 99024 POSTOP FOLLOW-UP VISIT: CPT | Performed by: ORTHOPAEDIC SURGERY

## 2019-11-14 PROCEDURE — 97140 MANUAL THERAPY 1/> REGIONS: CPT

## 2019-11-14 PROCEDURE — 97110 THERAPEUTIC EXERCISES: CPT

## 2019-11-14 PROCEDURE — 1160F RVW MEDS BY RX/DR IN RCRD: CPT | Performed by: INTERNAL MEDICINE

## 2019-11-14 PROCEDURE — 73502 X-RAY EXAM HIP UNI 2-3 VIEWS: CPT

## 2019-11-14 RX ORDER — AZITHROMYCIN 250 MG/1
TABLET, FILM COATED ORAL
Qty: 6 TABLET | Refills: 0 | Status: SHIPPED | OUTPATIENT
Start: 2019-11-14 | End: 2019-11-19

## 2019-11-14 NOTE — PROGRESS NOTES
Assessment/Plan:    Acute non-recurrent maxillary sinusitis  Acute sinusitis with increased nasal membrane swelling and thick greenish yellow mucus  Recommend the initiation of a 5 day Zithromax pack which should do a good job to treat this infection  She is encouraged to drink extra fluids maintain good nutrition and maintain good rest habits  Should her symptoms fail to improve she is encouraged to return for follow-up assessment  Acute pain of right knee  Of pain in the right knee appears to be arthritic in nature  We reviewed her previous x-rays which do show tricompartmental arthritis  I suspects since her surgery on the right hip she has been walking slightly different favoring the hip  This may be putting increased pressure and strain on the right knee  She does have some signs on physical examination of increased warmth and tenderness consistent with an acute flare-up of arthritis  I have recommended Voltaren gel 4 g 4 times a day  I want to avoid any oral nonsteroidal anti-inflammatories because she is on Lovenox medication for DVT prophylaxis  Will start the Voltaren gel with the permission of her orthopedic physician  She was provided with a prescription for the medication but should not fill it until she clears the medication with her surgeon  Primary osteoarthritis of right knee  Primary osteoarthritis of the right knee with recent flare up since surgery on her right hip please see assessment and plan under acute pain of the knee       Diagnoses and all orders for this visit:    Acute non-recurrent maxillary sinusitis  -     azithromycin (ZITHROMAX) 250 mg tablet; Take 2 tablets (500 mg total) by mouth daily for 1 day, THEN 1 tablet (250 mg total) daily for 4 days  Acute pain of right knee  -     diclofenac sodium (VOLTAREN) 1 %;  Apply 4 g topically 4 (four) times a day      TCM Call (since 10/14/2019)     Date and time call was made  11/11/2019  3:45 PM    Hospital care reviewed Records reviewed    Patient was hospitialized at  [facility]    Date of Admission  11/07/19    Date of discharge  11/08/10    Diagnosis  rT HIP ARTHROPLASTY    Disposition  Home    Were the patients medications reviewed and updated  Yes    Current Symptoms  None      TCM Call (since 10/14/2019)     Post hospital issues  None    Should patient be enrolled in anticoag monitoring? No    Scheduled for follow up? Yes    I have advised the patient to call PCP with any new or worsening symptoms  MARGARETTE GUEVARA MA    Counseling  Patient    Comments  PT APPT SCHEDULED FOR 11/19/19 AT 4:15PM          Subjective:      Patient ID: Brenda Boo is a 81 y o  female  This 73-year-old female patient presents today for a transition of care visit  She was recently hospitalized at [facility] for a right total hip replacement surgery  The surgery went well and she was discharged to home  Since returning home she has had some issues with sinus type infection  She has had congestion and a low-grade temperature of 99 9°  Her mucus is colored and at times has some flecks of blood  She denies any chest congestion or cough and also denies any sore throat  She does have some discharge from her left ear  She has had an occasional headache but no nausea or vomiting  She has also been experiencing some right knee pain and has a history of tricompartmental arthritis of the right knee  She has had 1 session of physical therapy for her right hip and will be seeing her orthopedic surgeon later today  She reports that her bowels are moving since discharge  She is taking a narcotic pain medication prior to physical therapy  Otherwise she has been using Tylenol for pain control  She reports no calf tenderness  She is on Lovenox for DVT prophylaxis        The following portions of the patient's history were reviewed and updated as appropriate:   She  has a past medical history of Ankylosing spondylitis ([address] ), Anxiety, Arthritis, Back pain, Carpal tunnel syndrome, Fibromyalgia, Fibromyalgia, primary, Heme positive stool, High cholesterol, Hyperlipidemia, Impingement syndrome of left shoulder, Impingement syndrome of right shoulder, Long term use of drug, No known health problems, RLS (restless legs syndrome), Rotator cuff injury, Spinal stenosis, Spinal stenosis, Spondylitis ([address] ), Spondyloarthritis, and Vitamin D deficiency  She   Patient Active Problem List    Diagnosis Date Noted    Acute non-recurrent maxillary sinusitis 2019    Acute pain of right knee 2019    Status post total hip replacement, right 2019    Pre-operative cardiovascular examination 10/30/2019    Abnormal EKG 10/17/2019    Preop general physical exam 10/17/2019    Primary osteoarthritis of right knee 2019    Encounter for annual routine gynecological examination 2019    Encounter for screening mammogram for malignant neoplasm of breast 2019    Chronic bilateral low back pain 2018    S/P endoscopic carpal tunnel release 2018    Spinal stenosis of lumbar region at multiple levels 2017    Spondylosis 2017    Scoliosis 2017     She  has a past surgical history that includes  section; Tubal ligation; Dilation and curettage of uterus; Retinal laser procedure; Colonoscopy; pr colonoscopy flx dx w/collj spec when pfrmd (N/A, 10/7/2016); pr arthrodesis posterior/posterolateral lumbar (N/A, 4/3/2017); pr wrist arthroscop,release xvers lig (Left, 2018); Cervical conization w/ laser; Back surgery; Hand surgery; Carpal tunnel release (Left); pr wrist arthroscop,release xvers lig (Right, 2018); FL injection right hip (non arthrogram) (2019); FL injection right hip (non arthrogram) (2019); and pr total hip arthroplasty (Right, 2019)    Her family history includes Arthritis in her brother, family, mother, sister, and sister; Breast cancer (age of onset: 36) in her maternal aunt; Breast cancer (age of onset: 67) in her mother; Cancer in her brother; Depression in her son; Endometrial cancer (age of onset: 61) in her sister; Heart attack in her brother, brother, father, mother, and sister; Hyperlipidemia in her family; Hypertension in her father and mother; Lung cancer in her maternal uncle; Melanoma in her brother and brother; No Known Problems in her daughter and son; Other in her brother; Prostate cancer in her brother; Prostate cancer (age of onset: 58) in her brother; Stroke in her brother and father; Uterine cancer in her sister  She  reports that she has never smoked  She has never used smokeless tobacco  She reports that she does not drink alcohol or use drugs  Current Outpatient Medications   Medication Sig Dispense Refill    ascorbic acid (VITAMIN C) 500 mg tablet Take 1 tablet (500 mg total) by mouth daily Start 30 days prior to surgery 30 tablet 0    aspirin 81 MG tablet Take 1 tablet by mouth daily      Cholecalciferol (VITAMIN D) 2000 UNITS CAPS Take 1 tablet by mouth daily      Cholecalciferol (VITAMIN D3) 50 MCG (2000 UT) capsule Vitamin D3 50 mcg (2,000 unit) capsule   Take by oral route        clonazePAM (KlonoPIN) 0 5 mg tablet Take 0 5 mg by mouth daily at bedtime  4    clotrimazole 1 % external solution 5 drops to the left ear BID for 7 days 30 mL 0    ferrous sulfate 324 (65 Fe) mg Take 1 tablet (324 mg total) by mouth 2 (two) times a day before meals Start 30 days prior to surgery 60 tablet 0    folic acid (FOLVITE) 1 mg tablet Take 1 tablet (1 mg total) by mouth daily Start 30 days prior to surgery 30 tablet 0    gabapentin (NEURONTIN) 100 mg capsule Take 1 capsule (100 mg total) by mouth 3 (three) times a day 90 capsule 2    meloxicam (MOBIC) 15 mg tablet Take 1 tablet by mouth daily      methocarbamol (ROBAXIN) 750 mg tablet TAKE 1 TABLET (750 MG TOTAL) BY MOUTH EVERY 6 (SIX) HOURS AS NEEDED FOR MUSCLE SPASMS 100 tablet 0    oxyCODONE (ROXICODONE) 5 mg immediate release tablet 1 pill po Q4 Hrs prn 30 tablet 0    PARoxetine (PAXIL) 10 mg tablet TAKE 1 TABLET DAILY  30 tablet 6    polymyxin b-trimethoprim (POLYTRIM) ophthalmic solution 5 drops to the affected EAR twice a day for 7 days 10 mL 0    traMADol (ULTRAM) 50 mg tablet Take 1 tablet (50 mg total) by mouth every 6 (six) hours as needed for moderate pain for up to 60 doses 60 tablet 0    azithromycin (ZITHROMAX) 250 mg tablet Take 2 tablets (500 mg total) by mouth daily for 1 day, THEN 1 tablet (250 mg total) daily for 4 days  6 tablet 0    diclofenac sodium (VOLTAREN) 1 % Apply 4 g topically 4 (four) times a day 100 g 2    enoxaparin (LOVENOX) 40 mg/0 4 mL Inject 0 4 mL (40 mg total) under the skin daily in the early morning for 28 doses 28 Syringe 0     No current facility-administered medications for this visit       Review of Systems   HENT: Positive for congestion, postnasal drip, rhinorrhea and sinus pressure  Musculoskeletal: Positive for arthralgias and myalgias  All other systems reviewed and are negative  Objective:      /76 (BP Location: Left arm, Patient Position: Sitting, Cuff Size: Adult)   Pulse 99   Temp 98 °F (36 7 °C) (Tympanic)   Ht 5' 1" (1 549 m)   Wt 76 7 kg (169 lb)   LMP  (LMP Unknown)   SpO2 98%   BMI 31 93 kg/m²          Physical Exam   Constitutional: She is oriented to person, place, and time  Vital signs are normal  She appears well-developed and well-nourished  She is cooperative  HENT:   Head: Normocephalic and atraumatic  Right Ear: Hearing, tympanic membrane, external ear and ear canal normal    Left Ear: Hearing, tympanic membrane, external ear and ear canal normal    Nose: Mucosal edema and rhinorrhea present  Mouth/Throat: Uvula is midline and mucous membranes are normal  Posterior oropharyngeal erythema present     Small amount of dried discharge in the left external auditory canal no blood is visible no fluid is visible behind the tympanic membrane  Examination nasal passages on both sides reveals a thick yellowish green mucus with increase membrane swelling  Posterior pharynx drainage is noted  Eyes: Pupils are equal, round, and reactive to light  Conjunctivae and lids are normal    Neck: No JVD present  Carotid bruit is not present  No thyromegaly present  Cardiovascular: Normal rate, regular rhythm, normal heart sounds and intact distal pulses  No murmur heard  Pulmonary/Chest: Effort normal and breath sounds normal  No stridor  No respiratory distress  She has no wheezes  Abdominal: Normal appearance  Musculoskeletal: Normal range of motion  She exhibits edema  Trace edema of the lower extremity and ankle area  The right knee has slight tenderness as well as some mild increase in temperature compared to the left knee  There is no evidence of any popliteal cyst   The calf is soft with no evidence of any cords  Lymphadenopathy:     She has no cervical adenopathy  Neurological: She is alert and oriented to person, place, and time  She has normal reflexes  She displays normal reflexes  Skin: Skin is warm, dry and intact  Psychiatric: She has a normal mood and affect  Her speech is normal and behavior is normal  Judgment and thought content normal  Cognition and memory are normal    Vitals reviewed  BMI Counseling: Body mass index is 31 93 kg/m²  The BMI is above normal  Nutrition recommendations include reducing portion sizes, moderation in carbohydrate intake, increasing intake of lean protein, reducing intake of saturated fat and trans fat and reducing intake of cholesterol  Exercise recommendations include exercising 3-5 times per week

## 2019-11-14 NOTE — ASSESSMENT & PLAN NOTE
Of pain in the right knee appears to be arthritic in nature  We reviewed her previous x-rays which do show tricompartmental arthritis  I suspects since her surgery on the right hip she has been walking slightly different favoring the hip  This may be putting increased pressure and strain on the right knee  She does have some signs on physical examination of increased warmth and tenderness consistent with an acute flare-up of arthritis  I have recommended Voltaren gel 4 g 4 times a day  I want to avoid any oral nonsteroidal anti-inflammatories because she is on Lovenox medication for DVT prophylaxis  Will start the Voltaren gel with the permission of her orthopedic physician  She was provided with a prescription for the medication but should not fill it until she clears the medication with her surgeon

## 2019-11-14 NOTE — PROGRESS NOTES
Assessment:   Diagnosis ICD-10-CM Associated Orders   1  Aftercare following right hip joint replacement surgery Z47 1 XR hip/pelv 2-3 vws right if performed    Z96 641        Plan:  X-rays taken reviewed  First postop visit 1 week status post right total hip arthroplasty  Postoperative dressing was removed and her incision is healing appropriately with staples in place  An ABD was then applied on top of her incision  No signs of infection or DVT  Weightbearing activities as tolerated  Continue with Lovenox for DVT prophylaxis  Continue with walker to cane progression  Continue physical therapy  To do next visit:  Return in about 1 week (around 11/21/2019) for wound re-check and staple removal     The above stated was discussed in layman's terms and the patient expressed understanding  All questions were answered to the patient's satisfaction  Scribe Attestation    I,:   Michelle Sexton am acting as a scribe while in the presence of the attending physician :        I,:   Vira Seha MD personally performed the services described in this documentation    as scribed in my presence :              Subjective:   Manuel Mckeon is a 81 y o  female who presents postoperative evaluation 1 week status post right total hip arthroplasty  Patient states her right hip is doing dramatically in regards to pain relief already  She has been administering her Lovenox for DVT prophylaxis  She presents using a walker for ambulatory assistance  Patient denies any calf or thigh pain  Denies any fevers or chills  Patient has been receiving physical therapy        Review of systems negative unless otherwise specified in HPI    Past Medical History:   Diagnosis Date    Ankylosing spondylitis ([facility] [address] )     Anxiety     LAST ASSESSED: 12/20/16    Arthritis     Back pain     Carpal tunnel syndrome     Fibromyalgia     LAST ASSESSED: 12/20/16    Fibromyalgia, primary     Heme positive stool     LAST ASSESSED: 10/22/16  High cholesterol     Hyperlipidemia     under control since weight loss    Impingement syndrome of left shoulder     LAST ASSESSED: 17    Impingement syndrome of right shoulder     LAST ASSESSED:     Long term use of drug     LAST ASSESSED: 16    No known health problems     NO PERTINENT PAST MEDICAL HX    RLS (restless legs syndrome)     Rotator cuff injury     right    Spinal stenosis     Spinal stenosis     Spondylitis (HCC)     Spondyloarthritis     RESOLVED: 16    Vitamin D deficiency        Past Surgical History:   Procedure Laterality Date    BACK SURGERY      CARPAL TUNNEL RELEASE Left     CERVICAL CONIZATION   W/ LASER      CERVICAL CONIZATION     SECTION      COLONOSCOPY      DILATION AND CURETTAGE OF UTERUS      FL INJECTION RIGHT HIP (NON ARTHROGRAM)  2019    FL INJECTION RIGHT HIP (NON ARTHROGRAM)  2019    HAND SURGERY      [place] ARTHRODESIS POSTERIOR/POSTEROLATERAL LUMBAR N/A 4/3/2017    Procedure: L2-L5 OPEN DECOMPRESSIVE LUMBAR LAMINECTOMY W/ PEDICLE SCREW AND NILDA FIXATION FUSION (IMPULSE); REMOVAL OF SKIN TAG MID BACK;  Surgeon: John Ruiz MD;  [facility];  Service: Neurosurgery    AZ COLONOSCOPY FLX DX W/COLLJ SPEC WHEN PFRMD N/A 10/7/2016    Procedure: EGD AND COLONOSCOPY;  Surgeon: Audrey Toledo MD;  [facility]: [facility];   Service: Gastroenterology    AZ TOTAL HIP ARTHROPLASTY Right 2019    Procedure: ARTHROPLASTY HIP TOTAL Posterior;  Surgeon: Anatoliy Hunt MD;  [facility];  Service: Orthopedics    AZ WRIST [place] LIG Left 2018    Procedure: RELEASE CARPAL TUNNEL ENDOSCOPIC;  Surgeon: Aniyah Flowers MD;  [facility];  Service: Orthopedics    [place] WRIST [place] LIG Right 2018    Procedure: RELEASE CARPAL TUNNEL ENDOSCOPIC;  Surgeon: Aniyah Flowers MD;  [facility];  Service: Orthopedics   [address] [place] [address] LIGATION         Family History   Problem Relation Age of Onset    Arthritis Mother    [place] Breast cancer Mother 67    Hypertension Mother     Heart attack Mother         MYOCARDIAL INFARCTION    Heart attack Father     Hypertension Father     Stroke Father         [address] (CVA)    Arthritis Sister     Uterine cancer Sister     Endometrial cancer Sister 61    Heart attack Brother     Prostate cancer Brother 58    Arthritis Brother     Melanoma Brother     Cancer Brother     Arthritis Family     Hyperlipidemia Family     Depression Son     Breast cancer Maternal Aunt 36    No Known Problems Daughter     Other Brother         hunting accident (shot)    Melanoma Brother     Prostate cancer Brother     Heart attack Brother     Stroke Brother     Arthritis Sister     Heart attack Sister     No Known Problems Son     Lung cancer Maternal Uncle        Social History     Occupational History    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  Comment: EMPLOYED   Tobacco Use    Smoking status: Never Smoker    Smokeless tobacco: Never Used   Substance and Sexual Activity    Alcohol use: Never     Frequency: Never    Drug use: No    Sexual activity: Yes     Partners: Male     Birth control/protection: None         Current Outpatient Medications:     ascorbic acid (VITAMIN C) 500 mg tablet, Take 1 tablet (500 mg total) by mouth daily Start 30 days prior to surgery, Disp: 30 tablet, Rfl: 0    aspirin 81 MG tablet, Take 1 tablet by mouth daily, Disp: , Rfl:     azithromycin (ZITHROMAX) 250 mg tablet, Take 2 tablets (500 mg total) by mouth daily for 1 day, THEN 1 tablet (250 mg total) daily for 4 days  , Disp: 6 tablet, Rfl: 0    Cholecalciferol (VITAMIN D) 2000 UNITS CAPS, Take 1 tablet by mouth daily, Disp: , Rfl:     Cholecalciferol (VITAMIN D3) 50 MCG (2000 UT) capsule, Vitamin D3 50 mcg (2,000 unit) capsule  Take by oral route , Disp: , Rfl:     clonazePAM (KlonoPIN) 0 5 mg tablet, Take 0 5 mg by mouth daily at bedtime, Disp: , Rfl: 4    clotrimazole 1 % external solution, 5 drops to the left ear BID for 7 days, Disp: 30 mL, Rfl: 0    diclofenac sodium (VOLTAREN) 1 %, Apply 4 g topically 4 (four) times a day, Disp: 100 g, Rfl: 2    enoxaparin (LOVENOX) 40 mg/0 4 mL, Inject 0 4 mL (40 mg total) under the skin daily in the early morning for 28 doses, Disp: 28 Syringe, Rfl: 0    ferrous sulfate 324 (65 Fe) mg, Take 1 tablet (324 mg total) by mouth 2 (two) times a day before meals Start 30 days prior to surgery, Disp: 60 tablet, Rfl: 0    folic acid (FOLVITE) 1 mg tablet, Take 1 tablet (1 mg total) by mouth daily Start 30 days prior to surgery, Disp: 30 tablet, Rfl: 0    gabapentin (NEURONTIN) 100 mg capsule, Take 1 capsule (100 mg total) by mouth 3 (three) times a day, Disp: 90 capsule, Rfl: 2    meloxicam (MOBIC) 15 mg tablet, Take 1 tablet by mouth daily, Disp: , Rfl:     methocarbamol (ROBAXIN) 750 mg tablet, TAKE 1 TABLET (750 MG TOTAL) BY MOUTH EVERY 6 (SIX) HOURS AS NEEDED FOR MUSCLE SPASMS, Disp: 100 tablet, Rfl: 0    oxyCODONE (ROXICODONE) 5 mg immediate release tablet, 1 pill po Q4 Hrs prn, Disp: 30 tablet, Rfl: 0    PARoxetine (PAXIL) 10 mg tablet, TAKE 1 TABLET DAILY  , Disp: 30 tablet, Rfl: 6    polymyxin b-trimethoprim (POLYTRIM) ophthalmic solution, 5 drops to the affected EAR twice a day for 7 days, Disp: 10 mL, Rfl: 0    traMADol (ULTRAM) 50 mg tablet, Take 1 tablet (50 mg total) by mouth every 6 (six) hours as needed for moderate pain for up to 60 doses, Disp: 60 tablet, Rfl: 0    No Known Allergies         Vitals:    11/14/19 1314   BP: 126/80   Pulse: (!) 108       Objective:                    Right Hip Exam     Other   Erythema: absent  Sensation: normal    Comments:    Healing posterior lateral incision with staples in place  Minimal week binge on the postoperative dressing  Mild swelling  Appropriate amount of warmth  Calf and thigh are soft nontender no signs DVT    No gross signs of infection  Diagnostics, reviewed and taken today if performed as documented: The attending physician has personally reviewed the pertinent films in PACS and interpretation is as follows:    Right hip and pelvis x-rays taken and reviewed in the office today show:  Right total hip prosthesis in excellent position alignment without evidence of loosening  Procedures, if performed today:    Procedures    None performed      Portions of the record may have been created with voice recognition software  Occasional wrong word or "sound a like" substitutions may have occurred due to the inherent limitations of voice recognition software  Read the chart carefully and recognize, using context, where substitutions have occurred

## 2019-11-14 NOTE — ASSESSMENT & PLAN NOTE
Primary osteoarthritis of the right knee with recent flare up since surgery on her right hip please see assessment and plan under acute pain of the knee

## 2019-11-14 NOTE — PROGRESS NOTES
PT Re-Evaluation     Today's date: 2019  Patient name: Jami Duane  : 1950  MRN: [id number]  Referring provider: Umang Dowd MD  Dx:   Encounter Diagnosis     ICD-10-CM    1  Status post total replacement of right hip Z96 641    2  Primary osteoarthritis of one hip, right M16 11                   Assessment  Assessment details: Patient presents with pain, decreased hip ROM, decreased LE flexibility, decreased LE strength, and decreased function secondary to s/p R SHARIF (posterior approach)  Patient would benefit from skilled PT intervention to address these issues and to maximize function  Thank you for the referral   Impairments: abnormal gait, abnormal or restricted ROM, activity intolerance, impaired balance, impaired physical strength, lacks appropriate home exercise program, pain with function and weight-bearing intolerance  Understanding of Dx/Px/POC: good   Prognosis: good    Goals  Short Term:  Pt will report decreased levels of pain by at least 2 subjective ratings in 4 weeks  Pt will demonstrate improved ROM by at least 10 degrees in 4 weeks  Pt will demonstrate improved strength by 1/2 grade MMT in 4 weeks  Long Term:   Pt will be independent in their HEP in 8 weeks  Pt will demonstrate improved FOTO, > 58  Pt will be independent with all ADL's  Pt will be able to ambulate community distances with AD PRN  Pt will perform steps in reciprocal pattern without hip pain    Plan  Plan details: Patient was educated in [address]  All questions were answered to pt's satisfaction      Patient would benefit from: skilled physical therapy  Planned modality interventions: cryotherapy  Planned therapy interventions: manual therapy, neuromuscular re-education, patient education, therapeutic activities, therapeutic exercise, gait training, balance/weight bearing training, flexibility and home exercise program  Frequency: 2x week  Duration in weeks: 12  Plan of Care beginning date: 11/11/2019  Plan of Care expiration date: 2/3/2020  Treatment plan discussed with: patient        Subjective Evaluation    History of Present Illness  Mechanism of injury: Pt is a 81 y o female who underwent R SHARIF, posterior approach, on 11/7/19  Pt denies complications with the surgery and was d/c home the next day  Pt is scheduled to f/u with MD on 11/14  Pt reports pain/difficulty with FWB on R LE (also c/o R knee pain secondary to OA), ambulation (RW at all times), sit to stand transfers, steps (3 to enter house and performs in step to pattern; has not done full flight up to her bedroom), showering (currently sponge bathing), toilet transfers (using commode currently)  Pt currently sleeping in recliner  Pain  Location: R hip 6/10 at worse and 0/10 at best;  R knee 8/10 at worse and 3/10 at best    Treatments  Discharged from (in last 30 days): inpatient hospitalization  Patient Goals  Patient goals for therapy: decreased pain, increased motion, increased strength, improved balance, independence with ADLs/IADLs and return to sport/leisure activities          Objective     Tenderness     Additional Tenderness Details  None noted        Neurological Testing     Sensation     Hip     Right Hip   Intact: light touch    Additional Neurological Details  Pt denies N/T    Active Range of Motion     Right Hip   Flexion: 40 degrees   Extension: 8 degrees   Abduction: 12 degrees     Additional Active Range of Motion Details  AROM measured in standing    Passive Range of Motion     Right Hip   Flexion: 70 degrees   Extension: 0 degrees   Abduction: 18 degrees   External rotation (90/90): 15 degrees     Strength/Myotome Testing     Right Hip   Planes of Motion   Flexion: 3-  Abduction: 2+    Right Knee   Flexion: 4  Extension: 3+    Right Ankle/Foot   Dorsiflexion: 5  Plantar flexion: 5 (seated)    Tests     Additional Tests Details  (-)homans    General Comments:      Hip Comments   Reviewed hip precautions for posterolateral approach and pt demonstrates a good understanding  Pt using RW correctly and is the correct height for her  Precautions: OA, Fibromyalgia, ankylosing spondylitis, spinal stenosis, lumbar fusion, follow hip precautions for posterior approach (No flexion past 90*, adduction or IR)        Manual   11/11                     R Hip PROM within precautions (No flex past 90*, ADD, or IR)  TP 10 mins                                                                                                                           Exercise Diary                        Bike or Nustep                       Heel slides                       SLR                       bridges                       Supine hip add squeeze                       Clamshells w/pillow                       Side stepping                       Standing hip 3 way                       Heel raises                       Step ups                       laq                       HS curls                       Gait training PRN                       Biodex: LOS                       Biodex:  RC                                                                                                                                                     Modalities                        CP PRN

## 2019-11-14 NOTE — ASSESSMENT & PLAN NOTE
Acute sinusitis with increased nasal membrane swelling and thick greenish yellow mucus  Recommend the initiation of a 5 day Zithromax pack which should do a good job to treat this infection  She is encouraged to drink extra fluids maintain good nutrition and maintain good rest habits  Should her symptoms fail to improve she is encouraged to return for follow-up assessment

## 2019-11-14 NOTE — PROGRESS NOTES
Daily Note     Today's date: 2019  Patient name: Yoana Lacey  : 1950  MRN: [id number]  Referring provider: Aden Tam MD  Dx:   Encounter Diagnosis     ICD-10-CM    1  Status post total replacement of right hip Z96 641    2  Primary osteoarthritis of one hip, right M16 11         1:1 with PTA CR 9:00- 9:45  Unbilled 9:45- 10:00  CP 10:00-10:10  Subjective: No complaints following I E  Reports compliance with HEP but limited due to right knee pain  Objective: See treatment diary below      Assessment: Tolerated treatment fair  Arrives to PT with RW and demonstrating a step too pattern  Attempted step through but unable to tolerate secondary to knee pain  General proximal muscle weakness therefore modified some exercises  Glutes extremely weak  Unable to tolerate stance on right LE  Plan to attempt 4" step ups NV  Patient demonstrated fatigue post treatment and would benefit from continued PT      Plan: Continue per plan of care  Precautions: OA, Fibromyalgia, ankylosing spondylitis, spinal stenosis, lumbar fusion, follow hip precautions for posterior approach (No flexion past 90*, adduction or IR)        Manual                      R Hip PROM within precautions (No flex past 90*, ADD, or IR)  TP 10 mins  CR  10 mins                                                                                                                         Exercise Diary                        Bike or Nustep    Nustep  L3  10 mins                   Heel slides    10"x10                   SLR                       bridges    unable  glute set  10"x10                   Supine hip add squeeze    5"x10                   Clamshells w/pillow                       Side stepping                       Standing hip 3 way    R on;y  10 ea                    Heel raises    20                   Step ups    NV                   laq    5"x10                   HS curls                       Gait training PRN                       Biodex: CHASITY                       Biodex:  RC                                                                                                                                                     Modalities     11/14                   CP PRN    10 mins

## 2019-11-18 ENCOUNTER — OFFICE VISIT (OUTPATIENT)
Dept: PHYSICAL THERAPY | Facility: REHABILITATION | Age: 69
End: 2019-11-18
Payer: COMMERCIAL

## 2019-11-18 DIAGNOSIS — M16.11 PRIMARY OSTEOARTHRITIS OF ONE HIP, RIGHT: ICD-10-CM

## 2019-11-18 DIAGNOSIS — Z96.641 STATUS POST TOTAL REPLACEMENT OF RIGHT HIP: Primary | ICD-10-CM

## 2019-11-18 PROCEDURE — 97112 NEUROMUSCULAR REEDUCATION: CPT

## 2019-11-18 PROCEDURE — 97110 THERAPEUTIC EXERCISES: CPT

## 2019-11-18 PROCEDURE — 97140 MANUAL THERAPY 1/> REGIONS: CPT

## 2019-11-18 NOTE — PROGRESS NOTES
Daily Note     Today's date: 2019  Patient name: Nam Jackson  : 1950  MRN: [id number]  Referring provider: Sergei Ortiz MD  Dx:   Encounter Diagnosis     ICD-10-CM    1  Status post total replacement of right hip Z96 641    2  Primary osteoarthritis of one hip, right M16 11          1:1 with PTA CR 5:15- 6:05  Subjective: Arrives to PT demonstrating a step through gait pattern  Knee injected on 19  Objective: See treatment diary below      Assessment: Tolerated treatment fair  Unable to perform full step up therefore taps only  Able to perform bridge today  Improved ability to transfer  Patient demonstrated fatigue post treatment and would benefit from continued PT      Plan: Continue per plan of care  Precautions: OA, Fibromyalgia, ankylosing spondylitis, spinal stenosis, lumbar fusion, follow hip precautions for posterior approach (No flexion past 90*, adduction or IR)        Manual                    R Hip PROM within precautions (No flex past 90*, ADD, or IR)  TP 10 mins  CR  10 mins  CR  10 mins                                               Exercise Diary                      Bike or Nustep    Nustep  L3  10 mins  Nustep  L3  10 mins                 Heel slides    10"x10  10"x10                 SLR                       bridges    unable  glute set  10"x10  3"x10                 Supine hip add squeeze    5"x10  5"x20                 Clamshells w/pillow                       Side stepping                       Standing hip 3 way    R only  10 ea   R only  10 ea                   Heel raises    20  20                 Step ups    NV  4" taps  Fwd/lat  10 ea                  laq    5"x10  5"x20                 HS curls                       Gait training PRN      RW  Step through  3 laps                 Biodex: LOS                       Biodex:  RC                                                                                                                                                     Modalities     11/14 11/18                 CP PRN    10 mins

## 2019-11-20 ENCOUNTER — OFFICE VISIT (OUTPATIENT)
Dept: PHYSICAL THERAPY | Facility: REHABILITATION | Age: 69
End: 2019-11-20
Payer: COMMERCIAL

## 2019-11-20 DIAGNOSIS — M16.11 PRIMARY OSTEOARTHRITIS OF ONE HIP, RIGHT: ICD-10-CM

## 2019-11-20 DIAGNOSIS — Z96.641 STATUS POST TOTAL REPLACEMENT OF RIGHT HIP: Primary | ICD-10-CM

## 2019-11-20 PROCEDURE — 97112 NEUROMUSCULAR REEDUCATION: CPT

## 2019-11-20 PROCEDURE — 97110 THERAPEUTIC EXERCISES: CPT

## 2019-11-20 PROCEDURE — 97140 MANUAL THERAPY 1/> REGIONS: CPT

## 2019-11-20 NOTE — PROGRESS NOTES
Daily Note     Today's date: 2019  Patient name: Shelley Heck  : 1950  MRN: [id number]  Referring provider: Sunny Godoy MD  Dx:   Encounter Diagnosis     ICD-10-CM    1  Status post total replacement of right hip Z96 641    2  Primary osteoarthritis of one hip, right M16 11          1:1 with PTA CR 5:15- 6:15  CP 6:15-6:25  Subjective: States her knee feels stiff but denies hip/knee pain  Staples will be removed tomorrow  Objective: See treatment diary below      Assessment: Tolerated treatment well  Focused on functional activities with addition of 4" step up with B/L UE and STS from low mat without UE  Patient demonstrated fatigue post treatment and would benefit from continued PT      Plan: Continue per plan of care  Precautions: OA, Fibromyalgia, ankylosing spondylitis, spinal stenosis, lumbar fusion, follow hip precautions for posterior approach (No flexion past 90*, adduction or IR)        Manual                  R Hip PROM within precautions (No flex past 90*, ADD, or IR)  TP 10 mins  CR  10 mins  CR  10 mins  CR  10 mins                                             Exercise Diary                    Bike or Nustep    Nustep  L3  10 mins  Nustep  L3  10 mins  Nu  L3  10 mins               Heel slides    10"x10  10"x10  10"x10               SLR                       bridges    unable  glute set  10"x10  3"x10  3"x10               Supine hip add squeeze    5"x10  5"x20  6"x20               Clamshells w/pillow                       Side stepping                       Standing hip 3 way    R only  10 ea   R only  10 ea    R only  10 ea                Heel raises    20  20  20               Step ups    NV  4" taps  Fwd/lat  10 ea   HR + SPC  4" x10 fwd  4" 5 lat               laq    5"x10  5"x20  NP               HS curls                       Gait training PRN      RW  Step through  3 laps  RW  Step through  2 laps               Biodex: LOS                       Biodex:  RC                                                weight shifting        5"x2 mins                STS        hi low  No UE  10                                                                      Modalities     11/14 11/18 11/20               CP PRN    10 mins    10 mins

## 2019-11-21 ENCOUNTER — OFFICE VISIT (OUTPATIENT)
Dept: OBGYN CLINIC | Facility: HOSPITAL | Age: 69
End: 2019-11-21

## 2019-11-21 VITALS
SYSTOLIC BLOOD PRESSURE: 115 MMHG | BODY MASS INDEX: 31.91 KG/M2 | DIASTOLIC BLOOD PRESSURE: 73 MMHG | HEIGHT: 61 IN | WEIGHT: 169 LBS | HEART RATE: 92 BPM

## 2019-11-21 DIAGNOSIS — Z96.641 STATUS POST TOTAL HIP REPLACEMENT, RIGHT: Primary | ICD-10-CM

## 2019-11-21 PROCEDURE — 99024 POSTOP FOLLOW-UP VISIT: CPT | Performed by: ORTHOPAEDIC SURGERY

## 2019-11-21 NOTE — PROGRESS NOTES
81 y o female 2 weeks following right total hip replacement who describes ongoing relief of her preprocedural right hip pain  Review of Systems  Review of systems negative unless otherwise specified in HPI    Past Medical History  Past Medical History:   Diagnosis Date    [facility]     Anxiety     LAST ASSESSED: 16    Arthritis     Back pain     Carpal tunnel syndrome     Fibromyalgia     LAST ASSESSED: 16    Fibromyalgia, primary     Heme positive stool     LAST ASSESSED: 10/22/16    High cholesterol     Hyperlipidemia     under control since weight loss    Impingement syndrome of left shoulder     LAST ASSESSED: 17    Impingement syndrome of right shoulder     LAST ASSESSED:     Long term use of drug     LAST ASSESSED: 16    No known health problems     NO PERTINENT PAST MEDICAL HX    RLS (restless legs syndrome)     Rotator cuff injury     right    Spinal stenosis     Spinal stenosis     [facility]     Spondyloarthritis     RESOLVED: 16    Vitamin D deficiency        Past Surgical History  Past Surgical History:   Procedure Laterality Date    BACK SURGERY      CARPAL TUNNEL RELEASE Left     CERVICAL CONIZATION   W/ LASER      CERVICAL CONIZATION     SECTION      COLONOSCOPY      DILATION AND CURETTAGE OF UTERUS      FL INJECTION RIGHT HIP (NON ARTHROGRAM)  2019    FL INJECTION RIGHT HIP (NON ARTHROGRAM)  2019    HAND SURGERY      SC ARTHRODESIS POSTERIOR/POSTEROLATERAL LUMBAR N/A 4/3/2017    Procedure: L2-L5 OPEN DECOMPRESSIVE LUMBAR LAMINECTOMY W/ PEDICLE SCREW AND NILDA FIXATION FUSION (IMPULSE); REMOVAL OF SKIN TAG MID BACK;  Surgeon: Boyd Lott MD;  [facility];  Service: Neurosurgery    SC COLONOSCOPY FLX DX W/COLLJ SPEC WHEN PFRMD N/A 10/7/2016    Procedure: EGD AND COLONOSCOPY;  Surgeon: Misael Padgett MD;  Location: [facility];   Service: Gastroenterology    SC TOTAL HIP ARTHROPLASTY Right 11/7/2019    Procedure: ARTHROPLASTY HIP TOTAL Posterior;  Surgeon: Army Rosita MD;  [facility];  Service: Orthopedics    CO WRIST Johnnie Billy LIG Left 4/26/2018    Procedure: RELEASE CARPAL TUNNEL ENDOSCOPIC;  Surgeon: Jo Ann Kaiser MD;  [facility];  Service: Orthopedics    CO WRIST Johnnie Billy LIG Right 6/14/2018    Procedure: RELEASE CARPAL TUNNEL ENDOSCOPIC;  Surgeon: Jo Ann Kaiser MD;  [facility];  Service: Orthopedics    RETINAL LASER PROCEDURE      TUBAL LIGATION         Current Medications  Current Outpatient Medications on File Prior to Visit   Medication Sig Dispense Refill    ascorbic acid (VITAMIN C) 500 mg tablet Take 1 tablet (500 mg total) by mouth daily Start 30 days prior to surgery 30 tablet 0    aspirin 81 MG tablet Take 1 tablet by mouth daily      Cholecalciferol (VITAMIN D) 2000 UNITS CAPS Take 1 tablet by mouth daily      Cholecalciferol (VITAMIN D3) 50 MCG (2000 UT) capsule Vitamin D3 50 mcg (2,000 unit) capsule   Take by oral route        clonazePAM (KlonoPIN) 0 5 mg tablet Take 0 5 mg by mouth daily at bedtime  4    clotrimazole 1 % external solution 5 drops to the left ear BID for 7 days 30 mL 0    diclofenac sodium (VOLTAREN) 1 % Apply 4 g topically 4 (four) times a day 100 g 2    enoxaparin (LOVENOX) 40 mg/0 4 mL Inject 0 4 mL (40 mg total) under the skin daily in the early morning for 28 doses 28 Syringe 0    ferrous sulfate 324 (65 Fe) mg Take 1 tablet (324 mg total) by mouth 2 (two) times a day before meals Start 30 days prior to surgery 60 tablet 0    folic acid (FOLVITE) 1 mg tablet Take 1 tablet (1 mg total) by mouth daily Start 30 days prior to surgery 30 tablet 0    gabapentin (NEURONTIN) 100 mg capsule Take 1 capsule (100 mg total) by mouth 3 (three) times a day 90 capsule 2    meloxicam (MOBIC) 15 mg tablet Take 1 tablet by mouth daily      methocarbamol (ROBAXIN) 750 mg tablet TAKE 1 TABLET (750 MG TOTAL) BY MOUTH EVERY 6 (SIX) HOURS AS NEEDED FOR MUSCLE SPASMS 100 tablet 0    oxyCODONE (ROXICODONE) 5 mg immediate release tablet 1 pill po Q4 Hrs prn 30 tablet 0    PARoxetine (PAXIL) 10 mg tablet TAKE 1 TABLET DAILY  30 tablet 6    polymyxin b-trimethoprim (POLYTRIM) ophthalmic solution 5 drops to the affected EAR twice a day for 7 days 10 mL 0    traMADol (ULTRAM) 50 mg tablet Take 1 tablet (50 mg total) by mouth every 6 (six) hours as needed for moderate pain for up to 60 doses 60 tablet 0     No current facility-administered medications on file prior to visit  Recent Labs [facility])  0   Lab Value Date/Time    HCT 40 7 11/08/2019 0454    HCT 39 9 11/28/2015 1120    HGB 13 0 11/08/2019 0454    HGB 12 8 11/28/2015 1120    WBC 13 02 (H) 11/08/2019 0454    WBC 5 04 11/28/2015 1120    INR 0 94 10/15/2019 1513    ESR 16 12/31/2018 1104    ESR 18 10/19/2015 1001    CRP 11 4 (H) 10/15/2019 1513    CRP <3 0 10/19/2015 1001    GLUCOSE 89 11/28/2015 1120    HGBA1C 5 6 10/15/2019 1513    HGBA1C 5 7 (H) 08/01/2015 0857         Physical exam  · General: Awake, Alert, Oriented  · Eyes: Pupils equal, round and reactive to light  · Heart: regular rate and rhythm  · Lungs: No audible wheezing  · Abdomen: soft  Exam finds a right hip with the heel postal incision  There is no tenderness the right calf    Imaging  None performed today    Procedure  None indicated or performed today    Assessment/Plan:   81 y  o female 2 weeks following right total hip replacement who looks well  Staples removed Steri-Strips not necessary  She continues to benefit from outpatient therapy    Next visit will be in 4 weeks time for repeat physical exam

## 2019-11-25 ENCOUNTER — OFFICE VISIT (OUTPATIENT)
Dept: PHYSICAL THERAPY | Facility: REHABILITATION | Age: 69
End: 2019-11-25
Payer: COMMERCIAL

## 2019-11-25 DIAGNOSIS — Z96.641 STATUS POST TOTAL REPLACEMENT OF RIGHT HIP: Primary | ICD-10-CM

## 2019-11-25 PROCEDURE — 97140 MANUAL THERAPY 1/> REGIONS: CPT | Performed by: PHYSICAL THERAPIST

## 2019-11-25 PROCEDURE — 97110 THERAPEUTIC EXERCISES: CPT | Performed by: PHYSICAL THERAPIST

## 2019-11-25 PROCEDURE — 97112 NEUROMUSCULAR REEDUCATION: CPT | Performed by: PHYSICAL THERAPIST

## 2019-11-25 NOTE — PROGRESS NOTES
Daily Note     Today's date: 2019  Patient name: Kacy Xie  : 1950  MRN: [id number]  Referring provider: Benita Daniels MD  Dx:   Encounter Diagnosis     ICD-10-CM    1  Status post total replacement of right hip Z96 641            1on1 with 2 PT's entire session  Subjective: Pt reports R knee pain due to ongoing OA  Pt states having difficulty performing sit to stand and ascending/descending stairs secondary to knee pain  Pt noted R knee injection last week that helped reduce pain  Pt denies any R hip pain  Objective: See treatment diary below      Assessment: Tolerated treatment well  Patient needed to support R knee cap due to pain when performing LAQ  Pt demonstrated fatigue during fwd step ups, required a minute of sitting to recover between sets  Patient exhibited good technique with therapeutic exercises and would benefit from continued PT  Pt denies pain in R hip post treatment, stated that she is fatigued but feels good, no need for CP  Plan: Progress treatment as tolerated  Precautions: OA, Fibromyalgia, ankylosing spondylitis, spinal stenosis, lumbar fusion, follow hip precautions for posterior approach (No flexion past 90*, adduction or IR)        Manual                R Hip PROM within precautions (No flex past 90*, ADD, or IR)  TP 10 mins  CR  10 mins  CR  10 mins  CR  10 mins  TP  10 mins                                           Exercise Diary                  Bike or Nustep    Nustep  L3  10 mins  Nustep  L3  10 mins  Nu  L3  10 mins  Nu  L3  10 mins             Heel slides    10"x10  10"x10  10"x10  10"x10             SLR                       bridges    unable  glute set  10"x10  3"x10  3"x10  3"x10             Supine hip add squeeze    5"x10  5"x20  6"x20  6"x20             Clamshells w/pillow          10             Side stepping          3 laps             Standing hip 3 way    R only  10 eaMajordan Anand only  10 ea    R only  10 ea   R only 10 ea              Heel raises    20  20  20  20             Step ups    NV  4" taps  Fwd/lat  10 ea   HR + SPC  4" x10 fwd  4" 5 lat  6" fwd  2x10    4" lat 5             laq    5"x10  5"x20  NP  5"x20             HS curls                       Gait training PRN      RW  Step through  3 laps  RW  Step through  2 laps  np             Biodex: LOS                       Biodex:  RC                                                weight shifting        5"x2 mins  5"x2mins              STS        hi low  No UE  10   hi low   No UE 10                                                                   Modalities     11/14 11/18 11/20  11/25             CP PRN    10 mins    10 mins  np

## 2019-11-27 ENCOUNTER — OFFICE VISIT (OUTPATIENT)
Dept: PHYSICAL THERAPY | Facility: REHABILITATION | Age: 69
End: 2019-11-27
Payer: COMMERCIAL

## 2019-11-27 DIAGNOSIS — M16.11 PRIMARY OSTEOARTHRITIS OF ONE HIP, RIGHT: ICD-10-CM

## 2019-11-27 DIAGNOSIS — G62.9 NEUROPATHY: ICD-10-CM

## 2019-11-27 DIAGNOSIS — Z96.641 STATUS POST TOTAL REPLACEMENT OF RIGHT HIP: Primary | ICD-10-CM

## 2019-11-27 PROCEDURE — 97110 THERAPEUTIC EXERCISES: CPT | Performed by: PHYSICAL THERAPIST

## 2019-11-27 PROCEDURE — 97112 NEUROMUSCULAR REEDUCATION: CPT | Performed by: PHYSICAL THERAPIST

## 2019-11-27 PROCEDURE — 97140 MANUAL THERAPY 1/> REGIONS: CPT | Performed by: PHYSICAL THERAPIST

## 2019-11-27 NOTE — PROGRESS NOTES
Daily Note     Today's date: 2019  Patient name: Nuno Burton  : 1950  MRN: [id number]  Referring provider: Zenaida Austin MD  Dx:   Encounter Diagnosis     ICD-10-CM    1  Status post total replacement of right hip Z96 641    2  Primary osteoarthritis of one hip, right M16 11                  Pt treated 1 on 1 from 200p to 245     Subjective: Pt reports soreness from last visit, no pain in R hip but R knee is still bothering her  Pt spoke with MD about knee braces to help with R knee pain, recommended she get a standard neoprene brace  She has not yet purchased one but plans to soon  Pt did not attempt using SPC due to error, will bring in NV  Pt mentions wanting to be able to ascend/descend stairs at [facility] tomorrow  Objective: See treatment diary below      Assessment: Tolerated treatment well  Pt required VC for proper technique with TE's, intermittent breaks due to fatigue  Progression of TE's was tolerated well, pt denies pain in R hip throughout treatment  R knee is more irritable today limiting performance of Step ups LAQ and HR's  Focused on step ups today, educated pt on how to ascend and descend stairs independently with minimal/no pain  Patient demonstrated fatigue post treatment and would benefit from continued PT to increase strength of LE  Plan: Progress treatment as tolerated  Precautions: OA, Fibromyalgia, ankylosing spondylitis, spinal stenosis, lumbar fusion, follow hip precautions for posterior approach (No flexion past 90*, adduction or IR)        Manual              R Hip PROM within precautions (No flex past 90*, ADD, or IR)  TP 10 mins  CR  10 mins  CR  10 mins  CR  10 mins  TP  10 mins  AK  10 mins                                         Exercise Diary                Bike or Nustep    Nustep  L3  10 mins  Nustep  L3  10 mins  Nu  L3  10 mins  Nu  L3  10 mins  Nu   L4 10 mins           Heel slides    10"x10  10"x10  10"x10  10"x10  10"x10           SLR            2x10           bridges    unable  glute set  10"x10  3"x10  3"x10  3"x10  3"x10           Supine hip add squeeze    5"x10  5"x20  6"x20  6"x20  6"x20           Clamshells w/pillow          10  10           Side stepping          3 laps  3 laps  PTB           Standing hip 3 way    R only  10 ea   R only  10 ea    R only  10 ea   R only 10 ea   R only 15 ea             Heel raises    20  20  20  20  np           Step ups    NV  4" taps  Fwd/lat  10 ea   HR + SPC  4" x10 fwd  4" 5 lat  6" fwd  2x10    4" lat 5  6" fwd 10  6" lat 10  **Up with good down with bad           laq    5"x10  5"x20  NP  5"x20  np knee pain           HS curls            np           Gait training PRN      RW  Step through  3 laps  RW  Step through  2 laps  np  np           Biodex: LOS                       Biodex:  RC                                                weight shifting        5"x2 mins  5"x2mins  5"x2mins            STS        hi low  No UE  10   hi low   No UE 10  hi low   No UE 10                                                                 Modalities     11/14 11/18 11/20 11/25 11/27           CP PRN    10 mins    10 mins  np  10'

## 2019-11-29 RX ORDER — GABAPENTIN 100 MG/1
CAPSULE ORAL
Qty: 90 CAPSULE | Refills: 2 | Status: SHIPPED | OUTPATIENT
Start: 2019-11-29 | End: 2020-03-04 | Stop reason: SDUPTHER

## 2019-12-02 ENCOUNTER — OFFICE VISIT (OUTPATIENT)
Dept: PHYSICAL THERAPY | Facility: REHABILITATION | Age: 69
End: 2019-12-02
Payer: COMMERCIAL

## 2019-12-02 DIAGNOSIS — Z96.641 STATUS POST TOTAL REPLACEMENT OF RIGHT HIP: Primary | ICD-10-CM

## 2019-12-02 DIAGNOSIS — M16.11 PRIMARY OSTEOARTHRITIS OF ONE HIP, RIGHT: ICD-10-CM

## 2019-12-02 PROCEDURE — 97112 NEUROMUSCULAR REEDUCATION: CPT | Performed by: PHYSICAL THERAPIST

## 2019-12-02 PROCEDURE — 97140 MANUAL THERAPY 1/> REGIONS: CPT | Performed by: PHYSICAL THERAPIST

## 2019-12-02 PROCEDURE — 97110 THERAPEUTIC EXERCISES: CPT | Performed by: PHYSICAL THERAPIST

## 2019-12-02 NOTE — PROGRESS NOTES
Daily Note     Today's date: 2019  Patient name: Gianni Glaser  : 1950  MRN: [id number]  Referring provider: Yogesh Blake MD  Dx:   Encounter Diagnosis     ICD-10-CM    1  Status post total replacement of right hip Z96 641    2  Primary osteoarthritis of one hip, right M16 11                   Subjective: Pt arrives with [facility] and R neoprene knee brace that she started using   Brace has helped decrease R knee pain and she is comfortable ambulating with SPC in L hand  Pt reports being able to ascend/descend stairs with use of railing at home using step too pattern  No c/o hip pain pre or post treatment  Objective: See treatment diary below      Assessment: Tolerated treatment well  Patient had difficulty initiang forw/lat step ups with R LE required b/l UE assistance on railing to complete  Patient mentions feeling unstable with SLS on R LE  Patient exhibited good technique with therapeutic exercises and would benefit from continued PT  Plan: Progress treatment as tolerated  Precautions: OA, Fibromyalgia, ankylosing spondylitis, spinal stenosis, lumbar fusion, follow hip precautions for posterior approach (No flexion past 90*, adduction or IR)        Manual     12/2         R Hip PROM within precautions (No flex past 90*, ADD, or IR)  TP 10 mins  CR  10 mins  CR  10 mins  CR  10 mins  TP  10 mins  AK  10 mins  AK   10 mins                                       Exercise Diary       12/2         Bike or Nustep    Nustep  L3  10 mins  Nustep  L3  10 mins  Nu  L3  10 mins  Nu  L3  10 mins  Nu   L4 10 mins  Nu L4 10 mins         Heel slides    10"x10  10"x10  10"x10  10"x10  10"x10  10"x10         SLR            2x10  2x10         bridges    unable  glute set  10"x10  3"x10  3"x10  3"x10  3"x10  3"x10         Supine hip add squeeze    5"x10  5"x20  6"x20  6"x20  6"x20  6"x20         Clamshells w/pillow          10  10  10         Side stepping          3 laps  3 laps  PTB  3 laps OTB         Standing hip 3 way    R only  10 ea   R only  10 ea    R only  10 ea   R only 10 ea   R only 15 ea    B/L 15 ea          Heel raises    20  20  20  20  np  20         Step ups    NV  4" taps  Fwd/lat  10 ea   HR + SPC  4" x10 fwd  4" 5 lat  6" fwd  2x10    4" lat 5  6" fwd 10  6" lat 10  **Up with good down with bad  8" fwd 1x10 up with good   6" bad first         laq    5"x10  5"x20  NP  5"x20  np knee pain  5"x20         HS curls            np  np         Gait training PRN      RW  Step through  3 laps  RW  Step through  2 laps  np  np  np         Biodex: LOS                       Biodex:  RC                                                weight shifting        5"x2 mins  5"x2mins  5"x2mins  5"x2min          STS        hi low  No UE  10   hi low   No UE 10  hi low   No UE 10  hi low No UE 10                                                               Modalities     11/14 11/18 11/20 11/25 11/27  12/2         CP PRN    10 mins    10 mins  np  10'  np

## 2019-12-04 ENCOUNTER — OFFICE VISIT (OUTPATIENT)
Dept: PHYSICAL THERAPY | Facility: REHABILITATION | Age: 69
End: 2019-12-04
Payer: COMMERCIAL

## 2019-12-04 DIAGNOSIS — Z96.641 STATUS POST TOTAL REPLACEMENT OF RIGHT HIP: Primary | ICD-10-CM

## 2019-12-04 PROCEDURE — 97140 MANUAL THERAPY 1/> REGIONS: CPT | Performed by: PHYSICAL THERAPIST

## 2019-12-04 PROCEDURE — 97112 NEUROMUSCULAR REEDUCATION: CPT | Performed by: PHYSICAL THERAPIST

## 2019-12-04 PROCEDURE — 97530 THERAPEUTIC ACTIVITIES: CPT | Performed by: PHYSICAL THERAPIST

## 2019-12-04 PROCEDURE — 97110 THERAPEUTIC EXERCISES: CPT | Performed by: PHYSICAL THERAPIST

## 2019-12-04 NOTE — PROGRESS NOTES
Daily Note     Today's date: 2019  Patient name: Alonzo Mckeon  : 1950  MRN: [id number]  Referring provider: Kiya Tang MD  Dx:   Encounter Diagnosis     ICD-10-CM    1  Status post total replacement of right hip Z96 641                   Subjective: Pt reports her knee pain is improved with wearing brace and applying voltaren gel  Pt denies hip pain  Objective: See treatment diary below       Assessment: Tolerated treatment well  No hip pain with TE performance  Patient would benefit from continued PT       Plan: Progress treatment as tolerated  Precautions: OA, Fibromyalgia, ankylosing spondylitis, spinal stenosis, lumbar fusion, follow hip precautions for posterior approach (No flexion past 90*, adduction or IR)        Manual          R Hip PROM within precautions (No flex past 90*, ADD, or IR)  TP 10 mins  CR  10 mins  CR  10 mins  CR  10 mins  TP  10 mins  AK  10 mins  AK   10 mins  TP 10 mins                                     Exercise Diary            Bike or Nustep    Nustep  L3  10 mins  Nustep  L3  10 mins  Nu  L3  10 mins  Nu  L3  10 mins  Nu   L4 10 mins  Nu L4 10 mins  Nu L4 x10'       Heel slides    10"x10  10"x10  10"x10  10"x10  10"x10  10"x10  10"x10       SLR            2x10  2x10  2x10       bridges    unable  glute set  10"x10  3"x10  3"x10  3"x10  3"x10  3"x10  3"x10       Supine hip add squeeze    5"x10  5"x20  6"x20  6"x20  6"x20  6"x20  6'x20       Clamshells w/pillow          10  10  10  15       Side stepping          3 laps  3 laps  PTB  3 laps OTB  3 laps OTB       Standing hip 3 way    R only  10 ea   R only  10 ea    R only  10 ea   R only 10 ea   R only 15 ea    B/L 15 ea   b/l [id number]       Heel raises    20  20  20  20  np  20  20       Step ups    NV  4" taps  Fwd/lat  10 ea   HR + SPC  4" x10 fwd  4" 5 lat  6" fwd  2x10    4" lat 5  6" fwd 10  6" lat 10  **Up with good down with bad  8" fwd 1x10 up with good   6" bad first  6" fwd x10; 4" lat x10       laq    5"x10  5"x20  NP  5"x20  np knee pain  5"x20  5'x20       HS curls            np  np  2x10       Gait training PRN      RW  Step through  3 laps  RW  Step through  2 laps  np  np  np         Biodex: LOS                static med diff x2       Biodex:  RC                                                weight shifting        5"x2 mins  5"x2mins  5"x2mins  5"x2min  np        STS        hi low  No UE  10   hi low   No UE 10  hi low   No UE 10  hi low No UE 10  hi low No UE x10                                                             Modalities     11/14 11/18 11/20 11/25 11/27  12/2         CP PRN    10 mins    10 mins  np  10'  np

## 2019-12-09 ENCOUNTER — OFFICE VISIT (OUTPATIENT)
Dept: PHYSICAL THERAPY | Facility: REHABILITATION | Age: 69
End: 2019-12-09
Payer: COMMERCIAL

## 2019-12-09 DIAGNOSIS — M16.11 PRIMARY OSTEOARTHRITIS OF ONE HIP, RIGHT: ICD-10-CM

## 2019-12-09 DIAGNOSIS — Z96.641 STATUS POST TOTAL REPLACEMENT OF RIGHT HIP: Primary | ICD-10-CM

## 2019-12-09 PROCEDURE — 97110 THERAPEUTIC EXERCISES: CPT

## 2019-12-09 PROCEDURE — 97140 MANUAL THERAPY 1/> REGIONS: CPT

## 2019-12-09 PROCEDURE — 97530 THERAPEUTIC ACTIVITIES: CPT

## 2019-12-09 PROCEDURE — 97112 NEUROMUSCULAR REEDUCATION: CPT

## 2019-12-09 NOTE — PROGRESS NOTES
Daily Note     Today's date: 2019  Patient name: Manuel Correa  : 1950  MRN: [id number]  Referring provider: Jennifer Wan MD  Dx:   Encounter Diagnosis     ICD-10-CM    1  Status post total replacement of right hip Z96 641    2  Primary osteoarthritis of one hip, right M16 11            1:1 with PTA CR 5:15- 6:20  Subjective: Forgot to don knee brace after showering  Continues to deny hip pain; eager to have knee addressed  Objective: See treatment diary below       Assessment: Tolerated treatment well  Steps remain challenging but due to knee pain versus hip  Reviewed proper hip/ankle strategy when performing weight shifting on Biodex with improved understanding  Able to lower height of table for STS  TB added to bridging for additional glute med strengthening  Patient would benefit from continued PT       Plan: Progress treatment as tolerated  Precautions: OA, Fibromyalgia, ankylosing spondylitis, spinal stenosis, lumbar fusion, follow hip precautions for posterior approach (No flexion past 90*, adduction or IR)        Manual        R Hip PROM within precautions (No flex past 90*, ADD, or IR)  TP 10 mins  CR  10 mins  CR  10 mins  CR  10 mins  TP  10 mins  AK  10 mins  AK   10 mins  TP 10 mins  CR  10 mins                                   Exercise Diary          Bike or Nustep    Nustep  L3  10 mins  Nustep  L3  10 mins  Nu  L3  10 mins  Nu  L3  10 mins  Nu   L4 10 mins  Nu L4 10 mins  Nu L4 x10'  Nu L5  10 mins     Heel slides    10"x10  10"x10  10"x10  10"x10  10"x10  10"x10  10"x10  10"x10     SLR            2x10  2x10  2x10  2x10     bridges    unable  glute set  10"x10  3"x10  3"x10  3"x10  3"x10  3"x10  3"x10  OTB  3"x10     Supine hip add squeeze    5"x10  5"x20  6"x20  6"x20  6"x20  6"x20  6'x20  5"x30     Clamshells w/pillow          10  10  10  15       Side stepping          3 laps  3 laps  PTB  3 laps OTB  3 laps OTB  OTB  3 laps     Standing hip 3 way    R only  10 ea   R only  10 ea    R only  10 ea   R only 10 ea   R only 15 ea    B/L 15 ea   b/l [id number]  B/L  15 ea      Heel raises    20  20  20  20  np  20  20  30     Step ups    NV  4" taps  Fwd/lat  10 ea   HR + SPC  4" x10 fwd  4" 5 lat  6" fwd  2x10    4" lat 5  6" fwd 10  6" lat 10  **Up with good down with bad  8" fwd 1x10 up with good   6" bad first  6" fwd x10; 4" lat x10  6" fwd x 10;  4" lat x10     laq    5"x10  5"x20  NP  5"x20  np knee pain  5"x20  5'x20  5"x30     HS curls            np  np  2x10  B/L  30     Gait training PRN      RW  Step through  3 laps  RW  Step through  2 laps  np  np  np    NP     Biodex: LOS                static med diff x2  static  Med diff  x3     Biodex:  RC                        weight shifting        5"x2 mins  5"x2mins  5"x2mins  5"x2min  np  NP      STS        hi low  No UE  10   hi low   No UE 10  hi low   No UE 10  hi low No UE 10  hi low No UE x10  hi low  No UE  21 in    2x10                                                           Modalities     11/14 11/18 11/20 11/25 11/27  12/2  12/9       CP PRN    10 mins    10 mins  np  10'  np  home  use

## 2019-12-11 ENCOUNTER — OFFICE VISIT (OUTPATIENT)
Dept: PHYSICAL THERAPY | Facility: REHABILITATION | Age: 69
End: 2019-12-11
Payer: COMMERCIAL

## 2019-12-11 DIAGNOSIS — Z96.641 STATUS POST TOTAL REPLACEMENT OF RIGHT HIP: Primary | ICD-10-CM

## 2019-12-11 DIAGNOSIS — M16.11 PRIMARY OSTEOARTHRITIS OF ONE HIP, RIGHT: ICD-10-CM

## 2019-12-11 PROCEDURE — 97140 MANUAL THERAPY 1/> REGIONS: CPT

## 2019-12-11 PROCEDURE — 97110 THERAPEUTIC EXERCISES: CPT

## 2019-12-11 PROCEDURE — 97530 THERAPEUTIC ACTIVITIES: CPT

## 2019-12-11 PROCEDURE — 97112 NEUROMUSCULAR REEDUCATION: CPT

## 2019-12-11 NOTE — PROGRESS NOTES
Daily Note     Today's date: 2019  Patient name: Fletcher Patricio  : 1950  MRN: [id number]  Referring provider: Kalani Brown MD  Dx:   Encounter Diagnosis     ICD-10-CM    1  Status post total replacement of right hip Z96 641    2  Primary osteoarthritis of one hip, right M16 11            1:1 with PTA CR 5:15 - 6:25  Subjective: Fatigue following progressions last session but states knee actually felt better after not being immobilized  Discussed use of knee brace when out in the community and for prolonged weight bearing but to use minimally when at home  Objective: See treatment diary below       Assessment: Tolerated treatment well  Improved tolerance and technique with step ups and Biodex  LE strength progressing nicely with limitations secondary to knee versus hip  Patient would benefit from continued PT  Plan: Progress treatment as tolerated  Precautions: OA, Fibromyalgia, ankylosing spondylitis, spinal stenosis, lumbar fusion, follow hip precautions for posterior approach (No flexion past 90*, adduction or IR)        Manual      R Hip PROM within precautions (No flex past 90*, ADD, or IR)  TP 10 mins  CR  10 mins  CR  10 mins  CR  10 mins  TP  10 mins  AK  10 mins  AK   10 mins  TP 10 mins  CR  10 mins  CR  10 mins                                 Exercise Diary        Bike or Nustep    Nustep  L3  10 mins  Nustep  L3  10 mins  Nu  L3  10 mins  Nu  L3  10 mins  Nu   L4 10 mins  Nu L4 10 mins  Nu L4 x10'  Nu L5  10 mins  Nu L5  10 mins   Heel slides    10"x10  10"x10  10"x10  10"x10  10"x10  10"x10  10"x10  10"x10  10"x10   SLR            2x10  2x10  2x10  2x10  2x10   bridges    unable  glute set  10"x10  3"x10  3"x10  3"x10  3"x10  3"x10  3"x10  OTB  3"x10  OTB  3"x12   Supine hip add squeeze    5"x10  5"x20  6"x20  6"x20  6"x20  6"x20  6'x20  5"x30  5"x30   Clamshells w/pillow          10  10  10  15  15  15   Side stepping          3 laps  3 laps  PTB  3 laps OTB  3 laps OTB  OTB  3 laps  OTB  3 laps   Standing hip 3 way    R only  10 ea   R only  10 ea    R only  10 ea   R only 10 ea   R only 15 ea    B/L 15 ea   b/l [id number]  B/L  15 ea   1#  B/L  15 ea  Heel raises    20  20  20  20  np  20  20  30  30   Step ups    NV  4" taps  Fwd/lat  10 ea   HR + SPC  4" x10 fwd  4" 5 lat  6" fwd  2x10    4" lat 5  6" fwd 10  6" lat 10  **Up with good down with bad  8" fwd 1x10 up with good   6" bad first  6" fwd x10; 4" lat x10  6" fwd x 10;  4" lat x10  6" fwd  x10; 4" lat x10   laq    5"x10  5"x20  NP  5"x20  np knee pain  5"x20  5'x20  5"x30  2#  5"x20   HS curls            np  np  2x10  B/L  30  1#  B/L 30 ea  Gait training PRN      RW  Step through  3 laps  RW  Step through  2 laps  np  np  np    NP  NP   Biodex: LOS                static med diff x2  static  Med diff  x3 Med diff  Static:58%  L12: 70%  L11: 92%     Biodex:  RC                        weight shifting        5"x2 mins  5"x2mins  5"x2mins  5"x2min  np  NP  NP    STS        hi low  No UE  10   hi low   No UE 10  hi low   No UE 10  hi low No UE 10  hi low No UE x10  hi low  No UE  21 in  2x10  hi low  No UE  21 in    2x10                                                         Modalities     11/14 11/18 11/20 11/25 11/27 12/2 12/9 12/11     CP PRN    10 mins    10 mins  np  10'  np  home  use  home use

## 2019-12-12 ENCOUNTER — OFFICE VISIT (OUTPATIENT)
Dept: INTERNAL MEDICINE CLINIC | Facility: CLINIC | Age: 69
End: 2019-12-12
Payer: COMMERCIAL

## 2019-12-12 VITALS
HEART RATE: 92 BPM | TEMPERATURE: 98.1 F | OXYGEN SATURATION: 97 % | WEIGHT: 170.4 LBS | SYSTOLIC BLOOD PRESSURE: 118 MMHG | DIASTOLIC BLOOD PRESSURE: 70 MMHG | RESPIRATION RATE: 14 BRPM | HEIGHT: 61 IN | BODY MASS INDEX: 32.17 KG/M2

## 2019-12-12 DIAGNOSIS — Z96.641 STATUS POST TOTAL HIP REPLACEMENT, RIGHT: Primary | ICD-10-CM

## 2019-12-12 DIAGNOSIS — Z13.0 SCREENING FOR DEFICIENCY ANEMIA: ICD-10-CM

## 2019-12-12 DIAGNOSIS — Z12.11 SCREENING FOR COLON CANCER: ICD-10-CM

## 2019-12-12 DIAGNOSIS — Z13.1 SCREENING FOR DIABETES MELLITUS: ICD-10-CM

## 2019-12-12 DIAGNOSIS — E78.5 HYPERLIPIDEMIA, UNSPECIFIED HYPERLIPIDEMIA TYPE: ICD-10-CM

## 2019-12-12 DIAGNOSIS — M17.11 PRIMARY OSTEOARTHRITIS OF RIGHT KNEE: ICD-10-CM

## 2019-12-12 DIAGNOSIS — R39.9 UTI SYMPTOMS: ICD-10-CM

## 2019-12-12 PROBLEM — J01.00 ACUTE NON-RECURRENT MAXILLARY SINUSITIS: Status: RESOLVED | Noted: 2019-11-14 | Resolved: 2019-12-12

## 2019-12-12 PROCEDURE — 99213 OFFICE O/P EST LOW 20 MIN: CPT | Performed by: INTERNAL MEDICINE

## 2019-12-12 RX ORDER — OFLOXACIN 3 MG/ML
SOLUTION AURICULAR (OTIC)
COMMUNITY
End: 2019-12-12 | Stop reason: ALTCHOICE

## 2019-12-12 RX ORDER — ASCORBIC ACID 500 MG
TABLET ORAL
COMMUNITY
End: 2019-12-12 | Stop reason: ALTCHOICE

## 2019-12-12 RX ORDER — OXYCODONE HYDROCHLORIDE 5 MG/1
TABLET ORAL
COMMUNITY
End: 2019-12-12 | Stop reason: SDUPTHER

## 2019-12-12 NOTE — PROGRESS NOTES
Assessment/Plan:    Primary osteoarthritis of right knee  Primary osteoarthritis of the right knee recommend continued use of Voltaren gel for relief of symptoms  The patient did get an injection of steroid into the knee by her orthopedic surgeon  She continues with physical therapy for strengthening a muscles around her knee as well as recuperation from her right hip surgery  Status post total hip replacement, right  Status post right total hip surgery the patient continues to do well with her recovery she continues with physical therapy and is ambulating with a cane at this time she has had no setbacks in her recovery  There is no evidence of any deep vein thrombosis she has completed her course of Lovenox for deep vein thrombosis prophylaxis and she is back on aspirin low dose 81 mg daily  Diagnoses and all orders for this visit:    Hyperlipidemia, unspecified hyperlipidemia type  -     Lipid panel; Future    Screening for deficiency anemia  -     CBC and differential; Future    Screening for diabetes mellitus  -     Comprehensive metabolic panel; Future    UTI symptoms  -     UA w Reflex to Microscopic w Reflex to Culture -Lab Collect    Screening for colon cancer  -     Occult Blood, Fecal Immunochemical; Future    Other orders  -     Discontinue: ascorbic acid (VITAMIN C) 500 MG tablet; ascorbic acid (vitamin C) 500 mg tablet   TAKE 1 TABLET (500 MG TOTAL) BY MOUTH DAILY START 30 DAYS PRIOR TO SURGERY  -     Discontinue: oxyCODONE (ROXICODONE) 5 mg immediate release tablet; oxycodone 5 mg tablet   TAKE 1 TABLET BY MOUTH EVERY 4 HOURS AS NEEDED  -     Discontinue: ofloxacin (FLOXIN) 0 3 % otic solution; ofloxacin 0 3 % ear drops   ADMINISTER 5 DROPS INTO THE LEFT EAR 2 (TWO) TIMES A DAY        Subjective:      Patient ID: Frances Mireles is a 81 y o  female  This patient returns for her follow-up visit  She is status post right hip replacement surgery and doing well with her physical therapy  She continues to attend physical therapy twice a week  She has had some postoperative discomfort in her right knee and has been using Voltaren gel for relief  Of this right knee discomfort is most likely due to arthritis in the knee and she indicates she may have to have surgery on the right knee at sometime in the future  She denies any chest pain palpitations or shortness of breath and has no significant edema in either lower extremity  She has completed her course of Lovenox for deep vein thrombosis prophylaxis and she is now back on low-dose aspirin  She has had some sleep disturbance with difficulty sleeping some nights  I indicated that she should start to take 5 mg of melatonin on a nightly basis and after 2 nights of poor sleep she could take a dose of Klonopin  She continues to walk with a cane her gait has improved since her last visit with us  The following portions of the patient's history were reviewed and updated as appropriate:   She  has a past medical history of Ankylosing spondylitis ([address] ), Anxiety, Arthritis, Back pain, Carpal tunnel syndrome, Fibromyalgia, Fibromyalgia, primary, Heme positive stool, High cholesterol, Hyperlipidemia, Impingement syndrome of left shoulder, Impingement syndrome of right shoulder, Long term use of drug, No known health problems, RLS (restless legs syndrome), Rotator cuff injury, Spinal stenosis, Spinal stenosis, Spondylitis ([address] ), Spondyloarthritis, and Vitamin D deficiency    She   Patient Active Problem List    Diagnosis Date Noted    Acute pain of right knee 11/14/2019    Status post total hip replacement, right 11/08/2019    Aftercare following right hip joint replacement surgery 10/30/2019    Abnormal EKG 10/17/2019    Preop general physical exam 10/17/2019    Primary osteoarthritis of right knee 06/26/2019    Encounter for annual routine gynecological examination 05/21/2019    Encounter for screening mammogram for malignant neoplasm of breast 2019    Chronic bilateral low back pain 2018    S/P endoscopic carpal tunnel release 2018    Spinal stenosis of lumbar region at multiple levels 2017    Spondylosis 2017    Scoliosis 2017     She  has a past surgical history that includes  section; Tubal ligation; Dilation and curettage of uterus; Retinal laser procedure; Colonoscopy; pr colonoscopy flx dx w/collj spec when pfrmd (N/A, 10/7/2016); pr arthrodesis posterior/posterolateral lumbar (N/A, 4/3/2017); pr wrist arthroscop,release xvers lig (Left, 2018); Cervical conization w/ laser; Back surgery; Hand surgery; Carpal tunnel release (Left); pr wrist arthroscop,release xvers lig (Right, 2018); FL injection right hip (non arthrogram) (2019); FL injection right hip (non arthrogram) (2019); and pr total hip arthroplasty (Right, 2019)  Her family history includes Arthritis in her brother, family, mother, sister, and sister; Breast cancer (age of onset: 36) in her maternal aunt; Breast cancer (age of onset: 67) in her mother; Cancer in her brother; Depression in her son; Endometrial cancer (age of onset: 61) in her sister; Heart attack in her brother, brother, father, mother, and sister; Hyperlipidemia in her family; Hypertension in her father and mother; Lung cancer in her maternal uncle; Melanoma in her brother and brother; No Known Problems in her daughter and son; Other in her brother; Prostate cancer in her brother; Prostate cancer (age of onset: 58) in her brother; Stroke in her brother and father; Uterine cancer in her sister  She  reports that she has never smoked  She has never used smokeless tobacco  She reports that she does not drink alcohol or use drugs    Current Outpatient Medications   Medication Sig Dispense Refill    aspirin 81 MG tablet Take 1 tablet by mouth daily      Cholecalciferol (VITAMIN D) 2000 UNITS CAPS Take 1 tablet by mouth daily      clonazePAM (KlonoPIN) 0 5 mg tablet Take 0 5 mg by mouth daily at bedtime  4    diclofenac sodium (VOLTAREN) 1 % Apply 4 g topically 4 (four) times a day 100 g 2    gabapentin (NEURONTIN) 100 mg capsule TAKE 1 CAPSULE BY MOUTH THREE TIMES A DAY 90 capsule 2    meloxicam (MOBIC) 15 mg tablet Take 1 tablet by mouth daily      methocarbamol (ROBAXIN) 750 mg tablet TAKE 1 TABLET (750 MG TOTAL) BY MOUTH EVERY 6 (SIX) HOURS AS NEEDED FOR MUSCLE SPASMS 100 tablet 0    oxyCODONE (ROXICODONE) 5 mg immediate release tablet 1 pill po Q4 Hrs prn 30 tablet 0    PARoxetine (PAXIL) 10 mg tablet TAKE 1 TABLET DAILY  30 tablet 6    traMADol (ULTRAM) 50 mg tablet Take 1 tablet (50 mg total) by mouth every 6 (six) hours as needed for moderate pain for up to 60 doses 60 tablet 0    Cholecalciferol (VITAMIN D3) 50 MCG (2000 UT) capsule Vitamin D3 50 mcg (2,000 unit) capsule   Take by oral route  No current facility-administered medications for this visit       Review of Systems   Musculoskeletal: Positive for arthralgias, gait problem and myalgias  Psychiatric/Behavioral: Positive for sleep disturbance  All other systems reviewed and are negative  Objective:      /70   Pulse 92   Temp 98 1 °F (36 7 °C)   Resp 14   Ht 5' 1" (1 549 m)   Wt 77 3 kg (170 lb 6 4 oz)   LMP  (LMP Unknown)   SpO2 97%   BMI 32 20 kg/m²          Physical Exam   Constitutional: She is oriented to person, place, and time  Vital signs are normal  She appears well-developed and well-nourished  She is cooperative  HENT:   Right Ear: Hearing, tympanic membrane, external ear and ear canal normal    Left Ear: Hearing, tympanic membrane, external ear and ear canal normal    Nose: Nose normal  No mucosal edema  Mouth/Throat: Uvula is midline, oropharynx is clear and moist and mucous membranes are normal    Eyes: Pupils are equal, round, and reactive to light  Conjunctivae and lids are normal    Neck: No JVD present   Carotid bruit is not present  No thyromegaly present  Cardiovascular: Normal rate, regular rhythm, normal heart sounds and intact distal pulses  No murmur heard  Pulmonary/Chest: Effort normal and breath sounds normal  No respiratory distress  Abdominal: Normal appearance  Musculoskeletal: Normal range of motion  She exhibits tenderness  She exhibits no edema  Lymphadenopathy:     She has no cervical adenopathy  Neurological: She is alert and oriented to person, place, and time  She has normal reflexes  Skin: Skin is warm, dry and intact  Psychiatric: She has a normal mood and affect  Her speech is normal and behavior is normal  Judgment and thought content normal  Cognition and memory are normal    Vitals reviewed

## 2019-12-12 NOTE — ASSESSMENT & PLAN NOTE
Primary osteoarthritis of the right knee recommend continued use of Voltaren gel for relief of symptoms  The patient did get an injection of steroid into the knee by her orthopedic surgeon  She continues with physical therapy for strengthening a muscles around her knee as well as recuperation from her right hip surgery

## 2019-12-12 NOTE — ASSESSMENT & PLAN NOTE
Status post right total hip surgery the patient continues to do well with her recovery she continues with physical therapy and is ambulating with a cane at this time she has had no setbacks in her recovery  There is no evidence of any deep vein thrombosis she has completed her course of Lovenox for deep vein thrombosis prophylaxis and she is back on aspirin low dose 81 mg daily

## 2019-12-16 ENCOUNTER — OFFICE VISIT (OUTPATIENT)
Dept: PHYSICAL THERAPY | Facility: REHABILITATION | Age: 69
End: 2019-12-16
Payer: COMMERCIAL

## 2019-12-16 DIAGNOSIS — M16.11 PRIMARY OSTEOARTHRITIS OF ONE HIP, RIGHT: ICD-10-CM

## 2019-12-16 DIAGNOSIS — Z96.641 STATUS POST TOTAL REPLACEMENT OF RIGHT HIP: Primary | ICD-10-CM

## 2019-12-16 PROCEDURE — 97530 THERAPEUTIC ACTIVITIES: CPT

## 2019-12-16 PROCEDURE — 97140 MANUAL THERAPY 1/> REGIONS: CPT

## 2019-12-16 PROCEDURE — 97110 THERAPEUTIC EXERCISES: CPT

## 2019-12-16 PROCEDURE — 97112 NEUROMUSCULAR REEDUCATION: CPT

## 2019-12-16 NOTE — PROGRESS NOTES
Daily Note     Today's date: 2019  Patient name: Sanchez Judge  : 1950  MRN: [id number]  Referring provider: Estephania Owens MD  Dx:   Encounter Diagnosis     ICD-10-CM    1  Status post total replacement of right hip Z96 641    2  Primary osteoarthritis of one hip, right M16 11            1:1 with PTA CR 5:15-6:30  Subjective: Forgot pain meds prior to treatment  Increased zan  Objective: See treatment diary below       Assessment: Tolerated treatment well  Progressed to 6" lateral step ups- continues to require mod ( A ) in both directions due to knee instability  Follow up with Dr Nicole Montes De Oca on 19- plan to RE at next visit  Patient would benefit from continued PT  Plan: Progress treatment as tolerated  Precautions: OA, Fibromyalgia, ankylosing spondylitis, spinal stenosis, lumbar fusion, follow hip precautions for posterior approach (No flexion past 90*, adduction or IR)        Manual      R Hip PROM within precautions (No flex past 90*, ADD, or IR)  TP 10 mins  CR  10 mins  CR  10 mins  CR  10 mins  TP  10 mins  AK  10 mins  AK   10 mins  TP 10 mins  CR  10 mins  CR  10 mins CR  10 mins                                  Exercise Diary        Bike or Nustep    Nustep  L3  10 mins  Nustep  L3  10 mins  Nu  L3  10 mins  Nu  L3  10 mins  Nu   L4 10 mins  Nu L4 10 mins  Nu L4 x10'  Nu L5  10 mins  Nu L5  10 mins Nu L5  10 mins   Heel slides    10"x10  10"x10  10"x10  10"x10  10"x10  10"x10  10"x10  10"x10  10"x10 10"x10   SLR            2x10  2x10  2x10  2x10  2x10 2x10   bridges    unable  glute set  10"x10  3"x10  3"x10  3"x10  3"x10  3"x10  3"x10  OTB  3"x10  OTB  3"x12 OTB  3"x   Supine hip add squeeze    5"x10  5"x20  6"x20  6"x20  6"x20  6"x20  6'x20  5"x30  5"x30 5"x30   Nathan singh/pillow          10  10  10  15  15  15 3"x15 Side stepping          3 laps  3 laps  PTB  3 laps OTB  3 laps OTB  OTB  3 laps  OTB  3 laps GTB  3 laps   Standing hip 3 way    R only  10 ea   R only  10 ea    R only  10 ea   R only 10 ea   R only 15 ea    B/L 15 ea   b/l [id number]  B/L  15 ea   1#  B/L  15 ea  1#  B/L  2x10 ea  Heel raises    20  20  20  20  np  20  20  30  30 3x10   Step ups    NV  4" taps  Fwd/lat  10 ea   HR + SPC  4" x10 fwd  4" 5 lat  6" fwd  2x10    4" lat 5  6" fwd 10  6" lat 10  **Up with good down with bad  8" fwd 1x10 up with good   6" bad first  6" fwd x10; 4" lat x10  6" fwd x 10;  4" lat x10  6" fwd  x10; 4" lat x10 6" x 10 ea    laq    5"x10  5"x20  NP  5"x20  np knee pain  5"x20  5'x20  5"x30  2#  5"x20 2#  5"x30   HS curls            np  np  2x10  B/L  30  1#  B/L 30 ea  2#  B/L  30 ea  Gait training PRN      RW  Step through  3 laps  RW  Step through  2 laps  np  np  np    NP  NP    Biodex: LOS                static med diff x2  static  Med diff  x3 Med diff  Static:58%  L12: 70%  L11: 92%   Med diff  Static: 85%  L12: 93%  L11: 94%   Biodex:  RC                         weight shifting        5"x2 mins  5"x2mins  5"x2mins  5"x2min  np  NP  NP     STS        hi low  No UE  10   hi low   No UE 10  hi low   No UE 10  hi low No UE 10  hi low No UE x10  hi low  No UE  21 in  2x10  hi low  No UE  21 in  2x10 Hi low  No UE  21 in    2x10                                                           Modalities     11/14 11/18 11/20 11/25 11/27 12/2 12/9 12/11 12/16   CP PRN    10 mins    10 mins  np  10'  np  home  use  home use  home use

## 2019-12-18 ENCOUNTER — EVALUATION (OUTPATIENT)
Dept: PHYSICAL THERAPY | Facility: REHABILITATION | Age: 69
End: 2019-12-18
Payer: COMMERCIAL

## 2019-12-18 DIAGNOSIS — Z96.641 STATUS POST TOTAL REPLACEMENT OF RIGHT HIP: Primary | ICD-10-CM

## 2019-12-18 DIAGNOSIS — M16.11 PRIMARY OSTEOARTHRITIS OF ONE HIP, RIGHT: ICD-10-CM

## 2019-12-18 PROCEDURE — 97112 NEUROMUSCULAR REEDUCATION: CPT

## 2019-12-18 PROCEDURE — 97530 THERAPEUTIC ACTIVITIES: CPT

## 2019-12-18 PROCEDURE — 97110 THERAPEUTIC EXERCISES: CPT

## 2019-12-18 PROCEDURE — 97140 MANUAL THERAPY 1/> REGIONS: CPT

## 2019-12-18 PROCEDURE — 97164 PT RE-EVAL EST PLAN CARE: CPT

## 2019-12-18 NOTE — PROGRESS NOTES
Daily Note     Today's date: 2019  Patient name: Kacy Xie  : 1950  MRN: [id number]  Referring provider: Benita Daniels MD  Dx:   Encounter Diagnosis     ICD-10-CM    1  Status post total replacement of right hip Z96 641    2  Primary osteoarthritis of one hip, right M16 11            1:1 with PTA CR 5:15-6:10  RE with SPT AK 6:10-6:25  Subjective: Follow up with Dr Caldwell Hermann tomorrow  Objective: See treatment diary below       Assessment: Tolerated treatment well  Introduced mini squats- required cues to prevent excessive shifting onto left LE  See RE for updated pain, strength and ROM measurements  Patient would benefit from continued PT  Plan: Progress treatment as tolerated  Precautions: OA, Fibromyalgia, ankylosing spondylitis, spinal stenosis, lumbar fusion, follow hip precautions for posterior approach (No flexion past 90*, adduction or IR)        Manual      R Hip PROM within precautions (No flex past 90*, ADD, or IR)  CR  10 mins  CR  10 mins  CR  10 mins  TP  10 mins  AK  10 mins  AK   10 mins  TP 10 mins  CR  10 mins  CR  10 mins CR  10 mins CR  10 mins                                 Exercise Diary      Bike or Nustep  Nustep  L3  10 mins  Nustep  L3  10 mins  Nu  L3  10 mins  Nu  L3  10 mins  Nu   L4 10 mins  Nu L4 10 mins  Nu L4 x10'  Nu L5  10 mins  Nu L5  10 mins Nu L5  10 mins Nu L5  10 mins   Heel slides  10"x10  10"x10  10"x10  10"x10  10"x10  10"x10  10"x10  10"x10  10"x10 10"x10 NP   SLR          2x10  2x10  2x10  2x10  2x10 2x10 2x10   bridges  unable  glute set  10"x10  3"x10  3"x10  3"x10  3"x10  3"x10  3"x10  OTB  3"x10  OTB  3"x12 OTB  3"x15 OTB  3"x15   Supine hip add squeeze  5"x10  5"x20  6"x20  6"x20  6"x20  6"x20  6'x20  5"x30  5"x30 5"x30 D/C   Nathan singh/pillow        10  10  10  15  15  15 3"x15 3"x15 Side stepping        3 laps  3 laps  PTB  3 laps OTB  3 laps OTB  OTB  3 laps  OTB  3 laps GTB  3 laps GTB  3 laps   Standing hip 3 way  R only  10 ea   R only  10 ea    R only  10 ea   R only 10 ea   R only 15 ea    B/L 15 ea   b/l [id number]  B/L  15 ea   1#  B/L  15 ea  1#  B/L  2x10 ea  1#  B/L  2x10  Ea  Heel raises  20  20  20  20  np  20  20  30  30 3x10 3x10   Step ups  NV  4" taps  Fwd/lat  10 ea   HR + SPC  4" x10 fwd  4" 5 lat  6" fwd  2x10    4" lat 5  6" fwd 10  6" lat 10  **Up with good down with bad  8" fwd 1x10 up with good   6" bad first  6" fwd x10; 4" lat x10  6" fwd x 10;  4" lat x10  6" fwd  x10; 4" lat x10 6" x 10 ea  6" x 10 ea    laq  5"x10  5"x20  NP  5"x20  np knee pain  5"x20  5'x20  5"x30  2#  5"x20 2#  5"x30 2#  5"x30   HS curls          np  np  2x10  B/L  30  1#  B/L 30 ea  2#  B/L  30 ea  2#  B/L  30 ea  Gait training PRN    RW  Step through  3 laps  RW  Step through  2 laps  np  np  np    NP  NP     Biodex: LOS              static med diff x2  static  Med diff  x3 Med diff  Static:58%  L12: 70%  L11: 92%   Med diff  Static: 85%  L12: 93%  L11: 94% Med diff:  L12: 89%  L11:   92%  L10:  94%   Biodex:  RC                        weight shifting      5"x2 mins  5"x2mins  5"x2mins  5"x2min  np  NP  NP      STS      hi low  No UE  10   hi low   No UE 10  hi low   No UE 10  hi low No UE 10  hi low No UE x10  hi low  No UE  21 in  2x10  hi low  No UE  21 in  2x10 Hi low  No UE  21 in    2x10 Hi Low  No UE  2x15    mini squats                    10                                 Modalities     11/14  11/18  11/20  11/25 11/27 12/2 12/9 12/11 12/16 12/18   CP PRN    10 mins    10 mins  np  10'  np  home  use  home use  home use Home use

## 2019-12-18 NOTE — PROGRESS NOTES
PT Re-Evaluation     Today's date: 2019  Patient name: Yoana Lacey  : 1950  MRN: [id number]  Referring provider: Aden Tam MD  Dx:   Encounter Diagnosis     ICD-10-CM    1  Status post total replacement of right hip Z96 641    2  Primary osteoarthritis of one hip, right M16 11        Start Time: 1715  Stop Time: 1825  Total time in clinic (min): 70 minutes    Assessment  Assessment details: Patient continues to show improvement with R hip ROM, strength, decreased pain and increase function secondary to post op R SHARIF  Patient would benefit from skilled physical therapy to continue to make improvements in ROM, strength and maximize function  Impairments: abnormal gait, abnormal or restricted ROM, activity intolerance, impaired physical strength, pain with function and weight-bearing intolerance  Understanding of Dx/Px/POC: good   Prognosis: good    Goals  Short Term:  Pt will report decreased levels of pain by at least 2 subjective ratings in 4 weeks- met  Pt will demonstrate improved ROM by at least 10 degrees in 4 weeks- met  Pt will demonstrate improved strength by 1/2 grade MMT in 4 weeks- met  Long Term:   Pt will be independent in their HEP in 8 weeks- not met  Pt will demonstrate improved FOTO, > 58 - not met (47)  Pt will be independent with all ADL's- met  Pt will be able to ambulate community distances with AD PRN  - met  Pt will perform steps in reciprocal pattern without hip pain- not met due to severe knee OA    Plan  Plan details: Patient was educated on HEP  All questions were answered to patient satisfaction     Patient would benefit from: skilled physical therapy  Planned modality interventions: cryotherapy  Planned therapy interventions: joint mobilization, manual therapy, balance, balance/weight bearing training, abdominal trunk stabilization, patient education, stretching, strengthening, therapeutic activities, therapeutic exercise, gait training, functional ROM exercises, flexibility and home exercise program  Frequency: 2x week  Duration in weeks: 8  Plan of Care beginning date: 2019  Plan of Care expiration date: 2020  Treatment plan discussed with: patient        Subjective Evaluation    History of Present Illness  Mechanism of injury: Pt  81 y o female post op R SHARIF and R knee OA  Pt is scheduled to f/u with MD   Patient reports no hip pain, the R knee is 2/10 currently and 8/10 at its worst  Pt reports improvement with ambulation, progressed to [facility] outdoors and no AD in the house  Patient is able to independently perform sit to stand transfers, household chores (cooking cleaning laundry) showering, getting dressed and sleeping in bed/recliner  Patient continues to have difficulty/pain with ascending/descending stairs secondary to severe R knee OA, denies hip pain  Pain  Current pain ratin  At worst pain ratin  Location: only experiences pain in R knee, none in the R hip  Relieving factors: ice and medications          Objective     Active Range of Motion     Right Hip   Flexion: 90 degrees   Extension: 10 degrees   Abduction: 25 degrees   External rotation (90/90): 18 degrees     Passive Range of Motion     Right Hip   Flexion: 90 degrees   Abduction: 30 degrees   External rotation (90/90): 25 degrees     Strength/Myotome Testing     Right Hip   Planes of Motion   Flexion: 4  Abduction: 4  External rotation: 4    Right Knee   Flexion: 4+  Extension: 4+             Precautions: OA, Fibromyalgia, ankylosing spondylitis, spinal stenosis, lumbar fusion, follow hip precautions for posterior approach (No flexion past 90*, adduction or IR)

## 2019-12-19 ENCOUNTER — OFFICE VISIT (OUTPATIENT)
Dept: OBGYN CLINIC | Facility: HOSPITAL | Age: 69
End: 2019-12-19

## 2019-12-19 VITALS
WEIGHT: 170 LBS | HEIGHT: 61 IN | HEART RATE: 80 BPM | BODY MASS INDEX: 32.1 KG/M2 | DIASTOLIC BLOOD PRESSURE: 80 MMHG | SYSTOLIC BLOOD PRESSURE: 130 MMHG

## 2019-12-19 DIAGNOSIS — Z47.1 AFTERCARE FOLLOWING RIGHT HIP JOINT REPLACEMENT SURGERY: Primary | ICD-10-CM

## 2019-12-19 DIAGNOSIS — Z96.641 AFTERCARE FOLLOWING RIGHT HIP JOINT REPLACEMENT SURGERY: Primary | ICD-10-CM

## 2019-12-19 PROCEDURE — 99024 POSTOP FOLLOW-UP VISIT: CPT | Performed by: ORTHOPAEDIC SURGERY

## 2019-12-19 RX ORDER — CEPHALEXIN 500 MG/1
CAPSULE ORAL
Qty: 20 CAPSULE | Refills: 1 | Status: SHIPPED | OUTPATIENT
Start: 2019-12-19 | End: 2021-04-26 | Stop reason: SDUPTHER

## 2019-12-19 NOTE — PROGRESS NOTES
Assessment:   Diagnosis ICD-10-CM Associated Orders   1  Aftercare following right hip joint replacement surgery Z47 1 cephalexin (KEFLEX) 500 mg capsule    Z96 641        Plan:  6 weeks s/p right total hip arthroplasty and doing very well  Continue therapy to maximize her outcome and continued strengthening  Total hip precautions as discussed  Antibiotics sent to her pharmacy and recommend waiting non-urgent dental appointments  To do next visit:  Return in about 6 weeks (around 1/30/2020) for re-check with x-rays right hip and pelvis  The above stated was discussed in layman's terms and the patient expressed understanding  All questions were answered to the patient's satisfaction  Scribe Attestation    I,:   Jeannette Cameron am acting as a scribe while in the presence of the attending physician :        I,:   Doreen Rosen MD personally performed the services described in this documentation    as scribed in my presence :              Subjective:   Brenda Boo is a 81 y o  female who presents repeat evaluation 6 weeks status post right total hip arthroplasty  Patient is pleased overall and getting around with a single point cane  She denies any groin pain  Denies any thigh pain  She has incisional discomfort if she lays on her right side for too long at nighttime  Overall patient is pleased  She has been attending physical therapy progressing well        Review of systems negative unless otherwise specified in HPI    Past Medical History:   Diagnosis Date    Ankylosing spondylitis ([facility] [place] [address] )     Anxiety     LAST ASSESSED: 12/20/16    Arthritis     Back pain     Carpal tunnel syndrome     Fibromyalgia     LAST ASSESSED: 12/20/16    Fibromyalgia, primary     Heme positive stool     LAST ASSESSED: 10/22/16    High cholesterol     Hyperlipidemia     under control since weight loss    Impingement syndrome of left shoulder     LAST ASSESSED: 2/21/17    Impingement syndrome of right shoulder LAST ASSESSED:     Long term use of drug     LAST ASSESSED: 16    No known health problems     NO PERTINENT PAST MEDICAL HX    RLS (restless legs syndrome)     Rotator cuff injury     right    Spinal stenosis     Spinal stenosis     Spondylitis (HCC)     Spondyloarthritis     RESOLVED: 16    Vitamin D deficiency        Past Surgical History:   Procedure Laterality Date    BACK SURGERY      CARPAL TUNNEL RELEASE Left     CERVICAL CONIZATION   W/ LASER      CERVICAL CONIZATION     SECTION      COLONOSCOPY      DILATION AND CURETTAGE OF UTERUS      FL INJECTION RIGHT HIP (NON ARTHROGRAM)  2019    FL INJECTION RIGHT HIP (NON ARTHROGRAM)  2019    HAND SURGERY      NH ARTHRODESIS POSTERIOR/POSTEROLATERAL LUMBAR N/A 4/3/2017    Procedure: L2-L5 OPEN DECOMPRESSIVE LUMBAR LAMINECTOMY W/ PEDICLE SCREW AND NILDA FIXATION FUSION (IMPULSE); REMOVAL OF SKIN TAG MID BACK;  Surgeon: Ashley Santos MD;  [facility];  Service: Neurosurgery    NH COLONOSCOPY FLX DX W/COLLJ SPEC WHEN PFRMD N/A 10/7/2016    Procedure: EGD AND COLONOSCOPY;  Surgeon: Cierra Campoverde MD;  [facility];   Service: Gastroenterology    NH TOTAL HIP ARTHROPLASTY Right 2019    Procedure: ARTHROPLASTY HIP TOTAL Posterior;  Surgeon: Red Varela MD;  [facility];  Service: Orthopedics    NH WRIST Priya Heading LIG Left 2018    Procedure: RELEASE CARPAL TUNNEL ENDOSCOPIC;  Surgeon: Waldemar Mcmahan MD;  [facility];  Service: Orthopedics    NH WRIST Priya Heading LIG Right 2018    Procedure: RELEASE CARPAL TUNNEL ENDOSCOPIC;  Surgeon: Waldemar Mcmahan MD;  [facility];  Service: Orthopedics    RETINAL LASER PROCEDURE      TUBAL LIGATION         Family History   Problem Relation Age of Onset    Arthritis Mother     Breast cancer Mother 67    Hypertension Mother     Heart attack Mother         MYOCARDIAL INFARCTION  Heart attack Father     Hypertension Father     Stroke Father         CEREBROVASCUALR ACCIDENT (CVA)    Arthritis Sister     Uterine cancer Sister     Endometrial cancer Sister 61    Heart attack Brother     Prostate cancer Brother 58    Arthritis Brother     Melanoma Brother     Cancer Brother     Arthritis Family     Hyperlipidemia Family     Depression Son     Breast cancer Maternal Aunt 36    No Known Problems Daughter     Other Brother         hunting accident (shot)    Melanoma Brother     Prostate cancer Brother     Heart attack Brother     Stroke Brother     Arthritis Sister     Heart attack Sister     No Known Problems Son     Lung cancer Maternal Uncle        Social History     Occupational History    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       Comment: EMPLOYED   Tobacco Use    Smoking status: Never Smoker    Smokeless tobacco: Never Used   Substance and Sexual Activity    Alcohol use: Never     Frequency: Never    Drug use: No    Sexual activity: Yes     Partners: Male     Birth control/protection: None         Current Outpatient Medications:     aspirin 81 MG tablet, Take 1 tablet by mouth daily, Disp: , Rfl:     cephalexin (KEFLEX) 500 mg capsule, Take 4 tablets p o  1 hr prior to dental appointment as instructed, Disp: 20 capsule, Rfl: 1    Cholecalciferol (VITAMIN D) 2000 UNITS CAPS, Take 1 tablet by mouth daily, Disp: , Rfl:     Cholecalciferol (VITAMIN D3) 50 MCG (2000 UT) capsule, Vitamin D3 50 mcg (2,000 unit) capsule  Take by oral route , Disp: , Rfl:     clonazePAM (KlonoPIN) 0 5 mg tablet, Take 0 5 mg by mouth daily at bedtime, Disp: , Rfl: 4    diclofenac sodium (VOLTAREN) 1 %, Apply 4 g topically 4 (four) times a day, Disp: 100 g, Rfl: 2    gabapentin (NEURONTIN) 100 mg capsule, TAKE 1 CAPSULE BY MOUTH THREE TIMES A DAY, Disp: 90 capsule, Rfl: 2    meloxicam (MOBIC) 15 mg tablet, Take 1 tablet by mouth daily, Disp: , Rfl:     methocarbamol (ROBAXIN) 750 mg tablet, TAKE 1 TABLET (750 MG TOTAL) BY MOUTH EVERY 6 (SIX) HOURS AS NEEDED FOR MUSCLE SPASMS, Disp: 100 tablet, Rfl: 0    oxyCODONE (ROXICODONE) 5 mg immediate release tablet, 1 pill po Q4 Hrs prn, Disp: 30 tablet, Rfl: 0    PARoxetine (PAXIL) 10 mg tablet, TAKE 1 TABLET DAILY  , Disp: 30 tablet, Rfl: 6    traMADol (ULTRAM) 50 mg tablet, Take 1 tablet (50 mg total) by mouth every 6 (six) hours as needed for moderate pain for up to 60 doses, Disp: 60 tablet, Rfl: 0    No Known Allergies         Vitals:    12/19/19 1521   BP: 130/80   Pulse: 80       Objective:                    Right Hip Exam     Tenderness   Right hip tenderness location: minimal tenderness laterally over her incision  Range of Motion   The patient has normal right hip ROM  Muscle Strength   The patient has normal right hip strength (with quick fatigue of her ABDs)  Other   Erythema: absent  Sensation: normal    Comments:    Healed posterior-lateral incision without evidence of infection  Calf and thigh are soft and non-tender without signs of DVT    Equal leg lengths    Non-antalgic gait  Diagnostics, reviewed and taken today if performed as documented:    None performed          Procedures, if performed today:    Procedures    None performed      Portions of the record may have been created with voice recognition software  Occasional wrong word or "sound a like" substitutions may have occurred due to the inherent limitations of voice recognition software  Read the chart carefully and recognize, using context, where substitutions have occurred

## 2019-12-23 ENCOUNTER — OFFICE VISIT (OUTPATIENT)
Dept: PHYSICAL THERAPY | Facility: REHABILITATION | Age: 69
End: 2019-12-23
Payer: COMMERCIAL

## 2019-12-23 DIAGNOSIS — Z96.641 STATUS POST TOTAL REPLACEMENT OF RIGHT HIP: Primary | ICD-10-CM

## 2019-12-23 DIAGNOSIS — M16.11 PRIMARY OSTEOARTHRITIS OF ONE HIP, RIGHT: ICD-10-CM

## 2019-12-23 PROCEDURE — 97112 NEUROMUSCULAR REEDUCATION: CPT

## 2019-12-23 PROCEDURE — 97110 THERAPEUTIC EXERCISES: CPT

## 2019-12-23 PROCEDURE — 97140 MANUAL THERAPY 1/> REGIONS: CPT

## 2019-12-23 NOTE — PROGRESS NOTES
Daily Note     Today's date: 2019  Patient name: Breonna Kelly  : 1950  MRN: [id number]  Referring provider: Misa Charles MD  Dx:   Encounter Diagnosis     ICD-10-CM    1  Status post total replacement of right hip Z96 641    2  Primary osteoarthritis of one hip, right M16 11                   Subjective: Pt reports improvement upon arrival, states she's been doing some short distance driving and it's been going well  Objective: See treatment diary below      Assessment: Tolerated treatment well  Patient would benefit from continued PT   Pt  able to complete all exercises with no increase in pain during or after session  Pt able to progress exercises this session without complaint  Pt 1:1 with PTA [id number], IEP for remainder  Plan: Continue per plan of care  Precautions: OA, Fibromyalgia, ankylosing spondylitis, spinal stenosis, lumbar fusion, follow hip precautions for posterior approach (No flexion past 90*, adduction or IR)  Manual          R Hip PROM within precautions (No flex past 90*, ADD, or IR)  AK   10 mins  TP 10 mins  CR  10 mins  CR  10 mins CR  10 mins CR  10 mins KP 8'                                Exercise Diary          Bike or Nustep  Nu L4 10 mins  Nu L4 x10'  Nu L5  10 mins  Nu L5  10 mins Nu L5  10 mins Nu L5  10 mins NS L6 10'       Heel slides  10"x10  10"x10  10"x10  10"x10 10"x10 NP np       SLR  2x10  2x10  2x10  2x10 2x10 2x10 2x15       bridges  3"x10  3"x10  OTB  3"x10  OTB  3"x12 OTB  3"x15 OTB  3"x15 15x3" otb       Clamshells w/pillow  10  15  15  15 3"x15 3"x15 15x3"       Side stepping  3 laps OTB  3 laps OTB  OTB  3 laps  OTB  3 laps GTB  3 laps GTB  3 laps 3 laps gtb       Standing hip 3 way  B/L 15 ea   b/l x15ea  B/L  15 ea   1#  B/L  15 ea  1#  B/L  2x10 ea  1#  B/L  2x10  Ea   15x ea 2# b/l       Heel raises  20  20  30  30 3x10 3x10 np       Step ups  8" fwd 1x10 up with good   6" bad first  6" fwd x10; 4" lat x10  6" fwd x 10;  4" lat x10  6" fwd  x10; 4" lat x10 6" x 10 ea  6" x 10 ea  15x fsu/lsu 6"       laq  5"x20  5'x20  5"x30  2#  5"x20 2#  5"x30 2#  5"x30 30x5" 2#       HS curls  np  2x10  B/L  30  1#  B/L 30 ea  2#  B/L  30 ea  2#  B/L  30 ea  30x ea b/l 2#       Gait training PRN  np    NP  NP          Biodex: LOS    static med diff x2  static  Med diff  x3 Med diff  Static:58%  L12: 70%  L11: 92%   Med diff  Static: 85%  L12: 93%  L11: 94% Med diff:  L12: 89%  L11:   92%  L10:  94% 3x, L12, L11, L10       Biodex:  RC                   weight shifting  5"x2min  np  NP  NP           STS  hi low No UE 10  hi low No UE x10  hi low  No UE  21 in  2x10  hi low  No UE  21 in  2x10 Hi low  No UE  21 in    2x10 Hi Low  No UE  2x15 np        mini squats          10 15x                                Modalities     11/14 11/18 11/20 11/25 11/27 12/2 12/9 12/11 12/16 12/18   CP PRN    10 mins    10 mins  np  10'  np  home  use  home use  home use Home use

## 2019-12-26 ENCOUNTER — OFFICE VISIT (OUTPATIENT)
Dept: PHYSICAL THERAPY | Facility: REHABILITATION | Age: 69
End: 2019-12-26
Payer: COMMERCIAL

## 2019-12-26 ENCOUNTER — APPOINTMENT (OUTPATIENT)
Dept: LAB | Facility: HOSPITAL | Age: 69
End: 2019-12-26
Attending: INTERNAL MEDICINE
Payer: COMMERCIAL

## 2019-12-26 DIAGNOSIS — Z96.641 STATUS POST TOTAL REPLACEMENT OF RIGHT HIP: Primary | ICD-10-CM

## 2019-12-26 DIAGNOSIS — M16.11 PRIMARY OSTEOARTHRITIS OF ONE HIP, RIGHT: ICD-10-CM

## 2019-12-26 DIAGNOSIS — E78.5 HYPERLIPIDEMIA, UNSPECIFIED HYPERLIPIDEMIA TYPE: ICD-10-CM

## 2019-12-26 DIAGNOSIS — Z13.1 SCREENING FOR DIABETES MELLITUS: ICD-10-CM

## 2019-12-26 DIAGNOSIS — Z13.0 SCREENING FOR DEFICIENCY ANEMIA: ICD-10-CM

## 2019-12-26 LAB
ALBUMIN SERPL BCP-MCNC: 3.5 G/DL (ref 3.5–5)
ALP SERPL-CCNC: 65 U/L (ref 46–116)
ALT SERPL W P-5'-P-CCNC: 19 U/L (ref 12–78)
ANION GAP SERPL CALCULATED.3IONS-SCNC: 4 MMOL/L (ref 4–13)
AST SERPL W P-5'-P-CCNC: 13 U/L (ref 5–45)
BASOPHILS # BLD AUTO: 0.04 THOUSANDS/ΜL (ref 0–0.1)
BASOPHILS NFR BLD AUTO: 1 % (ref 0–1)
BILIRUB SERPL-MCNC: 0.38 MG/DL (ref 0.2–1)
BILIRUB UR QL STRIP: NEGATIVE
BUN SERPL-MCNC: 22 MG/DL (ref 5–25)
CALCIUM SERPL-MCNC: 9.1 MG/DL (ref 8.3–10.1)
CHLORIDE SERPL-SCNC: 110 MMOL/L (ref 100–108)
CHOLEST SERPL-MCNC: 203 MG/DL (ref 50–200)
CLARITY UR: CLEAR
CO2 SERPL-SCNC: 29 MMOL/L (ref 21–32)
COLOR UR: YELLOW
CREAT SERPL-MCNC: 0.7 MG/DL (ref 0.6–1.3)
EOSINOPHIL # BLD AUTO: 0.17 THOUSAND/ΜL (ref 0–0.61)
EOSINOPHIL NFR BLD AUTO: 3 % (ref 0–6)
ERYTHROCYTE [DISTWIDTH] IN BLOOD BY AUTOMATED COUNT: 14.9 % (ref 11.6–15.1)
GFR SERPL CREATININE-BSD FRML MDRD: 90 ML/MIN/1.73SQ M
GLUCOSE P FAST SERPL-MCNC: 86 MG/DL (ref 65–99)
GLUCOSE UR STRIP-MCNC: NEGATIVE MG/DL
HCT VFR BLD AUTO: 41.3 % (ref 34.8–46.1)
HDLC SERPL-MCNC: 62 MG/DL
HGB BLD-MCNC: 12.8 G/DL (ref 11.5–15.4)
HGB UR QL STRIP.AUTO: NEGATIVE
IMM GRANULOCYTES # BLD AUTO: 0.01 THOUSAND/UL (ref 0–0.2)
IMM GRANULOCYTES NFR BLD AUTO: 0 % (ref 0–2)
KETONES UR STRIP-MCNC: NEGATIVE MG/DL
LDLC SERPL CALC-MCNC: 126 MG/DL (ref 0–100)
LEUKOCYTE ESTERASE UR QL STRIP: NEGATIVE
LYMPHOCYTES # BLD AUTO: 1.56 THOUSANDS/ΜL (ref 0.6–4.47)
LYMPHOCYTES NFR BLD AUTO: 25 % (ref 14–44)
MCH RBC QN AUTO: 29.1 PG (ref 26.8–34.3)
MCHC RBC AUTO-ENTMCNC: 31 G/DL (ref 31.4–37.4)
MCV RBC AUTO: 94 FL (ref 82–98)
MONOCYTES # BLD AUTO: 0.52 THOUSAND/ΜL (ref 0.17–1.22)
MONOCYTES NFR BLD AUTO: 8 % (ref 4–12)
NEUTROPHILS # BLD AUTO: 3.94 THOUSANDS/ΜL (ref 1.85–7.62)
NEUTS SEG NFR BLD AUTO: 63 % (ref 43–75)
NITRITE UR QL STRIP: NEGATIVE
NONHDLC SERPL-MCNC: 141 MG/DL
NRBC BLD AUTO-RTO: 0 /100 WBCS
PH UR STRIP.AUTO: 5.5 [PH]
PLATELET # BLD AUTO: 348 THOUSANDS/UL (ref 149–390)
PMV BLD AUTO: 9.8 FL (ref 8.9–12.7)
POTASSIUM SERPL-SCNC: 4 MMOL/L (ref 3.5–5.3)
PROT SERPL-MCNC: 6.9 G/DL (ref 6.4–8.2)
PROT UR STRIP-MCNC: NEGATIVE MG/DL
RBC # BLD AUTO: 4.4 MILLION/UL (ref 3.81–5.12)
SODIUM SERPL-SCNC: 143 MMOL/L (ref 136–145)
SP GR UR STRIP.AUTO: 1.02 (ref 1–1.03)
TRIGL SERPL-MCNC: 77 MG/DL
UROBILINOGEN UR QL STRIP.AUTO: 0.2 E.U./DL
WBC # BLD AUTO: 6.24 THOUSAND/UL (ref 4.31–10.16)

## 2019-12-26 PROCEDURE — 80061 LIPID PANEL: CPT

## 2019-12-26 PROCEDURE — 97140 MANUAL THERAPY 1/> REGIONS: CPT | Performed by: PHYSICAL THERAPIST

## 2019-12-26 PROCEDURE — 81003 URINALYSIS AUTO W/O SCOPE: CPT | Performed by: INTERNAL MEDICINE

## 2019-12-26 PROCEDURE — 36415 COLL VENOUS BLD VENIPUNCTURE: CPT

## 2019-12-26 PROCEDURE — 80053 COMPREHEN METABOLIC PANEL: CPT

## 2019-12-26 PROCEDURE — 97110 THERAPEUTIC EXERCISES: CPT | Performed by: PHYSICAL THERAPIST

## 2019-12-26 PROCEDURE — 85025 COMPLETE CBC W/AUTO DIFF WBC: CPT

## 2019-12-26 PROCEDURE — 97112 NEUROMUSCULAR REEDUCATION: CPT | Performed by: PHYSICAL THERAPIST

## 2019-12-26 NOTE — PROGRESS NOTES
Daily Note     Today's date: 2019  Patient name: Nam Jackson  : 1950  MRN: [id number]  Referring provider: Sergei Ortiz MD  Dx:   Encounter Diagnosis     ICD-10-CM    1  Status post total replacement of right hip Z96 641    2  Primary osteoarthritis of one hip, right M16 11                   Subjective: No new complaints upon arrival  Pt has started driving short distances and mentions that MD requires 6 months post-op SHARIF to be considered for TKA on the R  All precautions for posterior approach are still in place  Patient reports minimal soreness after last session  Objective: See treatment diary below      Assessment: Tolerated treatment well  Patient tolerated progression of TE's well, documented in diary below  Continues to show progress with LE strength and ROM within precautions  Patient demonstrated fatigue post treatment and exhibited good technique with therapeutic exercises  Plan: Progress treatment as tolerated  Precautions: OA, Fibromyalgia, ankylosing spondylitis, spinal stenosis, lumbar fusion, follow hip precautions for posterior approach (No flexion past 90*, adduction or IR)        Manual         R Hip PROM within precautions (No flex past 90*, ADD, or IR)  AK   10 mins  TP 10 mins  CR  10 mins  CR  10 mins CR  10 mins CR  10 mins KP 8' AK 10'                               Exercise Diary         Bike or Nustep  Nu L4 10 mins  Nu L4 x10'  Nu L5  10 mins  Nu L5  10 mins Nu L5  10 mins Nu L5  10 mins NS L6 10' NS L6  10'      Heel slides  10"x10  10"x10  10"x10  10"x10 10"x10 NP np np      SLR  2x10  2x10  2x10  2x10 2x10 2x10 2x15 2x15      bridges  3"x10  3"x10  OTB  3"x10  OTB  3"x12 OTB  3"x15 OTB  3"x15 15x3" otb 15x3" OTB      Clamshells w/pillow  10  15  15  15 3"x15 3"x15 15x3" 3"x20      Side stepping  3 laps OTB  3 laps OTB  OTB  3 laps  OTB  3 laps GTB  3 laps GTB  3 laps 3 laps gtb 4 laps  gtb      Standing hip 3 way  B/L 15 ea   b/l x15ea  B/L  15 ea   1#  B/L  15 ea  1#  B/L  2x10 ea  1#  B/L  2x10  Ea  15x ea 2# b/l 10x ea  3# b/l      Heel raises  20  20  30  30 3x10 3x10 np 3x10      Step ups  8" fwd 1x10 up with good   6" bad first  6" fwd x10; 4" lat x10  6" fwd x 10;  4" lat x10  6" fwd  x10; 4" lat x10 6" x 10 ea  6" x 10 ea  15x fsu/lsu 6" 15x FSU/LSU 6"      laq  5"x20  5'x20  5"x30  2#  5"x20 2#  5"x30 2#  5"x30 30x5" 2# 3# 5"x30      HS curls  np  2x10  B/L  30  1#  B/L 30 ea  2#  B/L  30 ea  2#  B/L  30 ea  30x ea b/l 2# 20x ea b/l 3#      Gait training PRN  np    NP  NP          Biodex: LOS    static med diff x2  static  Med diff  x3 Med diff  Static:58%  L12: 70%  L11: 92%   Med diff  Static: 85%  L12: 93%  L11: 94% Med diff:  L12: 89%  L11:   92%  L10:  94% 3x, L12, L11, L10 3x L12 93%, L11 96%, L10 95%  With UE      Biodex:  RC                   weight shifting  5"x2min  np  NP  NP           STS  hi low No UE 10  hi low No UE x10  hi low  No UE  21 in  2x10  hi low  No UE  21 in  2x10 Hi low  No UE  21 in    2x10 Hi Low  No UE  2x15 np Hi Low no UE 2x15       mini squats          10 15x 15x                               Modalities     11/14 11/18 11/20 11/25 11/27 12/2 12/9 12/11 12/16 12/18   CP PRN    10 mins    10 mins  np  10'  np  home  use  home use  home use Home use

## 2019-12-30 ENCOUNTER — OFFICE VISIT (OUTPATIENT)
Dept: INTERNAL MEDICINE CLINIC | Facility: CLINIC | Age: 69
End: 2019-12-30
Payer: COMMERCIAL

## 2019-12-30 ENCOUNTER — OFFICE VISIT (OUTPATIENT)
Dept: PHYSICAL THERAPY | Facility: REHABILITATION | Age: 69
End: 2019-12-30
Payer: COMMERCIAL

## 2019-12-30 VITALS
HEIGHT: 61 IN | DIASTOLIC BLOOD PRESSURE: 80 MMHG | TEMPERATURE: 98.9 F | BODY MASS INDEX: 32.28 KG/M2 | OXYGEN SATURATION: 98 % | WEIGHT: 171 LBS | SYSTOLIC BLOOD PRESSURE: 126 MMHG | RESPIRATION RATE: 16 BRPM | HEART RATE: 81 BPM

## 2019-12-30 DIAGNOSIS — E78.5 HYPERLIPIDEMIA, UNSPECIFIED HYPERLIPIDEMIA TYPE: Primary | ICD-10-CM

## 2019-12-30 DIAGNOSIS — R94.31 ABNORMAL EKG: ICD-10-CM

## 2019-12-30 DIAGNOSIS — M17.11 PRIMARY OSTEOARTHRITIS OF RIGHT KNEE: ICD-10-CM

## 2019-12-30 DIAGNOSIS — Z96.641 STATUS POST TOTAL REPLACEMENT OF RIGHT HIP: Primary | ICD-10-CM

## 2019-12-30 DIAGNOSIS — M16.11 PRIMARY OSTEOARTHRITIS OF ONE HIP, RIGHT: ICD-10-CM

## 2019-12-30 DIAGNOSIS — Z96.641 STATUS POST TOTAL HIP REPLACEMENT, RIGHT: ICD-10-CM

## 2019-12-30 PROBLEM — Z01.818 PREOP GENERAL PHYSICAL EXAM: Status: RESOLVED | Noted: 2019-10-17 | Resolved: 2019-12-30

## 2019-12-30 PROCEDURE — 97112 NEUROMUSCULAR REEDUCATION: CPT

## 2019-12-30 PROCEDURE — 97140 MANUAL THERAPY 1/> REGIONS: CPT

## 2019-12-30 PROCEDURE — 97110 THERAPEUTIC EXERCISES: CPT

## 2019-12-30 PROCEDURE — G0439 PPPS, SUBSEQ VISIT: HCPCS | Performed by: INTERNAL MEDICINE

## 2019-12-30 NOTE — PROGRESS NOTES
Daily Note     Today's date: 2019  Patient name: Jami Duane  : 1950  MRN: [id number]  Referring provider: Umang Dowd MD  Dx:   Encounter Diagnosis     ICD-10-CM    1  Status post total replacement of right hip Z96 641    2  Primary osteoarthritis of one hip, right M16 11                   Subjective: Patient reports her hip is feeling good, but is c/o right knee pain  Objective: See treatment diary below      Assessment: Tolerated treatment well  Patient showed good performance overall with her exercise program and would benefit from continued therapy  FSU and weighted LAQ aggravated her knee pain  Plan: Continue therapy as tolerated per Plan of Care  Valdez Farnsworth PTA       Precautions: OA, Fibromyalgia, ankylosing spondylitis, spinal stenosis, lumbar fusion, follow hip precautions for posterior approach (No flexion past 90*, adduction or IR)  Manual        R Hip PROM within precautions (No flex past 90*, ADD, or IR)  AK   10 mins  TP 10 mins  CR  10 mins  CR  10 mins CR  10 mins CR  10 mins KP 8' AK 10' KY 10'                              Exercise Diary        Bike or Nustep  Nu L4 10 mins  Nu L4 x10'  Nu L5  10 mins  Nu L5  10 mins Nu L5  10 mins Nu L5  10 mins NS L6 10' NS L6  10' NS L6  10'     Heel slides  10"x10  10"x10  10"x10  10"x10 10"x10 NP np np 10"x10     SLR  2x10  2x10  2x10  2x10 2x10 2x10 2x15 2x15 2x 15     bridges  3"x10  3"x10  OTB  3"x10  OTB  3"x12 OTB  3"x15 OTB  3"x15 15x3" otb 15x3" OTB 20x3"  OTB     Clamshells w/pillow  10  15  15  15 3"x15 3"x15 15x3" 3"x20 np     Side stepping  3 laps OTB  3 laps OTB  OTB  3 laps  OTB  3 laps GTB  3 laps GTB  3 laps 3 laps gtb 4 laps  gtb 4 laps gtb     Standing hip 3 way  B/L 15 ea   b/l x15ea  B/L  15 ea   1#  B/L  15 ea  1#  B/L  2x10 ea  1#  B/L  2x10  Ea   15x ea 2# b/l 10x ea  3# b/l 15x ea  3# Heel raises  20  20  30  30 3x10 3x10 np 3x10 3x10     Step ups  8" fwd 1x10 up with good   6" bad first  6" fwd x10; 4" lat x10  6" fwd x 10;  4" lat x10  6" fwd  x10; 4" lat x10 6" x 10 ea  6" x 10 ea  15x fsu/lsu 6" 15x FSU/LSU 6" Knee Pain     laq  5"x20  5'x20  5"x30  2#  5"x20 2#  5"x30 2#  5"x30 30x5" 2# 3# 5"x30 No wgt knee pain x30     HS curls  np  2x10  B/L  30  1#  B/L 30 ea  2#  B/L  30 ea  2#  B/L  30 ea  30x ea b/l 2# 20x ea b/l 3# 20x ea  B/L 3#     Gait training PRN  np    NP  NP          Biodex: LOS    static med diff x2  static  Med diff  x3 Med diff  Static:58%  L12: 70%  L11: 92%   Med diff  Static: 85%  L12: 93%  L11: 94% Med diff:  L12: 89%  L11:   92%  L10:  94% 3x, L12, L11, L10 3x L12 93%, L11 96%, L10 95%  With UE np     Biodex:  RC                   weight shifting  5"x2min  np  NP  NP           STS  hi low No UE 10  hi low No UE x10  hi low  No UE  21 in  2x10  hi low  No UE  21 in  2x10 Hi low  No UE  21 in    2x10 Hi Low  No UE  2x15 np Hi Low no UE 2x15 2x15      mini squats          10 15x 15x 15x                              Modalities     11/14 11/18 11/20 11/25 11/27 12/2 12/9 12/11 12/16 12/18   CP PRN    10 mins    10 mins  np  10'  np  home  use  home use  home use Home use

## 2019-12-30 NOTE — ASSESSMENT & PLAN NOTE
History of abnormal EKG noted on preoperative clearance of cardiology consultation reviewed no evidence to substantiate any significant cardiac disease at this time  The patient has no history of chest pain palpitations or shortness of breath

## 2019-12-30 NOTE — ASSESSMENT & PLAN NOTE
Lipid profile reviewed with the patient today it does show a mild elevation in her total cholesterol as well as LDL  We reviewed foods better likely to increased cholesterol in her system  She was provided with a handout to educate her about low-cholesterol diet  I have recommended that she have a follow-up blood test in 6 months

## 2019-12-30 NOTE — ASSESSMENT & PLAN NOTE
Patient is status post right total hip replacement surgery she continues to do well  Recommend continued follow-up with Orthopedics  Most recent visit with Orthopedics was reviewed during today's visit she has no evidence to support any deep vein thrombosis on physical examination and history today  Recommend continued physical therapy and ambulated shin for strengthening

## 2019-12-30 NOTE — PROGRESS NOTES
Assessment/Plan:    Status post total hip replacement, right  Patient is status post right total hip replacement surgery she continues to do well  Recommend continued follow-up with Orthopedics  Most recent visit with Orthopedics was reviewed during today's visit she has no evidence to support any deep vein thrombosis on physical examination and history today  Recommend continued physical therapy and ambulated shin for strengthening  Primary osteoarthritis of right knee  On off stew arthritis of the right knee exacerbated by her recent right hip surgery  The patient most likely will need surgery on her right knee in the future orthopedics recommends waiting at least 6 months which I concur with  In the meantime recommend continued physical therapy for strengthening  Hyperlipidemia  Lipid profile reviewed with the patient today it does show a mild elevation in her total cholesterol as well as LDL  We reviewed foods better likely to increased cholesterol in her system  She was provided with a handout to educate her about low-cholesterol diet  I have recommended that she have a follow-up blood test in 6 months  Abnormal EKG  History of abnormal EKG noted on preoperative clearance of cardiology consultation reviewed no evidence to substantiate any significant cardiac disease at this time  The patient has no history of chest pain palpitations or shortness of breath  Diagnoses and all orders for this visit:    Hyperlipidemia, unspecified hyperlipidemia type  -     Lipid panel; Future        Subjective:      Patient ID: Rosemary Mooney is a 81 y o  female  This 73-year-old female patient presents today for her initial Medicare wellness visit  She recently underwent a total hip replacement procedure which has done well  She continues to improve with her ambulatory skills and continues with a smooth recovery  Most recent visit with Orthopedics was reviewed during today's visit    The patient has right knee pain which most likely will require right total knee replacement but her orthopedic surgeon recommends waiting at the 6 months after her right hip surgery  Patient has no symptoms to suggest deep vein thrombosis there is no tenderness in her calves nor any shortness of breath or chest pain  She remains active to minimize her risk for deep vein thrombosis  The following portions of the patient's history were reviewed and updated as appropriate:   She  has a past medical history of Ankylosing spondylitis ([facility] [place] [address] ), Anxiety, Arthritis, Back pain, Carpal tunnel syndrome, Fibromyalgia, Fibromyalgia, primary, Heme positive stool, High cholesterol, Hyperlipidemia, Impingement syndrome of left shoulder, Impingement syndrome of right shoulder, Long term use of drug, No known health problems, RLS (restless legs syndrome), Rotator cuff injury, Spinal stenosis, Spinal stenosis, Spondylitis ([facility] [place] [address] ), Spondyloarthritis, and Vitamin D deficiency  She   Patient Active Problem List    Diagnosis Date Noted    Hyperlipidemia 2019    Acute pain of right knee 2019    Status post total hip replacement, right 2019    Aftercare following right hip joint replacement surgery 10/30/2019    Abnormal EKG 10/17/2019    Primary osteoarthritis of right knee 2019    Encounter for annual routine gynecological examination 2019    Encounter for screening mammogram for malignant neoplasm of breast 2019    Chronic bilateral low back pain 2018    S/P endoscopic carpal tunnel release 2018    Spinal stenosis of lumbar region at multiple levels 2017    Spondylosis 2017    Scoliosis 2017     She  has a past surgical history that includes  section; Tubal ligation; Dilation and curettage of uterus;  Retinal laser procedure; Colonoscopy; pr colonoscopy flx dx w/collj spec when pfrmd (N/A, 10/7/2016); pr arthrodesis posterior/posterolateral lumbar (N/A, 4/3/2017); pr wrist arthroscop,release xvers lig (Left, 4/26/2018); Cervical conization w/ laser; Back surgery; Hand surgery; Carpal tunnel release (Left); pr wrist arthroscop,release xvers lig (Right, 6/14/2018); FL injection right hip (non arthrogram) (4/24/2019); FL injection right hip (non arthrogram) (7/24/2019); and pr total hip arthroplasty (Right, 11/7/2019)  Her family history includes Arthritis in her brother, family, mother, sister, and sister; Breast cancer (age of onset: 36) in her maternal aunt; Breast cancer (age of onset: 67) in her mother; Cancer in her brother; Depression in her son; Endometrial cancer (age of onset: 61) in her sister; Heart attack in her brother, brother, father, mother, and sister; Hyperlipidemia in her family; Hypertension in her father and mother; Lung cancer in her maternal uncle; Melanoma in her brother and brother; No Known Problems in her daughter and son; Other in her brother; Prostate cancer in her brother; Prostate cancer (age of onset: 58) in her brother; Stroke in her brother and father; Uterine cancer in her sister  She  reports that she has never smoked  She has never used smokeless tobacco  She reports that she does not drink alcohol or use drugs  Current Outpatient Medications   Medication Sig Dispense Refill    aspirin 81 MG tablet Take 1 tablet by mouth daily      cephalexin (KEFLEX) 500 mg capsule Take 4 tablets p o  1 hr prior to dental appointment as instructed 20 capsule 1    Cholecalciferol (VITAMIN D3) 50 MCG (2000 UT) capsule Vitamin D3 50 mcg (2,000 unit) capsule   Take by oral route        clonazePAM (KlonoPIN) 0 5 mg tablet Take 0 5 mg by mouth daily at bedtime  4    diclofenac sodium (VOLTAREN) 1 % Apply 4 g topically 4 (four) times a day 100 g 2    gabapentin (NEURONTIN) 100 mg capsule TAKE 1 CAPSULE BY MOUTH THREE TIMES A DAY 90 capsule 2    meloxicam (MOBIC) 15 mg tablet Take 1 tablet by mouth daily      methocarbamol (ROBAXIN) 750 mg tablet TAKE 1 TABLET (750 MG TOTAL) BY MOUTH EVERY 6 (SIX) HOURS AS NEEDED FOR MUSCLE SPASMS 100 tablet 0    oxyCODONE (ROXICODONE) 5 mg immediate release tablet 1 pill po Q4 Hrs prn 30 tablet 0    PARoxetine (PAXIL) 10 mg tablet TAKE 1 TABLET DAILY  30 tablet 6    traMADol (ULTRAM) 50 mg tablet Take 1 tablet (50 mg total) by mouth every 6 (six) hours as needed for moderate pain for up to 60 doses 60 tablet 0     No current facility-administered medications for this visit       Review of Systems   Musculoskeletal: Positive for arthralgias, gait problem and myalgias  All other systems reviewed and are negative  Objective:      /80   Pulse 81   Temp 98 9 °F (37 2 °C)   Resp 16   Ht 5' 1" (1 549 m)   Wt 77 6 kg (171 lb)   LMP  (LMP Unknown)   SpO2 98%   BMI 32 31 kg/m²          Physical Exam   Constitutional: She is oriented to person, place, and time  Vital signs are normal  She appears well-developed and well-nourished  She is cooperative  No distress  HENT:   Right Ear: Hearing, tympanic membrane, external ear and ear canal normal    Left Ear: Hearing, tympanic membrane, external ear and ear canal normal    Nose: Nose normal  No mucosal edema  Mouth/Throat: Uvula is midline, oropharynx is clear and moist and mucous membranes are normal  No oropharyngeal exudate  Eyes: Pupils are equal, round, and reactive to light  Conjunctivae and lids are normal  Right eye exhibits no discharge  No scleral icterus  Neck: No JVD present  Carotid bruit is not present  No thyromegaly present  Cardiovascular: Normal rate, regular rhythm, normal heart sounds and intact distal pulses  No murmur heard  Pulmonary/Chest: Effort normal and breath sounds normal  No stridor  No respiratory distress  She has no wheezes  She has no rales  Abdominal: Soft  Normal appearance and bowel sounds are normal  She exhibits no distension and no mass  There is no tenderness  There is no guarding     Musculoskeletal: Normal range of motion  She exhibits no edema, tenderness or deformity  Lymphadenopathy:     She has no cervical adenopathy  Neurological: She is alert and oriented to person, place, and time  She has normal reflexes  She displays normal reflexes  No cranial nerve deficit  She exhibits normal muscle tone  Coordination normal    Skin: Skin is warm, dry and intact  No rash noted  She is not diaphoretic  No erythema  No pallor  Psychiatric: She has a normal mood and affect  Her speech is normal and behavior is normal  Judgment and thought content normal  Cognition and memory are normal    Vitals reviewed

## 2019-12-30 NOTE — PROGRESS NOTES
Assessment and Plan:     Problem List Items Addressed This Visit     None           Preventive health issues were discussed with patient, and age appropriate screening tests were ordered as noted in patient's After Visit Summary  Personalized health advice and appropriate referrals for health education or preventive services given if needed, as noted in patient's After Visit Summary       History of Present Illness:     Patient presents for Medicare Annual Wellness visit    Patient Care Team:  Tonia Celis MD as PCP - General  MD Boyd Coffey MD Liz Canny, MD Cherylin Cobb, DO Darrow Bull, MD as Endoscopist     Problem List:     Patient Active Problem List   Diagnosis    Spinal stenosis of lumbar region at multiple levels    Spondylosis    Scoliosis    S/P endoscopic carpal tunnel release    Chronic bilateral low back pain    Encounter for annual routine gynecological examination    Encounter for screening mammogram for malignant neoplasm of breast    Primary osteoarthritis of right knee    Abnormal EKG    Preop general physical exam    Aftercare following right hip joint replacement surgery    Status post total hip replacement, right    Acute pain of right knee      Past Medical and Surgical History:     Past Medical History:   Diagnosis Date    [facility] ([facility] [address] )     Anxiety     LAST ASSESSED: 12/20/16    Arthritis     Back pain     Carpal tunnel syndrome     Fibromyalgia     LAST ASSESSED: 12/20/16    Fibromyalgia, primary     Heme positive stool     LAST ASSESSED: 10/22/16    High cholesterol     Hyperlipidemia     under control since weight loss    Impingement syndrome of left shoulder     LAST ASSESSED: 2/21/17    Impingement syndrome of right shoulder     LAST ASSESSED: 2/21/1/7    Long term use of drug     LAST ASSESSED: 12/20/16    No known health problems     NO PERTINENT PAST MEDICAL HX    RLS (restless legs syndrome)  Rotator cuff injury     right    Spinal stenosis     Spinal stenosis     Spondyl[facility] ([facility] [address] [facility]     Spondyloarthritis     RESOLVED: 16    Vitamin D deficiency      Past Surgical History:   Procedure Laterality Date    BACK SURGERY      CARPAL TUNNEL RELEASE Left     CERVICAL CONIZATION   W/ LASER      CERVICAL CONIZATION     SECTION      COLONOSCOPY      DILATION AND CURETTAGE OF UTERUS      FL INJECTION RIGHT HIP (NON ARTHROGRAM)  2019    FL INJECTION RIGHT HIP (NON ARTHROGRAM)  2019    HAND SURGERY      CT ARTHRODESIS POSTERIOR/POSTEROLATERAL LUMBAR N/A 4/3/2017    Procedure: L2-L5 OPEN DECOMPRESSIVE LUMBAR LAMINECTOMY W/ PEDICLE SCREW AND NILDA FIXATION FUSION (IMPULSE); REMOVAL OF SKIN TAG MID BACK;  Surgeon: Anoop Lopez MD;  [facility];  Service: Neurosurgery    CT COLONOSCOPY FLX DX W/COLLJ SPEC WHEN PFRMD N/A 10/7/2016    Procedure: EGD AND COLONOSCOPY;  Surgeon: Rachel Huerta MD;  [facility];   Service: Gastroenterology    CT TOTAL HIP ARTHROPLASTY Right 2019    Procedure: ARTHROPLASTY HIP TOTAL Posterior;  Surgeon: Philpi Chong MD;  [facility];  Service: Orthopedics    CT WRIST Mayra Beau LIG Left 2018    Procedure: RELEASE CARPAL TUNNEL ENDOSCOPIC;  Surgeon: Matthew Kaur MD;  [facility];  Service: Orthopedics    CT WRIST Mayra Beau LIG Right 2018    Procedure: RELEASE CARPAL TUNNEL ENDOSCOPIC;  Surgeon: Matthew Kaur MD;  [facility];  Service: Orthopedics    RETINAL LASER PROCEDURE      TUBAL LIGATION        Family History:     Family History   Problem Relation Age of Onset    Arthritis Mother     Breast cancer Mother 67    Hypertension Mother     Heart attack Mother         MYOCARDIAL INFARCTION    Heart attack Father     Hypertension Father     Stroke Father         Jai Gaston (CVA)    Arthritis Sister     Uterine cancer Sister    [facility] Endometrial cancer Sister 61    Heart attack Brother     Prostate cancer Brother 58    Arthritis Brother     Melanoma Brother     Cancer Brother     Arthritis Family     Hyperlipidemia Family     Depression Son     Breast cancer Maternal Aunt 36    No Known Problems Daughter     Other Brother         hunting accident (shot)    Melanoma Brother     Prostate cancer Brother     Heart attack Brother     Stroke Brother     Arthritis Sister     Heart attack Sister     No Known Problems Son     Lung cancer Maternal Uncle       Social History:     Social History     Socioeconomic History    Marital status: /Civil Union     Spouse name: None    Number of children: 3    Years of education: None    Highest education level: None   Occupational History    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       Comment: EMPLOYED   Social Needs    Financial resource strain: None    Food insecurity:     Worry: None     Inability: None    Transportation needs:     Medical: None     Non-medical: None   Tobacco Use    Smoking status: Never Smoker    Smokeless tobacco: Never Used   Substance and Sexual Activity    Alcohol use: Never     Frequency: Never    Drug use: No    Sexual activity: Yes     Partners: Male     Birth control/protection: None   Lifestyle    Physical activity:     Days per week: None     Minutes per session: None    Stress: None   Relationships    Social connections:     Talks on phone: None     Gets together: None     Attends Sabianist service: None     Active member of club or organization: None     Attends meetings of clubs or organizations: None     Relationship status: None    Intimate partner violence:     Fear of current or ex partner: None     Emotionally abused: None     Physically abused: None     Forced sexual activity: None   Other Topics Concern    None   Social History Narrative    CAFFEINE USE    DOES NOT EXERCISE       Medications and Allergies:     Current Outpatient Medications   Medication Sig Dispense Refill    aspirin 81 MG tablet Take 1 tablet by mouth daily      cephalexin (KEFLEX) 500 mg capsule Take 4 tablets p o  1 hr prior to dental appointment as instructed 20 capsule 1    Cholecalciferol (VITAMIN D) 2000 UNITS CAPS Take 1 tablet by mouth daily      Cholecalciferol (VITAMIN D3) 50 MCG (2000 UT) capsule Vitamin D3 50 mcg (2,000 unit) capsule   Take by oral route   clonazePAM (KlonoPIN) 0 5 mg tablet Take 0 5 mg by mouth daily at bedtime  4    diclofenac sodium (VOLTAREN) 1 % Apply 4 g topically 4 (four) times a day 100 g 2    gabapentin (NEURONTIN) 100 mg capsule TAKE 1 CAPSULE BY MOUTH THREE TIMES A DAY 90 capsule 2    meloxicam (MOBIC) 15 mg tablet Take 1 tablet by mouth daily      methocarbamol (ROBAXIN) 750 mg tablet TAKE 1 TABLET (750 MG TOTAL) BY MOUTH EVERY 6 (SIX) HOURS AS NEEDED FOR MUSCLE SPASMS 100 tablet 0    oxyCODONE (ROXICODONE) 5 mg immediate release tablet 1 pill po Q4 Hrs prn 30 tablet 0    PARoxetine (PAXIL) 10 mg tablet TAKE 1 TABLET DAILY  30 tablet 6    traMADol (ULTRAM) 50 mg tablet Take 1 tablet (50 mg total) by mouth every 6 (six) hours as needed for moderate pain for up to 60 doses 60 tablet 0     No current facility-administered medications for this visit        No Known Allergies   Immunizations:     Immunization History   Administered Date(s) Administered    H1N1, All Formulations 11/06/2009    INFLUENZA 10/01/2018    Influenza, high dose seasonal 0 5 mL 10/01/2018, 10/17/2019    Pneumococcal Conjugate 13-Valent 12/28/2018      Health Maintenance:         Topic Date Due    Hepatitis C Screening  1950    MAMMOGRAM  05/01/2020    CRC Screening: Colonoscopy  10/07/2026         Topic Date Due    DTaP,Tdap,and Td Vaccines (1 - Tdap) 06[birth year]24/1963    Pneumococcal Vaccine: 65+ Years (2 of 2 - PPSV23) 12/28/2019      Medicare Health Risk Assessment:     Ht 5' 1" (1 549 m)   LMP  (LMP Unknown)   BMI 32 12 kg/m²      Natasha Deng is here for her Initial Wellness visit  Health Risk Assessment:   Patient rates overall health as very good  Patient feels that their physical health rating is same  Eyesight was rated as same  Hearing was rated as same  Patient feels that their emotional and mental health rating is same  Pain experienced in the last 7 days has been some  Patient's pain rating has been 5/10  Patient states that she has experienced no weight loss or gain in last 6 months  Depression Screening:   PHQ-2 Score: 0  PHQ-9 Score: 0      Fall Risk Screening: In the past year, patient has experienced: no history of falling in past year      Urinary Incontinence Screening:   Patient has leaked urine accidently in the last six months  Home Safety:  Patient does not have trouble with stairs inside or outside of their home  Patient has working smoke alarms and has working carbon monoxide detector  Home safety hazards include: loose rugs on the floor  Nutrition:   Current diet is Low Carb  Medications:   Patient is currently taking over-the-counter supplements  OTC medications include: see medication list  Patient is able to manage medications  Activities of Daily Living (ADLs)/Instrumental Activities of Daily Living (IADLs):   Walk and transfer into and out of bed and chair?: Yes  Dress and groom yourself?: Yes    Bathe or shower yourself?: Yes    Feed yourself?  Yes  Do your laundry/housekeeping?: Yes  Manage your money, pay your bills and track your expenses?: Yes  Make your own meals?: Yes    Do your own shopping?: Yes    Previous Hospitalizations:   Any hospitalizations or ED visits within the last 12 months?: Yes    How many hospitalizations have you had in the last year?: 1-2    Advance Care Planning:   Living will: No    Durable POA for healthcare: No    Advanced directive: No      Cognitive Screening:   Provider or family/friend/caregiver concerned regarding cognition?: No    PREVENTIVE SCREENINGS      Cardiovascular Screening: General: Screening Not Indicated and History Lipid Disorder      Diabetes Screening:     General: Screening Current      Colorectal Cancer Screening:     General: Screening Current      Breast Cancer Screening:     General: Screening Current      Cervical Cancer Screening:    General: Screening Not Indicated      Osteoporosis Screening:    General: Screening Not Indicated      Abdominal Aortic Aneurysm (AAA) Screening:        General: Screening Not Indicated      Lung Cancer Screening:     General: Screening Not Indicated      Hepatitis C Screening:    General: Screening Not Indicated      La Hines MD

## 2019-12-30 NOTE — ASSESSMENT & PLAN NOTE
On off stew arthritis of the right knee exacerbated by her recent right hip surgery  The patient most likely will need surgery on her right knee in the future orthopedics recommends waiting at least 6 months which I concur with  In the meantime recommend continued physical therapy for strengthening

## 2019-12-30 NOTE — PATIENT INSTRUCTIONS
Medicare Preventive Visit Patient Instructions  Thank you for completing your Welcome to Medicare Visit or Medicare Annual Wellness Visit today  Your next wellness visit will be due in one year (12/30/2020)  The screening/preventive services that you may require over the next 5-10 years are detailed below  Some tests may not apply to you based off risk factors and/or age  Screening tests ordered at today's visit but not completed yet may show as past due  Also, please note that scanned in results may not display below  Preventive Screenings:  Service Recommendations Previous Testing/Comments   Colorectal Cancer Screening  * Colonoscopy    * Fecal Occult Blood Test (FOBT)/Fecal Immunochemical Test (FIT)  * Fecal DNA/Cologuard Test  * Flexible Sigmoidoscopy Age: 54-65 years old   Colonoscopy: every 10 years (may be performed more frequently if at higher risk)  OR  FOBT/FIT: every 1 year  OR  Cologuard: every 3 years  OR  Sigmoidoscopy: every 5 years  Screening may be recommended earlier than age 48 if at higher risk for colorectal cancer  Also, an individualized decision between you and your healthcare provider will decide whether screening between the ages of 74-80 would be appropriate  Colonoscopy: 10/07/2016  FOBT/FIT: Not on file  Cologuard: Not on file  Sigmoidoscopy: Not on file    Screening Current     Breast Cancer Screening Age: 36 years old  Frequency: every 1-2 years  Not required if history of left and right mastectomy Mammogram: 05/01/2019    Screening Current   Cervical Cancer Screening Between the ages of 21-29, pap smear recommended once every 3 years  Between the ages of 33-67, can perform pap smear with HPV co-testing every 5 years     Recommendations may differ for women with a history of total hysterectomy, cervical cancer, or abnormal pap smears in past  Pap Smear: 05/22/2019    Screening Not Indicated   Hepatitis C Screening Once for adults born between 1945 and 1965  More frequently in patients at high risk for Hepatitis C Hep C Antibody: Not on file       Diabetes Screening 1-2 times per year if you're at risk for diabetes or have pre-diabetes Fasting glucose: 86 mg/dL   A1C: 5 6 %    Screening Current   Cholesterol Screening Once every 5 years if you don't have a lipid disorder  May order more often based on risk factors  Lipid panel: 12/26/2019    Screening Not Indicated  History Lipid Disorder     Other Preventive Screenings Covered by Medicare:  1  Abdominal Aortic Aneurysm (AAA) Screening: covered once if your at risk  You're considered to be at risk if you have a family history of AAA  2  Lung Cancer Screening: covers low dose CT scan once per year if you meet all of the following conditions: (1) Age 50-69; (2) No signs or symptoms of lung cancer; (3) Current smoker or have quit smoking within the last 15 years; (4) You have a tobacco smoking history of at least 30 pack years (packs per day multiplied by number of years you smoked); (5) You get a written order from a healthcare provider  3  Glaucoma Screening: covered annually if you're considered high risk: (1) You have diabetes OR (2) Family history of glaucoma OR (3)  aged 48 and older OR (3)  American aged 72 and older  3  Osteoporosis Screening: covered every 2 years if you meet one of the following conditions: (1) You're estrogen deficient and at risk for osteoporosis based off medical history and other findings; (2) Have a vertebral abnormality; (3) On glucocorticoid therapy for more than 3 months; (4) Have primary hyperparathyroidism; (5) On osteoporosis medications and need to assess response to drug therapy  · Last bone density test (DXA Scan): Not on file  5  HIV Screening: covered annually if you're between the age of 12-76  Also covered annually if you are younger than 13 and older than 72 with risk factors for HIV infection   For pregnant patients, it is covered up to 3 times per pregnancy  Immunizations:  Immunization Recommendations   Influenza Vaccine Annual influenza vaccination during flu season is recommended for all persons aged >= 6 months who do not have contraindications   Pneumococcal Vaccine (Prevnar and Pneumovax)  * Prevnar = PCV13  * Pneumovax = PPSV23   Adults 25-60 years old: 1-3 doses may be recommended based on certain risk factors  Adults 72 years old: Prevnar (PCV13) vaccine recommended followed by Pneumovax (PPSV23) vaccine  If already received PPSV23 since turning 65, then PCV13 recommended at least one year after PPSV23 dose  Hepatitis B Vaccine 3 dose series if at intermediate or high risk (ex: diabetes, end stage renal disease, liver disease)   Tetanus (Td) Vaccine - COST NOT COVERED BY MEDICARE PART B Following completion of primary series, a booster dose should be given every 10 years to maintain immunity against tetanus  Td may also be given as tetanus wound prophylaxis  Tdap Vaccine - COST NOT COVERED BY MEDICARE PART B Recommended at least once for all adults  For pregnant patients, recommended with each pregnancy  Shingles Vaccine (Shingrix) - COST NOT COVERED BY MEDICARE PART B  2 shot series recommended in those aged 48 and above     Health Maintenance Due:      Topic Date Due    Hepatitis C Screening  1950    MAMMOGRAM  05/01/2020    CRC Screening: Colonoscopy  10/07/2026     Immunizations Due:      Topic Date Due    DTaP,Tdap,and Td Vaccines (1 - Tdap) 06/24/1963    Pneumococcal Vaccine: 65+ Years (2 of 2 - PPSV23) 12/28/2019     Advance Directives   What are advance directives? Advance directives are legal documents that state your wishes and plans for medical care  These plans are made ahead of time in case you lose your ability to make decisions for yourself  Advance directives can apply to any medical decision, such as the treatments you want, and if you want to donate organs  What are the types of advance directives?   There are many types of advance directives, and each state has rules about how to use them  You may choose a combination of any of the following:  · Living will: This is a written record of the treatment you want  You can also choose which treatments you do not want, which to limit, and which to stop at a certain time  This includes surgery, medicine, IV fluid, and tube feedings  · Durable power of  for healthcare [facility]): This is a written record that states who you want to make healthcare choices for you when you are unable to make them for yourself  This person, called a proxy, is usually a family member or a friend  You may choose more than 1 proxy  · Do not resuscitate (DNR) order:  A DNR order is used in case your heart stops beating or you stop breathing  It is a request not to have certain forms of treatment, such as CPR  A DNR order may be included in other types of advance directives  · Medical directive: This covers the care that you want if you are in a coma, near death, or unable to make decisions for yourself  You can list the treatments you want for each condition  Treatment may include pain medicine, surgery, blood transfusions, dialysis, IV or tube feedings, and a ventilator (breathing machine)  · Values history: This document has questions about your views, beliefs, and how you feel and think about life  This information can help others choose the care that you would choose  Why are advance directives important? An advance directive helps you control your care  Although spoken wishes may be used, it is better to have your wishes written down  Spoken wishes can be misunderstood, or not followed  Treatments may be given even if you do not want them  An advance directive may make it easier for your family to make difficult choices about your care  Urinary Incontinence   Urinary incontinence (UI)  is when you lose control of your bladder   UI develops because your bladder cannot store or empty urine properly  The 3 most common types of UI are stress incontinence, urge incontinence, or both  Medicines:   · May be given to help strengthen your bladder control  Report any side effects of medication to your healthcare provider  Do pelvic muscle exercises often:  Your pelvic muscles help you stop urinating  Squeeze these muscles tight for 5 seconds, then relax for 5 seconds  Gradually work up to squeezing for 10 seconds  Do 3 sets of 15 repetitions a day, or as directed  This will help strengthen your pelvic muscles and improve bladder control  Train your bladder:  Go to the bathroom at set times, such as every 2 hours, even if you do not feel the urge to go  You can also try to hold your urine when you feel the urge to go  For example, hold your urine for 5 minutes when you feel the urge to go  As that becomes easier, hold your urine for 10 minutes  Self-care:   · Keep a UI record  Write down how often you leak urine and how much you leak  Make a note of what you were doing when you leaked urine  · Drink liquids as directed  You may need to limit the amount of liquid you drink to help control your urine leakage  Do not drink any liquid right before you go to bed  Limit or do not have drinks that contain caffeine or alcohol  · Prevent constipation  Eat a variety of high-fiber foods  Good examples are high-fiber cereals, beans, vegetables, and whole-grain breads  Walking is the best way to trigger your intestines to have a bowel movement  · Exercise regularly and maintain a healthy weight  Weight loss and exercise will decrease pressure on your bladder and help you control your leakage  · Use a catheter as directed  to help empty your bladder  A catheter is a tiny, plastic tube that is put into your bladder to drain your urine  · Go to behavior therapy as directed  Behavior therapy may be used to help you learn to control your urge to urinate      Weight Management   Why it is important to manage your weight:  Being overweight increases your risk of health conditions such as heart disease, high blood pressure, type 2 diabetes, and certain types of cancer  It can also increase your risk for osteoarthritis, sleep apnea, and other respiratory problems  Aim for a slow, steady weight loss  Even a small amount of weight loss can lower your risk of health problems  How to lose weight safely:  A safe and healthy way to lose weight is to eat fewer calories and get regular exercise  You can lose up about 1 pound a week by decreasing the number of calories you eat by 500 calories each day  Healthy meal plan for weight management:  A healthy meal plan includes a variety of foods, contains fewer calories, and helps you stay healthy  A healthy meal plan includes the following:  · Eat whole-grain foods more often  A healthy meal plan should contain fiber  Fiber is the part of grains, fruits, and vegetables that is not broken down by your body  Whole-grain foods are healthy and provide extra fiber in your diet  Some examples of whole-grain foods are whole-wheat breads and pastas, oatmeal, brown rice, and bulgur  · Eat a variety of vegetables every day  Include dark, leafy greens such as spinach, kale, eduardo greens, and mustard greens  Eat yellow and orange vegetables such as carrots, sweet potatoes, and winter squash  · Eat a variety of fruits every day  Choose fresh or canned fruit (canned in its own juice or light syrup) instead of juice  Fruit juice has very little or no fiber  · Eat low-fat dairy foods  Drink fat-free (skim) milk or 1% milk  Eat fat-free yogurt and low-fat cottage cheese  Try low-fat cheeses such as mozzarella and other reduced-fat cheeses  · Choose meat and other protein foods that are low in fat  Choose beans or other legumes such as split peas or lentils  Choose fish, skinless poultry (chicken or turkey), or lean cuts of red meat (beef or pork)   Before you cook meat or poultry, cut off any visible fat  · Use less fat and oil  Try baking foods instead of frying them  Add less fat, such as margarine, sour cream, regular salad dressing and mayonnaise to foods  Eat fewer high-fat foods  Some examples of high-fat foods include french fries, doughnuts, ice cream, and cakes  · Eat fewer sweets  Limit foods and drinks that are high in sugar  This includes candy, cookies, regular soda, and sweetened drinks  Exercise:  Exercise at least 30 minutes per day on most days of the week  Some examples of exercise include walking, biking, dancing, and swimming  You can also fit in more physical activity by taking the stairs instead of the elevator or parking farther away from stores  Ask your healthcare provider about the best exercise plan for you  © Copyright [employer] 2018 Information is for End User's use only and may not be sold, redistributed or otherwise used for commercial purposes   All illustrations and images included in CareNotes® are the copyrighted property of [employer]  or [address] [place] [address]

## 2020-01-06 ENCOUNTER — OFFICE VISIT (OUTPATIENT)
Dept: PHYSICAL THERAPY | Facility: REHABILITATION | Age: 70
End: 2020-01-06
Payer: COMMERCIAL

## 2020-01-06 DIAGNOSIS — Z96.641 STATUS POST TOTAL REPLACEMENT OF RIGHT HIP: Primary | ICD-10-CM

## 2020-01-06 DIAGNOSIS — M16.11 PRIMARY OSTEOARTHRITIS OF ONE HIP, RIGHT: ICD-10-CM

## 2020-01-06 PROCEDURE — 97112 NEUROMUSCULAR REEDUCATION: CPT | Performed by: PHYSICAL THERAPIST

## 2020-01-06 PROCEDURE — 97110 THERAPEUTIC EXERCISES: CPT | Performed by: PHYSICAL THERAPIST

## 2020-01-06 PROCEDURE — 97140 MANUAL THERAPY 1/> REGIONS: CPT | Performed by: PHYSICAL THERAPIST

## 2020-01-06 NOTE — PROGRESS NOTES
Daily Note     Today's date: 2020  Patient name: Maricruz Sow  : 1950  MRN: [id number]  Referring provider: Geoff Guevara MD  Dx:   Encounter Diagnosis     ICD-10-CM    1  Status post total replacement of right hip Z96 641    2  Primary osteoarthritis of one hip, right M16 11                   Subjective: Patient reports her hip is feeling good, but is c/o right knee pain  She had some hip pain last night after playing with her dog but that resolved by this morning  Objective: See treatment diary below      Assessment: Tolerated treatment well  Patient demonstrated good tolerance for exercises  She was challenged with lateral step ups, reporting a minor increase in knee pain  Continues to demonstrate lateral hip weakness during sidelying clamshells  Plan: Continue therapy as tolerated per Plan of Care  Rowdy Pina PT       Precautions: OA, Fibromyalgia, ankylosing spondylitis, spinal stenosis, lumbar fusion, follow hip precautions for posterior approach (No flexion past 90*, adduction or IR)        Manual       R Hip PROM within precautions (No flex past 90*, ADD, or IR)  AK   10 mins  TP 10 mins  CR  10 mins  CR  10 mins CR  10 mins CR  10 mins KP 8' AK 10' KY 10' TB 10 min                             Exercise Diary   6    Bike or Nustep  Nu L4 10 mins  Nu L4 x10'  Nu L5  10 mins  Nu L5  10 mins Nu L5  10 mins Nu L5  10 mins NS L6 10' NS L6  10' NS L6  10' NS L6 10min    Heel slides  10"x10  10"x10  10"x10  10"x10 10"x10 NP np np 10"x10     SLR  2x10  2x10  2x10  2x10 2x10 2x10 2x15 2x15 2x 15 2x15    bridges  3"x10  3"x10  OTB  3"x10  OTB  3"x12 OTB  3"x15 OTB  3"x15 15x3" otb 15x3" OTB 20x3"  OTB     Clamshells w/pillow  10  15  15  15 3"x15 3"x15 15x3" 3"x20 np 20x3s    Side stepping  3 laps OTB  3 laps OTB  OTB  3 laps  OTB  3 laps GTB  3 laps GTB  3 laps 3 laps gtb 4 laps  gtb 4 laps gtb 4 laps gtb    Standing hip 3 way  B/L 15 ea   b/l x15ea  B/L  15 ea   1#  B/L  15 ea  1#  B/L  2x10 ea  1#  B/L  2x10  Ea  15x ea 2# b/l 10x ea  3# b/l 15x ea  3# 15x ea 3#    Heel raises  20  20  30  30 3x10 3x10 np 3x10 3x10     Step ups  8" fwd 1x10 up with good   6" bad first  6" fwd x10; 4" lat x10  6" fwd x 10;  4" lat x10  6" fwd  x10; 4" lat x10 6" x 10 ea  6" x 10 ea  15x fsu/lsu 6" 15x FSU/LSU 6" Knee Pain 15x FSU/LSS 6"    laq  5"x20  5'x20  5"x30  2#  5"x20 2#  5"x30 2#  5"x30 30x5" 2# 3# 5"x30 No wgt knee pain x30 30x no weight    HS curls  np  2x10  B/L  30  1#  B/L 30 ea  2#  B/L  30 ea  2#  B/L  30 ea  30x ea b/l 2# 20x ea b/l 3# 20x ea  B/L 3# 20x ea B/L 3#    Gait training PRN  np    NP  NP          Biodex: LOS    static med diff x2  static  Med diff  x3 Med diff  Static:58%  L12: 70%  L11: 92%   Med diff  Static: 85%  L12: 93%  L11: 94% Med diff:  L12: 89%  L11:   92%  L10:  94% 3x, L12, L11, L10 3x L12 93%, L11 96%, L10 95%  With UE np np    Biodex:  RC                   weight shifting  5"x2min  np  NP  NP           STS  hi low No UE 10  hi low No UE x10  hi low  No UE  21 in  2x10  hi low  No UE  21 in  2x10 Hi low  No UE  21 in    2x10 Hi Low  No UE  2x15 np Hi Low no UE 2x15 2x15 nv     mini squats          10 15x 15x 15x 15x                             Modalities     11/14  11/18  11/20 11/25 11/27 12/2 12/9 12/11 12/16 12/18   CP PRN    10 mins    10 mins  np  10'  np  home  use  home use  home use Home use

## 2020-01-08 ENCOUNTER — OFFICE VISIT (OUTPATIENT)
Dept: PHYSICAL THERAPY | Facility: REHABILITATION | Age: 70
End: 2020-01-08
Payer: COMMERCIAL

## 2020-01-08 DIAGNOSIS — Z96.641 STATUS POST TOTAL REPLACEMENT OF RIGHT HIP: Primary | ICD-10-CM

## 2020-01-08 PROCEDURE — 97140 MANUAL THERAPY 1/> REGIONS: CPT | Performed by: PHYSICAL THERAPIST

## 2020-01-08 PROCEDURE — 97110 THERAPEUTIC EXERCISES: CPT | Performed by: PHYSICAL THERAPIST

## 2020-01-08 NOTE — PROGRESS NOTES
Daily Note     Today's date: 2020  Patient name: Lizabeth Cordon  : 1950  MRN: [id number]  Referring provider: Kane Stewart MD  Dx:   Encounter Diagnosis     ICD-10-CM    1  Status post total replacement of right hip Z96 641         1on1 for 30 mins and performed remaining TE's as part of IEP  Subjective: Pt reports some stiffness today with the colder weather  Objective: See treatment diary below      Assessment: Tolerated treatment well  Patient challenged with step ups leading with R LE, but does improve with repeititon  No pain noted with TE performance  Hip PROM progressing well  Plan: Progress treatment as tolerated  Precautions: OA, Fibromyalgia, ankylosing spondylitis, spinal stenosis, lumbar fusion, follow hip precautions for posterior approach (No flexion past 90*, adduction or IR)  Manual      R Hip PROM within precautions (No flex past 90*, ADD, or IR)  AK   10 mins  TP 10 mins  CR  10 mins  CR  10 mins CR  10 mins CR  10 mins KP 8' AK 10' KY 10' TB 10 min TP 10  mins                            Exercise Diary      Bike or Nustep  Nu L4 10 mins  Nu L4 x10'  Nu L5  10 mins  Nu L5  10 mins Nu L5  10 mins Nu L5  10 mins NS L6 10' NS L6  10' NS L6  10' NS L6 10min L6x10'   Heel slides  10"x10  10"x10  10"x10  10"x10 10"x10 NP np np 10"x10  dc   SLR  2x10  2x10  2x10  2x10 2x10 2x10 2x15 2x15 2x 15 2x15 2x15   bridges  3"x10  3"x10  OTB  3"x10  OTB  3"x12 OTB  3"x15 OTB  3"x15 15x3" otb 15x3" OTB 20x3"  OTB  3"x20 otb   Clamshells w/pillow  10  15  15  15 3"x15 3"x15 15x3" 3"x20 np 20x3s 3"x20   Side stepping  3 laps OTB  3 laps OTB  OTB  3 laps  OTB  3 laps GTB  3 laps GTB  3 laps 3 laps gtb 4 laps  gtb 4 laps gtb 4 laps gtb 4 laps   Standing hip 3 way  B/L 15 ea   b/l x15ea  B/L  15 ea   1#  B/L  15 ea  1#  B/L  2x10 ea  1#  B/L  2x10  Ea  15x ea 2# b/l 10x ea  3# b/l 15x ea  3# 15x ea 3# 3# x15ea b/l   Heel raises  20  20  30  30 3x10 3x10 np 3x10 3x10  x30   Step ups  8" fwd 1x10 up with good   6" bad first  6" fwd x10; 4" lat x10  6" fwd x 10;  4" lat x10  6" fwd  x10; 4" lat x10 6" x 10 ea  6" x 10 ea  15x fsu/lsu 6" 15x FSU/LSU 6" Knee Pain 15x FSU/LSS 6" 6" x15 ea fwd/lat   laq  5"x20  5'x20  5"x30  2#  5"x20 2#  5"x30 2#  5"x30 30x5" 2# 3# 5"x30 No wgt knee pain x30 30x no weight x30   HS curls  np  2x10  B/L  30  1#  B/L 30 ea  2#  B/L  30 ea  2#  B/L  30 ea  30x ea b/l 2# 20x ea b/l 3# 20x ea  B/L 3# 20x ea B/L 3# 3# x20   Gait training PRN  np    NP  NP          Biodex: LOS    static med diff x2  static  Med diff  x3 Med diff  Static:58%  L12: 70%  L11: 92%   Med diff  Static: 85%  L12: 93%  L11: 94% Med diff:  L12: 89%  L11:   92%  L10:  94% 3x, L12, L11, L10 3x L12 93%, L11 96%, L10 95%  With UE np np np   Biodex:  RC                   weight shifting  5"x2min  np  NP  NP           STS  hi low No UE 10  hi low No UE x10  hi low  No UE  21 in  2x10  hi low  No UE  21 in  2x10 Hi low  No UE  21 in    2x10 Hi Low  No UE  2x15 np Hi Low no UE 2x15 2x15 nv 2x15    mini squats          10 15x 15x 15x 15x x15                            Modalities     11/14 11/18 11/20 11/25 11/27 12/2 12/9 12/11 12/16 12/18   CP PRN    10 mins    10 mins  np  10'  np  home  use  home use  home use Home use

## 2020-01-13 ENCOUNTER — OFFICE VISIT (OUTPATIENT)
Dept: PHYSICAL THERAPY | Facility: REHABILITATION | Age: 70
End: 2020-01-13
Payer: COMMERCIAL

## 2020-01-13 DIAGNOSIS — M16.11 PRIMARY OSTEOARTHRITIS OF ONE HIP, RIGHT: ICD-10-CM

## 2020-01-13 DIAGNOSIS — Z96.641 STATUS POST TOTAL REPLACEMENT OF RIGHT HIP: Primary | ICD-10-CM

## 2020-01-13 PROCEDURE — 97110 THERAPEUTIC EXERCISES: CPT

## 2020-01-13 PROCEDURE — 97140 MANUAL THERAPY 1/> REGIONS: CPT

## 2020-01-13 PROCEDURE — 97112 NEUROMUSCULAR REEDUCATION: CPT

## 2020-01-13 NOTE — PROGRESS NOTES
Daily Note     Today's date: 2020  Patient name: Vicki Delgado  : 1950  MRN: [id number]  Referring provider: Kate Sandoval MD  Dx:   Encounter Diagnosis     ICD-10-CM    1  Status post total replacement of right hip Z96 641    2  Primary osteoarthritis of one hip, right M16 11         1:1 with PTA CR 4:10- 5:00  Unbilled 5:00-5:05  Subjective: Knee continues to limit ability to navigate steps  Hip " feels wonderful "   Next follow up on 20  Objective: See treatment diary below      Assessment: Tolerated treatment well  Less use of UE with step ups with more fluent motion observed  Patient would benefit from continued PT      Plan: Progress treatment as tolerated  Precautions: OA, Fibromyalgia, ankylosing spondylitis, spinal stenosis, lumbar fusion, follow hip precautions for posterior approach (No flexion past 90*, adduction or IR)  Manual      R Hip PROM within precautions (No flex past 90*, ADD, or IR)  TP 10 mins  CR  10 mins  CR  10 mins CR  10 mins CR  10 mins KP 8' AK 10' KY 10' TB 10 min TP 10  mins CR  10 mins                           Exercise Diary      Bike or Nustep  Nu L4 x10'  Nu L5  10 mins  Nu L5  10 mins Nu L5  10 mins Nu L5  10 mins NS L6 10' NS L6  10' NS L6  10' NS L6 10min L6x10' L6  10 mins   Heel slides  10"x10  10"x10  10"x10 10"x10 NP np np 10"x10  dc --   SLR  2x10  2x10  2x10 2x10 2x10 2x15 2x15 2x 15 2x15 2x15 2x15   bridges  3"x10  OTB  3"x10  OTB  3"x12 OTB  3"x15 OTB  3"x15 15x3" otb 15x3" OTB 20x3"  OTB  3"x20 otb OTB  3"x20   Clamshells w/pillow  15  15  15 3"x15 3"x15 15x3" 3"x20 np 20x3s 3"x20 3"   2x10   Side stepping  3 laps OTB  OTB  3 laps  OTB  3 laps GTB  3 laps GTB  3 laps 3 laps gtb 4 laps  gtb 4 laps gtb 4 laps gtb 4 laps GTB  4 laps   Standing hip 3 way  b/l x15ea  B/L  15 ea   1#  B/L  15 ea   1#  B/L  2x10 ea  1#  B/L  2x10  Ea  15x ea 2# b/l 10x ea  3# b/l 15x ea  3# 15x ea 3# 3# x15ea b/l 3#  B/L  2x10 ea  Heel raises  20  30  30 3x10 3x10 np 3x10 3x10  x30 30   Step ups  6" fwd x10; 4" lat x10  6" fwd x 10;  4" lat x10  6" fwd  x10; 4" lat x10 6" x 10 ea  6" x 10 ea  15x fsu/lsu 6" 15x FSU/LSU 6" Knee Pain 15x FSU/LSS 6" 6" x15 ea fwd/lat 6"  Fwd/lat  15 ea      laq  5'x20  5"x30  2#  5"x20 2#  5"x30 2#  5"x30 30x5" 2# 3# 5"x30 No wgt knee pain x30 30x no weight x30 0#  5"x30   HS curls  2x10  B/L  30  1#  B/L 30 ea  2#  B/L  30 ea  2#  B/L  30 ea  30x ea b/l 2# 20x ea b/l 3# 20x ea  B/L 3# 20x ea B/L 3# 3# x20 3#  20 ea  Biodex: LOS  static med diff x2  static  Med diff  x3 Med diff  Static:58%  L12: 70%  L11: 92%   Med diff  Static: 85%  L12: 93%  L11: 94% Med diff:  L12: 89%  L11:   92%  L10:  94% 3x, L12, L11, L10 3x L12 93%, L11 96%, L10 95%  With UE np np np NP    STS  hi low No UE x10  hi low  No UE  21 in  2x10  hi low  No UE  21 in  2x10 Hi low  No UE  21 in    2x10 Hi Low  No UE  2x15 np Hi Low no UE 2x15 2x15 nv 2x15 2x15    mini squats        10 15x 15x 15x 15x x15 15                           Modalities     11/14 11/18 11/20 11/25 11/27 12/2 12/9 12/11 12/16 12/18   CP PRN    10 mins    10 mins  np  10'  np  home  use  home use  home use Home use

## 2020-01-15 ENCOUNTER — OFFICE VISIT (OUTPATIENT)
Dept: PHYSICAL THERAPY | Facility: REHABILITATION | Age: 70
End: 2020-01-15
Payer: COMMERCIAL

## 2020-01-15 DIAGNOSIS — Z96.641 STATUS POST TOTAL REPLACEMENT OF RIGHT HIP: Primary | ICD-10-CM

## 2020-01-15 PROCEDURE — 97110 THERAPEUTIC EXERCISES: CPT | Performed by: PHYSICAL THERAPIST

## 2020-01-15 PROCEDURE — 97140 MANUAL THERAPY 1/> REGIONS: CPT | Performed by: PHYSICAL THERAPIST

## 2020-01-15 PROCEDURE — 97112 NEUROMUSCULAR REEDUCATION: CPT | Performed by: PHYSICAL THERAPIST

## 2020-01-15 NOTE — PROGRESS NOTES
Daily Note     Today's date: 1/15/2020  Patient name: Fletcher Patricio  : 1950  MRN: [id number]  Referring provider: Kalani Brown MD  Dx:   Encounter Diagnosis     ICD-10-CM    1  Status post total replacement of right hip Z96 641            1on1 with PT, -445, -500 and PT, JAYCEE [phone]  Subjective: Pt offers no complaints with her hip  Objective: See treatment diary below      Assessment: Tolerated treatment well  Patient continues to have some difficulty with sit to stand transfers due to quad weakness/knee pain and does not feel comfortable performing steps in reciprocal fashion at this time due to her knee  Plan: Continue per plan of care  Progress towards d/c at end of the month  Precautions: OA, Fibromyalgia, ankylosing spondylitis, spinal stenosis, lumbar fusion, follow hip precautions for posterior approach (No flexion past 90*, adduction or IR)  Manual   12/9  12/11 12/16 12/18 12/23 12/26 12/30 1/6 1/8 1/13 1/15   R Hip PROM within precautions (No flex past 90*, ADD, or IR)  CR  10 mins  CR  10 mins CR  10 mins CR  10 mins KP 8' AK 10' KY 10' TB 10 min TP 10  mins CR  10 mins TP 10 mins                          Exercise Diary   12/9  12/11 12/16 12/18 12/23 12/26 12/30 1/6 1/8 1/13 1/15   Bike or Nustep  Nu L5  10 mins  Nu L5  10 mins Nu L5  10 mins Nu L5  10 mins NS L6 10' NS L6  10' NS L6  10' NS L6 10min L6x10' L6  10 mins L6x10'   Heel slides  10"x10  10"x10 10"x10 NP np np 10"x10  dc --    SLR  2x10  2x10 2x10 2x10 2x15 2x15 2x 15 2x15 2x15 2x15 2x15   bridges  OTB  3"x10  OTB  3"x12 OTB  3"x15 OTB  3"x15 15x3" otb 15x3" OTB 20x3"  OTB  3"x20 otb OTB  3"x20 gtb 3"x20   Clamshells w/pillow  15  15 3"x15 3"x15 15x3" 3"x20 np 20x3s 3"x20 3"   2x10 gtb 3" x20   Side stepping  OTB  3 laps  OTB  3 laps GTB  3 laps GTB  3 laps 3 laps gtb 4 laps  gtb 4 laps gtb 4 laps gtb 4 laps GTB  4 laps GTB  4 laps   Standing hip 3 way  B/L  15 ea   1#  B/L  15 ea  1#  B/L  2x10 ea  1#  B/L  2x10  Ea  15x ea 2# b/l 10x ea  3# b/l 15x ea  3# 15x ea 3# 3# x15ea b/l 3#  B/L  2x10 ea  3#  B/L  2x10 ea  Heel raises  30  30 3x10 3x10 np 3x10 3x10  x30 30 30   Step ups  6" fwd x 10;  4" lat x10  6" fwd  x10; 4" lat x10 6" x 10 ea  6" x 10 ea  15x fsu/lsu 6" 15x FSU/LSU 6" Knee Pain 15x FSU/LSS 6" 6" x15 ea fwd/lat 6"  Fwd/lat  15 ea  6"  Fwd/lat  15 ea    laq  5"x30  2#  5"x20 2#  5"x30 2#  5"x30 30x5" 2# 3# 5"x30 No wgt knee pain x30 30x no weight x30 0#  5"x30 0#  5"x30   HS curls  B/L  30  1#  B/L 30 ea  2#  B/L  30 ea  2#  B/L  30 ea  30x ea b/l 2# 20x ea b/l 3# 20x ea  B/L 3# 20x ea B/L 3# 3# x20 3#  20 ea  3#  20 ea  Biodex: LOS  static  Med diff  x3 Med diff  Static:58%  L12: 70%  L11: 92%   Med diff  Static: 85%  L12: 93%  L11: 94% Med diff:  L12: 89%  L11:   92%  L10:  94% 3x, L12, L11, L10 3x L12 93%, L11 96%, L10 95%  With UE np np np NP NP    STS  hi low  No UE  21 in  2x10  hi low  No UE  21 in  2x10 Hi low  No UE  21 in    2x10 Hi Low  No UE  2x15 np Hi Low no UE 2x15 2x15 nv 2x15 2x15 Chair  1x8  No UE    mini squats      10 15x 15x 15x 15x x15 15 2x15                          Modalities     11/14 11/18 11/20 11/25 11/27 12/2 12/9 12/11 12/16 12/18   CP PRN    10 mins    10 mins  np  10'  np  home  use  home use  home use Home use

## 2020-01-20 ENCOUNTER — OFFICE VISIT (OUTPATIENT)
Dept: PHYSICAL THERAPY | Facility: REHABILITATION | Age: 70
End: 2020-01-20
Payer: COMMERCIAL

## 2020-01-20 DIAGNOSIS — M16.11 PRIMARY OSTEOARTHRITIS OF ONE HIP, RIGHT: ICD-10-CM

## 2020-01-20 DIAGNOSIS — Z96.641 STATUS POST TOTAL REPLACEMENT OF RIGHT HIP: Primary | ICD-10-CM

## 2020-01-20 PROCEDURE — 97140 MANUAL THERAPY 1/> REGIONS: CPT

## 2020-01-20 PROCEDURE — 97530 THERAPEUTIC ACTIVITIES: CPT

## 2020-01-20 PROCEDURE — 97110 THERAPEUTIC EXERCISES: CPT

## 2020-01-20 PROCEDURE — 97112 NEUROMUSCULAR REEDUCATION: CPT

## 2020-01-20 NOTE — PROGRESS NOTES
Daily Note     Today's date: 2020  Patient name: Corinne Roup  : 1950  MRN: [id number]  Referring provider: Jay Torres MD  Dx:   Encounter Diagnosis     ICD-10-CM    1  Status post total replacement of right hip Z96 641    2  Primary osteoarthritis of one hip, right M16 11            1:1 with PTA CR 4:15- 5:25  Subjective: Very fatigued after progressions last session but denies joint/muscle pain  Able to sit down onto her toilet with slight UE  ( A ) but unable to transfer up from  Objective: See treatment diary below      Assessment: Tolerated treatment well  Discussed self scar massage at home to improve mobility and desensitize  Able to progress repetitions and resistance again today  Eccentric quad strengthening most challenging which is evident with STS  Plan: Continue per plan of care  Progress towards d/c at end of the month  Precautions: OA, Fibromyalgia, ankylosing spondylitis, spinal stenosis, lumbar fusion, follow hip precautions for posterior approach (No flexion past 90*, adduction or IR)        Manual   12/11 12/16 12/18 12/23 12/26 12/30 1/6 1/8 1/13 1/15 1/20   R Hip PROM within precautions (No flex past 90*, ADD, or IR)  CR  10 mins CR  10 mins CR  10 mins KP 8' AK 10' KY 10' TB 10 min TP 10  mins CR  10 mins TP 10 mins CR  10 mins                         Exercise Diary   12/11 12/16 12/18 12/23 12/26 12/30 1/6 1/8 1/13 1/15 1/20   Bike or Nustep  Nu L5  10 mins Nu L5  10 mins Nu L5  10 mins NS L6 10' NS L6  10' NS L6  10' NS L6 10min L6x10' L6  10 mins L6x10' L7  10 mins   Heel slides  10"x10 10"x10 NP np np 10"x10  dc --     SLR  2x10 2x10 2x10 2x15 2x15 2x 15 2x15 2x15 2x15 2x15 2x15   bridges  OTB  3"x12 OTB  3"x15 OTB  3"x15 15x3" otb 15x3" OTB 20x3"  OTB  3"x20 otb OTB  3"x20 gtb 3"x20 GTB  3"x20   Clamshells w/pillow  15 3"x15 3"x15 15x3" 3"x20 np 20x3s 3"x20 3"   2x10 gtb 3" x20 GTB  3"x20   Side stepping  OTB  3 laps GTB  3 laps GTB  3 laps 3 laps gtb 4 laps  gtb 4 laps gtb 4 laps gtb 4 laps GTB  4 laps GTB  4 laps BTB  4 laps   Standing hip 3 way  1#  B/L  15 ea  1#  B/L  2x10 ea  1#  B/L  2x10  Ea  15x ea 2# b/l 10x ea  3# b/l 15x ea  3# 15x ea 3# 3# x15ea b/l 3#  B/L  2x10 ea  3#  B/L  2x10 ea  3#  B/L  2x10 ea  Heel raises  30 3x10 3x10 np 3x10 3x10  x30 30 30 30   Step ups  6" fwd  x10; 4" lat x10 6" x 10 ea  6" x 10 ea  15x fsu/lsu 6" 15x FSU/LSU 6" Knee Pain 15x FSU/LSS 6" 6" x15 ea fwd/lat 6"  Fwd/lat  15 ea  6"  Fwd/lat  15 ea  6#  Fwd/lat  17 ea    laq  2#  5"x20 2#  5"x30 2#  5"x30 30x5" 2# 3# 5"x30 No wgt knee pain x30 30x no weight x30 0#  5"x30 0#  5"x30 0#  5"x30   HS curls  1#  B/L 30 ea  2#  B/L  30 ea  2#  B/L  30 ea  30x ea b/l 2# 20x ea b/l 3# 20x ea  B/L 3# 20x ea B/L 3# 3# x20 3#  20 ea  3#  20 ea  3#  20 ea     STS  hi low  No UE  21 in  2x10 Hi low  No UE  21 in    2x10 Hi Low  No UE  2x15 np Hi Low no UE 2x15 2x15 nv 2x15 2x15 Chair  1x8  No UE Chair  No UE  1x10    mini squats    10 15x 15x 15x 15x x15 15 2x15 2x15                         Modalities     11/14 11/18 11/20 11/25 11/27  12/2 12/9 12/11 12/16 12/18   CP PRN    10 mins    10 mins  np  10'  np  home  use  home use  home use Home use

## 2020-01-22 ENCOUNTER — OFFICE VISIT (OUTPATIENT)
Dept: PHYSICAL THERAPY | Facility: REHABILITATION | Age: 70
End: 2020-01-22
Payer: COMMERCIAL

## 2020-01-22 DIAGNOSIS — M16.11 PRIMARY OSTEOARTHRITIS OF ONE HIP, RIGHT: ICD-10-CM

## 2020-01-22 DIAGNOSIS — Z96.641 STATUS POST TOTAL REPLACEMENT OF RIGHT HIP: Primary | ICD-10-CM

## 2020-01-22 PROCEDURE — 97140 MANUAL THERAPY 1/> REGIONS: CPT

## 2020-01-22 PROCEDURE — 97110 THERAPEUTIC EXERCISES: CPT

## 2020-01-22 PROCEDURE — 97530 THERAPEUTIC ACTIVITIES: CPT

## 2020-01-22 PROCEDURE — 97112 NEUROMUSCULAR REEDUCATION: CPT

## 2020-01-22 NOTE — PROGRESS NOTES
Daily Note     Today's date: 2020  Patient name: Maximiliano Reyes  : 1950  MRN: [id number]  Referring provider: Kenyon Carrera MD  Dx:   Encounter Diagnosis     ICD-10-CM    1  Status post total replacement of right hip Z96 641    2  Primary osteoarthritis of one hip, right M16 11            1:1 with PTA CR 4:10-5:10  Subjective: Sore knee after progressing resistance on NuStep last session; no issues with hip  Tired today after not sleeping well last night  Less hypersensitivity along incision  Objective: See treatment diary below      Assessment: Tolerated treatment well  Able to add 4" lateral step downs; assess how knee responds and D/C if patient experiences too much pain  Two more visits then D/C to independent HEP  Plan: Continue per plan of care  Precautions: OA, Fibromyalgia, ankylosing spondylitis, spinal stenosis, lumbar fusion, follow hip precautions for posterior approach (No flexion past 90*, adduction or IR)        Manual  12/16 12/18 12/23 12/26 12/30 1/6 1/8 1/13 1/15 1/20 1/22   R Hip PROM within precautions (No flex past 90*, ADD, or IR) CR  10 mins CR  10 mins KP 8' AK 10' KY 10' TB 10 min TP 10  mins CR  10 mins TP 10 mins CR  10 mins CR  10 mins                        Exercise Diary  12/16 12/18 12/23 12/26 12/30 1/6 1/8 1/13 1/15 1/20 1/22   Bike or Nustep Nu L5  10 mins Nu L5  10 mins NS L6 10' NS L6  10' NS L6  10' NS L6 10min L6x10' L6  10 mins L6x10' L7  10 mins L6  10 mins   Heel slides 10"x10 NP np np 10"x10  dc --      SLR 2x10 2x10 2x15 2x15 2x 15 2x15 2x15 2x15 2x15 2x15 2x15   bridges OTB  3"x15 OTB  3"x15 15x3" otb 15x3" OTB 20x3"  OTB  3"x20 otb OTB  3"x20 gtb 3"x20 GTB  3"x20 GTB  3"x20   Clamshells w/pillow 3"x15 3"x15 15x3" 3"x20 np 20x3s 3"x20 3"   2x10 gtb 3" x20 GTB  3"x20 GTB  3"x20   Side stepping GTB  3 laps GTB  3 laps 3 laps gtb 4 laps  gtb 4 laps gtb 4 laps gtb 4 laps GTB  4 laps GTB  4 laps BTB  4 laps BTB  4 laps   Standing hip 3 way 1#  B/L  2x10 ea  1#  B/L  2x10  Ea  15x ea 2# b/l 10x ea  3# b/l 15x ea  3# 15x ea 3# 3# x15ea b/l 3#  B/L  2x10 ea  3#  B/L  2x10 ea  3#  B/L  2x10 ea  3#  B/L  2x10 ea  Heel raises 3x10 3x10 np 3x10 3x10  x30 30 30 30 30   Step ups 6" x 10 ea  6" x 10 ea  15x fsu/lsu 6" 15x FSU/LSU 6" Knee Pain 15x FSU/LSS 6" 6" x15 ea fwd/lat 6"  Fwd/lat  15 ea  6"  Fwd/lat  15 ea  6#  Fwd/lat  17 ea  6#  Fwd/lat  17 ea    laq 2#  5"x30 2#  5"x30 30x5" 2# 3# 5"x30 No wgt knee pain x30 30x no weight x30 0#  5"x30 0#  5"x30 0#  5"x30 0#  5"x30   HS curls 2#  B/L  30 ea  2#  B/L  30 ea  30x ea b/l 2# 20x ea b/l 3# 20x ea  B/L 3# 20x ea B/L 3# 3# x20 3#  20 ea  3#  20 ea  3#  20 ea  3#  20 ea     STS Hi low  No UE  21 in    2x10 Hi Low  No UE  2x15 np Hi Low no UE 2x15 2x15 nv 2x15 2x15 Chair  1x8  No UE Chair  No UE  1x10 Chair  No UE  1x10    mini squats  10 15x 15x 15x 15x x15 15 2x15 2x15 2x15    lateral step down           4"  x10         Modalities     11/14 11/18 11/20 11/25 11/27  12/2 12/9 12/11 12/16 12/18   CP PRN    10 mins    10 mins  np  10'  np  home  use  home use  home use Home use

## 2020-01-26 DIAGNOSIS — M25.561 ACUTE PAIN OF RIGHT KNEE: ICD-10-CM

## 2020-01-27 ENCOUNTER — OFFICE VISIT (OUTPATIENT)
Dept: PHYSICAL THERAPY | Facility: REHABILITATION | Age: 70
End: 2020-01-27
Payer: COMMERCIAL

## 2020-01-27 DIAGNOSIS — Z96.641 STATUS POST TOTAL REPLACEMENT OF RIGHT HIP: Primary | ICD-10-CM

## 2020-01-27 DIAGNOSIS — M16.11 PRIMARY OSTEOARTHRITIS OF ONE HIP, RIGHT: ICD-10-CM

## 2020-01-27 PROCEDURE — 97110 THERAPEUTIC EXERCISES: CPT

## 2020-01-27 PROCEDURE — 97530 THERAPEUTIC ACTIVITIES: CPT

## 2020-01-27 PROCEDURE — 97140 MANUAL THERAPY 1/> REGIONS: CPT

## 2020-01-27 PROCEDURE — 97112 NEUROMUSCULAR REEDUCATION: CPT

## 2020-01-29 ENCOUNTER — OFFICE VISIT (OUTPATIENT)
Dept: PHYSICAL THERAPY | Facility: REHABILITATION | Age: 70
End: 2020-01-29
Payer: COMMERCIAL

## 2020-01-29 DIAGNOSIS — Z96.641 AFTERCARE FOLLOWING RIGHT HIP JOINT REPLACEMENT SURGERY: Primary | ICD-10-CM

## 2020-01-29 DIAGNOSIS — Z96.641 STATUS POST TOTAL REPLACEMENT OF RIGHT HIP: Primary | ICD-10-CM

## 2020-01-29 DIAGNOSIS — Z47.1 AFTERCARE FOLLOWING RIGHT HIP JOINT REPLACEMENT SURGERY: Primary | ICD-10-CM

## 2020-01-29 PROCEDURE — 97530 THERAPEUTIC ACTIVITIES: CPT | Performed by: PHYSICAL THERAPIST

## 2020-01-29 PROCEDURE — 97140 MANUAL THERAPY 1/> REGIONS: CPT | Performed by: PHYSICAL THERAPIST

## 2020-01-29 PROCEDURE — 97110 THERAPEUTIC EXERCISES: CPT | Performed by: PHYSICAL THERAPIST

## 2020-01-29 PROCEDURE — 97112 NEUROMUSCULAR REEDUCATION: CPT | Performed by: PHYSICAL THERAPIST

## 2020-01-29 NOTE — PROGRESS NOTES
PT Discharge    Today's date: 2020  Patient name: Leslee Medrano  : 1950  MRN: [id number]  Referring provider: Jaymie Jovel MD  Dx:   Encounter Diagnosis     ICD-10-CM    1  Status post total replacement of right hip Z96 641                   Assessment  Assessment details: Pt has progressed very well, demonstrating improvement in hip ROM, LE strength, and function since starting therapy  Pt denies hip pain with all functional tasks  Pt feels ready to be d/c to ongoing HEP at this time  Pt demonstrates a good understanding of TE's to perform HEP  Impairments: impaired physical strength  Other impairment: right knee pain  Understanding of Dx/Px/POC: good   Prognosis: good    Goals  Short Term:  Pt will report decreased levels of pain by at least 2 subjective ratings in 4 weeks- met  Pt will demonstrate improved ROM by at least 10 degrees in 4 weeks- met  Pt will demonstrate improved strength by 1/2 grade MMT in 4 weeks- met  Long Term:   Pt will be independent in their HEP in 8 weeks- met  Pt will demonstrate improved FOTO, > 58 - met (58)  Pt will be independent with all ADL's- met  Pt will be able to ambulate community distances with AD PRN  - met  Pt will perform steps in reciprocal pattern without hip pain- not met due to severe knee OA    Plan  Plan details: Patient was educated in [employer]  All questions were answered to pt's satisfaction  Planned therapy interventions: home exercise program  Treatment plan discussed with: patient        Subjective Evaluation    History of Present Illness  Mechanism of injury: Pt reports her hip has been doing well without c/o pain  Pt denies any functional limitations at this time due to her hip  Pt's chief complaint is that of R knee pain due to OA  Pt has knee pain and difficulty with sit to stand transfers from a lower seat  Pt continues to perform steps in step to manner due to her knee  Pt's ambulatory tolerance has improved    Pt takes her [facility] along when ambulating outdoors and is not using assistive device when indoors  Pt complying with HEP to good tolerance  Pain  At best pain ratin  At worst pain ratin  Location: R knee    Treatments  Current treatment: physical therapy  Patient Goals  Patient goals for therapy: decreased pain, increased motion, increased strength, improved balance and independence with ADLs/IADLs          Objective     Passive Range of Motion     Right Hip   Flexion: 90 degrees   Abduction: WFL  External rotation (90/90): [facility]    Strength/Myotome Testing     Right Hip   Planes of Motion   Flexion: 5  Abduction: 4+  External rotation: 4+    Right Knee   Flexion: 5  Extension: 5             Precautions: OA, Fibromyalgia, ankylosing spondylitis, spinal stenosis, lumbar fusion, follow hip precautions for posterior approach (No flexion past 90*, adduction or IR)  Manual  1/6 1/8 1/13 1/15 1/20 1/22 1/27  1/29         R Hip PROM within precautions (No flex past 90*, ADD, or IR) TB 10 min TP 10  mins CR  10 mins TP 10 mins CR  10 mins CR  10 mins KP 8'  TP 8'                                         Exercise Diary  1/6 1/8 1/13 1/15 1/20 1/22 1/27  1/29         Bike or Nustep NS L6 10min L6x10' L6  10 mins L6x10' L7  10 mins L6  10 mins 10' L6  10" L6         Heel slides   dc --                   SLR 2x15 2x15 2x15 2x15 2x15 2x15 2x15  2x15         bridges   3"x20 otb OTB  3"x20 gtb 3"x20 GTB  3"x20 GTB  3"x20 20x3" gtb  gtb 30x3"         Clamshells w/pillow 20x3s 3"x20 3"   2x10 gtb 3" x20 GTB  3"x20 GTB  3"x20 20x3" gtb  gtb 3"x30         Side stepping 4 laps gtb 4 laps GTB  4 laps GTB  4 laps BTB  4 laps BTB  4 laps 4 laps btb  4 laps btb         Standing hip 3 way 15x ea 3# 3# x15ea b/l 3#  B/L  2x10 ea  3#  B/L  2x10 ea  3#  B/L  2x10 ea  3#  B/L  2x10 ea   2x15 ea b/l 3#  2x15 ea b/l 3#         Heel raises   x30 30 30 30 30 30  np         Step ups 15x FSU/LSS 6" 6" x15 ea fwd/lat 6"  Fwd/lat  15 ea     6"  Fwd/lat  15 ea  6#  Fwd/lat  17 ea  6#  Fwd/lat  17 ea  20x ea fsu/lsu 6"  20x ea fwd/lat 6"         laq 30x no weight x30 0#  5"x30 0#  5"x30 0#  5"x30 0#  5"x30 2x15 2#  2# 2x15         HS curls 20x ea B/L 3# 3# x20 3#  20 ea  3#  20 ea  3#  20 ea  3#  20 ea  20x ea 3#  20xea 3#          STS nv 2x15 2x15 Chair  1x8  No UE Chair  No UE  1x10 Chair  No UE  1x10 10x no ue  np          mini squats 15x x15 15 2x15 2x15 2x15 2x15  2x15          lateral step down           4"  x10 15x 4"  4"x15               Modalities     11/14 11/18 11/20 11/25 11/27 12/2 12/9 12/11 12/16 12/18   CP PRN    10 mins    10 mins  np  10'  np  home  use  home use  home use Home use                               Updated measurements and functional status taken this session

## 2020-02-04 ENCOUNTER — OFFICE VISIT (OUTPATIENT)
Dept: OBGYN CLINIC | Facility: HOSPITAL | Age: 70
End: 2020-02-04

## 2020-02-04 ENCOUNTER — HOSPITAL ENCOUNTER (OUTPATIENT)
Dept: RADIOLOGY | Facility: HOSPITAL | Age: 70
Discharge: HOME | End: 2020-02-04
Attending: ORTHOPAEDIC SURGERY
Payer: COMMERCIAL

## 2020-02-04 VITALS
HEART RATE: 83 BPM | HEIGHT: 61 IN | SYSTOLIC BLOOD PRESSURE: 123 MMHG | DIASTOLIC BLOOD PRESSURE: 80 MMHG | BODY MASS INDEX: 32.31 KG/M2

## 2020-02-04 DIAGNOSIS — Z96.641 AFTERCARE FOLLOWING RIGHT HIP JOINT REPLACEMENT SURGERY: ICD-10-CM

## 2020-02-04 DIAGNOSIS — Z96.641 AFTERCARE FOLLOWING RIGHT HIP JOINT REPLACEMENT SURGERY: Primary | ICD-10-CM

## 2020-02-04 DIAGNOSIS — Z47.1 AFTERCARE FOLLOWING RIGHT HIP JOINT REPLACEMENT SURGERY: ICD-10-CM

## 2020-02-04 DIAGNOSIS — Z47.1 AFTERCARE FOLLOWING RIGHT HIP JOINT REPLACEMENT SURGERY: Primary | ICD-10-CM

## 2020-02-04 PROCEDURE — 73502 X-RAY EXAM HIP UNI 2-3 VIEWS: CPT

## 2020-02-04 PROCEDURE — 99024 POSTOP FOLLOW-UP VISIT: CPT | Performed by: ORTHOPAEDIC SURGERY

## 2020-02-04 PROCEDURE — 1160F RVW MEDS BY RX/DR IN RCRD: CPT | Performed by: ORTHOPAEDIC SURGERY

## 2020-02-04 PROCEDURE — 4040F PNEUMOC VAC/ADMIN/RCVD: CPT | Performed by: ORTHOPAEDIC SURGERY

## 2020-02-04 NOTE — PROGRESS NOTES
Assessment:  1  Aftercare following right hip joint replacement surgery         Plan:  The patient is doing well  She should continue to be active  The patient is counseled on prophylactic antibiotic use prior to dental visits  She should follow up in 3 months  To do next visit:  Return in about 3 months (around 5/4/2020)  The above stated was discussed in layman's terms and the patient expressed understanding  All questions were answered to the patient's satisfaction  Scribe Attestation    I,:   Queenie Guo am acting as a scribe while in the presence of the attending physician :        I,:   Mary Lou Benavidez MD personally performed the services described in this documentation    as scribed in my presence :              Subjective:   Radha Paul is a 81 y o  female who presents 3 months s/p right SHARIF, 11/7/19  She is doing well  Today she complains of right lateral hip pain when lying on right  She continues to be active          Review of systems negative unless otherwise specified in HPI    Past Medical History:   Diagnosis Date    Ankylosing spondylitis ([facility] )     Anxiety     LAST ASSESSED: 12/20/16    Arthritis     Back pain     Carpal tunnel syndrome     Fibromyalgia     LAST ASSESSED: 12/20/16    Fibromyalgia, primary     Heme positive stool     LAST ASSESSED: 10/22/16    High cholesterol     Hyperlipidemia     under control since weight loss    Impingement syndrome of left shoulder     LAST ASSESSED: 2/21/17    Impingement syndrome of right shoulder     LAST ASSESSED: 2/21/1/7    Long term use of drug     LAST ASSESSED: 12/20/16    No known health problems     NO PERTINENT PAST MEDICAL HX    RLS (restless legs syndrome)     Rotator cuff injury     right    Spinal stenosis     Spinal stenosis     Spondylitis ([facility] )     Spondyloarthritis     RESOLVED: 9/21/16    Vitamin D deficiency        Past Surgical History:   Procedure Laterality Date    BACK SURGERY      CARPAL TUNNEL RELEASE Left     CERVICAL CONIZATION   W/ LASER      CERVICAL CONIZATION     SECTION      COLONOSCOPY      DILATION AND CURETTAGE OF UTERUS      FL INJECTION RIGHT HIP (NON ARTHROGRAM)  2019    FL INJECTION RIGHT HIP (NON ARTHROGRAM)  2019    HAND SURGERY      WI ARTHRODESIS POSTERIOR/POSTEROLATERAL LUMBAR N/A 4/3/2017    Procedure: L2-L5 OPEN DECOMPRESSIVE LUMBAR LAMINECTOMY W/ PEDICLE SCREW AND NILDA FIXATION FUSION (IMPULSE); REMOVAL OF SKIN TAG MID BACK;  Surgeon: Phong Cordova MD;  [facility];  Service: Neurosurgery    WI COLONOSCOPY FLX DX W/COLLJ SPEC WHEN PFRMD N/A 10/7/2016    Procedure: EGD AND COLONOSCOPY;  Surgeon: Abdoul Orellana MD;  [facility]: [facility];   Service: Gastroenterology    WI TOTAL HIP ARTHROPLASTY Right 2019    Procedure: ARTHROPLASTY HIP TOTAL Posterior;  Surgeon: Jayashree Sánchez MD;  [facility];  Service: Orthopedics    WI WRIST Gemma Quarry LIG Left 2018    Procedure: RELEASE CARPAL TUNNEL ENDOSCOPIC;  Surgeon: Vandy Runner, MD;  [facility];  Service: Orthopedics    WI WRIST Gemma Quarry LIG Right 2018    Procedure: RELEASE CARPAL TUNNEL ENDOSCOPIC;  Surgeon: Vandy Runner, MD;  [facility];  Service: Orthopedics    RETINAL LASER PROCEDURE      TUBAL LIGATION         Family History   Problem Relation Age of Onset    Arthritis Mother     Breast cancer Mother 67    Hypertension Mother     Heart attack Mother         MYOCARDIAL INFARCTION    Heart attack Father     Hypertension Father     Stroke Father         Fernanda Spine (CVA)    Arthritis Sister     Uterine cancer Sister     Endometrial cancer Sister 61    Heart attack Brother     Prostate cancer Brother 58    Arthritis Brother     Melanoma Brother     Cancer Brother     Arthritis Family     Hyperlipidemia Family     Depression Son     Breast cancer Maternal Aunt 36    No Known Problems Daughter     Other Brother         hunting accident (shot)    Melanoma Brother     Prostate cancer Brother     Heart attack Brother     Stroke Brother     Arthritis Sister     Heart attack Sister     No Known Problems Son     Lung cancer Maternal Uncle        Social History     Occupational History    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  Comment: EMPLOYED   Tobacco Use    Smoking status: Never Smoker    Smokeless tobacco: Never Used   Substance and Sexual Activity    Alcohol use: Never     Frequency: Never    Drug use: No    Sexual activity: Yes     Partners: Male     Birth control/protection: None         Current Outpatient Medications:     aspirin 81 MG tablet, Take 1 tablet by mouth daily, Disp: , Rfl:     cephalexin (KEFLEX) 500 mg capsule, Take 4 tablets p o  1 hr prior to dental appointment as instructed, Disp: 20 capsule, Rfl: 1    Cholecalciferol (VITAMIN D3) 50 MCG (2000 UT) capsule, Vitamin D3 50 mcg (2,000 unit) capsule  Take by oral route , Disp: , Rfl:     clonazePAM (KlonoPIN) 0 5 mg tablet, Take 0 5 mg by mouth daily at bedtime, Disp: , Rfl: 4    diclofenac sodium (VOLTAREN) 1 %, Apply 4 g topically 4 (four) times a day, Disp: 100 g, Rfl: 2    gabapentin (NEURONTIN) 100 mg capsule, TAKE 1 CAPSULE BY MOUTH THREE TIMES A DAY, Disp: 90 capsule, Rfl: 2    meloxicam (MOBIC) 15 mg tablet, Take 1 tablet by mouth daily, Disp: , Rfl:     methocarbamol (ROBAXIN) 750 mg tablet, TAKE 1 TABLET (750 MG TOTAL) BY MOUTH EVERY 6 (SIX) HOURS AS NEEDED FOR MUSCLE SPASMS, Disp: 100 tablet, Rfl: 0    oxyCODONE (ROXICODONE) 5 mg immediate release tablet, 1 pill po Q4 Hrs prn, Disp: 30 tablet, Rfl: 0    PARoxetine (PAXIL) 10 mg tablet, TAKE 1 TABLET DAILY  , Disp: 30 tablet, Rfl: 6    traMADol (ULTRAM) 50 mg tablet, Take 1 tablet (50 mg total) by mouth every 6 (six) hours as needed for moderate pain for up to 60 doses, Disp: 60 tablet, Rfl: 0    No Known Allergies         Vitals:    02/04/20 1531   BP: 123/80   Pulse: 83       Objective:  Physical exam  · General: Awake, Alert, Oriented  · Eyes: Pupils equal, round and reactive to light  · Heart: regular rate and rhythm  · Lungs: No audible wheezing  · Abdomen: soft                    Ortho Exam   Right hip:  Well healed posterior scar  Appropriate warmth and swelling  Good arc of motion with no pain  Patient sits comfortably in chair with hip flexed at 90 degrees  Patient stands from seated position without assistance  Calf compartments soft and supple  Sensation intact  Toes are warm sensate and mobile      Diagnostics, reviewed and taken today if performed as documented: The attending physician has personally reviewed the pertinent films in PACS and interpretation is as follows:  Right hip x-ray:  Well aligned prosthesis  No acute changes  Procedures, if performed today:    Procedures    None performed      Portions of the record may have been created with voice recognition software  Occasional wrong word or "sound a like" substitutions may have occurred due to the inherent limitations of voice recognition software  Read the chart carefully and recognize, using context, where substitutions have occurred

## 2020-02-28 DIAGNOSIS — G62.9 NEUROPATHY: ICD-10-CM

## 2020-03-02 RX ORDER — GABAPENTIN 100 MG/1
CAPSULE ORAL
Qty: 90 CAPSULE | Refills: 2 | OUTPATIENT
Start: 2020-03-02

## 2020-03-04 ENCOUNTER — OFFICE VISIT (OUTPATIENT)
Dept: PAIN MEDICINE | Facility: CLINIC | Age: 70
End: 2020-03-04
Payer: COMMERCIAL

## 2020-03-04 VITALS
HEART RATE: 68 BPM | DIASTOLIC BLOOD PRESSURE: 80 MMHG | BODY MASS INDEX: 31.91 KG/M2 | SYSTOLIC BLOOD PRESSURE: 120 MMHG | WEIGHT: 169 LBS | HEIGHT: 61 IN

## 2020-03-04 DIAGNOSIS — M54.50 CHRONIC BILATERAL LOW BACK PAIN WITHOUT SCIATICA: Primary | ICD-10-CM

## 2020-03-04 DIAGNOSIS — G62.9 NEUROPATHY: ICD-10-CM

## 2020-03-04 DIAGNOSIS — G89.29 CHRONIC BILATERAL LOW BACK PAIN WITHOUT SCIATICA: Primary | ICD-10-CM

## 2020-03-04 DIAGNOSIS — M47.9 SPONDYLOSIS: ICD-10-CM

## 2020-03-04 PROCEDURE — 99214 OFFICE O/P EST MOD 30 MIN: CPT | Performed by: PHYSICIAN ASSISTANT

## 2020-03-04 PROCEDURE — 4040F PNEUMOC VAC/ADMIN/RCVD: CPT | Performed by: PHYSICIAN ASSISTANT

## 2020-03-04 PROCEDURE — 1160F RVW MEDS BY RX/DR IN RCRD: CPT | Performed by: PHYSICIAN ASSISTANT

## 2020-03-04 PROCEDURE — 3008F BODY MASS INDEX DOCD: CPT | Performed by: ORTHOPAEDIC SURGERY

## 2020-03-04 PROCEDURE — 3008F BODY MASS INDEX DOCD: CPT | Performed by: PHYSICIAN ASSISTANT

## 2020-03-04 PROCEDURE — 1036F TOBACCO NON-USER: CPT | Performed by: PHYSICIAN ASSISTANT

## 2020-03-04 RX ORDER — GABAPENTIN 100 MG/1
100 CAPSULE ORAL 3 TIMES DAILY
Qty: 90 CAPSULE | Refills: 2 | Status: SHIPPED | OUTPATIENT
Start: 2020-03-04 | End: 2020-06-10

## 2020-03-04 NOTE — PROGRESS NOTES
Assessment/Plan:      Diagnoses and all orders for this visit:    Chronic bilateral low back pain without sciatica    Neuropathy  -     gabapentin (NEURONTIN) 100 mg capsule; Take 1 capsule (100 mg total) by mouth 3 (three) times a day    Spondylosis      discussion:  62-year-old female presenting for routine medication follow-up  She continues to find greater than 50% pain relief on current regimen of gabapentin 100 mg 3 times daily we will continue with this medication as prescribed  She was encouraged to continue home exercise program from physical therapy  She has a follow up in this office in 6 months to 1 year for re-evaluation and assessment or sooner if needed  Patient expresses understanding and agrees to above plan  Subjective:     Patient ID: Rosemary Mooney is a 81 y o  female  HPI last seen 08/07/2019 for chronic back pain and neuropathy secondary to ankylosing spondylitis and fibromyalgia  She continues to utilize gabapentin 100 mg 3 times daily from this office with continued benefit  She rates her pain as a 3 or 4/10 on pain scale  Finds overall regimen provides greater than 50% pain relief  Since last office visit she did undergo right hip joint replacement in November of 2019  She completed subsequent physical therapy with marked benefit in overall mobility and pain relief  She does admit to some on ongoing right knee pain with known osteoarthritis  Patient is unsure if she wishes to proceed with replacement  Back pain remains relatively unchanged primarily axial described as an aching stiffness        PAST MEDICAL HISTORY  Past Medical History:   Diagnosis Date    Ankylosing spondylitis ([facility] [address]     Anxiety     LAST ASSESSED: 12/20/16    Arthritis     Back pain     Carpal tunnel syndrome     Fibromyalgia     LAST ASSESSED: 12/20/16    Fibromyalgia, primary     Heme positive stool     LAST ASSESSED: 10/22/16    High cholesterol     Hyperlipidemia     under control since weight loss    Impingement syndrome of left shoulder     LAST ASSESSED: 17    Impingement syndrome of right shoulder     LAST ASSESSED:     Long term use of drug     LAST ASSESSED: 16    No known health problems     NO PERTINENT PAST MEDICAL HX    RLS (restless legs syndrome)     Rotator cuff injury     right    Spinal stenosis     Spinal stenosis     Spondylitis ([facility] [address]     Spondyloarthritis     RESOLVED: 16    Vitamin D deficiency      PAST SURGICAL HISTORY  Past Surgical History:   Procedure Laterality Date    BACK SURGERY      CARPAL TUNNEL RELEASE Left     CERVICAL CONIZATION   W/ LASER      CERVICAL CONIZATION     SECTION      COLONOSCOPY      DILATION AND CURETTAGE OF UTERUS      FL INJECTION RIGHT HIP (NON ARTHROGRAM)  2019    FL INJECTION RIGHT HIP (NON ARTHROGRAM)  2019    HAND SURGERY      [place] ARTHRODESIS POSTERIOR/POSTEROLATERAL LUMBAR N/A 4/3/2017    Procedure: L2-L5 OPEN DECOMPRESSIVE LUMBAR LAMINECTOMY W/ PEDICLE SCREW AND NILDA FIXATION FUSION (IMPULSE); REMOVAL OF SKIN TAG MID BACK;  Surgeon: John Carrion MD;  [facility];  Service: Neurosurgery    [place] COLONOSCOPY FLX DX W/COLLJ SPEC WHEN PFRMD N/A 10/7/2016    Procedure: EGD AND COLONOSCOPY;  Surgeon: Annabella Morales MD;  [facility];   Service: Gastroenterology    [place] TOTAL HIP ARTHROPLASTY Right 2019    Procedure: ARTHROPLASTY HIP TOTAL Posterior;  Surgeon: Christiano Mobley MD;  [facility];  Service: Orthopedics    [place] WRIST Renée Erich LIG Left 2018    Procedure: RELEASE CARPAL TUNNEL ENDOSCOPIC;  Surgeon: Anisha English MD;  [facility];  Service: Orthopedics    [place] WRIST Renée Erich LIG Right 2018    Procedure: RELEASE CARPAL TUNNEL ENDOSCOPIC;  Surgeon: Anisha English MD;  [facility];  Service: Orthopedics    RETINAL LASER PROCEDURE      TUBAL LIGATION         FAMILY HISTORY  Family History Problem Relation Age of Onset    Arthritis Mother    Julieann Frankel Breast cancer Mother 67    Hypertension Mother     Heart attack Mother         MYOCARDIAL INFARCTION    Heart attack Father     Hypertension Father     Stroke Father         [address] (CVA)    Arthritis Sister     Uterine cancer Sister     Endometrial cancer Sister 61    Heart attack Brother     Prostate cancer Brother 58    Arthritis Brother     Melanoma Brother     Cancer Brother     Arthritis Family     Hyperlipidemia Family     Depression Son     Breast cancer Maternal Aunt 36    No Known Problems Daughter     Other Brother         hunting accident (shot)    Melanoma Brother     Prostate cancer Brother     Heart attack Brother     Stroke Brother     Arthritis Sister     Heart attack Sister     No Known Problems Son     Lung cancer Maternal Uncle      HOME MEDICATIONS  Current Outpatient Medications   Medication Sig Dispense Refill    aspirin 81 MG tablet Take 1 tablet by mouth daily      cephalexin (KEFLEX) 500 mg capsule Take 4 tablets p o  1 hr prior to dental appointment as instructed 20 capsule 1    Cholecalciferol (VITAMIN D3) 50 MCG (2000 UT) capsule Vitamin D3 50 mcg (2,000 unit) capsule   Take by oral route   clonazePAM (KlonoPIN) 0 5 mg tablet Take 0 5 mg by mouth daily at bedtime  4    diclofenac sodium (VOLTAREN) 1 % Apply 4 g topically 4 (four) times a day 100 g 2    gabapentin (NEURONTIN) 100 mg capsule Take 1 capsule (100 mg total) by mouth 3 (three) times a day 90 capsule 2    meloxicam (MOBIC) 15 mg tablet Take 1 tablet by mouth daily      methocarbamol (ROBAXIN) 750 mg tablet TAKE 1 TABLET (750 MG TOTAL) BY MOUTH EVERY 6 (SIX) HOURS AS NEEDED FOR MUSCLE SPASMS 100 tablet 0    PARoxetine (PAXIL) 10 mg tablet TAKE 1 TABLET DAILY   30 tablet 6    traMADol (ULTRAM) 50 mg tablet Take 1 tablet (50 mg total) by mouth every 6 (six) hours as needed for moderate pain for up to 60 doses 60 tablet 0    oxyCODONE (ROXICODONE) 5 mg immediate release tablet 1 pill po Q4 Hrs prn (Patient not taking: Reported on 3/4/2020) 30 tablet 0     No current facility-administered medications for this visit  ALLERGIES  Patient has no known allergies  SOCIAL HISTORY  Social History     Substance and Sexual Activity   Alcohol Use Never    Frequency: Never     Social History     Substance and Sexual Activity   Drug Use No     Social History     Tobacco Use   Smoking Status Never Smoker   Smokeless Tobacco Never Used       Review of Systems   Constitutional: Negative for activity change, chills, diaphoresis, fatigue, fever and unexpected weight change  HENT: Negative for trouble swallowing  Eyes: Negative for visual disturbance  Respiratory: Negative for shortness of breath  Cardiovascular: Negative for chest pain and leg swelling  Gastrointestinal: Negative for constipation and diarrhea  Endocrine: Negative for cold intolerance and heat intolerance  Genitourinary: Negative for decreased urine volume and difficulty urinating  Musculoskeletal: Positive for arthralgias and back pain  Negative for gait problem and joint swelling  Skin: Negative for color change and rash  Allergic/Immunologic: Negative for immunocompromised state  Neurological: Positive for numbness  Negative for dizziness, syncope, weakness and light-headedness  Psychiatric/Behavioral: Negative for sleep disturbance  Objective:  Vitals:    03/04/20 0951   BP: 120/80   Pulse: 68      Physical Exam   Constitutional: She is oriented to person, place, and time  She appears well-developed and well-nourished  HENT:   Head: Normocephalic and atraumatic  Eyes: Pupils are equal, round, and reactive to light  Conjunctivae are normal    Neck: Neck supple  Cardiovascular: Intact distal pulses  Pulmonary/Chest: Effort normal  No respiratory distress     Neurological: She is alert and oriented to person, place, and time    Skin: Skin is warm and dry  No rash noted  No pallor  Psychiatric: She has a normal mood and affect  Her behavior is normal  Judgment and thought content normal    Vitals reviewed

## 2020-05-19 ENCOUNTER — HOSPITAL ENCOUNTER (OUTPATIENT)
Dept: RADIOLOGY | Facility: HOSPITAL | Age: 70
Discharge: HOME | End: 2020-05-19
Attending: ORTHOPAEDIC SURGERY
Payer: COMMERCIAL

## 2020-05-19 ENCOUNTER — OFFICE VISIT (OUTPATIENT)
Dept: OBGYN CLINIC | Facility: HOSPITAL | Age: 70
End: 2020-05-19
Payer: COMMERCIAL

## 2020-05-19 VITALS
HEART RATE: 81 BPM | HEIGHT: 61 IN | DIASTOLIC BLOOD PRESSURE: 73 MMHG | BODY MASS INDEX: 31.93 KG/M2 | SYSTOLIC BLOOD PRESSURE: 118 MMHG

## 2020-05-19 DIAGNOSIS — Z96.641 AFTERCARE FOLLOWING RIGHT HIP JOINT REPLACEMENT SURGERY: Primary | ICD-10-CM

## 2020-05-19 DIAGNOSIS — Z47.1 AFTERCARE FOLLOWING RIGHT HIP JOINT REPLACEMENT SURGERY: Primary | ICD-10-CM

## 2020-05-19 DIAGNOSIS — M25.551 RIGHT HIP PAIN: ICD-10-CM

## 2020-05-19 DIAGNOSIS — Z96.641 AFTERCARE FOLLOWING RIGHT HIP JOINT REPLACEMENT SURGERY: ICD-10-CM

## 2020-05-19 DIAGNOSIS — Z47.1 AFTERCARE FOLLOWING RIGHT HIP JOINT REPLACEMENT SURGERY: ICD-10-CM

## 2020-05-19 PROCEDURE — 1160F RVW MEDS BY RX/DR IN RCRD: CPT | Performed by: ORTHOPAEDIC SURGERY

## 2020-05-19 PROCEDURE — 1036F TOBACCO NON-USER: CPT | Performed by: ORTHOPAEDIC SURGERY

## 2020-05-19 PROCEDURE — 99213 OFFICE O/P EST LOW 20 MIN: CPT | Performed by: ORTHOPAEDIC SURGERY

## 2020-05-19 PROCEDURE — 4040F PNEUMOC VAC/ADMIN/RCVD: CPT | Performed by: ORTHOPAEDIC SURGERY

## 2020-05-19 PROCEDURE — 73502 X-RAY EXAM HIP UNI 2-3 VIEWS: CPT

## 2020-06-10 DIAGNOSIS — G62.9 NEUROPATHY: ICD-10-CM

## 2020-06-10 RX ORDER — GABAPENTIN 100 MG/1
CAPSULE ORAL
Qty: 90 CAPSULE | Refills: 0 | Status: SHIPPED | OUTPATIENT
Start: 2020-06-10 | End: 2020-07-07

## 2020-07-06 DIAGNOSIS — G62.9 NEUROPATHY: ICD-10-CM

## 2020-07-07 RX ORDER — GABAPENTIN 100 MG/1
CAPSULE ORAL
Qty: 90 CAPSULE | Refills: 0 | Status: SHIPPED | OUTPATIENT
Start: 2020-07-07 | End: 2020-08-04

## 2020-07-14 DIAGNOSIS — M25.561 ACUTE PAIN OF RIGHT KNEE: ICD-10-CM

## 2020-07-18 ENCOUNTER — APPOINTMENT (OUTPATIENT)
Dept: LAB | Facility: HOSPITAL | Age: 70
End: 2020-07-18
Attending: INTERNAL MEDICINE
Payer: COMMERCIAL

## 2020-07-18 DIAGNOSIS — E78.5 HYPERLIPIDEMIA, UNSPECIFIED HYPERLIPIDEMIA TYPE: ICD-10-CM

## 2020-07-18 LAB
CHOLEST SERPL-MCNC: 219 MG/DL (ref 50–200)
HDLC SERPL-MCNC: 63 MG/DL
LDLC SERPL CALC-MCNC: 145 MG/DL (ref 0–100)
NONHDLC SERPL-MCNC: 156 MG/DL
TRIGL SERPL-MCNC: 54 MG/DL

## 2020-07-18 PROCEDURE — 36415 COLL VENOUS BLD VENIPUNCTURE: CPT

## 2020-07-18 PROCEDURE — 80061 LIPID PANEL: CPT

## 2020-08-01 DIAGNOSIS — G62.9 NEUROPATHY: ICD-10-CM

## 2020-08-04 RX ORDER — GABAPENTIN 100 MG/1
CAPSULE ORAL
Qty: 90 CAPSULE | Refills: 0 | Status: SHIPPED | OUTPATIENT
Start: 2020-08-04 | End: 2020-08-31

## 2020-08-31 DIAGNOSIS — G62.9 NEUROPATHY: ICD-10-CM

## 2020-08-31 RX ORDER — GABAPENTIN 100 MG/1
CAPSULE ORAL
Qty: 90 CAPSULE | Refills: 0 | Status: SHIPPED | OUTPATIENT
Start: 2020-08-31 | End: 2020-10-08 | Stop reason: SDUPTHER

## 2020-09-09 ENCOUNTER — TELEPHONE (OUTPATIENT)
Dept: INTERNAL MEDICINE CLINIC | Facility: CLINIC | Age: 70
End: 2020-09-09

## 2020-09-09 NOTE — TELEPHONE ENCOUNTER
Patient called stating that she has been summoned for grand jury in [place] and she has a few concerns with her back and knee problems she thinks she will be unable to do that  She would like to know that based on her health issues if you would write a note excusing her from that

## 2020-09-11 DIAGNOSIS — F32.A DEPRESSION, UNSPECIFIED DEPRESSION TYPE: ICD-10-CM

## 2020-09-11 RX ORDER — PAROXETINE 10 MG/1
10 TABLET, FILM COATED ORAL DAILY
Qty: 30 TABLET | Refills: 6 | Status: SHIPPED | OUTPATIENT
Start: 2020-09-11 | End: 2021-04-09

## 2020-09-24 ENCOUNTER — HOSPITAL ENCOUNTER (OUTPATIENT)
Dept: RADIOLOGY | Age: 70
Discharge: HOME | End: 2020-09-24
Payer: COMMERCIAL

## 2020-09-24 VITALS — HEIGHT: 61 IN | BODY MASS INDEX: 33.61 KG/M2 | WEIGHT: 178 LBS

## 2020-09-24 DIAGNOSIS — Z12.31 ENCOUNTER FOR SCREENING MAMMOGRAM FOR MALIGNANT NEOPLASM OF BREAST: ICD-10-CM

## 2020-09-24 PROCEDURE — 77067 SCR MAMMO BI INCL CAD: CPT

## 2020-09-24 PROCEDURE — 77063 BREAST TOMOSYNTHESIS BI: CPT

## 2020-10-08 ENCOUNTER — OFFICE VISIT (OUTPATIENT)
Dept: PAIN MEDICINE | Facility: MEDICAL CENTER | Age: 70
End: 2020-10-08
Payer: MEDICARE

## 2020-10-08 VITALS
SYSTOLIC BLOOD PRESSURE: 130 MMHG | HEIGHT: 61 IN | WEIGHT: 180 LBS | HEART RATE: 77 BPM | TEMPERATURE: 97.5 F | BODY MASS INDEX: 33.99 KG/M2 | DIASTOLIC BLOOD PRESSURE: 84 MMHG

## 2020-10-08 DIAGNOSIS — Z96.641 STATUS POST TOTAL HIP REPLACEMENT, RIGHT: ICD-10-CM

## 2020-10-08 DIAGNOSIS — M46.1 SACROILIITIS (HCC): Primary | ICD-10-CM

## 2020-10-08 DIAGNOSIS — G62.9 NEUROPATHY: ICD-10-CM

## 2020-10-08 DIAGNOSIS — Z98.1 HISTORY OF LUMBAR SPINAL FUSION: ICD-10-CM

## 2020-10-08 PROCEDURE — 99214 OFFICE O/P EST MOD 30 MIN: CPT | Performed by: PHYSICAL MEDICINE & REHABILITATION

## 2020-10-08 RX ORDER — GABAPENTIN 100 MG/1
100 CAPSULE ORAL 2 TIMES DAILY
Qty: 180 CAPSULE | Refills: 0 | Status: SHIPPED | OUTPATIENT
Start: 2020-10-08 | End: 2020-12-02 | Stop reason: SDUPTHER

## 2020-10-08 RX ORDER — GABAPENTIN 300 MG/1
300 CAPSULE ORAL
Qty: 90 CAPSULE | Refills: 0 | Status: SHIPPED | OUTPATIENT
Start: 2020-10-08 | End: 2020-12-02 | Stop reason: SDUPTHER

## 2020-10-14 ENCOUNTER — TELEPHONE (OUTPATIENT)
Dept: PAIN MEDICINE | Facility: CLINIC | Age: 70
End: 2020-10-14

## 2020-11-04 ENCOUNTER — EVALUATION (OUTPATIENT)
Dept: PHYSICAL THERAPY | Facility: REHABILITATION | Age: 70
End: 2020-11-04
Payer: COMMERCIAL

## 2020-11-04 DIAGNOSIS — Z96.641 STATUS POST TOTAL HIP REPLACEMENT, RIGHT: ICD-10-CM

## 2020-11-04 DIAGNOSIS — Z98.1 HISTORY OF LUMBAR SPINAL FUSION: ICD-10-CM

## 2020-11-04 DIAGNOSIS — M46.1 SACROILIITIS (HCC): Primary | ICD-10-CM

## 2020-11-04 PROCEDURE — 97161 PT EVAL LOW COMPLEX 20 MIN: CPT

## 2020-11-04 PROCEDURE — 97110 THERAPEUTIC EXERCISES: CPT

## 2020-11-11 ENCOUNTER — OFFICE VISIT (OUTPATIENT)
Dept: PHYSICAL THERAPY | Facility: REHABILITATION | Age: 70
End: 2020-11-11
Payer: COMMERCIAL

## 2020-11-11 DIAGNOSIS — Z98.1 HISTORY OF LUMBAR SPINAL FUSION: ICD-10-CM

## 2020-11-11 DIAGNOSIS — M46.1 SACROILIITIS (HCC): Primary | ICD-10-CM

## 2020-11-11 DIAGNOSIS — Z96.641 STATUS POST TOTAL HIP REPLACEMENT, RIGHT: ICD-10-CM

## 2020-11-11 PROCEDURE — 97110 THERAPEUTIC EXERCISES: CPT

## 2020-11-11 PROCEDURE — 97112 NEUROMUSCULAR REEDUCATION: CPT

## 2020-11-13 ENCOUNTER — OFFICE VISIT (OUTPATIENT)
Dept: PHYSICAL THERAPY | Facility: REHABILITATION | Age: 70
End: 2020-11-13
Payer: COMMERCIAL

## 2020-11-13 DIAGNOSIS — Z98.1 HISTORY OF LUMBAR SPINAL FUSION: ICD-10-CM

## 2020-11-13 DIAGNOSIS — Z96.641 STATUS POST TOTAL HIP REPLACEMENT, RIGHT: ICD-10-CM

## 2020-11-13 DIAGNOSIS — M46.1 SACROILIITIS (HCC): Primary | ICD-10-CM

## 2020-11-13 PROCEDURE — 97110 THERAPEUTIC EXERCISES: CPT

## 2020-11-13 PROCEDURE — 97112 NEUROMUSCULAR REEDUCATION: CPT

## 2020-11-16 RX ORDER — A/SINGAPORE/GP1908/2015 IVR-180 (AN A/MICHIGAN/45/2015 (H1N1)PDM09-LIKE VIRUS, A/HONG KONG/4801/2014, NYMC X-263B (H3N2) (AN A/HONG KONG/4801/2014-LIKE VIRUS), AND B/BRISBANE/60/2008, WILD TYPE (A B/BRISBANE/60/2008-LIKE VIRUS) 15; 15; 15 UG/.5ML; UG/.5ML; UG/.5ML
INJECTION, SUSPENSION INTRAMUSCULAR
COMMUNITY
Start: 2020-10-15

## 2020-11-17 ENCOUNTER — OFFICE VISIT (OUTPATIENT)
Dept: OBGYN CLINIC | Facility: HOSPITAL | Age: 70
End: 2020-11-17
Payer: COMMERCIAL

## 2020-11-17 ENCOUNTER — HOSPITAL ENCOUNTER (OUTPATIENT)
Dept: RADIOLOGY | Facility: HOSPITAL | Age: 70
Discharge: HOME | End: 2020-11-17
Attending: ORTHOPAEDIC SURGERY
Payer: COMMERCIAL

## 2020-11-17 VITALS
BODY MASS INDEX: 34.55 KG/M2 | HEIGHT: 61 IN | WEIGHT: 183 LBS | HEART RATE: 91 BPM | DIASTOLIC BLOOD PRESSURE: 73 MMHG | SYSTOLIC BLOOD PRESSURE: 110 MMHG

## 2020-11-17 DIAGNOSIS — Z96.641 AFTERCARE FOLLOWING RIGHT HIP JOINT REPLACEMENT SURGERY: Primary | ICD-10-CM

## 2020-11-17 DIAGNOSIS — Z47.1 AFTERCARE FOLLOWING RIGHT HIP JOINT REPLACEMENT SURGERY: ICD-10-CM

## 2020-11-17 DIAGNOSIS — Z47.1 AFTERCARE FOLLOWING RIGHT HIP JOINT REPLACEMENT SURGERY: Primary | ICD-10-CM

## 2020-11-17 DIAGNOSIS — Z96.641 AFTERCARE FOLLOWING RIGHT HIP JOINT REPLACEMENT SURGERY: ICD-10-CM

## 2020-11-17 DIAGNOSIS — M25.551 RIGHT HIP PAIN: ICD-10-CM

## 2020-11-17 PROCEDURE — 4040F PNEUMOC VAC/ADMIN/RCVD: CPT | Performed by: ORTHOPAEDIC SURGERY

## 2020-11-17 PROCEDURE — 3008F BODY MASS INDEX DOCD: CPT | Performed by: ORTHOPAEDIC SURGERY

## 2020-11-17 PROCEDURE — 1036F TOBACCO NON-USER: CPT | Performed by: ORTHOPAEDIC SURGERY

## 2020-11-17 PROCEDURE — 99212 OFFICE O/P EST SF 10 MIN: CPT | Performed by: ORTHOPAEDIC SURGERY

## 2020-11-17 PROCEDURE — 73502 X-RAY EXAM HIP UNI 2-3 VIEWS: CPT

## 2020-11-17 PROCEDURE — 1160F RVW MEDS BY RX/DR IN RCRD: CPT | Performed by: ORTHOPAEDIC SURGERY

## 2020-11-18 ENCOUNTER — OFFICE VISIT (OUTPATIENT)
Dept: PHYSICAL THERAPY | Facility: REHABILITATION | Age: 70
End: 2020-11-18
Payer: COMMERCIAL

## 2020-11-18 DIAGNOSIS — M46.1 SACROILIITIS (HCC): Primary | ICD-10-CM

## 2020-11-18 DIAGNOSIS — Z98.1 HISTORY OF LUMBAR SPINAL FUSION: ICD-10-CM

## 2020-11-18 DIAGNOSIS — Z96.641 STATUS POST TOTAL HIP REPLACEMENT, RIGHT: ICD-10-CM

## 2020-11-18 PROCEDURE — 97112 NEUROMUSCULAR REEDUCATION: CPT

## 2020-11-18 PROCEDURE — 97110 THERAPEUTIC EXERCISES: CPT

## 2020-11-20 ENCOUNTER — OFFICE VISIT (OUTPATIENT)
Dept: PHYSICAL THERAPY | Facility: REHABILITATION | Age: 70
End: 2020-11-20
Payer: COMMERCIAL

## 2020-11-20 DIAGNOSIS — M46.1 SACROILIITIS (HCC): Primary | ICD-10-CM

## 2020-11-20 DIAGNOSIS — Z96.641 STATUS POST TOTAL HIP REPLACEMENT, RIGHT: ICD-10-CM

## 2020-11-20 DIAGNOSIS — Z98.1 HISTORY OF LUMBAR SPINAL FUSION: ICD-10-CM

## 2020-11-20 PROCEDURE — 97110 THERAPEUTIC EXERCISES: CPT

## 2020-11-20 PROCEDURE — 97112 NEUROMUSCULAR REEDUCATION: CPT

## 2020-11-23 ENCOUNTER — OFFICE VISIT (OUTPATIENT)
Dept: PHYSICAL THERAPY | Facility: REHABILITATION | Age: 70
End: 2020-11-23
Payer: COMMERCIAL

## 2020-11-23 DIAGNOSIS — Z96.641 STATUS POST TOTAL HIP REPLACEMENT, RIGHT: ICD-10-CM

## 2020-11-23 DIAGNOSIS — Z98.1 HISTORY OF LUMBAR SPINAL FUSION: ICD-10-CM

## 2020-11-23 DIAGNOSIS — M46.1 SACROILIITIS (HCC): Primary | ICD-10-CM

## 2020-11-23 PROCEDURE — 97110 THERAPEUTIC EXERCISES: CPT | Performed by: PHYSICAL THERAPIST

## 2020-11-23 PROCEDURE — 97112 NEUROMUSCULAR REEDUCATION: CPT | Performed by: PHYSICAL THERAPIST

## 2020-11-25 ENCOUNTER — APPOINTMENT (OUTPATIENT)
Dept: PHYSICAL THERAPY | Facility: REHABILITATION | Age: 70
End: 2020-11-25
Payer: COMMERCIAL

## 2020-12-02 ENCOUNTER — OFFICE VISIT (OUTPATIENT)
Dept: PAIN MEDICINE | Facility: MEDICAL CENTER | Age: 70
End: 2020-12-02
Payer: COMMERCIAL

## 2020-12-02 ENCOUNTER — OFFICE VISIT (OUTPATIENT)
Dept: PHYSICAL THERAPY | Facility: REHABILITATION | Age: 70
End: 2020-12-02
Payer: COMMERCIAL

## 2020-12-02 VITALS
WEIGHT: 181 LBS | TEMPERATURE: 98.4 F | HEIGHT: 61 IN | BODY MASS INDEX: 34.17 KG/M2 | HEART RATE: 74 BPM | DIASTOLIC BLOOD PRESSURE: 77 MMHG | SYSTOLIC BLOOD PRESSURE: 126 MMHG | RESPIRATION RATE: 16 BRPM

## 2020-12-02 DIAGNOSIS — Z98.1 HISTORY OF LUMBAR SPINAL FUSION: ICD-10-CM

## 2020-12-02 DIAGNOSIS — M46.1 SACROILIITIS (HCC): Primary | ICD-10-CM

## 2020-12-02 DIAGNOSIS — G62.9 NEUROPATHY: ICD-10-CM

## 2020-12-02 DIAGNOSIS — G89.29 CHRONIC BILATERAL LOW BACK PAIN WITHOUT SCIATICA: ICD-10-CM

## 2020-12-02 DIAGNOSIS — M54.50 CHRONIC BILATERAL LOW BACK PAIN WITHOUT SCIATICA: ICD-10-CM

## 2020-12-02 DIAGNOSIS — Z96.641 STATUS POST TOTAL HIP REPLACEMENT, RIGHT: ICD-10-CM

## 2020-12-02 DIAGNOSIS — M48.061 SPINAL STENOSIS OF LUMBAR REGION AT MULTIPLE LEVELS: Primary | ICD-10-CM

## 2020-12-02 PROCEDURE — 97110 THERAPEUTIC EXERCISES: CPT

## 2020-12-02 PROCEDURE — 99213 OFFICE O/P EST LOW 20 MIN: CPT | Performed by: NURSE PRACTITIONER

## 2020-12-02 PROCEDURE — 97112 NEUROMUSCULAR REEDUCATION: CPT

## 2020-12-02 PROCEDURE — 3008F BODY MASS INDEX DOCD: CPT | Performed by: ORTHOPAEDIC SURGERY

## 2020-12-02 RX ORDER — GABAPENTIN 300 MG/1
300 CAPSULE ORAL
Qty: 90 CAPSULE | Refills: 0 | Status: SHIPPED | OUTPATIENT
Start: 2020-12-02 | End: 2021-02-24 | Stop reason: SDUPTHER

## 2020-12-02 RX ORDER — GABAPENTIN 100 MG/1
100 CAPSULE ORAL 2 TIMES DAILY
Qty: 180 CAPSULE | Refills: 0 | Status: SHIPPED | OUTPATIENT
Start: 2020-12-02 | End: 2021-02-24 | Stop reason: SDUPTHER

## 2020-12-02 RX ORDER — MELOXICAM 15 MG/1
15 TABLET ORAL DAILY
Qty: 30 TABLET | Refills: 2 | Status: SHIPPED | OUTPATIENT
Start: 2020-12-02 | End: 2021-02-24 | Stop reason: SDUPTHER

## 2020-12-04 ENCOUNTER — OFFICE VISIT (OUTPATIENT)
Dept: PHYSICAL THERAPY | Facility: REHABILITATION | Age: 70
End: 2020-12-04
Payer: COMMERCIAL

## 2020-12-04 DIAGNOSIS — M46.1 SACROILIITIS (HCC): Primary | ICD-10-CM

## 2020-12-04 DIAGNOSIS — Z98.1 HISTORY OF LUMBAR SPINAL FUSION: ICD-10-CM

## 2020-12-04 DIAGNOSIS — Z96.641 STATUS POST TOTAL HIP REPLACEMENT, RIGHT: ICD-10-CM

## 2020-12-04 PROCEDURE — 97110 THERAPEUTIC EXERCISES: CPT

## 2020-12-04 PROCEDURE — 97112 NEUROMUSCULAR REEDUCATION: CPT

## 2020-12-09 ENCOUNTER — APPOINTMENT (OUTPATIENT)
Dept: PHYSICAL THERAPY | Facility: REHABILITATION | Age: 70
End: 2020-12-09
Payer: COMMERCIAL

## 2020-12-11 ENCOUNTER — APPOINTMENT (OUTPATIENT)
Dept: PHYSICAL THERAPY | Facility: REHABILITATION | Age: 70
End: 2020-12-11
Payer: COMMERCIAL

## 2020-12-28 ENCOUNTER — OFFICE VISIT (OUTPATIENT)
Dept: PHYSICAL THERAPY | Facility: REHABILITATION | Age: 70
End: 2020-12-28
Payer: COMMERCIAL

## 2020-12-28 DIAGNOSIS — M46.1 SACROILIITIS (HCC): Primary | ICD-10-CM

## 2020-12-28 DIAGNOSIS — Z96.641 STATUS POST TOTAL HIP REPLACEMENT, RIGHT: ICD-10-CM

## 2020-12-28 DIAGNOSIS — Z98.1 HISTORY OF LUMBAR SPINAL FUSION: ICD-10-CM

## 2020-12-28 PROCEDURE — 97110 THERAPEUTIC EXERCISES: CPT

## 2020-12-28 PROCEDURE — 97112 NEUROMUSCULAR REEDUCATION: CPT

## 2020-12-31 ENCOUNTER — OFFICE VISIT (OUTPATIENT)
Dept: INTERNAL MEDICINE CLINIC | Facility: CLINIC | Age: 70
End: 2020-12-31
Payer: COMMERCIAL

## 2020-12-31 VITALS
WEIGHT: 181 LBS | OXYGEN SATURATION: 98 % | HEIGHT: 61 IN | SYSTOLIC BLOOD PRESSURE: 126 MMHG | HEART RATE: 82 BPM | TEMPERATURE: 99.1 F | DIASTOLIC BLOOD PRESSURE: 76 MMHG | BODY MASS INDEX: 34.17 KG/M2 | RESPIRATION RATE: 18 BRPM

## 2020-12-31 DIAGNOSIS — Z12.11 SCREENING FOR COLON CANCER: ICD-10-CM

## 2020-12-31 DIAGNOSIS — M17.11 PRIMARY OSTEOARTHRITIS OF RIGHT KNEE: ICD-10-CM

## 2020-12-31 DIAGNOSIS — R39.9 UTI SYMPTOMS: ICD-10-CM

## 2020-12-31 DIAGNOSIS — M41.9 SCOLIOSIS, UNSPECIFIED SCOLIOSIS TYPE, UNSPECIFIED SPINAL REGION: ICD-10-CM

## 2020-12-31 DIAGNOSIS — Z00.00 HEALTHCARE MAINTENANCE: ICD-10-CM

## 2020-12-31 DIAGNOSIS — E78.5 HYPERLIPIDEMIA, UNSPECIFIED HYPERLIPIDEMIA TYPE: ICD-10-CM

## 2020-12-31 DIAGNOSIS — Z23 NEED FOR VACCINATION: ICD-10-CM

## 2020-12-31 DIAGNOSIS — M65.30 TRIGGER FINGER OF LEFT HAND, UNSPECIFIED FINGER: Primary | ICD-10-CM

## 2020-12-31 DIAGNOSIS — Z13.0 SCREENING FOR DEFICIENCY ANEMIA: ICD-10-CM

## 2020-12-31 DIAGNOSIS — Z13.1 SCREENING FOR DIABETES MELLITUS: ICD-10-CM

## 2020-12-31 PROCEDURE — 90732 PPSV23 VACC 2 YRS+ SUBQ/IM: CPT

## 2020-12-31 PROCEDURE — G0009 ADMIN PNEUMOCOCCAL VACCINE: HCPCS

## 2020-12-31 PROCEDURE — 99215 OFFICE O/P EST HI 40 MIN: CPT | Performed by: INTERNAL MEDICINE

## 2020-12-31 PROCEDURE — G0438 PPPS, INITIAL VISIT: HCPCS | Performed by: INTERNAL MEDICINE

## 2020-12-31 PROCEDURE — 3008F BODY MASS INDEX DOCD: CPT | Performed by: ORTHOPAEDIC SURGERY

## 2021-01-08 ENCOUNTER — TELEMEDICINE (OUTPATIENT)
Dept: INTERNAL MEDICINE CLINIC | Facility: CLINIC | Age: 71
End: 2021-01-08
Payer: COMMERCIAL

## 2021-01-08 DIAGNOSIS — Z20.822 CLOSE EXPOSURE TO COVID-19 VIRUS: Primary | ICD-10-CM

## 2021-01-08 PROCEDURE — 99213 OFFICE O/P EST LOW 20 MIN: CPT | Performed by: INTERNAL MEDICINE

## 2021-01-08 NOTE — PROGRESS NOTES
COVID-19 Virtual Visit     Assessment/Plan:    Problem List Items Addressed This Visit        Other    Close exposure to COVID-19 virus - Primary       This 19-year-old female patient presents today for an evaluation by virtual visit  Her son has been diagnosed with COVID-19 and she has been in contact with him over the past several days  Fortunately the patient today has no active symptoms  We reviewed in detail the signs and symptoms of COVID-19 and I have recommended that her son quarantine from her from this point forward  I recommended that the patient have a COVID-19 screening test on Monday which will be several days from her initial it exposure  I placed her on vitamin-D 2000 units daily vitamin-C a 1000 mg daily she is already taking low-dose aspirin daily and I have also added zinc 20-25 mg daily  She knows to call me if she develops any signs or symptoms of infection  Relevant Orders    Novel Coronavirus (COVID-19), PCR [facility] - Collected at [place] [id number] or [facility]         Disposition:     I referred patient to one of our centralized sites for a COVID-19 swab  I have spent 15 minutes directly with the patient  Greater than 50% of this time was spent in counseling/coordination of care regarding: instructions for management, patient and family education, importance of treatment compliance and impressions  Encounter provider Anabell Michele MD    Provider located at [address] [address]    Recent Visits  No visits were found meeting these conditions  Showing recent visits within past 7 days and meeting all other requirements     Today's Visits  Date Type Provider Dept   01/08/21 Telemedicine Anabell Michele MD [facility] Internal Med   Showing today's visits and meeting all other requirements     Future Appointments  No visits were found meeting these conditions     Showing future appointments within next 150 days and meeting all other requirements      This virtual check-in was done via [employer] and patient was informed that this is not a secure, HIPAA-compliant platform  She agrees to proceed  Patient agrees to participate in a virtual check in via telephone or video visit instead of presenting to the office to address urgent/immediate medical needs  Patient is aware this is a billable service  After connecting through [facility], the patient was identified by name and date of birth  Leslee Medrano was informed that this was a telemedicine visit and that the exam was being conducted confidentially over secure lines  My office door was closed  No one else was in the room  Leslee Medrano acknowledged consent and understanding of privacy and security of the telemedicine visit  I informed the patient that I have reviewed her record in Epic and presented the opportunity for her to ask any questions regarding the visit today  The patient agreed to participate  Subjective:   Leslee Medrano is a 78 y o  female who is concerned about COVID-19  Patient is currently asymptomatic  Patient denies fever, chills, fatigue, malaise, congestion, rhinorrhea, sore throat, anosmia, loss of taste, cough, shortness of breath, chest tightness, abdominal pain, nausea, vomiting, diarrhea, myalgias and headaches       Exposure:   Contact with a person who is under investigation (PUI) for or who is positive for COVID-19 within the last 14 days?: Yes    Hospitalized recently for fever and/or lower respiratory symptoms?: No      Currently a healthcare worker that is involved in direct patient care?: No      Works in a special setting where the risk of COVID-19 transmission may be high? (this may include long-term care, correctional and intermediate facilities; homeless shelters; assisted-living facilities and group homes ): No      Resident in a special setting where the risk of COVID-19 transmission may be high? (this may include long-term care, correctional and care home facilities; homeless shelters; assisted-living facilities and group homes ): No      No results found for: Hannah Sow, [address], [address] [facility] [place] [address]  Past Medical History:   Diagnosis Date    [facility]yl[facility] [address] )     Anxiety     LAST ASSESSED: 16    Arthritis     Back pain     Carpal tunnel syndrome     Fibromyalgia     LAST ASSESSED: 16    Fibromyalgia, primary     Heme positive stool     LAST ASSESSED: 10/22/16    High cholesterol     Hyperlipidemia     under control since weight loss    Impingement syndrome of left shoulder     LAST ASSESSED: 17    Impingement syndrome of right shoulder     LAST ASSESSED:     Long term use of drug     LAST ASSESSED: 16    Low back pain     No known health problems     NO PERTINENT PAST MEDICAL HX    RLS (restless legs syndrome)     Rotator cuff injury     right    Spinal stenosis     Spinal stenosis     [facility]     Spondyloarthritis     RESOLVED: 16    Vitamin D deficiency      Past Surgical History:   Procedure Laterality Date    BACK SURGERY      CARPAL TUNNEL RELEASE Left     CERVICAL CONIZATION   W/ LASER      CERVICAL CONIZATION     SECTION      COLONOSCOPY      DILATION AND CURETTAGE OF UTERUS      FL INJECTION RIGHT HIP (NON ARTHROGRAM)  2019    FL INJECTION RIGHT HIP (NON ARTHROGRAM)  2019    HAND SURGERY      [place] ARTHRODESIS POSTERIOR/POSTEROLATERAL LUMBAR N/A 4/3/2017    Procedure: L2-L5 OPEN DECOMPRESSIVE LUMBAR LAMINECTOMY W/ PEDICLE SCREW AND NILDA FIXATION FUSION (IMPULSE); REMOVAL OF SKIN TAG MID BACK;  Surgeon: Boyd Lott MD;  [facility];  Service: Neurosurgery    [place] COLONOSCOPY FLX DX W/COLLJ SPEC WHEN PFRMD N/A 10/7/2016    Procedure: EGD AND COLONOSCOPY;  Surgeon: Misael Padgett MD;  [facility];   Service: Gastroenterology    [place] TOTAL HIP ARTHROPLASTY Right 2019 Procedure: ARTHROPLASTY HIP TOTAL Posterior;  Surgeon: Susan Damon MD;  [facility];  Service: Orthopedics    [place] WRIST María Camp LIG Left 4/26/2018    Procedure: RELEASE CARPAL TUNNEL ENDOSCOPIC;  Surgeon: Curt Mora MD;  [facility];  Service: Orthopedics    [place] WRIST María Camp LIG Right 6/14/2018    Procedure: RELEASE CARPAL TUNNEL ENDOSCOPIC;  Surgeon: Curt Mora MD;  [facility];  Service: Orthopedics    RETINAL LASER PROCEDURE      TUBAL LIGATION       Current Outpatient Medications   Medication Sig Dispense Refill    aspirin 81 MG tablet Take 1 tablet by mouth daily      cephalexin (KEFLEX) 500 mg capsule Take 4 tablets p o  1 hr prior to dental appointment as instructed 20 capsule 1    Cholecalciferol (VITAMIN D3) 50 MCG (2000 UT) capsule Vitamin D3 50 mcg (2,000 unit) capsule   Take by oral route        clonazePAM (KlonoPIN) 0 5 mg tablet Take 0 5 mg by mouth daily at bedtime  4    diclofenac sodium (VOLTAREN) 1 % Apply 4 g topically 4 (four) times a day 100 g 4    gabapentin (NEURONTIN) 100 mg capsule Take 1 capsule (100 mg total) by mouth 2 (two) times a day 180 capsule 0    gabapentin (NEURONTIN) 300 mg capsule Take 1 capsule (300 mg total) by mouth daily at bedtime 90 capsule 0    meloxicam (Mobic) 15 mg tablet Take 1 tablet (15 mg total) by mouth daily 30 tablet 2    methocarbamol (ROBAXIN) 750 mg tablet TAKE 1 TABLET (750 MG TOTAL) BY MOUTH EVERY 6 (SIX) HOURS AS NEEDED FOR MUSCLE SPASMS 100 tablet 0    oxyCODONE (ROXICODONE) 5 mg immediate release tablet 1 pill po Q4 Hrs prn 30 tablet 0    PARoxetine (PAXIL) 10 mg tablet Take 1 tablet (10 mg total) by mouth daily 30 tablet 6    traMADol (ULTRAM) 50 mg tablet Take 1 tablet (50 mg total) by mouth every 6 (six) hours as needed for moderate pain for up to 60 doses 60 tablet 0    Fluad Quadrivalent 0 5 ML [facility] ADMINISTERED       No current facility-administered medications for this visit  No Known Allergies    Review of Systems   Constitutional: Negative for chills, fatigue and fever  HENT: Negative for congestion, rhinorrhea and sore throat  Respiratory: Negative for cough, chest tightness and shortness of breath  Gastrointestinal: Negative for abdominal pain, diarrhea, nausea and vomiting  Musculoskeletal: Negative for myalgias  Neurological: Negative for headaches  All other systems reviewed and are negative  Objective: There were no vitals filed for this visit  Physical Exam  Constitutional:       Comments:  By virtual examination the patient appears to be alert and oriented x3  She is cooperative for this visit  She has no evidence of any diaphoresis and normal skin color  There is no evidence of respiratory distress nor any evidence of coughing during the course of our visit  VIRTUAL VISIT DISCLAIMER    Berna Pierson acknowledges that she has consented to an online visit or consultation  She understands that the online visit is based solely on information provided by her, and that, in the absence of a face-to-face physical evaluation by the physician, the diagnosis she receives is both limited and provisional in terms of accuracy and completeness  This is not intended to replace a full medical face-to-face evaluation by the physician  Berna Pierson understands and accepts these terms

## 2021-01-08 NOTE — ASSESSMENT & PLAN NOTE
This 71-year-old female patient presents today for an evaluation by virtual visit  Her son has been diagnosed with COVID-19 and she has been in contact with him over the past several days  Fortunately the patient today has no active symptoms  We reviewed in detail the signs and symptoms of COVID-19 and I have recommended that her son quarantine from her from this point forward  I recommended that the patient have a COVID-19 screening test on Monday which will be several days from her initial it exposure  I placed her on vitamin-D 2000 units daily vitamin-C a 1000 mg daily she is already taking low-dose aspirin daily and I have also added zinc 20-25 mg daily  She knows to call me if she develops any signs or symptoms of infection

## 2021-01-11 DIAGNOSIS — Z20.822 CLOSE EXPOSURE TO COVID-19 VIRUS: ICD-10-CM

## 2021-01-11 PROCEDURE — U0005 INFEC AGEN DETEC AMPLI PROBE: HCPCS | Performed by: INTERNAL MEDICINE

## 2021-01-11 PROCEDURE — U0003 INFECTIOUS AGENT DETECTION BY NUCLEIC ACID (DNA OR RNA); SEVERE ACUTE RESPIRATORY SYNDROME CORONAVIRUS 2 (SARS-COV-2) (CORONAVIRUS DISEASE [COVID-19]), AMPLIFIED PROBE TECHNIQUE, MAKING USE OF HIGH THROUGHPUT TECHNOLOGIES AS DESCRIBED BY CMS-2020-01-R: HCPCS | Performed by: INTERNAL MEDICINE

## 2021-01-12 LAB — SARS-COV-2 RNA SPEC QL NAA+PROBE: NOT DETECTED

## 2021-02-17 ENCOUNTER — CONSULT (OUTPATIENT)
Dept: OBGYN CLINIC | Facility: HOSPITAL | Age: 71
End: 2021-02-17
Attending: INTERNAL MEDICINE
Payer: COMMERCIAL

## 2021-02-17 VITALS
BODY MASS INDEX: 34.93 KG/M2 | HEIGHT: 61 IN | WEIGHT: 185 LBS | HEART RATE: 88 BPM | DIASTOLIC BLOOD PRESSURE: 78 MMHG | SYSTOLIC BLOOD PRESSURE: 125 MMHG

## 2021-02-17 DIAGNOSIS — M65.332 TRIGGER FINGER, LEFT MIDDLE FINGER: ICD-10-CM

## 2021-02-17 PROCEDURE — 20550 NJX 1 TENDON SHEATH/LIGAMENT: CPT | Performed by: ORTHOPAEDIC SURGERY

## 2021-02-17 PROCEDURE — 99214 OFFICE O/P EST MOD 30 MIN: CPT | Performed by: ORTHOPAEDIC SURGERY

## 2021-02-17 RX ORDER — BETAMETHASONE SODIUM PHOSPHATE AND BETAMETHASONE ACETATE 3; 3 MG/ML; MG/ML
6 INJECTION, SUSPENSION INTRA-ARTICULAR; INTRALESIONAL; INTRAMUSCULAR; SOFT TISSUE
Status: COMPLETED | OUTPATIENT
Start: 2021-02-17 | End: 2021-02-17

## 2021-02-17 RX ORDER — LIDOCAINE HYDROCHLORIDE 20 MG/ML
1 INJECTION, SOLUTION EPIDURAL; INFILTRATION; INTRACAUDAL; PERINEURAL
Status: COMPLETED | OUTPATIENT
Start: 2021-02-17 | End: 2021-02-17

## 2021-02-17 RX ADMIN — LIDOCAINE HYDROCHLORIDE 1 ML: 20 INJECTION, SOLUTION EPIDURAL; INFILTRATION; INTRACAUDAL; PERINEURAL at 09:20

## 2021-02-17 RX ADMIN — BETAMETHASONE SODIUM PHOSPHATE AND BETAMETHASONE ACETATE 6 MG: 3; 3 INJECTION, SUSPENSION INTRA-ARTICULAR; INTRALESIONAL; INTRAMUSCULAR; SOFT TISSUE at 09:20

## 2021-02-17 NOTE — PATIENT INSTRUCTIONS
What is it TRIGGER FINGER? Stenosing tenosynovitis, commonly known as trigger finger or trigger thumb, involves the pulleys and tendons in the hand that bend the fingers  The tendons work like long ropes connecting the muscles of the forearm with the bones of the fingers and thumb  In the finger, the pulleys are a series of rings that form a tunnel through which the tendons must glide, much like the guides on a fishing parish through which the line (or tendon) must pass  These pulleys hold the tendons close against the bone  The tendons and the tunnel have a slick lining that allows easy gliding of the tendon through the pulleys (see Figure 1)  Trigger finger/thumb occurs when the pulley at the base of the finger becomes too thick and constricting around the tendon, making it hard for the tendon to move freely through the pulley  Sometimes the tendon develops a nodule (knot) or swelling of its lining  Because of the increased resistance to the gliding of the tendon through the  pulley, one may feel pain, popping, or a catching feeling in the finger or thumb (see Figure 2)  When the tendon catches, it produces irritation and more swelling  This causes a vicious cycle of triggering, irritation, and swelling  Sometimes the finger becomes stuck or locked, and is hard to straighten or bend  What causes it? Causes for this condition are not always clear  Some trigger fingers are associated with medical conditions such as rheumatoid arthritis, gout, and diabetes  Local trauma to the palm/base of the finger may be a factor on occasion, but in most cases there is not a clear cause  Signs and symptoms   Trigger finger/thumb may start with discomfort felt at the base of the finger or thumb, where they join the palm  This area is often tender to local pressure  A nodule may sometimes be found in this area   When the finger begins to trigger or lock, the patient may think the  problem is at the middle knuckle of the finger or the tip knuckle of the thumb, since the tendon that is sticking is the one that moves these joints  Treatment  The goal of treatment in trigger finger/thumb is to eliminate the catching or locking and allow full movement of the finger or thumb without discomfort  Swelling around the flexor tendon and tendon sheath must be reduced to allow smooth gliding of the tendon  The wearing of a splint or taking an oral anti-inflammatory medication may sometimes  help  Treatment may also include changing activities to reduce swelling  An injection of steroid into the area around the tendon and pulley is often effective in relieving the trigger finger/thumb  If non-surgical forms of treatment do not relieve the symptoms, surgery may be recommended  This surgery is performed as an outpatient, usually with simple local anesthesia  The goal of surgery is to open the pulley at the base of the finger so that the tendon can glide more freely (see Figure 3)  Active motion of the finger generally begins immediately after surgery  Normal use of the hand can usually be resumed once comfort permits  Some patients may feel tenderness, discomfort, and swelling about the area of their surgery longer than others  Occasionally, hand therapy is required after surgery to regain  better use  © 2012 [employer] the [employer]  www handcare  org

## 2021-02-17 NOTE — PROGRESS NOTES
ASSESSMENT/PLAN:    Assessment:   Trigger Finger  left  long finger  Intrinsic tightness left long finger    Plan:   Injection provided today #1  Activities as tolerated    Follow Up:  6 - 8 week(s)    To Do Next Visit:   recheck trigger    General Discussions:     Trigger FInger: The anatomy and physiology of trigger finger was discussed with the patient today in the office  Edema and increased contact pressure within the flexor tendons at the A1 pulley can cause pain, crepitation, and limitation of function  Treatment options include resting MP blocking splints to decrease edema, oral anti-inflammatory medications, home or formal therapy exercises, up to 2 steroid injections within the tendon sheath, or surgical release  While majority of patients do respond to conservative treatment, up to 20% may require surgical release        _____________________________________________________  CHIEF COMPLAINT:  Chief Complaint   Patient presents with    Left Middle Finger - Pain, Clicking, Locking         SUBJECTIVE:  Nam Jackson is a 78y o  year old female who presents with Catching and Locking to the left long finger  This started  2 month(s) ago as Insidious      Radiation: None  Previous Treatments: None without relief  Associated symptoms: Catching and Locking    PAST MEDICAL HISTORY:  Past Medical History:   Diagnosis Date    [facility]     Anxiety     LAST ASSESSED: 12/20/16    Arthritis     Back pain     Carpal tunnel syndrome     Fibromyalgia     LAST ASSESSED: 12/20/16    Fibromyalgia, primary     Heme positive stool     LAST ASSESSED: 10/22/16    High cholesterol     Hyperlipidemia     under control since weight loss    Impingement syndrome of left shoulder     LAST ASSESSED: 2/21/17    Impingement syndrome of right shoulder     LAST ASSESSED: 2/21/1/7    Long term use of drug     LAST ASSESSED: 12/20/16    Low back pain     No known health problems     NO PERTINENT PAST MEDICAL HX    RLS (restless legs syndrome)     Rotator cuff injury     right    Spinal stenosis     Spinal stenosis     Spondylitis (HCC)     Spondyloarthritis     RESOLVED: 16    Vitamin D deficiency        PAST SURGICAL HISTORY:  Past Surgical History:   Procedure Laterality Date    BACK SURGERY      CARPAL TUNNEL RELEASE Left     CERVICAL CONIZATION   W/ LASER      CERVICAL CONIZATION     SECTION      COLONOSCOPY      DILATION AND CURETTAGE OF UTERUS      FL INJECTION RIGHT HIP (NON ARTHROGRAM)  2019    FL INJECTION RIGHT HIP (NON ARTHROGRAM)  2019    HAND SURGERY      NC ARTHRODESIS POSTERIOR/POSTEROLATERAL LUMBAR N/A 4/3/2017    Procedure: L2-L5 OPEN DECOMPRESSIVE LUMBAR LAMINECTOMY W/ PEDICLE SCREW AND NILDA FIXATION FUSION (IMPULSE); REMOVAL OF SKIN TAG MID BACK;  Surgeon: Mery Hewitt MD;  [facility];  Service: Neurosurgery    NC COLONOSCOPY FLX DX W/COLLJ SPEC WHEN PFRMD N/A 10/7/2016    Procedure: EGD AND COLONOSCOPY;  Surgeon: Reece Mcknight MD;  [facility];   Service: Gastroenterology    NC TOTAL HIP ARTHROPLASTY Right 2019    Procedure: ARTHROPLASTY HIP TOTAL Posterior;  Surgeon: Mary Lou Benavidez MD;  [facility];  Service: Orthopedics    NC WRIST Cliffton Abiola LIG Left 2018    Procedure: RELEASE CARPAL TUNNEL ENDOSCOPIC;  Surgeon: Felicity Cranker, MD;  [facility];  Service: Orthopedics    NC WRIST Cliffton Abiola LIG Right 2018    Procedure: RELEASE CARPAL TUNNEL ENDOSCOPIC;  Surgeon: Felicity Cranker, MD;  [facility];  Service: Orthopedics    RETINAL LASER PROCEDURE      TUBAL LIGATION         FAMILY HISTORY:  Family History   Problem Relation Age of Onset    Arthritis Mother     Breast cancer Mother 67    Hypertension Mother     Heart attack Mother         MYOCARDIAL INFARCTION    Heart attack Father     Hypertension Father     Stroke Father         Babita Mcneil ACCIDENT (CVA)    Arthritis Sister     Uterine cancer Sister     Endometrial cancer Sister 61    Heart attack Brother     Prostate cancer Brother 58    Arthritis Brother     Melanoma Brother     Cancer Brother     Arthritis Family     Hyperlipidemia Family     Depression Son     Breast cancer Maternal Aunt 36    No Known Problems Daughter     No Known Problems Maternal Grandmother     No Known Problems Maternal Grandfather     No Known Problems Paternal Grandmother     No Known Problems Paternal Grandfather     No Known Problems Brother     Other Brother         hunting accident (shot)    Melanoma Brother     Prostate cancer Brother     Heart attack Brother     Stroke Brother     Arthritis Sister     Heart attack Sister     No Known Problems Son     No Known Problems Son     Lung cancer Maternal Uncle     Breast cancer additional onset Other 46    Uterine cancer Other        SOCIAL HISTORY:  Social History     Tobacco Use    Smoking status: Never Smoker    Smokeless tobacco: Never Used   Substance Use Topics    Alcohol use: Never     Frequency: Never    Drug use: No       MEDICATIONS:    Current Outpatient Medications:     aspirin 81 MG tablet, Take 1 tablet by mouth daily, Disp: , Rfl:     cephalexin (KEFLEX) 500 mg capsule, Take 4 tablets p o  1 hr prior to dental appointment as instructed, Disp: 20 capsule, Rfl: 1    Cholecalciferol (VITAMIN D3) 50 MCG (2000 UT) capsule, Vitamin D3 50 mcg (2,000 unit) capsule  Take by oral route , Disp: , Rfl:     clonazePAM (KlonoPIN) 0 5 mg tablet, Take 0 5 mg by mouth daily at bedtime, Disp: , Rfl: 4    diclofenac sodium (VOLTAREN) 1 %, Apply 4 g topically 4 (four) times a day, Disp: 100 g, Rfl: 4    Fluad Quadrivalent 0 5 ML PRSY, PHARMACY ADMINISTERED, Disp: , Rfl:     gabapentin (NEURONTIN) 100 mg capsule, Take 1 capsule (100 mg total) by mouth 2 (two) times a day, Disp: 180 capsule, Rfl: 0    gabapentin (NEURONTIN) 300 mg capsule, Take 1 capsule (300 mg total) by mouth daily at bedtime, Disp: 90 capsule, Rfl: 0    meloxicam (Mobic) 15 mg tablet, Take 1 tablet (15 mg total) by mouth daily, Disp: 30 tablet, Rfl: 2    methocarbamol (ROBAXIN) 750 mg tablet, TAKE 1 TABLET (750 MG TOTAL) BY MOUTH EVERY 6 (SIX) HOURS AS NEEDED FOR MUSCLE SPASMS, Disp: 100 tablet, Rfl: 0    PARoxetine (PAXIL) 10 mg tablet, Take 1 tablet (10 mg total) by mouth daily, Disp: 30 tablet, Rfl: 6    traMADol (ULTRAM) 50 mg tablet, Take 1 tablet (50 mg total) by mouth every 6 (six) hours as needed for moderate pain for up to 60 doses, Disp: 60 tablet, Rfl: 0    oxyCODONE (ROXICODONE) 5 mg immediate release tablet, 1 pill po Q4 Hrs prn, Disp: 30 tablet, Rfl: 0    ALLERGIES:  No Known Allergies    REVIEW OF SYSTEMS:  Pertinent items are noted in HPI  A comprehensive review of systems was negative  LABS:  HgA1c:   Lab Results   Component Value Date    HGBA1C 5 6 10/15/2019     BMP:   Lab Results   Component Value Date    GLUCOSE 89 11/28/2015    CALCIUM 9 1 12/26/2019     11/28/2015    K 4 0 12/26/2019    CO2 29 12/26/2019     (H) 12/26/2019    BUN 22 12/26/2019    CREATININE 0 70 12/26/2019         _____________________________________________________  PHYSICAL EXAMINATION:  /78   Pulse 88   Ht 5' 1" (1 549 m)   Wt 83 9 kg (185 lb)   LMP  (LMP Unknown)   BMI 34 96 kg/m²   General: well developed and well nourished, alert, oriented times 3 and appears comfortable  Psychiatric: Normal  HEENT: Trachea Midline, No torticollis  Cardiovascular: No discernable arrhythmia  Pulmonary: No wheezing or stridor  Skin: No masses, erythema, lacerations, fluctation, ulcerations  Neurovascular: Sensation Intact to the Median, Ulnar, Radial Nerve, Motor Intact to the Median, Ulnar, Radial Nerve and Pulses Intact    MUSCULOSKELETAL EXAMINATION:  left long finger:  Positive palpable nodule over the A1 pulley  Positive tenderness to palpation over A1 pulley  Positive catching  Positive clicking    Flexor and extensor tendons intact, intrinsic tightness    _____________________________________________________  STUDIES REVIEWED:  No Studies to review      PROCEDURES PERFORMED:  Hand/upper extremity injection: L long A1  Universal Protocol:  Consent given by: patient  Patient understanding: patient states understanding of the procedure being performed  Site marked: the operative site was marked  Patient identity confirmed: verbally with patient    Supporting Documentation  Indications: pain   Procedure Details  Condition:trigger finger Location: long finger - L long A1   Needle size: 25 G  Ultrasound guidance: no  Medications administered: 6 mg betamethasone acetate-betamethasone sodium phosphate 6 (3-3) mg/mL; 1 mL lidocaine (PF) 2 %    Patient tolerance: patient tolerated the procedure well with no immediate complications  Dressing:  Sterile dressing applied              Scribe Attestation    I,:  Caleb Sexton am acting as a scribe while in the presence of the attending physician :       I,:  Matthew Kaur MD personally performed the services described in this documentation    as scribed in my presence :

## 2021-02-24 ENCOUNTER — OFFICE VISIT (OUTPATIENT)
Dept: PAIN MEDICINE | Facility: MEDICAL CENTER | Age: 71
End: 2021-02-24
Payer: COMMERCIAL

## 2021-02-24 VITALS
WEIGHT: 182 LBS | TEMPERATURE: 98.5 F | DIASTOLIC BLOOD PRESSURE: 74 MMHG | SYSTOLIC BLOOD PRESSURE: 115 MMHG | HEART RATE: 84 BPM | BODY MASS INDEX: 34.36 KG/M2 | HEIGHT: 61 IN | RESPIRATION RATE: 14 BRPM

## 2021-02-24 DIAGNOSIS — M54.50 CHRONIC BILATERAL LOW BACK PAIN WITHOUT SCIATICA: ICD-10-CM

## 2021-02-24 DIAGNOSIS — G62.9 NEUROPATHY: ICD-10-CM

## 2021-02-24 DIAGNOSIS — G89.29 CHRONIC BILATERAL LOW BACK PAIN WITHOUT SCIATICA: ICD-10-CM

## 2021-02-24 DIAGNOSIS — M48.061 SPINAL STENOSIS OF LUMBAR REGION AT MULTIPLE LEVELS: Primary | ICD-10-CM

## 2021-02-24 PROCEDURE — 99213 OFFICE O/P EST LOW 20 MIN: CPT | Performed by: NURSE PRACTITIONER

## 2021-02-24 PROCEDURE — 1036F TOBACCO NON-USER: CPT | Performed by: NURSE PRACTITIONER

## 2021-02-24 PROCEDURE — 3008F BODY MASS INDEX DOCD: CPT | Performed by: NURSE PRACTITIONER

## 2021-02-24 PROCEDURE — 1160F RVW MEDS BY RX/DR IN RCRD: CPT | Performed by: NURSE PRACTITIONER

## 2021-02-24 RX ORDER — GABAPENTIN 300 MG/1
300 CAPSULE ORAL
Qty: 90 CAPSULE | Refills: 0 | Status: SHIPPED | OUTPATIENT
Start: 2021-02-24 | End: 2021-05-24 | Stop reason: SDUPTHER

## 2021-02-24 RX ORDER — MELOXICAM 15 MG/1
15 TABLET ORAL DAILY
Qty: 30 TABLET | Refills: 2 | Status: SHIPPED | OUTPATIENT
Start: 2021-02-24 | End: 2021-05-24 | Stop reason: SDUPTHER

## 2021-02-24 RX ORDER — GABAPENTIN 100 MG/1
100 CAPSULE ORAL 2 TIMES DAILY
Qty: 180 CAPSULE | Refills: 0 | Status: SHIPPED | OUTPATIENT
Start: 2021-02-24 | End: 2021-05-24 | Stop reason: SDUPTHER

## 2021-02-24 NOTE — PROGRESS NOTES
Assessment  1  Spinal stenosis of lumbar region at multiple levels    2  Neuropathy    3  Chronic bilateral low back pain without sciatica        Plan  Continue with gabapentin and meloxicam both of these were refilled today  Continue with home exercises learned at physical therapy  Follow-up visit in 12 weeks for medication refills      My impressions and treatment recommendations were discussed in detail with the patient who verbalized understanding and had no further questions  Discharge instructions were provided  I personally saw and examined the patient and I agree with the above discussed plan of care  No orders of the defined types were placed in this encounter  New Medications Ordered This Visit   Medications    gabapentin (NEURONTIN) 300 mg capsule     Sig: Take 1 capsule (300 mg total) by mouth daily at bedtime     Dispense:  90 capsule     Refill:  0    gabapentin (NEURONTIN) 100 mg capsule     Sig: Take 1 capsule (100 mg total) by mouth 2 (two) times a day     Dispense:  180 capsule     Refill:  0    meloxicam (Mobic) 15 mg tablet     Sig: Take 1 tablet (15 mg total) by mouth daily     Dispense:  30 tablet     Refill:  2       History of Present Illness    Nuno Burton is a 78 y o  female who presents for follow-up related to her chronic low back pain  Today the patient reports she is doing better rates her pain 1/10  She gets occasional pain in the morning as described as dull, and aching  Patient tells me that she has completed physical therapy she feels that this helped her back a lot  She does continue to do home exercises now  Patient is taking gabapentin 100 mg in the morning in the afternoon and 300 mg at bedtime she feels that by increasing the bedtime dose she is sleeping much better  She also uses meloxicam 15 mg daily  Her medication regimen provides 95% relief without any side effects or issues      I have personally reviewed and/or updated the patient's past medical history, past surgical history, family history, social history, current medications, allergies, and vital signs today  Review of Systems   Respiratory: Negative for shortness of breath  Cardiovascular: Negative for chest pain  Gastrointestinal: Negative for constipation, diarrhea, nausea and vomiting  Musculoskeletal: Positive for back pain, joint swelling and myalgias  Negative for arthralgias and gait problem  Skin: Negative for rash  Neurological: Negative for dizziness, seizures and weakness  Psychiatric/Behavioral: Positive for decreased concentration and sleep disturbance  All other systems reviewed and are negative        Patient Active Problem List   Diagnosis    Spinal stenosis of lumbar region at multiple levels    Spondylosis    S/P endoscopic carpal tunnel release    Chronic bilateral low back pain    Encounter for annual routine gynecological examination    Encounter for screening mammogram for malignant neoplasm of breast    Primary osteoarthritis of right knee    Abnormal EKG    Aftercare following right hip joint replacement surgery    Status post total hip replacement, right    Acute pain of right knee    Hyperlipidemia    Right hip pain    Trigger finger of left hand    Close exposure to COVID-19 virus       Past Medical History:   Diagnosis Date    [facility]     Anxiety     LAST ASSESSED: 12/20/16    Arthritis     Back pain     Carpal tunnel syndrome     Fibromyalgia     LAST ASSESSED: 12/20/16    Fibromyalgia, primary     Heme positive stool     LAST ASSESSED: 10/22/16    High cholesterol     Hyperlipidemia     under control since weight loss    Impingement syndrome of left shoulder     LAST ASSESSED: 2/21/17    Impingement syndrome of right shoulder     LAST ASSESSED: 2/21/1/7    Long term use of drug     LAST ASSESSED: 12/20/16    Low back pain     No known health problems     NO PERTINENT PAST MEDICAL HX    RLS (restless legs syndrome)     Rotator cuff injury     right    Spinal stenosis     Spinal stenosis     Spondylitis (HCC)     Spondyloarthritis     RESOLVED: 16    Vitamin D deficiency        Past Surgical History:   Procedure Laterality Date    BACK SURGERY      CARPAL TUNNEL RELEASE Left     CERVICAL CONIZATION   W/ LASER      CERVICAL CONIZATION     SECTION      COLONOSCOPY      DILATION AND CURETTAGE OF UTERUS      FL INJECTION RIGHT HIP (NON ARTHROGRAM)  2019    FL INJECTION RIGHT HIP (NON ARTHROGRAM)  2019    FRACTURE SURGERY      HAND SURGERY      JOINT REPLACEMENT      ORTHOPEDIC SURGERY      AR ARTHRODESIS POSTERIOR/POSTEROLATERAL LUMBAR N/A 4/3/2017    Procedure: L2-L5 OPEN DECOMPRESSIVE LUMBAR LAMINECTOMY W/ PEDICLE SCREW AND NILDA FIXATION FUSION (IMPULSE); REMOVAL OF SKIN TAG MID BACK;  Surgeon: Phong Cordova MD;  [facility];  Service: Neurosurgery    AR COLONOSCOPY FLX DX W/COLLJ SPEC WHEN PFRMD N/A 10/7/2016    Procedure: EGD AND COLONOSCOPY;  Surgeon: Abdoul Orellana MD;  [facility];   Service: Gastroenterology    AR TOTAL HIP ARTHROPLASTY Right 2019    Procedure: ARTHROPLASTY HIP TOTAL Posterior;  Surgeon: Jayashree Sánchez MD;  [facility];  Service: Orthopedics    AR WRIST Gemma Quarry LIG Left 2018    Procedure: RELEASE CARPAL TUNNEL ENDOSCOPIC;  Surgeon: Vandy Runner, MD;  [facility];  Service: Orthopedics    AR WRIST Gemma Quarry LIG Right 2018    Procedure: RELEASE CARPAL TUNNEL ENDOSCOPIC;  Surgeon: Vandy Runner, MD;  [facility];  Service: Orthopedics    RETINAL LASER PROCEDURE      TUBAL LIGATION         Family History   Problem Relation Age of Onset    Arthritis Mother     Breast cancer Mother 67    Hypertension Mother     Heart attack Mother         MYOCARDIAL INFARCTION    Heart attack Father     Hypertension Father     Stroke Father CEREBROVASCUALR ACCIDENT (CVA)    Arthritis Sister     Uterine cancer Sister     Endometrial cancer Sister 61    Heart attack Brother     Prostate cancer Brother 58    Arthritis Brother     Melanoma Brother     Cancer Brother     Arthritis Family     Hyperlipidemia Family     Depression Son     Breast cancer Maternal Aunt 36    No Known Problems Daughter     No Known Problems Maternal Grandmother     No Known Problems Maternal Grandfather     No Known Problems Paternal Grandmother     No Known Problems Paternal Grandfather     No Known Problems Brother     Other Brother         hunting accident (shot)    Melanoma Brother     Prostate cancer Brother     Heart attack Brother     Stroke Brother     Arthritis Sister     Heart attack Sister     No Known Problems Son     No Known Problems Son     Lung cancer Maternal Uncle     Breast cancer additional onset Other 46    Uterine cancer Other        Social History     Occupational History    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  Comment: EMPLOYED   Tobacco Use    Smoking status: Never Smoker    Smokeless tobacco: Never Used   Substance and Sexual Activity    Alcohol use: Never     Frequency: Never    Drug use: No    Sexual activity: Yes     Partners: Male     Birth control/protection: None       Current Outpatient Medications on File Prior to Visit   Medication Sig    aspirin 81 MG tablet Take 1 tablet by mouth daily    Cholecalciferol (VITAMIN D3) 50 MCG (2000 UT) capsule Vitamin D3 50 mcg (2,000 unit) capsule   Take by oral route      clonazePAM (KlonoPIN) 0 5 mg tablet Take 0 5 mg by mouth daily at bedtime    methocarbamol (ROBAXIN) 750 mg tablet TAKE 1 TABLET (750 MG TOTAL) BY MOUTH EVERY 6 (SIX) HOURS AS NEEDED FOR MUSCLE SPASMS    PARoxetine (PAXIL) 10 mg tablet Take 1 tablet (10 mg total) by mouth daily    traMADol (ULTRAM) 50 mg tablet Take 1 tablet (50 mg total) by mouth every 6 (six) hours as needed for moderate pain for up to 60 doses    [DISCONTINUED] gabapentin (NEURONTIN) 100 mg capsule Take 1 capsule (100 mg total) by mouth 2 (two) times a day    [DISCONTINUED] gabapentin (NEURONTIN) 300 mg capsule Take 1 capsule (300 mg total) by mouth daily at bedtime    [DISCONTINUED] meloxicam (Mobic) 15 mg tablet Take 1 tablet (15 mg total) by mouth daily    cephalexin (KEFLEX) 500 mg capsule Take 4 tablets p o  1 hr prior to dental appointment as instructed (Patient not taking: Reported on 2/24/2021)    Fluad Quadrivalent 0 5 ML PRSY PHARMACY ADMINISTERED    oxyCODONE (ROXICODONE) 5 mg immediate release tablet 1 pill po Q4 Hrs prn (Patient not taking: Reported on 2/24/2021)    [DISCONTINUED] diclofenac sodium (VOLTAREN) 1 % Apply 4 g topically 4 (four) times a day     No current facility-administered medications on file prior to visit  No Known Allergies    Physical Exam    /74   Pulse 84   Temp 98 5 °F (36 9 °C)   Resp 14   Ht 5' 1" (1 549 m)   Wt 82 6 kg (182 lb)   LMP  (LMP Unknown)   BMI 34 39 kg/m²     Constitutional: normal, well developed, well nourished, alert, in no distress and non-toxic and no overt pain behavior    Eyes: anicteric  HEENT: grossly intact  Neck: supple, symmetric, trachea midline and no masses   Pulmonary:even and unlabored  Cardiovascular:No edema or pitting edema present  Skin:Normal without rashes or lesions and well hydrated  Psychiatric:Mood and affect appropriate  Neurologic:Cranial Nerves II-XII grossly intact  Musculoskeletal:normal    Imaging

## 2021-03-03 ENCOUNTER — TELEPHONE (OUTPATIENT)
Dept: INTERNAL MEDICINE CLINIC | Facility: CLINIC | Age: 71
End: 2021-03-03

## 2021-03-03 DIAGNOSIS — B35.1 NAIL FUNGUS: Primary | ICD-10-CM

## 2021-03-03 NOTE — TELEPHONE ENCOUNTER
Patient called for foot fungus and stated you prescribed medicine in the past   Patient goes to [facility] on [address] in [place] [place]  Please send medicine if possible    Thank you

## 2021-03-03 NOTE — PROGRESS NOTES
Patient called she has a fungal infection of the 1st toe nail  Of recommend Penlac applied daily continue for duration of 3 months

## 2021-03-10 DIAGNOSIS — Z23 ENCOUNTER FOR IMMUNIZATION: ICD-10-CM

## 2021-03-25 ENCOUNTER — IMMUNIZATIONS (OUTPATIENT)
Dept: FAMILY MEDICINE CLINIC | Facility: HOSPITAL | Age: 71
End: 2021-03-25

## 2021-03-25 DIAGNOSIS — Z23 ENCOUNTER FOR IMMUNIZATION: Primary | ICD-10-CM

## 2021-03-25 PROCEDURE — 91301 SARS-COV-2 / COVID-19 MRNA VACCINE (MODERNA) 100 MCG: CPT

## 2021-03-25 PROCEDURE — 0011A SARS-COV-2 / COVID-19 MRNA VACCINE (MODERNA) 100 MCG: CPT

## 2021-04-09 DIAGNOSIS — F32.A DEPRESSION, UNSPECIFIED DEPRESSION TYPE: ICD-10-CM

## 2021-04-09 RX ORDER — PAROXETINE 10 MG/1
TABLET, FILM COATED ORAL
Qty: 30 TABLET | Refills: 6 | Status: SHIPPED | OUTPATIENT
Start: 2021-04-09 | End: 2021-12-06

## 2021-04-14 ENCOUNTER — OFFICE VISIT (OUTPATIENT)
Dept: OBGYN CLINIC | Facility: HOSPITAL | Age: 71
End: 2021-04-14
Payer: COMMERCIAL

## 2021-04-14 VITALS
DIASTOLIC BLOOD PRESSURE: 74 MMHG | HEART RATE: 97 BPM | SYSTOLIC BLOOD PRESSURE: 121 MMHG | HEIGHT: 61 IN | WEIGHT: 183.2 LBS | BODY MASS INDEX: 34.59 KG/M2

## 2021-04-14 DIAGNOSIS — M65.332 TRIGGER MIDDLE FINGER OF LEFT HAND: Primary | ICD-10-CM

## 2021-04-14 PROCEDURE — 1160F RVW MEDS BY RX/DR IN RCRD: CPT | Performed by: ORTHOPAEDIC SURGERY

## 2021-04-14 PROCEDURE — 20550 NJX 1 TENDON SHEATH/LIGAMENT: CPT | Performed by: ORTHOPAEDIC SURGERY

## 2021-04-14 PROCEDURE — 99213 OFFICE O/P EST LOW 20 MIN: CPT | Performed by: ORTHOPAEDIC SURGERY

## 2021-04-14 PROCEDURE — 1036F TOBACCO NON-USER: CPT | Performed by: ORTHOPAEDIC SURGERY

## 2021-04-14 PROCEDURE — 3008F BODY MASS INDEX DOCD: CPT | Performed by: ORTHOPAEDIC SURGERY

## 2021-04-14 RX ADMIN — BETAMETHASONE SODIUM PHOSPHATE AND BETAMETHASONE ACETATE 6 MG: 3; 3 INJECTION, SUSPENSION INTRA-ARTICULAR; INTRALESIONAL; INTRAMUSCULAR; SOFT TISSUE at 10:24

## 2021-04-14 RX ADMIN — LIDOCAINE HYDROCHLORIDE 1 ML: 10 INJECTION, SOLUTION INFILTRATION; PERINEURAL at 10:24

## 2021-04-14 NOTE — PROGRESS NOTES
ASSESSMENT/PLAN:    Assessment:   Trigger Finger  left  long finger    Plan:   steroid injections  - second steroid injection provided in office today     Follow Up:  6  week(s)    To Do Next Visit:  Reassess response to treatment    General Discussions:  Trigger FInger: The anatomy and physiology of trigger finger was discussed with the patient today in the office  Edema and increased contact pressure within the flexor tendons at the A1 pulley can cause pain, crepitation, and limitation of function  Treatment options include resting MP blocking splints to decrease edema, oral anti-inflammatory medications, home or formal therapy exercises, up to 2 steroid injections within the tendon sheath, or surgical release  While majority of patients do respond to conservative treatment, up to 20% may require surgical release      _____________________________________________________  CHIEF COMPLAINT:  Chief Complaint   Patient presents with    Left Middle Finger - Triggering, Follow-up     1st injection 2/17/21         SUBJECTIVE:  Prateek Chamorro is a 78 y o  female who presents for follow up regarding   Left long finger trigger finger  Patient had a steroid injection at her last visit with 80% improvement  She denies any persistent locking or popping  She denies any injury  She denies any other acute complaints      PAST MEDICAL HISTORY:  Past Medical History:   Diagnosis Date    [facility]ylosing spondylitis ([facility] [address] )     Anxiety     LAST ASSESSED: 12/20/16    Arthritis     Back pain     Carpal tunnel syndrome     Fibromyalgia     LAST ASSESSED: 12/20/16    Fibromyalgia, primary     Heme positive stool     LAST ASSESSED: 10/22/16    High cholesterol     Hyperlipidemia     under control since weight loss    Impingement syndrome of left shoulder     LAST ASSESSED: 2/21/17    Impingement syndrome of right shoulder     LAST ASSESSED: 2/21/1/7    Long term use of drug     LAST ASSESSED: 12/20/16    Low back pain     No known health problems     NO PERTINENT PAST MEDICAL HX    RLS (restless legs syndrome)     Rotator cuff injury     right    Spinal stenosis     Spinal stenosis     Spondylitis (HCC)     Spondyloarthritis     RESOLVED: 16    Vitamin D deficiency        PAST SURGICAL HISTORY:  Past Surgical History:   Procedure Laterality Date    BACK SURGERY      CARPAL TUNNEL RELEASE Left     CERVICAL CONIZATION   W/ LASER      CERVICAL CONIZATION     SECTION      COLONOSCOPY      DILATION AND CURETTAGE OF UTERUS      FL INJECTION RIGHT HIP (NON ARTHROGRAM)  2019    FL INJECTION RIGHT HIP (NON ARTHROGRAM)  2019    FRACTURE SURGERY      HAND SURGERY      JOINT REPLACEMENT      ORTHOPEDIC SURGERY      ME ARTHRODESIS POSTERIOR/POSTEROLATERAL LUMBAR N/A 4/3/2017    Procedure: L2-L5 OPEN DECOMPRESSIVE LUMBAR LAMINECTOMY W/ PEDICLE SCREW AND NILDA FIXATION FUSION (IMPULSE); REMOVAL OF SKIN TAG MID BACK;  Surgeon: Anup Gallagher MD;  [facility];  Service: Neurosurgery    ME COLONOSCOPY FLX DX W/COLLJ SPEC WHEN PFRMD N/A 10/7/2016    Procedure: EGD AND COLONOSCOPY;  Surgeon: Jeff Owens MD;  [facility];   Service: Gastroenterology    ME TOTAL HIP ARTHROPLASTY Right 2019    Procedure: ARTHROPLASTY HIP TOTAL Posterior;  Surgeon: Antonio Johnson MD;  [facility];  Service: Orthopedics    ME WRIST Volney Saltness LIG Left 2018    Procedure: RELEASE CARPAL TUNNEL ENDOSCOPIC;  Surgeon: Deejay Owens MD;  [facility];  Service: Orthopedics    ME WRIST Volney Saltness LIG Right 2018    Procedure: RELEASE CARPAL TUNNEL ENDOSCOPIC;  Surgeon: Deejay Owens MD;  [facility];  Service: Orthopedics    RETINAL LASER PROCEDURE      TUBAL LIGATION         FAMILY HISTORY:  Family History   Problem Relation Age of Onset    Arthritis Mother    Alyssa Neely Breast cancer Mother 67    Hypertension Mother     Heart attack Mother MYOCARDIAL INFARCTION    Heart attack Father     Hypertension Father     Stroke Father         CEREBROVASCUALR ACCIDENT (CVA)    Arthritis Sister     Uterine cancer Sister     Endometrial cancer Sister 61    Heart attack Brother     Prostate cancer Brother 58    Arthritis Brother     Melanoma Brother     Cancer Brother     Arthritis Family     Hyperlipidemia Family     Depression Son     Breast cancer Maternal Aunt 36    No Known Problems Daughter     No Known Problems Maternal Grandmother     No Known Problems Maternal Grandfather     No Known Problems Paternal Grandmother     No Known Problems Paternal Grandfather     No Known Problems Brother     Other Brother         hunting accident (shot)    Melanoma Brother     Prostate cancer Brother     Heart attack Brother     Stroke Brother     Arthritis Sister     Heart attack Sister     No Known Problems Son     No Known Problems Son     Lung cancer Maternal Uncle     Breast cancer additional onset Other 46    Uterine cancer Other        SOCIAL HISTORY:  Social History     Tobacco Use    Smoking status: Never Smoker    Smokeless tobacco: Never Used   Substance Use Topics    Alcohol use: Never     Frequency: Never    Drug use: No       MEDICATIONS:    Current Outpatient Medications:     aspirin 81 MG tablet, Take 1 tablet by mouth daily, Disp: , Rfl:     Cholecalciferol (VITAMIN D3) 50 MCG (2000 UT) capsule, Vitamin D3 50 mcg (2,000 unit) capsule  Take by oral route , Disp: , Rfl:     ciclopirox (PENLAC) 8 % solution, Apply topically daily at bedtime Remove buildup once a week with cotton ball in alcohol continued treatment for 3 months, Disp: 6 6 mL, Rfl: 1    clonazePAM (KlonoPIN) 0 5 mg tablet, Take 0 5 mg by mouth daily at bedtime, Disp: , Rfl: 4    Diclofenac Sodium (VOLTAREN) 1 %, APPLY 4 G TOPICALLY 4 (FOUR) TIMES A DAY, Disp: , Rfl:     Fluad Quadrivalent 0 5 ML PRSY, PHARMACY ADMINISTERED, Disp: , Rfl:    gabapentin (NEURONTIN) 100 mg capsule, Take 1 capsule (100 mg total) by mouth 2 (two) times a day, Disp: 180 capsule, Rfl: 0    gabapentin (NEURONTIN) 300 mg capsule, Take 1 capsule (300 mg total) by mouth daily at bedtime, Disp: 90 capsule, Rfl: 0    meloxicam (Mobic) 15 mg tablet, Take 1 tablet (15 mg total) by mouth daily, Disp: 30 tablet, Rfl: 2    methocarbamol (ROBAXIN) 750 mg tablet, TAKE 1 TABLET (750 MG TOTAL) BY MOUTH EVERY 6 (SIX) HOURS AS NEEDED FOR MUSCLE SPASMS, Disp: 100 tablet, Rfl: 0    PARoxetine (PAXIL) 10 mg tablet, TAKE 1 TABLET BY MOUTH EVERY DAY, Disp: 30 tablet, Rfl: 6    traMADol (ULTRAM) 50 mg tablet, Take 1 tablet (50 mg total) by mouth every 6 (six) hours as needed for moderate pain for up to 60 doses, Disp: 60 tablet, Rfl: 0    cephalexin (KEFLEX) 500 mg capsule, Take 4 tablets p o  1 hr prior to dental appointment as instructed (Patient not taking: Reported on 2/24/2021), Disp: 20 capsule, Rfl: 1    oxyCODONE (ROXICODONE) 5 mg immediate release tablet, 1 pill po Q4 Hrs prn (Patient not taking: Reported on 2/24/2021), Disp: 30 tablet, Rfl: 0    ALLERGIES:  No Known Allergies    REVIEW OF SYSTEMS:  Pertinent items are noted in HPI  A comprehensive review of systems was negative      LABS:  HgA1c:   Lab Results   Component Value Date    HGBA1C 5 6 10/15/2019     BMP:   Lab Results   Component Value Date    GLUCOSE 89 11/28/2015    CALCIUM 9 1 12/26/2019     11/28/2015    K 4 0 12/26/2019    CO2 29 12/26/2019     (H) 12/26/2019    BUN 22 12/26/2019    CREATININE 0 70 12/26/2019           _____________________________________________________  PHYSICAL EXAMINATION:  Vital signs: /74   Pulse 97   Ht 5' 1" (1 549 m)   Wt 83 1 kg (183 lb 3 2 oz)   LMP  (LMP Unknown)   BMI 34 62 kg/m²   General: well developed and well nourished, alert, oriented times 3 and appears comfortable  Psychiatric: Normal  HEENT: Trachea Midline, No torticollis  Cardiovascular: No discernable arrhythmia  Pulmonary: No wheezing or stridor  Skin: No masses, erythema, lacerations, fluctation, ulcerations  Neurovascular: Sensation Intact to the Median, Ulnar, Radial Nerve, Motor Intact to the Median, Ulnar, Radial Nerve and Pulses Intact    MUSCULOSKELETAL EXAMINATION:  LEFT SIDE:  Finger:  Palpable clicking long finger, Crepitation long finger and Nodules  long finger    _____________________________________________________  STUDIES REVIEWED:  No Studies to review      PROCEDURES PERFORMED:  Hand/upper extremity injection: L long A1  Universal Protocol:  Consent: Verbal consent obtained  Risks and benefits: risks, benefits and alternatives were discussed  Consent given by: patient  Time out: Immediately prior to procedure a "time out" was called to verify the correct patient, procedure, equipment, support staff and site/side marked as required    Timeout called at: 4/14/2021 9:23 AM   Supporting Documentation  Indications: pain and therapeutic   Procedure Details  Condition:trigger finger Location: long finger - L long A1   Preparation: Patient was prepped and draped in the usual sterile fashion  Needle size: 25 G  Ultrasound guidance: no  Approach: volar  Medications administered: 1 mL lidocaine 1 %; 6 mg betamethasone acetate-betamethasone sodium phosphate 6 (3-3) mg/mL    Patient tolerance: patient tolerated the procedure well with no immediate complications  Dressing:  Sterile dressing applied              Scribe Attestation    I,:  Terese Glasgow PA-C am acting as a scribe while in the presence of the attending physician :       I,:  Evita Orellana MD personally performed the services described in this documentation    as scribed in my presence :           Jesse Jackson,:  Terese Glasgow PA-C am acting as a scribe while in the presence of the attending physician :       I,:  Evita Orellana MD personally performed the services described in this documentation    as scribed in my presence :

## 2021-04-15 RX ORDER — LIDOCAINE HYDROCHLORIDE 10 MG/ML
1 INJECTION, SOLUTION INFILTRATION; PERINEURAL
Status: COMPLETED | OUTPATIENT
Start: 2021-04-14 | End: 2021-04-14

## 2021-04-15 RX ORDER — BETAMETHASONE SODIUM PHOSPHATE AND BETAMETHASONE ACETATE 3; 3 MG/ML; MG/ML
6 INJECTION, SUSPENSION INTRA-ARTICULAR; INTRALESIONAL; INTRAMUSCULAR; SOFT TISSUE
Status: COMPLETED | OUTPATIENT
Start: 2021-04-14 | End: 2021-04-14

## 2021-04-26 ENCOUNTER — IMMUNIZATIONS (OUTPATIENT)
Dept: FAMILY MEDICINE CLINIC | Facility: HOSPITAL | Age: 71
End: 2021-04-26

## 2021-04-26 ENCOUNTER — TELEPHONE (OUTPATIENT)
Dept: OBGYN CLINIC | Facility: HOSPITAL | Age: 71
End: 2021-04-26

## 2021-04-26 DIAGNOSIS — Z96.641 AFTERCARE FOLLOWING RIGHT HIP JOINT REPLACEMENT SURGERY: ICD-10-CM

## 2021-04-26 DIAGNOSIS — Z23 ENCOUNTER FOR IMMUNIZATION: Primary | ICD-10-CM

## 2021-04-26 DIAGNOSIS — Z47.1 AFTERCARE FOLLOWING RIGHT HIP JOINT REPLACEMENT SURGERY: ICD-10-CM

## 2021-04-26 PROCEDURE — 0012A SARS-COV-2 / COVID-19 MRNA VACCINE (MODERNA) 100 MCG: CPT

## 2021-04-26 PROCEDURE — 91301 SARS-COV-2 / COVID-19 MRNA VACCINE (MODERNA) 100 MCG: CPT

## 2021-04-26 RX ORDER — CEPHALEXIN 500 MG/1
CAPSULE ORAL
Qty: 20 CAPSULE | Refills: 1 | Status: SHIPPED | OUTPATIENT
Start: 2021-04-26 | End: 2023-03-28

## 2021-05-03 DIAGNOSIS — Z96.641 STATUS POST TOTAL HIP REPLACEMENT, RIGHT: ICD-10-CM

## 2021-05-03 RX ORDER — TRAMADOL HYDROCHLORIDE 50 MG/1
50 TABLET ORAL EVERY 6 HOURS PRN
Qty: 60 TABLET | Refills: 0 | Status: SHIPPED | OUTPATIENT
Start: 2021-05-03 | End: 2021-08-05

## 2021-05-04 DIAGNOSIS — M17.11 PRIMARY OSTEOARTHRITIS OF RIGHT KNEE: Primary | ICD-10-CM

## 2021-05-05 ENCOUNTER — TELEPHONE (OUTPATIENT)
Dept: INTERNAL MEDICINE CLINIC | Facility: CLINIC | Age: 71
End: 2021-05-05

## 2021-05-05 NOTE — TELEPHONE ENCOUNTER
Patient called and left a detailed voice asking if she should take mobic in the am and then voltaren later in the day  Or should she discontinue one or the other

## 2021-05-05 NOTE — TELEPHONE ENCOUNTER
Call returned to the patient indicated that I feel she should only use Mobic or Voltaren preferring that she use of Voltaren gel for mild-to-moderate pain and Mobic for severe period she should never use both within 24 hours of each other

## 2021-05-24 ENCOUNTER — OFFICE VISIT (OUTPATIENT)
Dept: PAIN MEDICINE | Facility: MEDICAL CENTER | Age: 71
End: 2021-05-24
Payer: COMMERCIAL

## 2021-05-24 VITALS
HEIGHT: 61 IN | WEIGHT: 186 LBS | HEART RATE: 62 BPM | SYSTOLIC BLOOD PRESSURE: 133 MMHG | DIASTOLIC BLOOD PRESSURE: 85 MMHG | BODY MASS INDEX: 35.12 KG/M2 | TEMPERATURE: 97.3 F

## 2021-05-24 DIAGNOSIS — M48.061 SPINAL STENOSIS OF LUMBAR REGION AT MULTIPLE LEVELS: Primary | ICD-10-CM

## 2021-05-24 DIAGNOSIS — G62.9 NEUROPATHY: ICD-10-CM

## 2021-05-24 DIAGNOSIS — M54.50 CHRONIC BILATERAL LOW BACK PAIN WITHOUT SCIATICA: ICD-10-CM

## 2021-05-24 DIAGNOSIS — G89.29 CHRONIC BILATERAL LOW BACK PAIN WITHOUT SCIATICA: ICD-10-CM

## 2021-05-24 PROCEDURE — 99213 OFFICE O/P EST LOW 20 MIN: CPT | Performed by: NURSE PRACTITIONER

## 2021-05-24 PROCEDURE — 1036F TOBACCO NON-USER: CPT | Performed by: NURSE PRACTITIONER

## 2021-05-24 PROCEDURE — 1160F RVW MEDS BY RX/DR IN RCRD: CPT | Performed by: NURSE PRACTITIONER

## 2021-05-24 PROCEDURE — 3008F BODY MASS INDEX DOCD: CPT | Performed by: NURSE PRACTITIONER

## 2021-05-24 RX ORDER — GABAPENTIN 100 MG/1
100 CAPSULE ORAL 2 TIMES DAILY
Qty: 180 CAPSULE | Refills: 0 | Status: SHIPPED | OUTPATIENT
Start: 2021-05-24 | End: 2021-09-15 | Stop reason: SDUPTHER

## 2021-05-24 RX ORDER — GABAPENTIN 300 MG/1
300 CAPSULE ORAL
Qty: 90 CAPSULE | Refills: 0 | Status: SHIPPED | OUTPATIENT
Start: 2021-05-24 | End: 2021-09-15 | Stop reason: SDUPTHER

## 2021-05-24 RX ORDER — MELOXICAM 15 MG/1
15 TABLET ORAL DAILY
Qty: 30 TABLET | Refills: 2 | Status: SHIPPED | OUTPATIENT
Start: 2021-05-24 | End: 2021-09-15 | Stop reason: SDUPTHER

## 2021-05-24 NOTE — PROGRESS NOTES
Assessment:  1  Spinal stenosis of lumbar region at multiple levels    2  Chronic bilateral low back pain without sciatica    3  Neuropathy        Plan:    Continue with gabapentin , and meloxicam these were both refilled today  continue with home exercises  patient does have Voltaren gel at home that she uses on her knee we did discuss applying this to her back as needed  follow-up in 12 weeks for medication refills        History of Present Illness: The patient is a 78 y o  female who presents for a follow up office visit in regards to Back Pain  The patients current symptoms include  Pain rated 5/10 this is occasional most bothersome in the morning  She describes the pain as dull, aching, and pressure-like  Current pain medications includes:   Gabapentin 100 mg in the morning in the afternoon 300 mg at bedtime, along with meloxicam 15 mg daily  The patient reports that this regimen is providing  80% pain relief  The patient is reporting no side effects from this pain medication regimen  I have personally reviewed and/or updated the patient's past medical history, past surgical history, family history, social history, current medications, allergies, and vital signs today  Review of Systems  Review of Systems   Constitutional: Negative for fatigue  HENT: Negative for ear pain and hearing loss  Eyes: Negative for redness  Respiratory: Negative for cough  Cardiovascular: Negative for leg swelling  Gastrointestinal: Negative for anal bleeding and rectal pain  Endocrine: Negative for polydipsia  Genitourinary: Negative for hematuria  Musculoskeletal: Positive for arthralgias, back pain, gait problem and myalgias (leg cramps)  Negative for joint swelling  Skin: Negative for rash  Neurological: Negative for headaches  Hematological: Does not bruise/bleed easily  Psychiatric/Behavioral: Negative for behavioral problems and hallucinations           Past Medical History:   Diagnosis Date    [facility]ondylitis ([facility] [address] )     Anxiety     LAST ASSESSED: 16    Arthritis     Back pain     Carpal tunnel syndrome     Fibromyalgia     LAST ASSESSED: 16    Fibromyalgia, primary     Heme positive stool     LAST ASSESSED: 10/22/16    High cholesterol     Hyperlipidemia     under control since weight loss    Impingement syndrome of left shoulder     LAST ASSESSED: 17    Impingement syndrome of right shoulder     LAST ASSESSED:     Long term use of drug     LAST ASSESSED: 16    Low back pain     No known health problems     NO PERTINENT PAST MEDICAL HX    RLS (restless legs syndrome)     Rotator cuff injury     right    Spinal stenosis     Spinal stenosis     Spondylitis (HCC)     Spondyloarthritis     RESOLVED: 16    Vitamin D deficiency        Past Surgical History:   Procedure Laterality Date    BACK SURGERY      CARPAL TUNNEL RELEASE Left     CERVICAL CONIZATION   W/ LASER      CERVICAL CONIZATION     SECTION      COLONOSCOPY      DILATION AND CURETTAGE OF UTERUS      FL INJECTION RIGHT HIP (NON ARTHROGRAM)  2019    FL INJECTION RIGHT HIP (NON ARTHROGRAM)  2019    FRACTURE SURGERY      HAND SURGERY      JOINT REPLACEMENT      ORTHOPEDIC SURGERY      AK ARTHRODESIS POSTERIOR/POSTEROLATERAL LUMBAR N/A 4/3/2017    Procedure: L2-L5 OPEN DECOMPRESSIVE LUMBAR LAMINECTOMY W/ PEDICLE SCREW AND NILDA FIXATION FUSION (IMPULSE); REMOVAL OF SKIN TAG MID BACK;  Surgeon: Meryl Ramsey MD;  [facility];  Service: Neurosurgery    AK COLONOSCOPY FLX DX W/COLLJ SPEC WHEN PFRMD N/A 10/7/2016    Procedure: EGD AND COLONOSCOPY;  Surgeon: Radha Granados MD;  [facility];   Service: Gastroenterology    AK TOTAL HIP ARTHROPLASTY Right 2019    Procedure: ARTHROPLASTY HIP TOTAL Posterior;  Surgeon: Peggy Allen MD;  [facility];  Service: Orthopedics    AK WRIST Mason Sapp LIG Left 4/26/2018    Procedure: RELEASE CARPAL TUNNEL ENDOSCOPIC;  Surgeon: Olivia Vieira MD;  [facility];  Service: Orthopedics    IN WRIST Deborra Levo LIG Right 6/14/2018    Procedure: RELEASE CARPAL TUNNEL ENDOSCOPIC;  Surgeon: Olivia Vieira MD;  [facility];  Service: Orthopedics    RETINAL LASER PROCEDURE      TUBAL LIGATION         Family History   Problem Relation Age of Onset    Arthritis Mother     Breast cancer Mother 67    Hypertension Mother     Heart attack Mother         MYOCARDIAL INFARCTION    Heart attack Father     Hypertension Father     Stroke Father         Leidy Durant (CVA)    Arthritis Sister     Uterine cancer Sister     Endometrial cancer Sister 61    Heart attack Brother     Prostate cancer Brother 58    Arthritis Brother     Melanoma Brother     Cancer Brother     Arthritis Family     Hyperlipidemia Family     Depression Son     Breast cancer Maternal Aunt 36    No Known Problems Daughter     No Known Problems Maternal Grandmother     No Known Problems Maternal Grandfather     No Known Problems Paternal Grandmother     No Known Problems Paternal Grandfather     No Known Problems Brother     Other Brother         hunting accident (shot)    Melanoma Brother     Prostate cancer Brother     Heart attack Brother     Stroke Brother     Arthritis Sister     Heart attack Sister     No Known Problems Son     No Known Problems Son     Lung cancer Maternal Uncle     Breast cancer additional onset Other 46    Uterine cancer Other        Social History     Occupational History    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       Comment: EMPLOYED   Tobacco Use    Smoking status: Never Smoker    Smokeless tobacco: Never Used   Substance and Sexual Activity    Alcohol use: Never     Frequency: Never    Drug use: No    Sexual activity: Yes     Partners: Male     Birth control/protection: None         Current Outpatient Medications:   aspirin 81 MG tablet, Take 1 tablet by mouth daily, Disp: , Rfl:     cephalexin (KEFLEX) 500 mg capsule, Take 4 tablets p o  1 hr prior to dental appointment as instructed, Disp: 20 capsule, Rfl: 1    Cholecalciferol (VITAMIN D3) 50 MCG (2000 UT) capsule, Vitamin D3 50 mcg (2,000 unit) capsule  Take by oral route , Disp: , Rfl:     ciclopirox (PENLAC) 8 % solution, Apply topically daily at bedtime Remove buildup once a week with cotton ball in alcohol continued treatment for 3 months, Disp: 6 6 mL, Rfl: 1    clonazePAM (KlonoPIN) 0 5 mg tablet, Take 0 5 mg by mouth daily at bedtime, Disp: , Rfl: 4    Diclofenac Sodium (VOLTAREN) 1 %, Apply 4 g topically 4 (four) times a day, Disp: 100 g, Rfl: 4    gabapentin (NEURONTIN) 100 mg capsule, Take 1 capsule (100 mg total) by mouth 2 (two) times a day, Disp: 180 capsule, Rfl: 0    gabapentin (NEURONTIN) 300 mg capsule, Take 1 capsule (300 mg total) by mouth daily at bedtime, Disp: 90 capsule, Rfl: 0    meloxicam (Mobic) 15 mg tablet, Take 1 tablet (15 mg total) by mouth daily, Disp: 30 tablet, Rfl: 2    methocarbamol (ROBAXIN) 750 mg tablet, TAKE 1 TABLET (750 MG TOTAL) BY MOUTH EVERY 6 (SIX) HOURS AS NEEDED FOR MUSCLE SPASMS, Disp: 100 tablet, Rfl: 0    PARoxetine (PAXIL) 10 mg tablet, TAKE 1 TABLET BY MOUTH EVERY DAY, Disp: 30 tablet, Rfl: 6    traMADol (ULTRAM) 50 mg tablet, Take 1 tablet (50 mg total) by mouth every 6 (six) hours as needed for moderate pain for up to 60 doses, Disp: 60 tablet, Rfl: 0    Fluad Quadrivalent 0 5 ML PRSY, PHARMACY ADMINISTERED, Disp: , Rfl:     oxyCODONE (ROXICODONE) 5 mg immediate release tablet, 1 pill po Q4 Hrs prn (Patient not taking: Reported on 2/24/2021), Disp: 30 tablet, Rfl: 0    No Known Allergies    Physical Exam:    /85 (BP Location: Left arm, Patient Position: Sitting, Cuff Size: Standard)   Pulse 62   Temp (!) 97 3 °F (36 3 °C)   Ht 5' 1" (1 549 m)   Wt 84 4 kg (186 lb)   LMP  (LMP Unknown)   BMI 35 14 kg/m²     Constitutional:normal, well developed, well nourished, alert, in no distress and non-toxic and no overt pain behavior  Eyes:anicteric  HEENT:grossly intact  Neck:supple, symmetric, trachea midline and no masses   Pulmonary:even and unlabored  Cardiovascular:No edema or pitting edema present  Skin:Normal without rashes or lesions and well hydrated  Psychiatric:Mood and affect appropriate  Neurologic:Cranial Nerves II-XII grossly intact  Musculoskeletal:normal    Imaging  No orders to display       No orders of the defined types were placed in this encounter

## 2021-08-30 ENCOUNTER — TELEPHONE (OUTPATIENT)
Dept: INTERNAL MEDICINE CLINIC | Facility: CLINIC | Age: 71
End: 2021-08-30

## 2021-08-30 PROCEDURE — U0003 INFECTIOUS AGENT DETECTION BY NUCLEIC ACID (DNA OR RNA); SEVERE ACUTE RESPIRATORY SYNDROME CORONAVIRUS 2 (SARS-COV-2) (CORONAVIRUS DISEASE [COVID-19]), AMPLIFIED PROBE TECHNIQUE, MAKING USE OF HIGH THROUGHPUT TECHNOLOGIES AS DESCRIBED BY CMS-2020-01-R: HCPCS | Performed by: INTERNAL MEDICINE

## 2021-08-30 PROCEDURE — U0005 INFEC AGEN DETEC AMPLI PROBE: HCPCS | Performed by: INTERNAL MEDICINE

## 2021-08-30 NOTE — TELEPHONE ENCOUNTER
Please call the patient back I do believe she should have COVID testing an order has been placed for [facility] she can go to any care now location to have the testing done    Would not  visit with sister until her test has returned

## 2021-08-30 NOTE — TELEPHONE ENCOUNTER
Pt called, sinus pressure and congestion started on Friday  Fever on Friday and is now gone  She will be with her elderly sister tomorrow  She was wandering if she should get a rapid COVID test just to be sure  Although, She feels its just a sinus infection  She stated she gets them every year this time

## 2021-09-15 ENCOUNTER — OFFICE VISIT (OUTPATIENT)
Dept: PAIN MEDICINE | Facility: MEDICAL CENTER | Age: 71
End: 2021-09-15
Payer: MEDICARE

## 2021-09-15 VITALS
DIASTOLIC BLOOD PRESSURE: 74 MMHG | BODY MASS INDEX: 34.93 KG/M2 | TEMPERATURE: 98.5 F | WEIGHT: 185 LBS | HEIGHT: 61 IN | SYSTOLIC BLOOD PRESSURE: 122 MMHG | HEART RATE: 78 BPM

## 2021-09-15 DIAGNOSIS — M54.50 CHRONIC BILATERAL LOW BACK PAIN WITHOUT SCIATICA: ICD-10-CM

## 2021-09-15 DIAGNOSIS — G89.29 CHRONIC BILATERAL LOW BACK PAIN WITHOUT SCIATICA: ICD-10-CM

## 2021-09-15 DIAGNOSIS — M46.1 SACROILIITIS (HCC): ICD-10-CM

## 2021-09-15 DIAGNOSIS — G62.9 NEUROPATHY: ICD-10-CM

## 2021-09-15 DIAGNOSIS — M48.061 SPINAL STENOSIS OF LUMBAR REGION AT MULTIPLE LEVELS: Primary | ICD-10-CM

## 2021-09-15 PROCEDURE — 99213 OFFICE O/P EST LOW 20 MIN: CPT | Performed by: NURSE PRACTITIONER

## 2021-09-15 RX ORDER — GABAPENTIN 300 MG/1
300 CAPSULE ORAL
Qty: 90 CAPSULE | Refills: 0 | Status: SHIPPED | OUTPATIENT
Start: 2021-09-15 | End: 2021-12-20 | Stop reason: SDUPTHER

## 2021-09-15 RX ORDER — GABAPENTIN 100 MG/1
100 CAPSULE ORAL 2 TIMES DAILY
Qty: 180 CAPSULE | Refills: 0 | Status: SHIPPED | OUTPATIENT
Start: 2021-09-15 | End: 2021-12-20 | Stop reason: SDUPTHER

## 2021-09-15 RX ORDER — MELOXICAM 15 MG/1
15 TABLET ORAL DAILY
Qty: 30 TABLET | Refills: 2 | Status: SHIPPED | OUTPATIENT
Start: 2021-09-15 | End: 2021-12-20 | Stop reason: SDUPTHER

## 2021-09-15 NOTE — PROGRESS NOTES
Assessment  1  Spinal stenosis of lumbar region at multiple levels    2  Neuropathy    3  Chronic bilateral low back pain without sciatica    4  Sacroiliitis ([address] )        Plan    Continue with gabapentin  And meloxicam, these were both refilled today  Continue with home exercises  follow-up visit in 12 weeks for medication refills      My impressions and treatment recommendations were discussed in detail with the patient who verbalized understanding and had no further questions  Discharge instructions were provided  I personally saw and examined the patient and I agree with the above discussed plan of care  No orders of the defined types were placed in this encounter  New Medications Ordered This Visit   Medications    gabapentin (NEURONTIN) 100 mg capsule     Sig: Take 1 capsule (100 mg total) by mouth 2 (two) times a day     Dispense:  180 capsule     Refill:  0    gabapentin (NEURONTIN) 300 mg capsule     Sig: Take 1 capsule (300 mg total) by mouth daily at bedtime     Dispense:  90 capsule     Refill:  0    meloxicam (Mobic) 15 mg tablet     Sig: Take 1 tablet (15 mg total) by mouth daily     Dispense:  30 tablet     Refill:  2       History of Present Illness    Johann Councilman is a 73 y o  female  Presents for follow-up related to her chronic low back and leg pain  Today she rates her pain 2/10 this is occasional most bothersome in the morning  She describes the pain as dull, aching, and numbness  Patient continues with gabapentin 100 mg in the morning in the afternoon and 300 mg at bedtime, along with meloxicam 15 mg daily  Her medication regimen provides 90% relief without side effects or issues  I have personally reviewed and/or updated the patient's past medical history, past surgical history, family history, social history, current medications, allergies, and vital signs today  Review of Systems   Respiratory: Negative for shortness of breath      Cardiovascular: Negative for chest pain  Gastrointestinal: Negative for constipation, diarrhea, nausea and vomiting  Musculoskeletal: Positive for gait problem, joint swelling and myalgias  Negative for arthralgias  Skin: Negative for rash  Neurological: Negative for dizziness, seizures and weakness  All other systems reviewed and are negative        Patient Active Problem List   Diagnosis    Spinal stenosis of lumbar region at multiple levels    Spondylosis    S/P endoscopic carpal tunnel release    Chronic bilateral low back pain    Encounter for annual routine gynecological examination    Encounter for screening mammogram for malignant neoplasm of breast    Primary osteoarthritis of right knee    Abnormal EKG    Aftercare following right hip joint replacement surgery    Status post total hip replacement, right    Acute pain of right knee    Hyperlipidemia    Right hip pain    Trigger finger of left hand    Close exposure to COVID-19 virus       Past Medical History:   Diagnosis Date    [address]ylosing sp[address]yl[address] ([facility] [address]     Anxiety     LAST ASSESSED: 12/20/16    Arthritis     Back pain     Carpal tunnel syndrome     Fibromyalgia     LAST ASSESSED: 12/20/16    Fibromyalgia, primary     Heme positive stool     LAST ASSESSED: 10/22/16    High cholesterol     Hyperlipidemia     under control since weight loss    Impingement syndrome of left shoulder     LAST ASSESSED: 2/21/17    Impingement syndrome of right shoulder     LAST ASSESSED: 2/21/1/7    Long term use of drug     LAST ASSESSED: 12/20/16    Low back pain     No known health problems     NO PERTINENT PAST MEDICAL HX    RLS (restless legs syndrome)     Rotator cuff injury     right    Spinal stenosis     Spinal stenosis     Spondyl[address] ([facility] [address]     Spondyloarthritis     RESOLVED: 9/21/16    Vitamin D deficiency        Past Surgical History:   Procedure Laterality Date    BACK SURGERY      CARPAL TUNNEL RELEASE Left     CERVICAL CONIZATION   W/ LASER      CERVICAL CONIZATION     SECTION      COLONOSCOPY      DILATION AND CURETTAGE OF UTERUS      FL INJECTION RIGHT HIP (NON ARTHROGRAM)  2019    FL INJECTION RIGHT HIP (NON ARTHROGRAM)  2019    FRACTURE SURGERY      HAND SURGERY      JOINT REPLACEMENT      ORTHOPEDIC SURGERY      [place] ARTHRODESIS POSTERIOR/POSTEROLATERAL LUMBAR N/A 4/3/2017    Procedure: L2-L5 OPEN DECOMPRESSIVE LUMBAR LAMINECTOMY W/ PEDICLE SCREW AND NILDA FIXATION FUSION (IMPULSE); REMOVAL OF SKIN TAG MID BACK;  Surgeon: Rosa Maria Franco MD;  [facility];  Service: Neurosurgery    [place] COLONOSCOPY FLX DX W/COLLJ SPEC WHEN PFRMD N/A 10/7/2016    Procedure: EGD AND COLONOSCOPY;  Surgeon: Cynthia Alexander MD;  [facility];   Service: Gastroenterology    [place] TOTAL HIP ARTHROPLASTY Right 2019    Procedure: ARTHROPLASTY HIP TOTAL Posterior;  Surgeon: Danielle Navarro MD;  [facility];  Service: Orthopedics    [place] WRIST [place] Ink LIG Left 2018    Procedure: RELEASE CARPAL TUNNEL ENDOSCOPIC;  Surgeon: Alistair Mcnally MD;  [facility];  Service: Orthopedics    [place] WRIST [place] LIG Right 2018    Procedure: RELEASE CARPAL TUNNEL ENDOSCOPIC;  Surgeon: Alistair Mcnally MD;  [facility];  Service: Orthopedics    RETINAL LASER PROCEDURE      TUBAL LIGATION         Family History   Problem Relation Age of Onset    Arthritis Mother     Breast cancer Mother 67    Hypertension Mother     Heart attack Mother         MYOCARDIAL INFARCTION    Heart attack Father     Hypertension Father     Stroke Father         [place] (CVA)    Arthritis Sister     Uterine cancer Sister     Endometrial cancer Sister 61    Heart attack Brother     Prostate cancer Brother 58    Arthritis Brother     Melanoma Brother     Cancer Brother     Arthritis Family     Hyperlipidemia Family     Depression Son     Breast cancer Maternal Aunt 36    No Known Problems Daughter     No Known Problems Maternal Grandmother     No Known Problems Maternal Grandfather     No Known Problems Paternal Grandmother     No Known Problems Paternal Grandfather     No Known Problems Brother     Other Brother         hunting accident (shot)    Melanoma Brother     Prostate cancer Brother     Heart attack Brother     Stroke Brother     Arthritis Sister     Heart attack Sister     No Known Problems Son     No Known Problems Son     Lung cancer Maternal Uncle     Breast cancer additional onset Other 46    Uterine cancer Other        Social History     Occupational History    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  Comment: EMPLOYED   Tobacco Use    Smoking status: Never Smoker    Smokeless tobacco: Never Used   Vaping Use    Vaping Use: Never used   Substance and Sexual Activity    Alcohol use: Never    Drug use: No    Sexual activity: Yes     Partners: Male     Birth control/protection: None       Current Outpatient Medications on File Prior to Visit   Medication Sig    aspirin 81 MG tablet Take 1 tablet by mouth daily    Cholecalciferol (VITAMIN D3) 50 MCG (2000 UT) capsule Vitamin D3 50 mcg (2,000 unit) capsule   Take by oral route      ciclopirox (PENLAC) 8 % solution Apply topically daily at bedtime Remove buildup once a week with cotton ball in alcohol continued treatment for 3 months    clonazePAM (KlonoPIN) 0 5 mg tablet Take 0 5 mg by mouth daily at bedtime    Diclofenac Sodium (VOLTAREN) 1 % Apply 4 g topically 4 (four) times a day    methocarbamol (ROBAXIN) 750 mg tablet TAKE 1 TABLET (750 MG TOTAL) BY MOUTH EVERY 6 (SIX) HOURS AS NEEDED FOR MUSCLE SPASMS (Patient taking differently: Take 750 mg by mouth as needed for muscle spasms )    PARoxetine (PAXIL) 10 mg tablet TAKE 1 TABLET BY MOUTH EVERY DAY    traMADol (ULTRAM) 50 mg tablet One pill every 6 hours as needed for moderate to severe pain    [DISCONTINUED] gabapentin (NEURONTIN) 100 mg capsule Take 1 capsule (100 mg total) by mouth 2 (two) times a day    [DISCONTINUED] gabapentin (NEURONTIN) 300 mg capsule Take 1 capsule (300 mg total) by mouth daily at bedtime    [DISCONTINUED] meloxicam (Mobic) 15 mg tablet Take 1 tablet (15 mg total) by mouth daily    cephalexin (KEFLEX) 500 mg capsule Take 4 tablets p o  1 hr prior to dental appointment as instructed (Patient not taking: Reported on 9/15/2021)    Fluad Quadrivalent 0 5 ML PRSY PHARMACY ADMINISTERED    oxyCODONE (ROXICODONE) 5 mg immediate release tablet 1 pill po Q4 Hrs prn (Patient not taking: Reported on 2/24/2021)     No current facility-administered medications on file prior to visit  No Known Allergies    Physical Exam    /74   Pulse 78   Temp 98 5 °F (36 9 °C)   Ht 5' 1" (1 549 m)   Wt 83 9 kg (185 lb)   LMP  (LMP Unknown)   BMI 34 96 kg/m²     Constitutional: normal, well developed, well nourished, alert, in no distress and non-toxic and no overt pain behavior    Eyes: anicteric  HEENT: grossly intact  Neck: supple, symmetric, trachea midline and no masses   Pulmonary:even and unlabored  Cardiovascular:No edema or pitting edema present  Skin:Normal without rashes or lesions and well hydrated  Psychiatric:Mood and affect appropriate  Neurologic:Cranial Nerves II-XII grossly intact  Musculoskeletal:normal    Imaging

## 2021-09-17 ENCOUNTER — TELEPHONE (OUTPATIENT)
Dept: INTERNAL MEDICINE CLINIC | Facility: CLINIC | Age: 71
End: 2021-09-17

## 2021-09-17 NOTE — TELEPHONE ENCOUNTER
Pt was exposed to family member,so she wanted to know if you could place a order for a covid test,she know she can not go until 5 days after exposure

## 2021-09-19 ENCOUNTER — APPOINTMENT (OUTPATIENT)
Dept: URGENT CARE | Age: 71
End: 2021-09-19
Payer: MEDICARE

## 2021-09-19 PROCEDURE — U0005 INFEC AGEN DETEC AMPLI PROBE: HCPCS | Performed by: NURSE PRACTITIONER

## 2021-09-19 PROCEDURE — U0003 INFECTIOUS AGENT DETECTION BY NUCLEIC ACID (DNA OR RNA); SEVERE ACUTE RESPIRATORY SYNDROME CORONAVIRUS 2 (SARS-COV-2) (CORONAVIRUS DISEASE [COVID-19]), AMPLIFIED PROBE TECHNIQUE, MAKING USE OF HIGH THROUGHPUT TECHNOLOGIES AS DESCRIBED BY CMS-2020-01-R: HCPCS | Performed by: NURSE PRACTITIONER

## 2021-09-28 ENCOUNTER — HOSPITAL ENCOUNTER (OUTPATIENT)
Dept: RADIOLOGY | Age: 71
Discharge: HOME | End: 2021-09-28
Payer: MEDICARE

## 2021-09-28 VITALS — WEIGHT: 183 LBS | HEIGHT: 61 IN | BODY MASS INDEX: 34.55 KG/M2

## 2021-09-28 DIAGNOSIS — Z12.31 ENCOUNTER FOR SCREENING MAMMOGRAM FOR MALIGNANT NEOPLASM OF BREAST: ICD-10-CM

## 2021-09-28 PROCEDURE — 77063 BREAST TOMOSYNTHESIS BI: CPT

## 2021-09-28 PROCEDURE — 77067 SCR MAMMO BI INCL CAD: CPT

## 2021-09-29 NOTE — RESULT ENCOUNTER NOTE
Pre Appointment Screening for OVID    Is this a remote device check, an over the phone visit not in person/universal screen, or being scheduled as part of the Check Out process?  NO    Have you had close contact with a laboratory-confirmed COVID-19 patient within 14 days of symptom onset? Examples of close contact - is living in the same house with a person, kissing, hugging, sharing eating utensils, carpooling, close conversation.  NO    Have you traveled to [place], [place], [place], [place] or [place] in the past 14 days?  NO    Are you experiencing any of the following - a fever >100.4º F (38.0º C) that has lasted more than 24 hours, a cough or trouble breathing?  NO    Mr. Bennett OVID Screening NEGATIVE  Appointment 3-   Results are normal  Please notify patient

## 2021-11-16 ENCOUNTER — ANNUAL EXAM (OUTPATIENT)
Dept: OBGYN CLINIC | Facility: CLINIC | Age: 71
End: 2021-11-16
Payer: MEDICARE

## 2021-11-16 VITALS
HEIGHT: 61 IN | BODY MASS INDEX: 35.98 KG/M2 | SYSTOLIC BLOOD PRESSURE: 110 MMHG | WEIGHT: 190.6 LBS | DIASTOLIC BLOOD PRESSURE: 60 MMHG

## 2021-11-16 DIAGNOSIS — Z01.419 ENCOUNTER FOR ANNUAL ROUTINE GYNECOLOGICAL EXAMINATION: Primary | ICD-10-CM

## 2021-11-16 DIAGNOSIS — Z12.31 ENCOUNTER FOR SCREENING MAMMOGRAM FOR MALIGNANT NEOPLASM OF BREAST: ICD-10-CM

## 2021-11-16 DIAGNOSIS — Z80.3 FAMILY HISTORY OF BREAST CANCER: ICD-10-CM

## 2021-11-16 DIAGNOSIS — N39.3 STRESS INCONTINENCE: ICD-10-CM

## 2021-11-16 PROCEDURE — G0101 CA SCREEN;PELVIC/BREAST EXAM: HCPCS | Performed by: OBSTETRICS & GYNECOLOGY

## 2021-11-19 DIAGNOSIS — B35.1 NAIL FUNGUS: ICD-10-CM

## 2021-11-22 ENCOUNTER — TELEPHONE (OUTPATIENT)
Dept: INTERNAL MEDICINE CLINIC | Facility: CLINIC | Age: 71
End: 2021-11-22

## 2021-12-04 DIAGNOSIS — F32.A DEPRESSION, UNSPECIFIED DEPRESSION TYPE: ICD-10-CM

## 2021-12-06 RX ORDER — PAROXETINE 10 MG/1
TABLET, FILM COATED ORAL
Qty: 30 TABLET | Refills: 6 | Status: SHIPPED | OUTPATIENT
Start: 2021-12-06 | End: 2022-01-03 | Stop reason: SDUPTHER

## 2021-12-15 ENCOUNTER — TELEPHONE (OUTPATIENT)
Dept: PAIN MEDICINE | Facility: MEDICAL CENTER | Age: 71
End: 2021-12-15

## 2021-12-20 ENCOUNTER — OFFICE VISIT (OUTPATIENT)
Dept: PAIN MEDICINE | Facility: MEDICAL CENTER | Age: 71
End: 2021-12-20
Payer: MEDICARE

## 2021-12-20 VITALS
BODY MASS INDEX: 35.3 KG/M2 | SYSTOLIC BLOOD PRESSURE: 118 MMHG | HEART RATE: 82 BPM | HEIGHT: 61 IN | DIASTOLIC BLOOD PRESSURE: 78 MMHG | TEMPERATURE: 100 F | WEIGHT: 187 LBS

## 2021-12-20 DIAGNOSIS — M48.061 SPINAL STENOSIS OF LUMBAR REGION AT MULTIPLE LEVELS: ICD-10-CM

## 2021-12-20 DIAGNOSIS — M47.9 SPONDYLOSIS: Primary | ICD-10-CM

## 2021-12-20 DIAGNOSIS — M54.50 CHRONIC BILATERAL LOW BACK PAIN WITHOUT SCIATICA: ICD-10-CM

## 2021-12-20 DIAGNOSIS — G62.9 NEUROPATHY: ICD-10-CM

## 2021-12-20 DIAGNOSIS — Z98.1 HISTORY OF LUMBAR SPINAL FUSION: ICD-10-CM

## 2021-12-20 DIAGNOSIS — G89.29 CHRONIC BILATERAL LOW BACK PAIN WITHOUT SCIATICA: ICD-10-CM

## 2021-12-20 PROCEDURE — 99213 OFFICE O/P EST LOW 20 MIN: CPT | Performed by: NURSE PRACTITIONER

## 2021-12-20 RX ORDER — GABAPENTIN 300 MG/1
300 CAPSULE ORAL
Qty: 90 CAPSULE | Refills: 0 | Status: SHIPPED | OUTPATIENT
Start: 2021-12-20 | End: 2022-03-21 | Stop reason: SDUPTHER

## 2021-12-20 RX ORDER — GABAPENTIN 100 MG/1
100 CAPSULE ORAL 2 TIMES DAILY
Qty: 180 CAPSULE | Refills: 0 | Status: SHIPPED | OUTPATIENT
Start: 2021-12-20 | End: 2022-03-21 | Stop reason: SDUPTHER

## 2021-12-20 RX ORDER — MELOXICAM 15 MG/1
15 TABLET ORAL DAILY
Qty: 30 TABLET | Refills: 2 | Status: SHIPPED | OUTPATIENT
Start: 2021-12-20 | End: 2022-03-21 | Stop reason: SDUPTHER

## 2021-12-23 ENCOUNTER — APPOINTMENT (OUTPATIENT)
Dept: LAB | Facility: HOSPITAL | Age: 71
End: 2021-12-23
Attending: INTERNAL MEDICINE
Payer: MEDICARE

## 2021-12-23 DIAGNOSIS — R39.9 UTI SYMPTOMS: ICD-10-CM

## 2021-12-23 DIAGNOSIS — M54.6 PAIN IN THORACIC SPINE: ICD-10-CM

## 2021-12-23 DIAGNOSIS — Z79.1 ENCOUNTER FOR LONG-TERM (CURRENT) USE OF NON-STEROIDAL ANTI-INFLAMMATORIES: ICD-10-CM

## 2021-12-23 DIAGNOSIS — Z13.0 SCREENING FOR DEFICIENCY ANEMIA: ICD-10-CM

## 2021-12-23 DIAGNOSIS — E78.5 HYPERLIPIDEMIA, UNSPECIFIED HYPERLIPIDEMIA TYPE: ICD-10-CM

## 2021-12-23 LAB
ALBUMIN SERPL BCP-MCNC: 3.8 G/DL (ref 3.5–5)
ALP SERPL-CCNC: 51 U/L (ref 46–116)
ALT SERPL W P-5'-P-CCNC: 28 U/L (ref 12–78)
ANION GAP SERPL CALCULATED.3IONS-SCNC: 8 MMOL/L (ref 4–13)
AST SERPL W P-5'-P-CCNC: 19 U/L (ref 5–45)
BACTERIA UR QL AUTO: ABNORMAL /HPF
BASOPHILS # BLD AUTO: 0.03 THOUSANDS/ΜL (ref 0–0.1)
BASOPHILS NFR BLD AUTO: 1 % (ref 0–1)
BILIRUB SERPL-MCNC: 0.75 MG/DL (ref 0.2–1)
BILIRUB UR QL STRIP: ABNORMAL
BUN SERPL-MCNC: 28 MG/DL (ref 5–25)
CALCIUM SERPL-MCNC: 9.1 MG/DL (ref 8.3–10.1)
CHLORIDE SERPL-SCNC: 109 MMOL/L (ref 100–108)
CHOLEST SERPL-MCNC: 152 MG/DL
CLARITY UR: ABNORMAL
CO2 SERPL-SCNC: 25 MMOL/L (ref 21–32)
COLOR UR: ABNORMAL
CREAT SERPL-MCNC: 0.74 MG/DL (ref 0.6–1.3)
CRP SERPL QL: 3.7 MG/L
EOSINOPHIL # BLD AUTO: 0.16 THOUSAND/ΜL (ref 0–0.61)
EOSINOPHIL NFR BLD AUTO: 3 % (ref 0–6)
ERYTHROCYTE [DISTWIDTH] IN BLOOD BY AUTOMATED COUNT: 14 % (ref 11.6–15.1)
ERYTHROCYTE [SEDIMENTATION RATE] IN BLOOD: 19 MM/HOUR (ref 0–29)
GFR SERPL CREATININE-BSD FRML MDRD: 82 ML/MIN/1.73SQ M
GLUCOSE P FAST SERPL-MCNC: 75 MG/DL (ref 65–99)
GLUCOSE UR STRIP-MCNC: NEGATIVE MG/DL
HCT VFR BLD AUTO: 43.6 % (ref 34.8–46.1)
HDLC SERPL-MCNC: 52 MG/DL
HGB BLD-MCNC: 13.8 G/DL (ref 11.5–15.4)
HGB UR QL STRIP.AUTO: ABNORMAL
HYALINE CASTS #/AREA URNS LPF: ABNORMAL /LPF
IMM GRANULOCYTES # BLD AUTO: 0.02 THOUSAND/UL (ref 0–0.2)
IMM GRANULOCYTES NFR BLD AUTO: 0 % (ref 0–2)
KETONES UR STRIP-MCNC: ABNORMAL MG/DL
LDLC SERPL CALC-MCNC: 89 MG/DL (ref 0–100)
LEUKOCYTE ESTERASE UR QL STRIP: NEGATIVE
LYMPHOCYTES # BLD AUTO: 1.23 THOUSANDS/ΜL (ref 0.6–4.47)
LYMPHOCYTES NFR BLD AUTO: 23 % (ref 14–44)
MCH RBC QN AUTO: 28.5 PG (ref 26.8–34.3)
MCHC RBC AUTO-ENTMCNC: 31.7 G/DL (ref 31.4–37.4)
MCV RBC AUTO: 90 FL (ref 82–98)
MONOCYTES # BLD AUTO: 0.33 THOUSAND/ΜL (ref 0.17–1.22)
MONOCYTES NFR BLD AUTO: 6 % (ref 4–12)
NEUTROPHILS # BLD AUTO: 3.55 THOUSANDS/ΜL (ref 1.85–7.62)
NEUTS SEG NFR BLD AUTO: 67 % (ref 43–75)
NITRITE UR QL STRIP: NEGATIVE
NON-SQ EPI CELLS URNS QL MICRO: ABNORMAL /HPF
NONHDLC SERPL-MCNC: 100 MG/DL
NRBC BLD AUTO-RTO: 0 /100 WBCS
PH UR STRIP.AUTO: 5 [PH]
PLATELET # BLD AUTO: 344 THOUSANDS/UL (ref 149–390)
PMV BLD AUTO: 10 FL (ref 8.9–12.7)
POTASSIUM SERPL-SCNC: 4.1 MMOL/L (ref 3.5–5.3)
PROT SERPL-MCNC: 7.3 G/DL (ref 6.4–8.2)
PROT UR STRIP-MCNC: NEGATIVE MG/DL
RBC # BLD AUTO: 4.85 MILLION/UL (ref 3.81–5.12)
RBC #/AREA URNS AUTO: ABNORMAL /HPF
SODIUM SERPL-SCNC: 142 MMOL/L (ref 136–145)
SP GR UR STRIP.AUTO: 1.03 (ref 1–1.03)
TRIGL SERPL-MCNC: 56 MG/DL
UROBILINOGEN UR QL STRIP.AUTO: 0.2 E.U./DL
WBC # BLD AUTO: 5.32 THOUSAND/UL (ref 4.31–10.16)
WBC #/AREA URNS AUTO: ABNORMAL /HPF

## 2021-12-23 PROCEDURE — 86140 C-REACTIVE PROTEIN: CPT

## 2021-12-23 PROCEDURE — 80061 LIPID PANEL: CPT

## 2021-12-23 PROCEDURE — 85025 COMPLETE CBC W/AUTO DIFF WBC: CPT

## 2021-12-23 PROCEDURE — 85652 RBC SED RATE AUTOMATED: CPT

## 2021-12-23 PROCEDURE — 81001 URINALYSIS AUTO W/SCOPE: CPT | Performed by: INTERNAL MEDICINE

## 2021-12-23 PROCEDURE — 36415 COLL VENOUS BLD VENIPUNCTURE: CPT

## 2021-12-23 PROCEDURE — 80053 COMPREHEN METABOLIC PANEL: CPT

## 2022-01-03 ENCOUNTER — OFFICE VISIT (OUTPATIENT)
Dept: INTERNAL MEDICINE CLINIC | Facility: CLINIC | Age: 72
End: 2022-01-03
Payer: MEDICARE

## 2022-01-03 VITALS
HEIGHT: 61 IN | SYSTOLIC BLOOD PRESSURE: 130 MMHG | WEIGHT: 186.2 LBS | OXYGEN SATURATION: 98 % | HEART RATE: 71 BPM | TEMPERATURE: 95.8 F | DIASTOLIC BLOOD PRESSURE: 82 MMHG | BODY MASS INDEX: 35.16 KG/M2

## 2022-01-03 DIAGNOSIS — Z96.641 STATUS POST TOTAL HIP REPLACEMENT, RIGHT: ICD-10-CM

## 2022-01-03 DIAGNOSIS — F32.A DEPRESSION, UNSPECIFIED DEPRESSION TYPE: Primary | ICD-10-CM

## 2022-01-03 DIAGNOSIS — R31.29 MICROSCOPIC HEMATURIA: ICD-10-CM

## 2022-01-03 DIAGNOSIS — G89.29 CHRONIC BILATERAL LOW BACK PAIN WITHOUT SCIATICA: ICD-10-CM

## 2022-01-03 DIAGNOSIS — M54.50 CHRONIC BILATERAL LOW BACK PAIN WITHOUT SCIATICA: ICD-10-CM

## 2022-01-03 DIAGNOSIS — E78.5 HYPERLIPIDEMIA, UNSPECIFIED HYPERLIPIDEMIA TYPE: ICD-10-CM

## 2022-01-03 DIAGNOSIS — Z11.59 NEED FOR HEPATITIS C SCREENING TEST: ICD-10-CM

## 2022-01-03 DIAGNOSIS — M17.11 PRIMARY OSTEOARTHRITIS OF RIGHT KNEE: ICD-10-CM

## 2022-01-03 PROBLEM — M25.561 ACUTE PAIN OF RIGHT KNEE: Status: RESOLVED | Noted: 2019-11-14 | Resolved: 2022-01-03

## 2022-01-03 PROBLEM — Z20.822 CLOSE EXPOSURE TO COVID-19 VIRUS: Status: RESOLVED | Noted: 2021-01-08 | Resolved: 2022-01-03

## 2022-01-03 PROBLEM — Z47.1 AFTERCARE FOLLOWING RIGHT HIP JOINT REPLACEMENT SURGERY: Status: RESOLVED | Noted: 2019-10-30 | Resolved: 2022-01-03

## 2022-01-03 PROBLEM — Z98.890 S/P ENDOSCOPIC CARPAL TUNNEL RELEASE: Status: RESOLVED | Noted: 2018-04-04 | Resolved: 2022-01-03

## 2022-01-03 PROCEDURE — 1123F ACP DISCUSS/DSCN MKR DOCD: CPT | Performed by: INTERNAL MEDICINE

## 2022-01-03 PROCEDURE — 99215 OFFICE O/P EST HI 40 MIN: CPT | Performed by: INTERNAL MEDICINE

## 2022-01-03 PROCEDURE — G0439 PPPS, SUBSEQ VISIT: HCPCS | Performed by: INTERNAL MEDICINE

## 2022-01-03 RX ORDER — PAROXETINE HYDROCHLORIDE 20 MG/1
20 TABLET, FILM COATED ORAL DAILY
Qty: 30 TABLET | Refills: 6 | Status: SHIPPED | OUTPATIENT
Start: 2022-01-03 | End: 2022-06-08

## 2022-01-03 NOTE — PROGRESS NOTES
Assessment and Plan:     Problem List Items Addressed This Visit     None      Visit Diagnoses     Need for hepatitis C screening test    -  Primary    Relevant Orders    Hepatitis C Antibody ([facility])        BMI Counseling: Body mass index is 35 18 kg/m²  The BMI is above normal  Nutrition recommendations include decreasing portion sizes, decreasing fast food intake, consuming healthier snacks, moderation in carbohydrate intake and reducing intake of cholesterol  Exercise recommendations include moderate physical activity 150 minutes/week  No pharmacotherapy was ordered  Rationale for BMI follow-up plan is due to patient being overweight or obese  Preventive health issues were discussed with patient, and age appropriate screening tests were ordered as noted in patient's After Visit Summary  Personalized health advice and appropriate referrals for health education or preventive services given if needed, as noted in patient's After Visit Summary       History of Present Illness:     Patient presents for Medicare Annual Wellness visit    Patient Care Team:  Walter Lucas MD as PCP - General  MD Ele Henson MD Ulyses Boop, MD Gwenetta Ball, DO Jossie Neu, MD as Endoscopist     Problem List:     Patient Active Problem List   Diagnosis    Spinal stenosis of lumbar region at multiple levels    Spondylosis    S/P endoscopic carpal tunnel release    Chronic bilateral low back pain    Encounter for annual routine gynecological examination    Encounter for screening mammogram for malignant neoplasm of breast    Primary osteoarthritis of right knee    Abnormal EKG    Aftercare following right hip joint replacement surgery    Status post total hip replacement, right    Acute pain of right knee    Hyperlipidemia    Right hip pain    Trigger finger of left hand    Close exposure to COVID-19 virus    Family history of breast cancer    Stress incontinence  History of lumbar spinal fusion      Past Medical and Surgical History:     Past Medical History:   Diagnosis Date    Ankylosing spondylitis ([address] )     Anxiety     LAST ASSESSED: 16    Arthritis     Back pain     Carpal tunnel syndrome     Fibromyalgia     LAST ASSESSED: 16    Fibromyalgia, primary     Heme positive stool     LAST ASSESSED: 10/22/16    High cholesterol     Hyperlipidemia     under control since weight loss    Impingement syndrome of left shoulder     LAST ASSESSED: 17    Impingement syndrome of right shoulder     LAST ASSESSED:     Long term use of drug     LAST ASSESSED: 16    Low back pain     No known health problems     NO PERTINENT PAST MEDICAL HX    RLS (restless legs syndrome)     Rotator cuff injury     right    Spinal stenosis     Spinal stenosis     Spondylitis (HCC)     Spondyloarthritis     RESOLVED: 16    Vitamin D deficiency      Past Surgical History:   Procedure Laterality Date    BACK SURGERY      CARPAL TUNNEL RELEASE Left     CERVICAL CONIZATION   W/ LASER      CERVICAL CONIZATION     SECTION      COLONOSCOPY      DILATION AND CURETTAGE OF UTERUS      FL INJECTION RIGHT HIP (NON ARTHROGRAM)  2019    FL INJECTION RIGHT HIP (NON ARTHROGRAM)  2019    FRACTURE SURGERY      HAND SURGERY      JOINT REPLACEMENT      ORTHOPEDIC SURGERY      [place] ARTHRODESIS POSTERIOR/POSTEROLATERAL LUMBAR N/A 4/3/2017    Procedure: L2-L5 OPEN DECOMPRESSIVE LUMBAR LAMINECTOMY W/ PEDICLE SCREW AND NILDA FIXATION FUSION (IMPULSE); REMOVAL OF SKIN TAG MID BACK;  Surgeon: Nikita Bueno MD;  [facility];  Service: Neurosurgery    [place] COLONOSCOPY FLX DX W/COLLJ SPEC WHEN PFRMD N/A 10/7/2016    Procedure: EGD AND COLONOSCOPY;  Surgeon: Amy Rod MD;  [facility];   Service: Gastroenterology    [place] TOTAL HIP ARTHROPLASTY Right 2019    Procedure: ARTHROPLASTY HIP TOTAL Posterior;  Surgeon: Katty Torres Mary Loving MD;  [facility];  Service: Orthopedics    IN WRIST [place] LIG Left 4/26/2018    Procedure: RELEASE CARPAL TUNNEL ENDOSCOPIC;  Surgeon: Jearldine Goodell, MD;  [facility];  Service: Orthopedics    IN WRIST [place] Bren[place] LIG Right 6/14/2018    Procedure: RELEASE CARPAL TUNNEL ENDOSCOPIC;  Surgeon: Jearldine Goodell, MD;  [facility];  Service: Orthopedics    RETINAL LASER PROCEDURE      TUBAL LIGATION        Family History:     Family History   Problem Relation Age of Onset    Arthritis Mother     Breast cancer Mother 67    Hypertension Mother     Heart attack Mother         MYOCARDIAL INFARCTION    Heart attack Father     Hypertension Father     Stroke Father         CEREBROVASCUALR ACCIDENT (CVA)    Arthritis Sister     Uterine cancer Sister     Endometrial cancer Sister 61    Heart attack Brother     Prostate cancer Brother 58    Arthritis Brother     Melanoma Brother     Cancer Brother     Arthritis Family     Hyperlipidemia Family     Depression Son     Breast cancer Maternal Aunt 36    No Known Problems Daughter     No Known Problems Maternal Grandmother     No Known Problems Maternal Grandfather     No Known Problems Paternal Grandmother     No Known Problems Paternal Grandfather     No Known Problems Brother     Other Brother         hunting accident (shot)    Melanoma Brother     Prostate cancer Brother     Heart attack Brother     Stroke Brother     Arthritis Sister     Heart attack Sister     No Known Problems Son     No Known Problems Son     Lung cancer Maternal Uncle     Breast cancer additional onset Other 46    Uterine cancer Other       Social History:     Social History     Socioeconomic History    Marital status: /Civil Union     Spouse name: Not on file    Number of children: 3    Years of education: Not on file    Highest education level: Not on file   Occupational History    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  Comment: EMPLOYED   Tobacco Use    Smoking status: Never Smoker    Smokeless tobacco: Never Used   Vaping Use    Vaping Use: Never used   Substance and Sexual Activity    Alcohol use: Never    Drug use: No    Sexual activity: Yes     Partners: Male     Birth control/protection: None, Post-menopausal   Other Topics Concern    Not on file   Social History Narrative    CAFFEINE USE    DOES NOT EXERCISE     Social Determinants of Health     Financial Resource Strain: Not on file   Food Insecurity: Not on file   Transportation Needs: Not on file   Physical Activity: Not on file   Stress: Not on file   Social Connections: Not on file   Intimate Partner Violence: Not on file   Housing Stability: Not on file      Medications and Allergies:     Current Outpatient Medications   Medication Sig Dispense Refill    aspirin 81 MG tablet Take 1 tablet by mouth daily      cephalexin (KEFLEX) 500 mg capsule Take 4 tablets p o  1 hr prior to dental appointment as instructed (Patient not taking: Reported on 9/15/2021) 20 capsule 1    Cholecalciferol (VITAMIN D3) 50 MCG (2000 UT) capsule Vitamin D3 50 mcg (2,000 unit) capsule   Take by oral route        ciclopirox (PENLAC) 8 % solution APPLY TOPICALLY DAILY AT BEDTIME REMOVE BUILDUP ONCE A WEEK WITH COTTON BALL IN ALCOHOL CONTINUED TREATMENT FOR 3 MONTHS 6 6 mL 1    clonazePAM (KlonoPIN) 0 5 mg tablet Take 0 5 mg by mouth daily at bedtime  4    Diclofenac Sodium (VOLTAREN) 1 % Apply 4 g topically 4 (four) times a day 100 g 4    Fluad Quadrivalent 0 5 ML PRSY PHARMACY ADMINISTERED (Patient not taking: Reported on 12/20/2021)      gabapentin (NEURONTIN) 100 mg capsule Take 1 capsule (100 mg total) by mouth 2 (two) times a day 180 capsule 0    gabapentin (NEURONTIN) 300 mg capsule Take 1 capsule (300 mg total) by mouth daily at bedtime 90 capsule 0    meloxicam (Mobic) 15 mg tablet Take 1 tablet (15 mg total) by mouth daily 30 tablet 2    methocarbamol (ROBAXIN) 750 mg tablet TAKE 1 TABLET (750 MG TOTAL) BY MOUTH EVERY 6 (SIX) HOURS AS NEEDED FOR MUSCLE SPASMS (Patient taking differently: Take 750 mg by mouth as needed for muscle spasms ) 100 tablet 0    oxyCODONE (ROXICODONE) 5 mg immediate release tablet 1 pill po Q4 Hrs prn (Patient not taking: Reported on 2/24/2021) 30 tablet 0    PARoxetine (PAXIL) 10 mg tablet TAKE 1 TABLET BY MOUTH EVERY DAY 30 tablet 6    traMADol (ULTRAM) 50 mg tablet One pill every 6 hours as needed for moderate to severe pain 28 tablet 0     No current facility-administered medications for this visit  No Known Allergies   Immunizations:     Immunization History   Administered Date(s) Administered    COVID-19 MODERNA VACC 0 5 ML IM 03/09/2021, 03/25/2021, 04/26/2021, 04/29/2021    H1N1, All Formulations 11/06/2009    INFLUENZA 10/01/2018, 10/15/2020    Influenza Quadrivalent 3 years and older 10/18/2021    Influenza, high dose seasonal 0 7 mL 10/01/2018, 10/17/2019    Pneumococcal Conjugate 13-Valent 12/28/2018    Pneumococcal Polysaccharide PPV23 12/31/2020    Zoster Vaccine Recombinant 11/03/2020      Health Maintenance:         Topic Date Due    Hepatitis C Screening  Never done    Breast Cancer Screening: Mammogram  09/28/2022    Colorectal Cancer Screening  10/07/2026         Topic Date Due    DTaP,Tdap,and Td Vaccines (1 - Tdap) Never done    COVID-19 Vaccine (3 - Booster for Moderna series) 10/29/2021      Medicare Health Risk Assessment:     Temp (!) 95 8 °F (35 4 °C)   LMP  (LMP Unknown)      Collette Downs is here for her Subsequent Wellness visit  Last Medicare Wellness visit information reviewed, patient interviewed, no change since last AWV  Health Risk Assessment:   Patient rates overall health as good  Patient feels that their physical health rating is same  Patient is satisfied with their life  Eyesight was rated as same  Hearing was rated as same   Patient feels that their emotional and mental health rating is slightly worse  Patients states they are never, rarely angry  Patient states they are sometimes unusually tired/fatigued  Pain experienced in the last 7 days has been some  Patient's pain rating has been 7/10  Patient states that she has experienced weight loss or gain in last 6 months  Depression Screening:   PHQ-9 Score: 5      Fall Risk Screening: In the past year, patient has experienced: no history of falling in past year      Urinary Incontinence Screening:   Patient has leaked urine accidently in the last six months  Home Safety:  Patient has trouble with stairs inside or outside of their home  Patient has working smoke alarms and has working carbon monoxide detector  Home safety hazards include: none  Nutrition:   Current diet is Regular and Limited junk food  Medications:   Patient is not currently taking any over-the-counter supplements  Patient is able to manage medications  Activities of Daily Living (ADLs)/Instrumental Activities of Daily Living (IADLs):   Walk and transfer into and out of bed and chair?: Yes  Dress and groom yourself?: Yes    Bathe or shower yourself?: Yes    Feed yourself?  Yes  Do your laundry/housekeeping?: Yes  Manage your money, pay your bills and track your expenses?: Yes  Make your own meals?: Yes    Do your own shopping?: Yes    Previous Hospitalizations:   Any hospitalizations or ED visits within the last 12 months?: No      Advance Care Planning:   Living will: No    Durable POA for healthcare: No    Advanced directive: No      PREVENTIVE SCREENINGS      Cardiovascular Screening:    General: Screening Not Indicated and History Lipid Disorder      Diabetes Screening:     General: Screening Current      Colorectal Cancer Screening:     General: Screening Current      Breast Cancer Screening:     General: Screening Current      Cervical Cancer Screening:    General: Screening Not Indicated      Lung Cancer Screening:     General: Screening Not Indicated    Screening, Brief Intervention, and Referral to Treatment (SBIRT)    Screening  Typical number of drinks in a day: 0  Typical number of drinks in a week: 0  Interpretation: Low risk drinking behavior      Single Item Drug Screening:  How often have you used an illegal drug (including marijuana) or a prescription medication for non-medical reasons in the past year? never    Single Item Drug Screen Score: 0  Interpretation: Negative screen for possible drug use disorder      Mi Bryson MD

## 2022-01-03 NOTE — PATIENT INSTRUCTIONS
Medicare Preventive Visit Patient Instructions  Thank you for completing your Welcome to Medicare Visit or Medicare Annual Wellness Visit today  Your next wellness visit will be due in one year (1/4/2023)  The screening/preventive services that you may require over the next 5-10 years are detailed below  Some tests may not apply to you based off risk factors and/or age  Screening tests ordered at today's visit but not completed yet may show as past due  Also, please note that scanned in results may not display below  Preventive Screenings:  Service Recommendations Previous Testing/Comments   Colorectal Cancer Screening  * Colonoscopy    * Fecal Occult Blood Test (FOBT)/Fecal Immunochemical Test (FIT)  * Fecal DNA/Cologuard Test  * Flexible Sigmoidoscopy Age: 54-65 years old   Colonoscopy: every 10 years (may be performed more frequently if at higher risk)  OR  FOBT/FIT: every 1 year  OR  Cologuard: every 3 years  OR  Sigmoidoscopy: every 5 years  Screening may be recommended earlier than age 48 if at higher risk for colorectal cancer  Also, an individualized decision between you and your healthcare provider will decide whether screening between the ages of 74-80 would be appropriate  Colonoscopy: 10/07/2016  FOBT/FIT: Not on file  Cologuard: Not on file  Sigmoidoscopy: Not on file    Screening Current     Breast Cancer Screening Age: 36 years old  Frequency: every 1-2 years  Not required if history of left and right mastectomy Mammogram: 09/28/2021    Screening Current   Cervical Cancer Screening Between the ages of 21-29, pap smear recommended once every 3 years  Between the ages of 33-67, can perform pap smear with HPV co-testing every 5 years     Recommendations may differ for women with a history of total hysterectomy, cervical cancer, or abnormal pap smears in past  Pap Smear: 11/16/2021    Screening Not Indicated   Hepatitis C Screening Once for adults born between 1945 and 1965  More frequently in patients at high risk for Hepatitis C Hep C Antibody: Not on file        Diabetes Screening 1-2 times per year if you're at risk for diabetes or have pre-diabetes Fasting glucose: 75 mg/dL   A1C: 5 6 %    Screening Current   Cholesterol Screening Once every 5 years if you don't have a lipid disorder  May order more often based on risk factors  Lipid panel: 12/23/2021    Screening Not Indicated  History Lipid Disorder     Other Preventive Screenings Covered by Medicare:  1  Abdominal Aortic Aneurysm (AAA) Screening: covered once if your at risk  You're considered to be at risk if you have a family history of AAA  2  Lung Cancer Screening: covers low dose CT scan once per year if you meet all of the following conditions: (1) Age 50-69; (2) No signs or symptoms of lung cancer; (3) Current smoker or have quit smoking within the last 15 years; (4) You have a tobacco smoking history of at least 30 pack years (packs per day multiplied by number of years you smoked); (5) You get a written order from a healthcare provider  3  Glaucoma Screening: covered annually if you're considered high risk: (1) You have diabetes OR (2) Family history of glaucoma OR (3)  aged 48 and older OR (3)  American aged 72 and older  3  Osteoporosis Screening: covered every 2 years if you meet one of the following conditions: (1) You're estrogen deficient and at risk for osteoporosis based off medical history and other findings; (2) Have a vertebral abnormality; (3) On glucocorticoid therapy for more than 3 months; (4) Have primary hyperparathyroidism; (5) On osteoporosis medications and need to assess response to drug therapy  · Last bone density test (DXA Scan): Not on file  5  HIV Screening: covered annually if you're between the age of 12-76  Also covered annually if you are younger than 13 and older than 72 with risk factors for HIV infection   For pregnant patients, it is covered up to 3 times per pregnancy  Immunizations:  Immunization Recommendations   Influenza Vaccine Annual influenza vaccination during flu season is recommended for all persons aged >= 6 months who do not have contraindications   Pneumococcal Vaccine (Prevnar and Pneumovax)  * Prevnar = PCV13  * Pneumovax = PPSV23   Adults 25-60 years old: 1-3 doses may be recommended based on certain risk factors  Adults 72 years old: Prevnar (PCV13) vaccine recommended followed by Pneumovax (PPSV23) vaccine  If already received PPSV23 since turning 65, then PCV13 recommended at least one year after PPSV23 dose  Hepatitis B Vaccine 3 dose series if at intermediate or high risk (ex: diabetes, end stage renal disease, liver disease)   Tetanus (Td) Vaccine - COST NOT COVERED BY MEDICARE PART B Following completion of primary series, a booster dose should be given every 10 years to maintain immunity against tetanus  Td may also be given as tetanus wound prophylaxis  Tdap Vaccine - COST NOT COVERED BY MEDICARE PART B Recommended at least once for all adults  For pregnant patients, recommended with each pregnancy  Shingles Vaccine (Shingrix) - COST NOT COVERED BY MEDICARE PART B  2 shot series recommended in those aged 48 and above     Health Maintenance Due:      Topic Date Due    Hepatitis C Screening  Never done    Breast Cancer Screening: Mammogram  09/28/2022    Colorectal Cancer Screening  10/07/2026     Immunizations Due:      Topic Date Due    DTaP,Tdap,and Td Vaccines (1 - Tdap) Never done    COVID-19 Vaccine (3 - Booster for Moderna series) 10/29/2021     Advance Directives   What are advance directives? Advance directives are legal documents that state your wishes and plans for medical care  These plans are made ahead of time in case you lose your ability to make decisions for yourself  Advance directives can apply to any medical decision, such as the treatments you want, and if you want to donate organs     What are the types of advance directives? There are many types of advance directives, and each state has rules about how to use them  You may choose a combination of any of the following:  · Living will: This is a written record of the treatment you want  You can also choose which treatments you do not want, which to limit, and which to stop at a certain time  This includes surgery, medicine, IV fluid, and tube feedings  · Durable power of  for healthcare [facility]): This is a written record that states who you want to make healthcare choices for you when you are unable to make them for yourself  This person, called a proxy, is usually a family member or a friend  You may choose more than 1 proxy  · Do not resuscitate (DNR) order:  A DNR order is used in case your heart stops beating or you stop breathing  It is a request not to have certain forms of treatment, such as CPR  A DNR order may be included in other types of advance directives  · Medical directive: This covers the care that you want if you are in a coma, near death, or unable to make decisions for yourself  You can list the treatments you want for each condition  Treatment may include pain medicine, surgery, blood transfusions, dialysis, IV or tube feedings, and a ventilator (breathing machine)  · Values history: This document has questions about your views, beliefs, and how you feel and think about life  This information can help others choose the care that you would choose  Why are advance directives important? An advance directive helps you control your care  Although spoken wishes may be used, it is better to have your wishes written down  Spoken wishes can be misunderstood, or not followed  Treatments may be given even if you do not want them  An advance directive may make it easier for your family to make difficult choices about your care  Urinary Incontinence   Urinary incontinence (UI)  is when you lose control of your bladder   UI develops because your bladder cannot store or empty urine properly  The 3 most common types of UI are stress incontinence, urge incontinence, or both  Medicines:   · May be given to help strengthen your bladder control  Report any side effects of medication to your healthcare provider  Do pelvic muscle exercises often:  Your pelvic muscles help you stop urinating  Squeeze these muscles tight for 5 seconds, then relax for 5 seconds  Gradually work up to squeezing for 10 seconds  Do 3 sets of 15 repetitions a day, or as directed  This will help strengthen your pelvic muscles and improve bladder control  Train your bladder:  Go to the bathroom at set times, such as every 2 hours, even if you do not feel the urge to go  You can also try to hold your urine when you feel the urge to go  For example, hold your urine for 5 minutes when you feel the urge to go  As that becomes easier, hold your urine for 10 minutes  Self-care:   · Keep a UI record  Write down how often you leak urine and how much you leak  Make a note of what you were doing when you leaked urine  · Drink liquids as directed  You may need to limit the amount of liquid you drink to help control your urine leakage  Do not drink any liquid right before you go to bed  Limit or do not have drinks that contain caffeine or alcohol  · Prevent constipation  Eat a variety of high-fiber foods  Good examples are high-fiber cereals, beans, vegetables, and whole-grain breads  Walking is the best way to trigger your intestines to have a bowel movement  · Exercise regularly and maintain a healthy weight  Weight loss and exercise will decrease pressure on your bladder and help you control your leakage  · Use a catheter as directed  to help empty your bladder  A catheter is a tiny, plastic tube that is put into your bladder to drain your urine  · Go to behavior therapy as directed  Behavior therapy may be used to help you learn to control your urge to urinate      Weight Management   Why it is important to manage your weight:  Being overweight increases your risk of health conditions such as heart disease, high blood pressure, type 2 diabetes, and certain types of cancer  It can also increase your risk for osteoarthritis, sleep apnea, and other respiratory problems  Aim for a slow, steady weight loss  Even a small amount of weight loss can lower your risk of health problems  How to lose weight safely:  A safe and healthy way to lose weight is to eat fewer calories and get regular exercise  You can lose up about 1 pound a week by decreasing the number of calories you eat by 500 calories each day  Healthy meal plan for weight management:  A healthy meal plan includes a variety of foods, contains fewer calories, and helps you stay healthy  A healthy meal plan includes the following:  · Eat whole-grain foods more often  A healthy meal plan should contain fiber  Fiber is the part of grains, fruits, and vegetables that is not broken down by your body  Whole-grain foods are healthy and provide extra fiber in your diet  Some examples of whole-grain foods are whole-wheat breads and pastas, oatmeal, brown rice, and bulgur  · Eat a variety of vegetables every day  Include dark, leafy greens such as spinach, kale, eduardo greens, and mustard greens  Eat yellow and orange vegetables such as carrots, sweet potatoes, and winter squash  · Eat a variety of fruits every day  Choose fresh or canned fruit (canned in its own juice or light syrup) instead of juice  Fruit juice has very little or no fiber  · Eat low-fat dairy foods  Drink fat-free (skim) milk or 1% milk  Eat fat-free yogurt and low-fat cottage cheese  Try low-fat cheeses such as mozzarella and other reduced-fat cheeses  · Choose meat and other protein foods that are low in fat  Choose beans or other legumes such as split peas or lentils   Choose fish, skinless poultry (chicken or turkey), or lean cuts of red meat (beef or pork)  Before you cook meat or poultry, cut off any visible fat  · Use less fat and oil  Try baking foods instead of frying them  Add less fat, such as margarine, sour cream, regular salad dressing and mayonnaise to foods  Eat fewer high-fat foods  Some examples of high-fat foods include french fries, doughnuts, ice cream, and cakes  · Eat fewer sweets  Limit foods and drinks that are high in sugar  This includes candy, cookies, regular soda, and sweetened drinks  Exercise:  Exercise at least 30 minutes per day on most days of the week  Some examples of exercise include walking, biking, dancing, and swimming  You can also fit in more physical activity by taking the stairs instead of the elevator or parking farther away from stores  Ask your healthcare provider about the best exercise plan for you  © Copyright [employer] 2018 Information is for End User's use only and may not be sold, redistributed or otherwise used for commercial purposes   All illustrations and images included in CareNotes® are the copyrighted property of A D A M [employer]  or [address] [place] [address]

## 2022-01-04 NOTE — ASSESSMENT & PLAN NOTE
Lipid profile reviewed recommend continuation of healthy diet regular exercise and weight reduction follow-up testing in 1 year

## 2022-01-04 NOTE — ASSESSMENT & PLAN NOTE
Ongoing orthopedic symptoms of arthritis nature in the right knee continue meloxicam and p r n   Use of Voltaren gel

## 2022-01-04 NOTE — ASSESSMENT & PLAN NOTE
A review of the patient's current urinalysis indicates the presence of microscopic asymptomatic hematuria as a starting point I have requested the patient have a ultrasound of her kidneys and bladder will review results of the study with the patient when they are completed

## 2022-01-04 NOTE — PROGRESS NOTES
Assessment/Plan:    Microscopic hematuria  A review of the patient's current urinalysis indicates the presence of microscopic asymptomatic hematuria as a starting point I have requested the patient have a ultrasound of her kidneys and bladder will review results of the study with the patient when they are completed  Chronic bilateral low back pain  Chronic bilateral back pain reviewed with the patient  She continues under the management of pain management services  Currently utilizing muscle relaxers anti inflammatory meloxicam and p r n  Tramadol  Primary osteoarthritis of right knee  Ongoing orthopedic symptoms of arthritis nature in the right knee continue meloxicam and p r n  Use of Voltaren gel    Hyperlipidemia  Lipid profile reviewed recommend continuation of healthy diet regular exercise and weight reduction follow-up testing in 1 year    Depression  Mild increase in depression symptoms recently patient has been experiencing increase in of mild depression of through the holiday season and sent several stresses in her family recently  She is currently on 10 mg of Paxil daily we will increase this to 20 mg and I have asked her to contact me in 3-4 weeks to let me know how she is doing on this dose of medication no indication of any suicidal ideations or tendencies at this time  Diagnoses and all orders for this visit:    Need for hepatitis C screening test  -     Hepatitis C Antibody ([facility]); Future    Depression, unspecified depression type  -     PARoxetine (PAXIL) 20 mg tablet; Take 1 tablet (20 mg total) by mouth daily    Status post total hip replacement, right    Microscopic hematuria  -     US kidney and bladder; Future    Hyperlipidemia, unspecified hyperlipidemia type        Subjective:      Patient ID: Carlos Enrique Aj is a 73 y o  female  This 57-year-old female patient presents today for an annual complete physical examination review of her current blood work    She denies any recent signs or symptoms of COVID-19 infection has no cardiac symptoms present time of chest pain palpitations or shortness of breath nor does she have any significant peripheral edema  She is due for colonoscopy examination within the next 6 months  She is current on gyn examination and mammography  The following portions of the patient's history were reviewed and updated as appropriate:   She  has a past medical history of Ankylosing spondylitis ([address] ), Anxiety, Arthritis, Back pain, Carpal tunnel syndrome, Fibromyalgia, Fibromyalgia, primary, Heme positive stool, High cholesterol, Hyperlipidemia, Impingement syndrome of left shoulder, Impingement syndrome of right shoulder, Long term use of drug, Low back pain, No known health problems, RLS (restless legs syndrome), Rotator cuff injury, Spinal stenosis, Spinal stenosis, Spondylitis ([address] ), Spondyloarthritis, and Vitamin D deficiency  She   Patient Active Problem List    Diagnosis Date Noted    Depression 2022    Microscopic hematuria 2022    History of lumbar spinal fusion 2021    Family history of breast cancer 2021    Stress incontinence 2021    Trigger finger of left hand 2020    Right hip pain 2020    Hyperlipidemia 2019    Abnormal EKG 10/17/2019    Primary osteoarthritis of right knee 2019    Encounter for annual routine gynecological examination 2019    Encounter for screening mammogram for malignant neoplasm of breast 2019    Chronic bilateral low back pain 2018    Spinal stenosis of lumbar region at multiple levels 2017    Spondylosis 2017     She  has a past surgical history that includes  section; Tubal ligation; Dilation and curettage of uterus;  Retinal laser procedure; Colonoscopy; pr colonoscopy flx dx w/collj spec when pfrmd (N/A, 10/7/2016); pr arthrodesis posterior/posterolateral lumbar (N/A, 4/3/2017); pr wrist arthroscop,release xvers lig (Left, 4/26/2018); Cervical conization w/ laser; Back surgery; Hand surgery; Carpal tunnel release (Left); pr wrist arthroscop,release xvers lig (Right, 6/14/2018); FL injection right hip (non arthrogram) (4/24/2019); FL injection right hip (non arthrogram) (7/24/2019); pr total hip arthroplasty (Right, 11/7/2019); Joint replacement; orthopedic surgery; and Fracture surgery  Her family history includes Arthritis in her brother, family, mother, sister, and sister; Breast cancer (age of onset: 36) in her maternal aunt; Breast cancer (age of onset: 67) in her mother; Breast cancer additional onset (age of onset: 46) in her other; Cancer in her brother; Depression in her son; Endometrial cancer (age of onset: 61) in her sister; Heart attack in her brother, brother, father, mother, and sister; Hyperlipidemia in her family; Hypertension in her father and mother; Lung cancer in her maternal uncle; Melanoma in her brother and brother; No Known Problems in her brother, daughter, maternal grandfather, maternal grandmother, paternal grandfather, paternal grandmother, son, and son; Other in her brother; Prostate cancer in her brother; Prostate cancer (age of onset: 58) in her brother; Stroke in her brother and father; Uterine cancer in her other and sister  She  reports that she has never smoked  She has never used smokeless tobacco  She reports that she does not drink alcohol and does not use drugs  Current Outpatient Medications   Medication Sig Dispense Refill    aspirin 81 MG tablet Take 1 tablet by mouth daily      cephalexin (KEFLEX) 500 mg capsule Take 4 tablets p o  1 hr prior to dental appointment as instructed 20 capsule 1    Cholecalciferol (VITAMIN D3) 50 MCG (2000 UT) capsule Vitamin D3 50 mcg (2,000 unit) capsule   Take by oral route        ciclopirox (PENLAC) 8 % solution APPLY TOPICALLY DAILY AT BEDTIME REMOVE BUILDUP ONCE A WEEK WITH COTTON BALL IN ALCOHOL CONTINUED TREATMENT FOR 3 MONTHS 6 6 mL 1    clonazePAM (KlonoPIN) 0 5 mg tablet Take 0 5 mg by mouth daily at bedtime  4    Diclofenac Sodium (VOLTAREN) 1 % Apply 4 g topically 4 (four) times a day 100 g 4    Fluad Quadrivalent 0 5 ML PRSY PHARMACY ADMINISTERED      gabapentin (NEURONTIN) 100 mg capsule Take 1 capsule (100 mg total) by mouth 2 (two) times a day 180 capsule 0    gabapentin (NEURONTIN) 300 mg capsule Take 1 capsule (300 mg total) by mouth daily at bedtime 90 capsule 0    meloxicam (Mobic) 15 mg tablet Take 1 tablet (15 mg total) by mouth daily 30 tablet 2    methocarbamol (ROBAXIN) 750 mg tablet TAKE 1 TABLET (750 MG TOTAL) BY MOUTH EVERY 6 (SIX) HOURS AS NEEDED FOR MUSCLE SPASMS (Patient taking differently: Take 750 mg by mouth as needed for muscle spasms ) 100 tablet 0    PARoxetine (PAXIL) 20 mg tablet Take 1 tablet (20 mg total) by mouth daily 30 tablet 6    traMADol (ULTRAM) 50 mg tablet One pill every 6 hours as needed for moderate to severe pain 28 tablet 0     No current facility-administered medications for this visit       Review of Systems   Musculoskeletal: Positive for back pain  All other systems reviewed and are negative  Objective:      /82   Pulse 71   Temp (!) 95 8 °F (35 4 °C)   Ht 5' 1" (1 549 m)   Wt 84 5 kg (186 lb 3 2 oz)   LMP  (LMP Unknown)   SpO2 98%   BMI 35 18 kg/m²          Physical Exam  Vitals reviewed  Constitutional:       General: She is not in acute distress  Appearance: Normal appearance  She is well-developed  She is not ill-appearing  HENT:      Head: Normocephalic  Right Ear: Hearing, tympanic membrane, ear canal and external ear normal       Left Ear: Hearing, tympanic membrane, ear canal and external ear normal       Nose: Nose normal  No mucosal edema  Mouth/Throat:      Mouth: Mucous membranes are moist       Pharynx: Oropharynx is clear  Uvula midline  Eyes:      General: Lids are normal          Right eye: No discharge           Left eye: No discharge  Extraocular Movements: Extraocular movements intact  Conjunctiva/sclera: Conjunctivae normal       Pupils: Pupils are equal, round, and reactive to light  Neck:      Thyroid: No thyromegaly  Vascular: No carotid bruit or JVD  Cardiovascular:      Rate and Rhythm: Normal rate and regular rhythm  Heart sounds: Normal heart sounds  No murmur heard  Pulmonary:      Effort: Pulmonary effort is normal  No respiratory distress  Breath sounds: Normal breath sounds  No wheezing, rhonchi or rales  Abdominal:      General: Bowel sounds are normal  There is no distension  Palpations: Abdomen is soft  There is no mass  Tenderness: There is no abdominal tenderness  Musculoskeletal:         General: Normal range of motion  Cervical back: Normal range of motion and neck supple  No rigidity or tenderness  Right lower leg: No edema  Left lower leg: No edema  Lymphadenopathy:      Cervical: No cervical adenopathy  Skin:     General: Skin is warm and dry  Coloration: Skin is not jaundiced or pale  Neurological:      Mental Status: She is alert and oriented to person, place, and time  Mental status is at baseline  Deep Tendon Reflexes: Reflexes are normal and symmetric  Reflexes normal    Psychiatric:         Mood and Affect: Mood normal          Speech: Speech normal          Behavior: Behavior normal  Behavior is cooperative  Thought Content:  Thought content normal          Judgment: Judgment normal

## 2022-01-04 NOTE — ASSESSMENT & PLAN NOTE
Mild increase in depression symptoms recently patient has been experiencing increase in of mild depression of through the holiday season and sent several stresses in her family recently  She is currently on 10 mg of Paxil daily we will increase this to 20 mg and I have asked her to contact me in 3-4 weeks to let me know how she is doing on this dose of medication no indication of any suicidal ideations or tendencies at this time

## 2022-01-04 NOTE — ASSESSMENT & PLAN NOTE
Chronic bilateral back pain reviewed with the patient  She continues under the management of pain management services  Currently utilizing muscle relaxers anti inflammatory meloxicam and p r n  Tramadol

## 2022-01-19 ENCOUNTER — HOSPITAL ENCOUNTER (OUTPATIENT)
Dept: RADIOLOGY | Facility: HOSPITAL | Age: 72
Discharge: HOME | End: 2022-01-19
Attending: INTERNAL MEDICINE
Payer: MEDICARE

## 2022-01-19 DIAGNOSIS — R31.29 MICROSCOPIC HEMATURIA: ICD-10-CM

## 2022-01-19 PROCEDURE — 76770 US EXAM ABDO BACK WALL COMP: CPT

## 2022-01-25 ENCOUNTER — TELEPHONE (OUTPATIENT)
Dept: INTERNAL MEDICINE CLINIC | Facility: CLINIC | Age: 72
End: 2022-01-25

## 2022-01-25 NOTE — TELEPHONE ENCOUNTER
Call was returned to the patient her ultrasound of the kidneys and bladder looks normal no indication of any abnormalities to of recommend

## 2022-01-25 NOTE — TELEPHONE ENCOUNTER
Patient called and had her US done last week and asked if you saw the results if you could please call her back at [phone]    Thank you

## 2022-03-21 ENCOUNTER — OFFICE VISIT (OUTPATIENT)
Dept: PAIN MEDICINE | Facility: MEDICAL CENTER | Age: 72
End: 2022-03-21
Payer: MEDICARE

## 2022-03-21 VITALS
BODY MASS INDEX: 34.97 KG/M2 | HEART RATE: 77 BPM | DIASTOLIC BLOOD PRESSURE: 81 MMHG | WEIGHT: 185.2 LBS | SYSTOLIC BLOOD PRESSURE: 115 MMHG | HEIGHT: 61 IN

## 2022-03-21 DIAGNOSIS — G89.29 CHRONIC BILATERAL LOW BACK PAIN WITHOUT SCIATICA: ICD-10-CM

## 2022-03-21 DIAGNOSIS — M54.50 CHRONIC BILATERAL LOW BACK PAIN WITHOUT SCIATICA: ICD-10-CM

## 2022-03-21 DIAGNOSIS — G62.9 NEUROPATHY: ICD-10-CM

## 2022-03-21 PROCEDURE — 99213 OFFICE O/P EST LOW 20 MIN: CPT | Performed by: NURSE PRACTITIONER

## 2022-03-21 RX ORDER — GABAPENTIN 100 MG/1
100 CAPSULE ORAL 3 TIMES DAILY
Qty: 270 CAPSULE | Refills: 1 | Status: SHIPPED | OUTPATIENT
Start: 2022-03-21 | End: 2022-06-19

## 2022-03-21 RX ORDER — GABAPENTIN 300 MG/1
300 CAPSULE ORAL
Qty: 90 CAPSULE | Refills: 1 | Status: SHIPPED | OUTPATIENT
Start: 2022-03-21 | End: 2022-06-19

## 2022-03-21 RX ORDER — MELOXICAM 15 MG/1
15 TABLET ORAL DAILY
Qty: 90 TABLET | Refills: 1 | Status: SHIPPED | OUTPATIENT
Start: 2022-03-21 | End: 2022-06-19

## 2022-03-21 NOTE — PROGRESS NOTES
Assessment  1  Neuropathy    2  Chronic bilateral low back pain without sciatica        Plan  Continue gabapentin and meloxicam these were both refilled today  Continue with home exercises    Follow-up visit in 6 months for medication refills        My impressions and treatment recommendations were discussed in detail with the patient who verbalized understanding and had no further questions  Discharge instructions were provided  I personally saw and examined the patient and I agree with the above discussed plan of care  No orders of the defined types were placed in this encounter  New Medications Ordered This Visit   Medications    gabapentin (NEURONTIN) 100 mg capsule     Sig: Take 1 capsule (100 mg total) by mouth 3 (three) times a day     Dispense:  270 capsule     Refill:  1    gabapentin (NEURONTIN) 300 mg capsule     Sig: Take 1 capsule (300 mg total) by mouth daily at bedtime     Dispense:  90 capsule     Refill:  1    meloxicam (Mobic) 15 mg tablet     Sig: Take 1 tablet (15 mg total) by mouth daily     Dispense:  90 tablet     Refill:  1       History of Present Illness    Shalom Jaquez is a 73 y o  female presents for follow-up related to her chronic low back pain symptoms  Today she rates her pain 2/10 which is intermittent and bothersome in the morning  She describes the pain as dull, and aching  Patient is using gabapentin 100 mg in the morning in the afternoon and 400 mg at bedtime  She also takes meloxicam 15 mg daily  Her medication regimen provides 90% relief without side effects or issues  I have personally reviewed and/or updated the patient's past medical history, past surgical history, family history, social history, current medications, allergies, and vital signs today  Review of Systems   Musculoskeletal: Positive for back pain, gait problem and joint swelling  All other systems reviewed and are negative        Patient Active Problem List   Diagnosis    Spinal stenosis of lumbar region at multiple levels    Spondylosis    Chronic bilateral low back pain    Encounter for annual routine gynecological examination    Encounter for screening mammogram for malignant neoplasm of breast    Primary osteoarthritis of right knee    Abnormal EKG    Hyperlipidemia    Right hip pain    Trigger finger of left hand    Family history of breast cancer    Stress incontinence    History of lumbar spinal fusion    Depression    Microscopic hematuria       Past Medical History:   Diagnosis Date    [facility]ylosing [facility]ondylitis ([facility] [address] )     Anxiety     LAST ASSESSED: 16    Arthritis     Back pain     Carpal tunnel syndrome     Fibromyalgia     LAST ASSESSED: 16    Fibromyalgia, primary     Heme positive stool     LAST ASSESSED: 10/22/16    High cholesterol     Hyperlipidemia     under control since weight loss    Impingement syndrome of left shoulder     LAST ASSESSED: 17    Impingement syndrome of right shoulder     LAST ASSESSED:     Long term use of drug     LAST ASSESSED: 16    Low back pain     No known health problems     NO PERTINENT PAST MEDICAL HX    RLS (restless legs syndrome)     Rotator cuff injury     right    Spinal stenosis     Spinal stenosis     Spondylitis (HCC)     Spondyloarthritis     RESOLVED: 16    Vitamin D deficiency        Past Surgical History:   Procedure Laterality Date    BACK SURGERY      CARPAL TUNNEL RELEASE Left     CERVICAL CONIZATION   W/ LASER      CERVICAL CONIZATION     SECTION      COLONOSCOPY      DILATION AND CURETTAGE OF UTERUS      FL INJECTION RIGHT HIP (NON ARTHROGRAM)  2019    FL INJECTION RIGHT HIP (NON ARTHROGRAM)  2019    FRACTURE SURGERY      HAND SURGERY      JOINT REPLACEMENT      ORTHOPEDIC SURGERY      PA ARTHRODESIS POSTERIOR/POSTEROLATERAL LUMBAR N/A 4/3/2017    Procedure: L2-L5 OPEN DECOMPRESSIVE LUMBAR LAMINECTOMY W/ PEDICLE SCREW AND NILDA FIXATION FUSION (IMPULSE); REMOVAL OF SKIN TAG MID BACK;  Surgeon: Sheng Flowers MD;  [facility];  Service: Neurosurgery    DE COLONOSCOPY FLX DX W/COLLJ SPEC WHEN PFRMD N/A 10/7/2016    Procedure: EGD AND COLONOSCOPY;  Surgeon: Jayde Gonzalez MD;  [facility];   Service: Gastroenterology    DE TOTAL HIP ARTHROPLASTY Right 11/7/2019    Procedure: ARTHROPLASTY HIP TOTAL Posterior;  Surgeon: Criselda Faria MD;  [facility];  Service: Orthopedics    DE WRIST Hershall Brenna LIG Left 4/26/2018    Procedure: RELEASE CARPAL TUNNEL ENDOSCOPIC;  Surgeon: Jearldine Goodell, MD;  [facility];  Service: Orthopedics    DE WRIST Hershall Brenna LIG Right 6/14/2018    Procedure: RELEASE CARPAL TUNNEL ENDOSCOPIC;  Surgeon: Jearldine Goodell, MD;  [facility];  Service: Orthopedics    RETINAL LASER PROCEDURE      TUBAL LIGATION         Family History   Problem Relation Age of Onset    Arthritis Mother     Breast cancer Mother 67    Hypertension Mother     Heart attack Mother         MYOCARDIAL INFARCTION    Heart attack Father     Hypertension Father     Stroke Father         [address] (CV[facility])    Arthritis Sister     Uterine cancer Sister     Endometrial cancer Sister 61    Heart attack Brother     Prostate cancer Brother 58    Arthritis Brother     Melanoma Brother     Cancer Brother     Arthritis Family     Hyperlipidemia Family     Depression Son     Breast cancer Maternal Aunt 36    No Known Problems Daughter     No Known Problems Maternal Grandmother     No Known Problems Maternal Grandfather     No Known Problems Paternal Grandmother     No Known Problems Paternal Grandfather     No Known Problems Brother     Other Brother         hunting accident (shot)    Melanoma Brother     Prostate cancer Brother     Heart attack Brother     Stroke Brother     Arthritis Sister     Heart attack Sister     No Known Problems Son     No Known Problems Son     Lung cancer Maternal Uncle     Breast cancer additional onset Other 46    Uterine cancer Other        Social History     Occupational History    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  Comment: EMPLOYED   Tobacco Use    Smoking status: Never Smoker    Smokeless tobacco: Never Used   Vaping Use    Vaping Use: Never used   Substance and Sexual Activity    Alcohol use: Never    Drug use: No    Sexual activity: Yes     Partners: Male     Birth control/protection: None, Post-menopausal       Current Outpatient Medications on File Prior to Visit   Medication Sig    aspirin 81 MG tablet Take 1 tablet by mouth daily    cephalexin (KEFLEX) 500 mg capsule Take 4 tablets p o  1 hr prior to dental appointment as instructed    Cholecalciferol (VITAMIN D3) 50 MCG (2000 UT) capsule Vitamin D3 50 mcg (2,000 unit) capsule   Take by oral route      ciclopirox (PENLAC) 8 % solution APPLY TOPICALLY DAILY AT BEDTIME REMOVE BUILDUP ONCE A WEEK WITH COTTON BALL IN ALCOHOL CONTINUED TREATMENT FOR 3 MONTHS    clonazePAM (KlonoPIN) 0 5 mg tablet Take 0 5 mg by mouth daily at bedtime    Diclofenac Sodium (VOLTAREN) 1 % Apply 4 g topically 4 (four) times a day    Fluad Quadrivalent 0 5 ML PRSY PHARMACY ADMINISTERED    methocarbamol (ROBAXIN) 750 mg tablet TAKE 1 TABLET (750 MG TOTAL) BY MOUTH EVERY 6 (SIX) HOURS AS NEEDED FOR MUSCLE SPASMS (Patient taking differently: Take 750 mg by mouth as needed for muscle spasms )    PARoxetine (PAXIL) 20 mg tablet Take 1 tablet (20 mg total) by mouth daily    [DISCONTINUED] gabapentin (NEURONTIN) 100 mg capsule Take 1 capsule (100 mg total) by mouth 2 (two) times a day    [DISCONTINUED] gabapentin (NEURONTIN) 300 mg capsule Take 1 capsule (300 mg total) by mouth daily at bedtime    [DISCONTINUED] meloxicam (Mobic) 15 mg tablet Take 1 tablet (15 mg total) by mouth daily    traMADol (ULTRAM) 50 mg tablet One pill every 6 hours as needed for moderate to severe pain (Patient not taking: Reported on 3/21/2022 )     No current facility-administered medications on file prior to visit  No Known Allergies    Physical Exam    /81   Pulse 77   Ht 5' 1" (1 549 m)   Wt 84 kg (185 lb 3 2 oz)   LMP  (LMP Unknown)   BMI 34 99 kg/m²     Constitutional: normal, well developed, well nourished, alert, in no distress and non-toxic and no overt pain behavior    Eyes: anicteric  HEENT: grossly intact  Neck: supple, symmetric, trachea midline and no masses   Pulmonary:even and unlabored  Cardiovascular:No edema or pitting edema present  Skin:Normal without rashes or lesions and well hydrated  Psychiatric:Mood and affect appropriate  Neurologic:Cranial Nerves II-XII grossly intact  Musculoskeletal:normal       Imaging

## 2022-05-16 ENCOUNTER — TELEPHONE (OUTPATIENT)
Dept: OBGYN CLINIC | Facility: OTHER | Age: 72
End: 2022-05-16

## 2022-05-16 NOTE — TELEPHONE ENCOUNTER
email sent to [facility] and admin    Hello!!    I have a patient that had a recent fall over the weekend on her Right Knee she had a replacement on   I spoke to zeferino Escudero to add on for    Preferably at 1:15    Patient is :  Maricruz Sow    : [birth year]  MRN : [id number]  C/b # [id number]  Reason for Appointment : Follow Up / Right Knee / Lord Tabares to add per Cindi Anton / ADD FOR 1:15pm if possible  Requested Doctor & Location : Dr Lilo Dick    Thank you    Emily Genao

## 2022-05-19 DIAGNOSIS — M25.561 RIGHT KNEE PAIN, UNSPECIFIED CHRONICITY: Primary | ICD-10-CM

## 2022-05-26 ENCOUNTER — OFFICE VISIT (OUTPATIENT)
Dept: OBGYN CLINIC | Facility: HOSPITAL | Age: 72
End: 2022-05-26
Payer: MEDICARE

## 2022-05-26 ENCOUNTER — HOSPITAL ENCOUNTER (OUTPATIENT)
Dept: RADIOLOGY | Facility: HOSPITAL | Age: 72
Discharge: HOME | End: 2022-05-26
Attending: ORTHOPAEDIC SURGERY
Payer: MEDICARE

## 2022-05-26 VITALS
DIASTOLIC BLOOD PRESSURE: 68 MMHG | HEIGHT: 61 IN | BODY MASS INDEX: 35.3 KG/M2 | WEIGHT: 187 LBS | SYSTOLIC BLOOD PRESSURE: 101 MMHG | HEART RATE: 89 BPM

## 2022-05-26 DIAGNOSIS — M17.11 PRIMARY OSTEOARTHRITIS OF RIGHT KNEE: ICD-10-CM

## 2022-05-26 DIAGNOSIS — M25.461 EFFUSION OF RIGHT KNEE: ICD-10-CM

## 2022-05-26 DIAGNOSIS — M25.561 RIGHT KNEE PAIN, UNSPECIFIED CHRONICITY: Primary | ICD-10-CM

## 2022-05-26 DIAGNOSIS — M25.561 RIGHT KNEE PAIN, UNSPECIFIED CHRONICITY: ICD-10-CM

## 2022-05-26 PROCEDURE — 99213 OFFICE O/P EST LOW 20 MIN: CPT | Performed by: ORTHOPAEDIC SURGERY

## 2022-05-26 PROCEDURE — 73562 X-RAY EXAM OF KNEE 3: CPT

## 2022-05-26 PROCEDURE — 20610 DRAIN/INJ JOINT/BURSA W/O US: CPT | Performed by: ORTHOPAEDIC SURGERY

## 2022-05-26 RX ORDER — LIDOCAINE HYDROCHLORIDE 10 MG/ML
3 INJECTION, SOLUTION INFILTRATION; PERINEURAL
Status: COMPLETED | OUTPATIENT
Start: 2022-05-26 | End: 2022-05-26

## 2022-05-26 RX ORDER — LIDOCAINE HYDROCHLORIDE 10 MG/ML
2 INJECTION, SOLUTION INFILTRATION; PERINEURAL
Status: COMPLETED | OUTPATIENT
Start: 2022-05-26 | End: 2022-05-26

## 2022-05-26 RX ORDER — BETAMETHASONE SODIUM PHOSPHATE AND BETAMETHASONE ACETATE 3; 3 MG/ML; MG/ML
12 INJECTION, SUSPENSION INTRA-ARTICULAR; INTRALESIONAL; INTRAMUSCULAR; SOFT TISSUE
Status: COMPLETED | OUTPATIENT
Start: 2022-05-26 | End: 2022-05-26

## 2022-05-26 RX ORDER — BUPIVACAINE HYDROCHLORIDE 2.5 MG/ML
2 INJECTION, SOLUTION INFILTRATION; PERINEURAL
Status: COMPLETED | OUTPATIENT
Start: 2022-05-26 | End: 2022-05-26

## 2022-05-26 RX ADMIN — BUPIVACAINE HYDROCHLORIDE 2 ML: 2.5 INJECTION, SOLUTION INFILTRATION; PERINEURAL at 13:58

## 2022-05-26 RX ADMIN — BETAMETHASONE SODIUM PHOSPHATE AND BETAMETHASONE ACETATE 12 MG: 3; 3 INJECTION, SUSPENSION INTRA-ARTICULAR; INTRALESIONAL; INTRAMUSCULAR; SOFT TISSUE at 13:58

## 2022-05-26 RX ADMIN — LIDOCAINE HYDROCHLORIDE 2 ML: 10 INJECTION, SOLUTION INFILTRATION; PERINEURAL at 13:58

## 2022-05-26 RX ADMIN — LIDOCAINE HYDROCHLORIDE 3 ML: 10 INJECTION, SOLUTION INFILTRATION; PERINEURAL at 13:58

## 2022-05-26 NOTE — PROGRESS NOTES
Assessment:  1  Right knee pain, unspecified chronicity     2  Primary osteoarthritis of right knee         Plan:  Right patellofemoral arthritis with effusion  The patient was provided with right knee steroid injection following aspiration of 27ml fluid  The patient tolerated the procedure well  The patient should follow up in 3 months  To do next visit:  Return in about 3 months (around 8/26/2022)  The above stated was discussed in layman's terms and the patient expressed understanding  All questions were answered to the patient's satisfaction  Scribe Attestation    I,:  Karyle Ma am acting as a scribe while in the presence of the attending physician :       I,:  Jh Kaur MD personally performed the services described in this documentation    as scribed in my presence :             Subjective:   Maricruz Sow is a 73 y o  female who presents for follow up of right knee  She has had increase of pain over past several days with no injury  Overall she is feeling better  Today she complains of right anterolateral and posterior right knee pain  She rates her pain at 1/10 and 8/10 at its worse  Walking aggravates while rest alleviates  She does use IBU along with daily Mobic with benefit  She does use ice/heat with benefit  He has had steroid injections with limited benefit  She denies past surgery of right knee          Review of systems negative unless otherwise specified in HPI    Past Medical History:   Diagnosis Date    [facility]yl[facility] ([facility] [address]     Anxiety     LAST ASSESSED: 12/20/16    Arthritis     Back pain     Carpal tunnel syndrome     Fibromyalgia     LAST ASSESSED: 12/20/16    Fibromyalgia, primary     Heme positive stool     LAST ASSESSED: 10/22/16    High cholesterol     Hyperlipidemia     under control since weight loss    Impingement syndrome of left shoulder     LAST ASSESSED: 2/21/17    Impingement syndrome of right shoulder     LAST ASSESSED:     Long term use of drug     LAST ASSESSED: 16    Low back pain     No known health problems     NO PERTINENT PAST MEDICAL HX    RLS (restless legs syndrome)     Rotator cuff injury     right    Spinal stenosis     Spinal stenosis     Spondylitis (HCC)     Spondyloarthritis     RESOLVED: 16    Vitamin D deficiency        Past Surgical History:   Procedure Laterality Date    BACK SURGERY      CARPAL TUNNEL RELEASE Left     CERVICAL CONIZATION   W/ LASER      CERVICAL CONIZATION     SECTION      COLONOSCOPY      DILATION AND CURETTAGE OF UTERUS      FL INJECTION RIGHT HIP (NON ARTHROGRAM)  2019    FL INJECTION RIGHT HIP (NON ARTHROGRAM)  2019    FRACTURE SURGERY      HAND SURGERY      JOINT REPLACEMENT      ORTHOPEDIC SURGERY      OR ARTHRODESIS POSTERIOR/POSTEROLATERAL LUMBAR N/A 4/3/2017    Procedure: L2-L5 OPEN DECOMPRESSIVE LUMBAR LAMINECTOMY W/ PEDICLE SCREW AND NILDA FIXATION FUSION (IMPULSE); REMOVAL OF SKIN TAG MID BACK;  Surgeon: Rafael Catalan MD;  [facility];  Service: Neurosurgery    OR COLONOSCOPY FLX DX W/COLLJ SPEC WHEN PFRMD N/A 10/7/2016    Procedure: EGD AND COLONOSCOPY;  Surgeon: Aga Almeida MD;  [facility];   Service: Gastroenterology    OR TOTAL HIP ARTHROPLASTY Right 2019    Procedure: ARTHROPLASTY HIP TOTAL Posterior;  Surgeon: Durga Bryson MD;  [facility];  Service: Orthopedics    OR WRIST Jose Bailer LIG Left 2018    Procedure: RELEASE CARPAL TUNNEL ENDOSCOPIC;  Surgeon: Iliana Bauer MD;  [facility];  Service: Orthopedics    OR WRIST Jose Bailer LIG Right 2018    Procedure: RELEASE CARPAL TUNNEL ENDOSCOPIC;  Surgeon: Iliana Bauer MD;  [facility];  Service: Orthopedics    RETINAL LASER PROCEDURE      TUBAL LIGATION         Family History   Problem Relation Age of Onset    Arthritis Mother     Breast cancer Mother 67    Hypertension Mother     Heart attack Mother         MYOCARDIAL INFARCTION    Heart attack Father     Hypertension Father     Stroke Father         CEREBROVASCUALR ACCIDENT (CVA)    Arthritis Sister     Uterine cancer Sister     Endometrial cancer Sister 61    Heart attack Brother     Prostate cancer Brother 58    Arthritis Brother     Melanoma Brother     Cancer Brother     Arthritis Family     Hyperlipidemia Family     Depression Son     Breast cancer Maternal Aunt 36    No Known Problems Daughter     No Known Problems Maternal Grandmother     No Known Problems Maternal Grandfather     No Known Problems Paternal Grandmother     No Known Problems Paternal Grandfather     No Known Problems Brother     Other Brother         hunting accident (shot)    Melanoma Brother     Prostate cancer Brother     Heart attack Brother     Stroke Brother     Arthritis Sister     Heart attack Sister     No Known Problems Son     No Known Problems Son     Lung cancer Maternal Uncle     Breast cancer additional onset Other 46    Uterine cancer Other        Social History     Occupational History    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       Comment: EMPLOYED   Tobacco Use    Smoking status: Never Smoker    Smokeless tobacco: Never Used   Vaping Use    Vaping Use: Never used   Substance and Sexual Activity    Alcohol use: Never    Drug use: No    Sexual activity: Yes     Partners: Male     Birth control/protection: None, Post-menopausal         Current Outpatient Medications:     aspirin 81 MG tablet, Take 1 tablet by mouth daily, Disp: , Rfl:     cephalexin (KEFLEX) 500 mg capsule, Take 4 tablets p o  1 hr prior to dental appointment as instructed, Disp: 20 capsule, Rfl: 1    Cholecalciferol (VITAMIN D3) 50 MCG (2000 UT) capsule, Vitamin D3 50 mcg (2,000 unit) capsule  Take by oral route , Disp: , Rfl:     ciclopirox (PENLAC) 8 % solution, APPLY TOPICALLY DAILY AT BEDTIME REMOVE BUILDUP ONCE A WEEK WITH COTTON BALL IN ALCOHOL CONTINUED TREATMENT FOR 3 MONTHS, Disp: 6 6 mL, Rfl: 1    clonazePAM (KlonoPIN) 0 5 mg tablet, Take 0 5 mg by mouth daily at bedtime, Disp: , Rfl: 4    Diclofenac Sodium (VOLTAREN) 1 %, Apply 4 g topically 4 (four) times a day, Disp: 100 g, Rfl: 4    Fluad Quadrivalent 0 5 ML PRSY, PHARMACY ADMINISTERED, Disp: , Rfl:     gabapentin (NEURONTIN) 100 mg capsule, Take 1 capsule (100 mg total) by mouth 3 (three) times a day, Disp: 270 capsule, Rfl: 1    gabapentin (NEURONTIN) 300 mg capsule, Take 1 capsule (300 mg total) by mouth daily at bedtime, Disp: 90 capsule, Rfl: 1    meloxicam (Mobic) 15 mg tablet, Take 1 tablet (15 mg total) by mouth daily, Disp: 90 tablet, Rfl: 1    methocarbamol (ROBAXIN) 750 mg tablet, TAKE 1 TABLET (750 MG TOTAL) BY MOUTH EVERY 6 (SIX) HOURS AS NEEDED FOR MUSCLE SPASMS (Patient taking differently: Take 750 mg by mouth as needed for muscle spasms ), Disp: 100 tablet, Rfl: 0    PARoxetine (PAXIL) 20 mg tablet, Take 1 tablet (20 mg total) by mouth daily, Disp: 30 tablet, Rfl: 6    traMADol (ULTRAM) 50 mg tablet, One pill every 6 hours as needed for moderate to severe pain (Patient not taking: Reported on 3/21/2022 ), Disp: 28 tablet, Rfl: 0    No Known Allergies         Vitals:    05/26/22 1312   BP: 101/68   Pulse: 89       Objective:  Physical exam  · General: Awake, Alert, Oriented  · Eyes: Pupils equal, round and reactive to light  · Heart: regular rate and rhythm  · Lungs: No audible wheezing  · Abdomen: soft                    Ortho Exam  Right knee:  Mild valgus alignment  No erythema or ecchymosis  Modest effusion   No swelling  Normal strength  Good ROM with crepitus   Calf compartments soft and supple  Sensation intact  Toes are warm sensate and mobile        Diagnostics, reviewed and taken today if performed as documented:     The attending physician has personally reviewed the pertinent films in PACS and interpretation is as follows:  Right knee x-ray:  Severe patellofemoral arthritic changes with bone on bone apposition  Procedures, if performed today:    Large joint arthrocentesis: R knee  Universal Protocol:  Consent: Verbal consent obtained  Risks and benefits: risks, benefits and alternatives were discussed  Consent given by: patient  Time out: Immediately prior to procedure a "time out" was called to verify the correct patient, procedure, equipment, support staff and site/side marked as required  Timeout called at: 5/26/2022 1:54 PM   Patient understanding: patient states understanding of the procedure being performed  Site marked: the operative site was marked  Patient identity confirmed: verbally with patient    Supporting Documentation  Indications: pain   Procedure Details  Location: knee - R knee  Preparation: Patient was prepped and draped in the usual sterile fashion  Needle size: 22 G  Ultrasound guidance: no  Approach: anterolateral  Medications administered: 12 mg betamethasone acetate-betamethasone sodium phosphate 6 (3-3) mg/mL; 2 mL bupivacaine 0 25 %; 2 mL lidocaine 1 %; 3 mL lidocaine 1 %    Aspirate amount: 27 mL  Aspirate: clear and blood-tinged    Patient tolerance: patient tolerated the procedure well with no immediate complications  Dressing:  Sterile dressing applied             Portions of the record may have been created with voice recognition software  Occasional wrong word or "sound a like" substitutions may have occurred due to the inherent limitations of voice recognition software  Read the chart carefully and recognize, using context, where substitutions have occurred

## 2022-06-07 DIAGNOSIS — F32.A DEPRESSION, UNSPECIFIED DEPRESSION TYPE: ICD-10-CM

## 2022-06-08 RX ORDER — PAROXETINE HYDROCHLORIDE 20 MG/1
TABLET, FILM COATED ORAL
Qty: 90 TABLET | Refills: 2 | Status: SHIPPED | OUTPATIENT
Start: 2022-06-08

## 2022-06-13 NOTE — ASSESSMENT & PLAN NOTE
Evaluation and plan as noted above  [Takes medication as prescribed] : takes [None] : Patient does not have any barriers to medication adherence

## 2022-09-30 ENCOUNTER — HOSPITAL ENCOUNTER (OUTPATIENT)
Dept: RADIOLOGY | Age: 72
Discharge: HOME | End: 2022-09-30
Payer: MEDICARE

## 2022-09-30 VITALS — WEIGHT: 187 LBS | BODY MASS INDEX: 35.3 KG/M2 | HEIGHT: 61 IN

## 2022-09-30 DIAGNOSIS — Z12.31 ENCOUNTER FOR SCREENING MAMMOGRAM FOR MALIGNANT NEOPLASM OF BREAST: ICD-10-CM

## 2022-09-30 DIAGNOSIS — Z80.3 FAMILY HISTORY OF BREAST CANCER: ICD-10-CM

## 2022-09-30 PROCEDURE — 77063 BREAST TOMOSYNTHESIS BI: CPT

## 2022-09-30 PROCEDURE — 77067 SCR MAMMO BI INCL CAD: CPT

## 2022-10-10 ENCOUNTER — TELEPHONE (OUTPATIENT)
Dept: PAIN MEDICINE | Facility: CLINIC | Age: 72
End: 2022-10-10

## 2022-10-10 DIAGNOSIS — G62.9 NEUROPATHY: ICD-10-CM

## 2022-10-10 RX ORDER — GABAPENTIN 100 MG/1
100 CAPSULE ORAL 3 TIMES DAILY
Qty: 90 CAPSULE | Refills: 0 | Status: SHIPPED | OUTPATIENT
Start: 2022-10-10 | End: 2022-10-13

## 2022-10-10 NOTE — TELEPHONE ENCOUNTER
Caller: Patient       Doctor: Dr Aquiles Sherman       Reason for call: Medication Refill     Call back#: [phone]

## 2022-10-10 NOTE — TELEPHONE ENCOUNTER
MMG, pt is seeing you on 10/13 but will be completely out of her gabapentin before that  Pt takes 100mg in AM and afternoon and 2 at HS  She takes 300mg at HS as well  So she ends up taking four 100mg capsules and one 300mg capsule a day      --please advise thank you--  Send to the [facility] on [address]

## 2022-10-10 NOTE — TELEPHONE ENCOUNTER
Pt contacted Call Center requested refill of their medication  Medication Name: gabapentin      Dosage of Med: 100mg and 300mg      Pharmacy and Location: [facility]        Pt  Preferred Callback Phone Number: [phone]      Thank you

## 2022-10-10 NOTE — TELEPHONE ENCOUNTER
Attempted to reach pt  VMMLOM with CB# and OH  Gabapentin last ordered 3/21/22 with 1 refill by AO  Pt was see for an ov same day  Pt has an ov with [facility] on 10/13

## 2022-10-13 ENCOUNTER — OFFICE VISIT (OUTPATIENT)
Dept: PAIN MEDICINE | Facility: MEDICAL CENTER | Age: 72
End: 2022-10-13
Payer: MEDICARE

## 2022-10-13 VITALS
WEIGHT: 193 LBS | SYSTOLIC BLOOD PRESSURE: 114 MMHG | HEART RATE: 82 BPM | HEIGHT: 61 IN | DIASTOLIC BLOOD PRESSURE: 76 MMHG | OXYGEN SATURATION: 97 % | BODY MASS INDEX: 36.44 KG/M2

## 2022-10-13 DIAGNOSIS — G89.29 CHRONIC BILATERAL LOW BACK PAIN WITHOUT SCIATICA: ICD-10-CM

## 2022-10-13 DIAGNOSIS — M47.816 LUMBAR SPONDYLOSIS: Primary | ICD-10-CM

## 2022-10-13 DIAGNOSIS — G89.4 CHRONIC PAIN SYNDROME: ICD-10-CM

## 2022-10-13 DIAGNOSIS — M54.50 CHRONIC BILATERAL LOW BACK PAIN WITHOUT SCIATICA: ICD-10-CM

## 2022-10-13 DIAGNOSIS — G62.9 NEUROPATHY: ICD-10-CM

## 2022-10-13 PROCEDURE — 99214 OFFICE O/P EST MOD 30 MIN: CPT | Performed by: PHYSICIAN ASSISTANT

## 2022-10-13 RX ORDER — GABAPENTIN 600 MG/1
600 TABLET ORAL
Qty: 90 TABLET | Refills: 1 | Status: SHIPPED | OUTPATIENT
Start: 2022-10-13

## 2022-10-13 RX ORDER — GABAPENTIN 100 MG/1
100 CAPSULE ORAL 2 TIMES DAILY
Qty: 180 CAPSULE | Refills: 1 | Status: SHIPPED | OUTPATIENT
Start: 2022-10-13 | End: 2022-10-13 | Stop reason: SDUPTHER

## 2022-10-13 RX ORDER — GABAPENTIN 100 MG/1
100 CAPSULE ORAL 2 TIMES DAILY
Qty: 180 CAPSULE | Refills: 1 | Status: SHIPPED | OUTPATIENT
Start: 2022-10-13 | End: 2022-11-21

## 2022-10-13 RX ORDER — MELOXICAM 15 MG/1
15 TABLET ORAL DAILY
Qty: 90 TABLET | Refills: 1 | Status: SHIPPED | OUTPATIENT
Start: 2022-10-13 | End: 2023-01-11

## 2022-10-13 NOTE — PROGRESS NOTES
Assessment:  1  Lumbar spondylosis    2  Chronic bilateral low back pain without sciatica    3  Neuropathy    4  Chronic pain syndrome        Plan:  While the patient was in the office today, I did have a thorough conversation regarding their chronic pain syndrome, medication management, and treatment plan options  The patient remains clinically stable on the current medication regimen without side effects  I have refilled the Mobic 15 mg daily as well as the gabapentin 100 mg in morning, 100 mg in the afternoon and I have increased her nighttime dose to 600 mg  Follow-up in 6 months or sooner if needed if the pain changes or worsens  My impressions and treatment recommendations were discussed in detail with the patient who verbalized understanding and had no further questions  Discharge instructions were provided  I personally saw and examined the patient and I agree with the above discussed plan of care  No orders of the defined types were placed in this encounter  New Medications Ordered This Visit   Medications   • meloxicam (Mobic) 15 mg tablet     Sig: Take 1 tablet (15 mg total) by mouth daily     Dispense:  90 tablet     Refill:  1   • gabapentin (Neurontin) 600 MG tablet     Sig: Take 1 tablet (600 mg total) by mouth daily at bedtime     Dispense:  90 tablet     Refill:  1   • gabapentin (NEURONTIN) 100 mg capsule     Sig: Take 1 capsule (100 mg total) by mouth 2 (two) times a day     Dispense:  180 capsule     Refill:  1       History of Present Illness:  Jun Pinedo is a 81 y o  female who presents for a follow up office visit in regards to Back Pain  The patient’s current symptoms include chronic low back pain rated a 2/10 on the pain scale today described as an occasional dull, aching and pressure-like pain with no radicular symptoms on today's visit  She has been maintained on medication which includes meloxicam 15 mg daily and gabapentin 100 mg b i d  and 500 mg at HS    She has increased pain with prolonged standing and bending but overall feels her pain is manageable and tolerable at this time  I have personally reviewed and/or updated the patient's past medical history, past surgical history, family history, social history, current medications, allergies, and vital signs today  Review of Systems   Respiratory: Negative for shortness of breath  Cardiovascular: Negative for chest pain  Gastrointestinal: Negative for constipation, diarrhea, nausea and vomiting  Musculoskeletal: Positive for back pain, gait problem and joint swelling (ankles)  Negative for arthralgias and myalgias  Skin: Negative for rash  Neurological: Negative for dizziness, seizures and weakness  All other systems reviewed and are negative        Patient Active Problem List   Diagnosis   • Spinal stenosis of lumbar region at multiple levels   • Spondylosis   • Chronic bilateral low back pain   • Encounter for annual routine gynecological examination   • Encounter for screening mammogram for malignant neoplasm of breast   • Primary osteoarthritis of right knee   • Abnormal EKG   • Hyperlipidemia   • Right hip pain   • Trigger finger of left hand   • Family history of breast cancer   • Stress incontinence   • History of lumbar spinal fusion   • Depression   • Microscopic hematuria       Past Medical History:   Diagnosis Date   • [facility]    • Anxiety     LAST ASSESSED: 12/20/16   • Arthritis    • Back pain    • Carpal tunnel syndrome    • Fibromyalgia     LAST ASSESSED: 12/20/16   • Fibromyalgia, primary    • Heme positive stool     LAST ASSESSED: 10/22/16   • High cholesterol    • Hyperlipidemia     under control since weight loss   • Impingement syndrome of left shoulder     LAST ASSESSED: 2/21/17   • Impingement syndrome of right shoulder     LAST ASSESSED: 2/21/1/7   • Long term use of drug     LAST ASSESSED: 12/20/16   • Low back pain    • No known health problems     NO PERTINENT PAST MEDICAL HX   • RLS (restless legs syndrome)    • Rotator cuff injury     right   • Spinal stenosis    • Spinal stenosis    • Spondylitis (HCC)    • Spondyloarthritis     RESOLVED: 16   • Vitamin D deficiency        Past Surgical History:   Procedure Laterality Date   • BACK SURGERY     • CARPAL TUNNEL RELEASE Left    • CERVICAL CONIZATION   W/ LASER      CERVICAL CONIZATION   •  SECTION     • COLONOSCOPY     • DILATION AND CURETTAGE OF UTERUS     • FL INJECTION RIGHT HIP (NON ARTHROGRAM)  2019   • FL INJECTION RIGHT HIP (NON ARTHROGRAM)  2019   • FRACTURE SURGERY     • HAND SURGERY     • JOINT REPLACEMENT     • ORTHOPEDIC SURGERY     • AL ARTHRODESIS POSTERIOR/POSTEROLATERAL LUMBAR N/A 4/3/2017    Procedure: L2-L5 OPEN DECOMPRESSIVE LUMBAR LAMINECTOMY W/ PEDICLE SCREW AND NILDA FIXATION FUSION (IMPULSE); REMOVAL OF SKIN TAG MID BACK;  Surgeon: Sabina oLpez MD;  [facility];  Service: Neurosurgery   • AL COLONOSCOPY FLX DX W/COLLJ SPEC WHEN PFRMD N/A 10/7/2016    Procedure: EGD AND COLONOSCOPY;  Surgeon: Olga Berkowitz MD;  Location: [facility];   Service: Gastroenterology   • AL TOTAL HIP ARTHROPLASTY Right 2019    Procedure: ARTHROPLASTY HIP TOTAL Posterior;  Surgeon: Alva Guerra MD;  Location: [facility];  Service: Orthopedics   • AL WRIST Ariana Doles LIG Left 2018    Procedure: RELEASE CARPAL TUNNEL ENDOSCOPIC;  Surgeon: Evita Orellana MD;  Location: [facility];  Service: Orthopedics   • AL WRIST Ariana Doles LIG Right 2018    Procedure: RELEASE CARPAL TUNNEL ENDOSCOPIC;  Surgeon: Evita Orellana MD;  [facility];  Service: Orthopedics   • RETINAL LASER PROCEDURE     • TUBAL LIGATION         Family History   Problem Relation Age of Onset   • Arthritis Mother    • Breast cancer Mother 67   • Hypertension Mother    • Heart attack Mother         MYOCARDIAL INFARCTION   • Heart attack Father    • Hypertension Father    • Stroke Father         Ben Cesar (CVA)   • Arthritis Sister    • Uterine cancer Sister    • Endometrial cancer Sister 61   • Heart attack Brother    • Prostate cancer Brother 58   • Arthritis Brother    • Melanoma Brother    • Cancer Brother    • Arthritis Family    • Hyperlipidemia Family    • Depression Son    • Breast cancer Maternal Aunt 44   • No Known Problems Daughter    • No Known Problems Maternal Grandmother    • No Known Problems Maternal Grandfather    • No Known Problems Paternal Grandmother    • No Known Problems Paternal Grandfather    • No Known Problems Brother    • Other Brother         hunting accident (shot)   • Melanoma Brother    • Prostate cancer Brother    • Heart attack Brother    • Stroke Brother    • Arthritis Sister    • Heart attack Sister    • No Known Problems Son    • No Known Problems Son    • Lung cancer Maternal Uncle    • Breast cancer additional onset Other 46   • Uterine cancer Other        Social History     Occupational History   • 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  Comment: EMPLOYED   Tobacco Use   • Smoking status: Never Smoker   • Smokeless tobacco: Never Used   Vaping Use   • Vaping Use: Never used   Substance and Sexual Activity   • Alcohol use: Never   • Drug use: No   • Sexual activity: Yes     Partners: Male     Birth control/protection: None, Post-menopausal       Current Outpatient Medications on File Prior to Visit   Medication Sig   • aspirin 81 MG tablet Take 1 tablet by mouth daily   • Cholecalciferol (VITAMIN D3) 50 MCG (2000 UT) capsule Vitamin D3 50 mcg (2,000 unit) capsule   Take by oral route     • clonazePAM (KlonoPIN) 0 5 mg tablet Take 0 5 mg by mouth daily at bedtime   • Diclofenac Sodium (VOLTAREN) 1 % Apply 4 g topically 4 (four) times a day   • Fluad Quadrivalent 0 5 ML PRSY PHARMACY ADMINISTERED   • methocarbamol (ROBAXIN) 750 mg tablet TAKE 1 TABLET (750 MG TOTAL) BY MOUTH EVERY 6 (SIX) HOURS AS NEEDED FOR MUSCLE SPASMS (Patient taking differently: Take 750 mg by mouth as needed for muscle spasms)   • [DISCONTINUED] gabapentin (NEURONTIN) 100 mg capsule Take 1 capsule (100 mg total) by mouth 3 (three) times a day   • [DISCONTINUED] gabapentin (NEURONTIN) 300 mg capsule Take 1 capsule (300 mg total) by mouth daily at bedtime   • [DISCONTINUED] meloxicam (Mobic) 15 mg tablet Take 1 tablet (15 mg total) by mouth daily   • cephalexin (KEFLEX) 500 mg capsule Take 4 tablets p o  1 hr prior to dental appointment as instructed (Patient not taking: Reported on 10/13/2022)   • ciclopirox (PENLAC) 8 % solution APPLY TOPICALLY DAILY AT BEDTIME REMOVE BUILDUP ONCE A WEEK WITH COTTON BALL IN ALCOHOL CONTINUED TREATMENT FOR 3 MONTHS (Patient not taking: Reported on 10/13/2022)   • PARoxetine (PAXIL) 20 mg tablet TAKE 1 TABLET BY MOUTH EVERY DAY (Patient not taking: Reported on 10/13/2022)   • traMADol (ULTRAM) 50 mg tablet One pill every 6 hours as needed for moderate to severe pain (Patient not taking: No sig reported)     No current facility-administered medications on file prior to visit  No Known Allergies    Physical Exam:    /76   Pulse 82   Ht 5' 1" (1 549 m)   Wt 87 5 kg (193 lb)   LMP  (LMP Unknown)   SpO2 97%   BMI 36 47 kg/m²     Constitutional:normal, well developed, well nourished, alert, in no distress and non-toxic and no overt pain behavior  Eyes:anicteric  HEENT:grossly intact  Neck:supple, symmetric, trachea midline and no masses   Pulmonary:even and unlabored  Cardiovascular:No edema or pitting edema present  Skin:Normal without rashes or lesions and well hydrated  Psychiatric:Mood and affect appropriate  Neurologic:Cranial Nerves II-XII grossly intact  Musculoskeletal:  Tender to palpation over bilateral lumbar facet joints, tender paraspinal muscles of the lumbar spine, limited range of motion with extension, gait is slow but stable      Imaging

## 2022-10-13 NOTE — PATIENT INSTRUCTIONS
Gabapentin (By mouth)   Gabapentin (yumiko-a-PEN-tin)  Treats seizures and pain caused by shingles  Brand Name(s): FusePaq Fanatrex, Neurontin   There may be other brand names for this medicine  When This Medicine Should Not Be Used: This medicine is not right for everyone  Do not use it if you had an allergic reaction to gabapentin  How to Use This Medicine:   Capsule, Liquid, Tablet  Take your medicine as directed  Your dose may need to be changed several times to find what works best for you  If you have epilepsy, do not allow more than 12 hours to pass between doses  Capsule: Swallow the capsule whole with plenty of water  Do not open, crush, or chew it  Gralise® tablet: Swallow the tablet whole   Do not crush, break, or chew it  Neurontin® tablet: If you break a tablet into 2 pieces, use the second half as your next dose  Do not use the half-tablet if the whole tablet has been cut or broken after 28 days  Oral liquid: Measure the oral liquid medicine with a marked measuring spoon, oral syringe, or medicine cup  This medicine should come with a Medication Guide  Ask your pharmacist for a copy if you do not have one  Missed dose: Take a dose as soon as you remember  If it is almost time for your next dose, wait until then and take a regular dose  Do not take extra medicine to make up for a missed dose  Store the medicine in a closed container at room temperature, away from heat, moisture, and direct light  Store the Neurontin® oral liquid in the refrigerator  Do not freeze  Drugs and Foods to Avoid:   Ask your doctor or pharmacist before using any other medicine, including over-the-counter medicines, vitamins, and herbal products  Some medicines can affect how gabapentin works  Tell your doctor if you also using hydrocodone or morphine  If you take an antacid, wait at least 2 hours before you take gabapentin  Do not drink alcohol while you are using this medicine    Tell your doctor if you use anything else that makes you sleepy  Some examples are allergy medicine, narcotic pain medicine, and alcohol  Tell your doctor if you are also using lorazepam, oxycodone, or zolpidem  Warnings While Using This Medicine:   Tell your doctor if you are pregnant or breastfeeding, or if you have kidney problems (including patients receiving dialysis) or lung problems  Tell your doctor if you have a history of depression or mental health problems  This medicine may cause the following problems:  Drug reaction with eosinophilia and systemic symptoms (DRESS) or multiorgan hypersensitivity, which may damage the liver, kidney, blood, heart, or muscles  Changes in mood or behavior, including suicidal thoughts or behavior  Respiratory depression (serious breathing problem that can be life-threatening), when used with narcotic pain medicines  Do not stop using this medicine suddenly  Your doctor will need to slowly decrease your dose before you stop it completely  This medicine may make you dizzy or drowsy  Do not drive or do anything else that could be dangerous until you know how this medicine affects you  Tell any doctor or dentist who treats you that you are using this medicine  This medicine may affect certain medical test results  Your doctor will check your progress and the effects of this medicine at regular visits  Keep all appointments  Keep all medicine out of the reach of children  Never share your medicine with anyone  Possible Side Effects While Using This Medicine:   Call your doctor right away if you notice any of these side effects:   Allergic reaction: Itching or hives, swelling in your face or hands, swelling or tingling in your mouth or throat, chest tightness, trouble breathing  Behavior problems, aggression, restlessness, trouble concentrating, moodiness (especially in children)  Blistering, peeling, red skin rash  Blue lips, fingernails, or skin, chest pain, fast heartbeat, trouble breathing  Change in how much or how often you urinate, bloody or cloudy urine  Dark urine or pale stools, nausea, vomiting, loss of appetite, stomach pain, yellow skin or eyes  Fever, chills, cough, sore throat, body aches  Problems with coordination, shakiness, unsteadiness, unusual eye movement  Rapid weight gain, swelling in your hands, ankles, or feet  Rash, swollen or tender glands in the neck, armpit, or groin  Unusual moods or behaviors, thoughts of hurting yourself, feeling depressed  If you notice these less serious side effects, talk with your doctor:   Dizziness, drowsiness, sleepiness, tiredness  If you notice other side effects that you think are caused by this medicine, tell your doctor  Call your doctor for medical advice about side effects  You may report side effects to FDA at 8-612-FDA-0562    © Copyright [employer] [facility] 2022 Information is for End User's use only and may not be sold, redistributed or otherwise used for commercial purposes  The above information is an  only  It is not intended as medical advice for individual conditions or treatments  Talk to your doctor, nurse or pharmacist before following any medical regimen to see if it is safe and effective for you

## 2022-11-29 ENCOUNTER — TELEPHONE (OUTPATIENT)
Dept: INTERNAL MEDICINE CLINIC | Facility: CLINIC | Age: 72
End: 2022-11-29

## 2022-11-29 DIAGNOSIS — Z13.0 SCREENING FOR DEFICIENCY ANEMIA: ICD-10-CM

## 2022-11-29 DIAGNOSIS — Z13.1 SCREENING FOR DIABETES MELLITUS: ICD-10-CM

## 2022-11-29 DIAGNOSIS — E78.5 HYPERLIPIDEMIA, UNSPECIFIED HYPERLIPIDEMIA TYPE: Primary | ICD-10-CM

## 2022-11-29 DIAGNOSIS — Z12.11 COLON CANCER SCREENING: ICD-10-CM

## 2022-11-29 DIAGNOSIS — R31.29 MICROSCOPIC HEMATURIA: ICD-10-CM

## 2022-11-29 NOTE — TELEPHONE ENCOUNTER
Orders have been added to the [facility] laboratory system she will need to fast for 10-12 hours prior to the blood work thank you she can do at the same time as the other blood work ordered by her rheumatologist

## 2022-11-29 NOTE — TELEPHONE ENCOUNTER
YONI HAS APPT WITH YOU January 5TH, IS THERE ANY LABWORK THAT NEEDS TO BE DONE AND CAN IT BE DONE NEXT WEEK?  DR Jacobo Hendrickson HAS ORDERED C REACTIVE PROTEIN QUANT , esr, cbc WITH DIFF, cMP     AND WOULD WANT TO KNOW IF CAN DO ALL AT ONCE

## 2022-12-11 DIAGNOSIS — M54.50 CHRONIC BILATERAL LOW BACK PAIN WITHOUT SCIATICA: ICD-10-CM

## 2022-12-11 DIAGNOSIS — G89.29 CHRONIC BILATERAL LOW BACK PAIN WITHOUT SCIATICA: ICD-10-CM

## 2022-12-11 DIAGNOSIS — G62.9 NEUROPATHY: ICD-10-CM

## 2022-12-12 RX ORDER — GABAPENTIN 100 MG/1
CAPSULE ORAL
Qty: 90 CAPSULE | OUTPATIENT
Start: 2022-12-12

## 2022-12-15 ENCOUNTER — TELEPHONE (OUTPATIENT)
Dept: PAIN MEDICINE | Facility: MEDICAL CENTER | Age: 72
End: 2022-12-15

## 2022-12-15 RX ORDER — GABAPENTIN 100 MG/1
100 CAPSULE ORAL 2 TIMES DAILY
Qty: 180 CAPSULE | Refills: 3 | Status: SHIPPED | OUTPATIENT
Start: 2022-12-15 | End: 2023-01-23

## 2022-12-15 RX ORDER — GABAPENTIN 600 MG/1
600 TABLET ORAL
Qty: 90 TABLET | Refills: 3 | Status: SHIPPED | OUTPATIENT
Start: 2022-12-15

## 2022-12-15 NOTE — TELEPHONE ENCOUNTER
Pt contacted Call Center requested refill of their medication  Medication Name:Gabapentin         Dosage of Med: 100 mg                               600 mg    Frequency of Med: 2 x a day                                     1 at bed    Remaining Medication: none of the 100 mg                                            Some of them left    Pharmacy and Location:  [facility] on file        Pt  Preferred Callback Phone Ivan Curry      Thank you

## 2022-12-15 NOTE — TELEPHONE ENCOUNTER
Caller: Rogelio Sykes     Doctor/Office: Dr Misa King    Call regarding :  RN    Call was transferred to: RN

## 2022-12-15 NOTE — TELEPHONE ENCOUNTER
S/w pt  Pt is requesting a refill of her gabapentin 600 mg HS and Gabapentin 100 mg in am and afternoon  Medication is helping and denies side effects  Med last ordered on 10/13  Pt has an appt on 4/13

## 2022-12-30 ENCOUNTER — OFFICE VISIT (OUTPATIENT)
Dept: URGENT CARE | Age: 72
End: 2022-12-30

## 2022-12-30 ENCOUNTER — APPOINTMENT (OUTPATIENT)
Dept: LAB | Facility: CLINIC | Age: 72
End: 2022-12-30

## 2022-12-30 ENCOUNTER — APPOINTMENT (OUTPATIENT)
Dept: RADIOLOGY | Age: 72
End: 2022-12-30
Attending: PHYSICIAN ASSISTANT

## 2022-12-30 ENCOUNTER — TRANSCRIBE ORDERS (OUTPATIENT)
Dept: LAB | Facility: CLINIC | Age: 72
End: 2022-12-30

## 2022-12-30 VITALS
HEART RATE: 70 BPM | SYSTOLIC BLOOD PRESSURE: 124 MMHG | RESPIRATION RATE: 18 BRPM | OXYGEN SATURATION: 97 % | TEMPERATURE: 97.6 F | DIASTOLIC BLOOD PRESSURE: 60 MMHG

## 2022-12-30 DIAGNOSIS — Z79.1 LONG TERM CURRENT USE OF NON-STEROIDAL ANTI-INFLAMMATORIES (NSAID): ICD-10-CM

## 2022-12-30 DIAGNOSIS — Z13.1 SCREENING FOR DIABETES MELLITUS: ICD-10-CM

## 2022-12-30 DIAGNOSIS — M45.6 ANKYLOSING SPONDYLITIS LUMBAR REGION (HCC): Primary | ICD-10-CM

## 2022-12-30 DIAGNOSIS — Z12.11 COLON CANCER SCREENING: ICD-10-CM

## 2022-12-30 DIAGNOSIS — E78.5 HYPERLIPIDEMIA, UNSPECIFIED HYPERLIPIDEMIA TYPE: ICD-10-CM

## 2022-12-30 DIAGNOSIS — S40.012A CONTUSION OF LEFT SHOULDER, INITIAL ENCOUNTER: ICD-10-CM

## 2022-12-30 DIAGNOSIS — T14.90XA INJURY: ICD-10-CM

## 2022-12-30 DIAGNOSIS — M45.6 ANKYLOSING SPONDYLITIS LUMBAR REGION (HCC): ICD-10-CM

## 2022-12-30 DIAGNOSIS — T14.90XA INJURY: Primary | ICD-10-CM

## 2022-12-30 DIAGNOSIS — Z13.0 SCREENING FOR DEFICIENCY ANEMIA: ICD-10-CM

## 2022-12-30 DIAGNOSIS — R31.29 MICROSCOPIC HEMATURIA: ICD-10-CM

## 2022-12-30 DIAGNOSIS — S80.01XA CONTUSION OF RIGHT KNEE, INITIAL ENCOUNTER: ICD-10-CM

## 2022-12-30 LAB
ALBUMIN SERPL BCP-MCNC: 3.7 G/DL (ref 3.5–5)
ALP SERPL-CCNC: 56 U/L (ref 46–116)
ALT SERPL W P-5'-P-CCNC: 27 U/L (ref 12–78)
ANION GAP SERPL CALCULATED.3IONS-SCNC: 7 MMOL/L (ref 4–13)
AST SERPL W P-5'-P-CCNC: 17 U/L (ref 5–45)
BASOPHILS # BLD AUTO: 0.04 THOUSANDS/ÂΜL (ref 0–0.1)
BASOPHILS NFR BLD AUTO: 1 % (ref 0–1)
BILIRUB SERPL-MCNC: 0.52 MG/DL (ref 0.2–1)
BILIRUB UR QL STRIP: NEGATIVE
BUN SERPL-MCNC: 26 MG/DL (ref 5–25)
CALCIUM SERPL-MCNC: 9.3 MG/DL (ref 8.3–10.1)
CHLORIDE SERPL-SCNC: 109 MMOL/L (ref 96–108)
CHOLEST SERPL-MCNC: 191 MG/DL
CLARITY UR: NORMAL
CO2 SERPL-SCNC: 25 MMOL/L (ref 21–32)
COLOR UR: YELLOW
CREAT SERPL-MCNC: 0.68 MG/DL (ref 0.6–1.3)
CRP SERPL QL: 6 MG/L
EOSINOPHIL # BLD AUTO: 0.24 THOUSAND/ÂΜL (ref 0–0.61)
EOSINOPHIL NFR BLD AUTO: 4 % (ref 0–6)
ERYTHROCYTE [DISTWIDTH] IN BLOOD BY AUTOMATED COUNT: 14.4 % (ref 11.6–15.1)
ERYTHROCYTE [SEDIMENTATION RATE] IN BLOOD: 37 MM/HOUR (ref 0–29)
GFR SERPL CREATININE-BSD FRML MDRD: 88 ML/MIN/1.73SQ M
GLUCOSE P FAST SERPL-MCNC: 90 MG/DL (ref 65–99)
GLUCOSE UR STRIP-MCNC: NEGATIVE MG/DL
HCT VFR BLD AUTO: 42.9 % (ref 34.8–46.1)
HDLC SERPL-MCNC: 54 MG/DL
HGB BLD-MCNC: 13.4 G/DL (ref 11.5–15.4)
HGB UR QL STRIP.AUTO: NEGATIVE
IMM GRANULOCYTES # BLD AUTO: 0.02 THOUSAND/UL (ref 0–0.2)
IMM GRANULOCYTES NFR BLD AUTO: 0 % (ref 0–2)
KETONES UR STRIP-MCNC: NEGATIVE MG/DL
LDLC SERPL CALC-MCNC: 113 MG/DL (ref 0–100)
LEUKOCYTE ESTERASE UR QL STRIP: NEGATIVE
LYMPHOCYTES # BLD AUTO: 1.4 THOUSANDS/ÂΜL (ref 0.6–4.47)
LYMPHOCYTES NFR BLD AUTO: 22 % (ref 14–44)
MCH RBC QN AUTO: 28.4 PG (ref 26.8–34.3)
MCHC RBC AUTO-ENTMCNC: 31.2 G/DL (ref 31.4–37.4)
MCV RBC AUTO: 91 FL (ref 82–98)
MONOCYTES # BLD AUTO: 0.43 THOUSAND/ÂΜL (ref 0.17–1.22)
MONOCYTES NFR BLD AUTO: 7 % (ref 4–12)
NEUTROPHILS # BLD AUTO: 4.35 THOUSANDS/ÂΜL (ref 1.85–7.62)
NEUTS SEG NFR BLD AUTO: 66 % (ref 43–75)
NITRITE UR QL STRIP: NEGATIVE
NONHDLC SERPL-MCNC: 137 MG/DL
NRBC BLD AUTO-RTO: 0 /100 WBCS
PH UR STRIP.AUTO: 6 [PH]
PLATELET # BLD AUTO: 342 THOUSANDS/UL (ref 149–390)
PMV BLD AUTO: 9.9 FL (ref 8.9–12.7)
POTASSIUM SERPL-SCNC: 4.1 MMOL/L (ref 3.5–5.3)
PROT SERPL-MCNC: 7 G/DL (ref 6.4–8.4)
PROT UR STRIP-MCNC: NEGATIVE MG/DL
RBC # BLD AUTO: 4.72 MILLION/UL (ref 3.81–5.12)
SODIUM SERPL-SCNC: 141 MMOL/L (ref 135–147)
SP GR UR STRIP.AUTO: 1.02 (ref 1–1.03)
TRIGL SERPL-MCNC: 121 MG/DL
UROBILINOGEN UR STRIP-ACNC: <2 MG/DL
WBC # BLD AUTO: 6.48 THOUSAND/UL (ref 4.31–10.16)

## 2022-12-30 NOTE — PROGRESS NOTES
[facility]        NAME: Bobbi Haque is a 81 y o  female  : 1950    MRN: [id number]  DATE: 2022  TIME: 4:01 PM    Assessment and Plan   Injury Jasmin Coats  1  Injury  XR knee 4+ vw right injury    XR shoulder 2+ vw left      2  Contusion of left shoulder, initial encounter        3  Contusion of right knee, initial encounter        X-rays reviewed, significant arthritis with spurring noted, no acute fracture or dislocation per official read  Hinged knee brace applied in office, patient advised to rest, ice, compress and elevate and to take Tylenol and ibuprofen as needed  Follow-up with primary care provider if symptoms do not improve within 1 to 2 weeks  Patient Instructions   Contusion in Adults   WHAT YOU NEED TO KNOW:   A contusion is a bruise that appears on your skin after an injury  A bruise happens when small blood vessels tear but skin does not  Blood leaks into nearby tissue, such as soft tissue or muscle  DISCHARGE INSTRUCTIONS:   Return to the emergency department if:   • You have new trouble moving the injured area      • You have tingling or numbness in or near the injured area      • Your hand or foot below the bruise gets cold or turns pale      Call your doctor if:   • You find a new lump in the injured area      • Your symptoms do not improve with treatment after 4 to 5 days      • You have questions or concerns about your condition or care      Medicines: You may need any of the following:  • NSAIDs  help decrease swelling and pain or fever  This medicine is available with or without a doctor's order  NSAIDs can cause stomach bleeding or kidney problems in certain people  If you take blood thinner medicine, always ask your healthcare provider if NSAIDs are safe for you  Always read the medicine label and follow directions      • Prescription pain medicine  may be given  Ask your healthcare provider how to take this medicine safely   Some prescription pain medicines contain acetaminophen  Do not take other medicines that contain acetaminophen without talking to your healthcare provider  Too much acetaminophen may cause liver damage  Prescription pain medicine may cause constipation  Ask your healthcare provider how to prevent or treat constipation       • Take your medicine as directed  Contact your healthcare provider if you think your medicine is not helping or if you have side effects  Tell him of her if you are allergic to any medicine  Keep a list of the medicines, vitamins, and herbs you take  Include the amounts, and when and why you take them  Bring the list or the pill bottles to follow-up visits  Carry your medicine list with you in case of an emergency      Help a contusion heal:   • Rest the injured area  or use it less than usual  If you bruised your leg or foot, you may need crutches or a cane to help you walk  This will help you keep weight off your injured body part       • Apply ice  to decrease swelling and pain  Ice may also help prevent tissue damage  Use an ice pack, or put crushed ice in a plastic bag  Cover it with a towel and place it on your bruise for 15 to 20 minutes every hour or as directed      • Use compression  to support the area and decrease swelling  Wrap an elastic bandage around the area over the bruised muscle  Make sure the bandage is not too tight  You should be able to fit 1 finger between the bandage and your skin      • Elevate (raise) your injured body part  above the level of your heart to help decrease pain and swelling  Use pillows, blankets, or rolled towels to elevate the area as often as you can      • Do not drink alcohol  as directed  Alcohol may slow healing      • Do not stretch injured muscles  right after your injury  Ask your healthcare provider when and how you may safely stretch after your injury   Gentle stretches can help increase your flexibility      • Do not massage the area or put heating pads  on the bruise right after your injury  Heat and massage may slow healing  Your healthcare provider may tell you to apply heat after several days  At that time, heat will start to help the injury heal      Prevent another contusion:   • Stretch and warm up before you play sports or exercise      • Wear protective gear when you play sports  Examples are shin guards and padding       • If you begin a new physical activity, start slowly to give your body a chance to adjust      Follow up with your doctor as directed:  Write down your questions so you remember to ask them during your visits  © Copyright [employer] 2022 Information is for End User's use only and may not be sold, redistributed or otherwise used for commercial purposes  All illustrations and images included in CareNotes® are the copyrighted property of A D A M , Inc  or [facility] [employer]   The above information is an  only  It is not intended as medical advice for individual conditions or treatments  Talk to your doctor, nurse or pharmacist before following any medical regimen to see if it is safe and effective for you        Follow up with PCP in 3-5 days  Proceed to  ER if symptoms worsen  Chief Complaint     Chief Complaint   Patient presents with   • Knee Injury     Patient stated she fell down hurting right knee and left shoulder it happen last night  History of Present Illness       Patient is a 82-year-old female with past medical history significant for ankylosing spondylitis, fibromyalgia, arthritis of right knee, right rotator cuff tear, who presents for evaluation of right knee and left shoulder pain after falling last night  She reports that she turned while in her living room and somehow tripped and fell, landing directly on her right knee, and turned, striking her left shoulder  She denies head strike, numbness, tingling, joint swelling  She is able to bear weight and is ambulating normally    She reports significant discomfort with movement of left upper extremity  Review of Systems   Review of Systems   Constitutional: Negative for fatigue and fever  HENT: Negative for congestion, ear discharge, ear pain, postnasal drip, rhinorrhea, sinus pressure, sinus pain, sneezing and sore throat  Eyes: Negative  Negative for pain, discharge, redness and itching  Respiratory: Negative  Negative for apnea, cough, choking, chest tightness, shortness of breath, wheezing and stridor  Cardiovascular: Negative  Negative for chest pain and palpitations  Gastrointestinal: Negative  Negative for diarrhea, nausea and vomiting  Endocrine: Negative  Negative for polydipsia, polyphagia and polyuria  Genitourinary: Negative  Negative for decreased urine volume and flank pain  Musculoskeletal: Positive for arthralgias  Negative for back pain, gait problem, joint swelling, myalgias, neck pain and neck stiffness  Skin: Negative  Negative for color change and rash  Allergic/Immunologic: Negative  Negative for environmental allergies  Neurological: Negative  Negative for dizziness, facial asymmetry, light-headedness, numbness and headaches  Hematological: Negative  Negative for adenopathy  Psychiatric/Behavioral: Negative            Current Medications       Current Outpatient Medications:   •  gabapentin (NEURONTIN) 100 mg capsule, Take 1 capsule (100 mg total) by mouth 2 (two) times a day, Disp: 180 capsule, Rfl: 3  •  gabapentin (Neurontin) 600 MG tablet, Take 1 tablet (600 mg total) by mouth daily at bedtime, Disp: 90 tablet, Rfl: 3  •  aspirin 81 MG tablet, Take 1 tablet by mouth daily, Disp: , Rfl:   •  Cholecalciferol (VITAMIN D3) 50 MCG (2000 UT) capsule, Vitamin D3 50 mcg (2,000 unit) capsule  Take by oral route , Disp: , Rfl:   •  clonazePAM (KlonoPIN) 0 5 mg tablet, Take 0 5 mg by mouth daily at bedtime, Disp: , Rfl: 4  •  Diclofenac Sodium (VOLTAREN) 1 %, Apply 4 g topically 4 (four) times a day, Disp: 100 g, Rfl: 4  •  Fluad Quadrivalent 0 5 ML PRSY, PHARMACY ADMINISTERED, Disp: , Rfl:   •  meloxicam (Mobic) 15 mg tablet, Take 1 tablet (15 mg total) by mouth daily, Disp: 90 tablet, Rfl: 1    Current Allergies     Allergies as of 2022   • (No Known Allergies)            The following portions of the patient's history were reviewed and updated as appropriate: allergies, current medications, past family history, past medical history, past social history, past surgical history and problem list      Past Medical History:   Diagnosis Date   • Ankylosing spondylitis ([facility] )    • Anxiety     LAST ASSESSED: 16   • Arthritis    • Back pain    • Carpal tunnel syndrome    • Fibromyalgia     LAST ASSESSED: 16   • Fibromyalgia, primary    • Heme positive stool     LAST ASSESSED: 10/22/16   • High cholesterol    • Hyperlipidemia     under control since weight loss   • Impingement syndrome of left shoulder     LAST ASSESSED: 17   • Impingement syndrome of right shoulder     LAST ASSESSED:    • Long term use of drug     LAST ASSESSED: 16   • Low back pain    • No known health problems     NO PERTINENT PAST MEDICAL HX   • RLS (restless legs syndrome)    • Rotator cuff injury     right   • Spinal stenosis    • Spinal stenosis    • Spondylitis ([facility] )    • Spondyloarthritis     RESOLVED: 16   • Vitamin D deficiency        Past Surgical History:   Procedure Laterality Date   • BACK SURGERY     • CARPAL TUNNEL RELEASE Left    • CERVICAL CONIZATION   W/ LASER      CERVICAL CONIZATION   •  SECTION     • COLONOSCOPY     • DILATION AND CURETTAGE OF UTERUS     • FL INJECTION RIGHT HIP (NON ARTHROGRAM)  2019   • FL INJECTION RIGHT HIP (NON ARTHROGRAM)  2019   • FRACTURE SURGERY     • HAND SURGERY     • JOINT REPLACEMENT     • ORTHOPEDIC SURGERY     • WY ARTHRODESIS POSTERIOR/PSTLAT TQ 1NTRSPC LUMBAR N/A 4/3/2017    Procedure: L2-L5 OPEN DECOMPRESSIVE LUMBAR LAMINECTOMY W/ PEDICLE SCREW AND NILDA FIXATION FUSION (IMPULSE); REMOVAL OF SKIN TAG MID BACK;  Surgeon: Eugenio Granados MD;  [facility];  Service: Neurosurgery   • CT ARTHRP ACETBLR/PROX FEM PROSTC AGRFT/ALGRFT Right 11/7/2019    Procedure: ARTHROPLASTY HIP TOTAL Posterior;  Surgeon: Hendricks Fothergill, MD;  Location: [facility];  Service: Orthopedics   • CT COLONOSCOPY FLX DX W/COLLJ SPEC WHEN PFRMD N/A 10/7/2016    Procedure: EGD AND COLONOSCOPY;  Surgeon: Eron Davies MD;  Location: [facility];   Service: Gastroenterology   • CT [address]oors WRST SURG W/RLS TRANSVRS CARPL LIGM Left 4/26/2018    Procedure: RELEASE CARPAL TUNNEL ENDOSCOPIC;  Surgeon: Nicholas Craft MD;  Location[facility];  Service: Orthopedics   • CT [address] Floors WRST SURG W/RLS TRANSVRS CARPL LIGM Right 6/14/2018    Procedure: RELEASE CARPAL TUNNEL ENDOSCOPIC;  Surgeon: Nicholas Craft MD;  [facility];  Service: Orthopedics   • RETINAL LASER PROCEDURE     • TUBAL LIGATION         Family History   Problem Relation Age of Onset   • Arthritis Mother    • Breast cancer Mother 67   • Hypertension Mother    • Heart attack Mother         MYOCARDIAL INFARCTION   • Heart attack Father    • Hypertension Father    • Stroke Father         Kailey Sour (CVA)   • Arthritis Sister    • Uterine cancer Sister    • Endometrial cancer Sister 61   • Heart attack Brother    • Prostate cancer Brother 58   • Arthritis Brother    • Melanoma Brother    • Cancer Brother    • Arthritis Family    • Hyperlipidemia Family    • Depression Son    • Breast cancer Maternal Aunt 44   • No Known Problems Daughter    • No Known Problems Maternal Grandmother    • No Known Problems Maternal Grandfather    • No Known Problems Paternal Grandmother    • No Known Problems Paternal Grandfather    • No Known Problems Brother    • Other Brother         hunting accident (shot)   • Melanoma Brother    • Prostate cancer Brother    • Heart attack Brother    • Stroke Brother    • Arthritis Sister    • Heart attack Sister    • No Known Problems Son    • No Known Problems Son    • Lung cancer Maternal Uncle    • Breast cancer additional onset Other 46   • Uterine cancer Other          Medications have been verified  Objective   /60   Pulse 70   Temp 97 6 °F (36 4 °C) (Temporal)   Resp 18   LMP  (LMP Unknown)   SpO2 97%        Physical Exam     Physical Exam  Vitals and nursing note reviewed  Constitutional:       General: She is not in acute distress  Appearance: Normal appearance  She is not ill-appearing, toxic-appearing or diaphoretic  Interventions: She is not intubated  HENT:      Head: Normocephalic and atraumatic  Right Ear: External ear normal       Left Ear: External ear normal       Nose: Nose normal  No congestion or rhinorrhea  Mouth/Throat:      Mouth: Mucous membranes are moist    Eyes:      Extraocular Movements: Extraocular movements intact  Conjunctiva/sclera: Conjunctivae normal       Pupils: Pupils are equal, round, and reactive to light  Cardiovascular:      Rate and Rhythm: Normal rate and regular rhythm  Pulses: Normal pulses  Heart sounds: Normal heart sounds  No murmur heard  No friction rub  No gallop  Pulmonary:      Effort: Pulmonary effort is normal  No tachypnea, bradypnea, accessory muscle usage, prolonged expiration, respiratory distress or retractions  She is not intubated  Breath sounds: Normal breath sounds  No stridor, decreased air movement or transmitted upper airway sounds  No decreased breath sounds, wheezing, rhonchi or rales  Chest:      Chest wall: No tenderness  Abdominal:      General: Bowel sounds are normal       Palpations: Abdomen is soft  Tenderness: There is no abdominal tenderness  There is no guarding or rebound  Musculoskeletal:      Left shoulder: Tenderness present  No swelling, deformity, effusion, laceration, bony tenderness or crepitus  Decreased range of motion  Normal strength  Normal pulse  Cervical back: Normal range of motion and neck supple  No rigidity or tenderness  Left knee: No swelling, deformity, effusion, erythema, ecchymosis, lacerations, bony tenderness or crepitus  Normal range of motion  Tenderness present over the lateral joint line  No medial joint line, MCL, LCL, ACL, PCL or patellar tendon tenderness  Legs:       Comments: Tenderness to palpation of anterior left shoulder, extension of left upper arm possible knee about 90 degrees due to discomfort  Patient has full ability to perform internal rotation, abduction and abduction with discomfort  Normal strength with resistance to internal and external force, negative empty can test, negative belly press  Mild tenderness to palpation of lateral aspect of right knee, full range of motion and no crepitus with palpation  No appreciable swelling or discoloration on exam of right knee  Lymphadenopathy:      Cervical: No cervical adenopathy  Skin:     General: Skin is warm and dry  Capillary Refill: Capillary refill takes less than 2 seconds  Neurological:      General: No focal deficit present  Mental Status: She is alert and oriented to person, place, and time  Cranial Nerves: No cranial nerve deficit     Psychiatric:         Mood and Affect: Mood normal          Behavior: Behavior normal

## 2023-01-04 PROBLEM — Z80.3 FAMILY HISTORY OF BREAST CANCER IN MOTHER: Status: ACTIVE | Noted: 2023-01-04

## 2023-01-04 NOTE — PROGRESS NOTES
Assessment/Plan:  Small left breast mass on patient self-exam  L Breast 2 cm from areola at 8 o'clock  I cannot duplicate the finding  My exam is normal   Strong family history of breast cancer  Normal mammogram   Out of an abundance of caution we will order diagnostic imaging  The area below the pannus is unremarkable  The odor most likely is from trapped moisture and bacteria  Continued cleansing and Vaseline was recommended since it has worked  FH:  M - Breast Ca 79  MA- Breast Ca 39  M niece- Breast Ca 46, Uterine Ca 47  Sister- Cervical 53, Uterine Ca 77   S- Colon Polyps  B- Melanoma 64 d78 recurred  B- Prostate Ca 74     Declined BRCA testing in the past      Diagnoses and all orders for this visit:    Mass of lower inner quadrant of left breast  -     Mammo diagnostic left w 3d & cad; Future  -     US breast right limited (diagnostic); Future    Family history of breast cancer in mother  -     Mammo diagnostic left w 3d & cad; Future  -     US breast right limited (diagnostic); Future    Pannus, abdominal    Skin irritation              Subjective:        Patient ID: Tyler Soulier is a 81 y o  female  Saji Giron presents today complaining of a left breast lump which she fell 3 weeks ago while doing a self breast exam   She describes it as small, nontender and not associated with pain, nipple bleeding or nipple discharge  She felt nothing on the right  She did feel the lesion earlier this morning  However, she could not demonstrated today in the visit both in the supine and sitting positions  She describes it as a BB  She has a strong family history of breast cancer with her mother, maternal aunt, and a maternal niece being affected  She is also complaining of an odor below her pannus which she thinks is related to the  scar  She has been cleaning the area daily now and applying Vaseline and the odor has resolved        The following portions of the patient's history were reviewed and updated as appropriate: She  has a past medical history of Ankylosing spondylitis ([facility] ), Anxiety, Arthritis, Back pain, Carpal tunnel syndrome, Fibromyalgia, Fibromyalgia, primary, Heme positive stool, High cholesterol, Hyperlipidemia, Impingement syndrome of left shoulder, Impingement syndrome of right shoulder, Long term use of drug, Low back pain, No known health problems, RLS (restless legs syndrome), Rotator cuff injury, Spinal stenosis, Spinal stenosis, Spondylitis ([facility] ), Spondyloarthritis, and Vitamin D deficiency  Patient Active Problem List    Diagnosis Date Noted   • Family history of breast cancer in mother 2023   • Depression 2022   • Microscopic hematuria 2022   • History of lumbar spinal fusion 2021   • Family history of breast cancer 2021   • Stress incontinence 2021   • Trigger finger of left hand 2020   • Right hip pain 2020   • Hyperlipidemia 2019   • Abnormal EKG 10/17/2019   • Primary osteoarthritis of right knee 2019   • Encounter for annual routine gynecological examination 2019   • Encounter for screening mammogram for malignant neoplasm of breast 2019   • Chronic bilateral low back pain 2018   • Spinal stenosis of lumbar region at multiple levels 2017   • Spondylosis 2017     She  has a past surgical history that includes  section; Tubal ligation; Dilation and curettage of uterus; Retinal laser procedure; Colonoscopy; pr colonoscopy flx dx w/collj spec when pfrmd (N/A, 10/7/2016); pr arthrodesis posterior/pstlat tq 1ntrspc lumbar (N/A, 4/3/2017); pr ndsc wrst surg w/rls transvrs carpl ligm (Left, 2018); Cervical conization w/ laser; Back surgery; Hand surgery; Carpal tunnel release (Left); pr ndsc wrst surg w/rls transvrs carpl ligm (Right, 2018);  FL injection right hip (non arthrogram) (2019); FL injection right hip (non arthrogram) (2019); pr arthrp acetblr/prox fem prostc agrft/algrft (Right, 11/7/2019); Joint replacement; orthopedic surgery; and Fracture surgery  Her family history includes Arthritis in her brother, family, mother, sister, and sister; Breast cancer (age of onset: 36) in her maternal aunt; Breast cancer (age of onset: 67) in her mother; Breast cancer additional onset (age of onset: 46) in her other; Cancer in her brother; Depression in her son; Endometrial cancer (age of onset: 61) in her sister; Heart attack in her brother, brother, father, mother, and sister; Hyperlipidemia in her family; Hypertension in her father and mother; Lung cancer in her maternal uncle; Melanoma in her brother and brother; No Known Problems in her brother, daughter, maternal grandfather, maternal grandmother, paternal grandfather, paternal grandmother, son, and son; Other in her brother; Prostate cancer in her brother; Prostate cancer (age of onset: 58) in her brother; Stroke in her brother and father; Uterine cancer in her other and sister  She  reports that she has never smoked  She has never used smokeless tobacco  She reports that she does not drink alcohol and does not use drugs  Current Outpatient Medications   Medication Sig Dispense Refill   • PARoxetine (PAXIL) 20 mg tablet Take 1 tablet (20 mg total) by mouth daily 90 tablet 2   • aspirin 81 MG tablet Take 1 tablet by mouth daily     • cephalexin (KEFLEX) 500 mg capsule cephalexin 500 mg capsule   TAKE 4 CAPSULES BY MOUTH 1 HOUR BEFORE DENTAL APPOINTMENT     • chlorhexidine (PERIDEX) 0 12 % solution chlorhexidine gluconate 0 12 % mouthwash   RINSE MOUTH WITH 1/2 OZ FOR 30-60 SECS TWICE DAILY BEGIN ONE DAY AFTER SURGERY     • Cholecalciferol (VITAMIN D3) 50 MCG (2000 UT) capsule Vitamin D3 50 mcg (2,000 unit) capsule   Take by oral route       • clonazePAM (KlonoPIN) 0 5 mg tablet Take 0 5 mg by mouth daily at bedtime  4   • Diclofenac Sodium (VOLTAREN) 1 % Apply 4 g topically 4 (four) times a day 100 g 4 • Fluad Quadrivalent 0 5 ML PRSY PHARMACY ADMINISTERED     • gabapentin (NEURONTIN) 100 mg capsule Take 1 capsule (100 mg total) by mouth 2 (two) times a day 180 capsule 3   • gabapentin (Neurontin) 600 MG tablet Take 1 tablet (600 mg total) by mouth daily at bedtime 90 tablet 3   • meloxicam (Mobic) 15 mg tablet Take 1 tablet (15 mg total) by mouth daily 90 tablet 1     No current facility-administered medications for this visit  Current Outpatient Medications on File Prior to Visit   Medication Sig   • PARoxetine (PAXIL) 20 mg tablet Take 1 tablet (20 mg total) by mouth daily   • aspirin 81 MG tablet Take 1 tablet by mouth daily   • cephalexin (KEFLEX) 500 mg capsule cephalexin 500 mg capsule   TAKE 4 CAPSULES BY MOUTH 1 HOUR BEFORE DENTAL APPOINTMENT   • chlorhexidine (PERIDEX) 0 12 % solution chlorhexidine gluconate 0 12 % mouthwash   RINSE MOUTH WITH 1/2 OZ FOR 30-60 SECS TWICE DAILY BEGIN ONE DAY AFTER SURGERY   • Cholecalciferol (VITAMIN D3) 50 MCG (2000 UT) capsule Vitamin D3 50 mcg (2,000 unit) capsule   Take by oral route  • clonazePAM (KlonoPIN) 0 5 mg tablet Take 0 5 mg by mouth daily at bedtime   • Diclofenac Sodium (VOLTAREN) 1 % Apply 4 g topically 4 (four) times a day   • Fluad Quadrivalent 0 5 ML PRSY PHARMACY ADMINISTERED   • gabapentin (NEURONTIN) 100 mg capsule Take 1 capsule (100 mg total) by mouth 2 (two) times a day   • gabapentin (Neurontin) 600 MG tablet Take 1 tablet (600 mg total) by mouth daily at bedtime   • meloxicam (Mobic) 15 mg tablet Take 1 tablet (15 mg total) by mouth daily   • [DISCONTINUED] PARoxetine (PAXIL) 20 mg tablet      No current facility-administered medications on file prior to visit  She has No Known Allergies       Review of Systems   Constitutional: Negative            Objective:    Vitals:    01/05/23 1046   BP: 130/86   BP Location: Left arm   Patient Position: Sitting   Cuff Size: Standard   Weight: 88 7 kg (195 lb 9 6 oz)            Physical Exam  Vitals and nursing note reviewed  Exam conducted with a chaperone present  Constitutional:       Appearance: She is obese  Chest:   Breasts:     Right: Normal  No swelling, bleeding, inverted nipple, mass, nipple discharge, skin change or tenderness  Left: Normal  No swelling, bleeding, inverted nipple, mass, nipple discharge, skin change or tenderness  Abdominal:      General: Abdomen is flat  Palpations: Abdomen is soft  Tenderness: There is no abdominal tenderness  Hernia: No hernia is present  Comments: There is minimal erythema below the pannus at her  scar  There is no evidence of monilia  There is no odor  Neurological:      Mental Status: She is alert

## 2023-01-05 ENCOUNTER — OFFICE VISIT (OUTPATIENT)
Dept: INTERNAL MEDICINE CLINIC | Facility: CLINIC | Age: 73
End: 2023-01-05

## 2023-01-05 ENCOUNTER — OFFICE VISIT (OUTPATIENT)
Dept: OBGYN CLINIC | Facility: CLINIC | Age: 73
End: 2023-01-05

## 2023-01-05 VITALS — DIASTOLIC BLOOD PRESSURE: 86 MMHG | BODY MASS INDEX: 38.2 KG/M2 | SYSTOLIC BLOOD PRESSURE: 130 MMHG | WEIGHT: 195.6 LBS

## 2023-01-05 VITALS
SYSTOLIC BLOOD PRESSURE: 128 MMHG | BODY MASS INDEX: 38.36 KG/M2 | OXYGEN SATURATION: 94 % | DIASTOLIC BLOOD PRESSURE: 72 MMHG | HEART RATE: 88 BPM | TEMPERATURE: 97.3 F | WEIGHT: 195.4 LBS | HEIGHT: 60 IN

## 2023-01-05 DIAGNOSIS — M25.561 ACUTE PAIN OF RIGHT KNEE: ICD-10-CM

## 2023-01-05 DIAGNOSIS — F32.A DEPRESSION, UNSPECIFIED DEPRESSION TYPE: ICD-10-CM
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
## 2023-01-05 DIAGNOSIS — Z11.59 NEED FOR HEPATITIS C SCREENING TEST: ICD-10-CM

## 2023-01-05 DIAGNOSIS — E65 PANNUS, ABDOMINAL: ICD-10-CM

## 2023-01-05 DIAGNOSIS — N63.24 MASS OF LOWER INNER QUADRANT OF LEFT BREAST: Primary | ICD-10-CM

## 2023-01-05 DIAGNOSIS — E78.5 HYPERLIPIDEMIA, UNSPECIFIED HYPERLIPIDEMIA TYPE: ICD-10-CM

## 2023-01-05 DIAGNOSIS — M25.512 ACUTE PAIN OF LEFT SHOULDER: ICD-10-CM

## 2023-01-05 DIAGNOSIS — Z80.3 FAMILY HISTORY OF BREAST CANCER IN MOTHER: ICD-10-CM

## 2023-01-05 DIAGNOSIS — R23.8 SKIN IRRITATION: ICD-10-CM

## 2023-01-05 PROBLEM — M65.30 TRIGGER FINGER OF LEFT HAND: Status: RESOLVED | Noted: 2020-12-31 | Resolved: 2023-01-05

## 2023-01-05 PROBLEM — R31.29 MICROSCOPIC HEMATURIA: Status: RESOLVED | Noted: 2022-01-03 | Resolved: 2023-01-05

## 2023-01-05 PROBLEM — M25.551 RIGHT HIP PAIN: Status: RESOLVED | Noted: 2020-11-17 | Resolved: 2023-01-05

## 2023-01-05 RX ORDER — CHLORHEXIDINE GLUCONATE 0.12 MG/ML
RINSE ORAL
COMMUNITY

## 2023-01-05 RX ORDER — CEPHALEXIN 500 MG/1
CAPSULE ORAL
COMMUNITY

## 2023-01-05 RX ORDER — PAROXETINE HYDROCHLORIDE 20 MG/1
TABLET, FILM COATED ORAL
COMMUNITY
End: 2023-01-05 | Stop reason: SDUPTHER

## 2023-01-05 RX ORDER — PAROXETINE HYDROCHLORIDE 20 MG/1
20 TABLET, FILM COATED ORAL DAILY
Qty: 90 TABLET | Refills: 2 | Status: SHIPPED | OUTPATIENT
Start: 2023-01-05

## 2023-01-05 NOTE — ASSESSMENT & PLAN NOTE
I have reviewed in detail with the patient her lipid profile today it does show an upward trend in LDL value  Reviewed with the patient dietary changes to address this finding    Weight reduction would also be beneficial

## 2023-01-05 NOTE — ASSESSMENT & PLAN NOTE
Recent fall with a history of several recent falls prior  Sustained injury to her right knee and left shoulder which will be evaluated by orthopedics  We reviewed the fact that she does not have any deep tendon reflex response in either lower extremity which is likely a factor along with perhaps physical deconditioning  I referred her on to physical therapy to work on muscular strengthening and gait stability

## 2023-01-05 NOTE — PATIENT INSTRUCTIONS
Medicare Preventive Visit Patient Instructions  Thank you for completing your Welcome to Medicare Visit or Medicare Annual Wellness Visit today  Your next wellness visit will be due in one year (1/6/2024)  The screening/preventive services that you may require over the next 5-10 years are detailed below  Some tests may not apply to you based off risk factors and/or age  Screening tests ordered at today's visit but not completed yet may show as past due  Also, please note that scanned in results may not display below  Preventive Screenings:  Service Recommendations Previous Testing/Comments   Colorectal Cancer Screening  * Colonoscopy    * Fecal Occult Blood Test (FOBT)/Fecal Immunochemical Test (FIT)  * Fecal DNA/Cologuard Test  * Flexible Sigmoidoscopy Age: 39-70 years old   Colonoscopy: every 10 years (may be performed more frequently if at higher risk)  OR  FOBT/FIT: every 1 year  OR  Cologuard: every 3 years  OR  Sigmoidoscopy: every 5 years  Screening may be recommended earlier than age 39 if at higher risk for colorectal cancer  Also, an individualized decision between you and your healthcare provider will decide whether screening between the ages of 74-80 would be appropriate  Colonoscopy: 10/07/2016  FOBT/FIT: 12/30/2022  Cologuard: Not on file  Sigmoidoscopy: Not on file    Screening Current     Breast Cancer Screening Age: 36 years old  Frequency: every 1-2 years  Not required if history of left and right mastectomy Mammogram: 09/30/2022    Screening Current   Cervical Cancer Screening Between the ages of 21-29, pap smear recommended once every 3 years  Between the ages of 33-67, can perform pap smear with HPV co-testing every 5 years     Recommendations may differ for women with a history of total hysterectomy, cervical cancer, or abnormal pap smears in past  Pap Smear: 11/16/2021    Screening Not Indicated   Hepatitis C Screening Once for adults born between 1945 and 1965  More frequently in patients at high risk for Hepatitis C Hep C Antibody: Not on file        Diabetes Screening 1-2 times per year if you're at risk for diabetes or have pre-diabetes Fasting glucose: 90 mg/dL (12/30/2022)  A1C: 5 6 % (10/15/2019)  Screening Current   Cholesterol Screening Once every 5 years if you don't have a lipid disorder  May order more often based on risk factors  Lipid panel: 12/30/2022    Screening Not Indicated  History Lipid Disorder     Other Preventive Screenings Covered by Medicare:  1  Abdominal Aortic Aneurysm (AAA) Screening: covered once if your at risk  You're considered to be at risk if you have a family history of AAA  2  Lung Cancer Screening: covers low dose CT scan once per year if you meet all of the following conditions: (1) Age 50-69; (2) No signs or symptoms of lung cancer; (3) Current smoker or have quit smoking within the last 15 years; (4) You have a tobacco smoking history of at least 20 pack years (packs per day multiplied by number of years you smoked); (5) You get a written order from a healthcare provider  3  Glaucoma Screening: covered annually if you're considered high risk: (1) You have diabetes OR (2) Family history of glaucoma OR (3)  aged 48 and older OR (3)  American aged 72 and older  3  Osteoporosis Screening: covered every 2 years if you meet one of the following conditions: (1) You're estrogen deficient and at risk for osteoporosis based off medical history and other findings; (2) Have a vertebral abnormality; (3) On glucocorticoid therapy for more than 3 months; (4) Have primary hyperparathyroidism; (5) On osteoporosis medications and need to assess response to drug therapy  · Last bone density test (DXA Scan): Not on file  5  HIV Screening: covered annually if you're between the age of 12-76  Also covered annually if you are younger than 13 and older than 72 with risk factors for HIV infection   For pregnant patients, it is covered up to 3 times per pregnancy  Immunizations:  Immunization Recommendations   Influenza Vaccine Annual influenza vaccination during flu season is recommended for all persons aged >= 6 months who do not have contraindications   Pneumococcal Vaccine   * Pneumococcal conjugate vaccine = PCV13 (Prevnar 13), PCV15 (Vaxneuvance), PCV20 (Prevnar 20)  * Pneumococcal polysaccharide vaccine = PPSV23 (Pneumovax) Adults 25-60 years old: 1-3 doses may be recommended based on certain risk factors  Adults 72 years old: 1-2 doses may be recommended based off what pneumonia vaccine you previously received   Hepatitis B Vaccine 3 dose series if at intermediate or high risk (ex: diabetes, end stage renal disease, liver disease)   Tetanus (Td) Vaccine - COST NOT COVERED BY MEDICARE PART B Following completion of primary series, a booster dose should be given every 10 years to maintain immunity against tetanus  Td may also be given as tetanus wound prophylaxis  Tdap Vaccine - COST NOT COVERED BY MEDICARE PART B Recommended at least once for all adults  For pregnant patients, recommended with each pregnancy  Shingles Vaccine (Shingrix) - COST NOT COVERED BY MEDICARE PART B  2 shot series recommended in those aged 48 and above     Health Maintenance Due:      Topic Date Due   • Hepatitis C Screening  Never done   • Breast Cancer Screening: Mammogram  09/30/2023   • Colorectal Cancer Screening  10/07/2026     Immunizations Due:      Topic Date Due   • Hepatitis B Vaccine (1 of 3 - 3-dose series) Never done   • COVID-19 Vaccine (5 - Booster) 06/24/2021   • Influenza Vaccine (1) 09/01/2022     Advance Directives   What are advance directives? Advance directives are legal documents that state your wishes and plans for medical care  These plans are made ahead of time in case you lose your ability to make decisions for yourself  Advance directives can apply to any medical decision, such as the treatments you want, and if you want to donate organs  What are the types of advance directives? There are many types of advance directives, and each state has rules about how to use them  You may choose a combination of any of the following:  · Living will: This is a written record of the treatment you want  You can also choose which treatments you do not want, which to limit, and which to stop at a certain time  This includes surgery, medicine, IV fluid, and tube feedings  · Durable power of  for healthcare [facility]): This is a written record that states who you want to make healthcare choices for you when you are unable to make them for yourself  This person, called a proxy, is usually a family member or a friend  You may choose more than 1 proxy  · Do not resuscitate (DNR) order:  A DNR order is used in case your heart stops beating or you stop breathing  It is a request not to have certain forms of treatment, such as CPR  A DNR order may be included in other types of advance directives  · Medical directive: This covers the care that you want if you are in a coma, near death, or unable to make decisions for yourself  You can list the treatments you want for each condition  Treatment may include pain medicine, surgery, blood transfusions, dialysis, IV or tube feedings, and a ventilator (breathing machine)  · Values history: This document has questions about your views, beliefs, and how you feel and think about life  This information can help others choose the care that you would choose  Why are advance directives important? An advance directive helps you control your care  Although spoken wishes may be used, it is better to have your wishes written down  Spoken wishes can be misunderstood, or not followed  Treatments may be given even if you do not want them  An advance directive may make it easier for your family to make difficult choices about your care  Fall Prevention    Fall prevention  includes ways to make your home and other areas safer  It also includes ways you can move more carefully to prevent a fall  Health conditions that cause changes in your blood pressure, vision, or muscle strength and coordination may increase your risk for falls  Medicines may also increase your risk for falls if they make you dizzy, weak, or sleepy  Fall prevention tips:   · Stand or sit up slowly  · Use assistive devices as directed  · Wear shoes that fit well and have soles that   · Wear a personal alarm  · Stay active  · Manage your medical conditions  Home Safety Tips:  · Add items to prevent falls in the bathroom  · Keep paths clear  · Install bright lights in your home  · Keep items you use often on shelves within reach  · Paint or place reflective tape on the edges of your stairs  Urinary Incontinence   Urinary incontinence (UI)  is when you lose control of your bladder  UI develops because your bladder cannot store or empty urine properly  The 3 most common types of UI are stress incontinence, urge incontinence, or both  Medicines:   · May be given to help strengthen your bladder control  Report any side effects of medication to your healthcare provider  Do pelvic muscle exercises often:  Your pelvic muscles help you stop urinating  Squeeze these muscles tight for 5 seconds, then relax for 5 seconds  Gradually work up to squeezing for 10 seconds  Do 3 sets of 15 repetitions a day, or as directed  This will help strengthen your pelvic muscles and improve bladder control  Train your bladder:  Go to the bathroom at set times, such as every 2 hours, even if you do not feel the urge to go  You can also try to hold your urine when you feel the urge to go  For example, hold your urine for 5 minutes when you feel the urge to go  As that becomes easier, hold your urine for 10 minutes  Self-care:   · Keep a UI record  Write down how often you leak urine and how much you leak   Make a note of what you were doing when you leaked urine   · Drink liquids as directed  You may need to limit the amount of liquid you drink to help control your urine leakage  Do not drink any liquid right before you go to bed  Limit or do not have drinks that contain caffeine or alcohol  · Prevent constipation  Eat a variety of high-fiber foods  Good examples are high-fiber cereals, beans, vegetables, and whole-grain breads  Walking is the best way to trigger your intestines to have a bowel movement  · Exercise regularly and maintain a healthy weight  Weight loss and exercise will decrease pressure on your bladder and help you control your leakage  · Use a catheter as directed  to help empty your bladder  A catheter is a tiny, plastic tube that is put into your bladder to drain your urine  · Go to behavior therapy as directed  Behavior therapy may be used to help you learn to control your urge to urinate  Weight Management   Why it is important to manage your weight:  Being overweight increases your risk of health conditions such as heart disease, high blood pressure, type 2 diabetes, and certain types of cancer  It can also increase your risk for osteoarthritis, sleep apnea, and other respiratory problems  Aim for a slow, steady weight loss  Even a small amount of weight loss can lower your risk of health problems  How to lose weight safely:  A safe and healthy way to lose weight is to eat fewer calories and get regular exercise  You can lose up about 1 pound a week by decreasing the number of calories you eat by 500 calories each day  Healthy meal plan for weight management:  A healthy meal plan includes a variety of foods, contains fewer calories, and helps you stay healthy  A healthy meal plan includes the following:  · Eat whole-grain foods more often  A healthy meal plan should contain fiber  Fiber is the part of grains, fruits, and vegetables that is not broken down by your body   Whole-grain foods are healthy and provide extra fiber in your diet  Some examples of whole-grain foods are whole-wheat breads and pastas, oatmeal, brown rice, and bulgur  · Eat a variety of vegetables every day  Include dark, leafy greens such as spinach, kale, eduardo greens, and mustard greens  Eat yellow and orange vegetables such as carrots, sweet potatoes, and winter squash  · Eat a variety of fruits every day  Choose fresh or canned fruit (canned in its own juice or light syrup) instead of juice  Fruit juice has very little or no fiber  · Eat low-fat dairy foods  Drink fat-free (skim) milk or 1% milk  Eat fat-free yogurt and low-fat cottage cheese  Try low-fat cheeses such as mozzarella and other reduced-fat cheeses  · Choose meat and other protein foods that are low in fat  Choose beans or other legumes such as split peas or lentils  Choose fish, skinless poultry (chicken or turkey), or lean cuts of red meat (beef or pork)  Before you cook meat or poultry, cut off any visible fat  · Use less fat and oil  Try baking foods instead of frying them  Add less fat, such as margarine, sour cream, regular salad dressing and mayonnaise to foods  Eat fewer high-fat foods  Some examples of high-fat foods include french fries, doughnuts, ice cream, and cakes  · Eat fewer sweets  Limit foods and drinks that are high in sugar  This includes candy, cookies, regular soda, and sweetened drinks  Exercise:  Exercise at least 30 minutes per day on most days of the week  Some examples of exercise include walking, biking, dancing, and swimming  You can also fit in more physical activity by taking the stairs instead of the elevator or parking farther away from stores  Ask your healthcare provider about the best exercise plan for you  © Copyright [employer] 2018 Information is for End User's use only and may not be sold, redistributed or otherwise used for commercial purposes   All illustrations and images included in CareNotes® are the copyrighted property of [employer]  or [facility]

## 2023-01-05 NOTE — ASSESSMENT & PLAN NOTE
History of tricompartmental arthritis of the right knee with recent fall and contusion to the knee with increasing joint effusion  Suspect we will need a knee replacement somewhere down the road  For now rest and meloxicam are advised

## 2023-01-05 NOTE — ASSESSMENT & PLAN NOTE
No indication of fractures on x-ray of the patient's left shoulder may have mild rotator cuff injury recommend rest and anti-inflammatory meloxicam   Follow-up with orthopedics

## 2023-01-05 NOTE — ASSESSMENT & PLAN NOTE
Depression symptoms reviewed today patient seems to be doing well on paroxetine 20 mg daily would continue current dosing

## 2023-01-05 NOTE — PROGRESS NOTES
Assessment and Plan:     Problem List Items Addressed This Visit        Other    Hyperlipidemia     I have reviewed in detail with the patient her lipid profile today it does show an upward trend in LDL value  Reviewed with the patient dietary changes to address this finding  Weight reduction would also be beneficial          Depression - Primary     Depression symptoms reviewed today patient seems to be doing well on paroxetine 20 mg daily would continue current dosing         Relevant Medications    PARoxetine (PAXIL) 20 mg tablet    Fall     Recent fall with a history of several recent falls prior  Sustained injury to her right knee and left shoulder which will be evaluated by orthopedics  We reviewed the fact that she does not have any deep tendon reflex response in either lower extremity which is likely a factor along with perhaps physical deconditioning  I referred her on to physical therapy to work on muscular strengthening and gait stability  Relevant Orders    Ambulatory Referral to Physical Therapy    Acute pain of right knee     History of tricompartmental arthritis of the right knee with recent fall and contusion to the knee with increasing joint effusion  Suspect we will need a knee replacement somewhere down the road  For now rest and meloxicam are advised  Acute pain of left shoulder     No indication of fractures on x-ray of the patient's left shoulder may have mild rotator cuff injury recommend rest and anti-inflammatory meloxicam   Follow-up with orthopedics        Other Visit Diagnoses     Need for hepatitis C screening test        Relevant Orders    Hepatitis C antibody        BMI Counseling: Body mass index is 38 16 kg/m²  The BMI is above normal  Nutrition recommendations include decreasing portion sizes, encouraging healthy choices of fruits and vegetables, moderation in carbohydrate intake and reducing intake of cholesterol  No pharmacotherapy was ordered   Rationale for BMI follow-up plan is due to patient being overweight or obese  Preventive health issues were discussed with patient, and age appropriate screening tests were ordered as noted in patient's After Visit Summary  Personalized health advice and appropriate referrals for health education or preventive services given if needed, as noted in patient's After Visit Summary  History of Present Illness:     Patient presents for a Medicare Wellness Visit    This 72-year-old female patient presents today for her annual complete physical examination and review of comprehensive blood work  She unfortunately sustained a fall recently resulting in contusion to the right knee and injury to his her left shoulder  She was seen in the emergency room and x-rays were performed of both the knee and shoulder which we have reviewed with the patient today  She does have pre-existing arthritic changes of the knee  Is an upcoming visit with her orthopedic surgeon who she can refer to for further advice regarding these injuries  She has had several falls over the past several years and referred her on to physical therapy for strengthening conditioning and stability  Patient Care Team:  Donavan Arroyo MD as PCP - General  MD Ines Fu MD Christel Jester, MD Dirk Ishikawa, DO Elin Mann, MD as Endoscopist     Review of Systems:     Review of Systems   Musculoskeletal: Positive for arthralgias and joint swelling  Comfort in the left shoulder and right knee   All other systems reviewed and are negative         Problem List:     Patient Active Problem List   Diagnosis   • Spinal stenosis of lumbar region at multiple levels   • Spondylosis   • Chronic bilateral low back pain   • Encounter for annual routine gynecological examination   • Encounter for screening mammogram for malignant neoplasm of breast   • Primary osteoarthritis of right knee   • Abnormal EKG   • Hyperlipidemia   • Family history of breast cancer   • Stress incontinence   • History of lumbar spinal fusion   • Depression   • Family history of breast cancer in mother   • Fall   • Acute pain of right knee   • Acute pain of left shoulder      Past Medical and Surgical History:     Past Medical History:   Diagnosis Date   • Ankylosing spondylitis ([address] )    • Anxiety     LAST ASSESSED: 16   • Arthritis    • Back pain    • Carpal tunnel syndrome    • Fibromyalgia     LAST ASSESSED: 16   • Fibromyalgia, primary    • Heme positive stool     LAST ASSESSED: 10/22/16   • High cholesterol    • Hyperlipidemia     under control since weight loss   • Impingement syndrome of left shoulder     LAST ASSESSED: 17   • Impingement syndrome of right shoulder     LAST ASSESSED:    • Long term use of drug     LAST ASSESSED: 16   • Low back pain    • No known health problems     NO PERTINENT PAST MEDICAL HX   • RLS (restless legs syndrome)    • Rotator cuff injury     right   • Spinal stenosis    • Spinal stenosis    • Spondylitis (HCC)    • Spondyloarthritis     RESOLVED: 16   • Vitamin D deficiency      Past Surgical History:   Procedure Laterality Date   • BACK SURGERY     • CARPAL TUNNEL RELEASE Left    • CERVICAL CONIZATION   W/ LASER      CERVICAL CONIZATION   •  SECTION     • COLONOSCOPY     • DILATION AND CURETTAGE OF UTERUS     • FL INJECTION RIGHT HIP (NON ARTHROGRAM)  2019   • FL INJECTION RIGHT HIP (NON ARTHROGRAM)  2019   • FRACTURE SURGERY     • HAND SURGERY     • JOINT REPLACEMENT     • ORTHOPEDIC SURGERY     • FL ARTHRODESIS POSTERIOR/PSTLAT TQ 1NTRSPC LUMBAR N/A 4/3/2017    Procedure: L2-L5 OPEN DECOMPRESSIVE LUMBAR LAMINECTOMY W/ PEDICLE SCREW AND NILDA FIXATION FUSION (IMPULSE); REMOVAL OF SKIN TAG MID BACK;  Surgeon: Sabina Lopez MD;  [facility];  Service: Neurosurgery   • FL ARTHRP ACETBLR/PROX FEM PROSTC AGRFT/ALGRFT Right 2019    Procedure: ARTHROPLASTY HIP TOTAL Posterior;  Surgeon: Christy Linda MD;  [facility];  Service: Orthopedics   • NC COLONOSCOPY FLX DX W/COLLJ SPEC WHEN PFRMD N/A 10/7/2016    Procedure: EGD AND COLONOSCOPY;  Surgeon: Jaime Henry MD;  Location: [facility];   Service: Gastroenterology   • NC [address] WRST SURG W/RLS TRANSVRS CARPL LIGM Left 4/26/2018    Procedure: RELEASE CARPAL TUNNEL ENDOSCOPIC;  Surgeon: Anisha Lee MD;  [facility];  Service: Orthopedics   • NC [address] WRST SURG W/RLS TRANSVRS CARPL LIGM Right 6/14/2018    Procedure: RELEASE CARPAL TUNNEL ENDOSCOPIC;  Surgeon: Anisha Lee MD;  [facility];  Service: Orthopedics   • RETINAL LASER PROCEDURE     • TUBAL LIGATION        Family History:     Family History   Problem Relation Age of Onset   • Arthritis Mother    • Breast cancer Mother 67   • Hypertension Mother    • Heart attack Mother         MYOCARDIAL INFARCTION   • Heart attack Father    • Hypertension Father    • Stroke Father         Mayito Irizarry (CVA)   • Arthritis Sister    • Uterine cancer Sister    • Endometrial cancer Sister 61   • Heart attack Brother    • Prostate cancer Brother 58   • Arthritis Brother    • Melanoma Brother    • Cancer Brother    • Arthritis Family    • Hyperlipidemia Family    • Depression Son    • Breast cancer Maternal Aunt 40   • No Known Problems Daughter    • No Known Problems Maternal Grandmother    • No Known Problems Maternal Grandfather    • No Known Problems Paternal Grandmother    • No Known Problems Paternal Grandfather    • No Known Problems Brother    • Other Brother         hunting accident (shot)   • Melanoma Brother    • Prostate cancer Brother    • Heart attack Brother    • Stroke Brother    • Arthritis Sister    • Heart attack Sister    • No Known Problems Son    • No Known Problems Son    • Lung cancer Maternal Uncle    • Breast cancer additional onset Other 46   • Uterine cancer Other       Social History: Social History     Socioeconomic History   • Marital status: /Civil Union     Spouse name: None   • Number of children: 3   • Years of education: None   • Highest education level: None   Occupational History   • 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  Comment: EMPLOYED   Tobacco Use   • Smoking status: Never   • Smokeless tobacco: Never   Vaping Use   • Vaping Use: Never used   Substance and Sexual Activity   • Alcohol use: Never   • Drug use: No   • Sexual activity: Yes     Partners: Male     Birth control/protection: None, Post-menopausal   Other Topics Concern   • None   Social History Narrative    CAFFEINE USE    DOES NOT EXERCISE     Social Determinants of Health     Financial Resource Strain: Low Risk    • Difficulty of Paying Living Expenses: Not hard at all   Food Insecurity: Not on file   Transportation Needs: No Transportation Needs   • Lack of Transportation (Medical): No   • Lack of Transportation (Non-Medical): No   Physical Activity: Not on file   Stress: Not on file   Social Connections: Not on file   Intimate Partner Violence: Not on file   Housing Stability: Not on file      Medications and Allergies:     Current Outpatient Medications   Medication Sig Dispense Refill   • aspirin 81 MG tablet Take 1 tablet by mouth daily     • cephalexin (KEFLEX) 500 mg capsule cephalexin 500 mg capsule   TAKE 4 CAPSULES BY MOUTH 1 HOUR BEFORE DENTAL APPOINTMENT     • chlorhexidine (PERIDEX) 0 12 % solution chlorhexidine gluconate 0 12 % mouthwash   RINSE MOUTH WITH 1/2 OZ FOR 30-60 SECS TWICE DAILY BEGIN ONE DAY AFTER SURGERY     • Cholecalciferol (VITAMIN D3) 50 MCG (2000 UT) capsule Vitamin D3 50 mcg (2,000 unit) capsule   Take by oral route       • clonazePAM (KlonoPIN) 0 5 mg tablet Take 0 5 mg by mouth daily at bedtime  4   • Diclofenac Sodium (VOLTAREN) 1 % Apply 4 g topically 4 (four) times a day 100 g 4   • Fluad Quadrivalent 0 5 ML PRSY PHARMACY ADMINISTERED     • gabapentin (NEURONTIN) 100 mg capsule Take 1 capsule (100 mg total) by mouth 2 (two) times a day 180 capsule 3   • gabapentin (Neurontin) 600 MG tablet Take 1 tablet (600 mg total) by mouth daily at bedtime 90 tablet 3   • meloxicam (Mobic) 15 mg tablet Take 1 tablet (15 mg total) by mouth daily 90 tablet 1   • PARoxetine (PAXIL) 20 mg tablet Take 1 tablet (20 mg total) by mouth daily 90 tablet 2     No current facility-administered medications for this visit  No Known Allergies   Immunizations:     Immunization History   Administered Date(s) Administered   • COVID-19 MODERNA VACC 0 5 ML IM 03/09/2021, 03/09/2021, 03/25/2021, 04/26/2021, 04/29/2021, 04/29/2021   • H1N1, All Formulations 11/06/2009   • INFLUENZA 10/01/2018, 10/15/2020   • Influenza Quadrivalent 3 years and older 10/18/2021   • Influenza, high dose seasonal 0 7 mL 10/01/2018, 10/17/2019   • Pneumococcal Conjugate 13-Valent 12/28/2018   • Pneumococcal Polysaccharide PPV23 12/31/2020   • Zoster Vaccine Recombinant 11/03/2020   • influenza, injectable, quadrivalent 10/18/2021      Health Maintenance:         Topic Date Due   • Hepatitis C Screening  Never done   • Breast Cancer Screening: Mammogram  09/30/2023   • Colorectal Cancer Screening  10/07/2026         Topic Date Due   • Hepatitis B Vaccine (1 of 3 - 3-dose series) Never done   • COVID-19 Vaccine (5 - Booster) 06/24/2021   • Influenza Vaccine (1) 09/01/2022      Medicare Screening Tests and Risk Assessments:     Dolores Alford is here for her Subsequent Wellness visit  Health Risk Assessment:   Patient rates overall health as good  Patient feels that their physical health rating is slightly worse  Patient is dissatisfied with their life  Eyesight was rated as same  Hearing was rated as same  Patient feels that their emotional and mental health rating is slightly worse  Patients states they are never, rarely angry  Patient states they are sometimes unusually tired/fatigued  Pain experienced in the last 7 days has been some   Patient's pain rating has been 5/10  Patient states that she has experienced no weight loss or gain in last 6 months  Fall Risk Screening: In the past year, patient has experienced: history of falling in past year      Urinary Incontinence Screening:   Patient has leaked urine accidently in the last six months  Home Safety:  Patient does not have trouble with stairs inside or outside of their home  Patient has working smoke alarms and has working carbon monoxide detector  Home safety hazards include: household clutter  Nutrition:   Current diet is Regular and Limited junk food  Eat too much for activity level    Medications:   Patient is not currently taking any over-the-counter supplements  Patient is able to manage medications  Activities of Daily Living (ADLs)/Instrumental Activities of Daily Living (IADLs):   Walk and transfer into and out of bed and chair?: Yes  Dress and groom yourself?: Yes    Bathe or shower yourself?: Yes    Feed yourself?  Yes  Do your laundry/housekeeping?: Yes  Manage your money, pay your bills and track your expenses?: Yes  Make your own meals?: Yes    Do your own shopping?: Yes    Previous Hospitalizations:   Any hospitalizations or ED visits within the last 12 months?: Yes    How many hospitalizations have you had in the last year?: 1-2    Hospitalization Comments: Urgent care for a fall    Advance Care Planning:   Living will: No    Durable POA for healthcare: No    Advanced directive: No      PREVENTIVE SCREENINGS      Cardiovascular Screening:    General: Screening Not Indicated and History Lipid Disorder      Diabetes Screening:     General: Screening Current      Colorectal Cancer Screening:     General: Screening Current      Breast Cancer Screening:     General: Screening Current      Cervical Cancer Screening:    General: Screening Not Indicated      Osteoporosis Screening:    General: Screening Not Indicated      Abdominal Aortic Aneurysm (AAA) Screening:        General: Screening Not Indicated      Lung Cancer Screening:     General: Screening Not Indicated      Hepatitis C Screening:    General: Risks and Benefits Discussed    Screening, Brief Intervention, and Referral to Treatment (SBIRT)    Screening  Typical number of drinks in a day: 0  Typical number of drinks in a week: 0  Interpretation: Low risk drinking behavior  AUDIT-C Screenin) How often did you have a drink containing alcohol in the past year? monthly or less  2) How many drinks did you have on a typical day when you were drinking in the past year? 0  3) How often did you have 6 or more drinks on one occasion in the past year? never    AUDIT-C Score: 1  Interpretation: Score 0-2 (female): Negative screen for alcohol misuse    Single Item Drug Screening:  How often have you used an illegal drug (including marijuana) or a prescription medication for non-medical reasons in the past year? never    Single Item Drug Screen Score: 0  Interpretation: Negative screen for possible drug use disorder    No results found  Physical Exam:     /72   Pulse 88   Temp (!) 97 3 °F (36 3 °C)   Ht 5' (1 524 m)   Wt 88 6 kg (195 lb 6 4 oz)   LMP  (LMP Unknown)   SpO2 94%   BMI 38 16 kg/m²     Physical Exam  Vitals and nursing note reviewed  Constitutional:       General: She is not in acute distress  Appearance: She is well-developed  She is not ill-appearing  HENT:      Head: Normocephalic and atraumatic  Right Ear: Tympanic membrane, ear canal and external ear normal       Left Ear: Tympanic membrane, ear canal and external ear normal       Nose: Nose normal       Mouth/Throat:      Mouth: Mucous membranes are moist       Pharynx: Oropharynx is clear  Eyes:      General:         Right eye: No discharge  Left eye: No discharge  Extraocular Movements: Extraocular movements intact  Conjunctiva/sclera: Conjunctivae normal       Pupils: Pupils are equal, round, and reactive to light  Cardiovascular:      Rate and Rhythm: Normal rate and regular rhythm  Heart sounds: No murmur heard  Pulmonary:      Effort: Pulmonary effort is normal  No respiratory distress  Breath sounds: Normal breath sounds  No wheezing, rhonchi or rales  Abdominal:      General: There is no distension  Palpations: Abdomen is soft  There is no mass  Tenderness: There is no abdominal tenderness  There is no guarding  Musculoskeletal:         General: Swelling, tenderness and deformity present  Cervical back: Normal range of motion and neck supple  No rigidity or tenderness  Right lower leg: No edema  Left lower leg: No edema  Comments: Ecchymosis and swelling of the right knee with tenderness on the patella  No significant increase in warmth emanating from the joint  Tenderness with discomfort on motion of the left shoulder   Skin:     General: Skin is warm and dry  Capillary Refill: Capillary refill takes less than 2 seconds  Coloration: Skin is not jaundiced or pale  Neurological:      Mental Status: She is alert  Mental status is at baseline  Psychiatric:         Mood and Affect: Mood normal          Behavior: Behavior normal          Thought Content:  Thought content normal          Judgment: Judgment normal           Jayda MD Antonio

## 2023-01-17 ENCOUNTER — EVALUATION (OUTPATIENT)
Dept: PHYSICAL THERAPY | Facility: CLINIC | Age: 73
End: 2023-01-17

## 2023-01-17 DIAGNOSIS — Z74.09 IMPAIRED FUNCTIONAL MOBILITY, BALANCE, GAIT, AND ENDURANCE: Primary | ICD-10-CM
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
## 2023-01-17 NOTE — PROGRESS NOTES
PT Evaluation     Today's date: 2023  Patient name: Ezra Albarado  : 1950  MRN: [id number]  Referring provider: Brenna Lynch, *  Dx:   Encounter Diagnosis     ICD-10-CM    1  Impaired functional mobility, balance, gait, and endurance  Z74 [id number]       2  Fall, initial encounter  [address]  XXXA Ambulatory Referral to Physical Therapy          Start Time: 538  Stop Time: 1008  Total time in clinic (min): 63 minutes    Assessment  Assessment details: Patient presents to outpatient physical therapy with recent falls and impaired balance, decreased R knee strength, decreased hip strength bilaterally, gait deviations of antalgic gait and Trendelenburg on L side, impaired balance, and decreased functional mobility  She presents with L Trendelenburg during unilateral stance on L LE, which may be contributing to tripping on R foot during ambulation  She also has decreased sensation at R shin and medical history of spinal stenosis  Quinn Lee presents with decreased LE strength and endurance from 5xSTS of 29 4 seconds, which is above cut-off score of 15 seconds for increased fall risk and above age and gender matched normative value of 13 0 seconds  She is slightly below cut-off score of 13 5 seconds for TUG score, scoring at 13 1 seconds  FGA score of 11/30 places her at increased risk of falls, significantly below cut-off score of 22/30 for fall risk  She is also ambulating at self-selected gait speed of 0 98m/s, below cut-off score of 1 0m/s for increased fall risk and below normal gait speed of 1 2-1 4m/s  Quinn Lee would benefit from skilled physical therapy to decrease fall risk, improve balance, improve balance confidence, improve LE strength and endurance, improve functional mobility, and return to PLOF  Quinn Lee also presents with L shoulder pain from fall 2 weeks ago  She presents with pain, decreased AROM, decreased strength, and decreased function   Significantly decreased AROM and strength in L shoulder abduction and ER  Patient was given HEP with AAROM exercises to maintain motion and may benefit from physical therapy to address limitations  Impairments: abnormal gait, activity intolerance, impaired balance, impaired physical strength, lacks appropriate home exercise program and pain with function  Understanding of Dx/Px/POC: good   Prognosis: good    Plan  Patient would benefit from: skilled physical therapy  Planned therapy interventions: abdominal trunk stabilization, balance, balance/weight bearing training, neuromuscular re-education, patient education, strengthening, stretching, therapeutic activities, therapeutic exercise, flexibility, gait training and home exercise program  Frequency: 2x week  Duration in weeks: 8  Plan of Care beginning date: 2023  Plan of Care expiration date: 3/14/2023  Treatment plan discussed with: patient        Subjective Evaluation    History of Present Illness  Mechanism of injury: Patient reports that she has been tripping over things  Tatianna Kwame in the garage when she turned and tripped, couldn't get up herself  When visiting her son stepped on daughters foot and fell  3rd time tripped over something and "went down"  She states she fell on her "bad knee" on the R side (needs a knee replacement) and fell on the L shoulder  She states she does not feel that her R knee is not strong enough to hold her when doing stairs  She states that the L shoulder looks like a rotator cuff damage and has difficulty moving it in certain ways  Has been icing it at home  States the last fall was 3 weeks ago  Has stenosis and some numbness in the shin, not in the foot and not neuropathy  She states "I try to be careful" states she is afraid of falling  She states that the legs get weak sometimes and makes her feel like she would fall if she doesn't sit down  Difficulty getting up from chairs, sometimes needs help from soft/lower surfaces    Pain  Current pain ratin  At best pain ratin  At worst pain rating: 10  Location: L shoulder  Relieving factors: rest and heat    Social Support  Steps to enter house: yes  2  Stairs in house: yes (bathroom upstairs)   13  Lives in: multiple-level home  Lives with: spouse and adult children    Patient Goals  Patient goal: be more stable on my feet, be less fearful of falling        Objective       Balance Test 23   6 Minute Walk Test (ft): NT   Gait Speed (ft/s): 0 98m/s   5x Sit To Stand (s): 29 4 seconds no UE's   TU 19 seconds no AD   FGA      Flowsheet Rows    Flowsheet Row Most Recent Value   Functional Gait Assessment    Gait Level Surface  2   Change in GaiT Speed 1   Gait with horizontal head turns 2   Gait with vertical head turns 0   Gait and pivot tuen  2   Step over obstacle 1   Gait with narrow base of supprt 0   Gait with eyes closed 1   Ambulating backwards 1   Steps 1   Total score  11          Sensation Left Right   Kinesthesia NT NT   Light Touch intact Impaired to R shin       Manual Muscle Testing - Hip Left Right   Flexion 4+ 4   Abduction (sidelying) 3 3+   Adduction 4+ 4+     Manual Muscle Testing - Knee Left Right   Flexion 5 4+   Extension 5 4     Manual Muscle Testing - Ankle Left Right   Doriflexion 5 5   Plantarflexion NT NT     Knee flexion ROM extension 0 degrees, flexion (seated) 110 (115)   L shoulder ROM flexion about 100 degrees in sitting, full ROM in supine  Abduction unable to due to pain in sitting, supine to about 90 degrees  ER painful, PROM full in seated  Gait Assessment: decreased gait speed, decreased heel strike bilaterally, antalgic gait pattern with compensated Trendenburg during R stance, Trendelenburg on L stance         Precautions: hx of spinal fusion and R TKA (2019)      Manuals                                                                 Neuro Re-Ed             sidestepping             HKM             backwards             Foam EO             Tandem stance             Tandem walking Step up/down no UE's                                                    Ther Ex             clamshells X 20 each in sidelying            bridges X 10            SLR             Sit to stand X 3 reps without UE's                                                   Recumbent bike/nustep             Ther Activity             Staircase reciprocal                          Gait Training                                       Education POC, fall risk, balance confidence and role in balance, outcome measures and fall risk, HEP issued and reviewed                                          Access Code: [email or web address]  URL: [email or web address]  Date: 01/17/2023  Prepared by: Joe Nair    Exercises  • Clamshell - 1 x daily - 5 x weekly - 2 sets - 10 reps  • Supine Bridge - 1 x daily - 5 x weekly - 2 sets - 10 reps  • Supine Active Straight Leg Raise - 1 x daily - 5 x weekly - 2 sets - 10 reps  • Sidelying Hip Abduction - 1 x daily - 5 x weekly - 2 sets - 10 reps  • Supine Heel Slide - 1 x daily - 5 x weekly - 2 sets - 10 reps  • Seated Long Arc Quad - 1 x daily - 5 x weekly - 2 sets - 10 reps  • Supine Shoulder Flexion Extension AAROM with Dowel - 1 x daily - 5 x weekly - 2 sets - 10 reps  • Supine Shoulder Abduction AAROM with Dowel - 1 x daily - 5 x weekly - 2 sets - 10 reps  • Seated Scapular Retraction - 1 x daily - 5 x weekly - 2 sets - 10 reps - 5 hold  • Sit to Stand Without Arm Support - 1 x daily - 7 x weekly - 2 sets - 10 reps

## 2023-01-18 ENCOUNTER — OFFICE VISIT (OUTPATIENT)
Dept: PHYSICAL THERAPY | Facility: CLINIC | Age: 73
End: 2023-01-18
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
## 2023-01-18 DIAGNOSIS — Z74.09 IMPAIRED FUNCTIONAL MOBILITY, BALANCE, GAIT, AND ENDURANCE: ICD-10-CM

## 2023-01-18 NOTE — PROGRESS NOTES
Daily Note     Today's date: 2023  Patient name: Weston Salomon  : 1950  MRN: [id number]  Referring provider: Teresa Dutta, *  Dx:   Encounter Diagnosis     ICD-10-CM    1  Fall, initial encounter  [address]  XXXA       2  Impaired functional mobility, balance, gait, and endurance  Z74 09           Start Time: 0900  Stop Time: 0950  Total time in clinic (min): 50 minutes    Subjective: Patient reports being stiff today after yesterday's Eval       Objective: See treatment diary below      Assessment: Tolerated treatment fair  [facility] participated in skilled PT session focused on balance, gait, and endurance  Patient demonstrates increased swaying on foam with Natha Sit is challenged with a NBOS activities needing at least U/L UE support  Patient able to perform most exercises with U/L to No UE use this session  Patient would continue to benefit from skilled PT interventions to address deficits with balance, gait, and endurance  Patient demonstrated fatigue post treatment      Plan: Continue per plan of care        Precautions: hx of spinal fusion and R TKA (2019)      Manuals                                                                Neuro Re-Ed             sidestepping  //bars (long) x 3 laps           HKM  //bars (long) U/L UE use to No UE use x 3 laps           backwards  //bars (long) x 3 laps v c  for bigger step length           Foam EO  //bars (long) foam FA/FT  30" x 2 ea           Tandem stance             Tandem walking  //bars (long) U/L UE use x 3 laps           Step up/down no UE's  //bars (long)  6" step Fwd x 10 ea                                                  Ther Ex             clamshells X 20 each in sidelying            bridges X 10            SLR             Sit to stand X 3 reps without UE's No UE use x 10                                                  Recumbent bike/nustep  L4 x 8 min           Ther Activity             Staircase reciprocal Gait Training                                       Education POC, fall risk, balance confidence and role in balance, outcome measures and fall risk, HEP issued and reviewed

## 2023-01-24 ENCOUNTER — OFFICE VISIT (OUTPATIENT)
Dept: PHYSICAL THERAPY | Facility: CLINIC | Age: 73
End: 2023-01-24

## 2023-01-24 DIAGNOSIS — Z74.09 IMPAIRED FUNCTIONAL MOBILITY, BALANCE, GAIT, AND ENDURANCE: Primary | ICD-10-CM
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
## 2023-01-24 NOTE — PROGRESS NOTES
Daily Note     Today's date: 2023  Patient name: Lissa Salcido  : 1950  MRN: [id number]  Referring provider: Josue Ann, *  Dx:   No diagnosis found  Subjective: Patient reports she has been doing the exercises on the bed since she has difficulty getting up and down from the floor  Objective: See treatment diary below      Assessment: Tolerated treatment well  Patient was able to perform session with SOLO harness without UE support, and was able to increase time of nustep at start of session  Some hesitancy with performing step down leading with L LE, improve eccentric control on R LE with repetitions  No c/o knee pain during session  Requires cues for increased step length and width with backwards walking  Continue to progress as tolerated  Patient demonstrated fatigue post treatment      Plan: Continue per plan of care        Precautions: hx of spinal fusion and R TKA ()      Manuals                                                     Neuro Re-Ed           sidestepping  //bars (long) x 3 laps SOLO  1/4 lap x 3 laps        HKM  //bars (long) U/L UE use to No UE use x 3 laps         backwards  //bars (long) x 3 laps v c  for bigger step length SOLO  1/4 lap x 3 laps, cues for increased ROBBY and         Foam EO  //bars (long) foam FA/FT  30" x 2 ea SOLO  FTEO 30" x 4 reps with step up/down on foam between        Tandem stance           Tandem walking  //bars (long) U/L UE use x 3 laps SOLO  1/4 lap x 3 laps        Step up/down no UE's  //bars (long)  6" step Fwd x 10 ea SOLO  Up and over 4-inch step x 10 forward, lateral x 10         hurdles   SOLO  4-inch step-to pattern alternating leading leg with laps, forward x 3 laps                              Ther Ex           clamshells X 20 each in sidelying          bridges X 10          SLR           Sit to stand X 3 reps without UE's No UE use x 10                                          Recumbent bike/nustep L4 x 8 min nustep L4 for 10 minutes        Ther Activity           Staircase reciprocal                      Gait Training                                 Education POC, fall risk, balance confidence and role in balance, outcome measures and fall risk, HEP issued and reviewed

## 2023-01-26 ENCOUNTER — OFFICE VISIT (OUTPATIENT)
Dept: PHYSICAL THERAPY | Facility: CLINIC | Age: 73
End: 2023-01-26
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
## 2023-01-26 DIAGNOSIS — Z74.09 IMPAIRED FUNCTIONAL MOBILITY, BALANCE, GAIT, AND ENDURANCE: Primary | ICD-10-CM

## 2023-01-26 NOTE — PROGRESS NOTES
Daily Note     Today's date: 2023  Patient name: Jayden Flores  : 1950  MRN: [id number]  Referring provider: Priscilla Hashimoto, *  Dx:   Encounter Diagnosis     ICD-10-CM    1  Impaired functional mobility, balance, gait, and endurance  Z74 09       2  Fall, initial encounter  [address]  XXXA           Start Time: 5402  Stop Time: 3475  Total time in clinic (min): 45 minutes    Subjective: Patient reports feeling a little sore after last session  Explained to patient that some soreness is normal after exercises  Objective: See treatment diary below      Assessment: Tolerated treatment well  Tami Chisholm participated in skilled PT session focused on balance, gait, and endurance  Patient demonstrates improved follow through with increasing ROBBY with bwd walking with few cues this session  Increased reps with exercises with patient tolerated well  Patient is challenged with stepping onto compliant surface with increased swaying, no LOB  Patient would continue to benefit from skilled PT interventions to address deficits with balance, gait, and endurance  Patient demonstrated fatigue post treatment      Plan: Continue per plan of care        Precautions: hx of spinal fusion and R TKA (2019)      Manuals                                                    Neuro Re-Ed           sidestepping  //bars (long) x 3 laps SOLO  1/4 lap x 3 laps        HKM  //bars (long) U/L UE use to No UE use x 3 laps         backwards  //bars (long) x 3 laps v c  for bigger step length SOLO  1/4 lap x 3 laps, cues for increased ROBBY and  SOLO 1/4 length x 4 laps cues for increased ROBBY able to follow through this session with few cues       Foam EO  //bars (long) foam FA/FT  30" x 2 ea SOLO  FTEO 30" x 4 reps with step up/down on foam between SOLO on Airex 30" x 4  FT/EO  Step down/up on foam between mild unsteadiness with stepping onto airex, No LOB Increased swaying       Tandem stance           Tandem walking //bars (long) U/L UE use x 3 laps SOLO  1/4 lap x 3 laps SOLO 1/4 length  X 4 laps improved walking with slight increase in gait speed         Step up/down no UE's  //bars (long)  6" step Fwd x 10 ea SOLO  Up and over 4-inch step x 10 forward, lateral x 10         hurdles   SOLO  4-inch step-to pattern alternating leading leg with laps, forward x 3 laps SOLO 4" Hurdles (6)  Fwd x 3 laps step to  Fwd x 1 lap step over step    Lat x 4 laps                             Ther Ex           clamshells X 20 each in sidelying          bridges X 10          SLR           Sit to stand X 3 reps without UE's No UE use x 10                                          Recumbent bike/nustep  L4 x 8 min nustep L4 for 10 minutes Nustep  L4 x 10 min       Ther Activity           Staircase reciprocal                      Gait Training                                 Education POC, fall risk, balance confidence and role in balance, outcome measures and fall risk, HEP issued and reviewed

## 2023-01-30 ENCOUNTER — OFFICE VISIT (OUTPATIENT)
Dept: PHYSICAL THERAPY | Facility: CLINIC | Age: 73
End: 2023-01-30
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
## 2023-01-30 DIAGNOSIS — Z74.09 IMPAIRED FUNCTIONAL MOBILITY, BALANCE, GAIT, AND ENDURANCE: Primary | ICD-10-CM

## 2023-01-30 NOTE — PROGRESS NOTES
Daily Note     Today's date: 2023  Patient name: Weston Alcaraz  : 1950  MRN: [id number]  Referring provider: Sourav Fofana, *  Dx:   Encounter Diagnosis     ICD-10-CM    1  Impaired functional mobility, balance, gait, and endurance  Z74 09       2  Fall, initial encounter  [address]  XXXA                      Subjective: Patient reports feeling stiff this morning  States the shoulder feels good with doing the exercises at home, but can tell when she does too much  Objective: See treatment diary below      Assessment: Tolerated treatment well  Continues to have increased difficulty with performing lateral movements during session  Was able to tolerate increased height of hurdles compared to previous sessions, and was able to complete step-over step during forward hurdles  Required min cuing to decrease circumduction with forward hurdles  Improvement in backwards walking without cuing for increased ROBBY  Some instability noted with stepping up onto foam initially, however no overt LOB  Patient would continue to benefit from skilled PT interventions to address deficits with balance, gait, and endurance  Patient demonstrated fatigue post treatment      Plan: Continue per plan of care        Precautions: hx of spinal fusion and R TKA (2019)      Manuals                                         Neuro Re-Ed         sidestepping  //bars (long) x 3 laps SOLO  1/4 lap x 3 laps  SOLO  1/4 lap x 3 laps    HKM  //bars (long) U/L UE use to No UE use x 3 laps       backwards  //bars (long) x 3 laps v c  for bigger step length SOLO  1/4 lap x 3 laps, cues for increased ROBBY and  SOLO 1/4 length x 4 laps cues for increased ROBBY able to follow through this session with few cues SOLO  1/4 lap x 4 laps    Foam EO  //bars (long) foam FA/FT  30" x 2 ea SOLO  FTEO 30" x 4 reps with step up/down on foam between SOLO on Airex 30" x 4  FT/EO  Step down/up on foam between mild unsteadiness with stepping onto airex, No LOB Increased swaying     Tandem stance         Tandem walking  //bars (long) U/L UE use x 3 laps SOLO  1/4 lap x 3 laps SOLO 1/4 length  X 4 laps improved walking with slight increase in gait speed  SOLO  1/4 laps x 3 laps    Step up/down no UE's  //bars (long)  6" step Fwd x 10 ea SOLO  Up and over 4-inch step x 10 forward, lateral x 10   SOLO  Up and over 4-inch step x 10 forward, lateral each    hurdles   SOLO  4-inch step-to pattern alternating leading leg with laps, forward x 3 laps SOLO 4" Hurdles (6)  Fwd x 3 laps step to  Fwd x 1 lap step over step    Lat x 4 laps SOLO  6-inch hurdles (6) forward x 1 lap step-to, then 3 laps step over step      Lateral x 3 laps      Foam pads     SOLO  Stepping up and over foam pads (3) x 4 laps             Ther Ex         clamshells X 20 each in sidelying        bridges X 10        SLR         Sit to stand X 3 reps without UE's No UE use x 10                                  Recumbent bike/nustep  L4 x 8 min nustep L4 for 10 minutes Nustep  L4 x 10 min Nustep L6 x 10 min    Ther Activity         Staircase reciprocal                  Gait Training                           Education POC, fall risk, balance confidence and role in balance, outcome measures and fall risk, HEP issued and reviewed

## 2023-02-02 ENCOUNTER — APPOINTMENT (OUTPATIENT)
Dept: PHYSICAL THERAPY | Facility: CLINIC | Age: 73
End: 2023-02-02

## 2023-02-03 ENCOUNTER — APPOINTMENT (OUTPATIENT)
Dept: PHYSICAL THERAPY | Facility: CLINIC | Age: 73
End: 2023-02-03

## 2023-02-06 ENCOUNTER — OFFICE VISIT (OUTPATIENT)
Dept: PHYSICAL THERAPY | Facility: CLINIC | Age: 73
End: 2023-02-06
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
## 2023-02-06 DIAGNOSIS — Z74.09 IMPAIRED FUNCTIONAL MOBILITY, BALANCE, GAIT, AND ENDURANCE: Primary | ICD-10-CM

## 2023-02-06 NOTE — PROGRESS NOTES
Daily Note     Today's date: 2023  Patient name: Cassia Alston  : 1950  MRN: [id number]  Referring provider: Risa Ordoñez, *  Dx:   Encounter Diagnosis     ICD-10-CM    1  Impaired functional mobility, balance, gait, and endurance  Z74 09       2  Fall, initial encounter  [address]  XXXA                      Subjective: Patient that the bike was a little strong last time  She states that she tested positive for covid last week, is not running a fever today and is feeling tired but better  Objective: See treatment diary below      Assessment: Tolerated treatment well  Tolerated recumbent bike well without any irritation to knee  Oxygen saturation monitored during session due to recent COVID infection, was WNL between 95-97%  Decreased some intensity of exercises due to increased fatigue levels due to being sick  Most difficulty with going down step leading with L foot, stating R knee does not want to bend  Decreased eccentric control  Added staggered stance sit to stand with L LE in front to bias R LE strengthening, discussed about adding to HEP  Patient would continue to benefit from skilled PT interventions to address deficits with balance, gait, and endurance  Patient demonstrated fatigue post treatment      Plan: Continue per plan of care        Precautions: hx of spinal fusion and R TKA ()      Manuals  2/6                                        Neuro Re-Ed         sidestepping //bars (long) x 3 laps SOLO  1/4 lap x 3 laps  SOLO  1/4 lap x 3 laps SOLO  1/4 lap x 4 laps      SpO2 97%    HKM //bars (long) U/L UE use to No UE use x 3 laps    SOLO  1/4 lap x 4 laps    backwards //bars (long) x 3 laps v c  for bigger step length SOLO  1/4 lap x 3 laps, cues for increased ROBBY and  SOLO 1/4 length x 4 laps cues for increased ROBBY able to follow through this session with few cues SOLO  1/4 lap x 4 laps SOLO  1/4 lap x 4 laps    Foam EO //bars (long) foam FA/FT  30" x 2 ea SOLO  FTEO 30" x 4 reps with step up/down on foam between SOLO on Airex 30" x 4  FT/EO  Step down/up on foam between mild unsteadiness with stepping onto airex, No LOB Increased swaying      Tandem stance         Tandem walking //bars (long) U/L UE use x 3 laps SOLO  1/4 lap x 3 laps SOLO 1/4 length  X 4 laps improved walking with slight increase in gait speed  SOLO  1/4 laps x 3 laps     Step up/down no UE's //bars (long)  6" step Fwd x 10 ea SOLO  Up and over 4-inch step x 10 forward, lateral x 10   SOLO  Up and over 4-inch step x 10 forward, lateral each SOLO  Up and over 4-inch step x 10 forward, lateral each    hurdles  SOLO  4-inch step-to pattern alternating leading leg with laps, forward x 3 laps SOLO 4" Hurdles (6)  Fwd x 3 laps step to  Fwd x 1 lap step over step    Lat x 4 laps SOLO  6-inch hurdles (6) forward x 1 lap step-to, then 3 laps step over step      Lateral x 3 laps   SOLO  6-inch hurdles (4) forward x 3 laps step over step    Lateral x 3 laps    Foam pads    SOLO  Stepping up and over foam pads (3) x 4 laps     Sit to stand, staggered stance     L leg forward, focus on R LE strengthening and eccentric control x 5 reps    Ther Ex         clamshells         bridges         SLR         Sit to stand No UE use x 10    No UE's 2 x 10 reps                               Recumbent bike/nustep L4 x 8 min nustep L4 for 10 minutes Nustep  L4 x 10 min Nustep L6 x 10 min Recumbent bike L1 x 10 minutes  SpO2 95-96%    Ther Activity         Staircase reciprocal                  Gait Training                           Education

## 2023-02-09 ENCOUNTER — OFFICE VISIT (OUTPATIENT)
Dept: PHYSICAL THERAPY | Facility: CLINIC | Age: 73
End: 2023-02-09

## 2023-02-09 DIAGNOSIS — Z74.09 IMPAIRED FUNCTIONAL MOBILITY, BALANCE, GAIT, AND ENDURANCE: ICD-10-CM
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
## 2023-02-09 NOTE — PROGRESS NOTES
Daily Note     Today's date: 2023  Patient name: Amadeo Diggs  : 1950  MRN: [id number]  Referring provider: Jim Wayne, *  Dx:   Encounter Diagnosis     ICD-10-CM    1  Fall, initial encounter  [address]  XXXA       2  Impaired functional mobility, balance, gait, and endurance  Z74 09           Start Time: 0900  Stop Time: 0945  Total time in clinic (min): 45 minutes    Subjective: Patient reports feeling better, but she continues to feel tired all the time  Objective: See treatment diary below      Assessment: Tolerated treatment well  [facility] participated in skilled PT session focused on balance, gait, and endurance  Patient able to tolerate exercises with Sp02 staying between 94% -96% through out session  Returned to 6 inch step ups for strengthening B/L LE and 4 inch step for step downs focusing on eccentric control of RLE  Patient would continue to benefit from skilled PT interventions to address balance, gait, and endurance  Patient demonstrated fatigue post treatment      Plan: Continue per plan of care        Precautions: hx of spinal fusion and R TKA ()      Manuals                                        Neuro Re-Ed         sidestepping //bars (long) x 3 laps SOLO  1/4 lap x 3 laps  SOLO  1/4 lap x 3 laps SOLO  1/4 lap x 4 laps      SpO2 97% SOLO No AW 1/4 length x 4 laps    02 96%   HKM //bars (long) U/L UE use to No UE use x 3 laps    SOLO  1/4 lap x 4 laps    backwards //bars (long) x 3 laps v c  for bigger step length SOLO  1/4 lap x 3 laps, cues for increased ROBBY and  SOLO 1/4 length x 4 laps cues for increased ROBBY able to follow through this session with few cues SOLO  1/4 lap x 4 laps SOLO  1/4 lap x 4 laps    Foam EO //bars (long) foam FA/FT  30" x 2 ea SOLO  FTEO 30" x 4 reps with step up/down on foam between SOLO on Airex 30" x 4  FT/EO  Step down/up on foam between mild unsteadiness with stepping onto airex, No LOB Increased swaying      Tandem stance         Tandem walking //bars (long) U/L UE use x 3 laps SOLO  1/4 lap x 3 laps SOLO 1/4 length  X 4 laps improved walking with slight increase in gait speed  SOLO  1/4 laps x 3 laps     Step up/down no UE's //bars (long)  6" step Fwd x 10 ea SOLO  Up and over 4-inch step x 10 forward, lateral x 10   SOLO  Up and over 4-inch step x 10 forward, lateral each SOLO  Up and over 4-inch step x 10 forward, lateral each SOLO 6" step   Fwd x 10 ea     02 95%    4" step  Fwd up/over  X 10 focused on RLE eccentric control     hurdles  SOLO  4-inch step-to pattern alternating leading leg with laps, forward x 3 laps SOLO 4" Hurdles (6)  Fwd x 3 laps step to  Fwd x 1 lap step over step    Lat x 4 laps SOLO  6-inch hurdles (6) forward x 1 lap step-to, then 3 laps step over step      Lateral x 3 laps   SOLO  6-inch hurdles (4) forward x 3 laps step over step    Lateral x 3 laps SOLO No AW 1/4 length 6" hurdles (6)  Fwd x 4 laps     02 94%    Lat x 3 laps    02 94%     Foam pads    SOLO  Stepping up and over foam pads (3) x 4 laps     Sit to stand, staggered stance     L leg forward, focus on R LE strengthening and eccentric control x 5 reps L leg forward focusing on R LE strengthening and eccentric control x 10    02 95%   Ther Ex         clamshells         bridges         SLR         Sit to stand No UE use x 10    No UE's 2 x 10 reps                               Recumbent bike/nustep L4 x 8 min nustep L4 for 10 minutes Nustep  L4 x 10 min Nustep L6 x 10 min Recumbent bike L1 x 10 minutes  SpO2 95-96% Recumbent bike L1 x 10 min    02 96%   Ther Activity         Staircase reciprocal                  Gait Training                           Education

## 2023-02-13 ENCOUNTER — EVALUATION (OUTPATIENT)
Dept: PHYSICAL THERAPY | Facility: CLINIC | Age: 73
End: 2023-02-13
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
## 2023-02-13 DIAGNOSIS — Z74.09 IMPAIRED FUNCTIONAL MOBILITY, BALANCE, GAIT, AND ENDURANCE: Primary | ICD-10-CM

## 2023-02-13 NOTE — PROGRESS NOTES
Progress Note    Today's date: 2023  Patient name: Ezra Albarado  : 1950  MRN: [id number]  Referring provider: Brenna Lynch, *  Dx:   Encounter Diagnosis     ICD-10-CM    1  Impaired functional mobility, balance, gait, and endurance  Z74 09       2  Fall, initial encounter  [address] [address]  Fabiano Newman                         Assessment  23: Quinn Lee has attended 8 sessions of physical therapy for balance  She is demonstrating improvement in all outcome measures as this time  She has met 2/4 short term goals and is making progress with other 2 goals at this time  Self-selected gait speed improved from 0 98m/s to 1 09m/s, 5xSTS improved from 29 4 seconds to 13 57 seconds, FGA improved from  to   3MBWT was completed during session, 8 12 seconds  She continues to be at increased risk of falls from FGA score below cut-off score of /30, and 3MBWT above 4 5 seconds  5xSTS is slightly above age and gender matched normative value of 11 6 seconds  6MWT was completed due to patient's c/o increased fatigue post covid, completed 1100 feet, below age and gender matched norm value of 1545 feet  She reports she was limited with 6MWT due to fatigue, knee, and balance  Quinn Lee would benefit from continued physical therapy to decrease fall risk, improve balance, improve endurance, improve LE strength and endurance, improve balance confidence, improve ability to perform stairs, and return to prior level of function  Continue per current POC  Assessment details: Patient presents to outpatient physical therapy with recent falls and impaired balance, decreased R knee strength, decreased hip strength bilaterally, gait deviations of antalgic gait and Trendelenburg on L side, impaired balance, and decreased functional mobility  She presents with L Trendelenburg during unilateral stance on L LE, which may be contributing to tripping on R foot during ambulation   She also has decreased sensation at R shin and medical history of spinal stenosis  Vivek Roberson presents with decreased LE strength and endurance from 5xSTS of 29 4 seconds, which is above cut-off score of 15 seconds for increased fall risk and above age and gender matched normative value of 13 0 seconds  She is slightly below cut-off score of 13 5 seconds for TUG score, scoring at 13 1 seconds  FGA score of 11/30 places her at increased risk of falls, significantly below cut-off score of 22/30 for fall risk  She is also ambulating at self-selected gait speed of 0 98m/s, below cut-off score of 1 0m/s for increased fall risk and below normal gait speed of 1 2-1 4m/s  Vivek Roberson would benefit from skilled physical therapy to decrease fall risk, improve balance, improve balance confidence, improve LE strength and endurance, improve functional mobility, and return to PLOF  Vivek Roberson also presents with L shoulder pain from fall 2 weeks ago  She presents with pain, decreased AROM, decreased strength, and decreased function  Significantly decreased AROM and strength in L shoulder abduction and ER  Patient was given HEP with AAROM exercises to maintain motion and may benefit from physical therapy to address limitations  Impairments: abnormal gait, activity intolerance, impaired balance, impaired physical strength, lacks appropriate home exercise program and pain with function  Understanding of Dx/Px/POC: good   Prognosis: good    ST  Pt will improve gait speed to at least 1 1 m/s within 4 weeks needed to improve safety with ambulation  ALMOST MET  2  Pt will improve FGA score by at least 3 points within 4 weeks needed to reduce fall risk  MET  3  Pt will improve 5xSTS score by at least 5 seconds within 4 weeks needed to reduce fall risk  MET  4  Pt will demonstrate independence with HEP within 4 weeks  PROGRESSING    LT  Pt will improve gait speed to at least 1 2 m/s with least restrictive device within 8 weeks needed to improve safety with ambulation    2  Pt will improve FGA score to at least 24/30 within 8 weeks needed to reduce fall risk  3  Pt will improve 5xSTS score to <15 sec within 8 weeks needed to reduce fall risk  4  Pt will demonstrate independence with HEP within 8 weeks  5  Pt will decrease 3MBWT to <4 5 seconds to decrease fall risk in 8 weeks NEW    Plan  Patient would benefit from: skilled physical therapy  Planned therapy interventions: abdominal trunk stabilization, balance, balance/weight bearing training, neuromuscular re-education, patient education, strengthening, stretching, therapeutic activities, therapeutic exercise, flexibility, gait training and home exercise program  Frequency: 2x week  Duration in weeks: 4  Plan of Care beginning date: 1/17/2023  Plan of Care expiration date: 3/14/2023  Treatment plan discussed with: patient        Subjective Evaluation    History of Present Illness  2/13/23: She states that she tripped over something twice and was able to catch herself and prevent a fall  She states she doesn't use the R leg to go up the stairs at home  Shoulder is doing better than when she first started  Fear of falling has improved slightly  Is more aware and looks to make sure there is nothing to trip on  Feeling better after having covid, just tires easily  Low surfaces continue to be difficult  Perceived improvement 60%  She states she is able to get up from chairs by herself most of the time, sometimes needs assist when she is tired  Mechanism of injury: Patient reports that she has been tripping over things  Katerine Shank in the garage when she turned and tripped, couldn't get up herself  When visiting her son stepped on daughters foot and fell  3rd time tripped over something and "went down"  She states she fell on her "bad knee" on the R side (needs a knee replacement) and fell on the L shoulder  She states she does not feel that her R knee is not strong enough to hold her when doing stairs   She states that the L shoulder looks like a rotator cuff damage and has difficulty moving it in certain ways  Has been icing it at home  States the last fall was 3 weeks ago  Has stenosis and some numbness in the shin, not in the foot and not neuropathy  She states "I try to be careful" states she is afraid of falling  She states that the legs get weak sometimes and makes her feel like she would fall if she doesn't sit down  Difficulty getting up from chairs, sometimes needs help from soft/lower surfaces  Pain  Current pain ratin  At best pain ratin  At worst pain rating: 10  Location: L shoulder  Relieving factors: rest and heat    Social Support  Steps to enter house: yes  2  Stairs in house: yes (bathroom upstairs)   13  Lives in: multiple-level home  Lives with: spouse and adult children    Patient Goals  Patient goal: be more stable on my feet, be less fearful of falling        Objective       Balance Test 23   6 Minute Walk Test (ft): NT 1100 feet   Gait Speed (ft/s): 0 98m/s 1 09m/s SSGS   5x Sit To Stand (s): 29 4 seconds no UE's 13 57 seconds, hands on knees   TU 19 seconds no AD 9 87 seconds   FGA    3MBWT  8 12 seconds       Sensation Left Right   Kinesthesia NT NT   Light Touch intact Impaired to R shin       Manual Muscle Testing - Hip Left Right   Flexion 4+ 4   Abduction (sidelying) 3 3+   Adduction 4+ 4+     Manual Muscle Testing - Knee Left Right   Flexion 5 4+   Extension 5 4     Manual Muscle Testing - Ankle Left Right   Doriflexion 5 5   Plantarflexion NT NT     Knee flexion ROM extension 0 degrees, flexion (seated) 110 (115)   L shoulder ROM flexion about 100 degrees in sitting, full ROM in supine  Abduction unable to due to pain in sitting, supine to about 90 degrees  ER painful, PROM full in seated  Gait Assessment: decreased gait speed, decreased heel strike bilaterally, antalgic gait pattern with compensated Trendenburg during R stance, Trendelenburg on L stance         Precautions: hx of spinal fusion and R TKA (2019)      Manuals 1/24 1/26 1/30 2/6 2/9 2/13                                       Neuro Re-Ed      FGA 19/30  3MBWT  TUG     sidestepping SOLO  1/4 lap x 3 laps  SOLO  1/4 lap x 3 laps SOLO  1/4 lap x 4 laps      SpO2 97% SOLO No AW 1/4 length x 4 laps    02 96%    HKM    SOLO  1/4 lap x 4 laps     backwards SOLO  1/4 lap x 3 laps, cues for increased ROBBY and  SOLO 1/4 length x 4 laps cues for increased ROBBY able to follow through this session with few cues SOLO  1/4 lap x 4 laps SOLO  1/4 lap x 4 laps     Foam EO SOLO  FTEO 30" x 4 reps with step up/down on foam between SOLO on Airex 30" x 4  FT/EO  Step down/up on foam between mild unsteadiness with stepping onto airex, No LOB Increased swaying       Tandem stance         Tandem walking SOLO  1/4 lap x 3 laps SOLO 1/4 length  X 4 laps improved walking with slight increase in gait speed  SOLO  1/4 laps x 3 laps      Step up/down no UE's SOLO  Up and over 4-inch step x 10 forward, lateral x 10   SOLO  Up and over 4-inch step x 10 forward, lateral each SOLO  Up and over 4-inch step x 10 forward, lateral each SOLO 6" step   Fwd x 10 ea     02 95%    4" step  Fwd up/over  X 10 focused on RLE eccentric control   // bars   Up and over 6-inch step without UE support x 12 reps each LE   hurdles SOLO  4-inch step-to pattern alternating leading leg with laps, forward x 3 laps SOLO 4" Hurdles (6)  Fwd x 3 laps step to  Fwd x 1 lap step over step    Lat x 4 laps SOLO  6-inch hurdles (6) forward x 1 lap step-to, then 3 laps step over step      Lateral x 3 laps   SOLO  6-inch hurdles (4) forward x 3 laps step over step    Lateral x 3 laps SOLO No AW 1/4 length 6" hurdles (6)  Fwd x 4 laps     02 94%    Lat x 3 laps    02 94%      Foam pads   SOLO  Stepping up and over foam pads (3) x 4 laps      Sit to stand, staggered stance    L leg forward, focus on R LE strengthening and eccentric control x 5 reps L leg forward focusing on R LE strengthening and eccentric control x 10    02 95%    Ther Ex      6MWT 1100 feet  5xSTS   clamshells         bridges         SLR         Sit to stand    No UE's 2 x 10 reps                       TM      NV   Recumbent bike/nustep nustep L4 for 10 minutes Nustep  L4 x 10 min Nustep L6 x 10 min Recumbent bike L1 x 10 minutes  SpO2 95-96% Recumbent bike L1 x 10 min    02 96%    Ther Activity         Staircase reciprocal                  Gait Training                           Education      Progress with therapy, outcome measures, FOTO review

## 2023-02-14 ENCOUNTER — HOSPITAL ENCOUNTER (OUTPATIENT)
Dept: ULTRASOUND IMAGING | Facility: CLINIC | Age: 73
Discharge: HOME | End: 2023-02-14

## 2023-02-14 ENCOUNTER — TELEPHONE (OUTPATIENT)
Dept: OBGYN CLINIC | Facility: CLINIC | Age: 73
End: 2023-02-14

## 2023-02-14 ENCOUNTER — HOSPITAL ENCOUNTER (OUTPATIENT)
Dept: MAMMOGRAPHY | Facility: CLINIC | Age: 73
Discharge: HOME | End: 2023-02-14

## 2023-02-14 VITALS — HEIGHT: 60 IN | WEIGHT: 195.55 LBS | BODY MASS INDEX: 38.39 KG/M2

## 2023-02-14 DIAGNOSIS — N63.24 MASS OF LOWER INNER QUADRANT OF LEFT BREAST: ICD-10-CM

## 2023-02-14 DIAGNOSIS — Z80.3 FAMILY HISTORY OF BREAST CANCER IN MOTHER: ICD-10-CM

## 2023-02-14 NOTE — TELEPHONE ENCOUNTER
----- Message from [address] sent at 2/14/2023  2:22 PM EST -----  Please schedule follow up breast exam with Dr Kyra Vogel  Normal diagnostic left breast mammogram    Scattered benign calcifications and small stable nodule in left breast  No findings in area of concern  Continue with monthly breast self exam, annual clinical breast exam and annual mammogram    Consider returning to office for breast exam in 2 months with Dr Kyra Vogel

## 2023-02-15 ENCOUNTER — OFFICE VISIT (OUTPATIENT)
Dept: PHYSICAL THERAPY | Facility: CLINIC | Age: 73
End: 2023-02-15

## 2023-02-15 ENCOUNTER — TELEPHONE (OUTPATIENT)
Dept: OBGYN CLINIC | Facility: CLINIC | Age: 73
End: 2023-02-15
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
## 2023-02-15 DIAGNOSIS — Z74.09 IMPAIRED FUNCTIONAL MOBILITY, BALANCE, GAIT, AND ENDURANCE: Primary | ICD-10-CM

## 2023-02-15 NOTE — PROGRESS NOTES
Daily Note     Today's date: 2/15/2023  Patient name: Louise Baker  : 1950  MRN: [id number]  Referring provider: Duke Morse, *  Dx:   Encounter Diagnosis     ICD-10-CM    1  Impaired functional mobility, balance, gait, and endurance  Z74 09       2  Fall, initial encounter  [address]  XXXA                      Subjective: Patient reports she feels tired today  She states she helps her brother with getting to the grocery store yesterday, she feels like she gets tired from having covid still  Objective: See treatment diary below      Assessment: Tolerated treatment well  Patient demonstrated fatigue post treatment, exhibited good technique with therapeutic exercises and would benefit from continued PT  Trialed TM for 1st time during session, c/o UE fatigue but overall tolerated well  Was able to perform step up and over 6-inch step with single UE support with pole, and added lateral step ups with 4-inch step with single UE support  1 LOB with step taps, able to correct without SOLO support  Continue to progress as tolerated  Plan: Continue per plan of care        Precautions: hx of spinal fusion and R TKA ()      Manuals 1/24 1/26 1/30 2/6 2/9 2/15                                       Neuro Re-Ed         sidestepping SOLO  1/4 lap x 3 laps  SOLO  1/4 lap x 3 laps SOLO  1/4 lap x 4 laps      SpO2 97% SOLO No AW 1/4 length x 4 laps    02 96%    HKM    SOLO  1/4 lap x 4 laps     backwards SOLO  1/4 lap x 3 laps, cues for increased ROBBY and  SOLO 1/4 length x 4 laps cues for increased ROBBY able to follow through this session with few cues SOLO  1/4 lap x 4 laps SOLO  1/4 lap x 4 laps     Foam EO SOLO  FTEO 30" x 4 reps with step up/down on foam between SOLO on Airex 30" x 4  FT/EO  Step down/up on foam between mild unsteadiness with stepping onto airex, No LOB Increased swaying       Tandem stance         Tandem walking SOLO  1/4 lap x 3 laps SOLO 1/4 length  X 4 laps improved walking with slight increase in gait speed  SOLO  1/4 laps x 3 laps      Step up/down no UE's SOLO  Up and over 4-inch step x 10 forward, lateral x 10   SOLO  Up and over 4-inch step x 10 forward, lateral each SOLO  Up and over 4-inch step x 10 forward, lateral each SOLO 6" step   Fwd x 10 ea     02 95%    4" step  Fwd up/over  X 10 focused on RLE eccentric control   SOLO  6-inch step up with L and down with R x 12 reps  Up with R and down with L with pole on L for assist x 10 reps    Lateral step ups with 4" step x 10 reps with pole for assist   hurdles SOLO  4-inch step-to pattern alternating leading leg with laps, forward x 3 laps SOLO 4" Hurdles (6)  Fwd x 3 laps step to  Fwd x 1 lap step over step    Lat x 4 laps SOLO  6-inch hurdles (6) forward x 1 lap step-to, then 3 laps step over step      Lateral x 3 laps   SOLO  6-inch hurdles (4) forward x 3 laps step over step    Lateral x 3 laps SOLO No AW 1/4 length 6" hurdles (6)  Fwd x 4 laps     02 94%    Lat x 3 laps    02 94%   SOLO  6-inch hurdles (6) forward step over step x 4 laps    Lateral x 4 laps   Foam pads   SOLO  Stepping up and over foam pads (3) x 4 laps      Step taps      SOLO  4-inch step x 20 reps each   Sit to stand, staggered stance    L leg forward, focus on R LE strengthening and eccentric control x 5 reps L leg forward focusing on R LE strengthening and eccentric control x 10    02 95% L leg forward focusing on R LE   X 10 reps   Ther Ex         clamshells         bridges         SLR         Sit to stand    No UE's 2 x 10 reps                       TM      SOLO  1 5-2 0mph with BUE support x 8 minutes  RPE 4/10   Recumbent bike/nustep nustep L4 for 10 minutes Nustep  L4 x 10 min Nustep L6 x 10 min Recumbent bike L1 x 10 minutes  SpO2 95-96% Recumbent bike L1 x 10 min    02 96%    Ther Activity         Staircase reciprocal                  Gait Training                           Education

## 2023-02-16 ENCOUNTER — APPOINTMENT (OUTPATIENT)
Dept: PHYSICAL THERAPY | Facility: CLINIC | Age: 73
End: 2023-02-16

## 2023-02-20 ENCOUNTER — APPOINTMENT (OUTPATIENT)
Dept: PHYSICAL THERAPY | Facility: CLINIC | Age: 73
End: 2023-02-20

## 2023-02-21 ENCOUNTER — OFFICE VISIT (OUTPATIENT)
Dept: PHYSICAL THERAPY | Facility: CLINIC | Age: 73
End: 2023-02-21

## 2023-02-21 DIAGNOSIS — Z74.09 IMPAIRED FUNCTIONAL MOBILITY, BALANCE, GAIT, AND ENDURANCE: Primary | ICD-10-CM
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
## 2023-02-21 NOTE — PROGRESS NOTES
Daily Note     Today's date: 2023  Patient name: Shalom Jaquez  : 1950  MRN: [id number]  Referring provider: Miguel Olson, *  Dx:   Encounter Diagnosis     ICD-10-CM    1  Impaired functional mobility, balance, gait, and endurance  Z74 09       2  Fall, initial encounter  [address]  XXXA                      Subjective: No new reports  She reports she was able to straighten the knee when she needed to in the car, states she continues to get tired most likely due to covid      Objective: See treatment diary below      Assessment: Tolerated treatment well  Patient demonstrated fatigue post treatment, exhibited good technique with therapeutic exercises and would benefit from continued PT  Improved ability to perform step taps, increased from 4-inch to 6-inch step without significant instability  Some instability noted with stepping onto foam pads  Continue to work compliant surfaces  Able to increase time on TM to 10 minutes  Continue to progress as tolerated  Plan: Continue per plan of care  Precautions: hx of spinal fusion and R TKA ()      Manuals 1/26 1/30 2/6 2/9 2/15 2/21                                       Neuro Re-Ed         sidestepping  SOLO  1/4 lap x 3 laps SOLO  1/4 lap x 4 laps      SpO2 97% SOLO No AW 1/4 length x 4 laps    02 96%     HKM   SOLO  1/4 lap x 4 laps      backwards SOLO 1/4 length x 4 laps cues for increased ROBBY able to follow through this session with few cues SOLO  1/4 lap x 4 laps SOLO  1/4 lap x 4 laps   SOLO  1/4 lap x 3 laps   Foam EO SOLO on Airex 30" x 4  FT/EO  Step down/up on foam between mild unsteadiness with stepping onto airex, No LOB Increased swaying     SOLO  Stepping on/off foam pads (4) with balance on top x 3 laps   Tandem stance         Tandem walking SOLO 1/4 length  X 4 laps improved walking with slight increase in gait speed   SOLO  1/4 laps x 3 laps    SOLO  1/4 laps x 3 laps   Step up/down no UE's  SOLO  Up and over 4-inch step x 10 forward, lateral each SOLO  Up and over 4-inch step x 10 forward, lateral each SOLO 6" step   Fwd x 10 ea     02 95%    4" step  Fwd up/over  X 10 focused on RLE eccentric control   SOLO  6-inch step up with L and down with R x 12 reps  Up with R and down with L with pole on L for assist x 10 reps    Lateral step ups with 4" step x 10 reps with pole for assist SOLO  6-inch step x 10 up with L and down with R  Up with R and down with L with pole on L for assist x 10 reps   hurdles SOLO 4" Hurdles (6)  Fwd x 3 laps step to  Fwd x 1 lap step over step    Lat x 4 laps SOLO  6-inch hurdles (6) forward x 1 lap step-to, then 3 laps step over step      Lateral x 3 laps   SOLO  6-inch hurdles (4) forward x 3 laps step over step    Lateral x 3 laps SOLO No AW 1/4 length 6" hurdles (6)  Fwd x 4 laps     02 94%    Lat x 3 laps    02 94%   SOLO  6-inch hurdles (6) forward step over step x 4 laps    Lateral x 4 laps SOLO  6-inch (6) forward step over step x 4 laps    Lateral x 4 laps   Foam pads  SOLO  Stepping up and over foam pads (3) x 4 laps       Step taps     SOLO  4-inch step x 20 reps each SOLO  6-inch x 20 reps each   Sit to stand, staggered stance   L leg forward, focus on R LE strengthening and eccentric control x 5 reps L leg forward focusing on R LE strengthening and eccentric control x 10    02 95% L leg forward focusing on R LE   X 10 reps    Ther Ex         clamshells         bridges         SLR         Sit to stand   No UE's 2 x 10 reps                        TM     SOLO  1 5-2 0mph with BUE support x 8 minutes  RPE 4/10 SOLO  2 0mph with BUE support x 10 minutes   Recumbent bike/nustep Nustep  L4 x 10 min Nustep L6 x 10 min Recumbent bike L1 x 10 minutes  SpO2 95-96% Recumbent bike L1 x 10 min    02 96%     Ther Activity         Staircase reciprocal                  Gait Training                           Education

## 2023-02-22 ENCOUNTER — TELEPHONE (OUTPATIENT)
Dept: INTERNAL MEDICINE CLINIC | Facility: CLINIC | Age: 73
End: 2023-02-22

## 2023-02-22 NOTE — TELEPHONE ENCOUNTER
Hi, this is Visonys  My date of birth is [birth year] and I'll call back number [phone]  And the reason I'm calling is I'm wondering if Doctor Efren Wilson would write a letter stating that I'm not able to do jury duty  I know he's done that for me before because it's the walk you have to do from parking to get there and then the sitting, I just with [facility] and things, it's difficult for me to do  So if he'd be able to do that, if he writes a note, I can pick it up and send it into them  And that's it again at [phone]  And it's Meka Betancur  Thank you very much   david Boston

## 2023-02-23 ENCOUNTER — OFFICE VISIT (OUTPATIENT)
Dept: PHYSICAL THERAPY | Facility: CLINIC | Age: 73
End: 2023-02-23

## 2023-02-23 DIAGNOSIS — Z74.09 IMPAIRED FUNCTIONAL MOBILITY, BALANCE, GAIT, AND ENDURANCE: Primary | ICD-10-CM
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
## 2023-02-23 NOTE — PROGRESS NOTES
Daily Note     Today's date: 2023  Patient name: Jordyn Ellison  : 1950  MRN: [id number]  Referring provider: Zachery Ormond, *  Dx:   Encounter Diagnosis     ICD-10-CM    1  Impaired functional mobility, balance, gait, and endurance  Z74 09       2  Fall, initial encounter  [address]  XXXA           Start Time: 902  Stop Time: 0950  Total time in clinic (min): 48 minutes    Subjective: Patient reports feeling better, but she still continues with feeling tired  It's better, but still lingers  Objective: See treatment diary below      Assessment: Tolerated treatment well  Patient participated in skilled PT session focused on strengthening, balance, and endurance  Patient able to hold conversation while on TM for 10 min without being SOB  Patient demonstrates improved endurance also with tolerating increased reps with exercises  Patient continues to be challenged with RLE strength during step ups, needing pole assistance  Patient would continue to benefit from skilled PT interventions to address deficits with strength, balance, and endurance  Patient demonstrated fatigue post treatment      Plan: Continue per plan of care        Precautions: hx of spinal fusion and R TKA ()      Manuals 2/23 1/30 2/6 2/9 2/15 2/21                                       Neuro Re-Ed         sidestepping  SOLO  1/4 lap x 3 laps SOLO  1/4 lap x 4 laps      SpO2 97% SOLO No AW 1/4 length x 4 laps    02 96%     HKM   SOLO  1/4 lap x 4 laps      backwards SOLO No AW  1/4 length x 4 laps    02 96% SOLO  1/4 lap x 4 laps SOLO  1/4 lap x 4 laps   SOLO  1/4 lap x 3 laps   Foam EO SOLO No AW  1/4 length    Stepping on/ovv form pads (4) with balance on top x 4 laps    02 96%     SOLO  Stepping on/off foam pads (4) with balance on top x 3 laps   Tandem stance         Tandem walking  SOLO  1/4 laps x 3 laps    SOLO  1/4 laps x 3 laps   Step up/down no UE's SOLO No AW  1/4 length  6" step x 10 ea w/L up and down R  W/R up and down L    W/ pole on L for assist  SOLO  Up and over 4-inch step x 10 forward, lateral each SOLO  Up and over 4-inch step x 10 forward, lateral each SOLO 6" step   Fwd x 10 ea     02 95%    4" step  Fwd up/over  X 10 focused on RLE eccentric control   SOLO  6-inch step up with L and down with R x 12 reps  Up with R and down with L with pole on L for assist x 10 reps    Lateral step ups with 4" step x 10 reps with pole for assist SOLO  6-inch step x 10 up with L and down with R  Up with R and down with L with pole on L for assist x 10 reps   hurdles SOLO No AW  1/4 length  6" hurdles (6)  Fwd x 5 laps  Reciprocal pattern    02 98%    Lat x 4 laps   SOLO  6-inch hurdles (6) forward x 1 lap step-to, then 3 laps step over step      Lateral x 3 laps   SOLO  6-inch hurdles (4) forward x 3 laps step over step    Lateral x 3 laps SOLO No AW 1/4 length 6" hurdles (6)  Fwd x 4 laps     02 94%    Lat x 3 laps    02 94%   SOLO  6-inch hurdles (6) forward step over step x 4 laps    Lateral x 4 laps SOLO  6-inch (6) forward step over step x 4 laps    Lateral x 4 laps   Foam pads  SOLO  Stepping up and over foam pads (3) x 4 laps       Step taps SOLO No AW 1/4 length 6" step x 20 ea    SOLO  4-inch step x 20 reps each SOLO  6-inch x 20 reps each   Sit to stand, staggered stance   L leg forward, focus on R LE strengthening and eccentric control x 5 reps L leg forward focusing on R LE strengthening and eccentric control x 10    02 95% L leg forward focusing on R LE   X 10 reps    Ther Ex         clamshells         bridges         SLR         Sit to stand   No UE's 2 x 10 reps                        TM SOLO No AW  B/L UE support    2 0 mph x 10 min     02 95%   Able to hold conversation without SOB    SOLO  1 5-2 0mph with BUE support x 8 minutes  RPE 4/10 SOLO  2 0mph with BUE support x 10 minutes   Recumbent bike/nustep  Nustep L6 x 10 min Recumbent bike L1 x 10 minutes  SpO2 95-96% Recumbent bike L1 x 10 min    02 96%     Ther Activity         Staircase reciprocal                  Gait Training                           Education

## 2023-02-28 ENCOUNTER — OFFICE VISIT (OUTPATIENT)
Dept: PHYSICAL THERAPY | Facility: CLINIC | Age: 73
End: 2023-02-28
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
## 2023-02-28 DIAGNOSIS — Z74.09 IMPAIRED FUNCTIONAL MOBILITY, BALANCE, GAIT, AND ENDURANCE: Primary | ICD-10-CM

## 2023-02-28 NOTE — PROGRESS NOTES
Daily Note     Today's date: 2023  Patient name: Louise Baker  : 1950  MRN: [id number]  Referring provider: Duke Morse, *  Dx:   Encounter Diagnosis     ICD-10-CM    1  Impaired functional mobility, balance, gait, and endurance  Z74 09       2  Fall, initial encounter  [address]  XXXA                      Subjective: Patient reports no new complaints  Objective: See treatment diary below      Assessment: Tolerated treatment well  Patient participated in skilled PT session focused on balance and endurance  Added shuttle walks with Fwd/Bwd and 180* turns, and walking on compliant surfaces with thera discs underneath challenging dynamic balance, which patient tolerated well  Patient demonstrates some SOB with TM and some exercises with 02 dropping down to low 90's  Patient able to  through program faster today, but showed lower Sp02 levels  Patient demonstrates improved dynamic balance having no LOB or increased swaying with stepping over objects  Patient would continue to benefit from skilled PT interventions to address deficits with balance, and endurance  Patient demonstrated fatigue post treatment      Plan: Continue per plan of care        Precautions: hx of spinal fusion and R TKA ()      Manuals 2/23 2/28 2/6 2/9 2/15 2/21                                       Neuro Re-Ed         sidestepping   SOLO  1/4 lap x 4 laps      SpO2 97% SOLO No AW 1/4 length x 4 laps    02 96%     HKM   SOLO  1/4 lap x 4 laps      backwards SOLO No AW  1/4 length x 4 laps    02 96% SOLO No AW  1/4 length x 5 laps  SOLO  1/4 lap x 4 laps   SOLO  1/4 lap x 3 laps   Foam EO SOLO No AW  1/4 length    Stepping on/ovv form pads (4) with balance on top x 4 laps    02 96%     SOLO  Stepping on/off foam pads (4) with balance on top x 3 laps   Tandem stance         Tandem walking  SOLO No AW  1/4 length  Fwd x 3 laps      SOLO  1/4 laps x 3 laps   Step up/down no UE's SOLO No AW  1/4 length  6" step x 10 ea w/L up and down R  W/R up and down L    W/ pole on L for assist  SOLO No AW  1/4 length  Step up/over 6" step, walk up/over airex (1)  Walk up/over 6" step no pole for assistance    Alt LE lead ea lap  Fwd x 6 laps    02 96%      Lat x 2 laps  RPE 8/10 with RLE lead   SOLO  Up and over 4-inch step x 10 forward, lateral each SOLO 6" step   Fwd x 10 ea     02 95%    4" step  Fwd up/over  X 10 focused on RLE eccentric control   SOLO  6-inch step up with L and down with R x 12 reps  Up with R and down with L with pole on L for assist x 10 reps    Lateral step ups with 4" step x 10 reps with pole for assist SOLO  6-inch step x 10 up with L and down with R  Up with R and down with L with pole on L for assist x 10 reps   hurdles SOLO No AW  1/4 length  6" hurdles (6)  Fwd x 5 laps  Reciprocal pattern    02 98%    Lat x 4 laps   SOLO No AW  1/4 length 6" hurdles (6)  Fwd x 5 laps Reciprocal pattern    02 92%    Lat x 4 laps    94%    No LOB or increased swaying   SOLO  6-inch hurdles (4) forward x 3 laps step over step    Lateral x 3 laps SOLO No AW 1/4 length 6" hurdles (6)  Fwd x 4 laps     02 94%    Lat x 3 laps    02 94%   SOLO  6-inch hurdles (6) forward step over step x 4 laps    Lateral x 4 laps SOLO  6-inch (6) forward step over step x 4 laps    Lateral x 4 laps   Foam pads  SOLO No AW  Red mat with thera discs underneath 1/4 length Fwd x 5 laps    02 90%       Step taps SOLO No AW 1/4 length 6" step x 20 ea    SOLO  4-inch step x 20 reps each SOLO  6-inch x 20 reps each   Sit to stand, staggered stance          Shuttle w/180* turns                            SOLO No AW  1/4 length to cones (4) turn 180* walk Bwd to next cone turn 180* walk Fwd x 5 laps  HR 93  02 95%   L leg forward, focus on R LE strengthening and eccentric control x 5 reps L leg forward focusing on R LE strengthening and eccentric control x 10    02 95% L leg forward focusing on R LE   X 10 reps    Ther Ex         clamshells         bridges         SLR         Sit to stand   No UE's 2 x 10 reps                        TM SOLO No AW  B/L UE support    2 0 mph x 10 min     02 95%   Able to hold conversation without SOB SOLO No AW  B/L UE support    2 0 mph x 10'    02 92%  Somewhat winded this session   SOLO  1 5-2 0mph with BUE support x 8 minutes  RPE 4/10 SOLO  2 0mph with BUE support x 10 minutes   Recumbent bike/nustep   Recumbent bike L1 x 10 minutes  SpO2 95-96% Recumbent bike L1 x 10 min    02 96%     Ther Activity         Staircase reciprocal                  Gait Training                           Education

## 2023-03-02 ENCOUNTER — OFFICE VISIT (OUTPATIENT)
Dept: PHYSICAL THERAPY | Facility: CLINIC | Age: 73
End: 2023-03-02

## 2023-03-02 DIAGNOSIS — Z74.09 IMPAIRED FUNCTIONAL MOBILITY, BALANCE, GAIT, AND ENDURANCE: Primary | ICD-10-CM
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
## 2023-03-02 NOTE — PROGRESS NOTES
Daily Note     Today's date: 3/2/2023  Patient name: Elvis Govea  : 1950  MRN: [id number]  Referring provider: Malorie Richardson, *  Dx:   Encounter Diagnosis     ICD-10-CM    1  Impaired functional mobility, balance, gait, and endurance  Z74 09       2  Fall, initial encounter  [address]  XXXA           Start Time: 09  Stop Time: 1000  Total time in clinic (min): 52 minutes    Subjective: Patient reports having hamstring cramping last night when it was time to go to bed  Patient reports that she felt fine after last session and did not do anything different in her routine  Objective: See treatment diary below      Assessment: Tolerated treatment well  Patient participated in skilled PT session focused on balance, gait, and endurance  Patient challenged on 8" step with RLE lead due to weakness requiring HHA from therapist   Challenged on complaint surfaces demonstrates increased swaying with NBOS, but improved with using WBOS  Increased height in some hurdles, which patient had no difficulty clearing  Patient continues to ambulate with a waddling gait pattern due to R knee discomfort  Patient would continue to benefit from skilled PT interventions to address deficits with balance, gait, and endurance  Patient demonstrated fatigue post treatment      Plan: Continue per plan of care          Precautions: hx of spinal fusion and R TKA ()      Manuals 2/23 2/28 3/2 2/9 2/15 2/21                                       Neuro Re-Ed         sidestepping    SOLO No AW 1/4 length x 4 laps    02 96%     HKM         backwards SOLO No AW  1/4 length x 4 laps    02 96% SOLO No AW  1/4 length x 5 laps     SOLO  1/4 lap x 3 laps   Foam EO SOLO No AW  1/4 length    Stepping on/ovv form pads (4) with balance on top x 4 laps    02 96%     SOLO  Stepping on/off foam pads (4) with balance on top x 3 laps   Tandem stance         Tandem walking  SOLO No AW  1/4 length  Fwd x 3 laps      SOLO  1/4 laps x 3 laps   Step up/down no UE's SOLO No AW  1/4 length  6" step x 10 ea w/L up and down R  W/R up and down L    W/ pole on L for assist  SOLO No AW  1/4 length  Step up/over 6" step, walk up/over airex (1)  Walk up/over 6" step no pole for assistance    Alt LE lead ea lap  Fwd x 6 laps    02 96%      Lat x 2 laps  RPE 8/10 with RLE lead   SOLO No AW  1/4 length step up/over 6" step walk and up/over 8" step walk up/over 6" step  No pole assist  Alt lead LE ea length  Fwd x 5 laps w/HHA from therapist on 8" step with RLE lead    Lat x 3 laps HHA from therapist on 8" step with RLE SOLO 6" step   Fwd x 10 ea     02 95%    4" step  Fwd up/over  X 10 focused on RLE eccentric control   SOLO  6-inch step up with L and down with R x 12 reps  Up with R and down with L with pole on L for assist x 10 reps    Lateral step ups with 4" step x 10 reps with pole for assist SOLO  6-inch step x 10 up with L and down with R  Up with R and down with L with pole on L for assist x 10 reps   hurdles SOLO No AW  1/4 length  6" hurdles (6)  Fwd x 5 laps  Reciprocal pattern    02 98%    Lat x 4 laps   SOLO No AW  1/4 length 6" hurdles (6)  Fwd x 5 laps Reciprocal pattern    02 92%    Lat x 4 laps    94%    No LOB or increased swaying   SOLO No AW 1/4 length 6" (3) hurdles and 8"(3( hurdles  Fwd x 4 laps  Reciprocal    Lat x 3 laps SOLO No AW 1/4 length 6" hurdles (6)  Fwd x 4 laps     02 94%    Lat x 3 laps    02 94%   SOLO  6-inch hurdles (6) forward step over step x 4 laps    Lateral x 4 laps SOLO  6-inch (6) forward step over step x 4 laps    Lateral x 4 laps   Foam pads  SOLO No AW  Red mat with thera discs underneath 1/4 length Fwd x 5 laps    02 90% SOLO No AW  Foam beams (4) two pushed together to walk across  Fwd x 4 laps  Lat x 3 laps      Step taps SOLO No AW 1/4 length 6" step x 20 ea    SOLO  4-inch step x 20 reps each SOLO  6-inch x 20 reps each   Sit to stand, staggered stance          Shuttle w/180* turns                            SOLO No AW  1/4 length to cones (4) turn 180* walk Bwd to next cone turn 180* walk Fwd x 5 laps  HR 93  02 95%    L leg forward focusing on R LE strengthening and eccentric control x 10    02 95% L leg forward focusing on R LE   X 10 reps    Ther Ex         clamshells         bridges         SLR         Sit to stand   No UE 2x10 for strengthening of B/L LE                        TM SOLO No AW  B/L UE support    2 0 mph x 10 min     02 95%   Able to hold conversation without SOB SOLO No AW  B/L UE support    2 0 mph x 10'    02 92%  Somewhat winded this session Not available  SOLO  1 5-2 0mph with BUE support x 8 minutes  RPE 4/10 SOLO  2 0mph with BUE support x 10 minutes   Recumbent bike/nustep    Recumbent bike L1 x 10 min    02 96%     Ther Activity         Staircase reciprocal                  Gait Training   Hallway gait training  100ft x 2 v c increase B/L Arm swing  100ft x 2 v c  for faster gait speed                          Education

## 2023-03-07 ENCOUNTER — OFFICE VISIT (OUTPATIENT)
Dept: PHYSICAL THERAPY | Facility: CLINIC | Age: 73
End: 2023-03-07
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
## 2023-03-07 DIAGNOSIS — Z74.09 IMPAIRED FUNCTIONAL MOBILITY, BALANCE, GAIT, AND ENDURANCE: Primary | ICD-10-CM

## 2023-03-07 NOTE — PROGRESS NOTES
Daily Note     Today's date: 3/7/2023  Patient name: Shannon Etienne  : 1950  MRN: [id number]  Referring provider: Tony Edmond, *  Dx:   Encounter Diagnosis     ICD-10-CM    1  Impaired functional mobility, balance, gait, and endurance  Z74 09       2  Fall, initial encounter  [address]  XXXA           Start Time: 3544  Stop Time: 6511  Total time in clinic (min): 50 minutes    Subjective: Patient reports feeling tired today  Patient states she feels "off"      Objective: See treatment diary below      Assessment: Tolerated treatment well  Patient participated in skilled PT session focused on balance and endurance  Patient continues to be challenged on compliant surfaces with multiple LOB, more with NBOS, but able to use stepping strategies to self correct  Added incline to TM this session which patient tolerated well  Patient would continue to benefit from skilled PT interventions to address balance and endurance  Patient demonstrated fatigue post treatment      Plan: Continue per plan of care  Precautions: hx of spinal fusion and R TKA ()      Manuals 2/23 2/28 3/2 3/7 2/15 2/21                                       Neuro Re-Ed         sidestepping         HKM         backwards SOLO No AW  1/4 length x 4 laps    02 96% SOLO No AW  1/4 length x 5 laps     SOLO  1/4 lap x 3 laps   Foam EO SOLO No AW  1/4 length    Stepping on/ovv form pads (4) with balance on top x 4 laps    02 96%     SOLO  Stepping on/off foam pads (4) with balance on top x 3 laps   Tandem stance         Tandem walking  SOLO No AW  1/4 length  Fwd x 3 laps    SOLO No AW 1/4 length  On Foam beams     Fwd x 4 laps  Lat x 2  SOLO  1/4 laps x 3 laps   Step up/down no UE's SOLO No AW  1/4 length  6" step x 10 ea w/L up and down R  W/R up and down L    W/ pole on L for assist  SOLO No AW  1/4 length  Step up/over 6" step, walk up/over airex (1)  Walk up/over 6" step no pole for assistance    Alt LE lead ea lap  Fwd x 6 laps    02 96%      Lat x 2 laps  RPE 8/10 with RLE lead   SOLO No AW  1/4 length step up/over 6" step walk and up/over 8" step walk up/over 6" step  No pole assist  Alt lead LE ea length  Fwd x 5 laps w/HHA from therapist on 8" step with RLE lead    Lat x 3 laps HHA from therapist on 8" step with RLE  SOLO  6-inch step up with L and down with R x 12 reps  Up with R and down with L with pole on L for assist x 10 reps    Lateral step ups with 4" step x 10 reps with pole for assist SOLO  6-inch step x 10 up with L and down with R  Up with R and down with L with pole on L for assist x 10 reps   hurdles SOLO No AW  1/4 length  6" hurdles (6)  Fwd x 5 laps  Reciprocal pattern    02 98%    Lat x 4 laps   SOLO No AW  1/4 length 6" hurdles (6)  Fwd x 5 laps Reciprocal pattern    02 92%    Lat x 4 laps    94%    No LOB or increased swaying   SOLO No AW 1/4 length 6" (3) hurdles and 8"(3( hurdles  Fwd x 4 laps  Reciprocal    Lat x 3 laps See below SOLO  6-inch hurdles (6) forward step over step x 4 laps    Lateral x 4 laps SOLO  6-inch (6) forward step over step x 4 laps    Lateral x 4 laps   Foam pads  SOLO No AW  Red mat with thera discs underneath 1/4 length Fwd x 5 laps    02 90% SOLO No AW  Foam beams (4) two pushed together to walk across  Fwd x 4 laps  Lat x 3 laps SOLO No AW 1/4 length Airex (5) pushed together w/6" hurdles (4)    Fwd x 1 step to    Fwd x 4 laps  Reciprocal    Lat x 3 laps       Step taps SOLO No AW 1/4 length 6" step x 20 ea    SOLO  4-inch step x 20 reps each SOLO  6-inch x 20 reps each   Sit to stand, staggered stance          Shuttle w/180* turns                            SOLO No AW  1/4 length to cones (4) turn 180* walk Bwd to next cone turn 180* walk Fwd x 5 laps  HR 93  02 95%                SOLO No AW  1/4 length to cones (4) turn 180* walk Bwd to next cone turn 180* walk Fwd x 5 laps    STS w/speed  X 10  1st   27 sec  2nd 24 sec  3rd   22 sec L leg forward focusing on R LE   X 10 reps    Ther Ex         clamshells         bridges         SLR         Sit to stand   No UE 2x10 for strengthening of B/L LE                        TM SOLO No AW  B/L UE support    2 0 mph x 10 min     02 95%   Able to hold conversation without SOB SOLO No AW  B/L UE support    2 0 mph x 10'    02 92%  Somewhat winded this session Not available SOLO No AW B/L UE support    2 0 mph 2% incline  X 8 min    2 0 mph 3% incline x 2 min    Total 10 min SOLO  1 5-2 0mph with BUE support x 8 minutes  RPE 4/10 SOLO  2 0mph with BUE support x 10 minutes   Recumbent bike/nustep         Ther Activity         Staircase reciprocal                  Gait Training   Hallway gait training  100ft x 2 v c increase B/L Arm swing  100ft x 2 v c  for faster gait speed                          Education

## 2023-03-09 ENCOUNTER — OFFICE VISIT (OUTPATIENT)
Dept: PHYSICAL THERAPY | Facility: CLINIC | Age: 73
End: 2023-03-09

## 2023-03-09 DIAGNOSIS — Z74.09 IMPAIRED FUNCTIONAL MOBILITY, BALANCE, GAIT, AND ENDURANCE: Primary | ICD-10-CM
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
## 2023-03-09 NOTE — PROGRESS NOTES
Daily Note     Today's date: 3/9/2023  Patient name: [facility]  : 1950  MRN: [id number]  Referring provider: Ivon Walker, *  Dx:   Encounter Diagnosis     ICD-10-CM    1  Impaired functional mobility, balance, gait, and endurance  Z74 09       2  Fall, initial encounter  [address]  XXXA                      Subjective: No new complaints  Objective: See treatment diary below      Assessment: Tolerated treatment well  Able to progress hurdles to alternating between highest available and low, demonstrates difficulty w/ RLE w/ high hurdles, utilizes hip circumduction  Greatest challenge w/ foam beams, no overt LOB observed; +use ankle and occasional stepping strategy  Patient demonstrated fatigue post treatment and would benefit from continued PT      Plan: Continue per plan of care  Precautions: hx of spinal fusion and R TKA ()      Manuals 2/23 2/28 3/2 3/7 3/9                                        Neuro Re-Ed         sidestepping         HKM         backwards SOLO No AW  1/4 length x 4 laps    02 96% SOLO No AW  1/4 length x 5 laps        Foam EO SOLO No AW  1/4 length    Stepping on/ovv form pads (4) with balance on top x 4 laps    02 96%    SOLO standing on foam w/ alt  toe taps to cone x20    Tandem stance         Tandem walking  SOLO No AW  1/4 length  Fwd x 3 laps    SOLO No AW 1/4 length  On Foam beams     Fwd x 4 laps  Lat x 2     Step up/down no UE's SOLO No AW  1/4 length  6" step x 10 ea w/L up and down R  W/R up and down L    W/ pole on L for assist  SOLO No AW  1/4 length  Step up/over 6" step, walk up/over airex (1)  Walk up/over 6" step no pole for assistance    Alt LE lead ea lap  Fwd x 6 laps    02 96%      Lat x 2 laps  RPE 8/10 with RLE lead   SOLO No AW  1/4 length step up/over 6" step walk and up/over 8" step walk up/over 6" step  No pole assist  Alt lead LE ea length  Fwd x 5 laps w/HHA from therapist on 8" step with RLE lead    Lat x 3 laps HHA from therapist on 8" step with RLE      hurdles SOLO No AW  1/4 length  6" hurdles (6)  Fwd x 5 laps  Reciprocal pattern    02 98%    Lat x 4 laps   SOLO No AW  1/4 length 6" hurdles (6)  Fwd x 5 laps Reciprocal pattern    02 92%    Lat x 4 laps    94%    No LOB or increased swaying   SOLO No AW 1/4 length 6" (3) hurdles and 8"(3( hurdles  Fwd x 4 laps  Reciprocal    Lat x 3 laps See below SOLO hurdles alt high/low (8 total) fwd/lat x5 laps ea    Foam pads  SOLO No AW  Red mat with thera discs underneath 1/4 length Fwd x 5 laps    02 90% SOLO No AW  Foam beams (4) two pushed together to walk across  Fwd x 4 laps  Lat x 3 laps SOLO No AW 1/4 length Airex (5) pushed together w/6" hurdles (4)    Fwd x 1 step to    Fwd x 4 laps  Reciprocal    Lat x 3 laps   SOLO foam beams fwd/lat x3 laps ea    Step taps SOLO No AW 1/4 length 6" step x 20 ea        Sit to stand, staggered stance          Shuttle w/180* turns                            SOLO No AW  1/4 length to cones (4) turn 180* walk Bwd to next cone turn 180* walk Fwd x 5 laps  HR 93  02 95%                SOLO No AW  1/4 length to cones (4) turn 180* walk Bwd to next cone turn 180* walk Fwd x 5 laps    STS w/speed  X 10  1st   27 sec  2nd 24 sec  3rd   22 sec     Ther Ex         clamshells         bridges         SLR         Sit to stand   No UE 2x10 for strengthening of B/L LE                        TM SOLO No AW  B/L UE support    2 0 mph x 10 min     02 95%   Able to hold conversation without SOB SOLO No AW  B/L UE support    2 0 mph x 10'    02 92%  Somewhat winded this session Not available SOLO No AW B/L UE support    2 0 mph 2% incline  X 8 min    2 0 mph 3% incline x 2 min    Total 10 min SOLO No AW B/L UE support    2 0 mph 2% incline  X 8 min    2 0 mph 3% incline x 2 min    Total 10 min    Recumbent bike/nustep         Ther Activity         Staircase reciprocal                  Gait Training   Hallway gait training  100ft x 2 v c increase B/L Arm swing  100ft x 2 v c  for faster gait speed                          Education

## 2023-03-15 ENCOUNTER — EVALUATION (OUTPATIENT)
Dept: PHYSICAL THERAPY | Facility: CLINIC | Age: 73
End: 2023-03-15
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
## 2023-03-15 DIAGNOSIS — Z74.09 IMPAIRED FUNCTIONAL MOBILITY, BALANCE, GAIT, AND ENDURANCE: Primary | ICD-10-CM

## 2023-03-15 NOTE — PROGRESS NOTES
Re-Evaluation    Today's date: 3/15/2023  Patient name: Bobbi Haque  : 1950  MRN: [id number]  Referring provider: Laure Martinez, *  Dx:   Encounter Diagnosis     ICD-10-CM    1  Impaired functional mobility, balance, gait, and endurance  Z74 [id number]       2  Fall, initial encounter  [address]  [place]                         Assessment  3/15/23: Chica Macias has attended 16 sessions of physical therapy for balance  She has demonstrated improvements in all outcome measures at this time  She has been slightly limited by R knee and shortness of breath after having COVID  She is now above cut-off scores for fall risk for almost all testing  6MWT improved from 1100 feet to 1300 feet, however demonstrates decreased endurance and shortness of breath during higher intensity ambulation  Her self-selected gait speed has remained similar, however Chica Macias feels that it is limited due to her R knee, fast walking speed of 1 4m/s is within normal limits for walking speed  5xSTS time has improved from 13 5 seconds to 8 8 seconds (without UE's 10 1 seconds), below cut-off score of 15 seconds for fall risk, and is below age and gender matched norm value of 13 0 seconds  FGA improved from  to , above cut-off score of 22/30 for fall risk  3MBWT improved from 8 1 seconds to 5 4 seconds and is approaching cut-off score of 4 5 seconds for fall risk  Recommended Chica Macias follow up with physician due to shortness of breath and decreased endurance and breathing difficulty since having COVID  She has met 2/5 long term goals, almost met FGA score goal, and gait speed limited due to knee pain  Recommend discharge to [facility] for balance at this time  Patient agreeable to plan  23: Chica Macias has attended 8 sessions of physical therapy for balance  She is demonstrating improvement in all outcome measures as this time  She has met 2/4 short term goals and is making progress with other 2 goals at this time   Self-selected gait speed improved from 0 98m/s to 1 09m/s, 5xSTS improved from 29 4 seconds to 13 57 seconds, FGA improved from 11/30 to 19/30  3MBWT was completed during session, 8 12 seconds  She continues to be at increased risk of falls from FGA score below cut-off score of 22/30, and 3MBWT above 4 5 seconds  5xSTS is slightly above age and gender matched normative value of 11 6 seconds  6MWT was completed due to patient's c/o increased fatigue post covid, completed 1100 feet, below age and gender matched norm value of 1545 feet  She reports she was limited with 6MWT due to fatigue, knee, and balance  Quinn Lee would benefit from continued physical therapy to decrease fall risk, improve balance, improve endurance, improve LE strength and endurance, improve balance confidence, improve ability to perform stairs, and return to prior level of function  Continue per current POC  Assessment details: Patient presents to outpatient physical therapy with recent falls and impaired balance, decreased R knee strength, decreased hip strength bilaterally, gait deviations of antalgic gait and Trendelenburg on L side, impaired balance, and decreased functional mobility  She presents with L Trendelenburg during unilateral stance on L LE, which may be contributing to tripping on R foot during ambulation  She also has decreased sensation at R shin and medical history of spinal stenosis  Quinn Lee presents with decreased LE strength and endurance from 5xSTS of 29 4 seconds, which is above cut-off score of 15 seconds for increased fall risk and above age and gender matched normative value of 13 0 seconds  She is slightly below cut-off score of 13 5 seconds for TUG score, scoring at 13 1 seconds  FGA score of 11/30 places her at increased risk of falls, significantly below cut-off score of 22/30 for fall risk  She is also ambulating at self-selected gait speed of 0 98m/s, below cut-off score of 1 0m/s for increased fall risk and below normal gait speed of 1 2-1 4m/s  Adele Goldmann would benefit from skilled physical therapy to decrease fall risk, improve balance, improve balance confidence, improve LE strength and endurance, improve functional mobility, and return to PLOF  Adele Goldmann also presents with L shoulder pain from fall 2 weeks ago  She presents with pain, decreased AROM, decreased strength, and decreased function  Significantly decreased AROM and strength in L shoulder abduction and ER  Patient was given HEP with AAROM exercises to maintain motion and may benefit from physical therapy to address limitations  Impairments: abnormal gait, activity intolerance, impaired balance, impaired physical strength, lacks appropriate home exercise program and pain with function  Understanding of Dx/Px/POC: good   Prognosis: good    ST  Pt will improve gait speed to at least 1 1 m/s within 4 weeks needed to improve safety with ambulation  ALMOST MET  2  Pt will improve FGA score by at least 3 points within 4 weeks needed to reduce fall risk  MET  3  Pt will improve 5xSTS score by at least 5 seconds within 4 weeks needed to reduce fall risk  MET  4  Pt will demonstrate independence with HEP within 4 weeks  PROGRESSING    LT  Pt will improve gait speed to at least 1 2 m/s with least restrictive device within 8 weeks needed to improve safety with ambulation  NOT MET  2  Pt will improve FGA score to at least 24/30 within 8 weeks needed to reduce fall risk  ALMOST MET  3  Pt will improve 5xSTS score to <15 sec within 8 weeks needed to reduce fall risk  MET  4  Pt will demonstrate independence with HEP within 8 weeks  MET  5  Pt will decrease 3MBWT to <4 5 seconds to decrease fall risk in 8 weeks [address]  Discharge to [facility] at this time   Recommended follow up with physician for shortness of breath post COVID infection        Subjective Evaluation    History of Present Illness  3/15/23: went to a meet and needed to walk on softer surfaces and her daughter states that she did very well  Stairs, still dont trust the R knee to hold the weight, go one at a time  Getting up from a chair is much better  States she does not usually need help unless it is the end of the day  She states that the higher step is still difficulty due to the knee  23: She states that she tripped over something twice and was able to catch herself and prevent a fall  She states she doesn't use the R leg to go up the stairs at home  Shoulder is doing better than when she first started  Fear of falling has improved slightly  Is more aware and looks to make sure there is nothing to trip on  Feeling better after having covid, just tires easily  Low surfaces continue to be difficult  Perceived improvement 60%  She states she is able to get up from chairs by herself most of the time, sometimes needs assist when she is tired  Mechanism of injury: Patient reports that she has been tripping over things  Katerine Shank in the garage when she turned and tripped, couldn't get up herself  When visiting her son stepped on daughters foot and fell  3rd time tripped over something and "went down"  She states she fell on her "bad knee" on the R side (needs a knee replacement) and fell on the L shoulder  She states she does not feel that her R knee is not strong enough to hold her when doing stairs  She states that the L shoulder looks like a rotator cuff damage and has difficulty moving it in certain ways  Has been icing it at home  States the last fall was 3 weeks ago  Has stenosis and some numbness in the shin, not in the foot and not neuropathy  She states "I try to be careful" states she is afraid of falling  She states that the legs get weak sometimes and makes her feel like she would fall if she doesn't sit down  Difficulty getting up from chairs, sometimes needs help from soft/lower surfaces    Pain  Current pain ratin  At best pain ratin  At worst pain rating: 10  Location: L shoulder  Relieving factors: rest and heat    Social Support  Steps to enter house: yes  2  Stairs in house: yes (bathroom upstairs)   13  Lives in: multiple-level home  Lives with: spouse and adult children    Patient Goals  Patient goal: be more stable on my feet, be less fearful of falling        Objective       Balance Test 1/17/23 2/13/23 3/15/23   6 Minute Walk Test (ft): NT 1100 feet 1300 feet, SpO2 86%, quickly returned to 92%, pursed lip breathing increased to    Gait Speed (ft/s): 0 98m/s 1 09m/s SSGS 1 05m/s SSGS  1 40m/s fast gait speed during 6MWT   5x Sit To Stand (s): 29 4 seconds no UE's 13 57 seconds, hands on knees 8 8 seconds, hands on knees  10 1 seconds with arms crossed   TU 19 seconds no AD 9 87 seconds 7 32 seconds   FGA    3MBWT  8 12 seconds 5 47 seconds       Sensation Left Right   Kinesthesia NT NT   Light Touch intact Impaired to R shin       Manual Muscle Testing - Hip Left Right   Flexion 4+ 4   Abduction (sidelying) 3 3+   Adduction 4+ 4+     Manual Muscle Testing - Knee Left Right   Flexion 5 4+   Extension 5 4     Manual Muscle Testing - Ankle Left Right   Doriflexion 5 5   Plantarflexion NT NT     Knee flexion ROM extension 0 degrees, flexion (seated) 110 (115)   L shoulder ROM flexion about 100 degrees in sitting, full ROM in supine  Abduction unable to due to pain in sitting, supine to about 90 degrees  ER painful, PROM full in seated  Gait Assessment: decreased gait speed, decreased heel strike bilaterally, antalgic gait pattern with compensated Trendenburg during R stance, Trendelenburg on L stance         Precautions: hx of spinal fusion and R TKA (2019)      Manuals 2/23 2/28 3/2 3/7 3/9 3/15                                       Neuro Re-Ed      Fast gait speed 1 40    FGA     3MBWT x 2 trials    TUG   sidestepping         HKM         backwards SOLO No AW  1/4 length x 4 laps    02 96% SOLO No AW  1/4 length x 5 laps        Foam EO SOLO No AW  1/4 length    Stepping on/ovv form pads (4) with balance on top x 4 laps    02 96%    SOLO standing on foam w/ alt  toe taps to cone x20    Tandem stance         Tandem walking  SOLO No AW  1/4 length  Fwd x 3 laps    SOLO No AW 1/4 length  On Foam beams     Fwd x 4 laps  Lat x 2     Step up/down no UE's SOLO No AW  1/4 length  6" step x 10 ea w/L up and down R  W/R up and down L    W/ pole on L for assist  SOLO No AW  1/4 length  Step up/over 6" step, walk up/over airex (1)  Walk up/over 6" step no pole for assistance    Alt LE lead ea lap  Fwd x 6 laps    02 96%      Lat x 2 laps  RPE 8/10 with RLE lead   SOLO No AW  1/4 length step up/over 6" step walk and up/over 8" step walk up/over 6" step  No pole assist  Alt lead LE ea length  Fwd x 5 laps w/HHA from therapist on 8" step with RLE lead    Lat x 3 laps HHA from therapist on 8" step with RLE      hurdles SOLO No AW  1/4 length  6" hurdles (6)  Fwd x 5 laps  Reciprocal pattern    02 98%    Lat x 4 laps   SOLO No AW  1/4 length 6" hurdles (6)  Fwd x 5 laps Reciprocal pattern    02 92%    Lat x 4 laps    94%    No LOB or increased swaying   SOLO No AW 1/4 length 6" (3) hurdles and 8"(3( hurdles  Fwd x 4 laps  Reciprocal    Lat x 3 laps See below SOLO hurdles alt high/low (8 total) fwd/lat x5 laps ea    Foam pads  SOLO No AW  Red mat with thera discs underneath 1/4 length Fwd x 5 laps    02 90% SOLO No AW  Foam beams (4) two pushed together to walk across  Fwd x 4 laps  Lat x 3 laps SOLO No AW 1/4 length Airex (5) pushed together w/6" hurdles (4)    Fwd x 1 step to    Fwd x 4 laps  Reciprocal    Lat x 3 laps   SOLO foam beams fwd/lat x3 laps ea    Step taps SOLO No AW 1/4 length 6" step x 20 ea        Sit to stand, staggered stance          Shuttle w/180* turns                            SOLO No AW  1/4 length to cones (4) turn 180* walk Bwd to next cone turn 180* walk Fwd x 5 laps  HR 93  02 95%                SOLO No AW  1/4 length to cones (4) turn 180* walk Bwd to next cone turn 180* walk Fwd x 5 laps    STS w/speed  X 10  1st   27 sec  2nd 24 sec  3rd   22 sec     Ther Ex      5xSTS x 2 trials   clamshells         bridges         SLR         Sit to stand   No UE 2x10 for strengthening of B/L LE                        TM SOLO No AW  B/L UE support    2 0 mph x 10 min     02 95%   Able to hold conversation without SOB SOLO No AW  B/L UE support    2 0 mph x 10'    02 92%  Somewhat winded this session Not available SOLO No AW B/L UE support    2 0 mph 2% incline  X 8 min    2 0 mph 3% incline x 2 min    Total 10 min SOLO No AW B/L UE support    2 0 mph 2% incline  X 8 min    2 0 mph 3% incline x 2 min    Total 10 min 6MWT 1300 feet   Recumbent bike/nustep         Ther Activity         Staircase reciprocal      X 2 laps            Gait Training   Hallway gait training  100ft x 2 v c increase B/L Arm swing  100ft x 2 v c  for faster gait speed  Education      HEP review and handout, outcome measures, discharge recommendations, fall risk                        Access Code: [email or web address]  URL: [email or web address]  Date: 03/15/2023  Prepared by: Ligia Henderson    Exercises  • Clamshell - 1 x daily - 5 x weekly - 2 sets - 10 reps  • Supine Bridge - 1 x daily - 5 x weekly - 2 sets - 10 reps  • Supine Active Straight Leg Raise - 1 x daily - 5 x weekly - 2 sets - 10 reps  • Sidelying Hip Abduction - 1 x daily - 5 x weekly - 2 sets - 10 reps  • Supine Heel Slide - 1 x daily - 5 x weekly - 2 sets - 10 reps  • Seated Long Arc Quad - 1 x daily - 5 x weekly - 2 sets - 10 reps  • Supine Shoulder Flexion Extension AAROM with Dowel - 1 x daily - 5 x weekly - 2 sets - 10 reps  • Supine Shoulder Abduction AAROM with Dowel - 1 x daily - 5 x weekly - 2 sets - 10 reps  • Seated Scapular Retraction - 1 x daily - 5 x weekly - 2 sets - 10 reps - 5 hold  • Sit to Stand Without Arm Support - 1 x daily - 7 x weekly - 2 sets - 10 reps  • Standing Tandem Balance with Counter Support - 1 x daily - 4 x weekly - 3 sets - 30 hold  • Tandem Walking with Counter Support - 1 x daily - 4 x weekly - 5 sets  • Backward Walking with Counter Support - 1 x daily - 4 x weekly - 5 sets  • Walking March - 1 x daily - 4 x weekly - 5 sets  • Standing Hip Abduction with Counter Support - 1 x daily - 4 x weekly - 3 sets - 10 reps  • Mini Squat with Counter Support - 1 x daily - 4 x weekly - 2 sets - 10 reps

## 2023-03-16 ENCOUNTER — APPOINTMENT (OUTPATIENT)
Dept: PHYSICAL THERAPY | Facility: CLINIC | Age: 73
End: 2023-03-16

## 2023-03-21 ENCOUNTER — APPOINTMENT (OUTPATIENT)
Dept: PHYSICAL THERAPY | Facility: CLINIC | Age: 73
End: 2023-03-21

## 2023-03-23 ENCOUNTER — APPOINTMENT (OUTPATIENT)
Dept: PHYSICAL THERAPY | Facility: CLINIC | Age: 73
End: 2023-03-23

## 2023-03-28 ENCOUNTER — APPOINTMENT (OUTPATIENT)
Dept: PHYSICAL THERAPY | Facility: CLINIC | Age: 73
End: 2023-03-28

## 2023-03-30 ENCOUNTER — APPOINTMENT (OUTPATIENT)
Dept: PHYSICAL THERAPY | Facility: CLINIC | Age: 73
End: 2023-03-30

## 2023-04-21 NOTE — PROGRESS NOTES
Assessment/Plan:  Normal left breast exam  Small left breast mass on patient self-exam 12/22 -  L Breast 2 cm from areola at 8 o'clock  I could not duplicate the finding nor could she on the day of the visit 1/5/23  My exam was normal   Strong family history of breast cancer  Normal mammogram 9/22  Diagnostic mammogram and ultrasound of the left breast on February 24 was normal - a 2 month follow-up examination was recommended  RTO 11/23 for annual       Diagnoses and all orders for this visit:    Mass of lower inner quadrant of left breast    Family history of breast cancer in mother              Subjective:        Patient ID: Gloria Dewitt is a 81 y o  female  Markie Carlyt returns for a follow-up visit  4 months ago she felt a mass in the left breast in the lower outer quadrant  Since then it has been intermittently present and usually felt when she is erect  The imaging studies performed were negative and clinical management was recommended  The lump is not present today  The following portions of the patient's history were reviewed and updated as appropriate: She  has a past medical history of Ankylosing spondylitis ([address] ), Anxiety, Arthritis, Back pain, Carpal tunnel syndrome, Fibromyalgia, Fibromyalgia, primary, Heme positive stool, High cholesterol, Hyperlipidemia, Impingement syndrome of left shoulder, Impingement syndrome of right shoulder, Long term use of drug, Low back pain, No known health problems, RLS (restless legs syndrome), Rotator cuff injury, Spinal stenosis, Spinal stenosis, Spondylitis ([address] ), Spondyloarthritis, and Vitamin D deficiency    Patient Active Problem List    Diagnosis Date Noted   • Fall 01/05/2023   • Acute pain of right knee 01/05/2023   • Acute pain of left shoulder 01/05/2023   • Family history of breast cancer in mother 01/04/2023   • Depression 01/03/2022   • History of lumbar spinal fusion 12/20/2021   • Family history of breast cancer 11/16/2021   • Stress incontinence 2021   • Hyperlipidemia 2019   • Abnormal EKG 10/17/2019   • Primary osteoarthritis of right knee 2019   • Encounter for annual routine gynecological examination 2019   • Encounter for screening mammogram for malignant neoplasm of breast 2019   • Chronic bilateral low back pain 2018   • Spinal stenosis of lumbar region at multiple levels 2017   • Spondylosis 2017     She  has a past surgical history that includes  section; Tubal ligation; Dilation and curettage of uterus; Retinal laser procedure; Colonoscopy; pr colonoscopy flx dx w/collj spec when pfrmd (N/A, 10/7/2016); pr arthrodesis posterior/pstlat tq 1ntrspc lumbar (N/A, 4/3/2017); pr ndsc wrst surg w/rls transvrs carpl ligm (Left, 2018); Cervical conization w/ laser; Back surgery; Hand surgery; Carpal tunnel release (Left); pr ndsc wrst surg w/rls transvrs carpl ligm (Right, 2018); FL injection right hip (non arthrogram) (2019); FL injection right hip (non arthrogram) (2019); pr arthrp acetblr/prox fem prostc agrft/algrft (Right, 2019); Joint replacement; orthopedic surgery; and Fracture surgery  Her family history includes Arthritis in her brother, family, mother, sister, and sister; Breast cancer (age of onset: 36) in her maternal aunt; Breast cancer (age of onset: 67) in her mother; Breast cancer additional onset (age of onset: 46) in her other; Cancer in her brother; Depression in her son; Endometrial cancer (age of onset: 61) in her sister; Heart attack in her brother, brother, father, mother, and sister; Hyperlipidemia in her family; Hypertension in her father and mother; Lung cancer in her maternal uncle; Melanoma in her brother and brother; No Known Problems in her brother, daughter, maternal grandfather, maternal grandmother, paternal grandfather, paternal grandmother, son, and son;  Other in her brother; Prostate cancer in her brother; Prostate cancer (age of onset: 58) in her brother; Stroke in her brother and father; Uterine cancer in her other and sister  FH:  M - Breast Ca 79  MA- Breast Ca 39  M niece- Breast Ca 46, Uterine Ca 47  Sister- Cervical 53, Uterine Ca 77   S- Colon Polyps  B- Melanoma 64 d78 recurred  B- Prostate Ca 74      Declined BRCA testing in the past     She  reports that she has never smoked  She has never used smokeless tobacco  She reports that she does not drink alcohol and does not use drugs  Current Outpatient Medications   Medication Sig Dispense Refill   • aspirin 81 MG tablet Take 1 tablet by mouth daily     • cephalexin (KEFLEX) 500 mg capsule cephalexin 500 mg capsule   TAKE 4 CAPSULES BY MOUTH 1 HOUR BEFORE DENTAL APPOINTMENT     • Cholecalciferol (VITAMIN D3) 50 MCG (2000 UT) capsule Vitamin D3 50 mcg (2,000 unit) capsule   Take by oral route  • clonazePAM (KlonoPIN) 0 5 mg tablet Take 0 5 mg by mouth daily at bedtime  4   • Diclofenac Sodium (VOLTAREN) 1 % Apply 4 g topically 4 (four) times a day 100 g 4   • Fluad Quadrivalent 0 5 ML PRSY PHARMACY ADMINISTERED     • gabapentin (NEURONTIN) 100 mg capsule Take 1 capsule (100 mg total) by mouth 2 (two) times a day 180 capsule 3   • gabapentin (Neurontin) 600 MG tablet Take 1 tablet (600 mg total) by mouth daily at bedtime 90 tablet 3   • PARoxetine (PAXIL) 20 mg tablet Take 1 tablet (20 mg total) by mouth daily 90 tablet 2   • meloxicam (Mobic) 15 mg tablet Take 1 tablet (15 mg total) by mouth daily 90 tablet 1     No current facility-administered medications for this visit  Current Outpatient Medications on File Prior to Visit   Medication Sig   • aspirin 81 MG tablet Take 1 tablet by mouth daily   • cephalexin (KEFLEX) 500 mg capsule cephalexin 500 mg capsule   TAKE 4 CAPSULES BY MOUTH 1 HOUR BEFORE DENTAL APPOINTMENT   • Cholecalciferol (VITAMIN D3) 50 MCG (2000 UT) capsule Vitamin D3 50 mcg (2,000 unit) capsule   Take by oral route     • clonazePAM (KlonoPIN) 0 5 mg tablet Take 0 5 mg by mouth daily at bedtime   • Diclofenac Sodium (VOLTAREN) 1 % Apply 4 g topically 4 (four) times a day   • Fluad Quadrivalent 0 5 ML PRSY PHARMACY ADMINISTERED   • gabapentin (NEURONTIN) 100 mg capsule Take 1 capsule (100 mg total) by mouth 2 (two) times a day   • gabapentin (Neurontin) 600 MG tablet Take 1 tablet (600 mg total) by mouth daily at bedtime   • PARoxetine (PAXIL) 20 mg tablet Take 1 tablet (20 mg total) by mouth daily   • meloxicam (Mobic) 15 mg tablet Take 1 tablet (15 mg total) by mouth daily   • [DISCONTINUED] chlorhexidine (PERIDEX) 0 12 % solution chlorhexidine gluconate 0 12 % mouthwash   RINSE MOUTH WITH 1/2 OZ FOR 30-60 SECS TWICE DAILY BEGIN ONE DAY AFTER SURGERY     No current facility-administered medications on file prior to visit  She has No Known Allergies       Review of Systems   Genitourinary:        Stress incontinence is her only complaint   All other systems reviewed and are negative  Objective:    Vitals:    04/24/23 0902   BP: 136/80   BP Location: Left arm   Patient Position: Sitting   Cuff Size: Standard   Weight: 89 kg (196 lb 3 2 oz)            Physical Exam  Vitals and nursing note reviewed  Exam conducted with a chaperone present  Chest:   Breasts:     Left: Normal  No swelling, bleeding, inverted nipple, mass, nipple discharge, skin change or tenderness  Comments: Examined both supine and sitting  Lymphadenopathy:      Upper Body:      Left upper body: No supraclavicular, axillary or pectoral adenopathy  Neurological:      Mental Status: She is oriented to person, place, and time  Psychiatric:         Mood and Affect: Mood normal          Behavior: Behavior normal          Thought Content:  Thought content normal          Judgment: Judgment normal

## 2023-04-24 ENCOUNTER — OFFICE VISIT (OUTPATIENT)
Dept: OBGYN CLINIC | Facility: CLINIC | Age: 73
End: 2023-04-24

## 2023-04-24 VITALS — BODY MASS INDEX: 38.32 KG/M2 | WEIGHT: 196.2 LBS | DIASTOLIC BLOOD PRESSURE: 80 MMHG | SYSTOLIC BLOOD PRESSURE: 136 MMHG

## 2023-04-24 DIAGNOSIS — N63.24 MASS OF LOWER INNER QUADRANT OF LEFT BREAST: Primary | ICD-10-CM

## 2023-04-24 DIAGNOSIS — Z80.3 FAMILY HISTORY OF BREAST CANCER IN MOTHER: ICD-10-CM

## 2023-08-17 ENCOUNTER — TELEPHONE (OUTPATIENT)
Dept: INTERNAL MEDICINE CLINIC | Facility: CLINIC | Age: 73
End: 2023-08-17

## 2023-08-17 DIAGNOSIS — R05.1 ACUTE COUGH: Primary | ICD-10-CM

## 2023-08-17 NOTE — TELEPHONE ENCOUNTER
I was wondering if there's a possibility that I could get a chest X-ray. I've had a persistent cough and now it is productive and I wondered if that would be the best thing to do to be able to get a chest X-ray. The callback number would be [phone]. Thank you.  david Boston.

## 2023-08-17 NOTE — TELEPHONE ENCOUNTER
Please let the patient know that a request for chest x-ray has been placed at [facility].   Perhaps if she has a persisting cough she should consider coming into the office for an evaluation as well

## 2023-08-18 ENCOUNTER — APPOINTMENT (OUTPATIENT)
Dept: RADIOLOGY | Age: 73
End: 2023-08-18
Payer: MEDICARE

## 2023-08-18 DIAGNOSIS — R05.1 ACUTE COUGH: ICD-10-CM

## 2023-08-18 PROCEDURE — 71046 X-RAY EXAM CHEST 2 VIEWS: CPT

## 2023-10-11 ENCOUNTER — OFFICE VISIT (OUTPATIENT)
Dept: PAIN MEDICINE | Facility: MEDICAL CENTER | Age: 73
End: 2023-10-11
Payer: MEDICARE

## 2023-10-11 VITALS
OXYGEN SATURATION: 96 % | SYSTOLIC BLOOD PRESSURE: 135 MMHG | BODY MASS INDEX: 38.48 KG/M2 | HEART RATE: 83 BPM | WEIGHT: 196 LBS | HEIGHT: 60 IN | DIASTOLIC BLOOD PRESSURE: 78 MMHG

## 2023-10-11 DIAGNOSIS — G89.29 CHRONIC LEFT-SIDED LOW BACK PAIN WITH LEFT-SIDED SCIATICA: ICD-10-CM

## 2023-10-11 DIAGNOSIS — M51.36 LUMBAR DEGENERATIVE DISC DISEASE: ICD-10-CM

## 2023-10-11 DIAGNOSIS — G57.02 PIRIFORMIS SYNDROME OF LEFT SIDE: ICD-10-CM

## 2023-10-11 DIAGNOSIS — M47.816 LUMBAR SPONDYLOSIS: Primary | ICD-10-CM

## 2023-10-11 DIAGNOSIS — M54.42 CHRONIC LEFT-SIDED LOW BACK PAIN WITH LEFT-SIDED SCIATICA: ICD-10-CM

## 2023-10-11 DIAGNOSIS — M48.061 SPINAL STENOSIS OF LUMBAR REGION WITHOUT NEUROGENIC CLAUDICATION: ICD-10-CM

## 2023-10-11 PROCEDURE — 99214 OFFICE O/P EST MOD 30 MIN: CPT | Performed by: PHYSICIAN ASSISTANT

## 2023-10-11 RX ORDER — GABAPENTIN 600 MG/1
600 TABLET ORAL
Qty: 90 TABLET | Refills: 3 | Status: SHIPPED | OUTPATIENT
Start: 2023-10-11

## 2023-10-11 RX ORDER — METHYLPREDNISOLONE 4 MG/1
TABLET ORAL
Qty: 1 EACH | Refills: 0 | Status: SHIPPED | OUTPATIENT
Start: 2023-10-11

## 2023-10-11 NOTE — PROGRESS NOTES
Assessment:  1. Lumbar spondylosis    2. Spinal stenosis of lumbar region without neurogenic claudication    3. Lumbar degenerative disc disease    4. Piriformis syndrome of left side    5. Chronic left-sided low back pain with left-sided sciatica        Plan:  While the patient was in the office today, I did have a thorough conversation regarding their chronic pain syndrome, medication management, and treatment plan options. In order to address the acute exacerbation she is currently experiencing I have prescribed a Medrol Dosepak to be taken as directed. The patient was made aware to hold NSAIDs while taking this oral steroids. If her left buttock pain continues to be an issue then she will call at which point we can schedule a left piriformis injection. Otherwise she will continue the gabapentin 100 mg twice daily and 600 mg nightly. I have recommended an LSO brace for support and stabilization. Otherwise the patient will be seen in 6 months or sooner if needed. My impressions and treatment recommendations were discussed in detail with the patient who verbalized understanding and had no further questions. Discharge instructions were provided. I personally saw and examined the patient and I agree with the above discussed plan of care. Orders Placed This Encounter   Procedures   • Lumbar Back Brace     New Medications Ordered This Visit   Medications   • methylPREDNISolone 4 MG tablet therapy pack     Sig: Use as directed on package     Dispense:  1 each     Refill:  0   • gabapentin (Neurontin) 600 MG tablet     Sig: Take 1 tablet (600 mg total) by mouth daily at bedtime     Dispense:  90 tablet     Refill:  3       History of Present Illness:  Evonne Arrieta is a 72 y.o. female who presents for a follow up office visit in regards to chronic back pain.    The patient’s current symptoms include chronic low back pain and left buttock pain that she presently rates a 5 out of 10 and describes it as an intermittent dull, aching, sharp and shooting pain. She states that the left buttock pain became significant after long plane ride home from [place] last week. Normally she feels her pain is well controlled on the current medication regimen of gabapentin and meloxicam.  She has significant increase in back pain with standing and walking and increased left buttock pain with prolonged sitting. I have personally reviewed and/or updated the patient's past medical history, past surgical history, family history, social history, current medications, allergies, and vital signs today. Review of Systems   Respiratory:  Negative for shortness of breath. Cardiovascular:  Negative for chest pain. Gastrointestinal:  Negative for constipation, diarrhea, nausea and vomiting. Musculoskeletal:  Positive for back pain, gait problem, joint swelling and myalgias. Negative for arthralgias. Skin:  Negative for rash. Neurological:  Negative for dizziness, seizures and weakness. All other systems reviewed and are negative.       Patient Active Problem List   Diagnosis   • Spinal stenosis of lumbar region at multiple levels   • Spondylosis   • Chronic bilateral low back pain   • Encounter for annual routine gynecological examination   • Encounter for screening mammogram for malignant neoplasm of breast   • Primary osteoarthritis of right knee   • Abnormal EKG   • Hyperlipidemia   • Family history of breast cancer   • Stress incontinence   • History of lumbar spinal fusion   • Depression   • Family history of breast cancer in mother   • Fall   • Acute pain of right knee   • Acute pain of left shoulder   • Lumbar spondylosis   • Spinal stenosis of lumbar region without neurogenic claudication       Past Medical History:   Diagnosis Date   • Ankylosing spondylitis ([address])    • Anxiety     LAST ASSESSED: 12/20/16   • Arthritis    • Back pain    • Carpal tunnel syndrome    • Fibromyalgia     LAST ASSESSED: 12/20/16 • Fibromyalgia, primary    • Heme positive stool     LAST ASSESSED: 10/22/16   • High cholesterol    • Hyperlipidemia     under control since weight loss   • Impingement syndrome of left shoulder     LAST ASSESSED: 17   • Impingement syndrome of right shoulder     LAST ASSESSED:    • Long term use of drug     LAST ASSESSED: 16   • Low back pain    • No known health problems     NO PERTINENT PAST MEDICAL HX   • RLS (restless legs syndrome)    • Rotator cuff injury     right   • Spinal stenosis    • Spinal stenosis    • Spondylitis (HCC)    • Spondyloarthritis     RESOLVED: 16   • Vitamin D deficiency        Past Surgical History:   Procedure Laterality Date   • BACK SURGERY     • CARPAL TUNNEL RELEASE Left    • CERVICAL CONIZATION   W/ LASER      CERVICAL CONIZATION   •  SECTION     • COLONOSCOPY     • DILATION AND CURETTAGE OF UTERUS     • FL INJECTION RIGHT HIP (NON ARTHROGRAM)  2019   • FL INJECTION RIGHT HIP (NON ARTHROGRAM)  2019   • FRACTURE SURGERY     • HAND SURGERY     • JOINT REPLACEMENT     • ORTHOPEDIC SURGERY     • ND ARTHRODESIS POSTERIOR/PSTLAT TQ 1NTRSPC LUMBAR N/A 2017    Procedure: L2-L5 OPEN DECOMPRESSIVE LUMBAR LAMINECTOMY W/ PEDICLE SCREW AND NILDA FIXATION FUSION (IMPULSE); REMOVAL OF SKIN TAG MID BACK;  Surgeon: Santiago Pillai MD;  [facility];  Service: Neurosurgery   • ND ARTHRP ACETBLR/PROX FEM PROSTC AGRFT/ALGRFT Right 2019    Procedure: ARTHROPLASTY HIP TOTAL Posterior;  Surgeon: Gricelda Acosta MD;  [facility];  Service: Orthopedics   • ND COLONOSCOPY FLX DX W/COLLJ SPEC WHEN PFRMD N/A 10/07/2016    Procedure: EGD AND COLONOSCOPY;  Surgeon: Daria Billy MD;  Location: [facility];   Service: Gastroenterology   • ND [address] WRST SURG W/RLS TRANSVRS CARPL LIGM Left 2018    Procedure: RELEASE CARPAL TUNNEL ENDOSCOPIC;  Surgeon: Baylee Chaidez MD;  Location[facility];  Service: Orthopedics   • ND [address] SURG W/RLS TRANSVRS CARPL LIGM Right 06/14/2018    Procedure: RELEASE CARPAL TUNNEL ENDOSCOPIC;  Surgeon: Ej Brown MD;  [facility];  Service: Orthopedics   • RETINAL LASER PROCEDURE     • SPINE SURGERY     • TUBAL LIGATION         Family History   Problem Relation Age of Onset   • Arthritis Mother    • Breast cancer Mother 67   • Hypertension Mother    • Heart attack Mother         MYOCARDIAL INFARCTION   • Heart attack Father    • Hypertension Father    • Stroke Father         [employer] (CVA)   • Arthritis Sister    • Uterine cancer Sister    • Endometrial cancer Sister 61   • Hypertension Sister    • Heart attack Brother    • Prostate cancer Brother 58   • Arthritis Brother    • Melanoma Brother    • Cancer Brother         Melanoma   • Arthritis Family    • Hyperlipidemia Family    • Depression Son    • Breast cancer Maternal Aunt 44   • No Known Problems Daughter    • No Known Problems Maternal Grandmother    • No Known Problems Maternal Grandfather    • No Known Problems Paternal Grandmother    • No Known Problems Paternal Grandfather    • No Known Problems Brother    • Other Brother         hunting accident (shot)   • Melanoma Brother    • Prostate cancer Brother    • Heart attack Brother    • Stroke Brother    • Hypertension Brother    • Arthritis Sister    • Heart attack Sister    • No Known Problems Son    • No Known Problems Son    • Lung cancer Maternal Uncle    • Breast cancer additional onset Other 46   • Uterine cancer Other        Social History     Occupational History   • 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      Comment: EMPLOYED   Tobacco Use   • Smoking status: Never   • Smokeless tobacco: Never   Vaping Use   • Vaping Use: Never used   Substance and Sexual Activity   • Alcohol use: Never   • Drug use: No   • Sexual activity: Yes     Partners: Male     Birth control/protection: Post-menopausal, Female Sterilization       Current Outpatient Medications on File Prior to Visit Medication Sig   • aspirin 81 MG tablet Take 1 tablet by mouth daily   • Cholecalciferol (VITAMIN D3) 50 MCG (2000 UT) capsule Vitamin D3 50 mcg (2,000 unit) capsule   Take by oral route. • clonazePAM (KlonoPIN) 0.5 mg tablet Take 0.5 mg by mouth daily at bedtime   • Diclofenac Sodium (VOLTAREN) 1 % Apply 4 g topically 4 (four) times a day   • gabapentin (NEURONTIN) 100 mg capsule TAKE 1 CAPSULE BY MOUTH THREE TIMES A DAY   • PARoxetine (PAXIL) 20 mg tablet Take 1 tablet (20 mg total) by mouth daily   • [DISCONTINUED] gabapentin (Neurontin) 600 MG tablet Take 1 tablet (600 mg total) by mouth daily at bedtime   • cephalexin (KEFLEX) 500 mg capsule cephalexin 500 mg capsule   TAKE 4 CAPSULES BY MOUTH 1 HOUR BEFORE DENTAL APPOINTMENT   • Fluad Quadrivalent 0.5 ML PRSY PHARMACY ADMINISTERED (Patient not taking: Reported on 10/11/2023)   • meloxicam (Mobic) 15 mg tablet Take 1 tablet (15 mg total) by mouth daily     No current facility-administered medications on file prior to visit. No Known Allergies    Physical Exam:    /78   Pulse 83   Ht 5' (1.524 m)   Wt 88.9 kg (196 lb)   LMP  (LMP Unknown)   SpO2 96%   BMI 38.28 kg/m²     Constitutional:normal, well developed, well nourished, alert, in no distress and non-toxic and no overt pain behavior.  and overweight  Eyes:anicteric  HEENT:grossly intact  Neck:supple, symmetric, trachea midline and no masses   Pulmonary:even and unlabored  Cardiovascular:No edema or pitting edema present  Skin:Normal without rashes or lesions and well hydrated  Psychiatric:Mood and affect appropriate  Neurologic:Cranial Nerves II-XII grossly intact  Musculoskeletal: Gait is antalgic, tender to palpation over the left piriformis, limited range of motion of the lumbar spine    Imaging

## 2023-10-13 ENCOUNTER — TELEPHONE (OUTPATIENT)
Dept: INTERNAL MEDICINE CLINIC | Facility: CLINIC | Age: 73
End: 2023-10-13

## 2023-11-09 ENCOUNTER — APPOINTMENT (OUTPATIENT)
Dept: LAB | Facility: CLINIC | Age: 73
End: 2023-11-09
Payer: MEDICARE

## 2023-11-09 DIAGNOSIS — Z11.59 NEED FOR HEPATITIS C SCREENING TEST: ICD-10-CM

## 2023-11-09 DIAGNOSIS — M45.6 ANKYLOSING SPONDYLITIS OF LUMBAR REGION (HCC): ICD-10-CM

## 2023-11-09 LAB
ALBUMIN SERPL BCP-MCNC: 4 G/DL (ref 3.5–5)
ALP SERPL-CCNC: 44 U/L (ref 34–104)
ALT SERPL W P-5'-P-CCNC: 24 U/L (ref 7–52)
ANION GAP SERPL CALCULATED.3IONS-SCNC: 9 MMOL/L
AST SERPL W P-5'-P-CCNC: 20 U/L (ref 13–39)
BASOPHILS # BLD AUTO: 0.03 THOUSANDS/ÂΜL (ref 0–0.1)
BASOPHILS NFR BLD AUTO: 1 % (ref 0–1)
BILIRUB SERPL-MCNC: 0.55 MG/DL (ref 0.2–1)
BUN SERPL-MCNC: 22 MG/DL (ref 5–25)
CALCIUM SERPL-MCNC: 9 MG/DL (ref 8.4–10.2)
CHLORIDE SERPL-SCNC: 106 MMOL/L (ref 96–108)
CO2 SERPL-SCNC: 26 MMOL/L (ref 21–32)
CREAT SERPL-MCNC: 0.72 MG/DL (ref 0.6–1.3)
CRP SERPL QL: 3.1 MG/L
EOSINOPHIL # BLD AUTO: 0.26 THOUSAND/ÂΜL (ref 0–0.61)
EOSINOPHIL NFR BLD AUTO: 5 % (ref 0–6)
ERYTHROCYTE [DISTWIDTH] IN BLOOD BY AUTOMATED COUNT: 15.3 % (ref 11.6–15.1)
ERYTHROCYTE [SEDIMENTATION RATE] IN BLOOD: 35 MM/HOUR (ref 0–29)
GFR SERPL CREATININE-BSD FRML MDRD: 84 ML/MIN/1.73SQ M
GLUCOSE P FAST SERPL-MCNC: 112 MG/DL (ref 65–99)
HCT VFR BLD AUTO: 42.7 % (ref 34.8–46.1)
HCV AB SER QL: NORMAL
HGB BLD-MCNC: 13.7 G/DL (ref 11.5–15.4)
IMM GRANULOCYTES # BLD AUTO: 0.01 THOUSAND/UL (ref 0–0.2)
IMM GRANULOCYTES NFR BLD AUTO: 0 % (ref 0–2)
LYMPHOCYTES # BLD AUTO: 1.02 THOUSANDS/ÂΜL (ref 0.6–4.47)
LYMPHOCYTES NFR BLD AUTO: 21 % (ref 14–44)
MCH RBC QN AUTO: 29.3 PG (ref 26.8–34.3)
MCHC RBC AUTO-ENTMCNC: 32.1 G/DL (ref 31.4–37.4)
MCV RBC AUTO: 91 FL (ref 82–98)
MONOCYTES # BLD AUTO: 0.37 THOUSAND/ÂΜL (ref 0.17–1.22)
MONOCYTES NFR BLD AUTO: 8 % (ref 4–12)
NEUTROPHILS # BLD AUTO: 3.17 THOUSANDS/ÂΜL (ref 1.85–7.62)
NEUTS SEG NFR BLD AUTO: 65 % (ref 43–75)
NRBC BLD AUTO-RTO: 0 /100 WBCS
PLATELET # BLD AUTO: 324 THOUSANDS/UL (ref 149–390)
PMV BLD AUTO: 9.6 FL (ref 8.9–12.7)
POTASSIUM SERPL-SCNC: 3.9 MMOL/L (ref 3.5–5.3)
PROT SERPL-MCNC: 6.7 G/DL (ref 6.4–8.4)
RBC # BLD AUTO: 4.68 MILLION/UL (ref 3.81–5.12)
SODIUM SERPL-SCNC: 141 MMOL/L (ref 135–147)
WBC # BLD AUTO: 4.86 THOUSAND/UL (ref 4.31–10.16)

## 2023-11-09 PROCEDURE — 85025 COMPLETE CBC W/AUTO DIFF WBC: CPT

## 2023-11-09 PROCEDURE — 85652 RBC SED RATE AUTOMATED: CPT

## 2023-11-09 PROCEDURE — 36415 COLL VENOUS BLD VENIPUNCTURE: CPT

## 2023-11-09 PROCEDURE — 86140 C-REACTIVE PROTEIN: CPT

## 2023-11-09 PROCEDURE — 80053 COMPREHEN METABOLIC PANEL: CPT

## 2023-11-09 PROCEDURE — 86803 HEPATITIS C AB TEST: CPT

## 2023-11-28 PROBLEM — Z12.31 ENCOUNTER FOR SCREENING MAMMOGRAM FOR BREAST CANCER: Status: ACTIVE | Noted: 2023-11-28

## 2023-11-29 ENCOUNTER — ANNUAL EXAM (OUTPATIENT)
Dept: OBGYN CLINIC | Facility: CLINIC | Age: 73
End: 2023-11-29
Payer: MEDICARE

## 2023-11-29 VITALS — DIASTOLIC BLOOD PRESSURE: 86 MMHG | WEIGHT: 191 LBS | BODY MASS INDEX: 37.3 KG/M2 | SYSTOLIC BLOOD PRESSURE: 136 MMHG

## 2023-11-29 DIAGNOSIS — Z80.3 FAMILY HISTORY OF BREAST CANCER IN MOTHER: ICD-10-CM

## 2023-11-29 DIAGNOSIS — Z12.31 ENCOUNTER FOR SCREENING MAMMOGRAM FOR BREAST CANCER: ICD-10-CM

## 2023-11-29 DIAGNOSIS — Z01.419 ENCOUNTER FOR ANNUAL ROUTINE GYNECOLOGICAL EXAMINATION: Primary | ICD-10-CM

## 2023-11-29 PROCEDURE — G0101 CA SCREEN;PELVIC/BREAST EXAM: HCPCS | Performed by: OBSTETRICS & GYNECOLOGY

## 2023-11-29 NOTE — PROGRESS NOTES
Assessment/Plan:  Normal GYN exam  RTO 2 yr. CHARISSE/UI- slightly worse- Kegels p each void. History of cervical dysplasia '82- ADAL 1  FH of multiple Malignancies- BRCA counseling- declined, was recommended '16 too. Co-testing '16, NA   BMD- Dr. Alana Whitmore  Breast exams monthly   Annual 3 D mammography  Colonoscopy/EGD - 10/16 3 polyps, repeat was due 10/21, now rectal bleeding most likely due to external hemorrhoids. She will call next week to schedule. Exercise- inadequate due to chronic pain  Calcium 1,000 mg/daily with vitamin D- inadequate -nl BMD '12          Diagnoses and all orders for this visit:    Encounter for annual routine gynecological examination    Encounter for screening mammogram for breast cancer  -     Mammo screening bilateral w 3d & cad; Future    Family history of breast cancer in mother              Subjective:        Patient ID: Autumn Caraballo is a 72 y.o. female. Christine Reilly returns for an annual visit. Zia Arnett She was seen in January for a left breast mass that she had palpated. On the day of the examination the mass was not evident to myself or Christine Reilly. Nonetheless she underwent diagnostic studies which were normal and had a follow-up examination which was normal as well. She is currently due for a screening mammogram.    Her only complaint is a few episodes of bleeding after wiping herself from a bowel movement. It is not from the vagina. There is no pain involved. She is unsure if she has a hemorrhoid. She is overdue by 2 years for a colonoscopy. She will schedule one next week. Since using timed voiding she has noticed a decrease in urgency and urge incontinence. Stress incontinence has almost totally resolved. She remains sexually active and 1-2 times a week. She denies any problems with intercourse. She has noted no vaginal bleeding. She has noted night sweats for the past couple of months. I recommended she discuss this with Dr. Alana Whitmore.         The following portions of the patient's history were reviewed and updated as appropriate: She  has a past medical history of Ankylosing spondylitis ([address]), Anxiety, Arthritis, Back pain, Carpal tunnel syndrome, Fibromyalgia, Fibromyalgia, primary, Heme positive stool, High cholesterol, Hyperlipidemia, Impingement syndrome of left shoulder, Impingement syndrome of right shoulder, Long term use of drug, Low back pain, No known health problems, RLS (restless legs syndrome), Rotator cuff injury, Spinal stenosis, Spinal stenosis, Spondylitis ([address]), Spondyloarthritis, and Vitamin D deficiency.   Patient Active Problem List    Diagnosis Date Noted    Encounter for screening mammogram for breast cancer 2023    Lumbar spondylosis 10/11/2023    Spinal stenosis of lumbar region without neurogenic claudication 10/11/2023    Fall 2023    Acute pain of right knee 2023    Acute pain of left shoulder 2023    Family history of breast cancer in mother 2023    Depression 2022    History of lumbar spinal fusion 2021    Family history of breast cancer 2021    Stress incontinence 2021    Hyperlipidemia 2019    Abnormal EKG 10/17/2019    Primary osteoarthritis of right knee 2019    Encounter for annual routine gynecological examination 2019    Encounter for screening mammogram for malignant neoplasm of breast 2019    Chronic bilateral low back pain 2018    Spinal stenosis of lumbar region at multiple levels 2017    Spondylosis 2017   PMH:  Menarche 11   4 para 4;  x 4, first 2 for breech, then repeat, last with a tubal ligation (me)  ADAL-1- [facility] biopsy with D&C '  Menopause 57  Fibromyalgia  Spinal stenosis- epidurals in the past  Rheumatoid arthritis- shoulders and knees, injections to the left shoulder '08  Obesity  Anxiety '13- 1 son was station in [facility] and the [place]  3 Foot Fx's- Vit D Deficiency- nl BMD '12  Hypercholesterolemia- resolved with weight loss (>200 to 175 )  Retinal tears- right then left, , recurred   Spinal Stenosis- Laminectomy   Torn R Rotator Cuff- 3/17- PT  Bilateral Carpal Tunnel Syndrome 3/17, surgery in   [place] R Knee     R Hip Replacement    Shingles L leg 10/23 [had both vaccines]  She  has a past surgical history that includes  section; Tubal ligation; Dilation and curettage of uterus; Retinal laser procedure; Colonoscopy; pr colonoscopy flx dx w/collj spec when pfrmd (N/A, 10/07/2016); pr arthrodesis posterior/pstlat tq 1ntrspc lumbar (N/A, 2017); pr ndsc wrst surg w/rls transvrs carpl ligm (Left, 2018); Cervical conization w/ laser; Back surgery; Hand surgery; Carpal tunnel release (Left); pr ndsc wrst surg w/rls transvrs carpl ligm (Right, 2018); FL injection right hip (non arthrogram) (2019); FL injection right hip (non arthrogram) (2019); pr arthrp acetblr/prox fem prostc agrft/algrft (Right, 2019); Joint replacement; orthopedic surgery; Fracture surgery; and Spine surgery. Her family history includes Arthritis in her brother, family, mother, sister, and sister; Breast cancer (age of onset: 36) in her maternal aunt; Breast cancer (age of onset: 67) in her mother; Breast cancer additional onset (age of onset: 46) in her other; Cancer in her brother; Depression in her son; Endometrial cancer (age of onset: 61) in her sister; Heart attack in her brother, brother, father, mother, and sister; Hyperlipidemia in her family; Hypertension in her brother, father, mother, and sister; Lung cancer in her maternal uncle; Melanoma in her brother and brother; No Known Problems in her brother, daughter, maternal grandfather, maternal grandmother, paternal grandfather, paternal grandmother, son, and son; Other in her brother; Prostate cancer in her brother; Prostate cancer (age of onset: 58) in her brother; Stroke in her brother and father; Uterine cancer in her other and sister.   FH:  M - Breast Ca 79  MA- Breast Ca 39  M niece- Breast Ca 52, Uterine Ca 47  Sister- Cervical 53, Uterine Ca 66   S- Colon Polyps  B- Melanoma 64 d78 recurred  B- Prostate Ca 74      Declined BRCA testing in the past     She  reports that she has never smoked. She has never used smokeless tobacco. She reports that she does not drink alcohol and does not use drugs. 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 . Ad Pulido (me) 34- [facility] [employer] [facility] then [employer] for [employer], he wanted to be a . Is a . Third grandchild 7/19, a girl. Current Outpatient Medications   Medication Sig Dispense Refill    aspirin 81 MG tablet Take 1 tablet by mouth daily      cephalexin (KEFLEX) 500 mg capsule cephalexin 500 mg capsule   TAKE 4 CAPSULES BY MOUTH 1 HOUR BEFORE DENTAL APPOINTMENT      Cholecalciferol (VITAMIN D3) 50 MCG (2000 UT) capsule Vitamin D3 50 mcg (2,000 unit) capsule   Take by oral route. clonazePAM (KlonoPIN) 0.5 mg tablet Take 0.5 mg by mouth daily at bedtime  4    Diclofenac Sodium (VOLTAREN) 1 % Apply 4 g topically 4 (four) times a day 100 g 4    Fluad Quadrivalent 0.5 ML PRSY       gabapentin (NEURONTIN) 100 mg capsule TAKE 1 CAPSULE BY MOUTH THREE TIMES A DAY 90 capsule 2    gabapentin (Neurontin) 600 MG tablet Take 1 tablet (600 mg total) by mouth daily at bedtime 90 tablet 3    methylPREDNISolone 4 MG tablet therapy pack Use as directed on package 1 each 0    PARoxetine (PAXIL) 20 mg tablet Take 1 tablet (20 mg total) by mouth daily 90 tablet 2    meloxicam (Mobic) 15 mg tablet Take 1 tablet (15 mg total) by mouth daily 90 tablet 1     No current facility-administered medications for this visit.      Current Outpatient Medications on File Prior to Visit   Medication Sig    aspirin 81 MG tablet Take 1 tablet by mouth daily    cephalexin (KEFLEX) 500 mg capsule cephalexin 500 mg capsule   TAKE 4 CAPSULES BY MOUTH 1 HOUR BEFORE DENTAL APPOINTMENT    Cholecalciferol (VITAMIN D3) 50 MCG (2000 UT) capsule Vitamin D3 50 mcg (2,000 unit) capsule   Take by oral route. clonazePAM (KlonoPIN) 0.5 mg tablet Take 0.5 mg by mouth daily at bedtime    Diclofenac Sodium (VOLTAREN) 1 % Apply 4 g topically 4 (four) times a day    Fluad Quadrivalent 0.5 ML PRSY     gabapentin (NEURONTIN) 100 mg capsule TAKE 1 CAPSULE BY MOUTH THREE TIMES A DAY    gabapentin (Neurontin) 600 MG tablet Take 1 tablet (600 mg total) by mouth daily at bedtime    methylPREDNISolone 4 MG tablet therapy pack Use as directed on package    PARoxetine (PAXIL) 20 mg tablet Take 1 tablet (20 mg total) by mouth daily    meloxicam (Mobic) 15 mg tablet Take 1 tablet (15 mg total) by mouth daily     No current facility-administered medications on file prior to visit. She has No Known Allergies. .    Review of Systems   Constitutional:  Negative for activity change, appetite change, fatigue and unexpected weight change. Eyes:  Negative for visual disturbance. Respiratory:  Negative for cough, chest tightness, shortness of breath and wheezing. Cardiovascular:  Negative for chest pain, palpitations and leg swelling. Breast: Patient denies tenderness, nipple discharge, masses, or erythema. Gastrointestinal:  Negative for abdominal distention, abdominal pain, blood in stool, constipation, diarrhea, nausea and vomiting. Endocrine: Negative for cold intolerance and heat intolerance. Genitourinary:  Positive for urgency (Greatly improved). Negative for decreased urine volume, difficulty urinating, dyspareunia, dysuria, frequency, hematuria, menstrual problem, pelvic pain, vaginal bleeding, vaginal discharge and vaginal pain. Musculoskeletal:  Positive for gait problem [facility] with a cane now). Negative for arthralgias. Skin:  Negative for rash. Neurological:  Negative for weakness, light-headedness, numbness and headaches. Hematological:  Does not bruise/bleed easily.    Psychiatric/Behavioral:  Negative for agitation, behavioral problems and sleep disturbance. The patient is not nervous/anxious. Objective:    Vitals:    11/29/23 1049   BP: 136/86   BP Location: Right arm   Patient Position: Sitting   Cuff Size: Standard   Weight: 86.6 kg (191 lb)            Physical Exam  Vitals and nursing note reviewed. Exam conducted with a chaperone present. Constitutional:       General: She is not in acute distress. Appearance: Normal appearance. She is well-developed. HENT:      Head: Normocephalic and atraumatic. Eyes:      General: No scleral icterus. Right eye: No discharge. Left eye: No discharge. Extraocular Movements: Extraocular movements intact. Conjunctiva/sclera: Conjunctivae normal.   Neck:      Thyroid: No thyromegaly. Trachea: No tracheal deviation. Cardiovascular:      Rate and Rhythm: Normal rate and regular rhythm. Heart sounds: Normal heart sounds. No murmur heard. Pulmonary:      Effort: Pulmonary effort is normal. No respiratory distress. Breath sounds: Normal breath sounds. No wheezing. Chest:   Breasts:     Breasts are symmetrical.      Right: No inverted nipple, mass, nipple discharge, skin change or tenderness. Left: No inverted nipple, mass, nipple discharge, skin change or tenderness. Abdominal:      General: Bowel sounds are normal. There is no distension. Palpations: Abdomen is soft. There is no mass. Tenderness: There is no abdominal tenderness. There is no guarding or rebound. Genitourinary:     General: Normal vulva. Exam position: Lithotomy position. Labia:         Right: No rash, tenderness or lesion. Left: No rash, tenderness or lesion. Urethra: No urethral lesion. Vagina: Normal.      Cervix: Normal.      Uterus: Normal. Not deviated, not enlarged and not tender. Adnexa: Right adnexa normal and left adnexa normal.        Right: No mass, tenderness or fullness.           Left: No mass, tenderness or fullness. Rectum: External hemorrhoid present. Comments: Urethral meatus within normal limits. Perineum within normal limits. Bladder well supported. Physiologic vaginal atrophy. 2 external hemorrhoids -6 and 12:00. No ulceration or evidence of current bleeding. Musculoskeletal:         General: No tenderness. Normal range of motion. Cervical back: Normal range of motion and neck supple. Lymphadenopathy:      Cervical: No cervical adenopathy. Skin:     General: Skin is warm and dry. Neurological:      Mental Status: She is alert and oriented to person, place, and time. Psychiatric:         Mood and Affect: Mood normal.         Behavior: Behavior normal.         Thought Content:  Thought content normal.         Judgment: Judgment normal.

## 2024-01-04 ENCOUNTER — RA CDI HCC (OUTPATIENT)
Dept: OTHER | Facility: HOSPITAL | Age: 74
End: 2024-01-04

## 2024-01-07 DIAGNOSIS — F32.A DEPRESSION, UNSPECIFIED DEPRESSION TYPE: ICD-10-CM

## 2024-01-08 ENCOUNTER — OFFICE VISIT (OUTPATIENT)
Dept: INTERNAL MEDICINE CLINIC | Facility: CLINIC | Age: 74
End: 2024-01-08
Payer: MEDICARE

## 2024-01-08 VITALS
RESPIRATION RATE: 18 BRPM | TEMPERATURE: 96.9 F | DIASTOLIC BLOOD PRESSURE: 88 MMHG | SYSTOLIC BLOOD PRESSURE: 124 MMHG | HEIGHT: 60 IN | WEIGHT: 192 LBS | HEART RATE: 111 BPM | OXYGEN SATURATION: 97 % | BODY MASS INDEX: 37.69 KG/M2

## 2024-01-08 DIAGNOSIS — R61 DIAPHORESIS: ICD-10-CM

## 2024-01-08 DIAGNOSIS — E27.40 ADRENAL INSUFFICIENCY (HCC): ICD-10-CM

## 2024-01-08 DIAGNOSIS — R26.81 UNSTEADY GAIT WHEN WALKING: ICD-10-CM

## 2024-01-08 DIAGNOSIS — J01.00 SUBACUTE MAXILLARY SINUSITIS: Primary | ICD-10-CM

## 2024-01-08 DIAGNOSIS — Z20.1 EXPOSURE TO TB: ICD-10-CM

## 2024-01-08 DIAGNOSIS — R73.09 ABNORMAL GLUCOSE: ICD-10-CM

## 2024-01-08 DIAGNOSIS — E02 SUBCLINICAL IODINE-DEFICIENCY HYPOTHYROIDISM: ICD-10-CM

## 2024-01-08 DIAGNOSIS — M48.061 SPINAL STENOSIS OF LUMBAR REGION AT MULTIPLE LEVELS: ICD-10-CM

## 2024-01-08 DIAGNOSIS — F32.A DEPRESSION, UNSPECIFIED DEPRESSION TYPE: ICD-10-CM

## 2024-01-08 DIAGNOSIS — R39.9 UTI SYMPTOMS: ICD-10-CM

## 2024-01-08 DIAGNOSIS — E78.49 OTHER HYPERLIPIDEMIA: ICD-10-CM

## 2024-01-08 DIAGNOSIS — G62.9 NEUROPATHY: ICD-10-CM

## 2024-01-08 DIAGNOSIS — M17.11 PRIMARY OSTEOARTHRITIS OF RIGHT KNEE: ICD-10-CM

## 2024-01-08 PROCEDURE — 99215 OFFICE O/P EST HI 40 MIN: CPT | Performed by: INTERNAL MEDICINE

## 2024-01-08 PROCEDURE — G0439 PPPS, SUBSEQ VISIT: HCPCS | Performed by: INTERNAL MEDICINE

## 2024-01-08 RX ORDER — PAROXETINE HYDROCHLORIDE 20 MG/1
20 TABLET, FILM COATED ORAL DAILY
Qty: 90 TABLET | Refills: 2 | Status: SHIPPED | OUTPATIENT
Start: 2024-01-08

## 2024-01-08 RX ORDER — AZITHROMYCIN 250 MG/1
TABLET, FILM COATED ORAL DAILY
Qty: 6 TABLET | Refills: 0 | Status: SHIPPED | OUTPATIENT
Start: 2024-01-08 | End: 2024-01-13

## 2024-01-08 RX ORDER — PREDNISONE 10 MG/1
TABLET ORAL
COMMUNITY
Start: 2023-10-20

## 2024-01-08 RX ORDER — PAROXETINE HYDROCHLORIDE 20 MG/1
20 TABLET, FILM COATED ORAL DAILY
Qty: 90 TABLET | Refills: 2 | Status: SHIPPED | OUTPATIENT
Start: 2024-01-08 | End: 2024-01-08 | Stop reason: SDUPTHER

## 2024-01-08 RX ORDER — VALACYCLOVIR HYDROCHLORIDE 1 G/1
1000 TABLET, FILM COATED ORAL 3 TIMES DAILY
COMMUNITY
Start: 2023-10-20 | End: 2024-01-08

## 2024-01-08 RX ORDER — GABAPENTIN 100 MG/1
200 CAPSULE ORAL 3 TIMES DAILY
Qty: 180 CAPSULE | Refills: 2 | Status: SHIPPED | OUTPATIENT
Start: 2024-01-08

## 2024-01-08 NOTE — ASSESSMENT & PLAN NOTE
Symptoms and findings on physical examination are consistent with a subacute maxillary sinusitis syndrome.  The patient is placed on Zithromax 5-day pack to be taken as per the insert follow-up if symptoms do not improve.

## 2024-01-08 NOTE — PATIENT INSTRUCTIONS

## 2024-01-08 NOTE — ASSESSMENT & PLAN NOTE
I have requested an updated lipid profile the patient is encouraged to follow a low-cholesterol diet

## 2024-01-08 NOTE — ASSESSMENT & PLAN NOTE
Current dosing of paroxetine at 20 mg daily seems to work well for the patient's depression symptoms no apparent side effects of the drug recommend continuation of current therapy.

## 2024-01-08 NOTE — PROGRESS NOTES
Assessment and Plan:     Problem List Items Addressed This Visit    None       Preventive health issues were discussed with patient, and age appropriate screening tests were ordered as noted in patient's After Visit Summary.  Personalized health advice and appropriate referrals for health education or preventive services given if needed, as noted in patient's After Visit Summary.     History of Present Illness:     Patient presents for a Medicare Wellness Visit    HPI   Patient Care Team:  Clay Montesinos MD as PCP - General  MD Catracho Salgado MD Richard Baker, MD Brian Fellechner, DO Berhanu Geme, MD as Endoscopist     Review of Systems:     Review of Systems     Problem List:     Patient Active Problem List   Diagnosis    Spinal stenosis of lumbar region at multiple levels    Spondylosis    Chronic bilateral low back pain    Encounter for annual routine gynecological examination    Encounter for screening mammogram for malignant neoplasm of breast    Primary osteoarthritis of right knee    Abnormal EKG    Hyperlipidemia    Family history of breast cancer    Stress incontinence    History of lumbar spinal fusion    Depression    Family history of breast cancer in mother    Fall    Acute pain of right knee    Acute pain of left shoulder    Lumbar spondylosis    Spinal stenosis of lumbar region without neurogenic claudication    Encounter for screening mammogram for breast cancer      Past Medical and Surgical History:     Past Medical History:   Diagnosis Date    Ankylosing spondylitis (HCC)     Anxiety     LAST ASSESSED: 12/20/16    Arthritis     Back pain     Carpal tunnel syndrome     Fibromyalgia     LAST ASSESSED: 12/20/16    Fibromyalgia, primary     Heme positive stool     LAST ASSESSED: 10/22/16    High cholesterol     Hyperlipidemia     under control since weight loss    Impingement syndrome of left shoulder     LAST ASSESSED: 2/21/17    Impingement syndrome of right  shoulder     LAST ASSESSED:     Long term use of drug     LAST ASSESSED: 16    Low back pain     No known health problems     NO PERTINENT PAST MEDICAL HX    RLS (restless legs syndrome)     Rotator cuff injury     right    Spinal stenosis     Spinal stenosis     Spondylitis (HCC)     Spondyloarthritis     RESOLVED: 16    Vitamin D deficiency      Past Surgical History:   Procedure Laterality Date    BACK SURGERY      CARPAL TUNNEL RELEASE Left     CERVICAL CONIZATION   W/ LASER      CERVICAL CONIZATION     SECTION      COLONOSCOPY      DILATION AND CURETTAGE OF UTERUS      FL INJECTION RIGHT HIP (NON ARTHROGRAM)  2019    FL INJECTION RIGHT HIP (NON ARTHROGRAM)  2019    FRACTURE SURGERY      HAND SURGERY      JOINT REPLACEMENT      ORTHOPEDIC SURGERY      CA ARTHRODESIS POSTERIOR/PSTLAT TQ 1NTRSPC LUMBAR N/A 2017    Procedure: L2-L5 OPEN DECOMPRESSIVE LUMBAR LAMINECTOMY W/ PEDICLE SCREW AND NILDA FIXATION FUSION (IMPULSE); REMOVAL OF SKIN TAG MID BACK;  Surgeon: Catracho Fields MD;  Location: [facility];  Service: Neurosurgery    CA ARTHRP ACETBLR/PROX FEM PROSTC AGRFT/ALGRFT Right 2019    Procedure: ARTHROPLASTY HIP TOTAL Posterior;  Surgeon: Erich Warren MD;  Location: [facility];  Service: Orthopedics    CA COLONOSCOPY FLX DX W/COLLJ SPEC WHEN PFRMD N/A 10/07/2016    Procedure: EGD AND COLONOSCOPY;  Surgeon: Nathan Pierson MD;  Location: [facility];  Service: Gastroenterology    CA NDSC WRST SURG W/RLS TRANSVRS CARPL LIGM Left 2018    Procedure: RELEASE CARPAL TUNNEL ENDOSCOPIC;  Surgeon: Bon Ferrer MD;  Location: [facility];  Service: Orthopedics    CA NDSC WRST SURG W/RLS TRANSVRS CARPL LIGM Right 2018    Procedure: RELEASE CARPAL TUNNEL ENDOSCOPIC;  Surgeon: Bon Ferrer MD;  Location[facility];  Service: Orthopedics    RETINAL LASER PROCEDURE      SPINE SURGERY      TUBAL LIGATION        Family History:     Family History   Problem  Relation Age of Onset    Arthritis Mother     Breast cancer Mother 72    Hypertension Mother     Heart attack Mother         MYOCARDIAL INFARCTION    Heart attack Father     Hypertension Father     Stroke Father         CEREBROVASCUALR ACCIDENT (CVA)    Arthritis Sister     Uterine cancer Sister     Endometrial cancer Sister 60    Hypertension Sister     Heart attack Brother     Prostate cancer Brother 62    Arthritis Brother     Melanoma Brother     Cancer Brother         Melanoma    Arthritis Family     Hyperlipidemia Family     Depression Son     Breast cancer Maternal Aunt 40    No Known Problems Daughter     No Known Problems Maternal Grandmother     No Known Problems Maternal Grandfather     No Known Problems Paternal Grandmother     No Known Problems Paternal Grandfather     No Known Problems Brother     Other Brother         hunting accident (shot)    Melanoma Brother     Prostate cancer Brother     Heart attack Brother     Stroke Brother     Hypertension Brother     Arthritis Sister     Heart attack Sister     No Known Problems Son     No Known Problems Son     Lung cancer Maternal Uncle     Breast cancer additional onset Other 52    Uterine cancer Other       Social History:     Social History     Socioeconomic History    Marital status: /Civil Union     Spouse name: Not on file    Number of children: 3    Years of education: Not on file    Highest education level: Not on file   Occupational History    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     Comment: EMPLOYED   Tobacco Use    Smoking status: Never    Smokeless tobacco: Never   Vaping Use    Vaping status: Never Used   Substance and Sexual Activity    Alcohol use: Never    Drug use: No    Sexual activity: Yes     Partners: Male     Birth control/protection: Post-menopausal, Female Sterilization   Other Topics Concern    Not on file   Social History Narrative    CAFFEINE USE    DOES NOT EXERCISE     Social Determinants of Health     Financial Resource Strain: Low Risk   (1/3/2024)    Overall Financial Resource Strain (CARDIA)     Difficulty of Paying Living Expenses: Not hard at all   Food Insecurity: Not on file   Transportation Needs: No Transportation Needs (1/3/2024)    PRAPARE - Transportation     Lack of Transportation (Medical): No     Lack of Transportation (Non-Medical): No   Physical Activity: Not on file   Stress: Not on file   Social Connections: Not on file   Intimate Partner Violence: Not on file   Housing Stability: Not on file      Medications and Allergies:     Current Outpatient Medications   Medication Sig Dispense Refill    aspirin 81 MG tablet Take 1 tablet by mouth daily      cephalexin (KEFLEX) 500 mg capsule cephalexin 500 mg capsule   TAKE 4 CAPSULES BY MOUTH 1 HOUR BEFORE DENTAL APPOINTMENT      Cholecalciferol (VITAMIN D3) 50 MCG (2000 UT) capsule Vitamin D3 50 mcg (2,000 unit) capsule   Take by oral route.      clonazePAM (KlonoPIN) 0.5 mg tablet Take 0.5 mg by mouth daily at bedtime  4    Diclofenac Sodium (VOLTAREN) 1 % Apply 4 g topically 4 (four) times a day 100 g 4    Fluad Quadrivalent 0.5 ML PRSY       gabapentin (NEURONTIN) 100 mg capsule TAKE 1 CAPSULE BY MOUTH THREE TIMES A DAY 90 capsule 2    gabapentin (Neurontin) 600 MG tablet Take 1 tablet (600 mg total) by mouth daily at bedtime 90 tablet 3    meloxicam (Mobic) 15 mg tablet Take 1 tablet (15 mg total) by mouth daily 90 tablet 1    methylPREDNISolone 4 MG tablet therapy pack Use as directed on package 1 each 0    PARoxetine (PAXIL) 20 mg tablet TAKE 1 TABLET BY MOUTH EVERY DAY 90 tablet 2     No current facility-administered medications for this visit.     No Known Allergies   Immunizations:     Immunization History   Administered Date(s) Administered    COVID-19 MODERNA VACC 0.5 ML IM 03/09/2021, 03/09/2021, 03/25/2021, 04/26/2021, 04/29/2021, 04/29/2021    H1N1, All Formulations 11/06/2009    INFLUENZA 10/01/2018, 10/15/2020    Influenza Quadrivalent 3 years and older 10/18/2021     Influenza, high dose seasonal 0.7 mL 10/01/2018, 10/17/2019    Pneumococcal Conjugate 13-Valent 12/28/2018    Pneumococcal Polysaccharide PPV23 12/31/2020    Zoster Vaccine Recombinant 11/03/2020    influenza, injectable, quadrivalent 10/18/2021      Health Maintenance:         Topic Date Due    Breast Cancer Screening: Mammogram  09/30/2023    Colorectal Cancer Screening  10/07/2026    Hepatitis C Screening  Completed         Topic Date Due    Influenza Vaccine (1) 09/01/2023    COVID-19 Vaccine (7 - 2023-24 season) 09/01/2023      Medicare Screening Tests and Risk Assessments:         Health Risk Assessment:   Patient rates overall health as good. Patient feels that their physical health rating is slightly worse. Patient is dissatisfied with their life. Eyesight was rated as same. Hearing was rated as same. Patient feels that their emotional and mental health rating is slightly worse. Patients states they are never, rarely angry. Patient states they are often unusually tired/fatigued. Pain experienced in the last 7 days has been some. Patient's pain rating has been 6/10. Patient states that she has experienced no weight loss or gain in last 6 months.     Fall Risk Screening:   In the past year, patient has experienced: history of falling in past year      Urinary Incontinence Screening:   Patient has leaked urine accidently in the last six months.     Home Safety:  Patient has trouble with stairs inside or outside of their home. Patient has working smoke alarms and has working carbon monoxide detector. Home safety hazards include: loose rugs on the floor and household clutter.     Nutrition:   Current diet is Regular, No Added Salt and Limited junk food. Eat more than I should compared  to the  activity that I do    Medications:   Patient is currently taking over-the-counter supplements. OTC medications include: see medication list. Patient is able to manage medications.     Activities of Daily Living  (ADLs)/Instrumental Activities of Daily Living (IADLs):   Walk and transfer into and out of bed and chair?: Yes  Dress and groom yourself?: Yes    Bathe or shower yourself?: Yes    Feed yourself? Yes  Do your laundry/housekeeping?: No  Manage your money, pay your bills and track your expenses?: Yes  Make your own meals?: Yes    Do your own shopping?: Yes    Previous Hospitalizations:   Any hospitalizations or ED visits within the last 12 months?: No      Advance Care Planning:   Living will: No    Durable POA for healthcare: No    Advanced directive: No      PREVENTIVE SCREENINGS      Cardiovascular Screening:    General: Screening Not Indicated and History Lipid Disorder      Diabetes Screening:     General: Screening Current      Colorectal Cancer Screening:     General: Screening Current      Breast Cancer Screening:     General: Screening Current      Cervical Cancer Screening:    General: Screening Not Indicated      Lung Cancer Screening:     General: Screening Not Indicated      Hepatitis C Screening:    General: Screening Current    Screening, Brief Intervention, and Referral to Treatment (SBIRT)    Screening  Typical number of drinks in a day: 0  Typical number of drinks in a week: 0  Interpretation: Low risk drinking behavior.    AUDIT-C Screenin) How often did you have a drink containing alcohol in the past year? monthly or less  2) How many drinks did you have on a typical day when you were drinking in the past year? 1 to 2  3) How often did you have 6 or more drinks on one occasion in the past year? never    AUDIT-C Score: 1  Interpretation: Score 0-2 (female): Negative screen for alcohol misuse    Single Item Drug Screening:  How often have you used an illegal drug (including marijuana) or a prescription medication for non-medical reasons in the past year? never    Single Item Drug Screen Score: 0  Interpretation: Negative screen for possible drug use disorder    No results found.     Physical  Exam:     LMP  (LMP Unknown)     Physical Exam     Clay Montesinos MD

## 2024-01-08 NOTE — ASSESSMENT & PLAN NOTE
Postherpetic neuropathy in the left leg patient is currently on gabapentin 100 mg 3 times a day and 600 mg at bedtime still has significant neuropathy pain will increase daytime dosing to 200 mg morning afternoon and evening and continue 600 mg at bedtime for a total of 1200 mg daily

## 2024-01-08 NOTE — ASSESSMENT & PLAN NOTE
Patient reports intermittent episodes of diaphoresis unclear as to the cause of this.  We have ordered workup including thyroid check screening for TB and adrenal evaluation.

## 2024-01-08 NOTE — ASSESSMENT & PLAN NOTE
Lumbar region spinal stenosis possibly exacerbated by the patient's current arthritic condition of her right knee.  Walking with a cane at this point has had several falls.  She is referred on to physical therapy.

## 2024-01-08 NOTE — ASSESSMENT & PLAN NOTE
X-rays of the part of patient's right knee confirm the presence of severe tricompartmental arthritis of the right knee she is contemplating possibility of surgery in the future.  This time she is walking with a cane for stability.

## 2024-01-08 NOTE — PROGRESS NOTES
Assessment and Plan:     Problem List Items Addressed This Visit          Respiratory    Subacute maxillary sinusitis     Symptoms and findings on physical examination are consistent with a subacute maxillary sinusitis syndrome.  The patient is placed on Zithromax 5-day pack to be taken as per the insert follow-up if symptoms do not improve.         Relevant Medications    azithromycin (Zithromax) 250 mg tablet       Nervous and Auditory    Neuropathy     Postherpetic neuropathy in the left leg patient is currently on gabapentin 100 mg 3 times a day and 600 mg at bedtime still has significant neuropathy pain will increase daytime dosing to 200 mg morning afternoon and evening and continue 600 mg at bedtime for a total of 1200 mg daily         Relevant Medications    gabapentin (NEURONTIN) 100 mg capsule       Musculoskeletal and Integument    Primary osteoarthritis of right knee     X-rays of the part of patient's right knee confirm the presence of severe tricompartmental arthritis of the right knee she is contemplating possibility of surgery in the future.  This time she is walking with a cane for stability.            Other    Spinal stenosis of lumbar region at multiple levels     Lumbar region spinal stenosis possibly exacerbated by the patient's current arthritic condition of her right knee.  Walking with a cane at this point has had several falls.  She is referred on to physical therapy.         Hyperlipidemia     I have requested an updated lipid profile the patient is encouraged to follow a low-cholesterol diet         Relevant Orders    Lipid panel    Depression     Current dosing of paroxetine at 20 mg daily seems to work well for the patient's depression symptoms no apparent side effects of the drug recommend continuation of current therapy.         Relevant Medications    PARoxetine (PAXIL) 20 mg tablet    Diaphoresis     Patient reports intermittent episodes of diaphoresis unclear as to the cause of  this.  We have ordered workup including thyroid check screening for TB and adrenal evaluation.          Other Visit Diagnoses       Unsteady gait when walking    -  Primary    Relevant Orders    Ambulatory Referral to Physical Therapy    Adrenal insufficiency (HCC)        Relevant Orders    Cortisol Level, AM Specimen    Subclinical iodine-deficiency hypothyroidism        Relevant Medications    predniSONE 10 mg tablet    Other Relevant Orders    TSH, 3rd generation    T4, free    Exposure to TB        Relevant Orders    Quantiferon TB Gold Plus Assay    Abnormal glucose        Relevant Orders    Comprehensive metabolic panel    UTI symptoms        Relevant Orders    Urinalysis with microscopic             Preventive health issues were discussed with patient, and age appropriate screening tests were ordered as noted in patient's After Visit Summary.  Personalized health advice and appropriate referrals for health education or preventive services given if needed, as noted in patient's After Visit Summary.     History of Present Illness:     Patient presents for a Medicare Wellness Visit    This 73-year-old female patient presents today for her annual complete physical examination.  During today's visit the patient is expressed symptoms of nasal congestion and cough which has been present for approximately 1 week.  Mucus production is noted to be green by the patient.  She also reports 2 to 3 months of frequent sweats without provocation.    On review of symptoms the patient also indicates that she did experience a breakout of shingles in her left leg that was handled by dermatology this was in October 2023.  She continues to have significant post herpetic neuropathy in the left leg    Prior to the patient's visit a 10-minute period of time was used to review her medical records.  40 minutes was spent with the patient during today's examination and revie and 12 minutes were spent after the patient's visit completing  note and ordering studies       Patient Care Team:  Clay Montesinos MD as PCP - General  MD Catracho Salgado MD Richard Baker, MD Brian Fellechner, DO Berhanu Geme, MD as Endoscopist     Review of Systems:     Review of Systems   HENT:  Positive for congestion.    Respiratory:  Positive for cough.    Musculoskeletal:  Positive for arthralgias.        Pain in the right knee   Neurological:         Postherpetic neuropathy pain in the left leg   All other systems reviewed and are negative.       Problem List:     Patient Active Problem List   Diagnosis    Spinal stenosis of lumbar region at multiple levels    Spondylosis    Chronic bilateral low back pain    Encounter for annual routine gynecological examination    Encounter for screening mammogram for malignant neoplasm of breast    Primary osteoarthritis of right knee    Abnormal EKG    Hyperlipidemia    Family history of breast cancer    Stress incontinence    History of lumbar spinal fusion    Depression    Family history of breast cancer in mother    Fall    Acute pain of right knee    Acute pain of left shoulder    Lumbar spondylosis    Spinal stenosis of lumbar region without neurogenic claudication    Encounter for screening mammogram for breast cancer    Neuropathy    Subacute maxillary sinusitis    Diaphoresis      Past Medical and Surgical History:     Past Medical History:   Diagnosis Date    Ankylosing spondylitis (HCC)     Anxiety     LAST ASSESSED: 12/20/16    Arthritis     Back pain     Carpal tunnel syndrome     Fibromyalgia     LAST ASSESSED: 12/20/16    Fibromyalgia, primary     Heme positive stool     LAST ASSESSED: 10/22/16    High cholesterol     Hyperlipidemia     under control since weight loss    Impingement syndrome of left shoulder     LAST ASSESSED: 2/21/17    Impingement syndrome of right shoulder     LAST ASSESSED: 2/21/1/7    Long term use of drug     LAST ASSESSED: 12/20/16    Low back pain     No  known health problems     NO PERTINENT PAST MEDICAL HX    RLS (restless legs syndrome)     Rotator cuff injury     right    Spinal stenosis     Spinal stenosis     Spondylitis (HCC)     Spondyloarthritis     RESOLVED: 16    Vitamin D deficiency      Past Surgical History:   Procedure Laterality Date    BACK SURGERY      CARPAL TUNNEL RELEASE Left     CERVICAL CONIZATION   W/ LASER      CERVICAL CONIZATION     SECTION      COLONOSCOPY      DILATION AND CURETTAGE OF UTERUS      FL INJECTION RIGHT HIP (NON ARTHROGRAM)  2019    FL INJECTION RIGHT HIP (NON ARTHROGRAM)  2019    FRACTURE SURGERY      HAND SURGERY      JOINT REPLACEMENT      ORTHOPEDIC SURGERY      KS ARTHRODESIS POSTERIOR/PSTLAT TQ 1NTRSPC LUMBAR N/A 2017    Procedure: L2-L5 OPEN DECOMPRESSIVE LUMBAR LAMINECTOMY W/ PEDICLE SCREW AND NILDA FIXATION FUSION (IMPULSE); REMOVAL OF SKIN TAG MID BACK;  Surgeon: Catracho Fields MD;  Location: [facility];  Service: Neurosurgery    KS ARTHRP ACETBLR/PROX FEM PROSTC AGRFT/ALGRFT Right 2019    Procedure: ARTHROPLASTY HIP TOTAL Posterior;  Surgeon: Erich Warren MD;  Location: [facility];  Service: Orthopedics    KS COLONOSCOPY FLX DX W/COLLJ SPEC WHEN PFRMD N/A 10/07/2016    Procedure: EGD AND COLONOSCOPY;  Surgeon: Nathan Pierson MD;  Location: [facility];  Service: Gastroenterology    KS NDSC WRST SURG W/RLS TRANSVRS CARPL LIGM Left 2018    Procedure: RELEASE CARPAL TUNNEL ENDOSCOPIC;  Surgeon: Bon Ferrer MD;  Location: [facility];  Service: Orthopedics    KS NDSC WRST SURG W/RLS TRANSVRS CARPL LIGM Right 2018    Procedure: RELEASE CARPAL TUNNEL ENDOSCOPIC;  Surgeon: Bon Ferrer MD;  Location[facility];  Service: Orthopedics    RETINAL LASER PROCEDURE      SPINE SURGERY      TUBAL LIGATION        Family History:     Family History   Problem Relation Age of Onset    Arthritis Mother     Breast cancer Mother 72    Hypertension Mother     Heart attack  Mother         MYOCARDIAL INFARCTION    Heart attack Father     Hypertension Father     Stroke Father         CEREBROVASCUALR ACCIDENT (CVA)    Arthritis Sister     Uterine cancer Sister     Endometrial cancer Sister 60    Hypertension Sister     Heart attack Brother     Prostate cancer Brother 62    Arthritis Brother     Melanoma Brother     Cancer Brother         Melanoma    Arthritis Family     Hyperlipidemia Family     Depression Son     Breast cancer Maternal Aunt 40    No Known Problems Daughter     No Known Problems Maternal Grandmother     No Known Problems Maternal Grandfather     No Known Problems Paternal Grandmother     No Known Problems Paternal Grandfather     No Known Problems Brother     Other Brother         hunting accident (shot)    Melanoma Brother     Prostate cancer Brother     Heart attack Brother     Stroke Brother     Hypertension Brother     Arthritis Sister     Heart attack Sister     No Known Problems Son     No Known Problems Son     Lung cancer Maternal Uncle     Breast cancer additional onset Other 52    Uterine cancer Other       Social History:     Social History     Socioeconomic History    Marital status: /Civil Union     Spouse name: None    Number of children: 3    Years of education: None    Highest education level: None   Occupational History    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     Comment: EMPLOYED   Tobacco Use    Smoking status: Never    Smokeless tobacco: Never   Vaping Use    Vaping status: Never Used   Substance and Sexual Activity    Alcohol use: Never    Drug use: No    Sexual activity: Yes     Partners: Male     Birth control/protection: Post-menopausal, Female Sterilization   Other Topics Concern    None   Social History Narrative    CAFFEINE USE    DOES NOT EXERCISE     Social Determinants of Health     Financial Resource Strain: Low Risk  (1/3/2024)    Overall Financial Resource Strain (CARDIA)     Difficulty of Paying Living Expenses: Not hard at all   Food Insecurity: Not  on file   Transportation Needs: No Transportation Needs (1/3/2024)    PRAPARE - Transportation     Lack of Transportation (Medical): No     Lack of Transportation (Non-Medical): No   Physical Activity: Not on file   Stress: Not on file   Social Connections: Not on file   Intimate Partner Violence: Not on file   Housing Stability: Not on file      Medications and Allergies:     Current Outpatient Medications   Medication Sig Dispense Refill    aspirin 81 MG tablet Take 1 tablet by mouth daily      azithromycin (Zithromax) 250 mg tablet Take 2 tablets (500 mg total) by mouth daily for 1 day, THEN 1 tablet (250 mg total) daily for 4 days. 6 tablet 0    cephalexin (KEFLEX) 500 mg capsule cephalexin 500 mg capsule   TAKE 4 CAPSULES BY MOUTH 1 HOUR BEFORE DENTAL APPOINTMENT      Cholecalciferol (VITAMIN D3) 50 MCG (2000 UT) capsule Vitamin D3 50 mcg (2,000 unit) capsule   Take by oral route.      clonazePAM (KlonoPIN) 0.5 mg tablet Take 0.5 mg by mouth daily at bedtime  4    Diclofenac Sodium (VOLTAREN) 1 % Apply 4 g topically 4 (four) times a day 100 g 4    Fluad Quadrivalent 0.5 ML PRSY       gabapentin (NEURONTIN) 100 mg capsule Take 2 capsules (200 mg total) by mouth 3 (three) times a day 180 capsule 2    gabapentin (Neurontin) 600 MG tablet Take 1 tablet (600 mg total) by mouth daily at bedtime 90 tablet 3    PARoxetine (PAXIL) 20 mg tablet Take 1 tablet (20 mg total) by mouth daily 90 tablet 2    predniSONE 10 mg tablet PLEASE SEE ATTACHED FOR DETAILED DIRECTIONS      meloxicam (Mobic) 15 mg tablet Take 1 tablet (15 mg total) by mouth daily 90 tablet 1     No current facility-administered medications for this visit.     No Known Allergies   Immunizations:     Immunization History   Administered Date(s) Administered    COVID-19 MODERNA VACC 0.5 ML IM 03/09/2021, 03/09/2021, 03/09/2021, 03/25/2021, 04/26/2021, 04/29/2021, 04/29/2021, 04/29/2021    H1N1, All Formulations 11/06/2009    INFLUENZA 10/01/2018, 10/15/2020,  10/18/2021    Influenza Quadrivalent 3 years and older 10/18/2021    Influenza, high dose seasonal 0.7 mL 10/01/2018, 10/17/2019    Pneumococcal Conjugate 13-Valent 12/28/2018    Pneumococcal Polysaccharide PPV23 12/31/2020    Zoster Vaccine Recombinant 11/03/2020    influenza, injectable, quadrivalent 10/18/2021      Health Maintenance:         Topic Date Due    Breast Cancer Screening: Mammogram  09/30/2023    Colorectal Cancer Screening  10/07/2026    Hepatitis C Screening  Completed         Topic Date Due    Influenza Vaccine (1) 09/01/2023    COVID-19 Vaccine (9 - 2023-24 season) 09/01/2023      Medicare Screening Tests and Risk Assessments:     Lola is here for her Subsequent Wellness visit. Last Medicare Wellness visit information reviewed, patient interviewed, no change since last AWV.     Health Risk Assessment:   Patient rates overall health as good. Patient feels that their physical health rating is slightly worse. Patient is dissatisfied with their life. Eyesight was rated as same. Hearing was rated as same. Patient feels that their emotional and mental health rating is slightly worse. Patients states they are never, rarely angry. Patient states they are often unusually tired/fatigued. Pain experienced in the last 7 days has been some. Patient's pain rating has been 6/10. Patient states that she has experienced no weight loss or gain in last 6 months.     Fall Risk Screening:   In the past year, patient has experienced: history of falling in past year      Urinary Incontinence Screening:   Patient has leaked urine accidently in the last six months.     Home Safety:  Patient has trouble with stairs inside or outside of their home. Patient has working smoke alarms and has working carbon monoxide detector. Home safety hazards include: loose rugs on the floor and household clutter.     Nutrition:   Current diet is Regular, No Added Salt and Limited junk food. Eat more than I should compared  to the   activity that I do    Medications:   Patient is currently taking over-the-counter supplements. OTC medications include: see medication list. Patient is able to manage medications.     Activities of Daily Living (ADLs)/Instrumental Activities of Daily Living (IADLs):   Walk and transfer into and out of bed and chair?: Yes  Dress and groom yourself?: Yes    Bathe or shower yourself?: Yes    Feed yourself? Yes  Do your laundry/housekeeping?: No  Manage your money, pay your bills and track your expenses?: Yes  Make your own meals?: Yes    Do your own shopping?: Yes    Previous Hospitalizations:   Any hospitalizations or ED visits within the last 12 months?: No      Advance Care Planning:   Living will: No    Durable POA for healthcare: No    Advanced directive: No      PREVENTIVE SCREENINGS      Cardiovascular Screening:    General: Screening Not Indicated and History Lipid Disorder      Diabetes Screening:     General: Screening Current      Colorectal Cancer Screening:     General: Screening Current      Breast Cancer Screening:     General: Screening Current      Cervical Cancer Screening:    General: Screening Not Indicated      Lung Cancer Screening:     General: Screening Not Indicated      Hepatitis C Screening:    General: Screening Current    Screening, Brief Intervention, and Referral to Treatment (SBIRT)    Screening  Typical number of drinks in a day: 0  Typical number of drinks in a week: 0  Interpretation: Low risk drinking behavior.    AUDIT-C Screenin) How often did you have a drink containing alcohol in the past year? monthly or less  2) How many drinks did you have on a typical day when you were drinking in the past year? 1 to 2  3) How often did you have 6 or more drinks on one occasion in the past year? never    AUDIT-C Score: 1  Interpretation: Score 0-2 (female): Negative screen for alcohol misuse    Single Item Drug Screening:  How often have you used an illegal drug (including marijuana) or  a prescription medication for non-medical reasons in the past year? never    Single Item Drug Screen Score: 0  Interpretation: Negative screen for possible drug use disorder    No results found.     Physical Exam:     /88 (BP Location: Left arm, Patient Position: Sitting, Cuff Size: Large)   Pulse (!) 111   Temp (!) 96.9 °F (36.1 °C) (Tympanic Core)   Resp 18   Ht 5' (1.524 m)   Wt 87.1 kg (192 lb)   LMP  (LMP Unknown)   SpO2 97%   BMI 37.50 kg/m²     Physical Exam  Vitals and nursing note reviewed.   Constitutional:       General: She is not in acute distress.     Appearance: She is well-developed. She is ill-appearing.   HENT:      Head: Normocephalic and atraumatic.      Right Ear: Tympanic membrane, ear canal and external ear normal.      Left Ear: Tympanic membrane, ear canal and external ear normal.      Nose: Congestion present.      Mouth/Throat:      Mouth: Mucous membranes are moist.      Pharynx: Oropharynx is clear.   Eyes:      Extraocular Movements: Extraocular movements intact.      Conjunctiva/sclera: Conjunctivae normal.      Pupils: Pupils are equal, round, and reactive to light.   Neck:      Vascular: No carotid bruit.   Cardiovascular:      Rate and Rhythm: Normal rate and regular rhythm.      Heart sounds: No murmur heard.  Pulmonary:      Effort: Pulmonary effort is normal. No respiratory distress.      Breath sounds: Normal breath sounds. No wheezing, rhonchi or rales.   Abdominal:      General: Abdomen is flat. Bowel sounds are normal. There is no distension.      Palpations: Abdomen is soft. There is no mass.      Tenderness: There is no abdominal tenderness. There is no guarding.   Musculoskeletal:         General: No swelling.      Cervical back: Normal range of motion and neck supple. No rigidity or tenderness.      Right lower leg: No edema.      Left lower leg: No edema.      Comments: Arthritic deformity of the right knee   Lymphadenopathy:      Cervical: No cervical  adenopathy.   Skin:     General: Skin is warm and dry.      Capillary Refill: Capillary refill takes less than 2 seconds.      Coloration: Skin is not jaundiced or pale.   Neurological:      General: No focal deficit present.      Mental Status: She is alert. Mental status is at baseline.      Comments: Postherpetic type neuropathy in the left leg   Psychiatric:         Mood and Affect: Mood normal.         Behavior: Behavior normal.         Thought Content: Thought content normal.         Judgment: Judgment normal.          Clay Montesinos MD

## 2024-01-12 ENCOUNTER — EVALUATION (OUTPATIENT)
Dept: PHYSICAL THERAPY | Facility: CLINIC | Age: 74
End: 2024-01-12
Payer: MEDICARE

## 2024-01-12 DIAGNOSIS — R26.81 UNSTEADY GAIT WHEN WALKING: ICD-10-CM

## 2024-01-12 DIAGNOSIS — R68.89 ACTIVITY INTOLERANCE: Primary | ICD-10-CM

## 2024-01-12 DIAGNOSIS — R26.89 BALANCE PROBLEM: ICD-10-CM

## 2024-01-12 DIAGNOSIS — R29.6 FREQUENT FALLS: ICD-10-CM

## 2024-01-12 DIAGNOSIS — R29.898 WEAKNESS OF RIGHT LOWER EXTREMITY: ICD-10-CM

## 2024-01-12 PROCEDURE — 97112 NEUROMUSCULAR REEDUCATION: CPT

## 2024-01-12 PROCEDURE — 97162 PT EVAL MOD COMPLEX 30 MIN: CPT

## 2024-01-12 NOTE — PROGRESS NOTES
PT Evaluation     Today's date: 2024  Patient name: Lola Spangler  : 1950  MRN: [id number]  Referring provider: Clay Montesinos, *  Dx:   Encounter Diagnosis     ICD-10-CM    1. Activity intolerance  R68.89       2. Unsteady gait when walking  R26.81 Ambulatory Referral to Physical Therapy      3. Balance problem  R26.89       4. Frequent falls  R29.6       5. Weakness of right lower extremity  R29.898           Start Time: 1030  Stop Time: 1115  Total time in clinic (min): 45 minutes    Assessment  Assessment details: Lola Spangler is a 73 y.o. female who presents to physical therapy with history of frequent falls due to balance impairments and BLE weakness, previous pt at [address] [place] [address] for similar deficits with some success limited by R knee pain. Lola Spangler has a pertinent PMH of R knee osteoarthrits, lumbar spinal stenosis, chronic low back pain, HLD, and depression. Patient demonstrates impaired lower extremity strength per MMTs with decreased strength RLE vs LLE and functional strength deficits per 5xSTS requiring use of BUE, impaired balance per the TUG and FGA, and impaired gait speed and endurance per the 6 MWT, indicating pt is at an increased risk for falls and is performing below the predetermined norms for safe community ambulation. Patient may benefit from skilled physical therapy 2x per week for 8 weeks to address the above impairments and facilitate return to daily activities with independence and decreased risk for falls.   Impairments: abnormal coordination, abnormal gait, abnormal or restricted ROM, abnormal movement, activity intolerance, impaired balance, impaired physical strength, lacks appropriate home exercise program, safety issue, poor posture  and poor body mechanics    Symptom irritability: moderateUnderstanding of Dx/Px/POC: good   Prognosis: good    Goals  STG:  Patient will improve 6MWT by 200ft demonstrating significant improvements in endurance  w/in 4  weeks  Patient will be able to perform STS without use of UE wihtin 4 weeks   Patient will demonstrates significant improvements in dynamic balance by improving FGA score by 6 secs or more within 4 weeks  Patient will be able to negotiates stairs with reciprocal pattern with use of handrail within 4 weeks  Patient will be independent with HEP within 4 weeks  LTG:  Patient will improve 6MWT by 400ft demonstrating significant improvements in endurance  w/in 8 weeks  Patient will demonstrated improved LE strength and endurance by improved 5xSTS to 15s or less within 8 weeks  Patient will improve TUG to 12s or less indicating they are no longer at risk for increased falls 8 weeks  Patient will improve FGA to 22/30 or greater indicating they are no longer at higher risk for falls within 8 weeks   Patient will improve gait speed to 1.0 m/s indicating they are no longer at higher risk for falls within 8 weeks    Plan  Plan details: Address BLE weakness with functional strengthening to improve transfers and ambulation  Address ambulation endurance w/ LRAD and possibly w/o AD to achieve pt's goal of walking without SPC   Address dynamic balance w/ vestibular integration, compliant surfaces, and narrow base o support  Provide LE strengthening and core stregnthening HEP   Planned therapy interventions: abdominal trunk stabilization, ADL training, balance, body mechanics training, coordination, flexibility, functional ROM exercises, gait training, home exercise program, neuromuscular re-education, patient education, postural training, sensory integrative techniques, strengthening, stretching, therapeutic activities, therapeutic exercise and transfer training  Frequency: 2x week  Duration in weeks: 8  Plan of Care beginning date: 1/12/2024  Plan of Care expiration date: 3/8/2024  Treatment plan discussed with: patient        Subjective Evaluation    History of Present Illness  Mechanism of injury: -arthritis in R knee, doctor is  recommending R knee replacement at some point   -has SPC but doesn't use it in the house, also has a RW but doesn't use it   -had previous surgeries for R hip replacement and back   -had shingles on L side   -needed help getting up from most falls, Apple watch worn that has fall detection on it   Patient Goals  Patient goal: get rid of SPC, get more strength, get more confident about not falling, work on her core strength  Pain  Current pain ratin  At best pain ratin  At worst pain ratin  Location: R knee, low back pain at worst is 7.5/10, best is at 0/10  Quality: dull ache  Relieving factors: medications, rest and change in position  Aggravating factors: walking, standing and stair climbing    Social Support  Stairs in house: yes (14 step to second floor for only bathroom, goes one at a time, railings on both sides)   Lives in: multiple-level home  Lives with: spouse    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)  Exercise history: no routine, interested in going to [employer] for [employer]  Life stress: drives      Diagnostic Tests  No diagnostic tests performed  Treatments  Previous treatment: physical therapy        Objective  PT/OT Neuro Exam  Neurologic Exam    VITALS: Seated, manual, L UE  BP: 132/88 mmHg  HR: 76 BPM  O2: 99 %    ABC: 51.8     Sensation Left Right   Kinesthesia     Light Touch Wnl  Impaired sensation R shin/lateral lower leg    Sharp/Dull       2 Point Discrimination             Manual Muscle Testing    Lower Extremities Left Right Pain   Hip      Flexion 4 3+    Extension      Abduction 4 4    Adduction 4 4       Knee      Flexion 5 4    Extension 5 4       Ankle      Dorsiflexion 5 4    Plantarflexion 5 3+        Transfers   Sit to stand Independent w/ UE   Stand to sit Independent w/ UE       Balance Testing   Modified Clinical Test of Sensory Interaction of Balance  (MCTSIB) (seconds): IE: TBA NV  Firm EO:   Firm EC:   Foam EO:   Foam EC:    Messina Balance Scale (BBS):  <45 greater  risk for falls (Messina et al 1992)    MCID:   4 points 45-56 initially  5 points 35-44 initially  7 points 25-34 initially  5 points 0-24 initially   IE: not tested   Functional Gait Assessment (FGA)  < Or = 22/30 greater risk for falls    MDC:  Geriatric: 4  Stroke: 4  Parkinson's Disease: 4  Vestibular: 6 IE: 9/30       Mobility & Endurance Testing   5x Sit to Stand (seconds)  >15 seconds greater risk for falls (elderly)    MCID: 2.3 seconds IE: 15.27sec BUE    Timed Up-and-Go (seconds)  >13.5 seconds greater risk for falls   IE: 16.63 sec w/ SPC, 13.75s w/o AD   6 Minute Walk Test (meters)  Geriatrics:  Meters:         Male  Female  60-69 years  572    538  70-79 years  527    471  80-89 years  417    392    MCID: 50 meters/164 feet IE: 720ft w/ SPC      Gait speed: Self-selected & fast (meters/second)    1.0 m/s normal  1.2 m/s community-level speed    MCID: 0.10 m/s IE: 6m/8.46s=0.71m/s w/ SPC        Gait Analysis: decreased RLE stance, increased lateral weightshifting, occasional RLE circumduction during swing phase, decreased trunk rotation    Stair Navigation: step to step ascending/descending, bilateral railings     Short Term Goal Expiration Date:(2/9/24 4 weeks)  Long Term Goal Expiration Date: (3/8/24 8 weeks)  POC Expiration Date: (3/8/24 8 weeks)    Visit count: 1 of 10;     POC expires Unit limit Auth Expiration date PT/OT/ST + Visit Limit?   3/8/24 6 PT/OT 12/31/24 $2330CAP/BOMN PCY                           Visit/Unit Tracking  AUTH Status:  Date 1/12 IE             pending Used               Remaining                     Precautions frequent falls       Manuals 1/12 IE                                       Neuro Re-Ed         Pt education Outcome measures results, integration of balance systems, safety measures at home to decrease fall risk, POC focus                                                        Ther Ex                                                                        Ther Activity                         Gait Training                        Modalities

## 2024-01-16 ENCOUNTER — APPOINTMENT (OUTPATIENT)
Dept: PHYSICAL THERAPY | Facility: CLINIC | Age: 74
End: 2024-01-16
Payer: MEDICARE

## 2024-01-18 ENCOUNTER — OFFICE VISIT (OUTPATIENT)
Dept: PHYSICAL THERAPY | Facility: CLINIC | Age: 74
End: 2024-01-18
Payer: MEDICARE

## 2024-01-18 DIAGNOSIS — R26.89 BALANCE PROBLEM: ICD-10-CM

## 2024-01-18 DIAGNOSIS — R26.81 UNSTEADY GAIT WHEN WALKING: Primary | ICD-10-CM

## 2024-01-18 DIAGNOSIS — R68.89 ACTIVITY INTOLERANCE: ICD-10-CM

## 2024-01-18 DIAGNOSIS — R29.6 FREQUENT FALLS: ICD-10-CM

## 2024-01-18 DIAGNOSIS — R29.898 WEAKNESS OF RIGHT LOWER EXTREMITY: ICD-10-CM

## 2024-01-18 PROCEDURE — 97112 NEUROMUSCULAR REEDUCATION: CPT

## 2024-01-18 NOTE — PROGRESS NOTES
Daily Note     Today's date: 2024  Patient name: Lola Spangler  : 1950  MRN: [id number]  Referring provider: Clay Montesinos, *  Dx:   Encounter Diagnosis     ICD-10-CM    1. Unsteady gait when walking  R26.81       2. Activity intolerance  R68.89       3. Balance problem  R26.89       4. Frequent falls  R29.6       5. Weakness of right lower extremity  R29.898           Start Time: 0815  Stop Time: 0900  Total time in clinic (min): 45 minutes    Subjective: knee isn't feeling too bad, L foot is a little sore but she thinks it's because of wearing snow boots a lot lately       Objective: See treatment diary below        Balance Testing   Modified Clinical Test of Sensory Interaction of Balance  (MCTSIB) (seconds): IE:   Firm EO: 30s  Firm EC: 30s  Foam EO: 30s  Foam EC: 5s (best trial)   Messina Balance Scale (BBS):  <45 greater risk for falls (Messina et al 1992)     MCID:   4 points 45-56 initially  5 points 35-44 initially  7 points 25-34 initially  5 points 0-24 initially   IE: not tested   Functional Gait Assessment (FGA)  < Or = 22/30 greater risk for falls     MDC:  Geriatric: 4  Stroke: 4  Parkinson's Disease: 4  Vestibular: 6 IE:            Assessment: Tolerated treatment fair. Patient demonstrated fatigue post treatment and would benefit from continued PT. Session focused on introduction of dynamic balance activities w/o use of AD; significant difficulty noted with LLE stance and bilateral lateral weightshifting to off-load RLE; occasional LOB requiring modAx1 from PT to recover, often due to poor foot placement during obstacle negotiation. Also observed tendency to lean forward as pt fatigued, which led to losing her balance anteriorly. Cued to shift weight anteriorly during STS transfers with emphasis on pushing her hips forward when standing up to maintain upright posture. Will cont to strengthen BLE with focus on LLE stability, negotation of obstacles and uneven surfaces, and overall  "activity tolerance.       Plan: Continue per plan of care.      Short Term Goal Expiration Date:(2/9/24 4 weeks)  Long Term Goal Expiration Date: (3/8/24 8 weeks)  POC Expiration Date: (3/8/24 8 weeks)    Visit count: 1 of 10;     POC expires Unit limit Auth Expiration date PT/OT/ST + Visit Limit?   3/8/24 6 PT/OT 12/31/24 $2330CAP/BOMN PCY                           Visit/Unit Tracking  AUTH Status:  Date 1/12 IE 1/18            pending Used 1 2             Remaining                     Precautions frequent falls       Manuals 1/12 IE 1/18                                      Neuro Re-Ed   MCTSIB (see above)      Pt education Outcome measures results, integration of balance systems, safety measures at home to decrease fall risk, POC focus        HKM  SOLO   1/4 track   X3 laps fwd      Hurdles   SOLO   4\" (4) hurdles   X3 laps fwd leading with LLE   X3 laps fwd leading with RLE   X3 laps lat       Step taps/step ups   SOLO   6\" step taps   3x10 ea LE fwd       STS  SOLO   No UE  2x8     Vc to shift weight anteriorly and push hips forward upon standing                      Ther Ex                                                                        Ther Activity                        Gait Training                        Modalities                                      "

## 2024-01-19 ENCOUNTER — OFFICE VISIT (OUTPATIENT)
Dept: PHYSICAL THERAPY | Facility: CLINIC | Age: 74
End: 2024-01-19
Payer: MEDICARE

## 2024-01-19 DIAGNOSIS — R29.898 WEAKNESS OF RIGHT LOWER EXTREMITY: ICD-10-CM

## 2024-01-19 DIAGNOSIS — R68.89 ACTIVITY INTOLERANCE: ICD-10-CM

## 2024-01-19 DIAGNOSIS — R26.81 UNSTEADY GAIT WHEN WALKING: Primary | ICD-10-CM

## 2024-01-19 DIAGNOSIS — R29.6 FREQUENT FALLS: ICD-10-CM

## 2024-01-19 DIAGNOSIS — R26.89 BALANCE PROBLEM: ICD-10-CM

## 2024-01-19 PROCEDURE — 97112 NEUROMUSCULAR REEDUCATION: CPT

## 2024-01-19 NOTE — PROGRESS NOTES
"Daily Note     Today's date: 2024  Patient name: Lola Spangler  : 1950  MRN: [id number]  Referring provider: Clay Montesinos, *  Dx:   Encounter Diagnosis     ICD-10-CM    1. Unsteady gait when walking  R26.81       2. Activity intolerance  R68.89       3. Balance problem  R26.89       4. Frequent falls  R29.6       5. Weakness of right lower extremity  R29.898             Start Time: 0800  Stop Time: 0845  Total time in clinic (min): 45 minutes    Subjective: left foot was more sore last night and this morning, will keep an eye on it over the weekend to make sure it doesn't get worse but thinks its because she's using her left leg more than usual     Objective: See treatment diary below      Assessment: Tolerated treatment fair. Patient demonstrated fatigue post treatment and would benefit from continued PT. Difficulty w/ LLE stance in order to reach outside of ROBBY with RLE; improvements in marty negotiation both forward and lateral directions. Followed up with off loaded STS to facilitate weightshift onto LLE, attempted w/o UE support but required BUE on arm rests due to fatigue. Some improvements noted in LLE stance when cued to \"tighten L leg and push through the floor) to increase muscle activation. May benefit from reviewing general LE strengthening HEP next visit to improve overall stability of LLE during SLS and neuromuscular connection for muscle activation. Will cont to strengthen BLE with focus on LLE stability, negotation of obstacles and uneven surfaces, and overall activity tolerance.       Plan: Continue per plan of care.      Short Term Goal Expiration Date:(24 4 weeks)  Long Term Goal Expiration Date: (3/8/24 8 weeks)  POC Expiration Date: (3/8/24 8 weeks)    Visit count: 1 of 10;     POC expires Unit limit Auth Expiration date PT/OT/ST + Visit Limit?   3/8/24 6 PT/OT 24 $2330CAP/BOMN PCY                           Visit/Unit Tracking  AUTH Status:  Date  IE  " "1/19           pending Used 1 2 3            Remaining                     Precautions frequent falls       Manuals 1/12 IE 1/18 1/19                                     Neuro Re-Ed   MCTSIB (see above)      Pt education Outcome measures results, integration of balance systems, safety measures at home to decrease fall risk, POC focus        HKM  SOLO   1/4 track   X3 laps fwd      Hurdles   SOLO   4\" (4) hurdles   X3 laps fwd leading with LLE   X3 laps fwd leading with RLE   X3 laps lat  SOLO   4\" (4) hurldes  X3 laps fwd alt leading with LE   2x2 laps lat      Step taps/step ups   SOLO   6\" step taps   3x10 ea LE fwd  SOLO   6\" step taps   4x10 ea LE fwd      STS  SOLO   No UE  2x8     Vc to shift weight anteriorly and push hips forward upon standing SOLO   BUE support   AirEx pad under RLE to bias LLE   2x8      Dynamic balance   SOLO   Cone taps w/ small yellow markers  2x3 laps fwd              Ther Ex                                                                        Ther Activity                        Gait Training                        Modalities                                      "

## 2024-01-23 ENCOUNTER — OFFICE VISIT (OUTPATIENT)
Dept: PHYSICAL THERAPY | Facility: CLINIC | Age: 74
End: 2024-01-23
Payer: MEDICARE

## 2024-01-23 DIAGNOSIS — R26.89 BALANCE PROBLEM: ICD-10-CM

## 2024-01-23 DIAGNOSIS — R26.81 UNSTEADY GAIT WHEN WALKING: Primary | ICD-10-CM

## 2024-01-23 DIAGNOSIS — R29.6 FREQUENT FALLS: ICD-10-CM

## 2024-01-23 DIAGNOSIS — R29.898 WEAKNESS OF RIGHT LOWER EXTREMITY: ICD-10-CM

## 2024-01-23 DIAGNOSIS — R68.89 ACTIVITY INTOLERANCE: ICD-10-CM

## 2024-01-23 PROCEDURE — 97112 NEUROMUSCULAR REEDUCATION: CPT

## 2024-01-23 PROCEDURE — 97110 THERAPEUTIC EXERCISES: CPT

## 2024-01-23 NOTE — PROGRESS NOTES
Daily Note     Today's date: 2024  Patient name: Lola Spangler  : 1950  MRN: [id number]  Referring provider: Clay Montesinos, *  Dx:   Encounter Diagnosis     ICD-10-CM    1. Unsteady gait when walking  R26.81       2. Activity intolerance  R68.89       3. Balance problem  R26.89       4. Frequent falls  R29.6       5. Weakness of right lower extremity  R29.898               Start Time: 0815  Stop Time: 0900  Total time in clinic (min): 45 minutes    Subjective: left foot is still really bothering her mostly at night, thinks it's from her shingles episode back on October since it's really only the outside of her L foot and her shingles affected the L5 level according to her doctor. Is taking Tylenol and gabapentin for her restless leg syndrome and feels like that helps with the foot pain too.    Objective: See treatment diary below      Assessment: Tolerated treatment fair. Patient demonstrated fatigue post treatment and would benefit from continued PT. Able to progress marty height and number of hurdles navigated w/o significant LOB observed or increased need for rest breaks. Provided LE strengthening HEP and reviewed exercises during session; advised pt to perform HEP on the days she doesn't have PT in order to increase LE strength especially LLE as this is the side that she feels less stable on and experiences more LOB when in LLE stance; pt demo'd proper technique during HEP review and voiced understanding of frequency and duration. Will cont to strengthen BLE with focus on LLE stability, negotation of obstacles and uneven surfaces, and overall activity tolerance.       Plan: Continue per plan of care.      Short Term Goal Expiration Date:(24 4 weeks)  Long Term Goal Expiration Date: (3/8/24 8 weeks)  POC Expiration Date: (3/8/24 8 weeks)    Visit count: 1 of 10;     POC expires Unit limit Auth Expiration date PT/OT/ST + Visit Limit?   3/8/24 6 PT/OT 24 $2330CAP/BOMN PCY             "               Visit/Unit Tracking  AUTH Status:  Date 1/12 IE 1/18 1/19 1/23          pending Used 1 2 3 4           Remaining                     Precautions frequent falls       Manuals 1/12 IE 1/18 1/19 1/23                                    Neuro Re-Ed   MCTSIB (see above)      Pt education Outcome measures results, integration of balance systems, safety measures at home to decrease fall risk, POC focus        HKM  SOLO   1/4 track   X3 laps fwd      Hurdles   SOLO   4\" (4) hurdles   X3 laps fwd leading with LLE   X3 laps fwd leading with RLE   X3 laps lat  SOLO   4\" (4) hurldes  X3 laps fwd alt leading with LE   2x2 laps lat  SOLO   6\" (6) hurdles   X3 laps fwd   X3 laps lat     Step taps/step ups   SOLO   6\" step taps   3x10 ea LE fwd  SOLO   6\" step taps   4x10 ea LE fwd      STS  SOLO   No UE  2x8     Vc to shift weight anteriorly and push hips forward upon standing SOLO   BUE support   AirEx pad under RLE to bias LLE   2x8      Dynamic balance   SOLO   Cone taps w/ small yellow markers  2x3 laps fwd              Ther Ex        NuStep for CV/ROM    All ext   Level 3  X10 mins     LE HEP     Reviewed during session (see below)                                                    Ther Activity                        Gait Training                        Modalities                         Access Code: [email or web address]  URL: [email or web address]  Date: 01/23/2024  Prepared by: Mariel Land    Exercises  - Seated Long Arc Quad  - 1 x daily - 7 x weekly - 3 sets - 10 reps  - Standing March with Counter Support  - 1 x daily - 7 x weekly - 3 sets - 10 reps  - Standing Hip Abduction with Unilateral Counter Support  - 1 x daily - 7 x weekly - 3 sets - 10 reps  - Heel Toe Raises with Unilateral Counter Support  - 1 x daily - 7 x weekly - 3 sets - 10 reps  - Standing Knee Flexion with Unilateral Counter Support  - 1 x daily - 7 x weekly - 3 sets - 10 reps  - Standing Hip Extension with Unilateral Counter " Support  - 1 x daily - 7 x weekly - 3 sets - 10 reps

## 2024-01-26 ENCOUNTER — OFFICE VISIT (OUTPATIENT)
Dept: PHYSICAL THERAPY | Facility: CLINIC | Age: 74
End: 2024-01-26
Payer: MEDICARE

## 2024-01-26 ENCOUNTER — VBI (OUTPATIENT)
Dept: ADMINISTRATIVE | Facility: OTHER | Age: 74
End: 2024-01-26

## 2024-01-26 DIAGNOSIS — R26.89 BALANCE PROBLEM: ICD-10-CM

## 2024-01-26 DIAGNOSIS — R29.6 FREQUENT FALLS: ICD-10-CM

## 2024-01-26 DIAGNOSIS — R29.898 WEAKNESS OF RIGHT LOWER EXTREMITY: ICD-10-CM

## 2024-01-26 DIAGNOSIS — R68.89 ACTIVITY INTOLERANCE: ICD-10-CM

## 2024-01-26 DIAGNOSIS — R26.81 UNSTEADY GAIT WHEN WALKING: Primary | ICD-10-CM

## 2024-01-26 PROCEDURE — 97112 NEUROMUSCULAR REEDUCATION: CPT

## 2024-01-26 PROCEDURE — 97150 GROUP THERAPEUTIC PROCEDURES: CPT

## 2024-01-26 NOTE — PROGRESS NOTES
Daily Note     Today's date: 2024  Patient name: Lola Spangler  : 1950  MRN: [id number]  Referring provider: Clay Montesinos, *  Dx:   Encounter Diagnosis     ICD-10-CM    1. Unsteady gait when walking  R26.81       2. Activity intolerance  R68.89       3. Frequent falls  R29.6       4. Balance problem  R26.89       5. Weakness of right lower extremity  R29.898                 Start Time: 945  Stop Time: 1030  Total time in clinic (min): 45 minutes    Subjective: exercises are going well however is having a hard time with the alternating heel raises/toes raises; woke up this morning pretty stiff    Objective: See treatment diary below  1:1 w/ PT: [id number]  Group: [id number]      Assessment: Tolerated treatment fair. Patient demonstrated fatigue post treatment and would benefit from continued PT. Still more difficulty with LLE stance during step taps vs RLE stance; however tolerance of SLS activities improved compared to previous sessions; some LOB experienced mostly forward and pt reaches out for external support instead of utilizing balance reactions. Reviewed seated heel raises and toe raises instead of standing as pt unable to maintain proper technique at this time when standing. Will cont to strengthen BLE with focus on LLE stability, negotation of obstacles and uneven surfaces, and overall activity tolerance.       Plan: Continue per plan of care.      Short Term Goal Expiration Date:(24 4 weeks)  Long Term Goal Expiration Date: (3/8/24 8 weeks)  POC Expiration Date: (3/8/24 8 weeks)    Visit count: 1 of 10;     POC expires Unit limit Auth Expiration date PT/OT/ST + Visit Limit?   3/8/24 6 PT/OT 24 $2330CAP/BOMN PCY                           Visit/Unit Tracking  AUTH Status:  Date  IE          pending Used 1 2 3 4 5          Remaining                     Precautions frequent falls       Manuals  IE                                   "  Neuro Re-Ed   MCTSIB (see above)      Pt education Outcome measures results, integration of balance systems, safety measures at home to decrease fall risk, POC focus        HKM  SOLO   1/4 track   X3 laps fwd      Hurdles   SOLO   4\" (4) hurdles   X3 laps fwd leading with LLE   X3 laps fwd leading with RLE   X3 laps lat  SOLO   4\" (4) hurldes  X3 laps fwd alt leading with LE   2x2 laps lat  SOLO   6\" (6) hurdles   X3 laps fwd   X3 laps lat     Step taps/step ups   SOLO   6\" step taps   3x10 ea LE fwd  SOLO   6\" step taps   4x10 ea LE fwd   SOLO   6\" step taps   2x12 fwd ea LE  2x12 lat ea LE   STS  SOLO   No UE  2x8     Vc to shift weight anteriorly and push hips forward upon standing SOLO   BUE support   AirEx pad under RLE to bias LLE   2x8   NO UE support   2x10     Focus on keeping feet on floor when sitting down to avoid using momentum to stand back up    Dynamic balance   SOLO   Cone taps w/ small yellow markers  2x3 laps fwd              Ther Ex        NuStep for CV/ROM    All ext   Level 3  X10 mins     LE HEP     Reviewed during session (see below) Reviewed heel raises and toe raises but seated vs standing for home                                                   Ther Activity                        Gait Training                        Modalities                         Access Code: [email or web address]  URL: [email or web address]  Date: 01/23/2024  Prepared by: Mariel Land    Exercises  - Seated Long Arc Quad  - 1 x daily - 7 x weekly - 3 sets - 10 reps  - Standing March with Counter Support  - 1 x daily - 7 x weekly - 3 sets - 10 reps  - Standing Hip Abduction with Unilateral Counter Support  - 1 x daily - 7 x weekly - 3 sets - 10 reps  - Heel Toe Raises with Unilateral Counter Support  - 1 x daily - 7 x weekly - 3 sets - 10 reps  - Standing Knee Flexion with Unilateral Counter Support  - 1 x daily - 7 x weekly - 3 sets - 10 reps  - Standing Hip Extension with Unilateral Counter Support  - 1 " x daily - 7 x weekly - 3 sets - 10 reps

## 2024-01-30 ENCOUNTER — OFFICE VISIT (OUTPATIENT)
Dept: PHYSICAL THERAPY | Facility: CLINIC | Age: 74
End: 2024-01-30
Payer: MEDICARE

## 2024-01-30 DIAGNOSIS — R26.89 BALANCE PROBLEM: ICD-10-CM

## 2024-01-30 DIAGNOSIS — R29.898 WEAKNESS OF RIGHT LOWER EXTREMITY: ICD-10-CM

## 2024-01-30 DIAGNOSIS — R68.89 ACTIVITY INTOLERANCE: ICD-10-CM

## 2024-01-30 DIAGNOSIS — R26.81 UNSTEADY GAIT WHEN WALKING: Primary | ICD-10-CM

## 2024-01-30 DIAGNOSIS — R29.6 FREQUENT FALLS: ICD-10-CM

## 2024-01-30 PROCEDURE — 97110 THERAPEUTIC EXERCISES: CPT

## 2024-01-30 PROCEDURE — 97112 NEUROMUSCULAR REEDUCATION: CPT

## 2024-01-30 NOTE — PROGRESS NOTES
Daily Note     Today's date: 2024  Patient name: Lola Spangler  : 1950  MRN: [id number]  Referring provider: Clay Montesinos, *  Dx:   Encounter Diagnosis     ICD-10-CM    1. Unsteady gait when walking  R26.81       2. Activity intolerance  R68.89       3. Frequent falls  R29.6       4. Balance problem  R26.89       5. Weakness of right lower extremity  R29.898                   Start Time: 0915  Stop Time: 1000  Total time in clinic (min): 45 minutes    Subjective: feeling pretty good today, says foot pain is still bothering her at night    Objective: See treatment diary below      Assessment: Tolerated treatment fair. Patient demonstrated fatigue post treatment and would benefit from continued PT. Increased difficulty with RLE leading for step ups, requiring SPC on L for extra support; easier when leading w/ LLE but pt reports leading with LLE when going up steps at home and only goes one at a time. Challenged with obstacle negotiation, especially when having to navigate around close objects requiring narrow ambulation; also was more unsteady when changing her direction to backwards and lateral ambulation. Will cont to strengthen BLE with focus on LLE stability, negotation of obstacles and uneven surfaces, and overall activity tolerance.       Plan: Continue per plan of care.      Short Term Goal Expiration Date:(24 4 weeks)  Long Term Goal Expiration Date: (3/8/24 8 weeks)  POC Expiration Date: (3/8/24 8 weeks)    Visit count: 1 of 10;     POC expires Unit limit Auth Expiration date PT/OT/ST + Visit Limit?   3/8/24 6 PT/OT 24 $2330CAP/BOMN PCY                           Visit/Unit Tracking  AUTH Status:  Date  IE         pending Used 1 2 3 4 5 6         Remaining                     Precautions frequent falls       Manuals                                    Neuro Re-Ed   MCTSIB (see above)      Pt education        HKM  SOLO     "track   X3 laps fwd      Hurdles   SOLO   4\" (4) hurdles   X3 laps fwd leading with LLE   X3 laps fwd leading with RLE   X3 laps lat  SOLO   4\" (4) hurldes  X3 laps fwd alt leading with LE   2x2 laps lat  SOLO   6\" (6) hurdles   X3 laps fwd   X3 laps lat     Step taps/step ups  SOLO   6\" step taps   X12 fwd ea LE  X12 lat ea LE    6\" step ups   SPC  X8 fwd ea LE  SOLO   6\" step taps   3x10 ea LE fwd  SOLO   6\" step taps   4x10 ea LE fwd   SOLO   6\" step taps   2x12 fwd ea LE  2x12 lat ea LE   STS  SOLO   No UE  2x8     Vc to shift weight anteriorly and push hips forward upon standing SOLO   BUE support   AirEx pad under RLE to bias LLE   2x8   NO UE support   2x10     Focus on keeping feet on floor when sitting down to avoid using momentum to stand back up    Dynamic balance SOLO no SPC   Cone weaves (8)   X3 laps fwd   X3 laps lat   SOLO   Cone taps w/ small yellow markers  2x3 laps fwd              Ther Ex        NuStep for CV/ROM All ext level 3  X10 mins    All ext   Level 3  X10 mins     LE HEP     Reviewed during session (see below) Reviewed heel raises and toe raises but seated vs standing for home                                                   Ther Activity                        Gait Training                        Modalities                         Access Code: [email or web address]  URL: [email or web address]  Date: 01/23/2024  Prepared by: Mariel Land    Exercises  - Seated Long Arc Quad  - 1 x daily - 7 x weekly - 3 sets - 10 reps  - Standing March with Counter Support  - 1 x daily - 7 x weekly - 3 sets - 10 reps  - Standing Hip Abduction with Unilateral Counter Support  - 1 x daily - 7 x weekly - 3 sets - 10 reps  - Heel Toe Raises with Unilateral Counter Support  - 1 x daily - 7 x weekly - 3 sets - 10 reps  - Standing Knee Flexion with Unilateral Counter Support  - 1 x daily - 7 x weekly - 3 sets - 10 reps  - Standing Hip Extension with Unilateral Counter Support  - 1 x daily - 7 x weekly " - 3 sets - 10 reps

## 2024-02-02 ENCOUNTER — OFFICE VISIT (OUTPATIENT)
Dept: PHYSICAL THERAPY | Facility: CLINIC | Age: 74
End: 2024-02-02
Payer: MEDICARE

## 2024-02-02 DIAGNOSIS — R26.89 BALANCE PROBLEM: ICD-10-CM

## 2024-02-02 DIAGNOSIS — R29.6 FREQUENT FALLS: ICD-10-CM

## 2024-02-02 DIAGNOSIS — R29.898 WEAKNESS OF RIGHT LOWER EXTREMITY: ICD-10-CM

## 2024-02-02 DIAGNOSIS — R68.89 ACTIVITY INTOLERANCE: ICD-10-CM

## 2024-02-02 DIAGNOSIS — R26.81 UNSTEADY GAIT WHEN WALKING: Primary | ICD-10-CM

## 2024-02-02 PROCEDURE — 97112 NEUROMUSCULAR REEDUCATION: CPT

## 2024-02-02 NOTE — PROGRESS NOTES
Daily Note     Today's date: 2024  Patient name: Lola Spangler  : 1950  MRN: [id number]  Referring provider: Clay Montesinos, *  Dx:   Encounter Diagnosis     ICD-10-CM    1. Unsteady gait when walking  R26.81       2. Activity intolerance  R68.89       3. Frequent falls  R29.6       4. Balance problem  R26.89       5. Weakness of right lower extremity  R29.898                     Start Time: 930  Stop Time: 1015  Total time in clinic (min): 45 minutes    Subjective: feeling okay today, low back was bothering her after last visit likely due to some twisting; also notes that she doesn't go see her granddaughter's meets right now because sometimes they have to walk over mats and she feels unsteady when she does that     Objective: See treatment diary below      Assessment: Tolerated treatment fair. Patient demonstrated fatigue post treatment and would benefit from continued PT. Focused on negotiation of compliant surfaces as pt reports feeling unsteady when walking over floor mats. Did experience one significant LOB while going over foam pads due to catching her toes on one of the pads, required MaxAx2 to recover while in SOLO harness, unable to use balance reactions to catch herself, reported no pain anywhere and was able to continue with session w/o any issues. Also focused on eccentric control while ambulating as pt tends to take very heavy steps which may also be contributing to her back pain. Will cont to strengthen BLE with focus on LLE stability, negotation of obstacles and uneven surfaces, and overall activity tolerance.       Plan: Continue per plan of care.      Short Term Goal Expiration Date:(24 4 weeks)  Long Term Goal Expiration Date: (3/8/24 8 weeks)  POC Expiration Date: (3/8/24 8 weeks)    Visit count: 1 of 10;     POC expires Unit limit Auth Expiration date PT/OT/ST + Visit Limit?   3/8/24 6 PT/OT 24 $2330CAP/BOMN PCY                           Visit/Unit Tracking  AUTH  "Status:  Date 1/12 IE 1/18 1/19 1/23 1/26 1/30 2/2       pending Used 1 2 3 4 5 6 7        Remaining                     Precautions frequent falls       Manuals 2/2 1/18 1/19 1/23 1/26                                   Neuro Re-Ed   MCTSIB (see above)      Pt education        HKM  SOLO   1/4 track   X3 laps fwd      Hurdles  SOLO   6\" (6\" )  X3 laps fwd     Focus on lighter steps for better eccentric control  SOLO   4\" (4) hurdles   X3 laps fwd leading with LLE   X3 laps fwd leading with RLE   X3 laps lat  SOLO   4\" (4) hurldes  X3 laps fwd alt leading with LE   2x2 laps lat  SOLO   6\" (6) hurdles   X3 laps fwd   X3 laps lat     Step taps/step ups  SOLO 4\" step ups w/ SPC   Focus on eccentric control while stepping down for 3s count   2x8 fwd on ea LE SOLO   6\" step taps   3x10 ea LE fwd  SOLO   6\" step taps   4x10 ea LE fwd   SOLO   6\" step taps   2x12 fwd ea LE  2x12 lat ea LE   STS  SOLO   No UE  2x8     Vc to shift weight anteriorly and push hips forward upon standing SOLO   BUE support   AirEx pad under RLE to bias LLE   2x8   NO UE support   2x10     Focus on keeping feet on floor when sitting down to avoid using momentum to stand back up    Dynamic balance SOLO   No AD  Step up and over 4 AirEx pads   2x2 laps fwd   X2 laps lat     One LOB requiring MaxAx2 to recover  SOLO   Cone taps w/ small yellow markers  2x3 laps fwd              Ther Ex        NuStep for CV/ROM    All ext   Level 3  X10 mins     LE HEP     Reviewed during session (see below) Reviewed heel raises and toe raises but seated vs standing for home                                                   Ther Activity                        Gait Training                        Modalities                         Access Code: [email or web address]  URL: [email or web address]  Date: 01/23/2024  Prepared by: Mariel Land    Exercises  - Seated Long Arc Quad  - 1 x daily - 7 x weekly - 3 sets - 10 reps  - Standing March with Counter Support  - 1 " x daily - 7 x weekly - 3 sets - 10 reps  - Standing Hip Abduction with Unilateral Counter Support  - 1 x daily - 7 x weekly - 3 sets - 10 reps  - Heel Toe Raises with Unilateral Counter Support  - 1 x daily - 7 x weekly - 3 sets - 10 reps  - Standing Knee Flexion with Unilateral Counter Support  - 1 x daily - 7 x weekly - 3 sets - 10 reps  - Standing Hip Extension with Unilateral Counter Support  - 1 x daily - 7 x weekly - 3 sets - 10 reps

## 2024-02-06 ENCOUNTER — OFFICE VISIT (OUTPATIENT)
Dept: PHYSICAL THERAPY | Facility: CLINIC | Age: 74
End: 2024-02-06
Payer: MEDICARE

## 2024-02-06 DIAGNOSIS — R29.6 FREQUENT FALLS: ICD-10-CM

## 2024-02-06 DIAGNOSIS — R26.89 BALANCE PROBLEM: ICD-10-CM

## 2024-02-06 DIAGNOSIS — R68.89 ACTIVITY INTOLERANCE: ICD-10-CM

## 2024-02-06 DIAGNOSIS — R26.81 UNSTEADY GAIT WHEN WALKING: Primary | ICD-10-CM

## 2024-02-06 DIAGNOSIS — R29.898 WEAKNESS OF RIGHT LOWER EXTREMITY: ICD-10-CM

## 2024-02-06 PROCEDURE — 97110 THERAPEUTIC EXERCISES: CPT

## 2024-02-06 PROCEDURE — 97112 NEUROMUSCULAR REEDUCATION: CPT

## 2024-02-06 NOTE — PROGRESS NOTES
Daily Note     Today's date: 2024  Patient name: Lola Spangler  : 1950  MRN: [id number]  Referring provider: Clay Montesinos, *  Dx:   Encounter Diagnosis     ICD-10-CM    1. Unsteady gait when walking  R26.81       2. Activity intolerance  R68.89       3. Frequent falls  R29.6       4. Balance problem  R26.89       5. Weakness of right lower extremity  R29.898                       Start Time: 1110  Stop Time: 1155  Total time in clinic (min): 45 minutes    Subjective: back is bothering her a little more today, thinks it's from playing with her grandchildren    Objective: See treatment diary below      Assessment: Tolerated treatment fair. Patient demonstrated fatigue post treatment and would benefit from continued PT. Noted increased difficulty during L SLS vs R SLS while doing cone taps; also has difficulty lifting RLE high enough for certain targets. Followed up with unstabilizing RLE using ball under foot to facilitate LLE stance; required contact guard-minAx1 for stability as pt was often leaning towards the L but demo'd moments of stabilization on LLE when cued to tighten left leg and shift weight over top of left leg. Will cont to strengthen BLE with focus on LLE stability, negotation of obstacles and uneven surfaces, and overall activity tolerance.       Plan: Continue per plan of care.      Short Term Goal Expiration Date:(24 4 weeks)  Long Term Goal Expiration Date: (3/8/24 8 weeks)  POC Expiration Date: (3/8/24 8 weeks)    Visit count: 1 of 10;     POC expires Unit limit Auth Expiration date PT/OT/ST + Visit Limit?   3/8/24 6 PT/OT 24 $2330CAP/BOMN PCY                           Visit/Unit Tracking  AUTH Status:  Date  IE  2/2 2      pending Used 1 2 3 4 5 6 7 8       Remaining                     Precautions frequent falls       Manuals 2/2 2/                                   Neuro Re-Ed         Pt education        HKM       "  Hurdles  SOLO   6\" (6\" )  X3 laps fwd     Focus on lighter steps for better eccentric control   SOLO   4\" (4) hurldes  X3 laps fwd alt leading with LE   2x2 laps lat  SOLO   6\" (6) hurdles   X3 laps fwd   X3 laps lat     Step taps/step ups  SOLO 4\" step ups w/ SPC   Focus on eccentric control while stepping down for 3s count   2x8 fwd on ea LE  SOLO   6\" step taps   4x10 ea LE fwd   SOLO   6\" step taps   2x12 fwd ea LE  2x12 lat ea LE   STS  SOLO  No UE x10    Add: Airex under feet   Needed BUE   2x10 SOLO   BUE support   AirEx pad under RLE to bias LLE   2x8   NO UE support   2x10     Focus on keeping feet on floor when sitting down to avoid using momentum to stand back up    Dynamic balance SOLO   No AD  Step up and over 4 AirEx pads   2x2 laps fwd   X2 laps lat     One LOB requiring MaxAx2 to recover SOLO   No AD  Cone taps w/ regular cones   2x2 laps fwd    More difficulty on L stance vs R stance     SOLO   Rolling red ball under R foot to emphasize LLE weightshift  2 rounds of 20seconds   CG-minAx1 for stabilization SOLO   Cone taps w/ small yellow markers  2x3 laps fwd              Ther Ex        NuStep for CV/ROM  All ext level 5  X10 mins   All ext   Level 3  X10 mins     LE HEP     Reviewed during session (see below) Reviewed heel raises and toe raises but seated vs standing for home                                                   Ther Activity                        Gait Training                        Modalities                         Access Code: [email or web address]  URL: [email or web address]  Date: 01/23/2024  Prepared by: Mariel Land    Exercises  - Seated Long Arc Quad  - 1 x daily - 7 x weekly - 3 sets - 10 reps  - Standing March with Counter Support  - 1 x daily - 7 x weekly - 3 sets - 10 reps  - Standing Hip Abduction with Unilateral Counter Support  - 1 x daily - 7 x weekly - 3 sets - 10 reps  - Heel Toe Raises with Unilateral Counter Support  - 1 x daily - 7 x weekly - 3 sets - " 10 reps  - Standing Knee Flexion with Unilateral Counter Support  - 1 x daily - 7 x weekly - 3 sets - 10 reps  - Standing Hip Extension with Unilateral Counter Support  - 1 x daily - 7 x weekly - 3 sets - 10 reps

## 2024-02-07 ENCOUNTER — APPOINTMENT (OUTPATIENT)
Dept: LAB | Facility: CLINIC | Age: 74
End: 2024-02-07
Payer: MEDICARE

## 2024-02-07 DIAGNOSIS — E27.40 ADRENAL INSUFFICIENCY (HCC): ICD-10-CM

## 2024-02-07 DIAGNOSIS — Z20.1 EXPOSURE TO TB: ICD-10-CM

## 2024-02-07 DIAGNOSIS — E02 SUBCLINICAL IODINE-DEFICIENCY HYPOTHYROIDISM: ICD-10-CM

## 2024-02-07 DIAGNOSIS — R73.09 ABNORMAL GLUCOSE: ICD-10-CM

## 2024-02-07 LAB
ALBUMIN SERPL BCP-MCNC: 4.1 G/DL (ref 3.5–5)
ALP SERPL-CCNC: 54 U/L (ref 34–104)
ALT SERPL W P-5'-P-CCNC: 21 U/L (ref 7–52)
AMORPH URATE CRY URNS QL MICRO: ABNORMAL
ANION GAP SERPL CALCULATED.3IONS-SCNC: 10 MMOL/L
AST SERPL W P-5'-P-CCNC: 20 U/L (ref 13–39)
BACTERIA UR QL AUTO: ABNORMAL /HPF
BILIRUB SERPL-MCNC: 0.56 MG/DL (ref 0.2–1)
BILIRUB UR QL STRIP: NEGATIVE
BUN SERPL-MCNC: 26 MG/DL (ref 5–25)
CALCIUM SERPL-MCNC: 9.1 MG/DL (ref 8.4–10.2)
CHLORIDE SERPL-SCNC: 107 MMOL/L (ref 96–108)
CLARITY UR: ABNORMAL
CO2 SERPL-SCNC: 24 MMOL/L (ref 21–32)
COLOR UR: ABNORMAL
CORTIS AM PEAK SERPL-MCNC: 15.8 UG/DL (ref 6.7–22.6)
CREAT SERPL-MCNC: 0.66 MG/DL (ref 0.6–1.3)
GFR SERPL CREATININE-BSD FRML MDRD: 89 ML/MIN/1.73SQ M
GLUCOSE P FAST SERPL-MCNC: 116 MG/DL (ref 65–99)
GLUCOSE UR STRIP-MCNC: NEGATIVE MG/DL
HGB UR QL STRIP.AUTO: NEGATIVE
KETONES UR STRIP-MCNC: NEGATIVE MG/DL
LEUKOCYTE ESTERASE UR QL STRIP: NEGATIVE
MUCOUS THREADS UR QL AUTO: ABNORMAL
NITRITE UR QL STRIP: NEGATIVE
NON-SQ EPI CELLS URNS QL MICRO: ABNORMAL /HPF
PH UR STRIP.AUTO: 5.5 [PH]
POTASSIUM SERPL-SCNC: 4.3 MMOL/L (ref 3.5–5.3)
PROT SERPL-MCNC: 6.7 G/DL (ref 6.4–8.4)
PROT UR STRIP-MCNC: ABNORMAL MG/DL
RBC #/AREA URNS AUTO: ABNORMAL /HPF
SODIUM SERPL-SCNC: 141 MMOL/L (ref 135–147)
SP GR UR STRIP.AUTO: 1.03 (ref 1–1.03)
T4 FREE SERPL-MCNC: 0.76 NG/DL (ref 0.61–1.12)
TSH SERPL DL<=0.05 MIU/L-ACNC: 1.4 UIU/ML (ref 0.45–4.5)
UROBILINOGEN UR STRIP-ACNC: <2 MG/DL
WBC #/AREA URNS AUTO: ABNORMAL /HPF

## 2024-02-07 PROCEDURE — 36415 COLL VENOUS BLD VENIPUNCTURE: CPT

## 2024-02-07 PROCEDURE — 84443 ASSAY THYROID STIM HORMONE: CPT

## 2024-02-07 PROCEDURE — 82533 TOTAL CORTISOL: CPT

## 2024-02-07 PROCEDURE — 80053 COMPREHEN METABOLIC PANEL: CPT

## 2024-02-07 PROCEDURE — 86480 TB TEST CELL IMMUN MEASURE: CPT

## 2024-02-07 PROCEDURE — 84439 ASSAY OF FREE THYROXINE: CPT

## 2024-02-08 LAB
GAMMA INTERFERON BACKGROUND BLD IA-ACNC: 2.46 IU/ML
M TB IFN-G BLD-IMP: NEGATIVE
M TB IFN-G CD4+ BCKGRND COR BLD-ACNC: 0.03 IU/ML
M TB IFN-G CD4+ BCKGRND COR BLD-ACNC: 0.19 IU/ML
MITOGEN IGNF BCKGRD COR BLD-ACNC: 7.54 IU/ML

## 2024-02-09 ENCOUNTER — APPOINTMENT (OUTPATIENT)
Dept: PHYSICAL THERAPY | Facility: CLINIC | Age: 74
End: 2024-02-09
Payer: MEDICARE

## 2024-02-13 ENCOUNTER — APPOINTMENT (OUTPATIENT)
Dept: PHYSICAL THERAPY | Facility: CLINIC | Age: 74
End: 2024-02-13
Payer: MEDICARE

## 2024-02-14 ENCOUNTER — OFFICE VISIT (OUTPATIENT)
Dept: PHYSICAL THERAPY | Facility: CLINIC | Age: 74
End: 2024-02-14
Payer: MEDICARE

## 2024-02-14 DIAGNOSIS — R26.81 UNSTEADY GAIT WHEN WALKING: Primary | ICD-10-CM

## 2024-02-14 DIAGNOSIS — R29.898 WEAKNESS OF RIGHT LOWER EXTREMITY: ICD-10-CM

## 2024-02-14 DIAGNOSIS — R29.6 FREQUENT FALLS: ICD-10-CM

## 2024-02-14 DIAGNOSIS — R68.89 ACTIVITY INTOLERANCE: ICD-10-CM

## 2024-02-14 DIAGNOSIS — R26.89 BALANCE PROBLEM: ICD-10-CM

## 2024-02-14 PROCEDURE — 97110 THERAPEUTIC EXERCISES: CPT

## 2024-02-14 PROCEDURE — 97112 NEUROMUSCULAR REEDUCATION: CPT

## 2024-02-14 NOTE — PROGRESS NOTES
"Daily Note     Today's date: 2024  Patient name: Lola Spangler  : 1950  MRN: [id number]  Referring provider: Clay Montesinos, *  Dx:   Encounter Diagnosis     ICD-10-CM    1. Unsteady gait when walking  R26.81       2. Activity intolerance  R68.89       3. Frequent falls  R29.6       4. Balance problem  R26.89       5. Weakness of right lower extremity  R29.898                         Start Time: 1100  Stop Time: 1145  Total time in clinic (min): 45 minutes    Subjective: back is still bothering her today, isn't sure why, reports 6/10 LBP but took Tylenol right before she came     Objective: See treatment diary below      Assessment: Tolerated treatment fair. Patient demonstrated fatigue post treatment and would benefit from continued PT. Able to progress to ankle weights today for LE strengthening and to challenge dynamic balance; utilized minimal UE support with // bars, more so as she became fatigued. Still continues to be challenged with SLS w/o UE. Will cont to strengthen BLE with focus on LLE stability, negotation of obstacles and uneven surfaces, and overall activity tolerance.       Plan: Continue per plan of care.  Perform PN next visit.      Short Term Goal Expiration Date:(24 4 weeks)  Long Term Goal Expiration Date: (3/8/24 8 weeks)  POC Expiration Date: (3/8/24 8 weeks)    Visit count: 1 of 10;     POC expires Unit limit Auth Expiration date PT/OT/ST + Visit Limit?   3/8/24 6 PT/OT 24 $2330CAP/BOMN PCY                           Visit/Unit Tracking  AUTH Status:  Date  IE  2/2 2/     pending Used 1 2 3 4 5 6 7 8 9      Remaining                     Precautions frequent falls       Manuals 2/2 2/6                                    Neuro Re-Ed         Pt education        HKM   //bars long   4# b/l AW  Minimal UE support   X3 laps      Hurdles  SOLO   6\" (6\" )  X3 laps fwd     Focus on lighter steps for better eccentric control   " " SOLO   6\" (6) hurdles   X3 laps fwd   X3 laps lat     Step taps/step ups  SOLO 4\" step ups w/ SPC   Focus on eccentric control while stepping down for 3s count   2x8 fwd on ea LE  // bars BUE   4# b/l AW  6\" step ups   Focus on eccentric control when stepping back down   2x10 fwd ea LE     SOLO   6\" step taps   2x12 fwd ea LE  2x12 lat ea LE   STS  SOLO  No UE x10    Add: Airex under feet   Needed BUE   2x10   NO UE support   2x10     Focus on keeping feet on floor when sitting down to avoid using momentum to stand back up    Dynamic balance SOLO   No AD  Step up and over 4 AirEx pads   2x2 laps fwd   X2 laps lat     One LOB requiring MaxAx2 to recover SOLO   No AD  Cone taps w/ regular cones   2x2 laps fwd    More difficulty on L stance vs R stance     SOLO   Rolling red ball under R foot to emphasize LLE weightshift  2 rounds of 20seconds   CG-minAx1 for stabilization // bars long   4# b/l AW  Lateral stepping with cone taps   Single UE support   X2 laps              Ther Ex        NuStep for CV/ROM  All ext level 5  X10 mins  All ext level 4  [id number]  All ext   Level 3  X10 mins     LE HEP     Reviewed during session (see below) Reviewed heel raises and toe raises but seated vs standing for home   PB roll outs   Seated w/ gray PB  X15                                              Ther Activity                        Gait Training                        Modalities                         Access Code: [email or web address]  URL: [email or web address]  Date: 01/23/2024  Prepared by: Mariel Land    Exercises  - Seated Long Arc Quad  - 1 x daily - 7 x weekly - 3 sets - 10 reps  - Standing March with Counter Support  - 1 x daily - 7 x weekly - 3 sets - 10 reps  - Standing Hip Abduction with Unilateral Counter Support  - 1 x daily - 7 x weekly - 3 sets - 10 reps  - Heel Toe Raises with Unilateral Counter Support  - 1 x daily - 7 x weekly - 3 sets - 10 reps  - Standing Knee Flexion with Unilateral Counter " Support  - 1 x daily - 7 x weekly - 3 sets - 10 reps  - Standing Hip Extension with Unilateral Counter Support  - 1 x daily - 7 x weekly - 3 sets - 10 reps

## 2024-02-15 ENCOUNTER — EVALUATION (OUTPATIENT)
Dept: PHYSICAL THERAPY | Facility: CLINIC | Age: 74
End: 2024-02-15
Payer: MEDICARE

## 2024-02-15 DIAGNOSIS — R68.89 ACTIVITY INTOLERANCE: ICD-10-CM

## 2024-02-15 DIAGNOSIS — R29.6 FREQUENT FALLS: ICD-10-CM

## 2024-02-15 DIAGNOSIS — R26.89 BALANCE PROBLEM: ICD-10-CM

## 2024-02-15 DIAGNOSIS — R29.898 WEAKNESS OF RIGHT LOWER EXTREMITY: ICD-10-CM

## 2024-02-15 DIAGNOSIS — R26.81 UNSTEADY GAIT WHEN WALKING: Primary | ICD-10-CM

## 2024-02-15 PROCEDURE — 97116 GAIT TRAINING THERAPY: CPT

## 2024-02-15 PROCEDURE — 97112 NEUROMUSCULAR REEDUCATION: CPT

## 2024-02-15 NOTE — PROGRESS NOTES
Progress Update    Today's date: 2/15/2024  Patient name: Lola Spangler  : 1950  MRN: [id number]  Referring provider: Clay Montesinos, *  Dx:   Encounter Diagnosis     ICD-10-CM    1. Unsteady gait when walking  R26.81       2. Activity intolerance  R68.89       3. Frequent falls  R29.6       4. Balance problem  R26.89       5. Weakness of right lower extremity  R29.898                           Start Time: 0800  Stop Time: 0845  Total time in clinic (min): 45 minutes    Subjective: back is feeling better today, thinks the stretches yesterday really helped; feels like she's made a lot of progress so far in therapy    Objective: See treatment diary below      Assessment: Tolerated treatment fair. Patient demonstrated fatigue post treatment and would benefit from continued PT.Progress note completed today to assess STGs; improvements made towards goals addressing ambulation endurance, functional strength, and balance, however still is performing at an increased risk for falls and is ambulating below safe community norms. Pt reports feeling like her balance has gotten much better and is starting to feel more confident without the cane for shorter distances; still wants to work on dynamic balance activities including walking with a narrow ROBBY and stair negotiation, as well as improving her LE functional strength w/o use of UE. Will cont to strengthen BLE with focus on LLE stability, negotation of obstacles and uneven surfaces, and overall activity tolerance.         Balance Testing   Modified Clinical Test of Sensory Interaction of Balance  (MCTSIB) (seconds): IE:   Firm EO: 30s  Firm EC: 30s  Foam EO: 30s  Foam EC: 5s (best trial)    PN 2/15:   Foam EC: 30s (best trial out of 3)   Messina Balance Scale (BBS):  <45 greater risk for falls (Messina et al 1992)     MCID:   4 points 45-56 initially  5 points 35-44 initially  7 points 25-34 initially  5 points 0-24 initially   IE: not tested   Functional Gait  Assessment (FGA)  < Or = 22/30 greater risk for falls     MDC:  Geriatric: 4  Stroke: 4  Parkinson's Disease: 4  Vestibular: 6 IE:  no AD  PN 2/15:  no AD             Mobility & Endurance Testing   5x Sit to Stand (seconds)  >15 seconds greater risk for falls (elderly)     MCID: 2.3 seconds IE: 15.27sec BUE   PN 2/15: 14.65s minimal UE support   Timed Up-and-Go (seconds)  >13.5 seconds greater risk for falls    IE: 16.63 sec w/ SPC, 13.75s w/o AD  PN 2/15: 11.81s w/ SPC, 12.32s w/o AD   6 Minute Walk Test (meters)  Geriatrics:  Meters:         Male  Female  60-69 years  572    538  70-79 years  527    471  80-89 years  417    392     MCID: 50 meters/164 feet IE: 720ft w/ SPC   PN 2/15: 800ft w/ SPC         Gait speed: Self-selected & fast (meters/second)     1.0 m/s normal  1.2 m/s community-level speed     MCID: 0.10 m/s IE: 6m/8.46s=0.71m/s w/ SPC   PN 2/15: 6m/7.62=0.79m/s w/ SPC       STG:  Patient will improve 6MWT by 200ft demonstrating significant improvements in endurance  w/in 4 weeks-IN PROGRESS 2/15  Patient will be able to perform STS without use of UE wihtin 4 weeks-MET 2/15  Patient will demonstrates significant improvements in dynamic balance by improving FGA score by 6 points or more within 4 weeks-MET 2/15  Patient will be able to negotiates stairs with reciprocal pattern with use of handrail within 4 weeks-IN PROGRESS 2/15  Patient will be independent with HEP within 4 weeks-MET 2/15    Patient Goals  Patient goal: get rid of SPC, get more strength, get more confident about not falling, work on her core strength  Pain  Current pain ratin  At best pain ratin  At worst pain ratin  Location: R knee, low back pain at worst is 7.5/10, best is at 0/10  Quality: dull ache  Relieving factors: medications, rest and change in position  Aggravating factors: walking, standing and stair climbing    Plan: Continue per plan of care.    Plan  Plan details: Address BLE weakness with functional  "strengthening to improve transfers and ambulation  Address ambulation endurance w/ LRAD and possibly w/o AD to achieve pt's goal of walking without SPC   Address dynamic balance w/ vestibular integration, compliant surfaces, and narrow base o support  Provide LE strengthening and core stregnthening HEP   Planned therapy interventions: abdominal trunk stabilization, ADL training, balance, body mechanics training, coordination, flexibility, functional ROM exercises, gait training, home exercise program, neuromuscular re-education, patient education, postural training, sensory integrative techniques, strengthening, stretching, therapeutic activities, therapeutic exercise and transfer training  Frequency: 2x week  Duration in weeks: 8  Plan of Care beginning date: 1/12/2024  Plan of Care expiration date: 3/8/2024  Treatment plan discussed with: patient     Short Term Goal Expiration Date:(2/9/24 4 weeks)  Long Term Goal Expiration Date: (3/8/24 8 weeks)  POC Expiration Date: (3/8/24 8 weeks)    Visit count: 1 of 10;     POC expires Unit limit Auth Expiration date PT/OT/ST + Visit Limit?   3/8/24 6 PT/OT 12/31/24 $2330CAP/BOMN PCY                           Visit/Unit Tracking  AUTH Status:  Date 1/12 IE 1/18 1/19 1/23 1/26 1/30 2/2 2/6 2/14 2/15    pending Used 1 2 3 4 5 6 7 8 9 10     Remaining                     Precautions frequent falls       Manuals 2/2 2/6 2/14 2/15 1/26                                   Neuro Re-Ed     PN testing (see above)    Pt education        HKM   //bars long   4# b/l AW  Minimal UE support   X3 laps      Hurdles  SOLO   6\" (6\" )  X3 laps fwd     Focus on lighter steps for better eccentric control        Step taps/step ups  SOLO 4\" step ups w/ SPC   Focus on eccentric control while stepping down for 3s count   2x8 fwd on ea LE  // bars BUE   4# b/l AW  6\" step ups   Focus on eccentric control when stepping back down   2x10 fwd ea LE     SOLO   6\" step taps   2x12 fwd ea LE  2x12 lat ea LE "   STS  SOLO  No UE x10    Add: Airex under feet   Needed BUE   2x10   NO UE support   2x10     Focus on keeping feet on floor when sitting down to avoid using momentum to stand back up    Dynamic balance SOLO   No AD  Step up and over 4 AirEx pads   2x2 laps fwd   X2 laps lat     One LOB requiring MaxAx2 to recover SOLO   No AD  Cone taps w/ regular cones   2x2 laps fwd    More difficulty on L stance vs R stance     SOLO   Rolling red ball under R foot to emphasize LLE weightshift  2 rounds of 20seconds   CG-minAx1 for stabilization // bars long   4# b/l AW  Lateral stepping with cone taps   Single UE support   X2 laps              Ther Ex        NuStep for CV/ROM  All ext level 5  X10 mins  All ext level 4  [id number]      LE HEP      Reviewed heel raises and toe raises but seated vs standing for home   PB roll outs   Seated w/ gray PB  X15                                              Ther Activity                        Gait Training                        Modalities                         Access Code: [email or web address]  URL: [email or web address]  Date: 01/23/2024  Prepared by: Mariel Land    Exercises  - Seated Long Arc Quad  - 1 x daily - 7 x weekly - 3 sets - 10 reps  - Standing March with Counter Support  - 1 x daily - 7 x weekly - 3 sets - 10 reps  - Standing Hip Abduction with Unilateral Counter Support  - 1 x daily - 7 x weekly - 3 sets - 10 reps  - Heel Toe Raises with Unilateral Counter Support  - 1 x daily - 7 x weekly - 3 sets - 10 reps  - Standing Knee Flexion with Unilateral Counter Support  - 1 x daily - 7 x weekly - 3 sets - 10 reps  - Standing Hip Extension with Unilateral Counter Support  - 1 x daily - 7 x weekly - 3 sets - 10 reps

## 2024-02-20 ENCOUNTER — OFFICE VISIT (OUTPATIENT)
Dept: PHYSICAL THERAPY | Facility: CLINIC | Age: 74
End: 2024-02-20
Payer: MEDICARE

## 2024-02-20 DIAGNOSIS — R26.81 UNSTEADY GAIT WHEN WALKING: Primary | ICD-10-CM

## 2024-02-20 DIAGNOSIS — R26.89 BALANCE PROBLEM: ICD-10-CM

## 2024-02-20 DIAGNOSIS — R68.89 ACTIVITY INTOLERANCE: ICD-10-CM

## 2024-02-20 DIAGNOSIS — R29.898 WEAKNESS OF RIGHT LOWER EXTREMITY: ICD-10-CM

## 2024-02-20 DIAGNOSIS — R29.6 FREQUENT FALLS: ICD-10-CM

## 2024-02-20 PROCEDURE — 97112 NEUROMUSCULAR REEDUCATION: CPT

## 2024-02-20 PROCEDURE — 97110 THERAPEUTIC EXERCISES: CPT

## 2024-02-20 NOTE — PROGRESS NOTES
Daily Note    Today's date: 2024  Patient name: Lola Spangler  : 1950  MRN: [id number]  Referring provider: Clay Montesinos, *  Dx:   Encounter Diagnosis     ICD-10-CM    1. Unsteady gait when walking  R26.81       2. Activity intolerance  R68.89       3. Frequent falls  R29.6       4. Balance problem  R26.89       5. Weakness of right lower extremity  R29.898                             Start Time: 1645  Stop Time: 1730  Total time in clinic (min): 45 minutes    Subjective: back is bothering her more today but she took Tylenol because that usually helps     Objective: See treatment diary below      Assessment: Tolerated treatment fair. Patient demonstrated fatigue post treatment and would benefit from continued PT.Progressed negotiation of uneven surfaces w/ step up and overs while having ankle weights on and walking across foam beams both forwards and laterally; required SPC for external support especially while stepping up onto foam; most difficulty stepping up with LLE vs RLE. Tends to lose her balance anterior-posterior vs laterally; occasionally needed minAx1 from PT to recover LOB. Will cont to strengthen BLE with focus on LLE stability, negotation of obstacles and uneven surfaces, and overall activity tolerance.       Plan: Continue per plan of care.       Short Term Goal Expiration Date:(24 4 weeks)  Long Term Goal Expiration Date: (3/8/24 8 weeks)  POC Expiration Date: (3/8/24 8 weeks)    Visit count: 1 of 10;     POC expires Unit limit Auth Expiration date PT/OT/ST + Visit Limit?   3/8/24 6 PT/OT 24 $2330CAP/BOMN PCY                           Visit/Unit Tracking  AUTH Status:  Date  IE  2/2 /6 2/14 2/15 2/20   pending Used 1 2 3 4 5 6 7 8 9 10     Remaining                     Precautions frequent falls       Manuals 2//6 2/14 2/15 2/20                                   Neuro Re-Ed     PN testing (see above)    Pt education        HKM   //bars  "long   4# b/l AW  Minimal UE support   X3 laps      Hurdles  SOLO   6\" (6\" )  X3 laps fwd     Focus on lighter steps for better eccentric control        Step taps/step ups  SOLO 4\" step ups w/ SPC   Focus on eccentric control while stepping down for 3s count   2x8 fwd on ea LE  // bars BUE   4# b/l AW  6\" step ups   Focus on eccentric control when stepping back down   2x10 fwd ea LE     SOLO SPC   4# b/l AW  6\" step up and overs (2)  2x2 laps fwd      STS  SOLO  No UE x10    Add: Airex under feet   Needed BUE   2x10      Dynamic balance SOLO   No AD  Step up and over 4 AirEx pads   2x2 laps fwd   X2 laps lat     One LOB requiring MaxAx2 to recover SOLO   No AD  Cone taps w/ regular cones   2x2 laps fwd    More difficulty on L stance vs R stance     SOLO   Rolling red ball under R foot to emphasize LLE weightshift  2 rounds of 20seconds   CG-minAx1 for stabilization // bars long   4# b/l AW  Lateral stepping with cone taps   Single UE support   X2 laps   SOLO SPC   4# b/l AW  Foam beams (2x2)  Fwd ambulation x2 laps  Lateral ambulation x2 laps            Ther Ex        NuStep for CV/ROM  All ext level 5  X10 mins  All ext level 4  [id number]   All ext level 5  [id number]    LE HEP         PB roll outs   Seated w/ gray PB  X15                                              Ther Activity                        Gait Training                        Modalities                         Access Code: [email or web address]  URL: [email or web address]  Date: 01/23/2024  Prepared by: Mariel Land    Exercises  - Seated Long Arc Quad  - 1 x daily - 7 x weekly - 3 sets - 10 reps  - Standing March with Counter Support  - 1 x daily - 7 x weekly - 3 sets - 10 reps  - Standing Hip Abduction with Unilateral Counter Support  - 1 x daily - 7 x weekly - 3 sets - 10 reps  - Heel Toe Raises with Unilateral Counter Support  - 1 x daily - 7 x weekly - 3 sets - 10 reps  - Standing Knee Flexion with Unilateral Counter Support  - 1 x daily - 7 " x weekly - 3 sets - 10 reps  - Standing Hip Extension with Unilateral Counter Support  - 1 x daily - 7 x weekly - 3 sets - 10 reps

## 2024-02-21 PROBLEM — J01.00 SUBACUTE MAXILLARY SINUSITIS: Status: RESOLVED | Noted: 2024-01-08 | Resolved: 2024-02-21

## 2024-02-21 PROBLEM — Z01.419 ENCOUNTER FOR ANNUAL ROUTINE GYNECOLOGICAL EXAMINATION: Status: RESOLVED | Noted: 2019-05-21 | Resolved: 2024-02-21

## 2024-02-22 ENCOUNTER — OFFICE VISIT (OUTPATIENT)
Dept: PHYSICAL THERAPY | Facility: CLINIC | Age: 74
End: 2024-02-22
Payer: MEDICARE

## 2024-02-22 DIAGNOSIS — R68.89 ACTIVITY INTOLERANCE: ICD-10-CM

## 2024-02-22 DIAGNOSIS — R26.89 BALANCE PROBLEM: ICD-10-CM

## 2024-02-22 DIAGNOSIS — R26.81 UNSTEADY GAIT WHEN WALKING: Primary | ICD-10-CM

## 2024-02-22 DIAGNOSIS — R29.6 FREQUENT FALLS: ICD-10-CM

## 2024-02-22 DIAGNOSIS — R29.898 WEAKNESS OF RIGHT LOWER EXTREMITY: ICD-10-CM

## 2024-02-22 PROCEDURE — 97110 THERAPEUTIC EXERCISES: CPT

## 2024-02-22 PROCEDURE — 97112 NEUROMUSCULAR REEDUCATION: CPT

## 2024-02-22 NOTE — PROGRESS NOTES
Daily Note    Today's date: 2024  Patient name: Lola Spangler  : 1950  MRN: [id number]  Referring provider: Clay Montesinos, *  Dx:   Encounter Diagnosis     ICD-10-CM    1. Unsteady gait when walking  R26.81       2. Activity intolerance  R68.89       3. Frequent falls  R29.6       4. Balance problem  R26.89       5. Weakness of right lower extremity  R29.898                               Start Time: 0800  Stop Time: 0845  Total time in clinic (min): 45 minutes    Subjective: back was bothering her a lot yesterday, mostly her low back; feels like when she puts her hands on her back for support, then that helps, does have a brace that helps when she wears it; did not take Tylenol this morning because it isn't as bad today     Objective: See treatment diary below      Assessment: Tolerated treatment fair. Patient demonstrated fatigue post treatment and would benefit from continued PT.Progressed negotiation of compliant surfaces w/ hurdles to encourage higher step amplitude and increased SLS time b/l; also progressed with narrow compliant surfaces to further challenge balance strategies, specifically ankle and hip strategies; pt better able to maintain her balance t/o session today despite more challenging activities, however did do everything with her SPC vs w/o AD as she felt more confident with the external support. Will cont to strengthen BLE with focus on LLE stability, negotation of obstacles and uneven surfaces, and overall activity tolerance.       Plan: Continue per plan of care.       Short Term Goal Expiration Date:(24 4 weeks)  Long Term Goal Expiration Date: (3/8/24 8 weeks)  POC Expiration Date: (3/8/24 8 weeks)    Visit count: 1 of 10;     POC expires Unit limit Auth Expiration date PT/OT/ST + Visit Limit?   3/8/24 6 PT/OT 24 $2330CAP/BOMN PCY                           Visit/Unit Tracking  AUTH Status:  Date  IE  2/2 2/6 2/14 2/15 2/20 2/22  "  pending Used 1 2 3 4 5 6 7 8 9 10 11 12    Remaining                      Precautions frequent falls       Manuals 2/22 2/6 2/14 2/15 2/20                                   Neuro Re-Ed     PN testing (see above)    Pt education        HKM   //bars long   4# b/l AW  Minimal UE support   X3 laps      Hurdles         Step taps/step ups    // bars BUE   4# b/l AW  6\" step ups   Focus on eccentric control when stepping back down   2x10 fwd ea LE     SOLO SPC   4# b/l AW  6\" step up and overs (2)  2x2 laps fwd      STS  SOLO  No UE x10    Add: Airex under feet   Needed BUE   2x10      Dynamic balance SOLO SPC   No AW  Foam beams 2x2   Fwd ambulation x2 laps   Add: 6\" hurdles alt recip over beams x2 laps    Lat ambulation over beams 2x2 x2 laps     Lat ambulation over narrow foam beam x2 laps     All w/ SPC  SOLO   No AD  Cone taps w/ regular cones   2x2 laps fwd    More difficulty on L stance vs R stance     SOLO   Rolling red ball under R foot to emphasize LLE weightshift  2 rounds of 20seconds   CG-minAx1 for stabilization // bars long   4# b/l AW  Lateral stepping with cone taps   Single UE support   X2 laps   SOLO SPC   4# b/l AW  Foam beams (2x2)  Fwd ambulation x2 laps  Lateral ambulation x2 laps            Ther Ex        NuStep for CV/ROM All ext level 6  [id number]  All ext level 5  X10 mins  All ext level 4  [id number]   All ext level 5  [id number]    LE HEP         PB roll outs Seated w/ gray PB x12 in all 3 directions  Seated w/ gray PB  X15                                              Ther Activity                        Gait Training                        Modalities                         Access Code: [email or web address]  URL: [email or web address]  Date: 01/23/2024  Prepared by: Mariel Land    Exercises  - Seated Long Arc Quad  - 1 x daily - 7 x weekly - 3 sets - 10 reps  - Standing March with Counter Support  - 1 x daily - 7 x weekly - 3 sets - 10 reps  - Standing Hip Abduction with Unilateral " Counter Support  - 1 x daily - 7 x weekly - 3 sets - 10 reps  - Heel Toe Raises with Unilateral Counter Support  - 1 x daily - 7 x weekly - 3 sets - 10 reps  - Standing Knee Flexion with Unilateral Counter Support  - 1 x daily - 7 x weekly - 3 sets - 10 reps  - Standing Hip Extension with Unilateral Counter Support  - 1 x daily - 7 x weekly - 3 sets - 10 reps

## 2024-02-26 ENCOUNTER — VBI (OUTPATIENT)
Dept: ADMINISTRATIVE | Facility: OTHER | Age: 74
End: 2024-02-26

## 2024-02-27 ENCOUNTER — OFFICE VISIT (OUTPATIENT)
Dept: PHYSICAL THERAPY | Facility: CLINIC | Age: 74
End: 2024-02-27
Payer: MEDICARE

## 2024-02-27 DIAGNOSIS — R68.89 ACTIVITY INTOLERANCE: ICD-10-CM

## 2024-02-27 DIAGNOSIS — R29.6 FREQUENT FALLS: ICD-10-CM

## 2024-02-27 DIAGNOSIS — R26.81 UNSTEADY GAIT WHEN WALKING: Primary | ICD-10-CM

## 2024-02-27 DIAGNOSIS — R26.89 BALANCE PROBLEM: ICD-10-CM

## 2024-02-27 DIAGNOSIS — R29.898 WEAKNESS OF RIGHT LOWER EXTREMITY: ICD-10-CM

## 2024-02-27 PROCEDURE — 97110 THERAPEUTIC EXERCISES: CPT

## 2024-02-27 PROCEDURE — 97112 NEUROMUSCULAR REEDUCATION: CPT

## 2024-02-27 NOTE — PROGRESS NOTES
Daily Note    Today's date: 2024  Patient name: Lola Spangler  : 1950  MRN: [id number]  Referring provider: Clay Montesinos, *  Dx:   Encounter Diagnosis     ICD-10-CM    1. Unsteady gait when walking  R26.81       2. Activity intolerance  R68.89       3. Frequent falls  R29.6       4. Balance problem  R26.89       5. Weakness of right lower extremity  R29.898                                 Start Time: 0800  Stop Time: 0845  Total time in clinic (min): 45 minutes    Subjective: R knee is bothering her more lately, isn't sure what she did in particular to aggravate it    Objective: See treatment diary below      Assessment: Tolerated treatment fair. Patient demonstrated fatigue post treatment and would benefit from continued PT.Continued with negotiation of compliant surfaces and constant changes of direction w/ and w/o SPC, appeared more unsteady at end of session and reported more LE fatigue. Also may be due to more R knee pain today. Was more cautious when needing to use balance reactions to obstacles being placed in her path as she was walking but no LOB observed. Will cont to strengthen BLE with focus on LLE stability, negotation of obstacles and uneven surfaces, and overall activity tolerance.       Plan: Continue per plan of care.       Short Term Goal Expiration Date:(24 4 weeks)  Long Term Goal Expiration Date: (3/8/24 8 weeks)  POC Expiration Date: (3/8/24 8 weeks)    Visit count: 1 of 10;     POC expires Unit limit Auth Expiration date PT/OT/ST + Visit Limit?   3/8/24 6 PT/OT 24 $2330CAP/BOMN PCY                           Visit/Unit Tracking  AUTH Status:  Date  IE  2/2 2/6 2/14 2/15 2/20 2/22 2/27   pending Used 1 2 3 4 5 6 7 8 9 10 11 12     Remaining                       Precautions frequent falls       Manuals 2/22 2/27 2/14 2/15 2/20                                   Neuro Re-Ed     PN testing (see above)    Pt education        HKM   //bars  "long   4# b/l AW  Minimal UE support   X3 laps      Hurdles         Step taps/step ups    // bars BUE   4# b/l AW  6\" step ups   Focus on eccentric control when stepping back down   2x10 fwd ea LE     SOLO SPC   4# b/l AW  6\" step up and overs (2)  2x2 laps fwd      STS  No UE   2x10      Dynamic balance SOLO SPC   No AW  Foam beams 2x2   Fwd ambulation x2 laps   Add: 6\" hurdles alt recip over beams x2 laps    Lat ambulation over beams 2x2 x2 laps     Lat ambulation over narrow foam beam x2 laps     All w/ SPC  SOLO SPC   No AW  Foam beams narrow sidestepping   X2 laps lat     Ambulation over red mat no AD w/ 2 Blazepods on either side   1 round fo 10 hits  2 rounds of 15 hits     Ambulation over red mat no AD w/ PT dropping yellow markers as pt is walking   X3 laps      // bars long   4# b/l AW  Lateral stepping with cone taps   Single UE support   X2 laps   SOLO SPC   4# b/l AW  Foam beams (2x2)  Fwd ambulation x2 laps  Lateral ambulation x2 laps            Ther Ex        NuStep for CV/ROM All ext level 6  [id number]  Recumbent bike level 1 [id number]  All ext level 4  [id number]   All ext level 5  [id number]    LE HEP         PB roll outs Seated w/ gray PB x12 in all 3 directions  Seated w/ gray PB  X15                                              Ther Activity                        Gait Training                        Modalities                         Access Code: [email or web address]  URL: [email or web address]  Date: 01/23/2024  Prepared by: Mariel Land    Exercises  - Seated Long Arc Quad  - 1 x daily - 7 x weekly - 3 sets - 10 reps  - Standing March with Counter Support  - 1 x daily - 7 x weekly - 3 sets - 10 reps  - Standing Hip Abduction with Unilateral Counter Support  - 1 x daily - 7 x weekly - 3 sets - 10 reps  - Heel Toe Raises with Unilateral Counter Support  - 1 x daily - 7 x weekly - 3 sets - 10 reps  - Standing Knee Flexion with Unilateral Counter Support  - 1 x daily - 7 x weekly - 3 sets - 10 " reps  - Standing Hip Extension with Unilateral Counter Support  - 1 x daily - 7 x weekly - 3 sets - 10 reps

## 2024-02-29 ENCOUNTER — OFFICE VISIT (OUTPATIENT)
Dept: PHYSICAL THERAPY | Facility: CLINIC | Age: 74
End: 2024-02-29
Payer: MEDICARE

## 2024-02-29 DIAGNOSIS — R29.6 FREQUENT FALLS: ICD-10-CM

## 2024-02-29 DIAGNOSIS — R29.898 WEAKNESS OF RIGHT LOWER EXTREMITY: ICD-10-CM

## 2024-02-29 DIAGNOSIS — R68.89 ACTIVITY INTOLERANCE: ICD-10-CM

## 2024-02-29 DIAGNOSIS — R26.81 UNSTEADY GAIT WHEN WALKING: Primary | ICD-10-CM

## 2024-02-29 DIAGNOSIS — R26.89 BALANCE PROBLEM: ICD-10-CM

## 2024-02-29 PROCEDURE — 97110 THERAPEUTIC EXERCISES: CPT

## 2024-02-29 PROCEDURE — 97112 NEUROMUSCULAR REEDUCATION: CPT

## 2024-02-29 NOTE — PROGRESS NOTES
"Daily Note    Today's date: 2024  Patient name: Lola Spangler  : 1950  MRN: [id number]  Referring provider: Clay Montesinos, *  Dx:   Encounter Diagnosis     ICD-10-CM    1. Unsteady gait when walking  R26.81       2. Weakness of right lower extremity  R29.898       3. Activity intolerance  R68.89       4. Frequent falls  R29.6       5. Balance problem  R26.89                                   Start Time: 0800  Stop Time: 0845  Total time in clinic (min): 45 minutes    Subjective: No new complaints today    Objective: See treatment diary below      Assessment: Tolerated treatment fair. Patient demonstrated fatigue post treatment and would benefit from continued PT.Continued with negotiation of compliant surfaces w/ SPC and with emphasis on SLS b/l; required SPC when leading w/ RLE due to feeling less confident in RLE strength, but was more stable when leading w/ LLE and didn't need the SPC. Also tolerated introduction on TM to progress CV endurance. Will cont to strengthen BLE with focus on LLE stability, negotation of obstacles and uneven surfaces, and overall activity tolerance.       Plan: Continue per plan of care.       Short Term Goal Expiration Date:(24 4 weeks)  Long Term Goal Expiration Date: (3/8/24 8 weeks)  POC Expiration Date: (3/8/24 8 weeks)    Visit count: 1 of 10;     POC expires Unit limit Auth Expiration date PT/OT/ST + Visit Limit?   3/8/24 6 PT/OT 24 $2330CAP/BOMN PCY                           Visit/Unit Tracking  AUTH Status:  Date  IE  2/2 2/6 2/14 2/15 2/20 2/22 2/27 2/29   pending Used 1 2 3 4 5 6 7 8 9 10 11 12 13     Remaining                        Precautions frequent falls       Manuals 2/22 2/27 2/29 2/15 2/20                                   Neuro Re-Ed     PN testing (see above)    Pt education        HKM        Hurdles         Step taps/step ups    SOLO   No AW  6\" step ups  2x8 fwd ea LE  No SPC when leading with " "LLE  Required SPC when leading with RLE   SOLO SPC   4# b/l AW  6\" step up and overs (2)  2x2 laps fwd      STS  No UE   2x10      Dynamic balance SOLO SPC   No AW  Foam beams 2x2   Fwd ambulation x2 laps   Add: 6\" hurdles alt recip over beams x2 laps    Lat ambulation over beams 2x2 x2 laps     Lat ambulation over narrow foam beam x2 laps     All w/ SPC  SOLO SPC   No AW  Foam beams narrow sidestepping   X2 laps lat     Ambulation over red mat no AD w/ 2 Blazepods on either side   1 round fo 10 hits  2 rounds of 15 hits     Ambulation over red mat no AD w/ PT dropping yellow markers as pt is walking   X3 laps      SOLO SPC   No AW  Foam beams narrow sidestepping   X2 laps lat   Add: alt recip cone taps (6)  X2 laps lat   SOLO SPC   4# b/l AW  Foam beams (2x2)  Fwd ambulation x2 laps  Lateral ambulation x2 laps            Ther Ex        NuStep for CV/ROM All ext level 6  [id number]  Recumbent bike level 1 [id number]    All ext level 5  [id number]    LE HEP         PB roll outs Seated w/ gray PB x12 in all 3 directions       TM for CV endurance   SOLO BUE   No AW  1.0mph 1% incline   X8mins                                      Ther Activity                        Gait Training                        Modalities                         Access Code: [email or web address]  URL: [email or web address]  Date: 01/23/2024  Prepared by: Mariel Land    Exercises  - Seated Long Arc Quad  - 1 x daily - 7 x weekly - 3 sets - 10 reps  - Standing March with Counter Support  - 1 x daily - 7 x weekly - 3 sets - 10 reps  - Standing Hip Abduction with Unilateral Counter Support  - 1 x daily - 7 x weekly - 3 sets - 10 reps  - Heel Toe Raises with Unilateral Counter Support  - 1 x daily - 7 x weekly - 3 sets - 10 reps  - Standing Knee Flexion with Unilateral Counter Support  - 1 x daily - 7 x weekly - 3 sets - 10 reps  - Standing Hip Extension with Unilateral Counter Support  - 1 x daily - 7 x weekly - 3 sets - 10 reps             "

## 2024-03-05 ENCOUNTER — OFFICE VISIT (OUTPATIENT)
Dept: PHYSICAL THERAPY | Facility: CLINIC | Age: 74
End: 2024-03-05
Payer: MEDICARE

## 2024-03-05 DIAGNOSIS — R29.6 FREQUENT FALLS: ICD-10-CM

## 2024-03-05 DIAGNOSIS — R29.898 WEAKNESS OF RIGHT LOWER EXTREMITY: ICD-10-CM

## 2024-03-05 DIAGNOSIS — R26.81 UNSTEADY GAIT WHEN WALKING: Primary | ICD-10-CM

## 2024-03-05 DIAGNOSIS — R26.89 BALANCE PROBLEM: ICD-10-CM

## 2024-03-05 DIAGNOSIS — R68.89 ACTIVITY INTOLERANCE: ICD-10-CM

## 2024-03-05 PROCEDURE — 97110 THERAPEUTIC EXERCISES: CPT

## 2024-03-05 PROCEDURE — 97112 NEUROMUSCULAR REEDUCATION: CPT

## 2024-03-05 NOTE — PROGRESS NOTES
Daily Note    Today's date: 3/5/2024  Patient name: Lola Spangler  : 1950  MRN: [id number]  Referring provider: Clay Montesinos, *  Dx:   Encounter Diagnosis     ICD-10-CM    1. Unsteady gait when walking  R26.81       2. Balance problem  R26.89       3. Weakness of right lower extremity  R29.898       4. Activity intolerance  R68.89       5. Frequent falls  R29.6                                     Start Time: 1100  Stop Time: 1145  Total time in clinic (min): 45 minutes    Subjective: reports having back pain after last session probably due to using the treadmill for the first time; also was walking and standing around a lot this weekend    Objective: See treatment diary below      Assessment: Tolerated treatment fair. Continued progressing navigation of uneven surfaces w/ SPC to simulate different environments that pt has to walk in; noted improvements when she switched SPC to LUE when leading with RLE for step ups and marty negotiation; also noted improvements with compliant surfaces as pt was able to utilize ankle strategies to maintain her balance when stepping onto foam pads. Will cont to strengthen BLE with focus on LLE stability, negotation of obstacles and uneven surfaces, and overall activity tolerance; re-evaluation will be completed next visit with possible discharge from PT pending pt's progress towards goals.       Plan: Continue per plan of care.  Re-evaluation next visit with possible D/C from PT     Short Term Goal Expiration Date:(24 4 weeks)  Long Term Goal Expiration Date: (3/8/24 8 weeks)  POC Expiration Date: (3/8/24 8 weeks)    Visit count: 1 of 10;     POC expires Unit limit Auth Expiration date PT/OT/ST + Visit Limit?   3/8/24 6 PT/OT 24 $2330CAP/BOMN PCY                           Visit/Unit Tracking  AUTH Status:  Date  IE  2/2 2/6 2/14 2/15 2/20 2/22 2/27 2/29 3/5   pending Used 1 2 3 4 5 6 7 8 9 10 11 12 13 14 15    Remaining           "               Precautions frequent falls       Manuals 2/22 2/27 2/29 3/5 2/20                                   Neuro Re-Ed         Pt education        HKM        Hurdles         Step taps/step ups    SOLO   No AW  6\" step ups  2x8 fwd ea LE  No SPC when leading with LLE  Required SPC when leading with RLE  SOLO SPC   No AW  6\" Step up and overs (2)  X4 fwd leading with ea LE  Add: 3 AirEx pads, 2 on either side of 1 step, 1 on only   X3 laps fwd leading with ea LE  Add: (2) 12\" hurdles in bwt Airex pads  X1 lap fwd leading with ea LE  SOLO SPC   4# b/l AW  6\" step up and overs (2)  2x2 laps fwd      STS  No UE   2x10      Dynamic balance SOLO SPC   No AW  Foam beams 2x2   Fwd ambulation x2 laps   Add: 6\" hurdles alt recip over beams x2 laps    Lat ambulation over beams 2x2 x2 laps     Lat ambulation over narrow foam beam x2 laps     All w/ SPC  SOLO SPC   No AW  Foam beams narrow sidestepping   X2 laps lat     Ambulation over red mat no AD w/ 2 Blazepods on either side   1 round fo 10 hits  2 rounds of 15 hits     Ambulation over red mat no AD w/ PT dropping yellow markers as pt is walking   X3 laps      SOLO SPC   No AW  Foam beams narrow sidestepping   X2 laps lat   Add: alt recip cone taps (6)  X2 laps lat   SOLO SPC   4# b/l AW  Foam beams (2x2)  Fwd ambulation x2 laps  Lateral ambulation x2 laps            Ther Ex        NuStep for CV/ROM All ext level 6  [id number]  Recumbent bike level 1 [id number]   All ext level 5  X15 mins  All ext level 5  [id number]    LE HEP         PB roll outs Seated w/ gray PB x12 in all 3 directions       TM for CV endurance   SOLO BUE   No AW  1.0mph 1% incline   X8mins                                      Ther Activity                        Gait Training                        Modalities                         Access Code: [email or web address]  URL: [email or web address]  Date: 01/23/2024  Prepared by: Mariel Land    Exercises  - Seated Long Arc Quad  - 1 x daily - 7 x " weekly - 3 sets - 10 reps  - Standing March with Counter Support  - 1 x daily - 7 x weekly - 3 sets - 10 reps  - Standing Hip Abduction with Unilateral Counter Support  - 1 x daily - 7 x weekly - 3 sets - 10 reps  - Heel Toe Raises with Unilateral Counter Support  - 1 x daily - 7 x weekly - 3 sets - 10 reps  - Standing Knee Flexion with Unilateral Counter Support  - 1 x daily - 7 x weekly - 3 sets - 10 reps  - Standing Hip Extension with Unilateral Counter Support  - 1 x daily - 7 x weekly - 3 sets - 10 reps

## 2024-03-06 NOTE — PROGRESS NOTES
Re-Evaluation    Today's date: 3/7/2024  Patient name: Lola Spangler  : 1950  MRN: [id number]  Referring provider: Clay Montesinos, *  Dx:   Encounter Diagnosis     ICD-10-CM    1. Unsteady gait when walking  R26.81       2. Frequent falls  R29.6       3. Balance problem  R26.89       4. Weakness of right lower extremity  R29.898       5. Activity intolerance  R68.89             Start Time: 0800  Stop Time: 0845  Total time in clinic (min): 45 minutes      Assessment: Tolerated treatment fair. Re-evaluation performed today, pt has made significant improvements in all functional testing but is still performing below cut off scores for increased fall risk per FGA and is ambulating below safe community norms w/ SPC; pt also still appears very hesitant when ambulating w/o SPC and when ascending/descending stairs. Pt would still benefit from further skilled PT services to address these impairments in order to meet her personal goals of not needing a SPC and being able to navigate uneven surfaces especially when out in the community without feeling unsteady despite having UE support. POC will be extended for 4 weeks and goals have been updated to reflect these changes.           Balance Testing   Modified Clinical Test of Sensory Interaction of Balance  (MCTSIB) (seconds): IE:   Firm EO: 30s  Firm EC: 30s  Foam EO: 30s  Foam EC: 5s (best trial)     PN 2/15:   Foam EC: 30s (best trial out of 3)    RE 3/7:   Foam EC: 30s (second trial)   Messina Balance Scale (BBS):  <45 greater risk for falls (Messina et al 1992)     MCID:   4 points 45-56 initially  5 points 35-44 initially  7 points 25-34 initially  5 points 0-24 initially   IE: not tested   Functional Gait Assessment (FGA)  < Or = 22/30 greater risk for falls     MDC:  Geriatric: 4  Stroke: 4  Parkinson's Disease: 4  Vestibular: 6 IE:  no AD  PN 2/15:  no AD  RE 3/7:  no AD               Mobility & Endurance Testing   5x Sit to Stand  (seconds)  >15 seconds greater risk for falls (elderly)     MCID: 2.3 seconds IE: 15.27sec BUE   PN 2/15: 14.65s minimal UE support  RE 3/7: 13.90s no UE    Timed Up-and-Go (seconds)  >13.5 seconds greater risk for falls    IE: 16.63 sec w/ SPC, 13.75s w/o AD  PN 2/15: 11.81s w/ SPC, 12.32s w/o AD  RE 3/7: 10.91s w/ SPC, 8.91s no AD   6 Minute Walk Test (meters)  Geriatrics:  Meters:         Male  Female  60-69 years  572    538  70-79 years  527    471  80-89 years  417    392     MCID: 50 meters/164 feet IE: 720ft w/ SPC   PN 2/15: 800ft w/ SPC    RE 3/7: 850ft w/ SPC       Gait speed: Self-selected & fast (meters/second)     1.0 m/s normal  1.2 m/s community-level speed     MCID: 0.10 m/s IE: 6m/8.46s=0.71m/s w/ SPC   PN 2/15: 6m/7.62=0.79m/s w/ SPC   RE 3/7: 0.83m/s w/ SPC       STG:  Patient will improve 6MWT by 200ft demonstrating significant improvements in endurance  w/in 4 weeks-IN PROGRESS 2/15  Patient will be able to perform STS without use of UE wihtin 4 weeks-MET 2/15  Patient will demonstrates significant improvements in dynamic balance by improving FGA score by 6 points or more within 4 weeks-MET 2/15  Patient will be able to negotiates stairs with reciprocal pattern with use of handrail within 4 weeks-IN PROGRESS 2/15  Patient will be independent with HEP within 4 weeks-MET 2/15  LTG:  Patient will improve 6MWT by 400ft demonstrating significant improvements in endurance  w/in 8 weeks-IN PROGRESS 3/7  Patient will demonstrated improved LE strength and endurance by improved 5xSTS to 15s or less within 8 weeks-MET 3/7  Patient will improve TUG to 12s or less indicating they are no longer at risk for increased falls 8 weeks-MET 3/7  Patient will improve FGA to 22/30 or greater indicating they are no longer at higher risk for falls within 8 weeks-IN PROGRESS 3/7   Patient will improve gait speed to 1.0 m/s indicating they are no longer at higher risk for falls within 8 weeks-IN PROGRESS 3/7    NEW GOALS  as of 3/7/24:   Patient will improve 6MWT by 200ft demonstrating significant improvements in endurance  w/in 4 weeks  Patient will improve FGA to 22/30 or greater indicating they are no longer at higher risk for falls within 4 weeks  Patient will improve gait speed to 1.0 m/s indicating they are no longer at higher risk for falls within 4 weeks  Patient will improve 4th condition on MCTSIB to 30s on first trial to show improvements in vestibular integration within 4 weeks    Patient Goals  Patient goal: get rid of SPC, get more strength, get more confident about not falling, work on her core strength  Pain  Current pain ratin  At best pain ratin  At worst pain ratin  Location: R knee, low back pain at worst is 7.5/10, best is at 0/10  Quality: dull ache  Relieving factors: medications, rest and change in position  Aggravating factors: walking, standing and stair climbing     Plan: Continue per plan of care.    Plan  Plan details: Address BLE weakness with functional strengthening to improve transfers and ambulation  Address ambulation endurance w/ LRAD and possibly w/o AD to achieve pt's goal of walking without SPC   Address dynamic balance w/ vestibular integration, compliant surfaces, and narrow base o support  Provide LE strengthening and core stregnthening HEP   Planned therapy interventions: abdominal trunk stabilization, ADL training, balance, body mechanics training, coordination, flexibility, functional ROM exercises, gait training, home exercise program, neuromuscular re-education, patient education, postural training, sensory integrative techniques, strengthening, stretching, therapeutic activities, therapeutic exercise and transfer training  Frequency: 2x week  Duration in weeks: 12  Plan of Care beginning date: 2024  Plan of Care expiration date: 2024  Treatment plan discussed with: patient      Plan: Continue per plan of care.  Extending current POC to 2024 to address updated  "goals for mobility and balance.    Subjective: did not sleep well last night, her restless leg syndrome was really bad after being in the car for a longer drive yesterday    Objective: See treatment diary below       Short Term Goal Expiration Date:(2/9/24 4 weeks)  Long Term Goal Expiration Date: (3/8/24 8 weeks)  POC Expiration Date: (3/8/24 8 weeks)    Visit count: 1 of 10;     POC expires Unit limit Auth Expiration date PT/OT/ST + Visit Limit?   3/8/24 6 PT/OT 12/31/24 $2330CAP/BOMN PCY                           Visit/Unit Tracking  AUTH Status:  Date 3/7                 bomn Used 1                  Remaining (out of 10)  9                       Precautions frequent falls       Manuals 2/22 2/27 2/29 3/5 3/7                                   Neuro Re-Ed      RE testing (see above)   Pt education        HKM        Hurdles         Step taps/step ups    SOLO   No AW  6\" step ups  2x8 fwd ea LE  No SPC when leading with LLE  Required SPC when leading with RLE  SOLO SPC   No AW  6\" Step up and overs (2)  X4 fwd leading with ea LE  Add: 3 AirEx pads, 2 on either side of 1 step, 1 on only   X3 laps fwd leading with ea LE  Add: (2) 12\" hurdles in bwt Airex pads  X1 lap fwd leading with ea LE     STS  No UE   2x10      Dynamic balance SOLO SPC   No AW  Foam beams 2x2   Fwd ambulation x2 laps   Add: 6\" hurdles alt recip over beams x2 laps    Lat ambulation over beams 2x2 x2 laps     Lat ambulation over narrow foam beam x2 laps     All w/ SPC  SOLO SPC   No AW  Foam beams narrow sidestepping   X2 laps lat     Ambulation over red mat no AD w/ 2 Blazepods on either side   1 round fo 10 hits  2 rounds of 15 hits     Ambulation over red mat no AD w/ PT dropping yellow markers as pt is walking   X3 laps      SOLO SPC   No AW  Foam beams narrow sidestepping   X2 laps lat   Add: alt recip cone taps (6)  X2 laps lat              Ther Ex        NuStep for CV/ROM All ext level 6  [id number]  Recumbent bike level 1 [id number]   All ext " level 5  X15 mins     LE HEP         PB roll outs Seated w/ gray PB x12 in all 3 directions       TM for CV endurance   SOLO BUE   No AW  1.0mph 1% incline   X8mins                                      Ther Activity                        Gait Training                        Modalities                         Access Code: [email or web address]  URL: [email or web address]  Date: 01/23/2024  Prepared by: Mariel Land    Exercises  - Seated Long Arc Quad  - 1 x daily - 7 x weekly - 3 sets - 10 reps  - Standing March with Counter Support  - 1 x daily - 7 x weekly - 3 sets - 10 reps  - Standing Hip Abduction with Unilateral Counter Support  - 1 x daily - 7 x weekly - 3 sets - 10 reps  - Heel Toe Raises with Unilateral Counter Support  - 1 x daily - 7 x weekly - 3 sets - 10 reps  - Standing Knee Flexion with Unilateral Counter Support  - 1 x daily - 7 x weekly - 3 sets - 10 reps  - Standing Hip Extension with Unilateral Counter Support  - 1 x daily - 7 x weekly - 3 sets - 10 reps

## 2024-03-07 ENCOUNTER — EVALUATION (OUTPATIENT)
Dept: PHYSICAL THERAPY | Facility: CLINIC | Age: 74
End: 2024-03-07
Payer: MEDICARE

## 2024-03-07 DIAGNOSIS — R26.81 UNSTEADY GAIT WHEN WALKING: Primary | ICD-10-CM

## 2024-03-07 DIAGNOSIS — R68.89 ACTIVITY INTOLERANCE: ICD-10-CM

## 2024-03-07 DIAGNOSIS — R26.89 BALANCE PROBLEM: ICD-10-CM

## 2024-03-07 DIAGNOSIS — R29.6 FREQUENT FALLS: ICD-10-CM

## 2024-03-07 DIAGNOSIS — R29.898 WEAKNESS OF RIGHT LOWER EXTREMITY: ICD-10-CM

## 2024-03-07 PROCEDURE — 97116 GAIT TRAINING THERAPY: CPT

## 2024-03-07 PROCEDURE — 97112 NEUROMUSCULAR REEDUCATION: CPT

## 2024-03-12 ENCOUNTER — OFFICE VISIT (OUTPATIENT)
Dept: PHYSICAL THERAPY | Facility: CLINIC | Age: 74
End: 2024-03-12
Payer: MEDICARE

## 2024-03-12 DIAGNOSIS — R26.81 UNSTEADY GAIT WHEN WALKING: Primary | ICD-10-CM

## 2024-03-12 DIAGNOSIS — R29.898 WEAKNESS OF RIGHT LOWER EXTREMITY: ICD-10-CM

## 2024-03-12 DIAGNOSIS — R68.89 ACTIVITY INTOLERANCE: ICD-10-CM

## 2024-03-12 DIAGNOSIS — R26.89 BALANCE PROBLEM: ICD-10-CM

## 2024-03-12 DIAGNOSIS — R29.6 FREQUENT FALLS: ICD-10-CM

## 2024-03-12 PROCEDURE — 97110 THERAPEUTIC EXERCISES: CPT

## 2024-03-12 PROCEDURE — 97112 NEUROMUSCULAR REEDUCATION: CPT

## 2024-03-12 NOTE — PROGRESS NOTES
Re-Evaluation    Today's date: 3/12/2024  Patient name: Lola Spangler  : 1950  MRN: [id number]  Referring provider: Clay Montesinos, *  Dx:   Encounter Diagnosis     ICD-10-CM    1. Unsteady gait when walking  R26.81       2. Activity intolerance  R68.89       3. Frequent falls  R29.6       4. Balance problem  R26.89       5. Weakness of right lower extremity  R29.898               Start Time: 930  Stop Time: 1015  Total time in clinic (min): 45 minutes      Assessment: Tolerated treatment fair. Session focused on progressing balance over more uneven surfaces w/o using AD for support; pt still very hesitant to do activities like this w/o her cane but was able to complete w/ only one notable LOB in which pt was able to catch herself independently using stepping strategy. Reported LE fatigue at end of session which is expected since we're increasing the intensity of her dynamic balance activities. Will cont to address uneven and compliant surfaces, as well as focus more on stair training w/ minimal UE support.       Plan: Continue per plan of care.      Subjective: still hasn't been sleeping well at night due her restless leg syndrome, is thinking about talking with pain management about this, does take gabapentin for this currently     Objective: See treatment diary below       Short Term Goal Expiration Date:(24 4 weeks)  Long Term Goal Expiration Date: (3/8/24 8 weeks)  POC Expiration Date: (3/8/24 8 weeks)    Visit count: 2 of 10;     POC expires Unit limit Auth Expiration date PT/OT/ST + Visit Limit?   3/8/24 6 PT/OT 24 $2330CAP/BOMN PCY                           Visit/Unit Tracking  AUTH Status:  Date 3/7 3/12                bomn Used 1 2                 Remaining (out of 10)  9 8                      Precautions frequent falls       Manuals 3/12 2/27 2/29 3/5 3/7                                   Neuro Re-Ed      RE testing (see above)   Pt education        HKM        Hurdles        "  Step taps/step ups    SOLO   No AW  6\" step ups  2x8 fwd ea LE  No SPC when leading with LLE  Required SPC when leading with RLE  SOLO SPC   No AW  6\" Step up and overs (2)  X4 fwd leading with ea LE  Add: 3 AirEx pads, 2 on either side of 1 step, 1 on only   X3 laps fwd leading with ea LE  Add: (2) 12\" hurdles in bwt Airex pads  X1 lap fwd leading with ea LE     STS  No UE   2x10      Dynamic balance SOLO no SPC  4 blazepods (2 on either end) of red mat on floor   1 round of 15 hits   Add: foam discs and small river rocks under mat   1 round of 10 hits    SOLO SPC   No AW  Foam beams narrow sidestepping   X2 laps lat     Ambulation over red mat no AD w/ 2 Blazepods on either side   1 round fo 10 hits  2 rounds of 15 hits     Ambulation over red mat no AD w/ PT dropping yellow markers as pt is walking   X3 laps      SOLO SPC   No AW  Foam beams narrow sidestepping   X2 laps lat   Add: alt recip cone taps (6)  X2 laps lat              Ther Ex        NuStep for CV/ROM Level 5 all ext   [id number] Recumbent bike level 1 [id number]   All ext level 5  X15 mins     LE HEP         PB roll outs        TM for CV endurance   SOLO BUE   No AW  1.0mph 1% incline   X8mins                                      Ther Activity                        Gait Training                        Modalities                         Access Code: [email or web address]  URL: [email or web address]  Date: 01/23/2024  Prepared by: Mariel Land    Exercises  - Seated Long Arc Quad  - 1 x daily - 7 x weekly - 3 sets - 10 reps  - Standing March with Counter Support  - 1 x daily - 7 x weekly - 3 sets - 10 reps  - Standing Hip Abduction with Unilateral Counter Support  - 1 x daily - 7 x weekly - 3 sets - 10 reps  - Heel Toe Raises with Unilateral Counter Support  - 1 x daily - 7 x weekly - 3 sets - 10 reps  - Standing Knee Flexion with Unilateral Counter Support  - 1 x daily - 7 x weekly - 3 sets - 10 reps  - Standing Hip Extension with Unilateral " Counter Support  - 1 x daily - 7 x weekly - 3 sets - 10 reps

## 2024-03-14 ENCOUNTER — OFFICE VISIT (OUTPATIENT)
Dept: PHYSICAL THERAPY | Facility: CLINIC | Age: 74
End: 2024-03-14
Payer: MEDICARE

## 2024-03-14 DIAGNOSIS — R29.898 WEAKNESS OF RIGHT LOWER EXTREMITY: ICD-10-CM

## 2024-03-14 DIAGNOSIS — R29.6 FREQUENT FALLS: ICD-10-CM

## 2024-03-14 DIAGNOSIS — R26.81 UNSTEADY GAIT WHEN WALKING: Primary | ICD-10-CM

## 2024-03-14 DIAGNOSIS — R26.89 BALANCE PROBLEM: ICD-10-CM

## 2024-03-14 DIAGNOSIS — R68.89 ACTIVITY INTOLERANCE: ICD-10-CM

## 2024-03-14 PROCEDURE — 97112 NEUROMUSCULAR REEDUCATION: CPT

## 2024-03-14 PROCEDURE — 97110 THERAPEUTIC EXERCISES: CPT

## 2024-03-14 NOTE — PROGRESS NOTES
"Re-Evaluation    Today's date: 3/14/2024  Patient name: Lola Spangler  : 1950  MRN: [id number]  Referring provider: Clay Montesinos, *  Dx:   Encounter Diagnosis     ICD-10-CM    1. Unsteady gait when walking  R26.81       2. Weakness of right lower extremity  R29.898       3. Activity intolerance  R68.89       4. Frequent falls  R29.6       5. Balance problem  R26.89                 Start Time: 845  Stop Time: 930  Total time in clinic (min): 45 minutes      Assessment: Tolerated treatment fair. Improvements noted in SLS during marty negotiation as she took more even steps and more symmetrical stance time on each LE. Still most difficulty with lateral marty negotiation but was able to do w/o support from SPC. Will cont to address uneven and compliant surfaces, as well as focus more on stair training w/ minimal UE support.       Plan: Continue per plan of care.      Subjective: back was a little stiff this morning, says the stretches sometimes seem to help loosen her up     Objective: See treatment diary below       Short Term Goal/Long Term Goal Expiration Date:(24 4 weeks)  POC Expiration Date: (24 8 weeks)    Visit count: 3 of 10;     POC expires Unit limit Auth Expiration date PT/OT/ST + Visit Limit?   3/8/24 6 PT/OT 24 $2330CAP/BOMN PCY                           Visit/Unit Tracking  AUTH Status:  Date 3/7 3/12 3/14               bomn Used 1 2 3                Remaining (out of 10)  9 8 7                     Precautions frequent falls       Manuals 3/12 3/14 2/29 3/5 3/7                                   Neuro Re-Ed      RE testing (see above)   Pt education        HKM        Hurdles         Step taps/step ups    SOLO   No AW  6\" step ups  2x8 fwd ea LE  No SPC when leading with LLE  Required SPC when leading with RLE  SOLO SPC   No AW  6\" Step up and overs (2)  X4 fwd leading with ea LE  Add: 3 AirEx pads, 2 on either side of 1 step, 1 on only   X3 laps fwd leading with ea " "LE  Add: (2) 12\" hurdles in bwt Airex pads  X1 lap fwd leading with ea LE     STS        Dynamic balance SOLO no SPC  4 blazepods (2 on either end) of red mat on floor   1 round of 15 hits   Add: foam discs and small river rocks under mat   1 round of 10 hits    SOLO   No AD  6\" (6) hurdles   X3 laps fwd   Alt bwt leading LE   X3 laps lat     Standing with soccer ball under LE  SOLO SPC   Multidirectional perturbations   X15 on ea LE  SOLO SPC   No AW  Foam beams narrow sidestepping   X2 laps lat   Add: alt recip cone taps (6)  X2 laps lat              Ther Ex        NuStep for CV/ROM Level 5 all ext   [id number] Level 5 all ext   X12 mins  All ext level 5  X15 mins     LE HEP         PB roll outs  Frias PB   X15 ea direction      TM for CV endurance   SOLO BUE   No AW  1.0mph 1% incline   X8mins                                      Ther Activity                        Gait Training                        Modalities                         Access Code: [email or web address]  URL: [email or web address]  Date: 01/23/2024  Prepared by: Mariel Land    Exercises  - Seated Long Arc Quad  - 1 x daily - 7 x weekly - 3 sets - 10 reps  - Standing March with Counter Support  - 1 x daily - 7 x weekly - 3 sets - 10 reps  - Standing Hip Abduction with Unilateral Counter Support  - 1 x daily - 7 x weekly - 3 sets - 10 reps  - Heel Toe Raises with Unilateral Counter Support  - 1 x daily - 7 x weekly - 3 sets - 10 reps  - Standing Knee Flexion with Unilateral Counter Support  - 1 x daily - 7 x weekly - 3 sets - 10 reps  - Standing Hip Extension with Unilateral Counter Support  - 1 x daily - 7 x weekly - 3 sets - 10 reps             "

## 2024-03-19 ENCOUNTER — OFFICE VISIT (OUTPATIENT)
Dept: PHYSICAL THERAPY | Facility: CLINIC | Age: 74
End: 2024-03-19
Payer: MEDICARE

## 2024-03-19 DIAGNOSIS — R29.898 WEAKNESS OF RIGHT LOWER EXTREMITY: ICD-10-CM

## 2024-03-19 DIAGNOSIS — R68.89 ACTIVITY INTOLERANCE: ICD-10-CM

## 2024-03-19 DIAGNOSIS — R26.81 UNSTEADY GAIT WHEN WALKING: Primary | ICD-10-CM

## 2024-03-19 DIAGNOSIS — R29.6 FREQUENT FALLS: ICD-10-CM

## 2024-03-19 DIAGNOSIS — R26.89 BALANCE PROBLEM: ICD-10-CM

## 2024-03-19 PROCEDURE — 97110 THERAPEUTIC EXERCISES: CPT

## 2024-03-19 PROCEDURE — 97112 NEUROMUSCULAR REEDUCATION: CPT

## 2024-03-19 NOTE — PROGRESS NOTES
"Re-Evaluation    Today's date: 3/19/2024  Patient name: Lola Spangler  : 1950  MRN: [id number]  Referring provider: Clay Montesinos, *  Dx:   Encounter Diagnosis     ICD-10-CM    1. Unsteady gait when walking  R26.81       2. Balance problem  R26.89       3. Weakness of right lower extremity  R29.898       4. Activity intolerance  R68.89       5. Frequent falls  R29.6                   Start Time: 845  Stop Time: 930  Total time in clinic (min): 45 minutes      Assessment: Tolerated treatment fair. Progressed step ups from complaint surface, most difficulty leading with RLE vs LLE. Also progressed stair training with decreasing UE support and challenged with reciprocal pattern ascending/descending vs 2 feet per step. Will cont to address uneven and compliant surfaces, as well as focus more on stair training w/ minimal UE support.       Plan: Continue per plan of care.      Subjective: was able to walk around her brother's basement which has a really beni chinmay and she was able to do it without using her cane!     Objective: See treatment diary below       Short Term Goal/Long Term Goal Expiration Date:(24 4 weeks)  POC Expiration Date: (24 8 weeks)    Visit count: 4 of 10;     POC expires Unit limit Auth Expiration date PT/OT/ST + Visit Limit?   24 6 PT/OT 24 $2330CAP/BOMN PCY                           Visit/Unit Tracking  AUTH Status:  Date 3/7 3/12 3/14 3/19              bomn Used 1 2 3 4               Remaining (out of 10)  9 8 7 6                    Precautions frequent falls       Manuals 3/12 3/14 3/19 3/5 3/7                                   Neuro Re-Ed      RE testing (see above)   Pt education        HKM        Hurdles         Step taps/step ups    SOLO SPC   AirEx pad to 8\" step   2x8 fwd ea LE   Switched SPC bwt RUE and LUE depending on which LE was leading  SOLO SPC   No AW  6\" Step up and overs (2)  X4 fwd leading with ea LE  Add: 3 AirEx pads, 2 on either side of " "1 step, 1 on only   X3 laps fwd leading with ea LE  Add: (2) 12\" hurdles in bwt Airex pads  X1 lap fwd leading with ea LE     STS   SOLO   BUE   AirEx pad under feet   2x10     Attempted no UE but unable to complete      Dynamic balance SOLO no SPC  4 blazepods (2 on either end) of red mat on floor   1 round of 15 hits   Add: foam discs and small river rocks under mat   1 round of 10 hits    SOLO   No AD  6\" (6) hurdles   X3 laps fwd   Alt bwt leading LE   X3 laps lat     Standing with soccer ball under LE  SOLO SPC   Multidirectional perturbations   X15 on ea LE  SOLO   Trainer steps   X1 lap 2 feet per step BUE   X1 lap step over step BUE  X1 lap 2 feet per step RUE only  X4 laps step over step RUE only                  Ther Ex        NuStep for CV/ROM Level 5 all ext   [id number] Level 5 all ext   X12 mins Recumbent bike level 1  [id number]    All ext level 5  X15 mins     LE HEP         PB roll outs  Frias PB   X15 ea direction      TM for CV endurance                                        Ther Activity                        Gait Training                        Modalities                         Access Code: [email or web address]  URL: [email or web address]  Date: 01/23/2024  Prepared by: Mariel Land    Exercises  - Seated Long Arc Quad  - 1 x daily - 7 x weekly - 3 sets - 10 reps  - Standing March with Counter Support  - 1 x daily - 7 x weekly - 3 sets - 10 reps  - Standing Hip Abduction with Unilateral Counter Support  - 1 x daily - 7 x weekly - 3 sets - 10 reps  - Heel Toe Raises with Unilateral Counter Support  - 1 x daily - 7 x weekly - 3 sets - 10 reps  - Standing Knee Flexion with Unilateral Counter Support  - 1 x daily - 7 x weekly - 3 sets - 10 reps  - Standing Hip Extension with Unilateral Counter Support  - 1 x daily - 7 x weekly - 3 sets - 10 reps             "

## 2024-03-21 ENCOUNTER — OFFICE VISIT (OUTPATIENT)
Dept: PHYSICAL THERAPY | Facility: CLINIC | Age: 74
End: 2024-03-21
Payer: MEDICARE

## 2024-03-21 DIAGNOSIS — R26.89 BALANCE PROBLEM: ICD-10-CM

## 2024-03-21 DIAGNOSIS — R68.89 ACTIVITY INTOLERANCE: ICD-10-CM

## 2024-03-21 DIAGNOSIS — R26.81 UNSTEADY GAIT WHEN WALKING: Primary | ICD-10-CM

## 2024-03-21 DIAGNOSIS — R29.6 FREQUENT FALLS: ICD-10-CM

## 2024-03-21 DIAGNOSIS — R29.898 WEAKNESS OF RIGHT LOWER EXTREMITY: ICD-10-CM

## 2024-03-21 PROCEDURE — 97110 THERAPEUTIC EXERCISES: CPT

## 2024-03-21 PROCEDURE — 97112 NEUROMUSCULAR REEDUCATION: CPT

## 2024-03-21 NOTE — PROGRESS NOTES
"Daily Note    Today's date: 3/21/2024  Patient name: Lola Spangler  : 1950  MRN: [id number]  Referring provider: Clay Montesinos, *  Dx:   Encounter Diagnosis     ICD-10-CM    1. Unsteady gait when walking  R26.81       2. Frequent falls  R29.6       3. Balance problem  R26.89       4. Weakness of right lower extremity  R29.898       5. Activity intolerance  R68.89                     Start Time: 0850  Stop Time: 09  Total time in clinic (min): 40 minutes      Assessment: Tolerated treatment fair. Continued to progress navigation of uneven surfaces with step ups and complaint surfaces consecutively; noted improved eccentric control when stepping down onto airex pads and no LOB noted during activity! Also noted improvements in ability to negotiate compliant surfaces w/o AD. Will cont to address uneven and compliant surfaces, as well as focus more on stair training w/ minimal UE support.       Plan: Continue per plan of care.      Subjective: no new complaints today     Objective: See treatment diary below       Short Term Goal/Long Term Goal Expiration Date:(24 4 weeks)  POC Expiration Date: (24 8 weeks)    Visit count: 5 of 10;     POC expires Unit limit Auth Expiration date PT/OT/ST + Visit Limit?   24 6 PT/OT 24 $2330CAP/BOMN PCY                           Visit/Unit Tracking  AUTH Status:  Date 3/7 3/12 3/14 3/19 3/21             bomn Used 1 2 3 4 5              Remaining (out of 10)  9 8 7 6 5                   Precautions frequent falls       Manuals 3/12 3/14 3/19 3/21 3/7                                   Neuro Re-Ed      RE testing (see above)   Pt education        HKM        Hurdles         Step taps/step ups    SOLO SPC   AirEx pad to 8\" step   2x8 fwd ea LE   Switched SPC bwt RUE and LUE depending on which LE was leading  SOLO SPC   Step up and overs  6\"/8\"/6\"   X2 laps fwd leading with LLE  Add: airex in bwt steps  X3 laps fwd     Step up and over 3 airex pads only   No " "SPC   X2 laps fwd       STS   SOLO   BUE   AirEx pad under feet   2x10     Attempted no UE but unable to complete      Dynamic balance SOLO no SPC  4 blazepods (2 on either end) of red mat on floor   1 round of 15 hits   Add: foam discs and small river rocks under mat   1 round of 10 hits    SOLO   No AD  6\" (6) hurdles   X3 laps fwd   Alt bwt leading LE   X3 laps lat     Standing with soccer ball under LE  SOLO SPC   Multidirectional perturbations   X15 on ea LE  SOLO   Trainer steps   X1 lap 2 feet per step BUE   X1 lap step over step BUE  X1 lap 2 feet per step RUE only  X4 laps step over step RUE only                  Ther Ex        NuStep for CV/ROM Level 5 all ext   [id number] Level 5 all ext   X12 mins Recumbent bike level 1  [id number]    NuStep level 5 all ext   [id number]     LE HEP         PB roll outs  Frias PB   X15 ea direction      TM for CV endurance                                        Ther Activity                        Gait Training                        Modalities                         Access Code: [email or web address]  URL: [email or web address]  Date: 01/23/2024  Prepared by: Mariel Land    Exercises  - Seated Long Arc Quad  - 1 x daily - 7 x weekly - 3 sets - 10 reps  - Standing March with Counter Support  - 1 x daily - 7 x weekly - 3 sets - 10 reps  - Standing Hip Abduction with Unilateral Counter Support  - 1 x daily - 7 x weekly - 3 sets - 10 reps  - Heel Toe Raises with Unilateral Counter Support  - 1 x daily - 7 x weekly - 3 sets - 10 reps  - Standing Knee Flexion with Unilateral Counter Support  - 1 x daily - 7 x weekly - 3 sets - 10 reps  - Standing Hip Extension with Unilateral Counter Support  - 1 x daily - 7 x weekly - 3 sets - 10 reps             "

## 2024-03-26 ENCOUNTER — APPOINTMENT (OUTPATIENT)
Dept: PHYSICAL THERAPY | Facility: CLINIC | Age: 74
End: 2024-03-26
Payer: MEDICARE

## 2024-03-26 NOTE — PROGRESS NOTES
"Daily Note    Today's date: 3/26/2024  Patient name: Lola Spangler  : 1950  MRN: [id number]  Referring provider: Clay Montesinos, *  Dx:   No diagnosis found.                             Assessment: Tolerated treatment fair. Continued to progress navigation of uneven surfaces with step ups and complaint surfaces consecutively; noted improved eccentric control when stepping down onto airex pads and no LOB noted during activity! Also noted improvements in ability to negotiate compliant surfaces w/o AD. Will cont to address uneven and compliant surfaces, as well as focus more on stair training w/ minimal UE support.       Plan: Continue per plan of care.      Subjective: no new complaints today     1:1 w/ PT:   Group:   Self direct:     Objective: See treatment diary below       Short Term Goal/Long Term Goal Expiration Date:(24 4 weeks)  POC Expiration Date: (24 8 weeks)    Visit count: 5 of 10;     POC expires Unit limit Auth Expiration date PT/OT/ST + Visit Limit?   24 6 PT/OT 24 $2330CAP/BOMN PCY                           Visit/Unit Tracking  AUTH Status:  Date 3/7 3/12 3/14 3/19 3/21 3/26            bomn Used 1 2 3 4 5              Remaining (out of 10)  9 8 7 6 5                   Precautions frequent falls       Manuals 3/12 3/14 3/19 3/21 3/26                                   Neuro Re-Ed         Pt education        HKM        Hurdles         Step taps/step ups    SOLO SPC   AirEx pad to 8\" step   2x8 fwd ea LE   Switched SPC bwt RUE and LUE depending on which LE was leading  SOLO SPC   Step up and overs  6\"/8\"/6\"   X2 laps fwd leading with LLE  Add: airex in bwt steps  X3 laps fwd     Step up and over 3 airex pads only   No SPC   X2 laps fwd       STS   SOLO   BUE   AirEx pad under feet   2x10     Attempted no UE but unable to complete      Dynamic balance SOLO no SPC  4 blazepods (2 on either end) of red mat on floor   1 round of 15 hits   Add: foam discs and small river rocks " "under mat   1 round of 10 hits    SOLO   No AD  6\" (6) hurdles   X3 laps fwd   Alt bwt leading LE   X3 laps lat     Standing with soccer ball under LE  SOLO SPC   Multidirectional perturbations   X15 on ea LE  SOLO   Trainer steps   X1 lap 2 feet per step BUE   X1 lap step over step BUE  X1 lap 2 feet per step RUE only  X4 laps step over step RUE only                  Ther Ex        NuStep for CV/ROM Level 5 all ext   [id number] Level 5 all ext   X12 mins Recumbent bike level 1  [id number]    NuStep level 5 all ext   [id number]     LE HEP         PB roll outs  Frias PB   X15 ea direction      TM for CV endurance                                        Ther Activity                        Gait Training                        Modalities                         Access Code: [email or web address]  URL: [email or web address]  Date: 01/23/2024  Prepared by: Mariel Land    Exercises  - Seated Long Arc Quad  - 1 x daily - 7 x weekly - 3 sets - 10 reps  - Standing March with Counter Support  - 1 x daily - 7 x weekly - 3 sets - 10 reps  - Standing Hip Abduction with Unilateral Counter Support  - 1 x daily - 7 x weekly - 3 sets - 10 reps  - Heel Toe Raises with Unilateral Counter Support  - 1 x daily - 7 x weekly - 3 sets - 10 reps  - Standing Knee Flexion with Unilateral Counter Support  - 1 x daily - 7 x weekly - 3 sets - 10 reps  - Standing Hip Extension with Unilateral Counter Support  - 1 x daily - 7 x weekly - 3 sets - 10 reps             "

## 2024-03-27 ENCOUNTER — OFFICE VISIT (OUTPATIENT)
Dept: PHYSICAL THERAPY | Facility: CLINIC | Age: 74
End: 2024-03-27
Payer: MEDICARE

## 2024-03-27 DIAGNOSIS — R26.81 UNSTEADY GAIT WHEN WALKING: Primary | ICD-10-CM

## 2024-03-27 DIAGNOSIS — R26.89 BALANCE PROBLEM: ICD-10-CM

## 2024-03-27 DIAGNOSIS — R68.89 ACTIVITY INTOLERANCE: ICD-10-CM

## 2024-03-27 DIAGNOSIS — R29.6 FREQUENT FALLS: ICD-10-CM

## 2024-03-27 DIAGNOSIS — R29.898 WEAKNESS OF RIGHT LOWER EXTREMITY: ICD-10-CM

## 2024-03-27 PROCEDURE — 97150 GROUP THERAPEUTIC PROCEDURES: CPT

## 2024-03-27 PROCEDURE — 97112 NEUROMUSCULAR REEDUCATION: CPT

## 2024-03-27 NOTE — PROGRESS NOTES
"Daily Note    Today's date: 3/27/2024  Patient name: Lola Spangler  : 1950  MRN: [id number]  Referring provider: Clay Montesinos, *  Dx:   Encounter Diagnosis     ICD-10-CM    1. Unsteady gait when walking  R26.81       2. Activity intolerance  R68.89       3. Frequent falls  R29.6       4. Balance problem  R26.89       5. Weakness of right lower extremity  R29.898                       Start Time: 1400  Stop Time: 1445  Total time in clinic (min): 45 minutes      Assessment: Tolerated treatment fair. Noted improvements in maintaining balance on compliant surfaces compared to previous sessions which was a goal for patient, still more difficulty when leading with RLE vs LLE. Also noted improvements in balance reactions during soccer volley but required UE from SPC to do so. Will cont to address uneven and compliant surfaces, as well as focus more on stair training w/ minimal UE support.       Plan: Continue per plan of care.      Subjective: feeling a little sore today because she was busy yesterday     Self-direct: [phone]  Group: [id number]  1:1 w/ PT: [id number]    Objective: See treatment diary below       Short Term Goal/Long Term Goal Expiration Date:(24 4 weeks)  POC Expiration Date: (24 8 weeks)    Visit count: 6 of 10;     POC expires Unit limit Auth Expiration date PT/OT/ST + Visit Limit?   24 6 PT/OT 24 $2330CAP/BOMN PCY                           Visit/Unit Tracking  AUTH Status:  Date 3/7 3/12 3/14 3/19 3/21 3/27            bomn Used 1 2 3 4 5 6             Remaining (out of 10)  9 8 7 6 5 4                  Precautions frequent falls       Manuals 3/12 3/14 3/19 3/21 3/27                                   Neuro Re-Ed         Pt education        HKM        Hurdles         Step taps/step ups    SOLO SPC   AirEx pad to 8\" step   2x8 fwd ea LE   Switched SPC bwt RUE and LUE depending on which LE was leading  SOLO SPC   Step up and overs  6\"/8\"/6\"   X2 laps fwd leading with " "LLE  Add: airex in bwt steps  X3 laps fwd     Step up and over 3 airex pads only   No SPC   X2 laps fwd       STS   SOLO   BUE   AirEx pad under feet   2x10     Attempted no UE but unable to complete      Dynamic balance SOLO no SPC  4 blazepods (2 on either end) of red mat on floor   1 round of 15 hits   Add: foam discs and small river rocks under mat   1 round of 10 hits    SOLO   No AD  6\" (6) hurdles   X3 laps fwd   Alt bwt leading LE   X3 laps lat     Standing with soccer ball under LE  SOLO SPC   Multidirectional perturbations   X15 on ea LE  SOLO   Trainer steps   X1 lap 2 feet per step BUE   X1 lap step over step BUE  X1 lap 2 feet per step RUE only  X4 laps step over step RUE only       SOLO   AirEx step up and over   SPC   X4 laps fwd   X4 laps lat     Soccer volley with PT  X15 passes no SPC  X15 passes w/ SPC            Ther Ex        NuStep for CV/ROM Level 5 all ext   [id number] Level 5 all ext   X12 mins Recumbent bike level 1  [id number]    NuStep level 5 all ext   Y50sdne  Nustep level 5  All ext  y35fder   LE HEP         PB roll outs  Frias PB   X15 ea direction      TM for CV endurance                                        Ther Activity                        Gait Training                        Modalities                         Access Code: [email or web address]  URL: [email or web address]  Date: 01/23/2024  Prepared by: Mariel Land    Exercises  - Seated Long Arc Quad  - 1 x daily - 7 x weekly - 3 sets - 10 reps  - Standing March with Counter Support  - 1 x daily - 7 x weekly - 3 sets - 10 reps  - Standing Hip Abduction with Unilateral Counter Support  - 1 x daily - 7 x weekly - 3 sets - 10 reps  - Heel Toe Raises with Unilateral Counter Support  - 1 x daily - 7 x weekly - 3 sets - 10 reps  - Standing Knee Flexion with Unilateral Counter Support  - 1 x daily - 7 x weekly - 3 sets - 10 reps  - Standing Hip Extension with Unilateral Counter Support  - 1 x daily - 7 x weekly - 3 sets - 10 " reps

## 2024-03-28 ENCOUNTER — OFFICE VISIT (OUTPATIENT)
Dept: PHYSICAL THERAPY | Facility: CLINIC | Age: 74
End: 2024-03-28
Payer: MEDICARE

## 2024-03-28 DIAGNOSIS — R26.81 UNSTEADY GAIT WHEN WALKING: Primary | ICD-10-CM

## 2024-03-28 DIAGNOSIS — R29.898 WEAKNESS OF RIGHT LOWER EXTREMITY: ICD-10-CM

## 2024-03-28 DIAGNOSIS — R68.89 ACTIVITY INTOLERANCE: ICD-10-CM

## 2024-03-28 DIAGNOSIS — R29.6 FREQUENT FALLS: ICD-10-CM

## 2024-03-28 DIAGNOSIS — R26.89 BALANCE PROBLEM: ICD-10-CM

## 2024-03-28 PROCEDURE — 97110 THERAPEUTIC EXERCISES: CPT

## 2024-03-28 PROCEDURE — 97112 NEUROMUSCULAR REEDUCATION: CPT

## 2024-03-28 NOTE — PROGRESS NOTES
"Daily Note    Today's date: 3/28/2024  Patient name: Lola Spangler  : 1950  MRN: [id number]  Referring provider: Clay Montesinos, *  Dx:   Encounter Diagnosis     ICD-10-CM    1. Unsteady gait when walking  R26.81       2. Weakness of right lower extremity  R29.898       3. Activity intolerance  R68.89       4. Frequent falls  R29.6       5. Balance problem  R26.89                         Start Time: 845  Stop Time: 930  Total time in clinic (min): 45 minutes      Assessment: Tolerated treatment fair. Improvements made with negotiation of complaint surfaces and SLS while reaching outside of ROBBY, required UE support from SPC to do so, also needed cueing to switch UE for cane as needed depending on which LE she was leading with. Also noted improvements in ability to perform multiple STS consecutively without BUE support! Will cont to address uneven and compliant surfaces, as well as focus more on stair training w/ minimal UE support.       Plan: Continue per plan of care.      Subjective: still is having a lot of pain related to her shingles on her left leg at night, is seeing pain management about 2 weeks      Objective: See treatment diary below       Short Term Goal/Long Term Goal Expiration Date:(24 4 weeks)  POC Expiration Date: (24 8 weeks)    Visit count: 7 of 10;     POC expires Unit limit Auth Expiration date PT/OT/ST + Visit Limit?   24 6 PT/OT 24 $2330CAP/BOMN PCY                           Visit/Unit Tracking  AUTH Status:  Date 3/7 3/12 3/14 3/19 3/21 3/27 3/28           bomn Used 1 2 3 4 5 6 7            Remaining (out of 10)  9 8 7 6 5 4 3                 Precautions frequent falls       Manuals 3/28 3/14 3/19 3/21 3/27                                   Neuro Re-Ed         Pt education        HKM        Hurdles         Step taps/step ups    SOLO SPC   AirEx pad to 8\" step   2x8 fwd ea LE   Switched SPC bwt RUE and LUE depending on which LE was leading  SOLO SPC   Step " "up and overs  6\"/8\"/6\"   X2 laps fwd leading with LLE  Add: airex in bwt steps  X3 laps fwd     Step up and over 3 airex pads only   No SPC   X2 laps fwd       STS No UE   2x10   SOLO   BUE   AirEx pad under feet   2x10     Attempted no UE but unable to complete      Dynamic balance SOLO SPC  6\" (6) hurdles  X2 laps fwd   Add: 3 Airex pads in bwt alt hurdles   X2 laps fwd  Add: recip b/l cone taps bwt hurdles that don't have AirEx pads  X2 laps fwd    SOLO   No AD  6\" (6) hurdles   X3 laps fwd   Alt bwt leading LE   X3 laps lat     Standing with soccer ball under LE  SOLO SPC   Multidirectional perturbations   X15 on ea LE  SOLO   Trainer steps   X1 lap 2 feet per step BUE   X1 lap step over step BUE  X1 lap 2 feet per step RUE only  X4 laps step over step RUE only       SOLO   AirEx step up and over   SPC   X4 laps fwd   X4 laps lat     Soccer volley with PT  X15 passes no SPC  X15 passes w/ SPC            Ther Ex        NuStep for CV/ROM Nustep level 4  [id number] Level 5 all ext   X12 mins Recumbent bike level 1  [id number]    NuStep level 5 all ext   F93zheg  Nustep level 5  All ext  q74onjm   LE HEP         PB roll outs  Frias PB   X15 ea direction      TM for CV endurance                                        Ther Activity                        Gait Training                        Modalities                         Access Code: [email or web address]  URL: [email or web address]  Date: 01/23/2024  Prepared by: Mariel Land    Exercises  - Seated Long Arc Quad  - 1 x daily - 7 x weekly - 3 sets - 10 reps  - Standing March with Counter Support  - 1 x daily - 7 x weekly - 3 sets - 10 reps  - Standing Hip Abduction with Unilateral Counter Support  - 1 x daily - 7 x weekly - 3 sets - 10 reps  - Heel Toe Raises with Unilateral Counter Support  - 1 x daily - 7 x weekly - 3 sets - 10 reps  - Standing Knee Flexion with Unilateral Counter Support  - 1 x daily - 7 x weekly - 3 sets - 10 reps  - Standing Hip Extension " with Unilateral Counter Support  - 1 x daily - 7 x weekly - 3 sets - 10 reps

## 2024-04-02 ENCOUNTER — OFFICE VISIT (OUTPATIENT)
Dept: PHYSICAL THERAPY | Facility: CLINIC | Age: 74
End: 2024-04-02
Payer: MEDICARE

## 2024-04-02 DIAGNOSIS — R26.81 UNSTEADY GAIT WHEN WALKING: Primary | ICD-10-CM

## 2024-04-02 DIAGNOSIS — R29.898 WEAKNESS OF RIGHT LOWER EXTREMITY: ICD-10-CM

## 2024-04-02 DIAGNOSIS — R68.89 ACTIVITY INTOLERANCE: ICD-10-CM

## 2024-04-02 DIAGNOSIS — R29.6 FREQUENT FALLS: ICD-10-CM

## 2024-04-02 DIAGNOSIS — R26.89 BALANCE PROBLEM: ICD-10-CM

## 2024-04-02 PROCEDURE — 97112 NEUROMUSCULAR REEDUCATION: CPT

## 2024-04-02 PROCEDURE — 97150 GROUP THERAPEUTIC PROCEDURES: CPT

## 2024-04-02 NOTE — PROGRESS NOTES
Daily Note    Today's date: 2024  Patient name: Lola Spangler  : 1950  MRN: [id number]  Referring provider: Clay Montesinos, *  Dx:   Encounter Diagnosis     ICD-10-CM    1. Unsteady gait when walking  R26.81       2. Balance problem  R26.89       3. Weakness of right lower extremity  R29.898       4. Activity intolerance  R68.89       5. Frequent falls  R29.6                           Start Time: 845  Stop Time: 930  Total time in clinic (min): 45 minutes      Assessment: Tolerated treatment fair. Focused on stair negotiation as this is what she still feels the least confident doing, especially when stepping down; noted improvements in ability to lead with RLE and remembered to switch the cane from RUE to LUE when doing so w/o cueing from PT. Also did well with addition of compliant surfaces which were very challenging for her at the beginning of therapy. Will perform re-evaluation next visit to assess POC extension goals and discuss possible discharge from PT.       Plan: Continue per plan of care.  Complete RE next visit with possible discharge from PT pending results    Subjective: still having pain in her L side of her back that seems to come on when she has the discomfort in her L leg, comes on at night when she tries to rest       Objective: See treatment diary below    Self direct: [phone]  Group: [id number]  1:1 w/ PT:        Short Term Goal/Long Term Goal Expiration Date:(24 4 weeks)  POC Expiration Date: (24 8 weeks)    Visit count: 8 of 10;     POC expires Unit limit Auth Expiration date PT/OT/ST + Visit Limit?   24 6 PT/OT 24 $2330CAP/BOMN PCY                           Visit/Unit Tracking  AUTH Status:  Date 3/7 3/12 3/14 3/19 3/21 3/27 3/28 4/2          bomn Used 1 2 3 4 5 6 7 8           Remaining (out of 10)  9 8 7 6 5 4 3 2                Precautions frequent falls       Manuals 3/28 4/2 3/19 3/21 3/27                                   Neuro Re-Ed      "    Pt education        HKM        Hurdles         Step taps/step ups   SOLO SPC   6\" (2) step up and overs   Alt bwt LE leading   X4 laps fwd  Add: AirEx pads on either side of steps  X4 laps    SOLO SPC   AirEx pad to 8\" step   2x8 fwd ea LE   Switched SPC bwt RUE and LUE depending on which LE was leading  SOLO SPC   Step up and overs  6\"/8\"/6\"   X2 laps fwd leading with LLE  Add: airex in bwt steps  X3 laps fwd     Step up and over 3 airex pads only   No SPC   X2 laps fwd       STS No UE   2x10   SOLO   BUE   AirEx pad under feet   2x10     Attempted no UE but unable to complete      Dynamic balance SOLO SPC  6\" (6) hurdles  X2 laps fwd   Add: 3 Airex pads in bwt alt hurdles   X2 laps fwd  Add: recip b/l cone taps bwt hurdles that don't have AirEx pads  X2 laps fwd     SOLO   Trainer steps   X1 lap 2 feet per step BUE   X1 lap step over step BUE  X1 lap 2 feet per step RUE only  X4 laps step over step RUE only       SOLO   AirEx step up and over   SPC   X4 laps fwd   X4 laps lat     Soccer volley with PT  X15 passes no SPC  X15 passes w/ SPC            Ther Ex        NuStep for CV/ROM Nustep level 4  [id number] Level 6 nustep   F29lcmj  Recumbent bike level 1  T14msae    NuStep level 5 all ext   G22vnel  Nustep level 5  All ext  p60ufdf   LE HEP         PB roll outs        TM for CV endurance                                        Ther Activity                        Gait Training                        Modalities                         Access Code: [email or web address]  URL: [email or web address]  Date: 01/23/2024  Prepared by: Mariel Land    Exercises  - Seated Long Arc Quad  - 1 x daily - 7 x weekly - 3 sets - 10 reps  - Standing March with Counter Support  - 1 x daily - 7 x weekly - 3 sets - 10 reps  - Standing Hip Abduction with Unilateral Counter Support  - 1 x daily - 7 x weekly - 3 sets - 10 reps  - Heel Toe Raises with Unilateral Counter Support  - 1 x daily - 7 x weekly - 3 sets - 10 reps  - " Standing Knee Flexion with Unilateral Counter Support  - 1 x daily - 7 x weekly - 3 sets - 10 reps  - Standing Hip Extension with Unilateral Counter Support  - 1 x daily - 7 x weekly - 3 sets - 10 reps

## 2024-04-04 ENCOUNTER — EVALUATION (OUTPATIENT)
Dept: PHYSICAL THERAPY | Facility: CLINIC | Age: 74
End: 2024-04-04
Payer: MEDICARE

## 2024-04-04 DIAGNOSIS — R26.81 UNSTEADY GAIT WHEN WALKING: Primary | ICD-10-CM

## 2024-04-04 DIAGNOSIS — R29.898 WEAKNESS OF RIGHT LOWER EXTREMITY: ICD-10-CM

## 2024-04-04 DIAGNOSIS — R68.89 ACTIVITY INTOLERANCE: ICD-10-CM

## 2024-04-04 DIAGNOSIS — R26.89 BALANCE PROBLEM: ICD-10-CM

## 2024-04-04 DIAGNOSIS — R29.6 FREQUENT FALLS: ICD-10-CM

## 2024-04-04 PROCEDURE — 97112 NEUROMUSCULAR REEDUCATION: CPT

## 2024-04-04 PROCEDURE — 97116 GAIT TRAINING THERAPY: CPT

## 2024-04-04 NOTE — PROGRESS NOTES
Re-Evaluation/Discharge Summary    Today's date: 2024  Patient name: Lola Spangler  : 1950  MRN: [id number]  Referring provider: Clay Montesinos, *  Dx:   Encounter Diagnosis     ICD-10-CM    1. Unsteady gait when walking  R26.81       2. Frequent falls  R29.6       3. Balance problem  R26.89       4. Weakness of right lower extremity  R29.898       5. Activity intolerance  R68.89                             Start Time: 08  Stop Time: 930  Total time in clinic (min): 45 minutes      Assessment: Re-evaluation completed today; pt has either maintained or made slight improvements in all functional testing compared to last progress note and based on majority of testing is no longer at an increased risk for falls. She still experiences difficulty with compliant surfaces but has made significant improvements when using her SPC, which she uses whenever out in the community anyway. She also self reports improvements in overall balance via the ABC scale and has remained independent with her HEP. At this time, she is suitable for discharge from PT; if she wishes to return to PT, a new script will be needed; she is agreeable with this plan. I will discharge her from our care and we wish her the best going forward!          Balance Testing   Modified Clinical Test of Sensory Interaction of Balance  (MCTSIB) (seconds): IE:   Firm EO: 30s  Firm EC: 30s  Foam EO: 30s  Foam EC: 5s (best trial)     PN 2/15:   Foam EC: 30s (best trial out of 3)     RE 3/7:   Foam EC: 30s (second trial)    RE : Foam EC: 30s (second trial)   Messina Balance Scale (BBS):  <45 greater risk for falls (Messina et al 1992)     MCID:   4 points 45-56 initially  5 points 35-44 initially  7 points 25-34 initially  5 points 0-24 initially   IE: not tested   Functional Gait Assessment (FGA)  < Or = 22/30 greater risk for falls     MDC:  Geriatric: 4  Stroke: 4  Parkinson's Disease: 4  Vestibular: 6 IE:  no AD  PN 2/15:  no AD  RE 3/7:  20/30 no AD  RE 4/4: 22/30 no AD               Mobility & Endurance Testing   5x Sit to Stand (seconds)  >15 seconds greater risk for falls (elderly)     MCID: 2.3 seconds IE: 15.27sec BUE   PN 2/15: 14.65s minimal UE support  RE 3/7: 13.90s no UE   RE 4/4: 10.82s no UE    Timed Up-and-Go (seconds)  >13.5 seconds greater risk for falls    IE: 16.63 sec w/ SPC, 13.75s w/o AD  PN 2/15: 11.81s w/ SPC, 12.32s w/o AD  RE 3/7: 10.91s w/ SPC, 8.91s no AD  RE 4/4: 10.56s w/ AD; 9.38s no AD   6 Minute Walk Test (meters)  Geriatrics:  Meters:         Male  Female  60-69 years  572    538  70-79 years  527    471  80-89 years  417    392     MCID: 50 meters/164 feet IE: 720ft w/ SPC   PN 2/15: 800ft w/ SPC    RE 3/7: 850ft w/ SPC   RE 4/4: 940ft w/ SPC       Gait speed: Self-selected & fast (meters/second)     1.0 m/s normal  1.2 m/s community-level speed     MCID: 0.10 m/s IE: 6m/8.46s=0.71m/s w/ SPC   PN 2/15: 6m/7.62=0.79m/s w/ SPC   RE 3/7: 0.83m/s w/ SPC   RE 4/4: 0.84m/s w/ SPC      ABC scale: 81.25    STG:  Patient will improve 6MWT by 200ft demonstrating significant improvements in endurance  w/in 4 weeks-IN PROGRESS 2/15  Patient will be able to perform STS without use of UE wihtin 4 weeks-MET 2/15  Patient will demonstrates significant improvements in dynamic balance by improving FGA score by 6 points or more within 4 weeks-MET 2/15  Patient will be able to negotiates stairs with reciprocal pattern with use of handrail within 4 weeks-IN PROGRESS 2/15  Patient will be independent with HEP within 4 weeks-MET 2/15  LTG:  Patient will improve 6MWT by 400ft demonstrating significant improvements in endurance  w/in 8 weeks-IN PROGRESS 3/7  Patient will demonstrated improved LE strength and endurance by improved 5xSTS to 15s or less within 8 weeks-MET 3/7  Patient will improve TUG to 12s or less indicating they are no longer at risk for increased falls 8 weeks-MET 3/7  Patient will improve FGA to 22/30 or greater indicating  they are no longer at higher risk for falls within 8 weeks-IN PROGRESS 3/7   Patient will improve gait speed to 1.0 m/s indicating they are no longer at higher risk for falls within 8 weeks-IN PROGRESS 3/7     NEW GOALS as of 3/7/24:   Patient will improve 6MWT by 200ft demonstrating significant improvements in endurance  w/in 4 weeks-PROGRESS MADE   Patient will improve FGA to 22/30 or greater indicating they are no longer at higher risk for falls within 4 weeks-MET   Patient will improve gait speed to 1.0 m/s indicating they are no longer at higher risk for falls within 4 weeks-NOT MET   Patient will improve 4th condition on MCTSIB to 30s on first trial to show improvements in vestibular integration within 4 weeks-NOT MET      Patient Goals  Patient goal: get rid of SPC, get more strength, get more confident about not falling, work on her core strength  Pain  Current pain ratin  At best pain ratin  At worst pain ratin  Location: R knee, low back pain at worst is 7.5/10, best is at 0/10  Quality: dull ache  Relieving factors: medications, rest and change in position  Aggravating factors: walking, standing and stair climbing     Plan: Discharge from PT due to either maintaining or improving balance and mobility tasks  Plan  Plan details: Discharge from PT  Planned therapy interventions: abdominal trunk stabilization, ADL training, balance, body mechanics training, coordination, flexibility, functional ROM exercises, gait training, home exercise program, neuromuscular re-education, patient education, postural training, sensory integrative techniques, strengthening, stretching, therapeutic activities, therapeutic exercise and transfer training  Frequency: 2x week  Duration in weeks: 12  Plan of Care beginning date: 2024  Plan of Care expiration date: 2024  Treatment plan discussed with: patient      Plan: Discharge from PT     Subjective: feeling pretty good today, is having her oral  "surgery tomorrow        Objective: See treatment diary below         Short Term Goal/Long Term Goal Expiration Date:(4/5/24 4 weeks)  POC Expiration Date: (4/5/24 8 weeks)    Visit count: 8 of 10;     POC expires Unit limit Auth Expiration date PT/OT/ST + Visit Limit?   4/5/24 6 PT/OT 12/31/24 $2330CAP/BOMN PCY                           Visit/Unit Tracking  AUTH Status:  Date 3/7 3/12 3/14 3/19 3/21 3/27 3/28 4/2          bomn Used 1 2 3 4 5 6 7 8           Remaining (out of 10)  9 8 7 6 5 4 3 2                Precautions frequent falls       Manuals 3/28 4/2 4/4 3/21 3/27                                   Neuro Re-Ed    RE testing (see above)     Pt education        HKM        Hurdles         Step taps/step ups   SOLO SPC   6\" (2) step up and overs   Alt bwt LE leading   X4 laps fwd  Add: AirEx pads on either side of steps  X4 laps     SOLO SPC   Step up and overs  6\"/8\"/6\"   X2 laps fwd leading with LLE  Add: airex in bwt steps  X3 laps fwd     Step up and over 3 airex pads only   No SPC   X2 laps fwd       STS No UE   2x10        Dynamic balance SOLO SPC  6\" (6) hurdles  X2 laps fwd   Add: 3 Airex pads in bwt alt hurdles   X2 laps fwd  Add: recip b/l cone taps bwt hurdles that don't have AirEx pads  X2 laps fwd         SOLO   AirEx step up and over   SPC   X4 laps fwd   X4 laps lat     Soccer volley with PT  X15 passes no SPC  X15 passes w/ SPC            Ther Ex        NuStep for CV/ROM Nustep level 4  [id number] Level 6 nustep   [id number]     NuStep level 5 all ext   I71ixlv  Nustep level 5  All ext  [id number]   LE HEP         PB roll outs        TM for CV endurance                                        Ther Activity                        Gait Training                        Modalities                         Access Code: [email or web address]  URL: [email or web address]  Date: 01/23/2024  Prepared by: Mariel Land    Exercises  - Seated Long Arc Quad  - 1 x daily - 7 x weekly - 3 sets - 10 reps  - Standing " March with Counter Support  - 1 x daily - 7 x weekly - 3 sets - 10 reps  - Standing Hip Abduction with Unilateral Counter Support  - 1 x daily - 7 x weekly - 3 sets - 10 reps  - Heel Toe Raises with Unilateral Counter Support  - 1 x daily - 7 x weekly - 3 sets - 10 reps  - Standing Knee Flexion with Unilateral Counter Support  - 1 x daily - 7 x weekly - 3 sets - 10 reps  - Standing Hip Extension with Unilateral Counter Support  - 1 x daily - 7 x weekly - 3 sets - 10 reps

## 2024-04-08 ENCOUNTER — OFFICE VISIT (OUTPATIENT)
Dept: PAIN MEDICINE | Facility: MEDICAL CENTER | Age: 74
End: 2024-04-08
Payer: MEDICARE

## 2024-04-08 VITALS
HEART RATE: 101 BPM | BODY MASS INDEX: 37.3 KG/M2 | HEIGHT: 60 IN | DIASTOLIC BLOOD PRESSURE: 78 MMHG | SYSTOLIC BLOOD PRESSURE: 135 MMHG | WEIGHT: 190 LBS | OXYGEN SATURATION: 96 %

## 2024-04-08 DIAGNOSIS — M48.061 SPINAL STENOSIS OF LUMBAR REGION AT MULTIPLE LEVELS: ICD-10-CM

## 2024-04-08 DIAGNOSIS — M54.42 CHRONIC BILATERAL LOW BACK PAIN WITH LEFT-SIDED SCIATICA: Primary | ICD-10-CM

## 2024-04-08 DIAGNOSIS — M47.816 LUMBAR SPONDYLOSIS: ICD-10-CM

## 2024-04-08 DIAGNOSIS — G62.9 NEUROPATHY: ICD-10-CM

## 2024-04-08 DIAGNOSIS — G89.29 CHRONIC BILATERAL LOW BACK PAIN WITH LEFT-SIDED SCIATICA: Primary | ICD-10-CM

## 2024-04-08 PROCEDURE — G2211 COMPLEX E/M VISIT ADD ON: HCPCS

## 2024-04-08 PROCEDURE — 99214 OFFICE O/P EST MOD 30 MIN: CPT

## 2024-04-08 RX ORDER — AMOXICILLIN 500 MG/1
CAPSULE ORAL
COMMUNITY
Start: 2024-04-02

## 2024-04-08 RX ORDER — MELOXICAM 15 MG/1
15 TABLET ORAL DAILY
Qty: 90 TABLET | Refills: 1 | Status: SHIPPED | OUTPATIENT
Start: 2024-04-08

## 2024-04-08 RX ORDER — CHLORHEXIDINE GLUCONATE ORAL RINSE 1.2 MG/ML
SOLUTION DENTAL
COMMUNITY
Start: 2024-04-02

## 2024-04-08 NOTE — PROGRESS NOTES
Assessment:  1. Chronic bilateral low back pain with left-sided sciatica    2. Neuropathy    3. Spinal stenosis of lumbar region at multiple levels    4. Lumbar spondylosis        Plan:  Continue gabapentin and meloxicam as prescribed.  Refills of meloxicam were sent to the patient's pharmacy today.  Patient stated that she did not require refills of gabapentin at this time.  Obtain updated MRI of the lumbar spine given increased low back pain with radicular features and history of lumbar spinal fusion.  Follow-up pending MRI results.    This patient is being managed for a complex and serious condition that requires ongoing, intensive medical management. The nature of this condition demands constant vigilance and a nuanced approach to treatment. The condition necessitates an in-depth and focused approach to management, including regular monitoring and potential coordination with other healthcare professionals.     Detailed Description of Visit Complexity: This visit involved an intricate evaluation and management of the patient's condition. The complexity of the visit was due to the need for a detailed assessment of the current state, consideration of potential complications, and a careful balancing of treatment options to manage the condition effectively.     Patient's Health Status and History: This patient has a significant pain history which requires regular and detailed management. The condition's impact on their life and health is substantial, necessitating a comprehensive and tailored approach to chronic ongoing care.     My impressions and treatment recommendations were discussed in detail with the patient who verbalized understanding and had no further questions.  Discharge instructions were provided. I personally saw and examined the patient and I agree with the above discussed plan of care.    Orders Placed This Encounter   Procedures    MRI lumbar spine wo contrast     Standing Status:   Future      Standing Expiration Date:   4/8/2028     Scheduling Instructions:      There is no preparation for this test. Please leave your jewelry and valuables at home, wedding rings are the exception. All patients will be required to change into a hospital gown and pants.  Street clothes are not permitted in the MRI.  Magnetic nail polish must be removed prior to arrival for your test. Please bring your insurance cards, a form of photo ID and a list of your medications with you. Arrive 15 minutes prior to your appointment time in order to register. Please bring any prior CT or MRI studies of this area that were not performed at a [facility].            To schedule this appointment, please contact Central Scheduling at [phone].            Prior to your appointment, please make sure you complete the MRI Screening Form when you e-Check in for your appointment. This will be available starting 7 days before your appointment in [employer]. You may receive an e-mail with an activation code if you do not have a [employer] account. If you do not have access to a device, we will complete your screening at your appointment.     Order Specific Question:   What is the patient's sedation requirement? If Medication for Claustrophobia is selected, order medication at this point.     Answer:   No Sedation     Order Specific Question:   Does the patient have metallic implants?     Answer:   Yes     Comments:   spinal fusion, R SHARIF     Order Specific Question:   Does this procedure require the 3T MRI at [place] or [place]?     Answer:   No     Order Specific Question:   Release to patient through [employer]     Answer:   Immediate     Order Specific Question:   Is order priority selected as STAT?     Answer:   No     Order Specific Question:   Reason for Exam     Answer:   Low back pain with LLE pain, hx spinal fusion     Order Specific Question:   When should the test be performed?     Answer:   Elective- non urgent     New Medications  Ordered This Visit   Medications    amoxicillin (AMOXIL) 500 mg capsule     Sig: TAKE ALL 4 CAPSULES ONE HOUR BEFORE SURGERY    chlorhexidine (PERIDEX) 0.12 % solution     Sig: SWISH AND SPIT/RINSE MOUTH WITH 1/2 OZ FOR 30-60 SECONDS TWICE DAILY. BEGIN ONE DAY AFTER SURGERY    meloxicam (Mobic) 15 mg tablet     Sig: Take 1 tablet (15 mg total) by mouth daily     Dispense:  90 tablet     Refill:  1       History of Present Illness:  Lola Spangler is a 73 y.o. female with a history of chronic bilateral low back pain, L2-L5 fusion, and lumbar spinal stenosis who presents for a follow up office visit in regards to increasing pain in the left lower extremity.  Patient reports that she has bilateral low back pain intermittently.  This pain radiates into the left lower extremity, but sometimes skips the region of her buttock.  The pain is intermittent, and typically worse at night.  She is currently rating her pain a 5/10 in intensity.  She describes the quality of her pain as burning and dull/aching.  The patient does have a recent history of herpes zoster that presented with a rash on the left foot and distal left lower extremity.  She denies any lower extremity weakness or numbness associated with this pain complaint.  She denies any bowel/bladder incontinence or saddle anesthesia.    Current pain medication regimen consists of gabapentin 200 mg 3 times daily and 600 mg daily at bedtime as well as meloxicam 15 mg once daily.  Patient reports that she tolerates these medications well without side effects and that they do help to reduce her pain significantly.    I have personally reviewed and/or updated the patient's past medical history, past surgical history, family history, social history, current medications, allergies, and vital signs today.     Review of Systems   Respiratory:  Negative for shortness of breath.    Cardiovascular:  Negative for chest pain.   Gastrointestinal:  Negative for constipation, diarrhea,  nausea and vomiting.   Musculoskeletal:  Positive for gait problem, joint swelling and myalgias. Negative for arthralgias.   Skin:  Negative for rash.   Neurological:  Negative for dizziness, seizures and weakness.   All other systems reviewed and are negative.      Patient Active Problem List   Diagnosis    Spinal stenosis of lumbar region at multiple levels    Spondylosis    Chronic bilateral low back pain    Encounter for screening mammogram for malignant neoplasm of breast    Primary osteoarthritis of right knee    Abnormal EKG    Hyperlipidemia    Family history of breast cancer    Stress incontinence    History of lumbar spinal fusion    Depression    Family history of breast cancer in mother    Fall    Acute pain of right knee    Acute pain of left shoulder    Lumbar spondylosis    Spinal stenosis of lumbar region without neurogenic claudication    Encounter for screening mammogram for breast cancer    Neuropathy    Diaphoresis       Past Medical History:   Diagnosis Date    Ankylosing spondylitis (HCC)     Anxiety     LAST ASSESSED: 12/20/16    Arthritis     Back pain     Carpal tunnel syndrome     Fibromyalgia     LAST ASSESSED: 12/20/16    Fibromyalgia, primary     Heme positive stool     LAST ASSESSED: 10/22/16    High cholesterol     Hyperlipidemia     under control since weight loss    Impingement syndrome of left shoulder     LAST ASSESSED: 2/21/17    Impingement syndrome of right shoulder     LAST ASSESSED: 2/21/1/7    Long term use of drug     LAST ASSESSED: 12/20/16    Low back pain     No known health problems     NO PERTINENT PAST MEDICAL HX    RLS (restless legs syndrome)     Rotator cuff injury     right    Spinal stenosis     Spinal stenosis     Spondylitis (HCC)     Spondyloarthritis     RESOLVED: 9/21/16    Vitamin D deficiency        Past Surgical History:   Procedure Laterality Date    BACK SURGERY      CARPAL TUNNEL RELEASE Left     CERVICAL CONIZATION   W/ LASER      CERVICAL CONIZATION      SECTION      COLONOSCOPY      DILATION AND CURETTAGE OF UTERUS      FL INJECTION RIGHT HIP (NON ARTHROGRAM)  2019    FL INJECTION RIGHT HIP (NON ARTHROGRAM)  2019    FRACTURE SURGERY      HAND SURGERY      JOINT REPLACEMENT      ORTHOPEDIC SURGERY      ND ARTHRODESIS POSTERIOR/PSTLAT TQ 1NTRSPC LUMBAR N/A 2017    Procedure: L2-L5 OPEN DECOMPRESSIVE LUMBAR LAMINECTOMY W/ PEDICLE SCREW AND NILDA FIXATION FUSION (IMPULSE); REMOVAL OF SKIN TAG MID BACK;  Surgeon: Catracho Fields MD;  [facility];  Service: Neurosurgery    ND ARTHRP ACETBLR/PROX FEM PROSTC AGRFT/ALGRFT Right 2019    Procedure: ARTHROPLASTY HIP TOTAL Posterior;  Surgeon: Erich Warren MD;  [facility];  Service: Orthopedics    ND COLONOSCOPY FLX DX W/COLLJ SPEC WHEN PFRMD N/A 10/07/2016    Procedure: EGD AND COLONOSCOPY;  Surgeon: Nathan Pierson MD;  [facility];  Service: Gastroenterology    ND NDSC WRST SURG W/RLS TRANSVRS CARPL LIGM Left 2018    Procedure: RELEASE CARPAL TUNNEL ENDOSCOPIC;  Surgeon: Bon Ferrer MD;  [facility];  Service: Orthopedics    ND NDSC WRST SURG W/RLS TRANSVRS CARPL LIGM Right 2018    Procedure: RELEASE CARPAL TUNNEL ENDOSCOPIC;  Surgeon: Bon Ferrer MD;  [facility];  Service: Orthopedics    RETINAL LASER PROCEDURE      SPINE SURGERY      TUBAL LIGATION         Family History   Problem Relation Age of Onset    Arthritis Mother     Breast cancer Mother 72    Hypertension Mother     Heart attack Mother         MYOCARDIAL INFARCTION    Heart attack Father     Hypertension Father     Stroke Father         CEREBROVASCUALR ACCIDENT (CVA)    Arthritis Sister     Uterine cancer Sister     Endometrial cancer Sister 60    Hypertension Sister     Heart attack Brother     Prostate cancer Brother 62    Arthritis Brother     Melanoma Brother     Cancer Brother         Melanoma    Arthritis Family     Hyperlipidemia Family      Depression Son     Breast cancer Maternal Aunt 40    No Known Problems Daughter     No Known Problems Maternal Grandmother     No Known Problems Maternal Grandfather     No Known Problems Paternal Grandmother     No Known Problems Paternal Grandfather     No Known Problems Brother     Other Brother         hunting accident (shot)    Melanoma Brother     Prostate cancer Brother     Heart attack Brother     Stroke Brother     Hypertension Brother     Arthritis Sister     Heart attack Sister     No Known Problems Son     No Known Problems Son     Lung cancer Maternal Uncle     Breast cancer additional onset Other 52    Uterine cancer Other        Social History     Occupational History    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     Comment: EMPLOYED   Tobacco Use    Smoking status: Never    Smokeless tobacco: Never   Vaping Use    Vaping status: Never Used   Substance and Sexual Activity    Alcohol use: Never    Drug use: No    Sexual activity: Yes     Partners: Male     Birth control/protection: Post-menopausal, Female Sterilization       Current Outpatient Medications on File Prior to Visit   Medication Sig    amoxicillin (AMOXIL) 500 mg capsule TAKE ALL 4 CAPSULES ONE HOUR BEFORE SURGERY    aspirin 81 MG tablet Take 1 tablet by mouth daily    cephalexin (KEFLEX) 500 mg capsule cephalexin 500 mg capsule   TAKE 4 CAPSULES BY MOUTH 1 HOUR BEFORE DENTAL APPOINTMENT    chlorhexidine (PERIDEX) 0.12 % solution SWISH AND SPIT/RINSE MOUTH WITH 1/2 OZ FOR 30-60 SECONDS TWICE DAILY. BEGIN ONE DAY AFTER SURGERY    Cholecalciferol (VITAMIN D3) 50 MCG (2000 UT) capsule Vitamin D3 50 mcg (2,000 unit) capsule   Take by oral route.    clonazePAM (KlonoPIN) 0.5 mg tablet Take 0.5 mg by mouth daily at bedtime    Diclofenac Sodium (VOLTAREN) 1 % Apply 4 g topically 4 (four) times a day    Fluad Quadrivalent 0.5 ML PRSY     gabapentin (NEURONTIN) 100 mg capsule Take 2 capsules (200 mg total) by mouth 3 (three) times a day    gabapentin (Neurontin) 600 MG  tablet Take 1 tablet (600 mg total) by mouth daily at bedtime    PARoxetine (PAXIL) 20 mg tablet Take 1 tablet (20 mg total) by mouth daily    [DISCONTINUED] meloxicam (Mobic) 15 mg tablet Take 1 tablet (15 mg total) by mouth daily    predniSONE 10 mg tablet PLEASE SEE ATTACHED FOR DETAILED DIRECTIONS     No current facility-administered medications on file prior to visit.       No Known Allergies    Physical Exam:    /78   Pulse 101   Ht 5' (1.524 m)   Wt 86.2 kg (190 lb)   LMP  (LMP Unknown)   SpO2 96%   BMI 37.11 kg/m²     Constitutional:normal, well developed, well nourished, alert, in no distress and non-toxic and no overt pain behavior.  Eyes:anicteric  HEENT:grossly intact  Neck:supple, symmetric, trachea midline and no masses   Pulmonary:even and unlabored  Cardiovascular:No edema or pitting edema present  Skin:Normal without rashes or lesions and well hydrated  Psychiatric:Mood and affect appropriate  Neurologic:Cranial Nerves II-XII grossly intact  Musculoskeletal:normal lower extremity strength bilaterally.

## 2024-04-09 ENCOUNTER — APPOINTMENT (OUTPATIENT)
Dept: PHYSICAL THERAPY | Facility: CLINIC | Age: 74
End: 2024-04-09
Payer: MEDICARE

## 2024-04-11 ENCOUNTER — APPOINTMENT (OUTPATIENT)
Dept: PHYSICAL THERAPY | Facility: CLINIC | Age: 74
End: 2024-04-11
Payer: MEDICARE

## 2024-04-16 ENCOUNTER — APPOINTMENT (OUTPATIENT)
Dept: PHYSICAL THERAPY | Facility: CLINIC | Age: 74
End: 2024-04-16
Payer: MEDICARE

## 2024-04-18 ENCOUNTER — APPOINTMENT (OUTPATIENT)
Dept: PHYSICAL THERAPY | Facility: CLINIC | Age: 74
End: 2024-04-18
Payer: MEDICARE

## 2024-04-22 ENCOUNTER — HOSPITAL ENCOUNTER (OUTPATIENT)
Dept: RADIOLOGY | Facility: HOSPITAL | Age: 74
Discharge: HOME | End: 2024-04-22
Payer: MEDICARE

## 2024-04-22 DIAGNOSIS — M47.816 LUMBAR SPONDYLOSIS: ICD-10-CM

## 2024-04-22 DIAGNOSIS — M48.061 SPINAL STENOSIS OF LUMBAR REGION AT MULTIPLE LEVELS: ICD-10-CM

## 2024-04-22 PROCEDURE — 72148 MRI LUMBAR SPINE W/O DYE: CPT

## 2024-04-23 ENCOUNTER — APPOINTMENT (OUTPATIENT)
Dept: PHYSICAL THERAPY | Facility: CLINIC | Age: 74
End: 2024-04-23
Payer: MEDICARE

## 2024-04-25 ENCOUNTER — APPOINTMENT (OUTPATIENT)
Dept: PHYSICAL THERAPY | Facility: CLINIC | Age: 74
End: 2024-04-25
Payer: MEDICARE

## 2024-04-29 ENCOUNTER — TELEPHONE (OUTPATIENT)
Dept: PAIN MEDICINE | Facility: MEDICAL CENTER | Age: 74
End: 2024-04-29

## 2024-04-29 NOTE — TELEPHONE ENCOUNTER
----- Message from Briana Ty PA-C sent at 4/29/2024 12:45 PM EDT -----  Please notify the patient that MRI of the lumbar spine reveals postsurgical lumbar spine with varying degrees of canal stenosis and moderate foraminal narrowing on the left at L4-L5 and L5-S1.    We can offer her LEFT L4-L5 and L5-S1 TFESI to address her LLE pain.

## 2024-05-14 ENCOUNTER — APPOINTMENT (OUTPATIENT)
Dept: LAB | Facility: CLINIC | Age: 74
End: 2024-05-14
Payer: MEDICARE

## 2024-05-14 ENCOUNTER — TRANSCRIBE ORDERS (OUTPATIENT)
Dept: LAB | Facility: CLINIC | Age: 74
End: 2024-05-14

## 2024-05-14 DIAGNOSIS — M45.6 ANKYLOSING SPONDYLITIS OF LUMBAR REGION (HCC): Primary | ICD-10-CM

## 2024-05-14 DIAGNOSIS — Z79.1 ENCOUNTER FOR LONG-TERM (CURRENT) USE OF NON-STEROIDAL ANTI-INFLAMMATORIES: ICD-10-CM

## 2024-05-14 DIAGNOSIS — M45.6 ANKYLOSING SPONDYLITIS OF LUMBAR REGION (HCC): ICD-10-CM

## 2024-05-14 DIAGNOSIS — E78.49 OTHER HYPERLIPIDEMIA: ICD-10-CM

## 2024-05-14 LAB
ALBUMIN SERPL BCP-MCNC: 4.1 G/DL (ref 3.5–5)
ALP SERPL-CCNC: 53 U/L (ref 34–104)
ALT SERPL W P-5'-P-CCNC: 15 U/L (ref 7–52)
ANION GAP SERPL CALCULATED.3IONS-SCNC: 9 MMOL/L (ref 4–13)
AST SERPL W P-5'-P-CCNC: 16 U/L (ref 13–39)
BASOPHILS # BLD AUTO: 0.03 THOUSANDS/ÂΜL (ref 0–0.1)
BASOPHILS NFR BLD AUTO: 1 % (ref 0–1)
BILIRUB SERPL-MCNC: 0.54 MG/DL (ref 0.2–1)
BUN SERPL-MCNC: 28 MG/DL (ref 5–25)
CALCIUM SERPL-MCNC: 8.9 MG/DL (ref 8.4–10.2)
CHLORIDE SERPL-SCNC: 105 MMOL/L (ref 96–108)
CHOLEST SERPL-MCNC: 210 MG/DL
CO2 SERPL-SCNC: 28 MMOL/L (ref 21–32)
CREAT SERPL-MCNC: 0.77 MG/DL (ref 0.6–1.3)
CRP SERPL QL: 2.6 MG/L
EOSINOPHIL # BLD AUTO: 0.23 THOUSAND/ÂΜL (ref 0–0.61)
EOSINOPHIL NFR BLD AUTO: 5 % (ref 0–6)
ERYTHROCYTE [DISTWIDTH] IN BLOOD BY AUTOMATED COUNT: 14.5 % (ref 11.6–15.1)
ERYTHROCYTE [SEDIMENTATION RATE] IN BLOOD: 34 MM/HOUR (ref 0–29)
GFR SERPL CREATININE-BSD FRML MDRD: 77 ML/MIN/1.73SQ M
GLUCOSE P FAST SERPL-MCNC: 91 MG/DL (ref 65–99)
HCT VFR BLD AUTO: 43 % (ref 34.8–46.1)
HDLC SERPL-MCNC: 47 MG/DL
HGB BLD-MCNC: 13.9 G/DL (ref 11.5–15.4)
IMM GRANULOCYTES # BLD AUTO: 0.01 THOUSAND/UL (ref 0–0.2)
IMM GRANULOCYTES NFR BLD AUTO: 0 % (ref 0–2)
LDLC SERPL CALC-MCNC: 138 MG/DL (ref 0–100)
LYMPHOCYTES # BLD AUTO: 1.32 THOUSANDS/ÂΜL (ref 0.6–4.47)
LYMPHOCYTES NFR BLD AUTO: 27 % (ref 14–44)
MCH RBC QN AUTO: 29 PG (ref 26.8–34.3)
MCHC RBC AUTO-ENTMCNC: 32.3 G/DL (ref 31.4–37.4)
MCV RBC AUTO: 90 FL (ref 82–98)
MONOCYTES # BLD AUTO: 0.37 THOUSAND/ÂΜL (ref 0.17–1.22)
MONOCYTES NFR BLD AUTO: 7 % (ref 4–12)
NEUTROPHILS # BLD AUTO: 3.02 THOUSANDS/ÂΜL (ref 1.85–7.62)
NEUTS SEG NFR BLD AUTO: 60 % (ref 43–75)
NONHDLC SERPL-MCNC: 163 MG/DL
NRBC BLD AUTO-RTO: 0 /100 WBCS
PLATELET # BLD AUTO: 356 THOUSANDS/UL (ref 149–390)
PMV BLD AUTO: 10 FL (ref 8.9–12.7)
POTASSIUM SERPL-SCNC: 4.2 MMOL/L (ref 3.5–5.3)
PROT SERPL-MCNC: 6.6 G/DL (ref 6.4–8.4)
RBC # BLD AUTO: 4.79 MILLION/UL (ref 3.81–5.12)
SODIUM SERPL-SCNC: 142 MMOL/L (ref 135–147)
TRIGL SERPL-MCNC: 123 MG/DL
WBC # BLD AUTO: 4.98 THOUSAND/UL (ref 4.31–10.16)

## 2024-05-14 PROCEDURE — 80053 COMPREHEN METABOLIC PANEL: CPT

## 2024-05-14 PROCEDURE — 85652 RBC SED RATE AUTOMATED: CPT

## 2024-05-14 PROCEDURE — 85025 COMPLETE CBC W/AUTO DIFF WBC: CPT

## 2024-05-14 PROCEDURE — 86140 C-REACTIVE PROTEIN: CPT

## 2024-05-14 PROCEDURE — 36415 COLL VENOUS BLD VENIPUNCTURE: CPT

## 2024-05-14 PROCEDURE — 80061 LIPID PANEL: CPT

## 2024-05-29 ENCOUNTER — CONSULT (OUTPATIENT)
Dept: GASTROENTEROLOGY | Facility: CLINIC | Age: 74
End: 2024-05-29
Payer: MEDICARE

## 2024-05-29 VITALS
WEIGHT: 191.6 LBS | BODY MASS INDEX: 37.62 KG/M2 | DIASTOLIC BLOOD PRESSURE: 78 MMHG | HEIGHT: 60 IN | SYSTOLIC BLOOD PRESSURE: 120 MMHG | TEMPERATURE: 98.8 F

## 2024-05-29 DIAGNOSIS — K43.9 HERNIA OF ABDOMINAL WALL: ICD-10-CM

## 2024-05-29 DIAGNOSIS — K62.5 RECTAL BLEEDING: ICD-10-CM

## 2024-05-29 DIAGNOSIS — K21.9 GASTROESOPHAGEAL REFLUX DISEASE WITHOUT ESOPHAGITIS: Primary | ICD-10-CM

## 2024-05-29 DIAGNOSIS — Z86.010 HISTORY OF COLON POLYPS: ICD-10-CM

## 2024-05-29 PROCEDURE — 99204 OFFICE O/P NEW MOD 45 MIN: CPT | Performed by: INTERNAL MEDICINE

## 2024-05-29 NOTE — PROGRESS NOTES
[facility] - Outpatient Consultation  Lola Spangler 73 y.o. female MRN: [id number]  Encounter: [id number]          ASSESSMENT AND PLAN:      1. Gastroesophageal reflux disease without esophagitis  -Discussed reflux precautions  -To assess for esophagitis and hiatal hernia.  If there is significant esophagitis, I will start her on PPI therapy  - EGD; Future    2. Hernia of abdominal wall  -Bulge in the supraumbilical area with wall defect likely due to abdominal wall hernia less likely to be diastases recti.  I will get abdominal wall ultrasound for further evaluation.  If it confirms hernia I will refer her to surgery team for further evaluation  - US abdominal wall; Future    3. History of colon polyps  She had a small tubular adenoma removed in 2016.  She is due for surveillance colonoscopy.  - Colonoscopy; Future    4. Rectal bleeding  Likely secondary to hemorrhoids.  Will do further evaluation during colonoscopy    I obtained informed consent from the patient. The risks/benefits/alternatives of the procedure were discussed with the patient. Risks included, but not limited to, infection, bleeding, perforation, injury to organs in the abdomen, missed lesion and incomplete procedure were discussed. Patient was agreeable and electronic signature was obtained.     ______________________________________________________________________    HPI: 73-year-old female here for evaluation of epigastric pain and bulge.  She is complaining of epigastric discomfort and bulge after eating.  She also reports recent weight gain due to decreased activity from knee arthritis.  She has intermittent regurgitation.  On meloxicam for arthritis and fibromyalgia.  She has no diarrhea or constipation but reports intermittent bright red blood around the stool and on toilet paper        Normal EGD in 2016  Colonoscopy in 2016 -one Tubular adenoma removed      Answers submitted by the patient for this  visit:  Abdominal Pain Questionnaire (Submitted on 5/27/2024)  Chief Complaint: Abdominal pain  Chronicity: chronic  Onset: more than 1 month ago  Onset quality: gradual  Frequency: daily  Progression since onset: waxing and waning  Pain location: epigastric region  Pain - numeric: 5/10  Pain quality: aching, dull, a sensation of fullness  Radiates to: epigastric region  anorexia: No  arthralgias: Yes  belching: Yes  constipation: No  diarrhea: No  dysuria: No  fever: No  flatus: Yes  frequency: No  headaches: No  hematochezia: No  hematuria: No  melena: No  myalgias: Yes  nausea: Yes  weight loss: No  vomiting: No  Aggravated by: eating  Relieved by: belching, certain positions, palpation, passing flatus, recumbency      REVIEW OF SYSTEMS:    CONSTITUTIONAL: Denies any fever, chills, rigors, and weight loss.  HEENT: No earache or tinnitus. Denies hearing loss or visual disturbances.  CARDIOVASCULAR: No chest pain or palpitations.   RESPIRATORY: Denies any cough, hemoptysis, shortness of breath or dyspnea on exertion.  GASTROINTESTINAL: As noted in the History of Present Illness.   GENITOURINARY: No problems with urination. Denies any hematuria or dysuria.  NEUROLOGIC: No dizziness or vertigo, denies headaches.   MUSCULOSKELETAL: Denies any muscle or joint pain.   SKIN: Denies skin rashes or itching.   ENDOCRINE: Denies excessive thirst. Denies intolerance to heat or cold.  PSYCHOSOCIAL: Denies depression or anxiety. Denies any recent memory loss.       Historical Information   Past Medical History:   Diagnosis Date    Ankylosing spondylitis (HCC)     Anxiety     LAST ASSESSED: 12/20/16    Arthritis     Back pain     Carpal tunnel syndrome     Fibromyalgia     LAST ASSESSED: 12/20/16    Fibromyalgia, primary     Heme positive stool     LAST ASSESSED: 10/22/16    High cholesterol     Hyperlipidemia     under control since weight loss    Impingement syndrome of left shoulder     LAST ASSESSED: 2/21/17    Impingement  syndrome of right shoulder     LAST ASSESSED:     Long term use of drug     LAST ASSESSED: 16    Low back pain     No known health problems     NO PERTINENT PAST MEDICAL HX    RLS (restless legs syndrome)     Rotator cuff injury     right    Spinal stenosis     Spinal stenosis     Spondylitis (HCC)     Spondyloarthritis     RESOLVED: 16    Vitamin D deficiency      Past Surgical History:   Procedure Laterality Date    BACK SURGERY      CARPAL TUNNEL RELEASE Left     CERVICAL CONIZATION   W/ LASER      CERVICAL CONIZATION     SECTION      COLONOSCOPY      DILATION AND CURETTAGE OF UTERUS      FL INJECTION RIGHT HIP (NON ARTHROGRAM)  2019    FL INJECTION RIGHT HIP (NON ARTHROGRAM)  2019    FRACTURE SURGERY      HAND SURGERY      JOINT REPLACEMENT      ORTHOPEDIC SURGERY      OR ARTHRODESIS POSTERIOR/PSTLAT TQ 1NTRSPC LUMBAR N/A 2017    Procedure: L2-L5 OPEN DECOMPRESSIVE LUMBAR LAMINECTOMY W/ PEDICLE SCREW AND NILDA FIXATION FUSION (IMPULSE); REMOVAL OF SKIN TAG MID BACK;  Surgeon: Catracho Fields MD;  [facility];  Service: Neurosurgery    OR ARTHRP ACETBLR/PROX FEM PROSTC AGRFT/ALGRFT Right 2019    Procedure: ARTHROPLASTY HIP TOTAL Posterior;  Surgeon: Erich Warren MD;  [facility];  Service: Orthopedics    OR COLONOSCOPY FLX DX W/COLLJ SPEC WHEN PFRMD N/A 10/07/2016    Procedure: EGD AND COLONOSCOPY;  Surgeon: Nathan Pierson MD;  [facility];  Service: Gastroenterology    OR NDSC WRST SURG W/RLS TRANSVRS CARPL LIGM Left 2018    Procedure: RELEASE CARPAL TUNNEL ENDOSCOPIC;  Surgeon: Bon Ferrer MD;  [facility];  Service: Orthopedics    OR NDSC WRST SURG W/RLS TRANSVRS CARPL LIGM Right 2018    Procedure: RELEASE CARPAL TUNNEL ENDOSCOPIC;  Surgeon: Bon Ferrer MD;  [facility];  Service: Orthopedics    RETINAL LASER PROCEDURE      SPINE SURGERY      TUBAL LIGATION       Social History   Social  History     Substance and Sexual Activity   Alcohol Use Never     Social History     Substance and Sexual Activity   Drug Use No     Social History     Tobacco Use   Smoking Status Never   Smokeless Tobacco Never     Family History   Problem Relation Age of Onset    Arthritis Mother     Breast cancer Mother 72    Hypertension Mother     Heart attack Mother         MYOCARDIAL INFARCTION    Heart attack Father     Hypertension Father     Stroke Father         CEREBROVASCUALR ACCIDENT (CVA)    Arthritis Sister     Uterine cancer Sister     Endometrial cancer Sister 60    Hypertension Sister     Heart attack Brother     Prostate cancer Brother 62    Arthritis Brother     Melanoma Brother     Cancer Brother         Melanoma    Arthritis Family     Hyperlipidemia Family     Depression Son     Breast cancer Maternal Aunt 40    No Known Problems Daughter     No Known Problems Maternal Grandmother     No Known Problems Maternal Grandfather     No Known Problems Paternal Grandmother     No Known Problems Paternal Grandfather     No Known Problems Brother     Other Brother         hunting accident (shot)    Melanoma Brother     Prostate cancer Brother     Heart attack Brother     Stroke Brother     Hypertension Brother     Arthritis Sister     Heart attack Sister     No Known Problems Son     No Known Problems Son     Lung cancer Maternal Uncle     Breast cancer additional onset Other 52    Uterine cancer Other        Meds/Allergies       Current Outpatient Medications:     amoxicillin (AMOXIL) 500 mg capsule    aspirin 81 MG tablet    cephalexin (KEFLEX) 500 mg capsule    chlorhexidine (PERIDEX) 0.12 % solution    Cholecalciferol (VITAMIN D3) 50 MCG (2000 UT) capsule    clonazePAM (KlonoPIN) 0.5 mg tablet    Diclofenac Sodium (VOLTAREN) 1 %    Fluad Quadrivalent 0.5 ML PRSY    gabapentin (NEURONTIN) 100 mg capsule    gabapentin (Neurontin) 600 MG tablet    meloxicam (Mobic) 15 mg tablet    PARoxetine (PAXIL) 20 mg tablet     predniSONE 10 mg tablet    No Known Allergies        Objective     There were no vitals taken for this visit. There is no height or weight on file to calculate BMI.        PHYSICAL EXAM:      General Appearance:   Alert, cooperative, no distress   HEENT:   Normocephalic, atraumatic, anicteric.     Neck:  Supple, symmetrical, trachea midline   Lungs:   Clear to auscultation bilaterally; no rales, rhonchi or wheezing; respirations unlabored    Heart::   Regular rate and rhythm; no murmur, rub, or gallop.   Abdomen:   Soft, bulge in supraumbilical region with wall defect suggestive of abdominal wall hernia.  Normal bowel sounds; no masses, no organomegaly    Genitalia:   Deferred    Rectal:   Deferred    Extremities:  No cyanosis, clubbing or edema    Pulses:  2+ and symmetric    Skin:  No jaundice, rashes, or lesions    Lymph nodes:  No palpable cervical lymphadenopathy        Lab Results:   No visits with results within 1 Day(s) from this visit.   Latest known visit with results is:   Appointment on 05/14/2024   Component Date Value    Cholesterol 05/14/2024 210 (H)     Triglycerides 05/14/2024 123     HDL, Direct 05/14/2024 47 (L)     LDL Calculated 05/14/2024 138 (H)     Non-HDL-Chol (CHOL-HDL) 05/14/2024 163     Sed Rate 05/14/2024 34 (H)     CRP 05/14/2024 2.6     Sodium 05/14/2024 142     Potassium 05/14/2024 4.2     Chloride 05/14/2024 105     CO2 05/14/2024 28     ANION GAP 05/14/2024 9     BUN 05/14/2024 28 (H)     Creatinine 05/14/2024 0.77     Glucose, Fasting 05/14/2024 91     Calcium 05/14/2024 8.9     AST 05/14/2024 16     ALT 05/14/2024 15     Alkaline Phosphatase 05/14/2024 53     Total Protein 05/14/2024 6.6     Albumin 05/14/2024 4.1     Total Bilirubin 05/14/2024 0.54     eGFR 05/14/2024 77     WBC 05/14/2024 4.98     RBC 05/14/2024 4.79     Hemoglobin 05/14/2024 13.9     Hematocrit 05/14/2024 43.0     MCV 05/14/2024 90     MCH 05/14/2024 29.0     MCHC 05/14/2024 32.3     RDW 05/14/2024 14.5     MPV  05/14/2024 10.0     Platelets 05/14/2024 356     nRBC 05/14/2024 0     Segmented % 05/14/2024 60     Immature Grans % 05/14/2024 0     Lymphocytes % 05/14/2024 27     Monocytes % 05/14/2024 7     Eosinophils Relative 05/14/2024 5     Basophils Relative 05/14/2024 1     Absolute Neutrophils 05/14/2024 3.02     Absolute Immature Grans 05/14/2024 0.01     Absolute Lymphocytes 05/14/2024 1.32     Absolute Monocytes 05/14/2024 0.37     Eosinophils Absolute 05/14/2024 0.23     Basophils Absolute 05/14/2024 0.03          Radiology Results:   No results found.

## 2024-05-29 NOTE — PATIENT INSTRUCTIONS
Scheduled date of EGD/colonoscopy (as of today):08.01.24  Physician performing EGD/colonoscopy:DR KEVIN  Location of EGD/colonoscopy:[facility]  Desired bowel prep reviewed with patient:COY/MAMADOU  Instructions reviewed with patient by:KANNAN  Clearances:  N/A  Pt will call to schedule U/S

## 2024-05-31 ENCOUNTER — HOSPITAL ENCOUNTER (OUTPATIENT)
Dept: ULTRASOUND IMAGING | Facility: MEDICAL CENTER | Age: 74
Discharge: HOME | End: 2024-05-31
Attending: INTERNAL MEDICINE
Payer: MEDICARE

## 2024-05-31 DIAGNOSIS — K43.9 HERNIA OF ABDOMINAL WALL: ICD-10-CM

## 2024-05-31 PROCEDURE — 76705 ECHO EXAM OF ABDOMEN: CPT

## 2024-06-11 DIAGNOSIS — K43.9 SUPRAUMBILICAL HERNIA: Primary | ICD-10-CM

## 2024-06-25 ENCOUNTER — PREP FOR PROCEDURE (OUTPATIENT)
Dept: SURGERY | Facility: CLINIC | Age: 74
End: 2024-06-25

## 2024-06-25 ENCOUNTER — CONSULT (OUTPATIENT)
Dept: SURGERY | Facility: CLINIC | Age: 74
End: 2024-06-25
Payer: MEDICARE

## 2024-06-25 VITALS
WEIGHT: 189 LBS | BODY MASS INDEX: 35.68 KG/M2 | HEIGHT: 61 IN | HEART RATE: 96 BPM | DIASTOLIC BLOOD PRESSURE: 91 MMHG | SYSTOLIC BLOOD PRESSURE: 144 MMHG

## 2024-06-25 DIAGNOSIS — K43.9 VENTRAL HERNIA: ICD-10-CM

## 2024-06-25 DIAGNOSIS — K42.9 UMBILICAL HERNIA WITHOUT OBSTRUCTION OR GANGRENE: Primary | ICD-10-CM

## 2024-06-25 DIAGNOSIS — K42.9 UMBILICAL HERNIA: Primary | ICD-10-CM

## 2024-06-25 DIAGNOSIS — K43.9 SUPRAUMBILICAL HERNIA: ICD-10-CM

## 2024-06-25 PROCEDURE — 99204 OFFICE O/P NEW MOD 45 MIN: CPT | Performed by: SURGERY

## 2024-06-25 NOTE — PROGRESS NOTES
Assessment/Plan: Patient presents with an umbilical and ventral hernia.  Is been enlarging over the last 6 months.  She has a dull ache at times.  She desires undergo repair for relief of discomfort.    On examination a ventral and umbilical hernia is present.  This is easily reducible.  Operative repair is suggested.  Risks and benefits explained.  All questions answered.    Diagnoses and all orders for this visit:    Supraumbilical hernia  -     Ambulatory referral to General Surgery        Subjective:      Patient ID: Lola Spangler is a 74 y.o. female.    Patient presents for supraumbilical hernia consult.  States she has had a bulge and intermittent dull achy pain above her umbilicus for 5 months.  Does not limit her activities.  Ultrasound abdominal wall 5/31/2024   FINDINGS:  Umbilical hernia in the abdominal midline superior to the umbilicus with multiple hernia defects present adjacent to 1 another approximately each hernia defect measuring on the order of between 15 and 18 mm in diameter. Hernia sacs measure approximately   6 and 4 cm in diameter each, and each contain omental/peritoneal fatty tissue.  IMPRESSION:  Midline supraumbilical hernias containing omental/peritoneal fatty tissue.              The following portions of the patient's history were reviewed and updated as appropriate:     She  has a past medical history of Abnormal ECG, Ankylosing spondylitis (HCC), Anxiety, Arthritis, Back pain, Carpal tunnel syndrome, Colon polyp, Fibromyalgia, Fibromyalgia, primary, Heme positive stool, High cholesterol, Hyperlipidemia, Impingement syndrome of left shoulder, Impingement syndrome of right shoulder, Long term use of drug, Low back pain, Myocardial infarction (HCC), No known health problems, Obesity, RLS (restless legs syndrome), Rotator cuff injury, Spinal stenosis, Spinal stenosis, Spondylitis (HCC), Spondyloarthritis, and Vitamin D deficiency.  She  has a past surgical history that includes   section; Tubal ligation; Dilation and curettage of uterus; Retinal laser procedure; Colonoscopy; pr colonoscopy flx dx w/collj spec when pfrmd (N/A, 10/07/2016); pr arthrodesis posterior/pstlat tq 1ntrspc lumbar (N/A, 2017); pr ndsc wrst surg w/rls transvrs carpl ligm (Left, 2018); Cervical conization w/ laser; Back surgery; Hand surgery; Carpal tunnel release (Left); pr ndsc wrst surg w/rls transvrs carpl ligm (Right, 2018); FL injection right hip (non arthrogram) (2019); FL injection right hip (non arthrogram) (2019); pr arthrp acetblr/prox fem prostc agrft/algrft (Right, 2019); Joint replacement; orthopedic surgery; Fracture surgery; and Spine surgery.  Her family history includes Arthritis in her brother, family, mother, sister, and sister; Breast cancer (age of onset: 40) in her maternal aunt; Breast cancer (age of onset: 72) in her mother; Breast cancer additional onset (age of onset: 52) in her other; Cancer in her brother and sister; Depression in her son; Endometrial cancer (age of onset: 60) in her sister; Heart attack in her brother, brother, father, mother, and sister; Heart disease in her brother, father, and mother; Hyperlipidemia in her family; Hypertension in her brother, father, mother, and sister; Lung cancer in her maternal uncle; Melanoma in her brother and brother; No Known Problems in her brother, daughter, maternal grandfather, maternal grandmother, paternal grandfather, paternal grandmother, son, and son; Other in her brother; Prostate cancer in her brother; Prostate cancer (age of onset: 62) in her brother; Stroke in her brother and father; Uterine cancer in her other and sister.  She  reports that she has never smoked. She has never used smokeless tobacco. She reports current alcohol use. She reports that she does not use drugs.  Current Outpatient Medications   Medication Sig Dispense Refill    amoxicillin (AMOXIL) 500 mg capsule TAKE ALL 4  "CAPSULES ONE HOUR BEFORE SURGERY      aspirin 81 MG tablet Take 1 tablet by mouth daily      cephalexin (KEFLEX) 500 mg capsule cephalexin 500 mg capsule   TAKE 4 CAPSULES BY MOUTH 1 HOUR BEFORE DENTAL APPOINTMENT      Cholecalciferol (VITAMIN D3) 50 MCG (2000 UT) capsule Vitamin D3 50 mcg (2,000 unit) capsule   Take by oral route.      clonazePAM (KlonoPIN) 0.5 mg tablet Take 0.5 mg by mouth daily at bedtime  4    Diclofenac Sodium (VOLTAREN) 1 % Apply 4 g topically 4 (four) times a day 100 g 4    gabapentin (NEURONTIN) 100 mg capsule Take 2 capsules (200 mg total) by mouth 3 (three) times a day 180 capsule 2    gabapentin (Neurontin) 600 MG tablet Take 1 tablet (600 mg total) by mouth daily at bedtime 90 tablet 3    meloxicam (Mobic) 15 mg tablet Take 1 tablet (15 mg total) by mouth daily 90 tablet 1    PARoxetine (PAXIL) 20 mg tablet Take 1 tablet (20 mg total) by mouth daily 90 tablet 2     No current facility-administered medications for this visit.     She has No Known Allergies..    Review of Systems   Constitutional: Negative.  Negative for activity change.   HENT: Negative.     Eyes: Negative.    Respiratory: Negative.     Cardiovascular: Negative.    Gastrointestinal:  Positive for abdominal pain.   Endocrine: Negative.    Genitourinary: Negative.    Musculoskeletal: Negative.    Skin: Negative.    Allergic/Immunologic: Negative.    Neurological: Negative.    Psychiatric/Behavioral:  Negative for agitation, behavioral problems and confusion. The patient is not nervous/anxious.          Objective:      /91   Pulse 96   Ht 5' 1\" (1.549 m)   Wt 85.7 kg (189 lb)   LMP  (LMP Unknown)   BMI 35.71 kg/m²          Physical Exam  Constitutional:       Appearance: Normal appearance. She is well-developed.   HENT:      Head: Atraumatic.   Eyes:      Extraocular Movements: Extraocular movements intact.   Cardiovascular:      Rate and Rhythm: Normal rate and regular rhythm.      Heart sounds: Normal heart " sounds.   Pulmonary:      Effort: Pulmonary effort is normal.      Breath sounds: Normal breath sounds.   Abdominal:      General: Abdomen is flat.      Hernia: A hernia (Ventral and umbilical hernia present.  These are reducible.) is present.   Musculoskeletal:      Right lower leg: No edema.      Left lower leg: No edema.   Skin:     General: Skin is warm and dry.   Neurological:      Mental Status: She is alert and oriented to person, place, and time.   Psychiatric:         Mood and Affect: Mood normal.

## 2024-06-27 ENCOUNTER — ANESTHESIA EVENT (OUTPATIENT)
Dept: ANESTHESIOLOGY | Facility: HOSPITAL | Age: 74
End: 2024-06-27

## 2024-06-27 ENCOUNTER — ANESTHESIA (OUTPATIENT)
Dept: ANESTHESIOLOGY | Facility: HOSPITAL | Age: 74
End: 2024-06-27

## 2024-07-03 ENCOUNTER — APPOINTMENT (OUTPATIENT)
Dept: LAB | Facility: CLINIC | Age: 74
End: 2024-07-03
Payer: MEDICARE

## 2024-07-03 DIAGNOSIS — K43.9 VENTRAL HERNIA: ICD-10-CM

## 2024-07-03 DIAGNOSIS — K42.9 UMBILICAL HERNIA: ICD-10-CM

## 2024-07-03 LAB
ALBUMIN SERPL BCG-MCNC: 3.9 G/DL (ref 3.5–5)
ALP SERPL-CCNC: 47 U/L (ref 34–104)
ALT SERPL W P-5'-P-CCNC: 15 U/L (ref 7–52)
ANION GAP SERPL CALCULATED.3IONS-SCNC: 8 MMOL/L (ref 4–13)
AST SERPL W P-5'-P-CCNC: 19 U/L (ref 13–39)
ATRIAL RATE: 67 BPM
BILIRUB SERPL-MCNC: 0.43 MG/DL (ref 0.2–1)
BUN SERPL-MCNC: 24 MG/DL (ref 5–25)
CALCIUM SERPL-MCNC: 9.2 MG/DL (ref 8.4–10.2)
CHLORIDE SERPL-SCNC: 106 MMOL/L (ref 96–108)
CO2 SERPL-SCNC: 28 MMOL/L (ref 21–32)
CREAT SERPL-MCNC: 0.74 MG/DL (ref 0.6–1.3)
ERYTHROCYTE [DISTWIDTH] IN BLOOD BY AUTOMATED COUNT: 14.2 % (ref 11.6–15.1)
GFR SERPL CREATININE-BSD FRML MDRD: 81 ML/MIN/1.73SQ M
GLUCOSE P FAST SERPL-MCNC: 95 MG/DL (ref 65–99)
HCT VFR BLD AUTO: 42.5 % (ref 34.8–46.1)
HGB BLD-MCNC: 13.4 G/DL (ref 11.5–15.4)
MCH RBC QN AUTO: 28.5 PG (ref 26.8–34.3)
MCHC RBC AUTO-ENTMCNC: 31.5 G/DL (ref 31.4–37.4)
MCV RBC AUTO: 90 FL (ref 82–98)
P AXIS: 45 DEGREES
PLATELET # BLD AUTO: 348 THOUSANDS/UL (ref 149–390)
PMV BLD AUTO: 9.7 FL (ref 8.9–12.7)
POTASSIUM SERPL-SCNC: 4.2 MMOL/L (ref 3.5–5.3)
PR INTERVAL: 200 MS
PROT SERPL-MCNC: 6.4 G/DL (ref 6.4–8.4)
QRS AXIS: -25 DEGREES
QRSD INTERVAL: 82 MS
QT INTERVAL: 400 MS
QTC INTERVAL: 422 MS
RBC # BLD AUTO: 4.7 MILLION/UL (ref 3.81–5.12)
SODIUM SERPL-SCNC: 142 MMOL/L (ref 135–147)
T WAVE AXIS: 75 DEGREES
VENTRICULAR RATE: 67 BPM
WBC # BLD AUTO: 5.13 THOUSAND/UL (ref 4.31–10.16)

## 2024-07-03 PROCEDURE — 80053 COMPREHEN METABOLIC PANEL: CPT

## 2024-07-03 PROCEDURE — 93005 ELECTROCARDIOGRAM TRACING: CPT

## 2024-07-03 PROCEDURE — 85027 COMPLETE CBC AUTOMATED: CPT

## 2024-07-03 PROCEDURE — 93010 ELECTROCARDIOGRAM REPORT: CPT | Performed by: INTERNAL MEDICINE

## 2024-07-03 PROCEDURE — 36415 COLL VENOUS BLD VENIPUNCTURE: CPT

## 2024-07-03 NOTE — PRE-PROCEDURE INSTRUCTIONS
Pre-Surgery Instructions:   Medication Instructions    aspirin 81 MG tablet Stop taking 7 days prior to surgery.per MD    Cholecalciferol (VITAMIN D3) 50 MCG (2000 UT) capsule Stop taking 7 days prior to surgery.    clonazePAM (KlonoPIN) 0.5 mg tablet Take night before surgery    Diclofenac Sodium (VOLTAREN) 1 % Stop taking 7 days prior to surgery.    gabapentin (NEURONTIN) 100 mg capsule Take day of surgery.    gabapentin (Neurontin) 600 MG tablet Take night before surgery    meloxicam (Mobic) 15 mg tablet Stop taking 7 days prior to surgery.    PARoxetine (PAXIL) 20 mg tablet Take day of surgery.   Medication instructions for day surgery reviewed. Please use only a sip of water to take your instructed medications. Avoid all over the counter vitamins, supplements and NSAIDS for one week prior to surgery per anesthesia guidelines. Tylenol is ok to take as needed.     You will receive a call one business day prior to surgery with an arrival time and hospital directions. If your surgery is scheduled on a Monday, the hospital will be calling you on the Friday prior to your surgery. If you have not heard from anyone by 8pm, please call the hospital supervisor through the hospital  at [phone] or [place] [phone]).    Do not eat or drink anything after midnight the night before your surgery, including candy, mints, lifesavers, or chewing gum. Do not drink alcohol 24hrs before your surgery. Try not to smoke at least 24hrs before your surgery.       Follow the pre surgery showering instructions as listed in the “My Surgical Experience Booklet” or otherwise provided by your surgeon's office. Do not use a blade to shave the surgical area 1 week before surgery. It is okay to use a clean electric clippers up to 24 hours before surgery. Do not apply any lotions, creams, including makeup, cologne, deodorant, or perfumes after showering on the day of your surgery. Do not use dry shampoo, hair spray, hair gel, or  any type of hair products.     No contact lenses, eye make-up, or artificial eyelashes. Remove nail polish, including gel polish, and any artificial, gel, or acrylic nails if possible. Remove all jewelry including rings and body piercing jewelry.     Wear causal clothing that is easy to take on and off. Consider your type of surgery.    Keep any valuables, jewelry, piercings at home. Please bring any specially ordered equipment (sling, braces) if indicated.    Arrange for a responsible person to drive you to and from the hospital on the day of your surgery. Please confirm the visitor policy for the day of your procedure when you receive your phone call with an arrival time.     Call the surgeon's office with any new illnesses, exposures, or additional questions prior to surgery.    Please reference your “My Surgical Experience Booklet” for additional information to prepare for your upcoming surgery.

## 2024-07-10 RX ORDER — OXYCODONE HYDROCHLORIDE AND ACETAMINOPHEN 5; 325 MG/1; MG/1
1 TABLET ORAL EVERY 4 HOURS PRN
Qty: 10 TABLET | Refills: 0 | Status: SHIPPED | OUTPATIENT
Start: 2024-07-10

## 2024-07-10 NOTE — DISCHARGE INSTR - AVS FIRST PAGE
Please call the office when you leave to schedule an appointment for 2 weeks.              Please call [phone]. [address] [facility], [address], [place], [address]. [address] between [address] and [address].     Activity:    May lift 10 lb as many times as desired the 1st week,       20 lb in 2 weeks,       30 lb in 3 weeks.                Walking is encouraged  Normal daily activities including climbing steps are okay  Do not engage in strenuous activity ( sit-ups or crutches) or contact sports for 4-6 weeks post-operatively    Return to Work:   Okay to return to work when you feel well if you desire.        Diet:   You may return to your normal healthy diet.    Wound Care:  Your wound is closed with dissolvable stitches and glue.  It is okay to shower. Wash incision gently with soap and water and pat dry. Do not soak incisions in bath water or swim for two weeks. Do not apply any creams or ointments.    Pain Medication:   Please take as directed if needed. May use Advil or Motrin in addition.  Recall, the pain medicine and anesthesia is associated with constipation.    No driving while taking narcotic pain medications.      Other:  It is normal to developed a “healing ridge” / firm incision after surgery.  This is your body making scar tissue.  It is a good sign  Constipation is very common after general anesthesia.  Please use milk of magnesia as needed in order to help prevent constipation.  It is normal to get bruising after surgery.  If you have questions after discharge please call the office.    If you have increased pain, fever >101.5, increased drainage, redness or a bad smell at your surgery site, please call us immediately or come directly to the Emergency Room

## 2024-07-11 ENCOUNTER — PATIENT MESSAGE (OUTPATIENT)
Dept: GASTROENTEROLOGY | Facility: CLINIC | Age: 74
End: 2024-07-11

## 2024-07-11 ENCOUNTER — TELEPHONE (OUTPATIENT)
Age: 74
End: 2024-07-11

## 2024-07-11 NOTE — TELEPHONE ENCOUNTER
Scheduled date of EGD/colonoscopy (as of today): 11/1/24    Physician performing EGD/colonoscopy: HERBERTH    Location of EGD/colonoscopy:  [facility]    Patient called and reschedule her 8/1/24 procedures.

## 2024-07-12 ENCOUNTER — ANESTHESIA EVENT (OUTPATIENT)
Dept: PERIOP | Facility: AMBULARY SURGERY CENTER | Age: 74
End: 2024-07-12
Payer: MEDICARE

## 2024-07-12 ENCOUNTER — HOSPITAL ENCOUNTER (OUTPATIENT)
Facility: AMBULARY SURGERY CENTER | Age: 74
Setting detail: OUTPATIENT SURGERY
Discharge: HOME | End: 2024-07-12
Attending: SURGERY | Admitting: SURGERY
Payer: MEDICARE

## 2024-07-12 ENCOUNTER — ANESTHESIA (OUTPATIENT)
Dept: PERIOP | Facility: AMBULARY SURGERY CENTER | Age: 74
End: 2024-07-12
Payer: MEDICARE

## 2024-07-12 VITALS
SYSTOLIC BLOOD PRESSURE: 109 MMHG | OXYGEN SATURATION: 96 % | WEIGHT: 189 LBS | DIASTOLIC BLOOD PRESSURE: 59 MMHG | RESPIRATION RATE: 18 BRPM | HEART RATE: 87 BPM | TEMPERATURE: 97.3 F | HEIGHT: 61 IN | BODY MASS INDEX: 35.68 KG/M2

## 2024-07-12 DIAGNOSIS — K43.9 SUPRAUMBILICAL HERNIA: Primary | ICD-10-CM

## 2024-07-12 PROCEDURE — 49593 RPR AA HRN 1ST 3-10 RDC: CPT | Performed by: PHYSICIAN ASSISTANT

## 2024-07-12 PROCEDURE — C1781 MESH (IMPLANTABLE): HCPCS | Performed by: SURGERY

## 2024-07-12 PROCEDURE — 49593 RPR AA HRN 1ST 3-10 RDC: CPT | Performed by: SURGERY

## 2024-07-12 DEVICE — VENTRIO ST HERNIA PATCH
Type: IMPLANTABLE DEVICE | Site: ABDOMEN | Status: FUNCTIONAL
Brand: VENTRIO ST HERNIA PATCH

## 2024-07-12 RX ORDER — CEFAZOLIN SODIUM 2 G/50ML
2000 SOLUTION INTRAVENOUS ONCE
Status: COMPLETED | OUTPATIENT
Start: 2024-07-12 | End: 2024-07-12

## 2024-07-12 RX ORDER — PROPOFOL 10 MG/ML
INJECTION, EMULSION INTRAVENOUS CONTINUOUS PRN
Status: DISCONTINUED | OUTPATIENT
Start: 2024-07-12 | End: 2024-07-12

## 2024-07-12 RX ORDER — EPHEDRINE SULFATE 50 MG/ML
INJECTION INTRAVENOUS AS NEEDED
Status: DISCONTINUED | OUTPATIENT
Start: 2024-07-12 | End: 2024-07-12

## 2024-07-12 RX ORDER — ONDANSETRON 2 MG/ML
INJECTION INTRAMUSCULAR; INTRAVENOUS AS NEEDED
Status: DISCONTINUED | OUTPATIENT
Start: 2024-07-12 | End: 2024-07-12

## 2024-07-12 RX ORDER — SODIUM CHLORIDE, SODIUM LACTATE, POTASSIUM CHLORIDE, CALCIUM CHLORIDE 600; 310; 30; 20 MG/100ML; MG/100ML; MG/100ML; MG/100ML
INJECTION, SOLUTION INTRAVENOUS CONTINUOUS PRN
Status: DISCONTINUED | OUTPATIENT
Start: 2024-07-12 | End: 2024-07-12

## 2024-07-12 RX ORDER — BUPIVACAINE HYDROCHLORIDE 2.5 MG/ML
INJECTION, SOLUTION EPIDURAL; INFILTRATION; INTRACAUDAL AS NEEDED
Status: DISCONTINUED | OUTPATIENT
Start: 2024-07-12 | End: 2024-07-12 | Stop reason: HOSPADM

## 2024-07-12 RX ORDER — HYDROMORPHONE HCL/PF 1 MG/ML
0.5 SYRINGE (ML) INJECTION
Status: DISCONTINUED | OUTPATIENT
Start: 2024-07-12 | End: 2024-07-12 | Stop reason: HOSPADM

## 2024-07-12 RX ORDER — PROPOFOL 10 MG/ML
INJECTION, EMULSION INTRAVENOUS AS NEEDED
Status: DISCONTINUED | OUTPATIENT
Start: 2024-07-12 | End: 2024-07-12

## 2024-07-12 RX ORDER — ACETAMINOPHEN 325 MG/1
650 TABLET ORAL EVERY 4 HOURS PRN
Status: DISCONTINUED | OUTPATIENT
Start: 2024-07-12 | End: 2024-07-12 | Stop reason: HOSPADM

## 2024-07-12 RX ORDER — ONDANSETRON 2 MG/ML
4 INJECTION INTRAMUSCULAR; INTRAVENOUS ONCE AS NEEDED
Status: DISCONTINUED | OUTPATIENT
Start: 2024-07-12 | End: 2024-07-12 | Stop reason: HOSPADM

## 2024-07-12 RX ORDER — MAGNESIUM HYDROXIDE 1200 MG/15ML
LIQUID ORAL AS NEEDED
Status: DISCONTINUED | OUTPATIENT
Start: 2024-07-12 | End: 2024-07-12 | Stop reason: HOSPADM

## 2024-07-12 RX ORDER — OXYCODONE HYDROCHLORIDE AND ACETAMINOPHEN 5; 325 MG/1; MG/1
1 TABLET ORAL EVERY 4 HOURS PRN
Status: DISCONTINUED | OUTPATIENT
Start: 2024-07-12 | End: 2024-07-12 | Stop reason: HOSPADM

## 2024-07-12 RX ORDER — DEXAMETHASONE SODIUM PHOSPHATE 10 MG/ML
INJECTION, SOLUTION INTRAMUSCULAR; INTRAVENOUS AS NEEDED
Status: DISCONTINUED | OUTPATIENT
Start: 2024-07-12 | End: 2024-07-12

## 2024-07-12 RX ORDER — ONDANSETRON 2 MG/ML
4 INJECTION INTRAMUSCULAR; INTRAVENOUS EVERY 4 HOURS PRN
Status: DISCONTINUED | OUTPATIENT
Start: 2024-07-12 | End: 2024-07-12 | Stop reason: HOSPADM

## 2024-07-12 RX ORDER — LIDOCAINE HYDROCHLORIDE 10 MG/ML
INJECTION, SOLUTION EPIDURAL; INFILTRATION; INTRACAUDAL; PERINEURAL AS NEEDED
Status: DISCONTINUED | OUTPATIENT
Start: 2024-07-12 | End: 2024-07-12

## 2024-07-12 RX ORDER — FENTANYL CITRATE 50 UG/ML
INJECTION, SOLUTION INTRAMUSCULAR; INTRAVENOUS AS NEEDED
Status: DISCONTINUED | OUTPATIENT
Start: 2024-07-12 | End: 2024-07-12

## 2024-07-12 RX ORDER — FENTANYL CITRATE/PF 50 MCG/ML
50 SYRINGE (ML) INJECTION
Status: DISCONTINUED | OUTPATIENT
Start: 2024-07-12 | End: 2024-07-12 | Stop reason: HOSPADM

## 2024-07-12 RX ADMIN — FENTANYL CITRATE 50 MCG: 50 INJECTION INTRAMUSCULAR; INTRAVENOUS at 08:52

## 2024-07-12 RX ADMIN — ONDANSETRON 4 MG: 2 INJECTION INTRAMUSCULAR; INTRAVENOUS at 08:43

## 2024-07-12 RX ADMIN — PHENYLEPHRINE HYDROCHLORIDE 50 MCG/MIN: 50 INJECTION INTRAVENOUS at 08:39

## 2024-07-12 RX ADMIN — PROPOFOL 150 MG: 10 INJECTION, EMULSION INTRAVENOUS at 08:38

## 2024-07-12 RX ADMIN — CEFAZOLIN SODIUM 2000 MG: 2 SOLUTION INTRAVENOUS at 08:34

## 2024-07-12 RX ADMIN — DEXAMETHASONE SODIUM PHOSPHATE 10 MG: 10 INJECTION, SOLUTION INTRAMUSCULAR; INTRAVENOUS at 08:43

## 2024-07-12 RX ADMIN — EPHEDRINE SULFATE 15 MG: 50 INJECTION INTRAVENOUS at 08:46

## 2024-07-12 RX ADMIN — FENTANYL CITRATE 50 MCG: 50 INJECTION INTRAMUSCULAR; INTRAVENOUS at 08:43

## 2024-07-12 RX ADMIN — SODIUM CHLORIDE, SODIUM LACTATE, POTASSIUM CHLORIDE, AND CALCIUM CHLORIDE: .6; .31; .03; .02 INJECTION, SOLUTION INTRAVENOUS at 09:09

## 2024-07-12 RX ADMIN — PROPOFOL 50 MCG/KG/MIN: 10 INJECTION, EMULSION INTRAVENOUS at 08:39

## 2024-07-12 RX ADMIN — OXYCODONE HYDROCHLORIDE AND ACETAMINOPHEN 1 TABLET: 5; 325 TABLET ORAL at 11:09

## 2024-07-12 RX ADMIN — LIDOCAINE HYDROCHLORIDE 80 MG: 10 INJECTION, SOLUTION EPIDURAL; INFILTRATION; INTRACAUDAL; PERINEURAL at 08:38

## 2024-07-12 RX ADMIN — SODIUM CHLORIDE, SODIUM LACTATE, POTASSIUM CHLORIDE, AND CALCIUM CHLORIDE: .6; .31; .03; .02 INJECTION, SOLUTION INTRAVENOUS at 08:34

## 2024-07-12 NOTE — ANESTHESIA PREPROCEDURE EVALUATION
Procedure:  REPAIR HERNIA UMBILICAL (Abdomen)  REPAIR HERNIA VENTRAL (Abdomen)    Relevant Problems   CARDIO   (+) Hyperlipidemia      MUSCULOSKELETAL   (+) Chronic bilateral low back pain   (+) Lumbar spondylosis   (+) Primary osteoarthritis of right knee   (+) Spondylosis      NEURO/PSYCH   (+) Chronic bilateral low back pain   (+) Depression            RLS (restless legs syndrome)  Spondylitis (HCC)    Ankylosing spondylitis (HCC)        IMPRESSIONS: 1. Negative pharmacologic stress test with regadenoson for symptoms of angina pectoris and negative for ECG evidence of ischemia.  2. Tomographic perfusion series consistent with normal perfusion without definite ischemia or prior infarction.  3. Normal left ventricular cavity size with normal left ventricular systolic function and wall motion. EF determined as 67 %.  4. Normal resting blood pressure and appropriate blood pressure response noted during the stress test.  5. No chest pain was reported during the stress test.  6. Baseline sinus bradycardia rhythm with occasional isolated premature ventricular contractions noted during the stress test.     Prepared and signed by     Doyle Castaneda MD  Signed 11/01/2019 12:41:49       Physical Exam    Airway    Mallampati score: II  TM Distance: >3 FB  Neck ROM: full     Dental       Cardiovascular      Pulmonary      Other Findings  post-pubertal.      Anesthesia Plan  ASA Score- 2     Anesthesia Type- general with ASA Monitors.         Additional Monitors:     Airway Plan: LMA.           Plan Factors-    Chart reviewed.                      Induction- intravenous.    Postoperative Plan-         Informed Consent- Anesthetic plan and risks discussed with patient.  I personally reviewed this patient with the CRNA. Discussed and agreed on the Anesthesia Plan with the CRNA..

## 2024-07-12 NOTE — INTERVAL H&P NOTE
H&P reviewed. After examining the patient I find no changes in the patients condition since the H&P had been written.    Vitals:    07/12/24 0743   BP: 116/62   Pulse: 90   Resp: 18   Temp: (!) 97 °F (36.1 °C)   SpO2: 96%

## 2024-07-15 NOTE — TELEPHONE ENCOUNTER
Patient called to verify her appt was rescheduled. Advised yes it was reschedule to 11/01 thank you

## 2024-07-30 ENCOUNTER — OFFICE VISIT (OUTPATIENT)
Dept: SURGERY | Facility: CLINIC | Age: 74
End: 2024-07-30

## 2024-07-30 DIAGNOSIS — K42.9 UMBILICAL HERNIA WITHOUT OBSTRUCTION OR GANGRENE: Primary | ICD-10-CM

## 2024-07-30 PROCEDURE — 99024 POSTOP FOLLOW-UP VISIT: CPT | Performed by: SURGERY

## 2024-07-30 NOTE — PROGRESS NOTES
Assessment/Plan: Patient is status post umbilical and ventral hernia repair.  Overall she feels well.  She offers no complaints.  She is advance her activities nicely.  All questions answered.    There are no diagnoses linked to this encounter.    Subjective:      Patient ID: Lola Spangler is a 74 y.o. female.    Patient presents post operatively.  Umbilical and ventral hernia repair 2024.      The following portions of the patient's history were reviewed and updated as appropriate:     She  has a past medical history of Abnormal ECG, Ankylosing spondylitis (HCC), Anxiety, Arthritis, Back pain, Carpal tunnel syndrome, Colon polyp, Fibromyalgia, Fibromyalgia, primary, Heme positive stool, High cholesterol, Hyperlipidemia, Impingement syndrome of left shoulder, Impingement syndrome of right shoulder, Long term use of drug, Low back pain, Myocardial infarction (HCC), No known health problems, Obesity, RLS (restless legs syndrome), Rotator cuff injury, Spinal stenosis, Spinal stenosis, Spondylitis (HCC), Spondyloarthritis, and Vitamin D deficiency.  She  has a past surgical history that includes  section; Tubal ligation; Dilation and curettage of uterus; Retinal laser procedure; Colonoscopy; pr colonoscopy flx dx w/collj spec when pfrmd (N/A, 10/07/2016); pr arthrodesis posterior/pstlat tq 1ntrspc lumbar (N/A, 2017); pr ndsc wrst surg w/rls transvrs carpl ligm (Left, 2018); Cervical conization w/ laser; Back surgery; Hand surgery; Carpal tunnel release (Left); pr ndsc wrst surg w/rls transvrs carpl ligm (Right, 2018); FL injection right hip (non arthrogram) (2019); FL injection right hip (non arthrogram) (2019); pr arthrp acetblr/prox fem prostc agrft/algrft (Right, 2019); Joint replacement (); orthopedic surgery; Fracture surgery; Spine surgery; pr rpr aa hernia 1st < 3 cm reducible (N/A, 2024); and pr rpr aa hernia 1st < 3 cm reducible (N/A, 2024).  Her  family history includes Arthritis in her brother, family, mother, sister, and sister; Breast cancer (age of onset: 40) in her maternal aunt; Breast cancer (age of onset: 72) in her mother; Breast cancer additional onset (age of onset: 52) in her other; Cancer in her brother and sister; Depression in her son; Endometrial cancer (age of onset: 60) in her sister; Heart attack in her brother, brother, father, mother, and sister; Heart disease in her brother, father, and mother; Hyperlipidemia in her family; Hypertension in her brother, father, mother, and sister; Lung cancer in her maternal uncle; Melanoma in her brother and brother; No Known Problems in her brother, daughter, maternal grandfather, maternal grandmother, paternal grandfather, paternal grandmother, son, and son; Other in her brother; Prostate cancer in her brother; Prostate cancer (age of onset: 62) in her brother; Stroke in her brother and father; Uterine cancer in her other and sister.  She  reports that she has never smoked. She has never used smokeless tobacco. She reports current alcohol use. She reports that she does not use drugs.  Current Outpatient Medications   Medication Sig Dispense Refill    aspirin 81 MG tablet Take 1 tablet by mouth daily      cephalexin (KEFLEX) 500 mg capsule cephalexin 500 mg capsule   TAKE 4 CAPSULES BY MOUTH 1 HOUR BEFORE DENTAL APPOINTMENT      Cholecalciferol (VITAMIN D3) 50 MCG (2000 UT) capsule Vitamin D3 50 mcg (2,000 unit) capsule   Take by oral route.      clonazePAM (KlonoPIN) 0.5 mg tablet Take 0.5 mg by mouth daily at bedtime  4    Diclofenac Sodium (VOLTAREN) 1 % Apply 4 g topically 4 (four) times a day 100 g 4    gabapentin (NEURONTIN) 100 mg capsule Take 2 capsules (200 mg total) by mouth 3 (three) times a day 180 capsule 2    gabapentin (Neurontin) 600 MG tablet Take 1 tablet (600 mg total) by mouth daily at bedtime 90 tablet 3    meloxicam (Mobic) 15 mg tablet Take 1 tablet (15 mg total) by mouth daily  90 tablet 1    oxyCODONE-acetaminophen (PERCOCET) 5-325 mg per tablet Take 1 tablet by mouth every 4 (four) hours as needed for moderate pain for up to 10 doses Max Daily Amount: 6 tablets 10 tablet 0    PARoxetine (PAXIL) 20 mg tablet Take 1 tablet (20 mg total) by mouth daily (Patient taking differently: Take 20 mg by mouth every morning) 90 tablet 2     No current facility-administered medications for this visit.     She has No Known Allergies..    Review of Systems   Constitutional: Negative.  Negative for activity change.   HENT: Negative.     Eyes: Negative.    Respiratory: Negative.     Cardiovascular: Negative.    Gastrointestinal: Negative.    Endocrine: Negative.    Genitourinary: Negative.    Musculoskeletal:  Positive for arthralgias, back pain and gait problem.   Skin: Negative.    Allergic/Immunologic: Negative.    Psychiatric/Behavioral:  Negative for agitation, behavioral problems and confusion. The patient is not nervous/anxious.          Objective:      LMP  (LMP Unknown)          Physical Exam  Constitutional:       Appearance: Normal appearance. She is well-developed.   HENT:      Head: Atraumatic.   Pulmonary:      Effort: Pulmonary effort is normal.   Abdominal:      General: Abdomen is flat.   Musculoskeletal:      Right lower leg: No edema.      Left lower leg: No edema.   Skin:     General: Skin is warm and dry.      Comments: Incision clean and dry    Neurological:      Mental Status: She is alert and oriented to person, place, and time.   Psychiatric:         Mood and Affect: Mood normal.

## 2024-08-16 ENCOUNTER — TELEPHONE (OUTPATIENT)
Age: 74
End: 2024-08-16

## 2024-08-16 NOTE — TELEPHONE ENCOUNTER
Lola is calling because she was told by her current Rheumatologist to call her PCP to get a suggestion on a new Rheumatologist since the current one will be retiring.

## 2024-08-19 DIAGNOSIS — M45.9 ANKYLOSING SPONDYLITIS, UNSPECIFIED SITE OF SPINE (HCC): Primary | ICD-10-CM

## 2024-08-19 NOTE — TELEPHONE ENCOUNTER
A referral has been sent to Dr. Dunia Fuentes.  Her phone number is [phone].  The patient can make a phone call to the office to schedule her appointment.  Thank you

## 2024-08-20 DIAGNOSIS — F32.A DEPRESSION, UNSPECIFIED DEPRESSION TYPE: ICD-10-CM

## 2024-08-21 RX ORDER — PAROXETINE 20 MG/1
20 TABLET, FILM COATED ORAL DAILY
Qty: 90 TABLET | Refills: 2 | Status: SHIPPED | OUTPATIENT
Start: 2024-08-21

## 2024-08-21 NOTE — PROGRESS NOTES
Daily Note     Today's date: 2020  Patient name: Burgess Driscoll  : 1950  MRN: [id number]  Referring provider: Emerita Lai MD  Dx:   Encounter Diagnosis     ICD-10-CM    1  Status post total replacement of right hip Z96 641    2  Primary osteoarthritis of one hip, right M16 11                   Subjective: Pt reports doing well overall, states she has no pain with hip or activity  Objective: See treatment diary below      Assessment: Tolerated treatment well  Patient would benefit from continued PT   Pt  able to complete all exercises with no increase in pain during or after session  Pt able to progress exercises this session without complaint  Pt  1:1 with PTA for entirety  Plan: Potential discharge next visit  Precautions: OA, Fibromyalgia, ankylosing spondylitis, spinal stenosis, lumbar fusion, follow hip precautions for posterior approach (No flexion past 90*, adduction or IR)  Manual  1/6 1/8 1/13 1/15 1/20 1/22 1/27       R Hip PROM within precautions (No flex past 90*, ADD, or IR) TB 10 min TP 10  mins CR  10 mins TP 10 mins CR  10 mins CR  10 mins KP 8'                            Exercise Diary  1/6 1/8 1/13 1/15 1/20 1/22 1/27       Bike or Nustep NS L6 10min L6x10' L6  10 mins L6x10' L7  10 mins L6  10 mins 10' L6       Heel slides  dc --           SLR 2x15 2x15 2x15 2x15 2x15 2x15 2x15       bridges  3"x20 otb OTB  3"x20 gtb 3"x20 GTB  3"x20 GTB  3"x20 20x3" gtb       Clamshells w/pillow 20x3s 3"x20 3"   2x10 gtb 3" x20 GTB  3"x20 GTB  3"x20 20x3" gtb       Side stepping 4 laps gtb 4 laps GTB  4 laps GTB  4 laps BTB  4 laps BTB  4 laps 4 laps btb       Standing hip 3 way 15x ea 3# 3# x15ea b/l 3#  B/L  2x10 ea  3#  B/L  2x10 ea  3#  B/L  2x10 ea  3#  B/L  2x10 ea  2x15 ea b/l 3#       Heel raises  x30 30 30 30 30 30       Step ups 15x FSU/LSS 6" 6" x15 ea fwd/lat 6"  Fwd/lat  15 ea  6"  Fwd/lat  15 ea  6#  Fwd/lat  17 ea  6#  Fwd/lat  17 ea   20x ea fsu/lsu 6" Vaginal delivery laq 30x no weight x30 0#  5"x30 0#  5"x30 0#  5"x30 0#  5"x30 2x15 2#       HS curls 20x ea B/L 3# 3# x20 3#  20 ea  3#  20 ea  3#  20 ea  3#  20 ea   20x ea 3#        STS nv 2x15 2x15 Chair  1x8  No UE Chair  No UE  1x10 Chair  No UE  1x10 10x no ue        mini squats 15x x15 15 2x15 2x15 2x15 2x15        lateral step down      4"  x10 15x 4"             Modalities     11/14 11/18 11/20 11/25 11/27 12/2 12/9 12/11 12/16 12/18   CP PRN    10 mins    10 mins  np  10'  np  home  use  home use  home use Home use

## 2024-09-13 DIAGNOSIS — G62.9 NEUROPATHY: ICD-10-CM

## 2024-09-13 DIAGNOSIS — M51.36 LUMBAR DEGENERATIVE DISC DISEASE: ICD-10-CM

## 2024-09-13 RX ORDER — GABAPENTIN 600 MG/1
600 TABLET ORAL
Qty: 90 TABLET | Refills: 3 | OUTPATIENT
Start: 2024-09-13

## 2024-09-18 DIAGNOSIS — G62.9 NEUROPATHY: ICD-10-CM

## 2024-09-18 RX ORDER — GABAPENTIN 100 MG/1
200 CAPSULE ORAL 3 TIMES DAILY
Qty: 180 CAPSULE | Refills: 5 | Status: SHIPPED | OUTPATIENT
Start: 2024-09-18

## 2024-09-18 RX ORDER — GABAPENTIN 100 MG/1
200 CAPSULE ORAL 3 TIMES DAILY
Qty: 180 CAPSULE | Refills: 0 | Status: CANCELLED | OUTPATIENT
Start: 2024-09-18

## 2024-09-18 NOTE — TELEPHONE ENCOUNTER
Reason for call:   [x] Refill   [] Prior Auth  [] Other:     Office:   [x] PCP/Provider -   [] Specialty/Provider -     Medication: gabapentin (NEURONTIN) 100 mg capsule     Dose/Frequency: Take 2 capsules (200 mg total) by mouth 3 (three) times a day     Quantity: 180 capsule    Pharmacy: [facility]/pharmacy #[id number] - [place], [place] - [address] [phone]     Does the patient have enough for 3 days?   [x] Yes   [] No - Send as HP to POD

## 2024-10-18 ENCOUNTER — ANESTHESIA (OUTPATIENT)
Dept: ANESTHESIOLOGY | Facility: HOSPITAL | Age: 74
End: 2024-10-18

## 2024-10-18 ENCOUNTER — ANESTHESIA EVENT (OUTPATIENT)
Dept: ANESTHESIOLOGY | Facility: HOSPITAL | Age: 74
End: 2024-10-18

## 2024-11-01 ENCOUNTER — ANESTHESIA EVENT (OUTPATIENT)
Dept: GASTROENTEROLOGY | Facility: AMBULARY SURGERY CENTER | Age: 74
End: 2024-11-01
Payer: MEDICARE

## 2024-11-01 ENCOUNTER — HOSPITAL ENCOUNTER (OUTPATIENT)
Dept: GASTROENTEROLOGY | Facility: AMBULARY SURGERY CENTER | Age: 74
Setting detail: OUTPATIENT SURGERY
End: 2024-11-01
Attending: INTERNAL MEDICINE
Payer: MEDICARE

## 2024-11-01 VITALS
TEMPERATURE: 97 F | WEIGHT: 188 LBS | SYSTOLIC BLOOD PRESSURE: 122 MMHG | RESPIRATION RATE: 22 BRPM | HEART RATE: 80 BPM | OXYGEN SATURATION: 96 % | BODY MASS INDEX: 35.5 KG/M2 | HEIGHT: 61 IN | DIASTOLIC BLOOD PRESSURE: 74 MMHG

## 2024-11-01 DIAGNOSIS — K21.9 GASTROESOPHAGEAL REFLUX DISEASE WITHOUT ESOPHAGITIS: ICD-10-CM

## 2024-11-01 DIAGNOSIS — Z86.0100 HISTORY OF COLON POLYPS: ICD-10-CM

## 2024-11-01 PROCEDURE — 88305 TISSUE EXAM BY PATHOLOGIST: CPT | Performed by: PATHOLOGY

## 2024-11-01 PROCEDURE — 43239 EGD BIOPSY SINGLE/MULTIPLE: CPT | Performed by: INTERNAL MEDICINE

## 2024-11-01 PROCEDURE — 45385 COLONOSCOPY W/LESION REMOVAL: CPT | Performed by: INTERNAL MEDICINE

## 2024-11-01 RX ORDER — LIDOCAINE HYDROCHLORIDE 20 MG/ML
INJECTION, SOLUTION EPIDURAL; INFILTRATION; INTRACAUDAL; PERINEURAL AS NEEDED
Status: DISCONTINUED | OUTPATIENT
Start: 2024-11-01 | End: 2024-11-01

## 2024-11-01 RX ORDER — SODIUM CHLORIDE, SODIUM LACTATE, POTASSIUM CHLORIDE, CALCIUM CHLORIDE 600; 310; 30; 20 MG/100ML; MG/100ML; MG/100ML; MG/100ML
INJECTION, SOLUTION INTRAVENOUS CONTINUOUS PRN
Status: DISCONTINUED | OUTPATIENT
Start: 2024-11-01 | End: 2024-11-01

## 2024-11-01 RX ORDER — PROPOFOL 10 MG/ML
INJECTION, EMULSION INTRAVENOUS AS NEEDED
Status: DISCONTINUED | OUTPATIENT
Start: 2024-11-01 | End: 2024-11-01

## 2024-11-01 RX ADMIN — LIDOCAINE HYDROCHLORIDE 100 MG: 20 INJECTION, SOLUTION EPIDURAL; INFILTRATION; INTRACAUDAL at 10:13

## 2024-11-01 RX ADMIN — PROPOFOL 100 MCG/KG/MIN: 10 INJECTION, EMULSION INTRAVENOUS at 10:19

## 2024-11-01 RX ADMIN — PROPOFOL 100 MG: 10 INJECTION, EMULSION INTRAVENOUS at 10:13

## 2024-11-01 RX ADMIN — PROPOFOL 50 MG: 10 INJECTION, EMULSION INTRAVENOUS at 10:22

## 2024-11-01 RX ADMIN — SODIUM CHLORIDE, SODIUM LACTATE, POTASSIUM CHLORIDE, AND CALCIUM CHLORIDE: .6; .31; .03; .02 INJECTION, SOLUTION INTRAVENOUS at 10:00

## 2024-11-01 RX ADMIN — PROPOFOL 50 MG: 10 INJECTION, EMULSION INTRAVENOUS at 10:15

## 2024-11-01 NOTE — ANESTHESIA POSTPROCEDURE EVALUATION
Post-Op Assessment Note    CV Status:  Stable  Pain Score: 0    Pain management: adequate       Mental Status:  Sleepy and arousable   Hydration Status:  Stable   PONV Controlled:  Controlled   Airway Patency:  Patent     Post Op Vitals Reviewed: Yes    No anethesia notable event occurred.    Staff: CRNA           Last Filed PACU Vitals:  Vitals Value Taken Time   Temp 97 °F (36.1 °C) 11/01/24 1036   Pulse 77 11/01/24 1036   /59 11/01/24 1036   Resp 17 11/01/24 1036   SpO2 95 % 11/01/24 1036       Modified Lorin:  Activity: 2 (11/1/2024 10:36 AM)  Respiration: 2 (11/1/2024 10:36 AM)  Circulation: 2 (11/1/2024 10:36 AM)  Consciousness: 1 (11/1/2024 10:36 AM)  Oxygen Saturation: 2 (11/1/2024 10:36 AM)  Modified Lorin Score: 9 (11/1/2024 10:36 AM)

## 2024-11-01 NOTE — H&P
History and Physical - [facility]  Lola Spangler 74 y.o. female MRN: [id number]    HPI: Lola Spangler is a 74 y.o. year old female who presents for EGD and colonoscopy history of reflux and history of colon polyps      Review of Systems    Historical Information   Past Medical History:   Diagnosis Date    Abnormal ECG     Ankylosing spondylitis (HCC)     Anxiety     LAST ASSESSED: 16    Arthritis     Back pain     Carpal tunnel syndrome     Colon polyp     7 years ago with colonoscopy    Fibromyalgia     LAST ASSESSED: 16    Fibromyalgia, primary     Heme positive stool     LAST ASSESSED: 10/22/16    High cholesterol     Hyperlipidemia     under control since weight loss    Impingement syndrome of left shoulder     LAST ASSESSED: 17    Impingement syndrome of right shoulder     LAST ASSESSED:     Long term use of drug     LAST ASSESSED: 16    Low back pain     Myocardial infarction (HCC)     silent    No known health problems     NO PERTINENT PAST MEDICAL HX    Obesity     RLS (restless legs syndrome)     Rotator cuff injury     right    Spinal stenosis     Spinal stenosis     Spondylitis (HCC)     Spondyloarthritis     RESOLVED: 16    Vitamin D deficiency      Past Surgical History:   Procedure Laterality Date    BACK SURGERY      CARPAL TUNNEL RELEASE Left     CERVICAL CONIZATION   W/ LASER      CERVICAL CONIZATION     SECTION      COLONOSCOPY      DILATION AND CURETTAGE OF UTERUS      FL INJECTION RIGHT HIP (NON ARTHROGRAM)  2019    FL INJECTION RIGHT HIP (NON ARTHROGRAM)  2019    FRACTURE SURGERY      HAND SURGERY      JOINT REPLACEMENT      RTHR    ORTHOPEDIC SURGERY      KY ARTHRODESIS POSTERIOR/PSTLAT TQ 1NTRSPC LUMBAR N/A 2017    Procedure: L2-L5 OPEN DECOMPRESSIVE LUMBAR LAMINECTOMY W/ PEDICLE SCREW AND NILDA FIXATION FUSION (IMPULSE); REMOVAL OF SKIN TAG MID BACK;  Surgeon: Catracho Fields MD;  [facility];   Service: Neurosurgery    NM ARTHRP ACETBLR/PROX FEM PROSTC AGRFT/ALGRFT Right 11/07/2019    Procedure: ARTHROPLASTY HIP TOTAL Posterior;  Surgeon: Erich Warren MD;  [facility];  Service: Orthopedics    NM COLONOSCOPY FLX DX W/COLLJ SPEC WHEN PFRMD N/A 10/07/2016    Procedure: EGD AND COLONOSCOPY;  Surgeon: Nathan Pierson MD;  Location: [facility];  Service: Gastroenterology    NM NDSC WRST SURG W/RLS TRANSVRS CARPL LIGM Left 04/26/2018    Procedure: RELEASE CARPAL TUNNEL ENDOSCOPIC;  Surgeon: Bon Ferrer MD;  [facility];  Service: Orthopedics    NM NDSC WRST SURG W/RLS TRANSVRS CARPL LIGM Right 06/14/2018    Procedure: RELEASE CARPAL TUNNEL ENDOSCOPIC;  Surgeon: Bon Ferrer MD;  [facility];  Service: Orthopedics    NM RPR AA HERNIA 1ST < 3 CM REDUCIBLE N/A 7/12/2024    Procedure: REPAIR HERNIA UMBILICAL;  Surgeon: Lazaro Moser MD;  [facility];  Service: General    NM RPR AA HERNIA 1ST < 3 CM REDUCIBLE N/A 7/12/2024    Procedure: REPAIR HERNIA VENTRAL;  Surgeon: Lazaro Moser MD;  [facility];  Service: General    RETINAL LASER PROCEDURE      SPINE SURGERY      TUBAL LIGATION       Social History   Social History     Substance and Sexual Activity   Alcohol Use Yes    Comment: An occasional glass of wine     Social History     Substance and Sexual Activity   Drug Use Never     Social History     Tobacco Use   Smoking Status Never   Smokeless Tobacco Never     Family History   Problem Relation Age of Onset    Arthritis Mother     Breast cancer Mother 72    Hypertension Mother     Heart attack Mother         MYOCARDIAL INFARCTION    Heart disease Mother     Heart attack Father     Hypertension Father     Stroke Father         CEREBROVASCUALR ACCIDENT (CVA)    Heart disease Father     Arthritis Sister     Uterine cancer Sister     Endometrial cancer Sister 60    Hypertension Sister     Cancer Sister         Cervical and uterine    Heart  "attack Brother     Prostate cancer Brother 62    Arthritis Brother     Melanoma Brother     Cancer Brother         Melanoma    Heart disease Brother     Arthritis Family     Hyperlipidemia Family     Depression Son     Breast cancer Maternal Aunt 40    No Known Problems Daughter     No Known Problems Maternal Grandmother     No Known Problems Maternal Grandfather     No Known Problems Paternal Grandmother     No Known Problems Paternal Grandfather     No Known Problems Brother     Other Brother         hunting accident (shot)    Melanoma Brother     Prostate cancer Brother     Heart attack Brother     Stroke Brother     Hypertension Brother     Arthritis Sister     Heart attack Sister     No Known Problems Son     No Known Problems Son     Lung cancer Maternal Uncle     Breast cancer additional onset Other 52    Uterine cancer Other        Meds/Allergies     Not in a hospital admission.    No Known Allergies    Objective     /68   Pulse 81 Comment: sr  Temp (!) 97.3 °F (36.3 °C) (Temporal)   Resp 18   Ht 5' 1\" (1.549 m)   Wt 85.3 kg (188 lb)   LMP  (LMP Unknown)   SpO2 94%   BMI 35.52 kg/m²       PHYSICAL EXAM    Gen: NAD  CV: RRR  CHEST: Clear  ABD: soft, NT/ND  EXT: no edema  Neuro: AAO      ASSESSMENT/PLAN:  This is a 74 y.o. year old female here for EGD and colonoscopy    PLAN:   Procedure: EGD and colonoscopy with biopsy and possible polypectomy      "

## 2024-11-01 NOTE — ANESTHESIA PREPROCEDURE EVALUATION
Procedure:  COLONOSCOPY  EGD    Relevant Problems   CARDIO   (+) Hyperlipidemia      MUSCULOSKELETAL   (+) Chronic bilateral low back pain   (+) Lumbar spondylosis   (+) Primary osteoarthritis of right knee   (+) Spondylosis      NEURO/PSYCH   (+) Chronic bilateral low back pain   (+) Depression            RLS (restless legs syndrome)  Spondylitis (HCC)    Ankylosing spondylitis (HCC)        IMPRESSIONS: 1. Negative pharmacologic stress test with regadenoson for symptoms of angina pectoris and negative for ECG evidence of ischemia.  2. Tomographic perfusion series consistent with normal perfusion without definite ischemia or prior infarction.  3. Normal left ventricular cavity size with normal left ventricular systolic function and wall motion. EF determined as 67 %.  4. Normal resting blood pressure and appropriate blood pressure response noted during the stress test.  5. No chest pain was reported during the stress test.  6. Baseline sinus bradycardia rhythm with occasional isolated premature ventricular contractions noted during the stress test.     Prepared and signed by     Doyle Castaneda MD  Signed 11/01/2019 12:41:49       Physical Exam    Airway    Mallampati score: II  TM Distance: >3 FB  Neck ROM: full     Dental       Cardiovascular      Pulmonary      Other Findings  post-pubertal.      Anesthesia Plan  ASA Score- 3     Anesthesia Type- IV sedation with anesthesia with ASA Monitors.         Additional Monitors:     Airway Plan:            Plan Factors-Exercise tolerance (METS): >4 METS.    Chart reviewed. EKG reviewed. Imaging results reviewed. Existing labs reviewed. Patient summary reviewed.                  Induction-     Postoperative Plan-     Perioperative Resuscitation Plan - Level 1 - Full Code.       Informed Consent- Anesthetic plan and risks discussed with patient.  I personally reviewed this patient with the CRNA. Discussed and agreed on the Anesthesia Plan with the CRNA..

## 2024-11-01 NOTE — ANESTHESIA POSTPROCEDURE EVALUATION
Post-Op Assessment Note    CV Status:  Stable  Pain Score: 0    Pain management: adequate       Mental Status:  Sleepy and arousable   Hydration Status:  Stable   PONV Controlled:  Controlled   Airway Patency:  Patent     Post Op Vitals Reviewed: Yes    No anethesia notable event occurred.    Staff: CRNA, Anesthesiologist           Last Filed PACU Vitals:  Vitals Value Taken Time   Temp 97 °F (36.1 °C) 11/01/24 1036   Pulse 77 11/01/24 1036   /59 11/01/24 1036   Resp 17 11/01/24 1036   SpO2 95 % 11/01/24 1036       Modified Lorin:  Activity: 2 (11/1/2024 10:51 AM)  Respiration: 2 (11/1/2024 10:51 AM)  Circulation: 2 (11/1/2024 10:51 AM)  Consciousness: 2 (11/1/2024 10:51 AM)  Oxygen Saturation: 2 (11/1/2024 10:51 AM)  Modified Lorin Score: 10 (11/1/2024 10:51 AM)

## 2024-11-05 PROCEDURE — 88305 TISSUE EXAM BY PATHOLOGIST: CPT | Performed by: PATHOLOGY

## 2024-11-17 ENCOUNTER — RESULTS FOLLOW-UP (OUTPATIENT)
Dept: GASTROENTEROLOGY | Facility: CLINIC | Age: 74
End: 2024-11-17

## 2024-12-05 DIAGNOSIS — M51.369 LUMBAR DEGENERATIVE DISC DISEASE: ICD-10-CM

## 2024-12-05 RX ORDER — GABAPENTIN 600 MG/1
600 TABLET ORAL
Qty: 90 TABLET | Refills: 0 | OUTPATIENT
Start: 2024-12-05

## 2024-12-05 NOTE — TELEPHONE ENCOUNTER
S/w pt. Per pt her PCP has been prescribing her gabapentin. Pt plans to follow up with her PCP for her 600 mg dose refill of gabapentin. Pt will call back if she needs further assistance.

## 2024-12-05 NOTE — TELEPHONE ENCOUNTER
Reason for call:   [x] Refill   [] Prior Auth  [] Other:     Office:   [] PCP/Provider -   [x] Specialty/Provider -   Ordering Department: [facility]  Authorized By: Chelsey Khalil PA-C    Medication:  gabapentin (Neurontin) 600 MG tablet    Dose/Frequency: Take 1 tablet (600 mg total) by mouth daily at bedtime     Quantity: 90    Pharmacy: [facility]/pharmacy #[id number] - [place], [place] - [address] [phone]    Does the patient have enough for 3 days?   [] Yes   [x] No - Send as HP to POD

## 2024-12-06 DIAGNOSIS — M51.369 LUMBAR DEGENERATIVE DISC DISEASE: ICD-10-CM

## 2024-12-06 RX ORDER — GABAPENTIN 600 MG/1
600 TABLET ORAL
Qty: 90 TABLET | Refills: 3 | Status: SHIPPED | OUTPATIENT
Start: 2024-12-06

## 2024-12-06 NOTE — TELEPHONE ENCOUNTER
Patient would like a call when this gets sent to the pharmacy or if there are any issues. Out of medication      Reason for call:   [] Refill   [] Prior Auth  [] Other:     Office:   [x] PCP/Provider - [facility]  [] Specialty/Provider -     Medication: Gabapentin    Dose/Frequency: 600 mg     Quantity: #90    Pharmacy: [facility] [address]    Does the patient have enough for 3 days?   [] Yes   [x] No - Send as HP to POD

## 2024-12-30 DIAGNOSIS — G47.9 SLEEP DISTURBANCE: Primary | ICD-10-CM

## 2024-12-30 RX ORDER — CLONAZEPAM 0.5 MG/1
0.5 TABLET ORAL
Qty: 30 TABLET | Refills: 0 | Status: SHIPPED | OUTPATIENT
Start: 2024-12-30

## 2024-12-30 NOTE — TELEPHONE ENCOUNTER
Medication has not yet been filled by the PCP    Reason for call:   [x] Refill   [] Prior Auth  [] Other:     Office:   [x] PCP/Provider -   [] Specialty/Provider -     Medication:   - Clonazepam (Klonopin) 0.5mg- take 0.5 mg by mouth daily at bedtime      Pharmacy: [facility] [place]    Does the patient have enough for 3 days?   [] Yes   [x] No - Send as HP to POD

## 2025-01-03 ENCOUNTER — RA CDI HCC (OUTPATIENT)
Dept: OTHER | Facility: HOSPITAL | Age: 75
End: 2025-01-03

## 2025-01-03 PROBLEM — Z12.31 ENCOUNTER FOR SCREENING MAMMOGRAM FOR BREAST CANCER: Status: RESOLVED | Noted: 2023-11-28 | Resolved: 2025-01-03

## 2025-01-03 PROBLEM — Z12.31 ENCOUNTER FOR SCREENING MAMMOGRAM FOR MALIGNANT NEOPLASM OF BREAST: Status: RESOLVED | Noted: 2019-05-21 | Resolved: 2025-01-03

## 2025-01-10 ENCOUNTER — TELEPHONE (OUTPATIENT)
Age: 75
End: 2025-01-10

## 2025-01-10 ENCOUNTER — OFFICE VISIT (OUTPATIENT)
Age: 75
End: 2025-01-10
Payer: MEDICARE

## 2025-01-10 VITALS
TEMPERATURE: 97.7 F | WEIGHT: 194.2 LBS | SYSTOLIC BLOOD PRESSURE: 128 MMHG | HEART RATE: 88 BPM | OXYGEN SATURATION: 95 % | DIASTOLIC BLOOD PRESSURE: 78 MMHG | HEIGHT: 61 IN | BODY MASS INDEX: 36.67 KG/M2

## 2025-01-10 DIAGNOSIS — G47.30 SLEEP APNEA, UNSPECIFIED TYPE: ICD-10-CM

## 2025-01-10 DIAGNOSIS — E78.49 OTHER HYPERLIPIDEMIA: ICD-10-CM

## 2025-01-10 DIAGNOSIS — Z13.1 SCREENING FOR DIABETES MELLITUS: ICD-10-CM

## 2025-01-10 DIAGNOSIS — R39.9 UTI SYMPTOMS: ICD-10-CM

## 2025-01-10 DIAGNOSIS — Z13.0 SCREENING FOR DEFICIENCY ANEMIA: ICD-10-CM

## 2025-01-10 DIAGNOSIS — Z12.31 ENCOUNTER FOR SCREENING MAMMOGRAM FOR MALIGNANT NEOPLASM OF BREAST: ICD-10-CM

## 2025-01-10 DIAGNOSIS — F32.A DEPRESSION, UNSPECIFIED DEPRESSION TYPE: ICD-10-CM

## 2025-01-10 DIAGNOSIS — R07.9 CHEST PAIN, UNSPECIFIED TYPE: Primary | ICD-10-CM

## 2025-01-10 DIAGNOSIS — G62.9 NEUROPATHY: ICD-10-CM

## 2025-01-10 PROCEDURE — G0438 PPPS, INITIAL VISIT: HCPCS | Performed by: INTERNAL MEDICINE

## 2025-01-10 PROCEDURE — 99214 OFFICE O/P EST MOD 30 MIN: CPT | Performed by: INTERNAL MEDICINE

## 2025-01-10 PROCEDURE — 93000 ELECTROCARDIOGRAM COMPLETE: CPT | Performed by: INTERNAL MEDICINE

## 2025-01-10 NOTE — TELEPHONE ENCOUNTER
"Hello,    The following message was sent via e-mail to the leadership team:     Please advise if you can help facilitate the following overbook request:    Patient Name: Lola Spangler    Patient MRN: [id number]    Call back #: [phone]    Insurance: Medicare A & B    Department:Cardiology    Speciality: General Cardiology    Reason for overbook request: PROVIDER REQUEST    Comments (Write \"N/a\" if no comments): Patient has an ASAP referral to cardiology for chest pain. Dr. Clay Montesinos's office called to request for the patient to be seen within the next two weeks.     Requested doctor and location:Dr. Edwards     Date of current appointment: 2/20/25      Thank you.    "

## 2025-01-10 NOTE — TELEPHONE ENCOUNTER
Patient returned Rachel Narvaez's call regarding a sooner appointment. I tried to get Rachel on the phone, but was unsuccessful. Please call the patient at your earliest convenience.     Call Back #: [phone]

## 2025-01-10 NOTE — ASSESSMENT & PLAN NOTE
Evaluation of depression symptoms indicates adequate control continue current medication paroxetine 20 mg daily depression Screening Follow-up Plan: Patient's depression screening was positive with a PHQ-9 score of 6. Patient with underlying depression and was advised to continue current medications as prescribed.

## 2025-01-10 NOTE — PROGRESS NOTES
Name: Lola Spangler      : 1950      MRN: [id number]  Encounter Provider: Clay Montesinos MD  Encounter Date: 1/10/2025   Encounter department: [facility] INTERNAL MEDICINE    Assessment & Plan  Chest pain, unspecified type  Chest pain somewhat suggestive of underlying coronary artery disease electrocardiogram today shows poor R wave progression over the anterior leads.  I have indicated to the patient I like her to have a cardiology consultation.  Given her neuropathy issues it is unlikely that she would be able to walk any distance on a treadmill.  Will asked cardiology to evaluate further    Orders:    POCT ECG    Ambulatory Referral to Cardiology; Future    Sleep apnea, unspecified type    Orders:    Ambulatory Referral to Sleep Medicine; Future    Screening for deficiency anemia    Orders:    CBC and differential; Future    Screening for diabetes mellitus    Orders:    Comprehensive metabolic panel; Future    Other hyperlipidemia  Lipid profile has been requested for review recommend continuation of low-cholesterol diet will review lipid profile with the patient upon completion    Orders:    Lipid panel; Future    UTI symptoms    Orders:    UA w Reflex to Microscopic w Reflex to Culture; Future    Encounter for screening mammogram for malignant neoplasm of breast    Orders:    Mammo screening bilateral w 3d and cad; Future    Neuropathy  Continue current dosing of gabapentin for control of neuropathy.         Depression, unspecified depression type  Evaluation of depression symptoms indicates adequate control continue current medication paroxetine 20 mg daily depression Screening Follow-up Plan: Patient's depression screening was positive with a PHQ-9 score of 6. Patient with underlying depression and was advised to continue current medications as prescribed.            Preventive health issues were discussed with patient, and age appropriate screening tests were ordered as noted in  patient's After Visit Summary. Personalized health advice and appropriate referrals for health education or preventive services given if needed, as noted in patient's After Visit Summary.    History of Present Illness     This pleasant 74-year-old female patient presents to our office today for her annual physical examination.  She relates that she has been experiencing episodes of chest pain and nausea sometimes radiating into the jaw area.  These are related to physical activity or exertion.  Relieved with rest.    She also has been experiencing an increase in snoring and daytime fatigue symptoms.       Patient Care Team:  Clay Montesinos MD as PCP - General  MD Catracho Salgado MD Richard Baker, MD Brian Fellechner, DO Nathan Pierson MD as Endoscopist    Review of Systems   Constitutional:  Positive for fatigue.   Respiratory:  Positive for chest tightness.         Snoring   Gastrointestinal:  Positive for nausea.   All other systems reviewed and are negative.    Medical History Reviewed by provider this encounter:  Meds  Problems       Annual Wellness Visit Questionnaire   Lola is here for her Subsequent Wellness visit. Last Medicare Wellness visit information reviewed, patient interviewed, no change since last AWV.     Health Risk Assessment:   Patient rates overall health as good. Patient feels that their physical health rating is slightly worse. Patient is satisfied with their life. Eyesight was rated as same. Hearing was rated as same. Patient feels that their emotional and mental health rating is slightly worse. Patients states they are sometimes angry. Patient states they are often unusually tired/fatigued. Pain experienced in the last 7 days has been some. Patient's pain rating has been 5/10. Patient states that she has experienced no weight loss or gain in last 6 months.     Depression Screening:   PHQ-9 Score: 6      Fall Risk Screening:   In the past year,  patient has experienced: history of falling in past year      Urinary Incontinence Screening:   Patient has leaked urine accidently in the last six months.     Home Safety:  Patient has trouble with stairs inside or outside of their home. Patient has working smoke alarms and has working carbon monoxide detector. Home safety hazards include: loose rugs on the floor.     Nutrition:   Current diet is Regular and Limited junk food.     Medications:   Patient is not currently taking any over-the-counter supplements. Patient is able to manage medications.     Activities of Daily Living (ADLs)/Instrumental Activities of Daily Living (IADLs):   Walk and transfer into and out of bed and chair?: Yes  Dress and groom yourself?: Yes    Bathe or shower yourself?: Yes    Feed yourself? Yes  Do your laundry/housekeeping?: Yes  Manage your money, pay your bills and track your expenses?: Yes  Make your own meals?: Yes    Do your own shopping?: Yes    ADL comments: Need some help with house  work    Previous Hospitalizations:   Any hospitalizations or ED visits within the last 12 months?: No      Advance Care Planning:   Living will: No    Durable POA for healthcare: No    Advanced directive: No      PREVENTIVE SCREENINGS      Cardiovascular Screening:    General: Screening Not Indicated and History Lipid Disorder      Diabetes Screening:     General: Screening Current      Colorectal Cancer Screening:     General: Screening Current      Cervical Cancer Screening:    General: Screening Not Indicated      Lung Cancer Screening:     General: Screening Not Indicated      Hepatitis C Screening:    General: Screening Current    Screening, Brief Intervention, and Referral to Treatment (SBIRT)    Screening  Typical number of drinks in a day: 0  Typical number of drinks in a week: 0  Interpretation: Low risk drinking behavior.    AUDIT-C Screenin) How often did you have a drink containing alcohol in the past year? monthly or less  2)  "How many drinks did you have on a typical day when you were drinking in the past year? 0  3) How often did you have 6 or more drinks on one occasion in the past year? never    AUDIT-C Score: 1  Interpretation: Score 0-2 (female): Negative screen for alcohol misuse    Single Item Drug Screening:  How often have you used an illegal drug (including marijuana) or a prescription medication for non-medical reasons in the past year? never    Single Item Drug Screen Score: 0  Interpretation: Negative screen for possible drug use disorder    Social Drivers of Health     Financial Resource Strain: Low Risk  (1/3/2024)    Overall Financial Resource Strain (CARDIA)     Difficulty of Paying Living Expenses: Not hard at all   Food Insecurity: No Food Insecurity (1/4/2025)    Hunger Vital Sign     Worried About Running Out of Food in the Last Year: Never true     Ran Out of Food in the Last Year: Never true   Transportation Needs: No Transportation Needs (1/4/2025)    PRAPARE - Transportation     Lack of Transportation (Medical): No     Lack of Transportation (Non-Medical): No   Housing Stability: Low Risk  (1/4/2025)    Housing Stability Vital Sign     Unable to Pay for Housing in the Last Year: No     Number of Times Moved in the Last Year: 0     Homeless in the Last Year: No   Utilities: Not At Risk (1/4/2025)    [facility] Utilities     Threatened with loss of utilities: No     No results found.    Objective   /78   Pulse 88   Temp 97.7 °F (36.5 °C) (Tympanic)   Ht 5' 1\" (1.549 m)   Wt 88.1 kg (194 lb 3.2 oz)   LMP  (LMP Unknown)   SpO2 95%   BMI 36.69 kg/m²     Physical Exam  Vitals and nursing note reviewed.   Constitutional:       General: She is not in acute distress.     Appearance: She is well-developed.   HENT:      Head: Normocephalic and atraumatic.      Right Ear: Tympanic membrane, ear canal and external ear normal.      Left Ear: Tympanic membrane, ear canal and external ear normal.      Nose: Nose normal.    "   Mouth/Throat:      Mouth: Mucous membranes are moist.      Pharynx: Oropharynx is clear.   Eyes:      Conjunctiva/sclera: Conjunctivae normal.      Pupils: Pupils are equal, round, and reactive to light.   Neck:      Vascular: No carotid bruit.   Cardiovascular:      Rate and Rhythm: Normal rate and regular rhythm.      Heart sounds: Normal heart sounds. No murmur heard.  Pulmonary:      Effort: Pulmonary effort is normal. No respiratory distress.      Breath sounds: Normal breath sounds. No wheezing, rhonchi or rales.   Abdominal:      General: Bowel sounds are normal. There is no distension.      Palpations: Abdomen is soft. There is no mass.      Tenderness: There is no abdominal tenderness. There is no guarding.   Musculoskeletal:      Cervical back: Normal range of motion and neck supple. No rigidity or tenderness.      Right lower leg: No edema.      Left lower leg: No edema.   Lymphadenopathy:      Cervical: No cervical adenopathy.   Skin:     General: Skin is warm and dry.      Capillary Refill: Capillary refill takes less than 2 seconds.   Neurological:      General: No focal deficit present.      Mental Status: She is alert. Mental status is at baseline.   Psychiatric:         Mood and Affect: Mood normal.         Behavior: Behavior normal.         Thought Content: Thought content normal.         Judgment: Judgment normal.

## 2025-01-10 NOTE — ASSESSMENT & PLAN NOTE
Lipid profile has been requested for review recommend continuation of low-cholesterol diet will review lipid profile with the patient upon completion    Orders:    Lipid panel; Future

## 2025-01-10 NOTE — ASSESSMENT & PLAN NOTE
Chest pain somewhat suggestive of underlying coronary artery disease electrocardiogram today shows poor R wave progression over the anterior leads.  I have indicated to the patient I like her to have a cardiology consultation.  Given her neuropathy issues it is unlikely that she would be able to walk any distance on a treadmill.  Will asked cardiology to evaluate further    Orders:    POCT ECG    Ambulatory Referral to Cardiology; Future

## 2025-01-14 ENCOUNTER — OFFICE VISIT (OUTPATIENT)
Dept: SLEEP CENTER | Facility: CLINIC | Age: 75
End: 2025-01-14
Payer: MEDICARE

## 2025-01-14 ENCOUNTER — CONSULT (OUTPATIENT)
Age: 75
End: 2025-01-14

## 2025-01-14 VITALS
BODY MASS INDEX: 36.78 KG/M2 | DIASTOLIC BLOOD PRESSURE: 84 MMHG | SYSTOLIC BLOOD PRESSURE: 118 MMHG | TEMPERATURE: 98.1 F | OXYGEN SATURATION: 94 % | WEIGHT: 194.8 LBS | HEIGHT: 61 IN | HEART RATE: 70 BPM

## 2025-01-14 VITALS
OXYGEN SATURATION: 96 % | DIASTOLIC BLOOD PRESSURE: 84 MMHG | SYSTOLIC BLOOD PRESSURE: 120 MMHG | BODY MASS INDEX: 36.44 KG/M2 | WEIGHT: 193 LBS | HEIGHT: 61 IN | HEART RATE: 104 BPM

## 2025-01-14 DIAGNOSIS — Z79.899 CHRONIC PRESCRIPTION BENZODIAZEPINE USE: ICD-10-CM

## 2025-01-14 DIAGNOSIS — M45.9 ANKYLOSING SPONDYLITIS, UNSPECIFIED SITE OF SPINE (HCC): ICD-10-CM

## 2025-01-14 DIAGNOSIS — M15.0 PRIMARY GENERALIZED (OSTEO)ARTHRITIS: ICD-10-CM

## 2025-01-14 DIAGNOSIS — I20.9 ANGINA PECTORIS (HCC): ICD-10-CM

## 2025-01-14 DIAGNOSIS — B02.29 POST HERPETIC NEURALGIA: ICD-10-CM

## 2025-01-14 DIAGNOSIS — M79.7 FIBROMYALGIA: ICD-10-CM

## 2025-01-14 DIAGNOSIS — G25.81 RESTLESS LEG SYNDROME: Primary | ICD-10-CM

## 2025-01-14 DIAGNOSIS — Z15.89 HLA B27 POSITIVE: Primary | ICD-10-CM

## 2025-01-14 DIAGNOSIS — M75.42 IMPINGEMENT SYNDROME OF BOTH SHOULDERS: ICD-10-CM

## 2025-01-14 DIAGNOSIS — G47.19 EXCESSIVE DAYTIME SLEEPINESS: ICD-10-CM

## 2025-01-14 DIAGNOSIS — G47.30 SLEEP APNEA, UNSPECIFIED TYPE: ICD-10-CM

## 2025-01-14 DIAGNOSIS — M47.816 LUMBAR SPONDYLOSIS: ICD-10-CM

## 2025-01-14 DIAGNOSIS — F51.04 CHRONIC INSOMNIA: ICD-10-CM

## 2025-01-14 DIAGNOSIS — E83.10 DISORDER OF IRON METABOLISM: ICD-10-CM

## 2025-01-14 DIAGNOSIS — Z72.820 SLEEP DEPRIVATION: ICD-10-CM

## 2025-01-14 DIAGNOSIS — M08.80 ENTHESITIS RELATED ARTHRITIS (HCC): ICD-10-CM

## 2025-01-14 DIAGNOSIS — M54.16 CHRONIC RIGHT-SIDED LUMBAR RADICULOPATHY: ICD-10-CM

## 2025-01-14 DIAGNOSIS — M75.41 IMPINGEMENT SYNDROME OF BOTH SHOULDERS: ICD-10-CM

## 2025-01-14 PROCEDURE — 99204 OFFICE O/P NEW MOD 45 MIN: CPT | Performed by: PSYCHIATRY & NEUROLOGY

## 2025-01-14 NOTE — Clinical Note
Hello-thanks for referring this patient.  She has significant sleepiness, we discussed that clonazepam could be contributing.  I would recommend reducing the dose to 0.25 mg, with a plan to eventually taper off to see if that improves her sleepiness.  I see that you prescribe this medication, do want to make this adjustment?  If it is prescribed only for sleep, I can also take over management of this and try to taper her off the medication.  She was in agreement with this plan

## 2025-01-14 NOTE — PATIENT INSTRUCTIONS
Get the lab work that I ordered when you go for the other lab work that you are getting.  Make sure they do not do the same labs twice.  I would discontinue the Mobic and start arthritis strength Tylenol 650 mg tablets, 2 tablets every 12 hours.  That should be of significant benefit for you.  I would encourage you to consider the anti-inflammatory diet which will help many of your symptoms, particularly fibromyalgia.  In addition I would decrease insulin resistance and promote weight reduction.  It would be of benefit for many of the things you are being treated for.  I would suggest you go to [facility] for evaluation of your shoe gear.  Ask them to give you metatarsal bars which will make your toes more comfortable.  I will be in touch with you to review the results of your lab work.  I do not think you have ankylosing spondylitis, however, with your history of multiple episodes of plantar fasciitis in the past, you may have enthesitis related arthritis, which has come down obviously.

## 2025-01-14 NOTE — PROGRESS NOTES
Name: Lola Spangler      : 1950      MRN: [id number]  Encounter Provider: Clay Padilla MD  Encounter Date: 2025   Encounter department: [facility]  :  Assessment & Plan  Excessive daytime sleepiness  -I am concerned about her level of sleepiness, this may be multifactorial.  Certainly must consider obstructive sleep apnea, she has risk factors and symptoms such as snoring and sleepiness which suggest this.  To assess, I would like to have a diagnostic polysomnogram.  I think a home test would be too limited.  She is in agreement  -She is prescribed clonazepam, this too can be sedating, especially with use of gabapentin in an individual over 65 years old.  I will discuss with her primary care physician, would recommend potentially reducing and tapering off of clonazepam if possible.  -If obstructive sleep apnea is identified, likely would consider treatment with CPAP.  We did not discuss treatment options in detail today as there was a lot going on so I will see her back in a follow-up visit after her test  Orders:  •  Diagnostic Sleep Study; Future    Restless leg syndrome  -She describes symptoms which seem to fit restless leg syndrome.  Gabapentin is first line but potentially may work better if she takes a higher dose prior to symptom onset which is around 8 PM.  -Would like to exclude iron deficiency as a contributor.  Last iron panel was checked 5 years ago and was acceptable but should be reassessed.  Orders:  •  Iron Panel (Includes Ferritin, Iron Sat%, Iron, and TIBC); Future    Chronic insomnia  -She has some insomnia symptoms which are multifactorial, pain and other sleep disorders may be contributing.       Chronic prescription benzodiazepine use  -Would be ideal to lower clonazepam to 0.25 mg, taken all that earlier in the evening with a plan to eventually taper to see if that improves her sleepiness       Disorder of iron metabolism    Orders:  •  Iron Panel  "(Includes Ferritin, Iron Sat%, Iron, and TIBC); Future        History of Present Illness   HPI          Patient seen with Zakiya Angeles D.O.- resident physician     This is a 73yo F with hx including obesity (BMI 36.47), depression (PHQ9 = 6), RLS, vit D deficiency, lumbar spinal stenosis at multiple levels who presents for evaluation of sleep apnea due to increased snoring and daytime fatigue symptoms. Current medications include Gabapentin 200mg TID and 600mg QHS, Clonazepam 0.5mg QHS, Vit D3 2000IU daily, Paroxetine 20mg daily. Denies respiratory issues or heart failure. Currently seeing a cardiologist for angina. . She is a retired nurse.  Lives with her      CC = \"I have trouble sleeping at night. I snore a lot. I talk in my sleep.\"    Lola describes that she does not sleep well, moves a lot in sleep. Has had worsening symptoms the past few years     She was diagnosed with RLS several years ago.  Prescribed clonazepam 0.5 mg by rheumatology initially and gabapentin by pain management    She take clonazepam 0.5 mg at 10 pm  Also takes gabapentin 600 mg at 10 pm  Also takes takes gabapentin 200 mg 10 am, 2 pm,. 6 pm for post- herpetic neuralgia    Has a feeling of crawling in her legs. Has to keep moving. Movement helps with this.        Weekdays: Sleep 12am (not always sleepy then- uses her phone to try to help her fall asleep ), fall asleep 2am, wake 9am, nap 2pm & 8pm, avg hrs 5-6 hours  Weekends: Sleep 12am, fall asleep 1am, wake 7:30am, nap 2pm & 8pm, avg hrs 4-5 hours (wakes early to go to [employer])   Gets up to urinate 3-4 times to urinate and pain in left foot. Often falls back asleep without difficulty.    Has been drinking water close to bedtime and is trying to change this.  Feels like she gets her best sleep from 530-9 am    Weight changes: 25lbs gain over 1-1.5 years    Naps- twice a day for 30 minutes.  Also dozes.  Has had drowsy driving.  Feels she can control staying awake.   Has not fallen " "asleep at a light in years and now can sense it.      Sleep issues: Issues with falling asleep & staying asleep. Snoring, years. Wakes self snoring sometimes  . Has fallen asleep frequently while talking on the phone.  Bruxism: Yes, doesn't use a mouthguard. Follows with a dentist.   Daytime sleepiness: Yes  GERD: Occasionally  Anxiety/Rumination: Yes  Caffeine: 12oz daily in the morning, sometimes at lunch  Smoking: Never  Alcohol: Occasional glass of wine    Hallucinations: None  Paralysis: None  Cataplexy: None    RLS: Yes, years. Gabapentin helps, but still bothersome.  PLD: States  states moves legs a lot while sleeping.  Last iron-  10/15/19 - Iron Sat 23%, ferritin 134.  Sleep talk. No history of sleep walking. No dream enactment     Occupation: Retired  Drives: Yes  Accidents: None    Past tx: Klonopin. Has tried melatonin and magnesium, but short-lived impact.  Past sleep studies: None    [place] sleepiness scale:  In the following situations, what is your likelihood of falling asleep or dozing?  0 = would never doze or sleep  1 = slight chance of dozing or sleeping  2 = moderate chance of dozing or sleeping  3 = high chance of dozing or sleeping    Sitting and reading = 3  Watching TV = 3  Sitting inactive in a public place = 3  Being a passenger in a car for an hour = 3  Lying down in the afternoon = 2  Sitting & talking to someone = 2  Sitting quietly after lunch (when you've had no alcohol) = 3  Stopping for a few minutes in traffic while driving = 1    Total [place] score = 20    Review of Systems  Pertinent positives/negatives included in HPI and also as noted:       Objective   /84   Pulse 104   Ht 5' 1\" (1.549 m)   Wt 87.5 kg (193 lb)   LMP  (LMP Unknown)   SpO2 96%   BMI 36.47 kg/m²        Physical Exam    Mallampati Grade : Mallampati 3-4        Soft palate and uvula : Normal soft palate  Hard Palate : normal                 Appearance : no distress, alert, cooperative  Mental " Status. Memory, and Affect : alert and oriented, normal affect, makes good eye contact, provides a detailed history  Cardiac- : regular rate and rhythm, S1, S2 normal, no murmur, click, rub or gallop  Pulmonary : clear to auscultation bilaterally            Data  Lab Results   Component Value Date    HGB 13.4 07/03/2024    HCT 42.5 07/03/2024    MCV 90 07/03/2024      Lab Results   Component Value Date    GLUCOSE 89 11/28/2015    CALCIUM 9.2 07/03/2024     11/28/2015    K 4.2 07/03/2024    CO2 28 07/03/2024     07/03/2024    BUN 24 07/03/2024    CREATININE 0.74 07/03/2024     Lab Results   Component Value Date    IRON 57 10/15/2019    TIBC 246 (L) 10/15/2019    FERRITIN 134 10/15/2019     Lab Results   Component Value Date    AST 19 07/03/2024    ALT 15 07/03/2024

## 2025-01-14 NOTE — PATIENT INSTRUCTIONS
Thank you for trusting me with your care!    I know we often  cover a lot of information at the visit, so if you have follow-up questions, are unclear about the plan, or feel there were important items that we did not discuss or you did not receive clarity on, please don't hesitate to reach out to me.     MyChart messages are preferred for routine matters.  Please make sure to call for urgent matters as there can be a delay in responding to questions over [facility].    It is very important to avoid driving while drowsy, this can be very dangerous or even cause serious injury or death.  If sleepy, it is not safe to get behind the wheel.  If you are driving and feels sleepy, it is very important to pull over right away.  Even losing control of the car for a split second can be deadly.  If you feel you cannot control when sleepiness occurs and cannot prevented, it is important to not drive at all until this improves.  Please let me know if you experience this as it is very important.     IMPORTANT- Prior to a sleep study (at home or in the sleep lab), I strongly recommend contacting your medical insurance first to understand your benefits (including deductible if applicable), coverage for this test, and out of pocket costs.  Even if the test is approved by medical insurance, the cost to you is determined by your medical benefits.   If you have concerns about your out of pocket costs for sleep testing, please contact me/the office before you complete the test and we can discuss if there are alternate options.     I recommend following this advice in general before any lab test, imaging test, doctor visit, surgery, or ordering CPAP supplies as it is best to understand your coverage to avoid unexpected bills after the fact.       Nursing Support:  When: Monday through Friday 7:30A-4:30PM except holidays  Where: Our direct line is [phone]  *3  *1.      If you are having a true emergency please call 911.  In the event  that the line is busy or it is after hours please leave a voice message and we will return your call.  Please speak clearly, leaving your full name, birth date, best number to reach you and the reason for your call.   Medication refills: We will need the name of the medication, the dosage, the ordering provider, whether you get a 30 or 90 day refill, and the pharmacy name and address.  Medications will be ordered by the provider only.  Nurses cannot call in prescriptions.  Please allow 7 days for medication refills.  Physician requested updates: If your provider requested that you call with an update after starting medication, please be ready to provide us the medication and dosage, what time you take your medication, the time you attempt to fall asleep, time you fall asleep, when you wake up, and what time you get out of bed.  Sleep Study Results: We will contact you with sleep study results and/or next steps after the physician has reviewed your testing.

## 2025-01-14 NOTE — PROGRESS NOTES
Assessment and Plan:   Patient with history of HLA-B27 positive ankylosing spondylitis, with no clinical evidence for ankylosing spondylitis by history, physical findings, or prior imaging.  She does not have inflammatory back pain or sacroiliitis, however, she does have history of recurrent plantar fasciitis currently quiescent.  Rule out enthesitis related arthritis with no current symptoms.  No history of Psoriasis, uveitis, ulcerative colitis or Crohn's disease.  Primary generalized osteoarthritis with advanced DJD right knee.  She does have history of right total hip arthroplasty.  Bilateral shoulder impingement with some limitation of motion with no current pain.  Lumbar spondylosis status post multilevel decompressive laminectomy with hardware with chronic right lower extremity radiculopathy postop.  History of fibromyalgia treated by prior rheumatologist with nonsteroidals.  History of sleep disturbance with restless leg syndrome, snoring, and daytime somnolence.  Rule out sleep apnea.  History of restless leg syndrome.  History of nocturia and mild urinary stress incontinence.  Rule out overactive bladder.  History of mild reflux likely secondary to hiatal hernia.  Recent history of chest pain radiating into the neck and jaw with associated nausea.  Rule out angina pectoris.  Rule out coronary artery disease.  History of postherpetic neuralgia.  High BMI.    Plan:  Diagnoses and all orders for this visit:    HLA B27 positive    Ankylosing spondylitis, unspecified site of spine (HCC)  -     Ambulatory Referral to Rheumatology  -     CBC and differential; Future  -     C-reactive protein; Future  -     Comprehensive metabolic panel; Future  -     RF Screen w/ Reflex to Titer; Future  -     Cyclic citrul peptide antibody, IgG; Future  -     Lyme Ab/Western Blot Reflex; Future  -     UA (URINE) with reflex to Scope  -     CK; Future  -     TORRIE Screen w/Reflex Cascade; Future  -     Urine  Microscopic    Enthesitis related arthritis (HCC)    Primary generalized (osteo)arthritis    Lumbar spondylosis    Chronic right-sided lumbar radiculopathy    Impingement syndrome of both shoulders    Fibromyalgia    Post herpetic neuralgia    Angina pectoris (HCC)    BMI 36.0-36.9,adult        Follow-up plan: Long discussion with patient regarding diagnostic and therapeutic options.  Will refer for serologies to rule out unlikely possibility of inflammatory arthropathy or connective tissue disease.  I have asked the patient to discontinue meloxicam in view of questionable angina.  I have suggested arthritis strength Tylenol 2 tabs every 12 hours.  I have also suggested the anti-inflammatory diet which would likely be of benefit for many of her symptoms in addition to decreasing insulin resistance and promoting weight reduction.  Refer to [facility] for evaluation of shoe gear and consideration of metatarsal bars in view of her mechanical foot deformities.  No need for treatment for presumptive diagnosis of ankylosing spondylitis as I think it is more likely that she has a diagnosis consistent with enthesitis related arthritis with prior recurrent plantar fasciitis currently quiescent.  No evidence for inflammatory back disease.  She is to have a sleep study to rule out sleep apnea later this week.  She is to have cardiac workup to rule out coronary artery disease.  Plan follow-up in 6 months or sooner if needed.  Further diagnostic or therapeutic options will be entertained at that time.  Discussed in detail with patient.  Patient reassured.      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  She has continued on gabapentin 200 mg 3 times daily with 600 mg at bedtime with overall improvement in her pain, who has continued on meloxicam for chronic low back pain.  Patient does give history of recurrent plantar fasciitis in the past currently inactive.  She does have history of low back pain with prior radicular symptoms right lower extremity, with pain worse with activity and improved with laying down.  The symptoms would be contrary to her diagnosis of ankylosing spondylitis, but rather would be consistent with lumbar spondylosis.  Patient currently has low back pain with walking and standing, with mainly neurogenic symptoms.  She also has right knee pain due to advanced DJD and is likely to have total knee arthroplasty after cardiac evaluation for recent complaints of chest pain radiating into her neck and jaw, which started before [place].  Symptoms are not daily, but she does have associated nausea and pressure.  She has no shortness of breath or diaphoresis with the chest pain but does have wheezing and dyspnea on exertion with stairs.  Morning stiffness is 5 to 10 minutes duration.  She does have finger deformities with swelling in her knuckles.  She has history of full-thickness right rotator cuff tear with impingement and left shoulder partial rotator cuff tear.  She does have history of chronic numbness right lower extremity after lumbar decompression 2017 with L2-L5 decompression of laminectomy with pedicle screw and parish fixating fusion.  She has no other joint symptoms.  She has occasional right frontal headaches with no history of new visual symptoms, scalp tenderness, or jaw claudication.  She has been under a lot of stress due to several members of her family passing.  She has chronic dry mouth but has not had increasing dental decay.  She does have snoring and  daytime sleepiness in addition to restless leg syndrome and she is due to have a sleep study later this week.  She has nocturia with mild urinary stress incontinence but denies urgency or dysuria.  She has occasional reflux symptoms but does have history of hiatal hernia.  She denies unexplained fevers, rash, Raynaud's, Psoriasis, mouth ulcers, pg, dry eyes, history seizure, history of documented tick bite, or any other positive review of systems other than those listed above.  She has no history of Crohn's disease or ulcerative colitis.  She has no history of uveitis.      Lola Spangler is a 74 y.o.  female who presents for follow-up consultation and transfer of care from prior rheumatologist with history of HLA-B27 ankylosing spondylitis, lumbar spondylosis with history of prior decompression laminectomies, fibromyalgia, and primary generalized osteoarthritis.    Review of Systems  Review of Systems   Constitutional:  Positive for fatigue (Daytime somnolence). Negative for chills and fever.   HENT:  Negative for ear pain and sore throat.         Dry mouth   Eyes:  Negative for pain and visual disturbance.   Respiratory:  Positive for shortness of breath (Dyspnea on exertion with wheezing particularly with inclines like stairs). Negative for cough.         Chronic snoring   Cardiovascular:  Positive for chest pain. Negative for palpitations.   Gastrointestinal:  Positive for nausea (With chest pain). Negative for abdominal pain and vomiting.        Occasional reflux symptoms with history hiatal hernia   Genitourinary:  Negative for dysuria and hematuria.        Nocturia  Mild urinary stress incontinence   Musculoskeletal:  Positive for arthralgias, back pain and joint swelling.        History recurrent plantar fasciitis currently quiescent   Skin:  Negative for color change and rash.   Neurological:  Positive for headaches (Right frontal headache). Negative for seizures and syncope.        Restless leg  syndrome  Postherpetic neuralgia   Psychiatric/Behavioral:  Positive for sleep disturbance.    All other systems reviewed and are negative.      Allergies  No Known Allergies    Home Medications    Current Outpatient Medications:   •  aspirin 81 MG tablet, Take 1 tablet by mouth daily, Disp: , Rfl:   •  cephalexin (KEFLEX) 500 mg capsule, cephalexin 500 mg capsule  TAKE 4 CAPSULES BY MOUTH 1 HOUR BEFORE DENTAL APPOINTMENT, Disp: , Rfl:   •  Cholecalciferol (VITAMIN D3) 50 MCG (2000 UT) capsule, Vitamin D3 50 mcg (2,000 unit) capsule  Take by oral route., Disp: , Rfl:   •  clonazePAM (KlonoPIN) 0.5 mg tablet, Take 1 tablet (0.5 mg total) by mouth daily at bedtime, Disp: 30 tablet, Rfl: 0  •  Diclofenac Sodium (VOLTAREN) 1 %, Apply 4 g topically 4 (four) times a day, Disp: 100 g, Rfl: 4  •  gabapentin (NEURONTIN) 100 mg capsule, Take 2 capsules (200 mg total) by mouth 3 (three) times a day, Disp: 180 capsule, Rfl: 5  •  gabapentin (Neurontin) 600 MG tablet, Take 1 tablet (600 mg total) by mouth daily at bedtime, Disp: 90 tablet, Rfl: 3  •  meloxicam (Mobic) 15 mg tablet, Take 1 tablet (15 mg total) by mouth daily, Disp: 90 tablet, Rfl: 1  •  PARoxetine (PAXIL) 20 mg tablet, TAKE 1 TABLET BY MOUTH EVERY DAY, Disp: 90 tablet, Rfl: 2    Past Medical History  Past Medical History:   Diagnosis Date   • Abnormal ECG    • Ankylosing spondylitis (HCC)    • Anxiety     LAST ASSESSED: 12/20/16   • Arthritis    • Back pain    • Carpal tunnel syndrome    • Colon polyp     7 years ago with colonoscopy   • Depression    • Encounter for screening mammogram for breast cancer 11/28/2023   • Encounter for screening mammogram for malignant neoplasm of breast 05/21/2019   • Fibromyalgia     LAST ASSESSED: 12/20/16   • Fibromyalgia, primary    • Heme positive stool     LAST ASSESSED: 10/22/16   • High cholesterol    • Hyperlipidemia     under control since weight loss   • Impingement syndrome of left shoulder     LAST ASSESSED: 2/21/17    • Impingement syndrome of right shoulder     LAST ASSESSED:    • Long term use of drug     LAST ASSESSED: 16   • Low back pain    • Myocardial infarction (HCC)     silent   • No known health problems     NO PERTINENT PAST MEDICAL HX   • Obesity    • RLS (restless legs syndrome)    • Rotator cuff injury     right   • Spinal stenosis    • Spinal stenosis    • Spondylitis (HCC)    • Spondyloarthritis     RESOLVED: 16   • Vitamin D deficiency        Past Surgical History   Past Surgical History:   Procedure Laterality Date   • BACK SURGERY     • CARPAL TUNNEL RELEASE Left    • CERVICAL CONIZATION   W/ LASER      CERVICAL CONIZATION   •  SECTION     • COLONOSCOPY     • DILATION AND CURETTAGE OF UTERUS     • FL INJECTION RIGHT HIP (NON ARTHROGRAM)  2019   • FL INJECTION RIGHT HIP (NON ARTHROGRAM)  2019   • FRACTURE SURGERY     • HAND SURGERY     • HIP SURGERY     • JOINT REPLACEMENT      RTHR   • ORTHOPEDIC SURGERY     • NC ARTHRODESIS POSTERIOR/PSTLAT TQ 1NTRSPC LUMBAR N/A 2017    Procedure: L2-L5 OPEN DECOMPRESSIVE LUMBAR LAMINECTOMY W/ PEDICLE SCREW AND NILDA FIXATION FUSION (IMPULSE); REMOVAL OF SKIN TAG MID BACK;  Surgeon: Catracho Fields MD;  [facility];  Service: Neurosurgery   • NC ARTHRP ACETBLR/PROX FEM PROSTC AGRFT/ALGRFT Right 2019    Procedure: ARTHROPLASTY HIP TOTAL Posterior;  Surgeon: Erich Warren MD;  [facility];  Service: Orthopedics   • NC COLONOSCOPY FLX DX W/COLLJ SPEC WHEN PFRMD N/A 10/07/2016    Procedure: EGD AND COLONOSCOPY;  Surgeon: Nathan Pierson MD;  Location: [facility];  Service: Gastroenterology   • NC NDSC WRST SURG W/RLS TRANSVRS CARPL LIGM Left 2018    Procedure: RELEASE CARPAL TUNNEL ENDOSCOPIC;  Surgeon: Bon Ferrer MD;  [facility];  Service: Orthopedics   • NC NDSC WRST SURG W/RLS TRANSVRS CARPL LIGM Right 2018    Procedure: RELEASE CARPAL TUNNEL ENDOSCOPIC;  Surgeon: Bon  MD Carissa;  Location[facility];  Service: Orthopedics   • VA RPR AA HERNIA 1ST < 3 CM REDUCIBLE N/A 07/12/2024    Procedure: REPAIR HERNIA UMBILICAL;  Surgeon: Lazaro Moser MD;  [facility];  Service: General   • VA RPR AA HERNIA 1ST < 3 CM REDUCIBLE N/A 07/12/2024    Procedure: REPAIR HERNIA VENTRAL;  Surgeon: Lazaro Moser MD;  [facility];  Service: General   • RETINAL LASER PROCEDURE     • SPINE SURGERY     • TUBAL LIGATION     • UMBILICAL HERNIA REPAIR  08/01/2024       Family History  Family History   Problem Relation Age of Onset   • Arthritis Mother    • Breast cancer Mother 72   • Hypertension Mother    • Heart attack Mother         MYOCARDIAL INFARCTION   • Heart disease Mother    • Heart attack Father    • Hypertension Father    • Stroke Father         CEREBROVASCUALR ACCIDENT (CVA)   • Heart disease Father    • Arthritis Sister    • Uterine cancer Sister    • Endometrial cancer Sister 60   • Hypertension Sister    • Cancer Sister         Cervical and uterine   • Heart attack Brother    • Prostate cancer Brother 62   • Arthritis Brother    • Melanoma Brother    • Cancer Brother         Melanoma   • Heart disease Brother    • Arthritis Family    • Hyperlipidemia Family    • Depression Son    • Breast cancer Maternal Aunt 40   • No Known Problems Daughter    • No Known Problems Maternal Grandmother    • No Known Problems Maternal Grandfather    • No Known Problems Paternal Grandmother    • No Known Problems Paternal Grandfather    • No Known Problems Brother    • Other Brother         hunting accident (shot)   • Melanoma Brother    • Prostate cancer Brother    • Heart attack Brother    • Stroke Brother    • Hypertension Brother    • Arthritis Sister    • Heart attack Sister    • No Known Problems Son    • No Known Problems Son    • Lung cancer Maternal Uncle    • Breast cancer additional onset Other 52   • Uterine cancer Other        Social History  Occupation:  "Retired nurse  Social History     Substance and Sexual Activity   Alcohol Use Yes    Comment: An occasional glass of wine     Social History     Substance and Sexual Activity   Drug Use No     Social History     Tobacco Use   Smoking Status Never   Smokeless Tobacco Never       Objective:    Vitals:    01/14/25 1352   BP: 118/84   Patient Position: Sitting   Cuff Size: Adult   Pulse: 70   Temp: 98.1 °F (36.7 °C)   TempSrc: Tympanic   SpO2: 94%   Weight: 88.4 kg (194 lb 12.8 oz)   Height: 5' 1\" (1.549 m)       Physical Exam  Vitals reviewed.   Constitutional:       Appearance: Normal appearance.   HENT:      Head: Normocephalic.      Nose:      Comments: Nose and throat unremarkable.  Eyes:      Extraocular Movements: Extraocular movements intact.   Neck:      Comments: Without masses, thyromegaly, lymphadenopathy  Cardiovascular:      Rate and Rhythm: Regular rhythm.   Pulmonary:      Breath sounds: Normal breath sounds.   Abdominal:      Palpations: Abdomen is soft.   Musculoskeletal:      Cervical back: Neck supple.      Comments: Neck decreased lateral flexion with slightly decreased rotation.  Spurling's negative.  Shoulders right decreased abduction less than external rotation.  Left decreased abduction slightly with very slightly decreased external rotation.  Bilateral shoulder crepitus.  Elbows full range of motion.  Wrist full range of motion.  Tinel's negative bilaterally.  Hands marked squaring first carpometacarpal joints bilaterally left greater than right with scattered Heberden's with few subluxations and scattered Antonella's.  No synovitis appreciated.  Straight leg raising negative bilaterally.  Schober's negative.  No SI joint tenderness appreciated.  Hips essentially full range of motion.  Knees right very slightly decreased flexion with valgus deformity.  Left essentially full range of motion.  Bilateral patellofemoral crepitus.  Small cool effusion right knee.  Ankles slightly decreased dorsi " flexion.  Feet rearfoot hyperpronation with midfoot cavus deformities, high instep's, hallux valgus deformities, hammertoe deformities, and cock-up toe deformities.  No synovitis appreciated.  Slight thickening plantar fascia without tenderness plantar fascia insertion or Achilles tendon insertions.   Skin:     General: Skin is warm.      Comments: [place]   Neurological:      General: No focal deficit present.         Imaging:   Ultrasound abdominal wall dated 5/31/2024 significant for midline supraumbilical hernias containing omental/peritoneal fatty tissue.  Lumbar spine MRI dated 4/22/2024 significant multilevel degenerative change of postsurgical lumbar spine with varying degrees of canal stenosis moderate at L1-1 foraminal narrowing moderate to severe left L1 to and moderate left L4-5 and left L5-S1.  Chest x-ray dated 8/18/2023 with no acute cardiopulmonary disease noted.  Left shoulder x-ray dated 12/30/2022 significant for mild osteoarthritis left shoulder with suspected narrowing of the acromiohumeral distance which may be associated with rotator cuff disease.  Right knee x-ray dated 12/30/2022 significant for tricompartmental osteoarthritis of the right knee with marginal hypertrophic spurring present.  Right knee x-ray dated 5/26/2022 significant for severe patellofemoral osteoarthritis with lateral patellar subluxation and extensive articular surface deformity.  Mild medial lateral compartment osteoarthritis.  Right hip x-ray dated 11/17/2020 significant for stable postoperative changes of right total hip arthroplasty with surgical hardware appearing intact.  Mild osteoarthritic degenerative changes left hip joint.  Postoperative changes of prior lumbar fusion with multilevel laminectomies present.    Labs:   Lab work dated 7/3/2024 significant for creatinine 0.74 with estimated GFR 81.  Calcium 9.2.  CBC unremarkable.  Lab work dated 2/7/2024 significant for TSH normal at 1.395 with free T4 0.76.  A.m.  cortisol dated 2/7/2024 15.8.  QuantiFERON gold negative.  Lab work dated 11/9/2023 significant for sed rate 35.  C-reactive protein 3.1.  Hepatitis C antibody nonreactive.      Reviewed records, imaging, labs in detail with patient.  Discussion and counseling completed.  Office visit with documentation 65 minutes.  This note was written in part using the assistance of the [employer] [id number] microphone system. Those portions using this system have been dictated and not read.

## 2025-01-15 ENCOUNTER — TRANSCRIBE ORDERS (OUTPATIENT)
Dept: LAB | Facility: CLINIC | Age: 75
End: 2025-01-15

## 2025-01-15 ENCOUNTER — APPOINTMENT (OUTPATIENT)
Dept: LAB | Facility: CLINIC | Age: 75
End: 2025-01-15
Payer: MEDICARE

## 2025-01-15 DIAGNOSIS — M45.9 ANKYLOSING SPONDYLITIS, UNSPECIFIED SITE OF SPINE (HCC): Primary | ICD-10-CM

## 2025-01-15 DIAGNOSIS — Z13.0 SCREENING FOR DEFICIENCY ANEMIA: ICD-10-CM

## 2025-01-15 DIAGNOSIS — E83.10 DISORDER OF IRON METABOLISM: ICD-10-CM

## 2025-01-15 DIAGNOSIS — Z13.1 SCREENING FOR DIABETES MELLITUS: ICD-10-CM

## 2025-01-15 DIAGNOSIS — E78.49 OTHER HYPERLIPIDEMIA: ICD-10-CM

## 2025-01-15 DIAGNOSIS — R39.9 UTI SYMPTOMS: ICD-10-CM

## 2025-01-15 DIAGNOSIS — G25.81 RESTLESS LEG SYNDROME: ICD-10-CM

## 2025-01-15 DIAGNOSIS — M45.9 ANKYLOSING SPONDYLITIS, UNSPECIFIED SITE OF SPINE (HCC): ICD-10-CM

## 2025-01-15 LAB
ALBUMIN SERPL BCG-MCNC: 4.1 G/DL (ref 3.5–5)
ALP SERPL-CCNC: 47 U/L (ref 34–104)
ALT SERPL W P-5'-P-CCNC: 17 U/L (ref 7–52)
ANION GAP SERPL CALCULATED.3IONS-SCNC: 9 MMOL/L (ref 4–13)
AST SERPL W P-5'-P-CCNC: 18 U/L (ref 13–39)
B BURGDOR IGG+IGM SER QL IA: NEGATIVE
BACTERIA UR QL AUTO: ABNORMAL /HPF
BASOPHILS # BLD AUTO: 0.04 THOUSANDS/ΜL (ref 0–0.1)
BASOPHILS NFR BLD AUTO: 1 % (ref 0–1)
BILIRUB SERPL-MCNC: 0.59 MG/DL (ref 0.2–1)
BILIRUB UR QL STRIP: NEGATIVE
BILIRUB UR QL STRIP: NEGATIVE
BUN SERPL-MCNC: 22 MG/DL (ref 5–25)
CALCIUM SERPL-MCNC: 9.1 MG/DL (ref 8.4–10.2)
CHLORIDE SERPL-SCNC: 104 MMOL/L (ref 96–108)
CHOLEST SERPL-MCNC: 206 MG/DL (ref ?–200)
CK SERPL-CCNC: 104 U/L (ref 26–192)
CLARITY UR: CLEAR
CLARITY UR: CLEAR
CO2 SERPL-SCNC: 27 MMOL/L (ref 21–32)
COLOR UR: YELLOW
COLOR UR: YELLOW
CREAT SERPL-MCNC: 0.75 MG/DL (ref 0.6–1.3)
CRP SERPL QL: 2.7 MG/L
EOSINOPHIL # BLD AUTO: 0.19 THOUSAND/ΜL (ref 0–0.61)
EOSINOPHIL NFR BLD AUTO: 4 % (ref 0–6)
ERYTHROCYTE [DISTWIDTH] IN BLOOD BY AUTOMATED COUNT: 14.3 % (ref 11.6–15.1)
FERRITIN SERPL-MCNC: 77 NG/ML (ref 11–307)
GFR SERPL CREATININE-BSD FRML MDRD: 79 ML/MIN/1.73SQ M
GLUCOSE P FAST SERPL-MCNC: 114 MG/DL (ref 65–99)
GLUCOSE UR STRIP-MCNC: NEGATIVE MG/DL
GLUCOSE UR STRIP-MCNC: NEGATIVE MG/DL
HCT VFR BLD AUTO: 47.3 % (ref 34.8–46.1)
HDLC SERPL-MCNC: 50 MG/DL
HGB BLD-MCNC: 15.8 G/DL (ref 11.5–15.4)
HGB UR QL STRIP.AUTO: NEGATIVE
HGB UR QL STRIP.AUTO: NEGATIVE
IMM GRANULOCYTES # BLD AUTO: 0.01 THOUSAND/UL (ref 0–0.2)
IMM GRANULOCYTES NFR BLD AUTO: 0 % (ref 0–2)
IRON SATN MFR SERPL: 30 % (ref 15–50)
IRON SERPL-MCNC: 103 UG/DL (ref 50–212)
KETONES UR STRIP-MCNC: NEGATIVE MG/DL
KETONES UR STRIP-MCNC: NEGATIVE MG/DL
LDLC SERPL CALC-MCNC: 132 MG/DL (ref 0–100)
LEUKOCYTE ESTERASE UR QL STRIP: NEGATIVE
LEUKOCYTE ESTERASE UR QL STRIP: NEGATIVE
LYMPHOCYTES # BLD AUTO: 1.14 THOUSANDS/ΜL (ref 0.6–4.47)
LYMPHOCYTES NFR BLD AUTO: 24 % (ref 14–44)
MCH RBC QN AUTO: 30.8 PG (ref 26.8–34.3)
MCHC RBC AUTO-ENTMCNC: 33.4 G/DL (ref 31.4–37.4)
MCV RBC AUTO: 92 FL (ref 82–98)
MONOCYTES # BLD AUTO: 0.36 THOUSAND/ΜL (ref 0.17–1.22)
MONOCYTES NFR BLD AUTO: 8 % (ref 4–12)
MUCOUS THREADS UR QL AUTO: ABNORMAL
NEUTROPHILS # BLD AUTO: 3.08 THOUSANDS/ΜL (ref 1.85–7.62)
NEUTS SEG NFR BLD AUTO: 63 % (ref 43–75)
NITRITE UR QL STRIP: NEGATIVE
NITRITE UR QL STRIP: NEGATIVE
NON-SQ EPI CELLS URNS QL MICRO: ABNORMAL /HPF
NONHDLC SERPL-MCNC: 156 MG/DL
NRBC BLD AUTO-RTO: 0 /100 WBCS
PH UR STRIP.AUTO: 5.5 [PH]
PH UR STRIP.AUTO: 5.5 [PH]
PLATELET # BLD AUTO: 408 THOUSANDS/UL (ref 149–390)
PMV BLD AUTO: 10.5 FL (ref 8.9–12.7)
POTASSIUM SERPL-SCNC: 4.1 MMOL/L (ref 3.5–5.3)
PROT SERPL-MCNC: 6.9 G/DL (ref 6.4–8.4)
PROT UR STRIP-MCNC: ABNORMAL MG/DL
PROT UR STRIP-MCNC: ABNORMAL MG/DL
RBC # BLD AUTO: 5.13 MILLION/UL (ref 3.81–5.12)
RBC #/AREA URNS AUTO: ABNORMAL /HPF
SODIUM SERPL-SCNC: 140 MMOL/L (ref 135–147)
SP GR UR STRIP.AUTO: 1.02 (ref 1–1.03)
SP GR UR STRIP.AUTO: 1.02 (ref 1–1.03)
TIBC SERPL-MCNC: 338.8 UG/DL (ref 250–450)
TRANSFERRIN SERPL-MCNC: 242 MG/DL (ref 203–362)
TRIGL SERPL-MCNC: 121 MG/DL (ref ?–150)
UIBC SERPL-MCNC: 236 UG/DL (ref 155–355)
UROBILINOGEN UR STRIP-ACNC: <2 MG/DL
UROBILINOGEN UR STRIP-ACNC: <2 MG/DL
WBC # BLD AUTO: 4.82 THOUSAND/UL (ref 4.31–10.16)
WBC #/AREA URNS AUTO: ABNORMAL /HPF

## 2025-01-15 PROCEDURE — 86618 LYME DISEASE ANTIBODY: CPT

## 2025-01-15 PROCEDURE — 83550 IRON BINDING TEST: CPT

## 2025-01-15 PROCEDURE — 80061 LIPID PANEL: CPT

## 2025-01-15 PROCEDURE — 36415 COLL VENOUS BLD VENIPUNCTURE: CPT

## 2025-01-15 PROCEDURE — 82728 ASSAY OF FERRITIN: CPT

## 2025-01-15 PROCEDURE — 81001 URINALYSIS AUTO W/SCOPE: CPT | Performed by: INTERNAL MEDICINE

## 2025-01-15 PROCEDURE — 86140 C-REACTIVE PROTEIN: CPT

## 2025-01-15 PROCEDURE — 82550 ASSAY OF CK (CPK): CPT

## 2025-01-15 PROCEDURE — 86200 CCP ANTIBODY: CPT

## 2025-01-15 PROCEDURE — 86430 RHEUMATOID FACTOR TEST QUAL: CPT

## 2025-01-15 PROCEDURE — 83540 ASSAY OF IRON: CPT

## 2025-01-15 PROCEDURE — 85025 COMPLETE CBC W/AUTO DIFF WBC: CPT

## 2025-01-15 PROCEDURE — 86038 ANTINUCLEAR ANTIBODIES: CPT

## 2025-01-15 PROCEDURE — 86225 DNA ANTIBODY NATIVE: CPT

## 2025-01-15 PROCEDURE — 80053 COMPREHEN METABOLIC PANEL: CPT

## 2025-01-16 ENCOUNTER — RESULTS FOLLOW-UP (OUTPATIENT)
Age: 75
End: 2025-01-16

## 2025-01-16 PROBLEM — M75.41 IMPINGEMENT SYNDROME OF BOTH SHOULDERS: Status: ACTIVE | Noted: 2025-01-16

## 2025-01-16 PROBLEM — M54.16 CHRONIC RIGHT-SIDED LUMBAR RADICULOPATHY: Status: ACTIVE | Noted: 2025-01-16

## 2025-01-16 PROBLEM — M79.7 FIBROMYALGIA: Status: ACTIVE | Noted: 2025-01-16

## 2025-01-16 PROBLEM — M08.80 ENTHESITIS RELATED ARTHRITIS (HCC): Status: ACTIVE | Noted: 2025-01-16

## 2025-01-16 PROBLEM — Z15.89 HLA B27 POSITIVE: Status: ACTIVE | Noted: 2025-01-16

## 2025-01-16 PROBLEM — B02.29 POST HERPETIC NEURALGIA: Status: ACTIVE | Noted: 2025-01-16

## 2025-01-16 PROBLEM — M75.42 IMPINGEMENT SYNDROME OF BOTH SHOULDERS: Status: ACTIVE | Noted: 2025-01-16

## 2025-01-16 PROBLEM — I20.9 ANGINA PECTORIS (HCC): Status: ACTIVE | Noted: 2025-01-16

## 2025-01-16 PROBLEM — M15.0 PRIMARY GENERALIZED (OSTEO)ARTHRITIS: Status: ACTIVE | Noted: 2025-01-16

## 2025-01-16 LAB
CCP AB SER IA-ACNC: 1.3 (ref ?–10)
DSDNA IGG SERPL IA-ACNC: 1.4 IU/ML (ref ?–15)
NUCLEAR IGG SER IA-RTO: 0.2 RATIO (ref ?–1)
RHEUMATOID FACT SER QL LA: NEGATIVE

## 2025-01-16 NOTE — TELEPHONE ENCOUNTER
Patient was returning a phone call in regards to her lab work.  I tried to get a nurse on the line, but the call got disconnected.  Please call the patient back.  Thank you.

## 2025-01-17 ENCOUNTER — HOSPITAL ENCOUNTER (OUTPATIENT)
Dept: SLEEP CENTER | Facility: CLINIC | Age: 75
Discharge: HOME | End: 2025-01-17
Payer: MEDICARE

## 2025-01-17 DIAGNOSIS — G47.19 EXCESSIVE DAYTIME SLEEPINESS: ICD-10-CM

## 2025-01-17 PROCEDURE — 95810 POLYSOM 6/> YRS 4/> PARAM: CPT

## 2025-01-17 PROCEDURE — 95810 POLYSOM 6/> YRS 4/> PARAM: CPT | Performed by: PSYCHIATRY & NEUROLOGY

## 2025-01-18 PROBLEM — G47.33 OSA (OBSTRUCTIVE SLEEP APNEA): Status: ACTIVE | Noted: 2025-01-10

## 2025-01-18 NOTE — PROGRESS NOTES
Sleep Study Documentation    Pre-Sleep Study       Sleep testing procedure explained to patient:YES    Patient napped prior to study:YES- less than 30 minutes. Napped after 2PM: yes    Caffeine:Dayshift worker after 12PM.  Caffeine use:YES- tea  6 to 18 ounces  Herbal decaf  Alcohol:Dayshift workers after 5PM: Alcohol use:NO    Typical day for patient:YES       Study Documentation    Sleep Study Indications: snoring, gasping and nocturnal choking, daytime sleepiness    Sleep Study: Diagnostic   Snore:Moderate  Supplemental O2: no    Minimum SaO2 80  Baseline SaO2 91        EKG abnormalities: no     EEG abnormalities: no    Were abnormal behaviors in sleep observed:NO    Is Total Sleep Study Recording Time < 2 hours: N/A    Is Total Sleep Study Recording Time > 2 hours but study is incomplete: N/A    Is Total Sleep Study Recording Time 6 hours or more but sleep was not obtained: NO          Post-Sleep Study    Medication used at bedtime or during sleep study:YES other prescription medications    Patient reports time it took to fall asleep:20 to 30 minutes    Patient reports waking up during study:3 or more times.  Patient reports returning to sleep without difficulty.    Patient reports sleeping 4 to 6 hours with dreaming.    Does the Patient feel this is a typical night of sleep:typical    Patient rated sleepiness: Somewhat sleepy or tired    PAP treatment:no.

## 2025-01-23 ENCOUNTER — TELEPHONE (OUTPATIENT)
Age: 75
End: 2025-01-23

## 2025-01-23 ENCOUNTER — RESULTS FOLLOW-UP (OUTPATIENT)
Age: 75
End: 2025-01-23

## 2025-01-23 DIAGNOSIS — D75.839 THROMBOCYTOSIS: Primary | ICD-10-CM

## 2025-01-23 NOTE — TELEPHONE ENCOUNTER
Caller: Patient     Doctor: Kelvin     Reason for call: Patient is scheduled for 3/1 appt to discuss sx for right knee. She is asking if there is a time frame that she should not have dental work/cleaning BEFORE surgery.   Please advise     Call back#: [phone]

## 2025-01-24 ENCOUNTER — RESULTS FOLLOW-UP (OUTPATIENT)
Dept: SLEEP CENTER | Facility: CLINIC | Age: 75
End: 2025-01-24

## 2025-01-24 DIAGNOSIS — G47.33 OSA (OBSTRUCTIVE SLEEP APNEA): Primary | ICD-10-CM

## 2025-01-24 NOTE — TELEPHONE ENCOUNTER
Call to patient who states Dr. Padilla reviewed results with her.     CPAP titration study scheduled for 4/4/2025 in [facility]. Reviewed instructions and cancellation policy. Added to wait list.     Added to High AHI Study teams chat

## 2025-01-24 NOTE — TELEPHONE ENCOUNTER
I spoke with Lola to review results with her. She understands necessity of treatment, will expedite CPAP titration  Advised her to reduce clonazepam to 0.25 mg at night

## 2025-01-29 ENCOUNTER — HOSPITAL ENCOUNTER (OUTPATIENT)
Dept: SLEEP CENTER | Facility: CLINIC | Age: 75
Discharge: HOME | End: 2025-01-29
Payer: MEDICARE

## 2025-01-29 DIAGNOSIS — G47.33 OSA (OBSTRUCTIVE SLEEP APNEA): ICD-10-CM

## 2025-01-29 PROCEDURE — 95811 POLYSOM 6/>YRS CPAP 4/> PARM: CPT

## 2025-01-29 PROCEDURE — 95811 POLYSOM 6/>YRS CPAP 4/> PARM: CPT | Performed by: PSYCHIATRY & NEUROLOGY

## 2025-01-30 NOTE — PROGRESS NOTES
Sleep Study Documentation    Pre-Sleep Study       Sleep testing procedure explained to patient:YES    Patient napped prior to study:NO    Caffeine:Dayshift worker after 12PM.  Caffeine use:NO    Alcohol:Dayshift workers after 5PM: Alcohol use:NO    Typical day for patient:YES       Study Documentation    Sleep Study Indications: Snoring     Sleep Study: Treatment   Optimal PAP pressure: 13cm  Leak:Small  Snore:Eliminated  REM Obtained:yes  Supplemental O2: no     Minimum SaO2 87%  Baseline SaO2 93.3%  PAP mask tried (list all) Medium Resmed AirTouch   PAP mask choice(final) Medium Resmed AirTouch   PAP mask type:full face  PAP pressure at which snoring was eliminated 13  Minimum SaO2 at final PAP pressure 87%  Mode of Therapy:CPAP  ETCO2:No  CPAP changed to BiPAP:No    Mode of Therapy:CPAP    EKG abnormalities: no     EEG abnormalities: no    Were abnormal behaviors in sleep observed:NO    Is Total Sleep Study Recording Time < 2 hours: N/A    Is Total Sleep Study Recording Time > 2 hours but study is incomplete: N/A    Is Total Sleep Study Recording Time 6 hours or more but sleep was not obtained: NO    Patient classification: retired       Post-Sleep Study    Medication used at bedtime or during sleep study:YES other prescription medications    Patient reports time it took to fall asleep:greater than 60 minutes    Patient reports waking up during study:3 or more times.  Patient reports returning to sleep without difficulty.    Patient reports sleeping 4 to 6 hours with dreaming.    Does the Patient feel this is a typical night of sleep:better than usual    Patient rated sleepiness: Somewhat sleepy or tired    PAP treatment:yes: Post PAP treatment patient reports feeling better and  would wear PAP mask at home.

## 2025-01-31 ENCOUNTER — RESULTS FOLLOW-UP (OUTPATIENT)
Dept: SLEEP CENTER | Facility: CLINIC | Age: 75
End: 2025-01-31

## 2025-01-31 ENCOUNTER — TELEPHONE (OUTPATIENT)
Dept: SLEEP CENTER | Facility: CLINIC | Age: 75
End: 2025-01-31

## 2025-01-31 DIAGNOSIS — G47.33 OSA (OBSTRUCTIVE SLEEP APNEA): Primary | ICD-10-CM

## 2025-01-31 NOTE — TELEPHONE ENCOUNTER
See previous encounter sleep study results.     CPAP script and other clinicals sent to [facility] via [place] with request to expedite due to severe sleep apnea, AHI 64.5.

## 2025-01-31 NOTE — TELEPHONE ENCOUNTER
CPAP sleep study resulted and shows an effective titration resulting in AHI 1.8.  Increased periodic limb movements during sleep. CPAP ordered.     Result read by patient.    Call to patient reviewed results and recommendation for CPAP.     Patient chose [facility] as DME provider.   Phone number given.     Compliance appointment scheduled for 4/28/2025 @ 11 am with Dr. Padilla.     See new encounter for CPAP script

## 2025-01-31 NOTE — TELEPHONE ENCOUNTER
----- Message from Clay Padilla MD sent at 1/31/2025  9:24 AM EST -----  Cpap ordered, please have DME expedite as she had severe findings on the  diagnostic test

## 2025-02-03 ENCOUNTER — OFFICE VISIT (OUTPATIENT)
Dept: CARDIOLOGY CLINIC | Facility: CLINIC | Age: 75
End: 2025-02-03
Payer: MEDICARE

## 2025-02-03 VITALS
BODY MASS INDEX: 36.35 KG/M2 | HEART RATE: 94 BPM | OXYGEN SATURATION: 98 % | SYSTOLIC BLOOD PRESSURE: 112 MMHG | HEIGHT: 61 IN | DIASTOLIC BLOOD PRESSURE: 80 MMHG | WEIGHT: 192.5 LBS

## 2025-02-03 DIAGNOSIS — E78.2 MIXED HYPERLIPIDEMIA: ICD-10-CM

## 2025-02-03 DIAGNOSIS — E66.01 OBESITY, MORBID (HCC): ICD-10-CM

## 2025-02-03 DIAGNOSIS — Z82.49 FAMILY HISTORY OF HEART DISEASE: ICD-10-CM

## 2025-02-03 DIAGNOSIS — R94.31 ABNORMAL EKG: ICD-10-CM

## 2025-02-03 DIAGNOSIS — R07.2 PRECORDIAL PAIN: Primary | ICD-10-CM

## 2025-02-03 PROCEDURE — 99204 OFFICE O/P NEW MOD 45 MIN: CPT | Performed by: INTERNAL MEDICINE

## 2025-02-03 RX ORDER — SENNOSIDES 8.6 MG
1300 CAPSULE ORAL 2 TIMES DAILY
COMMUNITY

## 2025-02-03 NOTE — PROGRESS NOTES
[facility]    Name:Lola Spangler   DOS: 2/3/2025     Chief Complaint:   Chief Complaint   Patient presents with    consult       HISTORY OF PRESENT ILLNESS:    HPI:  Lola Spangler is a 74 y.o. female. She  has a past medical history of Abnormal ECG, Ankylosing spondylitis (HCC), Anxiety, Arthritis, Back pain, Carpal tunnel syndrome, Colon polyp, Depression, Encounter for screening mammogram for breast cancer (11/28/2023), Encounter for screening mammogram for malignant neoplasm of breast (05/21/2019), Fibromyalgia, Fibromyalgia, primary, Heme positive stool, High cholesterol, Hyperlipidemia, Impingement syndrome of left shoulder, Impingement syndrome of right shoulder, Long term use of drug, Low back pain, Myocardial infarction (HCC), No known health problems, Obesity, RLS (restless legs syndrome), Rotator cuff injury, Spinal stenosis, Spinal stenosis, Spondylitis (HCC), Spondyloarthritis, and Vitamin D deficiency.    She presents to establish care with a cardiologist for evaluation of chest pain, at the recommendation of her PCP Dr. Montesinos.    Patient states that she has been experiencing chest discomfort since approximately December 2024.  She describes this as a dull pressure in the center of her chest without clear radiation.  This is waxing and waning, occurring intermittently.  Symptoms are self-limiting with no clear aggravating or relieving factor.  She reports associated nausea with these episodes.  Episodes typically occur at rest with no clear relation to exertion although can sometimes occur while she is exerting herself.  She otherwise denies shortness of breath, diaphoresis, dizziness, orthopnea, edema.  She has had no syncope.  Does report occasional intermittent palpitations/fluttering, rare.    Family history is significant for heart disease in both parents when they were in their 70s.  The patient has no formal prior cardiac diagnosis.  She underwent nuclear myocardial  perfusion imaging in November 2019, at that time this was for an abnormal EKG in the preoperative setting in the absence of symptoms.  At that time, SPECT imaging demonstrated preserved LV systolic function with no evidence of ischemia or prior infarction.       ROS    ROS: Pertinent positives and negatives as described in History of Present Illness. Remainder of a 14 point review of systems was negative.     No Known Allergies     Current Outpatient Medications on File Prior to Visit   Medication Sig Dispense Refill    acetaminophen (TYLENOL) 650 mg CR tablet Take 1,300 mg by mouth 2 (two) times a day      aspirin 81 MG tablet Take 1 tablet by mouth daily      cephalexin (KEFLEX) 500 mg capsule cephalexin 500 mg capsule   TAKE 4 CAPSULES BY MOUTH 1 HOUR BEFORE DENTAL APPOINTMENT      Cholecalciferol (VITAMIN D3) 50 MCG (2000 UT) capsule Vitamin D3 50 mcg (2,000 unit) capsule   Take by oral route.      clonazePAM (KlonoPIN) 0.5 mg tablet Take 1 tablet (0.5 mg total) by mouth daily at bedtime 30 tablet 0    Diclofenac Sodium (VOLTAREN) 1 % Apply 4 g topically 4 (four) times a day 100 g 4    gabapentin (NEURONTIN) 100 mg capsule Take 2 capsules (200 mg total) by mouth 3 (three) times a day 180 capsule 5    gabapentin (Neurontin) 600 MG tablet Take 1 tablet (600 mg total) by mouth daily at bedtime 90 tablet 3    PARoxetine (PAXIL) 20 mg tablet TAKE 1 TABLET BY MOUTH EVERY DAY 90 tablet 2    meloxicam (Mobic) 15 mg tablet Take 1 tablet (15 mg total) by mouth daily (Patient not taking: Reported on 2/3/2025) 90 tablet 1     No current facility-administered medications on file prior to visit.       Past Medical History:   Diagnosis Date    Abnormal ECG     Ankylosing spondylitis (HCC)     Anxiety     LAST ASSESSED: 12/20/16    Arthritis     Back pain     Carpal tunnel syndrome     Colon polyp     7 years ago with colonoscopy    Depression     Encounter for screening mammogram for breast cancer 11/28/2023    Encounter for  screening mammogram for malignant neoplasm of breast 2019    Fibromyalgia     LAST ASSESSED: 16    Fibromyalgia, primary     Heme positive stool     LAST ASSESSED: 10/22/16    High cholesterol     Hyperlipidemia     under control since weight loss    Impingement syndrome of left shoulder     LAST ASSESSED: 17    Impingement syndrome of right shoulder     LAST ASSESSED:     Long term use of drug     LAST ASSESSED: 16    Low back pain     Myocardial infarction (HCC)     silent    No known health problems     NO PERTINENT PAST MEDICAL HX    Obesity     RLS (restless legs syndrome)     Rotator cuff injury     right    Spinal stenosis     Spinal stenosis     Spondylitis (HCC)     Spondyloarthritis     RESOLVED: 16    Vitamin D deficiency        Past Surgical History:   Procedure Laterality Date    BACK SURGERY      CARPAL TUNNEL RELEASE Left     CERVICAL CONIZATION   W/ LASER      CERVICAL CONIZATION     SECTION      COLONOSCOPY      DILATION AND CURETTAGE OF UTERUS      FL INJECTION RIGHT HIP (NON ARTHROGRAM)  2019    FL INJECTION RIGHT HIP (NON ARTHROGRAM)  2019    FRACTURE SURGERY      HAND SURGERY      HIP SURGERY      JOINT REPLACEMENT      RTHR    ORTHOPEDIC SURGERY      NC ARTHRODESIS POSTERIOR/PSTLAT TQ 1NTRSPC LUMBAR N/A 2017    Procedure: L2-L5 OPEN DECOMPRESSIVE LUMBAR LAMINECTOMY W/ PEDICLE SCREW AND NILDA FIXATION FUSION (IMPULSE); REMOVAL OF SKIN TAG MID BACK;  Surgeon: Catracho Fields MD;  Location[facility];  Service: Neurosurgery    NC ARTHRP ACETBLR/PROX FEM PROSTC AGRFT/ALGRFT Right 2019    Procedure: ARTHROPLASTY HIP TOTAL Posterior;  Surgeon: Erich Warren MD;  [facility];  Service: Orthopedics    NC COLONOSCOPY FLX DX W/COLLJ SPEC WHEN PFRMD N/A 10/07/2016    Procedure: EGD AND COLONOSCOPY;  Surgeon: Nathan Pierson MD;  Location: [facility];  Service: Gastroenterology    NC NDSC WRST SURG W/RLS TRANSVRS CARPL LIGM  Left 04/26/2018    Procedure: RELEASE CARPAL TUNNEL ENDOSCOPIC;  Surgeon: Bon Ferrer MD;  Location: [facility];  Service: Orthopedics    CO NDSC WRST SURG W/RLS TRANSVRS CARPL LIGM Right 06/14/2018    Procedure: RELEASE CARPAL TUNNEL ENDOSCOPIC;  Surgeon: Bon Ferrer MD;  [facility];  Service: Orthopedics    CO RPR AA HERNIA 1ST < 3 CM REDUCIBLE N/A 07/12/2024    Procedure: REPAIR HERNIA UMBILICAL;  Surgeon: Lazaro Moser MD;  [facility];  Service: General    CO RPR AA HERNIA 1ST < 3 CM REDUCIBLE N/A 07/12/2024    Procedure: REPAIR HERNIA VENTRAL;  Surgeon: Lazaro Moser MD;  [facility];  Service: General    RETINAL LASER PROCEDURE      SPINE SURGERY      TUBAL LIGATION      UMBILICAL HERNIA REPAIR  08/01/2024       Family History   Problem Relation Age of Onset    Arthritis Mother     Breast cancer Mother 72    Hypertension Mother     Heart attack Mother         MYOCARDIAL INFARCTION    Heart disease Mother     Heart attack Father     Hypertension Father     Stroke Father         CEREBROVASCUALR ACCIDENT (CVA)    Heart disease Father     Arthritis Sister     Uterine cancer Sister     Endometrial cancer Sister 60    Hypertension Sister     Cancer Sister         Cervical and uterine    Heart attack Brother     Prostate cancer Brother 62    Arthritis Brother     Melanoma Brother     Cancer Brother         Melanoma    Heart disease Brother     Arthritis Family     Hyperlipidemia Family     Depression Son     Breast cancer Maternal Aunt 40    No Known Problems Daughter     No Known Problems Maternal Grandmother     No Known Problems Maternal Grandfather     No Known Problems Paternal Grandmother     No Known Problems Paternal Grandfather     No Known Problems Brother     Other Brother         hunting accident (shot)    Melanoma Brother     Prostate cancer Brother     Heart attack Brother     Stroke Brother     Hypertension Brother     Arthritis Sister      "Heart attack Sister     No Known Problems Son     No Known Problems Son     Lung cancer Maternal Uncle     Breast cancer additional onset Other 52    Uterine cancer Other        Social History     Socioeconomic History    Marital status: /Civil Union     Spouse name: Not on file    Number of children: 3    Years of education: Not on file    Highest education level: Not on file   Occupational History    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     Comment: EMPLOYED   Tobacco Use    Smoking status: Never    Smokeless tobacco: Never   Vaping Use    Vaping status: Never Used   Substance and Sexual Activity    Alcohol use: Yes     Comment: An occasional glass of wine    Drug use: No    Sexual activity: Yes     Partners: Male     Birth control/protection: Post-menopausal, Female Sterilization   Other Topics Concern    Not on file   Social History Narrative    CAFFEINE USE    DOES NOT EXERCISE     Social Drivers of Health     Financial Resource Strain: Low Risk  (1/3/2024)    Overall Financial Resource Strain (CARDIA)     Difficulty of Paying Living Expenses: Not hard at all   Food Insecurity: No Food Insecurity (1/4/2025)    Hunger Vital Sign     Worried About Running Out of Food in the Last Year: Never true     Ran Out of Food in the Last Year: Never true   Transportation Needs: No Transportation Needs (1/4/2025)    PRAPARE - Transportation     Lack of Transportation (Medical): No     Lack of Transportation (Non-Medical): No   Physical Activity: Not on file   Stress: Not on file   Social Connections: Not on file   Intimate Partner Violence: Not on file   Housing Stability: Low Risk  (1/4/2025)    Housing Stability Vital Sign     Unable to Pay for Housing in the Last Year: No     Number of Times Moved in the Last Year: 0     Homeless in the Last Year: No       OBJECTIVE:    /80 (BP Location: Left arm, Patient Position: Sitting, Cuff Size: Large)   Pulse 94   Ht 5' 1\" (1.549 m)   Wt 87.3 kg (192 lb 8 oz)   LMP  (LMP Unknown)   " SpO2 98%   BMI 36.37 kg/m²      BP Readings from Last 3 Encounters:   02/03/25 112/80   01/14/25 118/84   01/14/25 120/84       Wt Readings from Last 3 Encounters:   02/03/25 87.3 kg (192 lb 8 oz)   01/14/25 88.4 kg (194 lb 12.8 oz)   01/14/25 87.5 kg (193 lb)         Physical Exam  Vitals reviewed.   Constitutional:       General: She is not in acute distress.     Appearance: Normal appearance. She is not diaphoretic.   HENT:      Head: Normocephalic and atraumatic.   Eyes:      Conjunctiva/sclera: Conjunctivae normal.   Neck:      Vascular: No JVD.   Cardiovascular:      Rate and Rhythm: Normal rate and regular rhythm.      Pulses: Normal pulses.      Heart sounds: Normal heart sounds. No murmur heard.     No friction rub. No gallop.   Pulmonary:      Effort: Pulmonary effort is normal.      Breath sounds: Normal breath sounds. No wheezing, rhonchi or rales.   Abdominal:      General: Abdomen is flat. Bowel sounds are normal.   Musculoskeletal:      Right lower leg: No edema.      Left lower leg: No edema.   Skin:     General: Skin is warm and dry.   Neurological:      General: No focal deficit present.      Mental Status: She is alert and oriented to person, place, and time.   Psychiatric:         Mood and Affect: Mood normal.         Behavior: Behavior normal.                                                       Cardiac testing:     EKG personally reviewed as performed on 1/10/25. My read: Sinus rhythm with poor precordial R-wave progression.         LABS:  Lab Results   Component Value Date    GLUCOSE 89 11/28/2015    BUN 22 01/15/2025    CREATININE 0.75 01/15/2025    CALCIUM 9.1 01/15/2025     11/28/2015    K 4.1 01/15/2025    CO2 27 01/15/2025     01/15/2025    ALKPHOS 47 01/15/2025    BILITOT 0.50 11/28/2015    PROT 6.7 11/28/2015    AST 18 01/15/2025    ALT 17 01/15/2025    ANIONGAP 6 11/28/2015        Lab Results   Component Value Date    WBC 4.82 01/15/2025    HGB 15.8 (H) 01/15/2025    HCT  "47.3 (H) 01/15/2025    MCV 92 01/15/2025     (H) 01/15/2025       Lab Results   Component Value Date    CHOL 176 11/28/2015    HDL 50 01/15/2025    LDLCALC 132 (H) 01/15/2025    TRIG 121 01/15/2025       Lab Results   Component Value Date    HGBA1C 5.6 10/15/2019           ASSESSMENT/PLAN:  Diagnoses and all orders for this visit:    Precordial pain  -     Ambulatory Referral to Cardiology  -     Echo complete w/ contrast if indicated; Future  -     NM PET CT CARDIAC PERFUSION RX STRESS (RADIOLOGY); Future    Abnormal EKG  -     Echo complete w/ contrast if indicated; Future  -     NM PET CT CARDIAC PERFUSION RX STRESS (RADIOLOGY); Future    Mixed hyperlipidemia    Family history of heart disease    Obesity, morbid (HCC)    Other orders  -     acetaminophen (TYLENOL) 650 mg CR tablet; Take 1,300 mg by mouth 2 (two) times a day    74-year-old female presenting to \[facility]\"" care for evaluation of precordial chest wall pain, atypical with some typical features.  Her pretest risk for coronary artery disease is intermediate based upon age, family history, hyperlipidemia, ongoing atypical symptoms.  In this setting, I have recommended further evaluation with the following tests:    Transthoracic echocardiogram for an updated structural baseline  PET nuclear myocardial perfusion study to evaluate for underlying ischemia, infarction the patient is unable to exercise.  Due to right knee osteoarthritis, lower extremity neuropathy.     She will follow-up in office to review the results of testing          Madhav Wilder MD [facility]      Portions of the record may have been created with voice recognition software. Occasional wrong word or \"sound alike\" substitutions may have occurred due to the inherent limitations of voice recognition software. Please review the chart carefully and recognize, using context, where substitutions/typographical errors may have occurred.     "

## 2025-02-03 NOTE — PATIENT INSTRUCTIONS
You were seen today in the Cardiology office for evaluation of chest pain.     Below is a summary of the recommendations based upon today's visit:    1. Dietary modifications - Follow a Mediterranean diet - one that is low in saturated fats (avoid red meat, dairy, cheeses), emphasize chicken, fish, turkey, tofu, vegetables, fruit (moderate quantities); also avoid simple carbs/starch/sugars, use whole wheat/grain/bran/oatmeal, snack on small quantities of nuts and fruits.     2. Exercise is very important. Ideally, AT LEAST four to five times weekly for 30 to 60 minutes per session - both cardiovascular and resistance work is OK. Listen to your body and rest when needed.    3. Continue all present medications.    4. Testing prior to your next visit includes: ultrasound of the heart and stress test    5. Remember the ABCDEs to cardiovascular health for patients:  A: Awareness of cardiovascular symptoms  B: Blood pressure control  C: Cholesterol control  C: Cigarette avoidance  D: Diabetes control  D: Dietary choices  E: Exercise    6. Return in 3 months     Any testing ordered in office today will be available for patient review via [employer], and reviewed with me at the follow-up office visit as scheduled.    Thank you for choosing [facility].    Please call our office or use [employer] with any questions.

## 2025-02-11 LAB

## 2025-02-12 ENCOUNTER — HOSPITAL ENCOUNTER (OUTPATIENT)
Dept: NON INVASIVE DIAGNOSTICS | Facility: HOSPITAL | Age: 75
Discharge: HOME | End: 2025-02-12
Payer: MEDICARE

## 2025-02-12 ENCOUNTER — HOSPITAL ENCOUNTER (OUTPATIENT)
Dept: NUCLEAR MEDICINE | Facility: HOSPITAL | Age: 75
Discharge: HOME | End: 2025-02-12
Attending: INTERNAL MEDICINE
Payer: MEDICARE

## 2025-02-12 VITALS — BODY MASS INDEX: 36.25 KG/M2 | WEIGHT: 192 LBS | HEIGHT: 61 IN

## 2025-02-12 DIAGNOSIS — R07.2 PRECORDIAL PAIN: ICD-10-CM

## 2025-02-12 DIAGNOSIS — R94.31 ABNORMAL EKG: ICD-10-CM

## 2025-02-12 LAB
CHEST PAIN STATEMENT: NORMAL
CHEST PAIN STATEMENT: NORMAL
MAX DIASTOLIC BP: 64 MMHG
MAX DIASTOLIC BP: 64 MMHG
MAX PREDICTED HEART RATE: 148 BPM
MAX PREDICTED HEART RATE: 148 BPM
PROTOCOL NAME: NORMAL
PROTOCOL NAME: NORMAL
REASON FOR TERMINATION: NORMAL
REASON FOR TERMINATION: NORMAL
STRESS POST EXERCISE DUR MIN: 3 MIN
STRESS POST EXERCISE DUR MIN: 3 MIN
STRESS POST EXERCISE DUR SEC: 1 SEC
STRESS POST EXERCISE DUR SEC: 1 SEC
STRESS POST PEAK HR: 96 BPM
STRESS POST PEAK HR: 96 BPM
STRESS POST PEAK SYSTOLIC BP: 131 MMHG
STRESS POST PEAK SYSTOLIC BP: 131 MMHG
TARGET HR FORMULA: NORMAL
TARGET HR FORMULA: NORMAL
TEST INDICATION: NORMAL
TEST INDICATION: NORMAL

## 2025-02-12 PROCEDURE — 78434 AQMBF PET REST & RX STRESS: CPT

## 2025-02-12 PROCEDURE — A9555 RB82 RUBIDIUM: HCPCS

## 2025-02-12 PROCEDURE — 93017 CV STRESS TEST TRACING ONLY: CPT

## 2025-02-12 PROCEDURE — 78431 MYOCRD IMG PET RST&STRS CT: CPT

## 2025-02-12 RX ORDER — REGADENOSON 0.08 MG/ML
0.4 INJECTION, SOLUTION INTRAVENOUS ONCE
Status: COMPLETED | OUTPATIENT
Start: 2025-02-12 | End: 2025-02-12

## 2025-02-12 RX ADMIN — REGADENOSON 0.4 MG: 0.08 INJECTION, SOLUTION INTRAVENOUS at 13:49

## 2025-02-14 LAB
RATE PRESSURE PRODUCT: NORMAL
SL CV REST NUCLEAR ISOTOPE DOSE: 30 MCI
SL CV STRESS NUCLEAR ISOTOPE DOSE: 30 MCI
SL CV STRESS RECOVERY BP: NORMAL MMHG
SL CV STRESS RECOVERY HR: 86 BPM
SL CV STRESS RECOVERY O2 SAT: 95 %
SPECT HRT GATED+EF W RNC IV: 78 %
STRESS ANGINA INDEX: 0
STRESS BASELINE BP: NORMAL MMHG
STRESS BASELINE HR: 69 BPM
STRESS O2 SAT REST: 98 %
STRESS PEAK HR: 96 BPM
STRESS POST O2 SAT PEAK: 97 %
STRESS POST PEAK BP: 131 MMHG
STRESS/REST PERFUSION RATIO: 0.88

## 2025-02-18 ENCOUNTER — HOSPITAL ENCOUNTER (OUTPATIENT)
Dept: NON INVASIVE DIAGNOSTICS | Facility: CLINIC | Age: 75
Discharge: HOME | End: 2025-02-18
Payer: MEDICARE

## 2025-02-18 VITALS
HEIGHT: 61 IN | SYSTOLIC BLOOD PRESSURE: 112 MMHG | BODY MASS INDEX: 36.25 KG/M2 | WEIGHT: 192 LBS | HEART RATE: 94 BPM | DIASTOLIC BLOOD PRESSURE: 80 MMHG

## 2025-02-18 DIAGNOSIS — R94.31 ABNORMAL EKG: ICD-10-CM

## 2025-02-18 DIAGNOSIS — R07.2 PRECORDIAL PAIN: ICD-10-CM

## 2025-02-18 LAB
AORTIC ROOT: 2.8 CM
AORTIC VALVE MEAN VELOCITY: 8 M/S
ASCENDING AORTA: 3.1 CM
AV AREA BY CONTINUOUS VTI: 2.8 CM2
AV AREA PEAK VELOCITY: 2.4 CM2
AV LVOT MEAN GRADIENT: 2 MMHG
AV LVOT PEAK GRADIENT: 3 MMHG
AV MEAN PRESS GRAD SYS DOP V1V2: 3 MMHG
AV ORIFICE AREA US: 2.77 CM2
AV PEAK GRADIENT: 7 MMHG
AV VELOCITY RATIO: 0.8
AV VMAX SYS DOP: 1.28 M/S
BSA FOR ECHO PROCEDURE: 1.86 M2
DOP CALC AO VTI: 23.17 CM
DOP CALC LVOT AREA: 3.46 CM2
DOP CALC LVOT CARDIAC INDEX: 2.4 L/MIN/M2
DOP CALC LVOT CARDIAC OUTPUT: 4.46 L/MIN
DOP CALC LVOT DIAMETER: 2.1 CM
DOP CALC LVOT PEAK VEL VTI: 18.53 CM
DOP CALC LVOT PEAK VEL: 0.89 M/S
DOP CALC LVOT STROKE INDEX: 34.9 ML/M2
DOP CALC LVOT STROKE VOLUME: 64.15
E WAVE DECELERATION TIME: 164 MS
E/A RATIO: 0.6
FRACTIONAL SHORTENING: 44 (ref 28–44)
INTERVENTRICULAR SEPTUM IN DIASTOLE (PARASTERNAL SHORT AXIS VIEW): 1 CM
INTERVENTRICULAR SEPTUM: 1 CM (ref 0.6–1.1)
LAAS-AP2: 12.5 CM2
LAAS-AP4: 11.3 CM2
LEFT ATRIUM SIZE: 3.3 CM
LEFT ATRIUM VOLUME (MOD BIPLANE): 27 ML
LEFT ATRIUM VOLUME INDEX (MOD BIPLANE): 14.5 ML/M2
LEFT INTERNAL DIMENSION IN SYSTOLE: 2.5 CM (ref 2.1–4)
LEFT VENTRICULAR INTERNAL DIMENSION IN DIASTOLE: 4.5 CM (ref 3.5–6)
LEFT VENTRICULAR POSTERIOR WALL IN END DIASTOLE: 1.1 CM
LEFT VENTRICULAR STROKE VOLUME: 69 ML
LV EF US.2D.A4C+ESTIMATED: 65 %
LVSV (TEICH): 69 ML
MV E'TISSUE VEL-LAT: 8 CM/S
MV E'TISSUE VEL-SEP: 7 CM/S
MV PEAK A VEL: 0.96 M/S
MV PEAK E VEL: 58 CM/S
RA PRESSURE ESTIMATED: 3 MMHG
RIGHT ATRIUM AREA SYSTOLE A4C: 9.8 CM2
RIGHT VENTRICLE ID DIMENSION: 3.3 CM
RV PSP: 33 MMHG
SINOTUBULAR JUNCTION: 2.5 CM
SL CV LEFT ATRIUM LENGTH A2C: 4.4 CM
SL CV LV EF: 65
SL CV PED ECHO LEFT VENTRICLE DIASTOLIC VOLUME (MOD BIPLANE) 2D: 92 ML
SL CV PED ECHO LEFT VENTRICLE SYSTOLIC VOLUME (MOD BIPLANE) 2D: 23 ML
SL CV SINUS OF VALSALVA 2D: 3.1 CM
STJ: 2.5 CM
TR MAX PG: 30 MMHG
TR PEAK VELOCITY: 2.8 M/S
TRICUSPID ANNULAR PLANE SYSTOLIC EXCURSION: 1.9 CM
TRICUSPID VALVE PEAK REGURGITATION VELOCITY: 2.76 M/S

## 2025-02-18 PROCEDURE — 93306 TTE W/DOPPLER COMPLETE: CPT | Performed by: INTERNAL MEDICINE

## 2025-02-18 PROCEDURE — 93306 TTE W/DOPPLER COMPLETE: CPT

## 2025-02-20 DIAGNOSIS — M25.561 RIGHT KNEE PAIN, UNSPECIFIED CHRONICITY: Primary | ICD-10-CM

## 2025-02-21 ENCOUNTER — TELEPHONE (OUTPATIENT)
Age: 75
End: 2025-02-21

## 2025-02-21 DIAGNOSIS — G25.81 RESTLESS LEG SYNDROME: Primary | ICD-10-CM

## 2025-02-21 RX ORDER — ROPINIROLE 0.25 MG/1
0.25 TABLET, FILM COATED ORAL
Qty: 30 TABLET | Refills: 2 | Status: SHIPPED | OUTPATIENT
Start: 2025-02-21 | End: 2025-03-16

## 2025-02-21 NOTE — TELEPHONE ENCOUNTER
Patient called regarding Rx: Clonazepam. Pt states medication was discontinued by Dr. Padilla sleep doctor as patient was recently diagnosed with sleep apnea and states does not recommend patient taking medication. Pt inquiring if alternative medication can be prescribed for her restless leg syndrome.      Please advise.  Thank you

## 2025-02-21 NOTE — TELEPHONE ENCOUNTER
Requip can be used as a replacement for clonazepam.  If she is interested in a trial of the medication please find out which drugstore she would like me to send it to thank you

## 2025-03-11 ENCOUNTER — PREP FOR PROCEDURE (OUTPATIENT)
Dept: OBGYN CLINIC | Facility: HOSPITAL | Age: 75
End: 2025-03-11

## 2025-03-11 ENCOUNTER — OFFICE VISIT (OUTPATIENT)
Dept: OBGYN CLINIC | Facility: HOSPITAL | Age: 75
End: 2025-03-11
Payer: MEDICARE

## 2025-03-11 ENCOUNTER — HOSPITAL ENCOUNTER (OUTPATIENT)
Dept: RADIOLOGY | Facility: HOSPITAL | Age: 75
Discharge: HOME | End: 2025-03-11
Attending: ORTHOPAEDIC SURGERY
Payer: MEDICARE

## 2025-03-11 VITALS — WEIGHT: 198 LBS | HEIGHT: 61 IN | BODY MASS INDEX: 37.38 KG/M2

## 2025-03-11 DIAGNOSIS — M25.561 CHRONIC PAIN OF RIGHT KNEE: ICD-10-CM

## 2025-03-11 DIAGNOSIS — M17.11 PRIMARY OSTEOARTHRITIS OF RIGHT KNEE: Primary | ICD-10-CM

## 2025-03-11 DIAGNOSIS — Z47.1 AFTERCARE FOLLOWING RIGHT KNEE JOINT REPLACEMENT SURGERY: ICD-10-CM

## 2025-03-11 DIAGNOSIS — Z51.81 ANTICOAGULATION MANAGEMENT ENCOUNTER: ICD-10-CM

## 2025-03-11 DIAGNOSIS — Z79.01 ANTICOAGULATION MANAGEMENT ENCOUNTER: ICD-10-CM

## 2025-03-11 DIAGNOSIS — Z01.812 PRE-OPERATIVE LABORATORY EXAMINATION: ICD-10-CM

## 2025-03-11 DIAGNOSIS — M25.561 RIGHT KNEE PAIN, UNSPECIFIED CHRONICITY: ICD-10-CM

## 2025-03-11 DIAGNOSIS — Z01.810 PRE-OPERATIVE CARDIOVASCULAR EXAMINATION: ICD-10-CM

## 2025-03-11 DIAGNOSIS — Z96.651 AFTERCARE FOLLOWING RIGHT KNEE JOINT REPLACEMENT SURGERY: ICD-10-CM

## 2025-03-11 DIAGNOSIS — G89.29 CHRONIC PAIN OF RIGHT KNEE: ICD-10-CM

## 2025-03-11 PROCEDURE — 99214 OFFICE O/P EST MOD 30 MIN: CPT | Performed by: ORTHOPAEDIC SURGERY

## 2025-03-11 PROCEDURE — 73562 X-RAY EXAM OF KNEE 3: CPT

## 2025-03-11 RX ORDER — MULTIVIT-MIN/IRON FUM/FOLIC AC 7.5 MG-4
1 TABLET ORAL DAILY
Qty: 30 TABLET | Refills: 2 | Status: SHIPPED | OUTPATIENT
Start: 2025-03-11

## 2025-03-11 RX ORDER — ASCORBIC ACID 500 MG
500 TABLET ORAL 2 TIMES DAILY
Qty: 60 TABLET | Refills: 2 | Status: SHIPPED | OUTPATIENT
Start: 2025-03-11

## 2025-03-11 RX ORDER — MUPIROCIN 20 MG/G
OINTMENT TOPICAL
Qty: 15 G | Refills: 1 | Status: SHIPPED | OUTPATIENT
Start: 2025-03-11

## 2025-03-11 RX ORDER — CHLORHEXIDINE GLUCONATE ORAL RINSE 1.2 MG/ML
15 SOLUTION DENTAL ONCE
OUTPATIENT
Start: 2025-03-11 | End: 2025-03-11

## 2025-03-11 RX ORDER — CEFAZOLIN SODIUM 2 G/50ML
2000 SOLUTION INTRAVENOUS ONCE
OUTPATIENT
Start: 2025-03-11 | End: 2025-03-11

## 2025-03-11 RX ORDER — FOLIC ACID 1 MG/1
1 TABLET ORAL DAILY
Qty: 30 TABLET | Refills: 2 | Status: SHIPPED | OUTPATIENT
Start: 2025-03-11

## 2025-03-11 RX ORDER — SODIUM CHLORIDE, SODIUM LACTATE, POTASSIUM CHLORIDE, CALCIUM CHLORIDE 600; 310; 30; 20 MG/100ML; MG/100ML; MG/100ML; MG/100ML
125 INJECTION, SOLUTION INTRAVENOUS CONTINUOUS
OUTPATIENT
Start: 2025-03-11

## 2025-03-11 RX ORDER — TRANEXAMIC ACID 10 MG/ML
1000 INJECTION, SOLUTION INTRAVENOUS ONCE
OUTPATIENT
Start: 2025-03-11 | End: 2025-03-11

## 2025-03-11 RX ORDER — FERROUS SULFATE 324(65)MG
324 TABLET, DELAYED RELEASE (ENTERIC COATED) ORAL
Qty: 30 TABLET | Refills: 2 | Status: SHIPPED | OUTPATIENT
Start: 2025-03-11

## 2025-03-11 RX ORDER — GABAPENTIN 300 MG/1
300 CAPSULE ORAL ONCE
OUTPATIENT
Start: 2025-03-11 | End: 2025-03-11

## 2025-03-11 RX ORDER — ACETAMINOPHEN 325 MG/1
975 TABLET ORAL ONCE
OUTPATIENT
Start: 2025-03-11 | End: 2025-03-11

## 2025-03-11 NOTE — PROGRESS NOTES
Name: Lola Spangler      : 1950       MRN: [id number]   Encounter Provider: Erich Warren MD   Encounter Date: 25  Encounter department: [facility] [place]     ASSESSMENT & PLAN:  Assessment & Plan  Primary osteoarthritis of right knee  Will schedule right TKA  Orders:    Case request operating room: Robotically assisted right total knee arthroplasty, all associated procedures as indicated; Standing    Notify physician; Standing    Diet NPO; Sips with meds; Standing    Nursing Communication Warmimg Interventions Implemented; Standing    Nursing Communication CHG bath, have staff wash entire body (neck down) per pre-op bathing protocol. Routine, evening prior to, and day of surgery.; Standing    Nursing Communication Swab both nares with Povidone-Iodine solution, EXCLUDE if patient has shellfish/Iodine allergy, and replace with nasal alcohol swabstick. Routine, day of surgery, on call to OR; Standing    Void on call to OR; Standing    Insert peripheral IV; Standing    Ambulatory Referral to [facility]; Future    Ambulatory referral to Physical Therapy; Future    Place sequential compression device; Standing    Ambulatory referral to Family Practice; Future    Chronic pain of right knee  Will schedule right TKA  Orders:    Case request operating room: Robotically assisted right total knee arthroplasty, all associated procedures as indicated; Standing    Notify physician; Standing    Diet NPO; Sips with meds; Standing    Nursing Communication Warmimg Interventions Implemented; Standing    Nursing Communication CHG bath, have staff wash entire body (neck down) per pre-op bathing protocol. Routine, evening prior to, and day of surgery.; Standing    Nursing Communication Swab both nares with Povidone-Iodine solution, EXCLUDE if patient has shellfish/Iodine allergy, and replace with nasal alcohol swabstick. Routine, day of surgery, on call to OR;  Standing    Void on call to OR; Standing    Insert peripheral IV; Standing    Ambulatory Referral to [facility]; Future    Ambulatory referral to Physical Therapy; Future    Place sequential compression device; Standing    Ambulatory referral to Family Practice; Future    Pre-operative laboratory examination    Orders:    Comprehensive metabolic panel; Future    Hemoglobin A1C W/EAG Estimation; Future    CBC and differential; Future    MRSA culture; Future    Protime-INR; Future    APTT; Future    Type and screen; Future    Anemia Panel w/Reflex; Future    Pre-operative cardiovascular examination         Aftercare following right knee joint replacement surgery    Orders:    Ambulatory referral to Physical Therapy; Future    Anticoagulation management encounter    Orders:    CBC and differential; Future    Protime-INR; Future    APTT; Future      Diagnostics reviewed and physical exam performed.  Diagnosis, treatment options and associated risks were discussed with the patient including no treatment, nonsurgical treatment and potential for surgical intervention.  The patient was given the opportunity to ask questions regarding each.  Quality of life decision pursue elective right total knee arthroplasty.  Risks and benefits of a robotically assisted right total knee arthroplasty were discussed.  Consents obtained.  No guarantees given.  Reasonable expectations of surgical outcome advised. preoperative vitamins, mupirocin as well as postoperative Lovenox sent to her pharmacy of record.  Weightbearing and activity as tolerated  Counseled on weight loss and general wellness measures    To do next visit:  Return for post-op with x-rays upon arrival right knee.  ____________________________________________________  CHIEF COMPLAINT:  Chief Complaint   Patient presents with    Right Knee - Follow-up         SUBJECTIVE:  Lola Spangler is a 74 y.o. female who presents today for repeat evaluation of her  right knee due to return of pain.  She was last seen several years ago, May 2022 where an aspiration of her right knee was performed with an injection of cortisone with minimal lasting relief.  She states she has increased knee pain with prolonged weightbearing activities.  She is stiffness getting up with her prolonged sedentary positions.  She has difficulty ambulating stairs especially in an alternating fashion.  At times her right knee feels as if it wants to buckle underneath her but has not fallen.  Over the past several months she has been using a cane for ambulatory assistance.  Pain scale 7/10 or greater at times she finds that her right knee symptoms do limit her day-to-day activities as well as impedes on her quality of life.  She is interested in pursuing elective right total knee arthroplasty.  Her son is getting  in September.       Right total hip arthroplasty, November 2019 and doing well        PAST MEDICAL HISTORY:  Past Medical History:   Diagnosis Date    Abnormal ECG     Ankylosing spondylitis (HCC)     Anxiety     LAST ASSESSED: 12/20/16    Arthritis     Back pain     Carpal tunnel syndrome     Colon polyp     7 years ago with colonoscopy    Depression     Encounter for screening mammogram for breast cancer 11/28/2023    Encounter for screening mammogram for malignant neoplasm of breast 05/21/2019    Fibromyalgia     LAST ASSESSED: 12/20/16    Fibromyalgia, primary     Heme positive stool     LAST ASSESSED: 10/22/16    High cholesterol     Hyperlipidemia     under control since weight loss    Impingement syndrome of left shoulder     LAST ASSESSED: 2/21/17    Impingement syndrome of right shoulder     LAST ASSESSED: 2/21/1/7    Long term use of drug     LAST ASSESSED: 12/20/16    Low back pain     Myocardial infarction (HCC)     silent    No known health problems     NO PERTINENT PAST MEDICAL HX    Obesity     RLS (restless legs syndrome)     Rotator cuff injury     right    Spinal  stenosis     Spinal stenosis     Spondylitis (HCC)     Spondyloarthritis     RESOLVED: 16    Vitamin D deficiency        PAST SURGICAL HISTORY:  Past Surgical History:   Procedure Laterality Date    BACK SURGERY      CARPAL TUNNEL RELEASE Left     CERVICAL CONIZATION   W/ LASER      CERVICAL CONIZATION     SECTION      COLONOSCOPY      DILATION AND CURETTAGE OF UTERUS      FL INJECTION RIGHT HIP (NON ARTHROGRAM)  2019    FL INJECTION RIGHT HIP (NON ARTHROGRAM)  2019    FRACTURE SURGERY      HAND SURGERY      HIP SURGERY      JOINT REPLACEMENT      RTHR    ORTHOPEDIC SURGERY      HI ARTHRODESIS POSTERIOR/PSTLAT TQ 1NTRSPC LUMBAR N/A 2017    Procedure: L2-L5 OPEN DECOMPRESSIVE LUMBAR LAMINECTOMY W/ PEDICLE SCREW AND NILDA FIXATION FUSION (IMPULSE); REMOVAL OF SKIN TAG MID BACK;  Surgeon: Catracho Fields MD;  [facility];  Service: Neurosurgery    HI ARTHRP ACETBLR/PROX FEM PROSTC AGRFT/ALGRFT Right 2019    Procedure: ARTHROPLASTY HIP TOTAL Posterior;  Surgeon: Erich Warren MD;  Location: [facility];  Service: Orthopedics    HI COLONOSCOPY FLX DX W/COLLJ SPEC WHEN PFRMD N/A 10/07/2016    Procedure: EGD AND COLONOSCOPY;  Surgeon: Nathan Pierson MD;  Location: [facility];  Service: Gastroenterology    HI NDSC WRST SURG W/RLS TRANSVRS CARPL LIGM Left 2018    Procedure: RELEASE CARPAL TUNNEL ENDOSCOPIC;  Surgeon: Bon Ferrer MD;  Location[facility];  Service: Orthopedics    HI NDSC WRST SURG W/RLS TRANSVRS CARPL LIGM Right 2018    Procedure: RELEASE CARPAL TUNNEL ENDOSCOPIC;  Surgeon: Bno Ferrer MD;  [facility];  Service: Orthopedics    HI RPR AA HERNIA 1ST < 3 CM REDUCIBLE N/A 2024    Procedure: REPAIR HERNIA UMBILICAL;  Surgeon: Lazaro Moser MD;  [facility];  Service: General    HI RPR AA HERNIA 1ST < 3 CM REDUCIBLE N/A 2024    Procedure: REPAIR HERNIA VENTRAL;  Surgeon: Lazaro Moser MD;   Location[facility];  Service: General    RETINAL LASER PROCEDURE      SPINE SURGERY      TUBAL LIGATION      UMBILICAL HERNIA REPAIR  08/01/2024       FAMILY HISTORY:  Family History   Problem Relation Age of Onset    Arthritis Mother     Breast cancer Mother 72    Hypertension Mother     Heart attack Mother         MYOCARDIAL INFARCTION    Heart disease Mother     Heart attack Father     Hypertension Father     Stroke Father         CEREBROVASCUALR ACCIDENT (CVA)    Heart disease Father     Arthritis Sister     Uterine cancer Sister     Endometrial cancer Sister 60    Hypertension Sister     Cancer Sister         Cervical and uterine    Heart attack Brother     Prostate cancer Brother 62    Arthritis Brother     Melanoma Brother     Cancer Brother         Melanoma    Heart disease Brother     Arthritis Family     Hyperlipidemia Family     Depression Son     Breast cancer Maternal Aunt 40    No Known Problems Daughter     No Known Problems Maternal Grandmother     No Known Problems Maternal Grandfather     No Known Problems Paternal Grandmother     No Known Problems Paternal Grandfather     No Known Problems Brother     Other Brother         hunting accident (shot)    Melanoma Brother     Prostate cancer Brother     Heart attack Brother     Stroke Brother     Hypertension Brother     Arthritis Sister     Heart attack Sister     No Known Problems Son     No Known Problems Son     Lung cancer Maternal Uncle     Breast cancer additional onset Other 52    Uterine cancer Other        SOCIAL HISTORY:  Social History     Tobacco Use    Smoking status: Never    Smokeless tobacco: Never   Vaping Use    Vaping status: Never Used   Substance Use Topics    Alcohol use: Yes     Comment: An occasional glass of wine    Drug use: No       MEDICATIONS:    Current Outpatient Medications:     acetaminophen (TYLENOL) 650 mg CR tablet, Take 1,300 mg by mouth 2 (two) times a day, Disp: , Rfl:     aspirin 81 MG tablet, Take 1 tablet  "by mouth daily, Disp: , Rfl:     cephalexin (KEFLEX) 500 mg capsule, cephalexin 500 mg capsule  TAKE 4 CAPSULES BY MOUTH 1 HOUR BEFORE DENTAL APPOINTMENT, Disp: , Rfl:     Cholecalciferol (VITAMIN D3) 50 MCG (2000 UT) capsule, Vitamin D3 50 mcg (2,000 unit) capsule  Take by oral route., Disp: , Rfl:     Diclofenac Sodium (VOLTAREN) 1 %, Apply 4 g topically 4 (four) times a day, Disp: 100 g, Rfl: 4    gabapentin (NEURONTIN) 100 mg capsule, Take 2 capsules (200 mg total) by mouth 3 (three) times a day, Disp: 180 capsule, Rfl: 5    gabapentin (Neurontin) 600 MG tablet, Take 1 tablet (600 mg total) by mouth daily at bedtime, Disp: 90 tablet, Rfl: 3    PARoxetine (PAXIL) 20 mg tablet, TAKE 1 TABLET BY MOUTH EVERY DAY, Disp: 90 tablet, Rfl: 2    rOPINIRole (REQUIP) 0.25 mg tablet, Take 1 tablet (0.25 mg total) by mouth daily at bedtime For management of restless leg syndrome, Disp: 30 tablet, Rfl: 2    ALLERGIES:  No Known Allergies    LABS:  HgA1c:   Lab Results   Component Value Date    HGBA1C 5.6 10/15/2019     BMP:   Lab Results   Component Value Date    GLUCOSE 89 11/28/2015    CALCIUM 9.1 01/15/2025     11/28/2015    K 4.1 01/15/2025    CO2 27 01/15/2025     01/15/2025    BUN 22 01/15/2025    CREATININE 0.75 01/15/2025     CBC: No components found for: \"CBC\"    _____________________________________________________  PHYSICAL EXAMINATION:  Vital signs: Ht 5' 1\" (1.549 m)   Wt 89.8 kg (198 lb)   LMP  (LMP Unknown)   BMI 37.41 kg/m²   General: No acute distress, awake and alert  Psychiatric: Mood and affect appear appropriate  HEENT: Trachea Midline, No torticollis, no apparent facial trauma  Cardiovascular: No audible murmurs; Extremities appear perfused  Pulmonary: No audible wheezing or stridor  Skin: Intact, no open lesions; see further details (if any) below    MUSCULOSKELETAL EXAMINATION:    Right Knee Exam     Tenderness   The patient is experiencing tenderness in the lateral joint line and medial " joint line (Patella facets).    Range of Motion   Extension:  10   Flexion:  110 (With crepitation and stiffness and full flexion)     Other   Erythema: absent  Sensation: normal  Swelling: mild  Effusion: effusion present    Comments:    Intact extensor mechanism.  Valgus alignment    Mildly antalgic gait with use of a single-point cane for ambulatory assistance              _____________________________________________________  STUDIES REVIEWED:    The attending physician has personally reviewed the pertinent films in PACS and their interpretation is as follows:    Right knee x-rays taken and reviewed in the office today and show: Advanced patellofemoral degenerative changes with erosion of the patellofemoral compartment.  Lateral tracking of the patella noted.  Subchondral sclerosis and osteophyte formation present.  Modest lateral and mild medial joint space narrowing.      PROCEDURES PERFORMED:    Procedures    None Preformed        Scribe Attestation      I,:  Donnell Izquierdo am acting as a scribe while in the presence of the attending physician.:       I,:  Erich Warren MD personally performed the services described in this documentation    as scribed in my presence.:

## 2025-03-11 NOTE — ASSESSMENT & PLAN NOTE
Will schedule right TKA  Orders:    Case request operating room: Robotically assisted right total knee arthroplasty, all associated procedures as indicated; Standing    Notify physician; Standing    Diet NPO; Sips with meds; Standing    Nursing Communication Warmimg Interventions Implemented; Standing    Nursing Communication CHG bath, have staff wash entire body (neck down) per pre-op bathing protocol. Routine, evening prior to, and day of surgery.; Standing    Nursing Communication Swab both nares with Povidone-Iodine solution, EXCLUDE if patient has shellfish/Iodine allergy, and replace with nasal alcohol swabstick. Routine, day of surgery, on call to OR; Standing    Void on call to OR; Standing    Insert peripheral IV; Standing    Ambulatory Referral to [facility]; Future    Ambulatory referral to Physical Therapy; Future    Place sequential compression device; Standing    Ambulatory referral to Family Practice; Future

## 2025-03-14 PROBLEM — R61 DIAPHORESIS: Status: RESOLVED | Noted: 2024-01-08 | Resolved: 2025-03-14

## 2025-03-14 PROBLEM — M51.369 DEGENERATION OF INTERVERTEBRAL DISC OF LUMBAR REGION: Status: ACTIVE | Noted: 2017-02-23

## 2025-03-14 PROBLEM — M25.512 ACUTE PAIN OF LEFT SHOULDER: Status: RESOLVED | Noted: 2023-01-05 | Resolved: 2025-03-14

## 2025-03-14 PROBLEM — R07.9 CHEST PAIN: Status: RESOLVED | Noted: 2025-01-10 | Resolved: 2025-03-14

## 2025-03-14 PROBLEM — R29.818 NEUROGENIC CLAUDICATION: Status: ACTIVE | Noted: 2017-02-09

## 2025-03-14 NOTE — PATIENT INSTRUCTIONS
BEFORE SURGERY    Contact your surgical nurse navigator or surgical provider with any questions regarding preoperative plan or schedule.  Stop all over the counter supplements, herbal, naturopathic  medications for 1 week prior to surgery UNLESS prescribed by your surgeon  Hold NSAIDS (i.e. advil, alleve, motrin, ibuprofen, celebrex) minimum 5 days prior to surgery  Follow presurgical medication instructions provided by preadmission nursing team reviewed during your presurgery phone call  Strategies for optimizing your surgery through breathing exercises, nutrition and physical activity can be found at [email or web address]  Call [phone] with any presurgical concerns or medications questions or use the messaging feature in your [employer] to contact your provider    AFTER SURGERY    Recommend using Tylenol ( acetaminophen ) 1000 mg every eight hours during the first week post discharge along with icing the area for 20 mins every 3-4 hours while awake can be helpful in reducing your need for post operative opioid use. This opioid sparing plan can be used along side your surgeons pain plan.  Use stool softener over the counter (colace) daily after surgery during the first 1-2 weeks to avoid post operative constipation issues  If no bowel movement within 3 days after surgery then use over the counter Miralax in addition to your stool softener   If cleared by your surgical team for activity then early and often walking is encouraged and can be important in prevention of post surgical blood clots. Additionally spend as much time out of bed as possible and allowed by your surgical team  Use your incentive spirometer twice per hour in the first seven days after surgery to help prevent post surgery lung complications and infections  It is very important you follow the instructions from your surgeon regarding any medications for after surgery blood clot prevention. Compliance with these medications or interventions is very  important.  Call [phone] with any post discharge concerns or medical issues or use the messaging feature in your [employer] to contact your provider

## 2025-03-14 NOTE — ASSESSMENT & PLAN NOTE
Atypical  Seen by cardiology in February  Stress/echo completed and essentially benign  Cleared by cardiology

## 2025-03-14 NOTE — PROGRESS NOTES
Internal Medicine Pre-Operative Evaluation:     Reason for Visit: Pre-operative Evaluation for Risk Stratification and Optimization    Patient ID: Lola Spangler is a 74 y.o. female.     Case: [id number] Date/Time: 04/14/25 0730   Procedure: Robotically assisted right total knee arthroplasty, all associated procedures as indicated (Right: Knee)   Anesthesia type: Choice   Diagnosis:      Primary osteoarthritis of right knee [M17.11]      Chronic pain of right knee [M25.561, G89.29]   Pre-op diagnosis:      Primary osteoarthritis of right knee [M17.11]      Chronic pain of right knee [M25.561, G89.29]   Location: WE OR ROOM 04 / [facility]   Surgeons: Erich Warren MD         Recommendations to Proceed withSurgery    Patient is considered to be Low risk for Medium risk procedure.     After evaluation and discussion with patient with emphasis that all surgery has some degree of inherent risk it is acknowledged by patient this risk is Acceptable.    Patient is optimized and may proceed with planned procedure.     Assessment    Pre-operative Medical Evaluation for planned surgery  Recommendations as listed in PLAN section below  Contact surgical nurse  navigator with any questions regarding preoperative plan or schedule.      Assessment & Plan  Preoperative clearance    Primary osteoarthritis of right knee  Failed outpatient conservative measures  Electing to undergo surgical procedure as stated above    VANNESSA (obstructive sleep apnea)  Uses cpap  Obesity, morbid (HCC)  Recommend ongoing attempts at weight loss  Current BMI meets criteria of <40 per MLJ preoperative qualifications    Chest pain, unspecified type  Atypical  Seen by cardiology in February  Stress/echo completed and essentially benign  Cleared by cardiology  Prediabetes  Hgb A1c   Lab Results   Component Value Date    HGBA1C 6.0 (H) 03/22/2025     Recommend following DM diet             Plan:     1. Further  preoperative workup as follows:   - none no further testing required may proceed with surgery    2. Preoperative Medication Management Review performed by PAT nursing  YES    3. Patient requires further consultation with:   No Consults Required    4. Discharge Planning / Barriers to Discharge  none identified - patients has post discharge therapy plan in place, transportation arranged for discharge day, adequate family support at home to assist with discharge to home.        Subjective:           History of Present Illness:     Lola Spangler is a 74 y.o. female who presents to the office today for a preoperative consultation at the request of surgeon. The patient understands this is an elective procedure and not emergent. They are electing to undergo planned procedure with an understanding that all surgery has inherent risk. They have worked with their surgeon and failed conservative treatment measures. Today they present for preoperative risk assessment and recommendations for optimization in preparation for surgery.    Pt seen in center for perioperative medicine for upcoming proposed surgery. They have failed previous conservative measures and have elected surgical intervention.     Pt meets presurgical lab and BMI optimization goals.    Pt was seen by cardiology in February for non specific chest pain symptoms. She underwent stress/echo, which were both essentially benign. Pt has her cardiac clearance in the media section. She is cleared to hold ASA as needed for surgery.     Pt has a h/o back surgery and is concerned regarding spinal anesthesia, she will d/w anesthesia the morning of surgery      Lola Spangler has an IN HOSPITAL cardiac risk of RCI RISK CLASS I (0 risk factors, risk of major cardiac compl. appr. 0.5%) based on RCRI calculator    Cardiac Risk Estimation: per the Revised Cardiac Risk Index (Circ. 100:1043, 1999),         Pre-op Exam    Previous history of bleeding disorders or clots?:  "No  Previous Anesthesia reaction?: No  Prolonged steroid use in the last 6 months?: No    Assessment of Cardiac Risk:   - Unstable or severe angina or MI in the last 6 weeks or history of stent placement in the last year?: No   - Decompensated heart failure (e.g. New onset heart failure, NYHA  Class IV heart failure, or worsening existing heart failure)?: No  - Significant arrhythmias such as high grade AV block, symptomatic ventricular arrhythmia, newly recognized ventricular tachycardia, supraventricular tachycardia with resting heart rate >100, or symptomatic bradycardia?: No  - Severe heart valve disease including aortic stenosis or symptomatic mitral stenosis?: No      Pre-operative Risk Factors:  Elevated-risk surgery: No    History of cerebrovascular disease: No    History of ischemic heart disease: No  Pre-operative treatment with insulin: No  Pre-operative creatinine >2 mg/dL: No    History of congestive heart failure: No    Duke Activity Status Index (DASI):   DASI Total Score: 23.45  METs: 5.6        ROS: No TIA's or unusual headaches, no dysphagia.  No prolonged cough. No dyspnea or chest pain on exertion.  No abdominal pain, change in bowel habits, black or bloody stools.  No urinary tract or BPH symptoms.  Positive reported pain in arthritic joint. Positive difficulty with gait. No skin rashes or issues.      Objective:    /88   Pulse 105   Ht 5' 1\" (1.549 m)   Wt 88.3 kg (194 lb 9.6 oz)   LMP  (LMP Unknown)   SpO2 97%   BMI 36.77 kg/m²       General Appearance: no distress, conversive  HEENT: PERRLA, conjuctiva normal; oropharynx clear; mucous membranes moist;   Neck:  Supple, no lymphadenopathy or thyromegaly  Lungs: breath sounds normal, normal respiratory effort, no retractions, expiratory effort normal  CV: normal heart sounds S1/S2, PMI normal   ABD: soft non tender, obese, +BS x4  EXT: DP pulses intact, no lymphadenopathy, no edema  Skin: normal turgor, normal texture, no " rash  Psych: affect normal, mood normal  Neuro: AAOx3        The following portions of the patient's history were reviewed and updated as appropriate: allergies, current medications, past family history, past medical history, past social history, past surgical history and problem list.     Past History:       Past Medical History:   Diagnosis Date    Abnormal ECG     Ankylosing spondylitis (HCC)     Anxiety     LAST ASSESSED: 16    Arthritis     Back pain     Carpal tunnel syndrome     Colon polyp     7 years ago with colonoscopy    CPAP (continuous positive airway pressure) dependence     Depression     Encounter for screening mammogram for breast cancer 2023    Encounter for screening mammogram for malignant neoplasm of breast 2019    Fibromyalgia     LAST ASSESSED: 16    Fibromyalgia, primary     Heme positive stool     LAST ASSESSED: 10/22/16    High cholesterol     Hyperlipidemia     under control since weight loss    Impingement syndrome of left shoulder     LAST ASSESSED: 17    Impingement syndrome of right shoulder     LAST ASSESSED:     Long term use of drug     LAST ASSESSED: 16    Low back pain     Myocardial infarction (HCC)     silent    No known health problems     NO PERTINENT PAST MEDICAL HX    Obesity     RLS (restless legs syndrome)     Rotator cuff injury     right    Sleep apnea     Spinal stenosis     Spinal stenosis     Spondylitis (HCC)     Spondyloarthritis     RESOLVED: 16    Vitamin D deficiency     Past Surgical History:   Procedure Laterality Date    BACK SURGERY      CARPAL TUNNEL RELEASE Left     CERVICAL CONIZATION   W/ LASER      CERVICAL CONIZATION     SECTION      COLONOSCOPY      DILATION AND CURETTAGE OF UTERUS      FL INJECTION RIGHT HIP (NON ARTHROGRAM)  2019    FL INJECTION RIGHT HIP (NON ARTHROGRAM)  2019    FRACTURE SURGERY      HAND SURGERY      HIP SURGERY      JOINT REPLACEMENT      RTHR    ORTHOPEDIC  SURGERY      SD ARTHRODESIS POSTERIOR/PSTLAT TQ 1NTRSPC LUMBAR N/A 04/03/2017    Procedure: L2-L5 OPEN DECOMPRESSIVE LUMBAR LAMINECTOMY W/ PEDICLE SCREW AND NILDA FIXATION FUSION (IMPULSE); REMOVAL OF SKIN TAG MID BACK;  Surgeon: Catracho Fields MD;  Location[facility];  Service: Neurosurgery    SD ARTHRP ACETBLR/PROX FEM PROSTC AGRFT/ALGRFT Right 11/07/2019    Procedure: ARTHROPLASTY HIP TOTAL Posterior;  Surgeon: Erich Warren MD;  [facility];  Service: Orthopedics    SD COLONOSCOPY FLX DX W/COLLJ SPEC WHEN PFRMD N/A 10/07/2016    Procedure: EGD AND COLONOSCOPY;  Surgeon: Nathan Pierson MD;  Location: [facility];  Service: Gastroenterology    SD NDSC WRST SURG W/RLS TRANSVRS CARPL LIGM Left 04/26/2018    Procedure: RELEASE CARPAL TUNNEL ENDOSCOPIC;  Surgeon: Bon Ferrer MD;  Location[facility];  Service: Orthopedics    SD NDSC WRST SURG W/RLS TRANSVRS CARPL LIGM Right 06/14/2018    Procedure: RELEASE CARPAL TUNNEL ENDOSCOPIC;  Surgeon: Bon Ferrer MD;  [facility];  Service: Orthopedics    SD RPR AA HERNIA 1ST < 3 CM REDUCIBLE N/A 07/12/2024    Procedure: REPAIR HERNIA UMBILICAL;  Surgeon: Lazaro Moser MD;  [facility];  Service: General    SD RPR AA HERNIA 1ST < 3 CM REDUCIBLE N/A 07/12/2024    Procedure: REPAIR HERNIA VENTRAL;  Surgeon: Lazaro Moser MD;  [facility];  Service: General    RETINAL LASER PROCEDURE      SPINE SURGERY      TUBAL LIGATION      UMBILICAL HERNIA REPAIR  08/01/2024          Social History     Tobacco Use    Smoking status: Never    Smokeless tobacco: Never   Vaping Use    Vaping status: Never Used   Substance Use Topics    Alcohol use: Yes     Comment: An occasional glass of wine    Drug use: No     Family History   Problem Relation Age of Onset    Arthritis Mother     Breast cancer Mother 72    Hypertension Mother     Heart attack Mother         MYOCARDIAL INFARCTION    Heart disease Mother     Heart attack  Father     Hypertension Father     Stroke Father         CEREBROVASCUALR ACCIDENT (CVA)    Heart disease Father     Arthritis Sister     Uterine cancer Sister     Endometrial cancer Sister 60    Hypertension Sister     Cancer Sister         Cervical and uterine    Heart attack Brother     Prostate cancer Brother 62    Arthritis Brother     Melanoma Brother     Cancer Brother         Melanoma    Heart disease Brother     Arthritis Family     Hyperlipidemia Family     Depression Son     Breast cancer Maternal Aunt 40    No Known Problems Daughter     No Known Problems Maternal Grandmother     No Known Problems Maternal Grandfather     No Known Problems Paternal Grandmother     No Known Problems Paternal Grandfather     No Known Problems Brother     Other Brother         hunting accident (shot)    Melanoma Brother     Prostate cancer Brother     Heart attack Brother     Stroke Brother     Hypertension Brother     Arthritis Sister     Heart attack Sister     No Known Problems Son     No Known Problems Son     Lung cancer Maternal Uncle     Breast cancer additional onset Other 52    Uterine cancer Other           Allergies:     No Known Allergies     Current Medications:     Current Outpatient Medications   Medication Instructions    acetaminophen (TYLENOL) 1,300 mg, 2 times daily    ascorbic acid (VITAMIN C) 500 mg, Oral, 2 times daily    aspirin 81 MG tablet 1 tablet, Daily    cephalexin (KEFLEX) 500 mg capsule cephalexin 500 mg capsule   TAKE 4 CAPSULES BY MOUTH 1 HOUR BEFORE DENTAL APPOINTMENT    Cholecalciferol (VITAMIN D3) 50 MCG (2000 UT) capsule Vitamin D3 50 mcg (2,000 unit) capsule   Take by oral route.    Diclofenac Sodium (VOLTAREN) 4 g, Topical, 4 times daily    enoxaparin (LOVENOX) 40 mg, Subcutaneous, Daily (early morning)    ferrous sulfate 324 mg, Oral, Daily before breakfast    folic acid (FOLVITE) 1 mg, Oral, Daily    gabapentin (NEURONTIN) 200 mg, Oral, 3 times daily    gabapentin (NEURONTIN) 600 mg,  "Oral, Daily at bedtime    Multiple Vitamins-Minerals (multivitamin with minerals) tablet 1 tablet, Oral, Daily    mupirocin (BACTROBAN) 2 % ointment Apply topically to the inside of the left and right nostrils twice daily for 5 days before surgery, including the morning of surgery.    PARoxetine (PAXIL) 20 mg, Oral, Daily    rOPINIRole (REQUIP) 0.25 mg, Oral, Daily at bedtime, For management of restless leg syndrome           PRE-OP WORKSHEET DATA    Assessment of Pre-Operative Risks     MLJ Quality Hard Stops:    BMI (<40) : Estimated body mass index is 36.77 kg/m² as calculated from the following:    Height as of this encounter: 5' 1\" (1.549 m).    Weight as of this encounter: 88.3 kg (194 lb 9.6 oz).    Hgb ( >11):   Lab Results   Component Value Date    HGB 13.4 03/22/2025    HGB 15.8 (H) 01/15/2025    HGB 13.4 07/03/2024       HbA1c (<7.5) :   Lab Results   Component Value Date    HGBA1C 6.0 (H) 03/22/2025       GFR (>60) (Less then 45 = Nephrology consult):    Lab Results   Component Value Date    EGFR 92 03/22/2025    EGFR 79 01/15/2025    EGFR 81 07/03/2024            Pre-Op Data Reviewed:       Laboratory Results: I have personally reviewed the pertinent reports    EKG: I personally reviewed and interpreted available tracings in the electronic medical record    1/10/2025 POCT= NSR    OLD RECORDS: reviewed old records in the chart review section if EHR on day of visit.    Previous cardiopulmonary studies within the past year:  Echocardiogram: yes   Lab Results   Component Value Date    LVEF 65 02/18/2025     Cardiac Catheterization: no  Stress Test: yes, 2025  Normal study  No perfusion defects seen.  Myocardial flow reserve is normal (see table below). MFR:   2.8  No relative reduction in myocardial flow reserve in any coronary artery vascular territory.   Normal resting EF with normal increase in EF with stress. Rest EF: 64% ; Stress EF: 78%  No ischemic ECG changes noted with stress. Sensitivity is " limited due to pharmacological protocol. No arrhythmia noted.   No significant coronary calcification seen on cardiac CT (note that calcium score was not performed).  Please see separate radiology report for extracardiac CT findings.       Time of visit including pre-visit chart review, visit and post-visit coordination of plan and care , review of pre-surgical lab work, preparation and time spent documenting note in electronic medical record, time spent face-to-face in physical examination answering patient questions by care team 35 minutes             [facility]

## 2025-03-15 DIAGNOSIS — G25.81 RESTLESS LEG SYNDROME: ICD-10-CM

## 2025-03-16 RX ORDER — ROPINIROLE 0.25 MG/1
0.25 TABLET, FILM COATED ORAL
Qty: 90 TABLET | Refills: 1 | Status: SHIPPED | OUTPATIENT
Start: 2025-03-16

## 2025-03-17 ENCOUNTER — TELEPHONE (OUTPATIENT)
Dept: OBGYN CLINIC | Facility: HOSPITAL | Age: 75
End: 2025-03-17

## 2025-03-17 DIAGNOSIS — M17.11 PRIMARY OSTEOARTHRITIS OF RIGHT KNEE: Primary | ICD-10-CM

## 2025-03-17 RX ORDER — ENOXAPARIN SODIUM 100 MG/ML
40 INJECTION SUBCUTANEOUS
Qty: 11.2 ML | Refills: 0 | Status: SHIPPED | OUTPATIENT
Start: 2025-03-17 | End: 2025-04-14

## 2025-03-17 NOTE — TELEPHONE ENCOUNTER
Preoperative Elective Admission Assessment- spoke with patient     EKG/LAB/MRSA SWAB/CXR date: going either 3/20 or 3/21    Living Situation:    Who does pt live with: spouse  What kind of home: multi-level  How do they enter the home: front  How many levels in home: 3  # of steps to enter home: 2  # of steps to second floor: 14  Are there handrails: Yes  Are there landings: Yes  Sleeping arrangement: first/entry floor (recommended for the first week )  Where is Bathroom: second floor   Where is the tub or shower: second floor; walk in shower   Toilet height? Concerns for low toilets -standard   Dogs or ther pets: no     First Floor Setup:   Is there a bathroom: No  Where would pt sleep: recliner then transition to bed on second floor      DME: Pt has RW; advised to bring dos.  patient is looking into if she has a BSC & RTS- will report back if does not have.   We discussed clearing pathways in the home and making sure there is accessibly to use the walker, for example, removing throw rugs.      Patient's Current Level of Function: Ambulates: Independently, cane as needed.     Post-op Caregiver: spouse  Caregiver Name and phone number for Inpatient discharge needs: Leland (spouse) or Meka (daughter)   Currently receive any HHC/aides/community supports: No     Post-op Transport: spouse  To/from hospital: spouse  To/from PT 2-3x/week: spouse  Uses community transport now: No     Outpatient Physical Therapy Site:  Site: [place]  pre and post-op appts scheduled? Yes     Medication Management: self  Preferred Pharmacy for Post-op Medications: [id number] - [place], [place] - [address] [[id number]]   Blood Management Vitamin Regimen: Pt confirms taking as prescribed  Post-op anticoagulant: lovenox (not ordered)   Has Bactroban for 5 days preop: Yes  Educated on Preoperative Bathing Instructions, and use of Soap for 5 days before surgery.      DC Plan: Pt plans to be discharged home      Barriers to DC  identified preoperatively: none identified    BMI: 37.41    Patient Education:  Pt educated on post-op pain, early mobilization (POD0), LOS goals, OP PT goals, and preoperative bathing. Patient educated that our goal is to appropriately discharge patient based off their post-op function while striving to maintain maximal independence. The goal is to discharge patient to home and for them to attend outpatient physical therapy.    Assigned to care team? Yes

## 2025-03-21 NOTE — PRE-PROCEDURE INSTRUCTIONS
Pre-Surgery Instructions:   Medication Instructions    acetaminophen (TYLENOL) 650 mg CR tablet Uses PRN- OK to take day of surgery    ascorbic acid (VITAMIN C) 500 MG tablet Instructions provided by MD    aspirin 81 MG tablet Take day of surgery.    Cholecalciferol (VITAMIN D3) 50 MCG (2000 UT) capsule Stop taking 7 days prior to surgery.    Diclofenac Sodium (VOLTAREN) 1 % Stop taking 1 day prior to surgery.    ferrous sulfate 324 (65 Fe) mg Instructions provided by MD    folic acid (FOLVITE) 1 mg tablet Instructions provided by MD    gabapentin (NEURONTIN) 100 mg capsule Take day of surgery.    gabapentin (Neurontin) 600 MG tablet Take night before surgery    Multiple Vitamins-Minerals (multivitamin with minerals) tablet Instructions provided by MD    mupirocin (BACTROBAN) 2 % ointment Instructions provided by MD    PARoxetine (PAXIL) 20 mg tablet Take day of surgery.    rOPINIRole (REQUIP) 0.25 mg tablet Take night before surgery     Reviewed with patient via phone all medication instructions. Advised not to take any NSAID's, Vitamins not ordered by Surgeon or Herbal products for 7 days prior to the DOS. Acetaminophen products are ok to take. Instructed about NPO after midnight the night before DOS, except sips of water with medications allowed for the morning of the DOS.Reviewed showering instructions as given by Surgical office. Ortho class and Incentive Spirometry Education given. Instructed to call Surgical office with any questions. Informed about call from [facility] with the time to arrive for the scheduled surgery. Patient verbalized understanding.    Medication instructions for day of surgery reviewed. Please take all instructed medications with only a sip of water.       You will receive a call one business day prior to surgery with an arrival time and hospital directions. If your surgery is scheduled on a Monday, the hospital will be calling you on the Friday prior to your surgery. If you have not heard from  anyone by 8pm, please call the hospital supervisor through the hospital  at [phone]. ([place] [phone] or [place] [phone]).    Do not eat or drink anything after midnight the night before your surgery, including candy, mints, lifesavers, or chewing gum. Do not drink alcohol 24hrs before your surgery. Try not to smoke at least 24hrs before your surgery.       Follow the pre surgery showering instructions as listed in the “My Surgical Experience Booklet” or otherwise provided by your surgeon's office. Do not use a blade to shave the surgical area 1 week before surgery. It is okay to use a clean electric clippers up to 24 hours before surgery. Do not apply any lotions, creams, including makeup, cologne, deodorant, or perfumes after showering on the day of your surgery. Do not use dry shampoo, hair spray, hair gel, or any type of hair products.     No contact lenses, eye make-up, or artificial eyelashes. Remove nail polish, including gel polish, and any artificial, gel, or acrylic nails if possible. Remove all jewelry including rings and body piercing jewelry.     Wear causal clothing that is easy to take on and off. Consider your type of surgery.    Keep any valuables, jewelry, piercings at home. Please bring any specially ordered equipment (sling, braces) if indicated.    Arrange for a responsible person to drive you to and from the hospital on the day of your surgery. Please confirm the visitor policy for the day of your procedure when you receive your phone call with an arrival time.     Call the surgeon's office with any new illnesses, exposures, or additional questions prior to surgery.    Please reference your “My Surgical Experience Booklet” for additional information to prepare for your upcoming surgery.    See Geriatric Assessment below...  Cognitive Assessment:   CAM:   TUG <15 sec:  Falls (last 6 months): 0  Hand  score:  -Attempt 1:  -Attempt 2:  -Attempt 3:  Sandro Total Score:  23  PHQ- 9 Depression Scale:1  Nutrition Assessment Score:14  METS:5.62  Incentive Spirometry Level:   Health goals:  -What are your overall health goals? (quit smoking, wt. loss, rest, decrease stress)  Be more active, walk without cane, dance at Son's wedding  -What brings you strength? (family, friends, [place], health)  Family,Prayer  -What activities are important to you? (exercise, reading, travel, work)  My flowers, reading, spending time with grand kids

## 2025-03-22 ENCOUNTER — APPOINTMENT (OUTPATIENT)
Dept: LAB | Facility: CLINIC | Age: 75
End: 2025-03-22
Payer: MEDICARE

## 2025-03-22 DIAGNOSIS — Z01.812 PRE-OPERATIVE LABORATORY EXAMINATION: ICD-10-CM

## 2025-03-22 DIAGNOSIS — Z51.81 ANTICOAGULATION MANAGEMENT ENCOUNTER: ICD-10-CM

## 2025-03-22 DIAGNOSIS — Z79.01 ANTICOAGULATION MANAGEMENT ENCOUNTER: ICD-10-CM

## 2025-03-22 LAB
ALBUMIN SERPL BCG-MCNC: 4.1 G/DL (ref 3.5–5)
ALP SERPL-CCNC: 49 U/L (ref 34–104)
ALT SERPL W P-5'-P-CCNC: 21 U/L (ref 7–52)
ANION GAP SERPL CALCULATED.3IONS-SCNC: 10 MMOL/L (ref 4–13)
APTT PPP: 31 SECONDS (ref 23–34)
AST SERPL W P-5'-P-CCNC: 20 U/L (ref 13–39)
BASOPHILS # BLD AUTO: 0.04 THOUSANDS/ÂΜL (ref 0–0.1)
BASOPHILS NFR BLD AUTO: 1 % (ref 0–1)
BILIRUB SERPL-MCNC: 0.4 MG/DL (ref 0.2–1)
BUN SERPL-MCNC: 17 MG/DL (ref 5–25)
CALCIUM SERPL-MCNC: 9.1 MG/DL (ref 8.4–10.2)
CHLORIDE SERPL-SCNC: 104 MMOL/L (ref 96–108)
CO2 SERPL-SCNC: 25 MMOL/L (ref 21–32)
CREAT SERPL-MCNC: 0.58 MG/DL (ref 0.6–1.3)
EOSINOPHIL # BLD AUTO: 0.13 THOUSAND/ÂΜL (ref 0–0.61)
EOSINOPHIL NFR BLD AUTO: 2 % (ref 0–6)
ERYTHROCYTE [DISTWIDTH] IN BLOOD BY AUTOMATED COUNT: 14.9 % (ref 11.6–15.1)
EST. AVERAGE GLUCOSE BLD GHB EST-MCNC: 126 MG/DL
GFR SERPL CREATININE-BSD FRML MDRD: 92 ML/MIN/1.73SQ M
GLUCOSE P FAST SERPL-MCNC: 95 MG/DL (ref 65–99)
HBA1C MFR BLD: 6 %
HCT VFR BLD AUTO: 40.3 % (ref 34.8–46.1)
HGB BLD-MCNC: 13.4 G/DL (ref 11.5–15.4)
IMM GRANULOCYTES # BLD AUTO: 0.02 THOUSAND/UL (ref 0–0.2)
IMM GRANULOCYTES NFR BLD AUTO: 0 % (ref 0–2)
INR PPP: 0.9 (ref 0.85–1.19)
LYMPHOCYTES # BLD AUTO: 1.31 THOUSANDS/ÂΜL (ref 0.6–4.47)
LYMPHOCYTES NFR BLD AUTO: 23 % (ref 14–44)
MCH RBC QN AUTO: 30.2 PG (ref 26.8–34.3)
MCHC RBC AUTO-ENTMCNC: 33.3 G/DL (ref 31.4–37.4)
MCV RBC AUTO: 91 FL (ref 82–98)
MONOCYTES # BLD AUTO: 0.49 THOUSAND/ÂΜL (ref 0.17–1.22)
MONOCYTES NFR BLD AUTO: 9 % (ref 4–12)
NEUTROPHILS # BLD AUTO: 3.66 THOUSANDS/ÂΜL (ref 1.85–7.62)
NEUTS SEG NFR BLD AUTO: 65 % (ref 43–75)
NRBC BLD AUTO-RTO: 0 /100 WBCS
PLATELET # BLD AUTO: 331 THOUSANDS/UL (ref 149–390)
PMV BLD AUTO: 9.3 FL (ref 8.9–12.7)
POTASSIUM SERPL-SCNC: 3.9 MMOL/L (ref 3.5–5.3)
PROT SERPL-MCNC: 6.5 G/DL (ref 6.4–8.4)
PROTHROMBIN TIME: 12.4 SECONDS (ref 12.3–15)
RBC # BLD AUTO: 4.44 MILLION/UL (ref 3.81–5.12)
SODIUM SERPL-SCNC: 139 MMOL/L (ref 135–147)
WBC # BLD AUTO: 5.65 THOUSAND/UL (ref 4.31–10.16)

## 2025-03-22 PROCEDURE — 85610 PROTHROMBIN TIME: CPT

## 2025-03-22 PROCEDURE — 36415 COLL VENOUS BLD VENIPUNCTURE: CPT

## 2025-03-22 PROCEDURE — 87081 CULTURE SCREEN ONLY: CPT

## 2025-03-22 PROCEDURE — 85025 COMPLETE CBC W/AUTO DIFF WBC: CPT

## 2025-03-22 PROCEDURE — 86900 BLOOD TYPING SEROLOGIC ABO: CPT | Performed by: ORTHOPAEDIC SURGERY

## 2025-03-22 PROCEDURE — 83036 HEMOGLOBIN GLYCOSYLATED A1C: CPT

## 2025-03-22 PROCEDURE — 80053 COMPREHEN METABOLIC PANEL: CPT

## 2025-03-22 PROCEDURE — 85730 THROMBOPLASTIN TIME PARTIAL: CPT

## 2025-03-22 PROCEDURE — 86850 RBC ANTIBODY SCREEN: CPT | Performed by: ORTHOPAEDIC SURGERY

## 2025-03-22 PROCEDURE — 86901 BLOOD TYPING SEROLOGIC RH(D): CPT | Performed by: ORTHOPAEDIC SURGERY

## 2025-03-23 ENCOUNTER — LAB REQUISITION (OUTPATIENT)
Dept: LAB | Facility: HOSPITAL | Age: 75
End: 2025-03-23
Payer: MEDICARE

## 2025-03-23 DIAGNOSIS — Z01.812 ENCOUNTER FOR PREPROCEDURAL LABORATORY EXAMINATION: ICD-10-CM

## 2025-03-23 LAB
ABO GROUP BLD: NORMAL
BLD GP AB SCN SERPL QL: NEGATIVE
MRSA NOSE QL CULT: NORMAL
RH BLD: POSITIVE
SPECIMEN EXPIRATION DATE: NORMAL

## 2025-03-24 ENCOUNTER — OFFICE VISIT (OUTPATIENT)
Age: 75
End: 2025-03-24
Payer: MEDICARE

## 2025-03-24 VITALS
HEART RATE: 105 BPM | WEIGHT: 194.6 LBS | BODY MASS INDEX: 36.74 KG/M2 | OXYGEN SATURATION: 97 % | SYSTOLIC BLOOD PRESSURE: 130 MMHG | HEIGHT: 61 IN | DIASTOLIC BLOOD PRESSURE: 88 MMHG

## 2025-03-24 DIAGNOSIS — R07.9 CHEST PAIN, UNSPECIFIED TYPE: ICD-10-CM

## 2025-03-24 DIAGNOSIS — M17.11 PRIMARY OSTEOARTHRITIS OF RIGHT KNEE: ICD-10-CM

## 2025-03-24 DIAGNOSIS — G47.33 OSA (OBSTRUCTIVE SLEEP APNEA): ICD-10-CM

## 2025-03-24 DIAGNOSIS — G89.29 CHRONIC PAIN OF RIGHT KNEE: ICD-10-CM

## 2025-03-24 DIAGNOSIS — Z01.818 PREOPERATIVE CLEARANCE: Primary | ICD-10-CM

## 2025-03-24 DIAGNOSIS — E66.01 OBESITY, MORBID (HCC): ICD-10-CM

## 2025-03-24 DIAGNOSIS — M25.561 CHRONIC PAIN OF RIGHT KNEE: ICD-10-CM

## 2025-03-24 DIAGNOSIS — R73.03 PREDIABETES: ICD-10-CM

## 2025-03-24 PROCEDURE — G2211 COMPLEX E/M VISIT ADD ON: HCPCS | Performed by: NURSE PRACTITIONER

## 2025-03-24 PROCEDURE — 99214 OFFICE O/P EST MOD 30 MIN: CPT | Performed by: NURSE PRACTITIONER

## 2025-03-24 NOTE — ASSESSMENT & PLAN NOTE
Hgb A1c   Lab Results   Component Value Date    HGBA1C 6.0 (H) 03/22/2025     Recommend following DM diet

## 2025-03-31 ENCOUNTER — ANESTHESIA EVENT (OUTPATIENT)
Age: 75
End: 2025-03-31
Payer: MEDICARE

## 2025-04-06 DIAGNOSIS — M25.561 CHRONIC PAIN OF RIGHT KNEE: ICD-10-CM

## 2025-04-06 DIAGNOSIS — G89.29 CHRONIC PAIN OF RIGHT KNEE: ICD-10-CM

## 2025-04-06 DIAGNOSIS — M17.11 PRIMARY OSTEOARTHRITIS OF RIGHT KNEE: ICD-10-CM

## 2025-04-08 ENCOUNTER — EVALUATION (OUTPATIENT)
Dept: PHYSICAL THERAPY | Facility: REHABILITATION | Age: 75
End: 2025-04-08
Payer: MEDICARE

## 2025-04-08 DIAGNOSIS — Z47.1 AFTERCARE FOLLOWING RIGHT KNEE JOINT REPLACEMENT SURGERY: ICD-10-CM

## 2025-04-08 DIAGNOSIS — Z96.651 AFTERCARE FOLLOWING RIGHT KNEE JOINT REPLACEMENT SURGERY: ICD-10-CM

## 2025-04-08 DIAGNOSIS — M17.11 PRIMARY OSTEOARTHRITIS OF RIGHT KNEE: ICD-10-CM

## 2025-04-08 DIAGNOSIS — G89.29 CHRONIC PAIN OF RIGHT KNEE: ICD-10-CM

## 2025-04-08 DIAGNOSIS — M25.561 CHRONIC PAIN OF RIGHT KNEE: ICD-10-CM

## 2025-04-08 PROCEDURE — 97161 PT EVAL LOW COMPLEX 20 MIN: CPT

## 2025-04-08 PROCEDURE — 97110 THERAPEUTIC EXERCISES: CPT

## 2025-04-08 PROCEDURE — 97112 NEUROMUSCULAR REEDUCATION: CPT

## 2025-04-08 RX ORDER — FOLIC ACID 1 MG/1
1000 TABLET ORAL DAILY
Qty: 90 TABLET | Refills: 1 | OUTPATIENT
Start: 2025-04-08

## 2025-04-08 RX ORDER — FERROUS SULFATE 324(65)MG
324 TABLET, DELAYED RELEASE (ENTERIC COATED) ORAL
Qty: 90 TABLET | Refills: 1 | OUTPATIENT
Start: 2025-04-08

## 2025-04-08 RX ORDER — ASCORBIC ACID 500 MG
500 TABLET ORAL 2 TIMES DAILY
Qty: 180 TABLET | Refills: 1 | OUTPATIENT
Start: 2025-04-08

## 2025-04-08 NOTE — PROGRESS NOTES
PT Evaluation     Today's date: 2025  Patient name: Lola Spangler  : 1950  MRN: [id number]  Referring provider: Erich Warren,*  Dx:   Encounter Diagnosis     ICD-10-CM    1. Primary osteoarthritis of right knee  M17.11 Ambulatory referral to Physical Therapy      2. Chronic pain of right knee  M25.561 Ambulatory referral to Physical Therapy    G89.29       3. Aftercare following right knee joint replacement surgery  Z47.1 Ambulatory referral to Physical Therapy    Z96.651           Start Time: 949  Stop Time:   Total time in clinic (min): 43 minutes    Assessment  Impairments: abnormal coordination, abnormal gait, abnormal muscle firing, abnormal muscle tone, abnormal or restricted ROM, activity intolerance, impaired balance, impaired physical strength, lacks appropriate home exercise program, pain with function, weight-bearing intolerance, unable to perform ADL, participation limitations, activity limitations and endurance  Symptom irritability: moderate    Assessment details: Patient is presenting to PT for 1 pre-operative visit for a R TKA.  Risk Assessment and Prediction Tool results reviewed. Patient reported functional outcome scores reviewed. Discussed DOS and patient's questions were answered to patient's satisfaction. Mobility/ROM results per above. Strength results per above. Balance/Gait (including Timed Up & Go) per above. Virtual Home Assessment was reviewed with patient. Home Preparation Checklist was reviewed with patient including identification of care partner and encouragement of single level set-up. Post-operative pain management expectations discussed to the patient's satisfaction. Post-operative gait training for level ground and transfers was performed. Patient demonstrated competence with immediate post-operative home exercise program.  Expectation is that patient will be d/c'd to home with outpatient PT and will benefit from PT to address gait/strength dysfunction  along with hip ROM deficits to return to prior level of functioning.  The patient was educated on the evaluation findings and the POC. HEP was provided and demonstrated. Patient responded well to exercises and interventions performed during the session. Patient noted no increase in symptoms. This patient is a great candidate for physical therapy to address the impairments, decrease pain, decrease fall risk, improve functional independence and quality of life.    Understanding of Dx/Px/POC: good     Prognosis: good    Goals  ST-6 weeks  Patient reports feeling 25% better since starting therapy to improve QOL  Patient's TUG score will improve to 10 seconds to decrease fall risk  Patient's 5x STS score will improve to 12 seconds to decrease fall risk  Patient's R knee flexion AROM will improve 5-10 degrees to improve functional mobility   Patient will report being compliant with HEP to improve prognosis.     LTG: 10-12 weeks  Patient reports feeling 50% better since starting therapy to improve QOL  Patient will be able to perform ADLs and recreational activities with no more than a 5/10 pain to improve QOL.   Patient's R knee flexion AROM will improve by 10-15 degrees to improve functional mobility    Patient will report being compliant with an advanced HEP to improve prognosis.      Plan  Patient would benefit from: skilled physical therapy  Planned modality interventions: low level laser therapy, manual electrical stimulation, biofeedback, cryotherapy, hydrotherapy, ultrasound, traction and TENS    Planned therapy interventions: IASTM, joint mobilization, kinesiology taping, manual therapy, massage, Zacarias taping, muscle pump exercises, nerve gliding, neuromuscular re-education, patient/caregiver education, postural training, strengthening, stretching, therapeutic activities, therapeutic exercise, therapeutic training, transfer training, functional ROM exercises, flexibility, coordination, body mechanics  training, balance/weight bearing training, balance, ADL training, activity modification and abdominal trunk stabilization    Frequency: 2-3x week  Duration in weeks: 10  Treatment plan discussed with: patient        Subjective Evaluation    History of Present Illness  Mechanism of injury: Patient presents to the [facility] today with R knee. The pain began many years ago and has been worsening. The pain is isolated to the knee region. Patient does have difficulty sleeping secondary to the pain.  Patient does take OTC medications to offer short term relief. Finds most difficulty with stairs and standing after sitting for a while. Patient uses a SPC for the last few months in the community.   Patient Goals  Patient goals for therapy: decreased pain, improved balance and independence with ADLs/IADLs  Patient goal: walking  Pain  Current pain ratin  At best pain ratin  At worst pain ratin  Quality: dull ache  Aggravating factors: walking, stair climbing and sitting  Progression: no change    Social Support  Steps to enter house: yes  Lives in: multiple-level home  Lives with: spouse    Treatments  Previous treatment: injection treatment and physical therapy        Objective  Observation: decreased gait speed, SPC    Lower Extremity ROM  ROM Right Left   Knee flex 115 130   Knee ext 3 0     Lower Extremity Strength  MMT Right Left   Hip flexion  4 4   Hip extension     Hip Abduction 4- 3+   Knee Flexion 4 4   Knee Extension 5+ 4+   Ankle Dorsiflexion 4 4   Ankle Plantarflexion           Dynamic Balance Tests  5x sit to stand (sec)  >14 sec indicates high risk for falls 4/8= 13 seconds,   TUG (sec)  >14 sec indicates high risk for falls 4/8= 16 seconds, SPC             Precautions: R TKA DOS 25,  hx of SHARIF, back surgery, cardiac surgery   POC expires Unit limit Auth Expiration date PT/OT + Visit Limit?   25 (10) BOMN  BOMN         Visit/Unit Tracking  AUTH Status:  Date 2025        Medicare Used 1          Remaining             Pertinent Findings:      Test / Measure  4/8/2025   FOTO (Predicted )    Fall risk    R knee ROM limited   BLE strength 4    Access Code: [email or web address]  URL: [email or web address]  Date: 04/08/2025  Prepared by: Wanda Duque    Exercises  - Seated Ankle Pumps  - 1 x daily - 7 x weekly - 3 sets - 10 reps  - Heel Raises with Counter Support  - 1 x daily - 7 x weekly - 3 sets - 10 reps  - Supine Quad Set  - 1 x daily - 7 x weekly - 3 sets - 10 reps  - Supine Gluteal Sets  - 1 x daily - 7 x weekly - 3 sets - 10 reps  - Supine Bridge  - 1 x daily - 7 x weekly - 3 sets - 10 reps    Manuals 4/8                                                                Neuro Re-Ed             POC/HEP education  SW                                                                                          Ther Ex             RB  6'             Bridges Hep             Glute sets  Hep             Quad sets  Hep             Heel raises  Hep             AP Hep                                       Ther Activity             5x STS             TUG             Gait Training                                       Modalities

## 2025-04-09 DIAGNOSIS — Z96.651 AFTERCARE FOLLOWING RIGHT KNEE JOINT REPLACEMENT SURGERY: Primary | ICD-10-CM

## 2025-04-09 DIAGNOSIS — Z47.1 AFTERCARE FOLLOWING RIGHT KNEE JOINT REPLACEMENT SURGERY: Primary | ICD-10-CM

## 2025-04-11 NOTE — DISCHARGE INSTR - AVS FIRST PAGE
Discharge Instructions: Knee replacement with Dr. Warren    Weight Bearing Status:                                           Weight Bearing as tolerated to the right lower extremity with assistive devices.     Pain Management/Medications  You may resume your usual medications.  You may stop pre-operative vitamins (Folic acid, Ferrous sulfate, and Vitamin C) and Bactroban ointment.   Please take the following medications:  Anti-coagulation (blood clot prevention) - Complete Lovenox injections for 28 days. Continue home Aspirin 81 mg daily.   Pain medication:  Oxycodone 5 m tablet every 6 hours as needed for severe pain  Tizanidine (Muscle relaxer) 2 m tablet up to 3 times a day as needed for mild pain and muscle discomfort  Tylenol 1000 mg: up to three times daily as needed for mild to moderate pain. Do not exceed 3000mg daily   The pain medications will likely cause constipation, in order to decrease this risk consider taking over the counter stool softeners or MiraLax    Continue applying ice to your knee on and off for about 20 minutes as needed.  Continue to elevate your operative leg, with ankle above the level of your heart when possible.    If you have questions or pain concerns, please contact the office.   If you need refills of your medications prior to your next office visit, please contact the office.     Showering/Dressing Instructions:   Do not shower until first follow up appointment.  Keep surgical dressing clean and dry until follow up appointment.  You may adjust the ACE bandage as it slides down   If your leg is swelling around the bandage due to continued sliding, please remove the bandage   No baths, swimming, or submerging your leg until cleared to do so.      Driving Instructions:  No driving until cleared by Orthopaedic Surgery.    PT/OT:  Continue PT/OT on outpatient basis as directed    Follow up instructions:   Follow up as scheduled on 2025 in [place].   If you need to  change or cancel your appointment for any reason, please call the clinic at [phone]    Please contact the office if you experience any of the following:  Excessive bleeding (bleeding through your dressing)  Fever greater than 101 degrees F after 48 hours (low grade fevers the day or two after surgery are normal)  Persistent nausea or vomiting  Decreased sensation or discoloration of the operative limb  Pain or swelling that is getting worse and not better with medication    Miscellaneous:  Advise against any dental cleanings or procedures for 3 months after surgery.   - If there is a dental emergency, please contact the office for further instructions.

## 2025-04-14 ENCOUNTER — ANESTHESIA (OUTPATIENT)
Age: 75
End: 2025-04-14
Payer: MEDICARE

## 2025-04-14 ENCOUNTER — HOSPITAL ENCOUNTER (OUTPATIENT)
Age: 75
Setting detail: OUTPATIENT SURGERY
Discharge: HOME | End: 2025-04-16
Attending: ORTHOPAEDIC SURGERY | Admitting: ORTHOPAEDIC SURGERY
Payer: MEDICARE

## 2025-04-14 DIAGNOSIS — M25.561 CHRONIC PAIN OF RIGHT KNEE: ICD-10-CM

## 2025-04-14 DIAGNOSIS — G89.29 CHRONIC PAIN OF RIGHT KNEE: ICD-10-CM

## 2025-04-14 DIAGNOSIS — M17.11 PRIMARY OSTEOARTHRITIS OF RIGHT KNEE: Primary | ICD-10-CM

## 2025-04-14 PROCEDURE — C1776 JOINT DEVICE (IMPLANTABLE): HCPCS | Performed by: ORTHOPAEDIC SURGERY

## 2025-04-14 PROCEDURE — 97163 PT EVAL HIGH COMPLEX 45 MIN: CPT

## 2025-04-14 PROCEDURE — NC001 PR NO CHARGE: Performed by: ORTHOPAEDIC SURGERY

## 2025-04-14 PROCEDURE — S2900 ROBOTIC SURGICAL SYSTEM: HCPCS | Performed by: ORTHOPAEDIC SURGERY

## 2025-04-14 PROCEDURE — 27447 TOTAL KNEE ARTHROPLASTY: CPT

## 2025-04-14 PROCEDURE — 97535 SELF CARE MNGMENT TRAINING: CPT

## 2025-04-14 PROCEDURE — 97167 OT EVAL HIGH COMPLEX 60 MIN: CPT

## 2025-04-14 PROCEDURE — 99222 1ST HOSP IP/OBS MODERATE 55: CPT | Performed by: INTERNAL MEDICINE

## 2025-04-14 PROCEDURE — 97116 GAIT TRAINING THERAPY: CPT

## 2025-04-14 PROCEDURE — 97110 THERAPEUTIC EXERCISES: CPT

## 2025-04-14 PROCEDURE — C1713 ANCHOR/SCREW BN/BN,TIS/BN: HCPCS | Performed by: ORTHOPAEDIC SURGERY

## 2025-04-14 PROCEDURE — 27447 TOTAL KNEE ARTHROPLASTY: CPT | Performed by: ORTHOPAEDIC SURGERY

## 2025-04-14 DEVICE — ATTUNE KNEE SYSTEM TIBIAL INSERT FIXED BEARING POSTERIOR STABILIZED 4 6MM AOX
Type: IMPLANTABLE DEVICE | Site: KNEE | Status: FUNCTIONAL
Brand: ATTUNE

## 2025-04-14 DEVICE — ATTUNE KNEE SYSTEM FEMORAL POSTERIOR STABILIZED SIZE 4 RIGHT CEMENTED
Type: IMPLANTABLE DEVICE | Site: KNEE | Status: FUNCTIONAL
Brand: ATTUNE

## 2025-04-14 DEVICE — ATTUNE KNEE SYSTEM TIBIAL BASE FIXED BEARING SIZE 4 CEMENTED
Type: IMPLANTABLE DEVICE | Site: KNEE | Status: FUNCTIONAL
Brand: ATTUNE

## 2025-04-14 DEVICE — SMARTSET HV HIGH VISCOSITY BONE CEMENT 40G
Type: IMPLANTABLE DEVICE | Site: KNEE | Status: FUNCTIONAL
Brand: SMARTSET

## 2025-04-14 DEVICE — ATTUNE PATELLA MEDIALIZED DOME 35MM CEMENTED AOX
Type: IMPLANTABLE DEVICE | Site: KNEE | Status: FUNCTIONAL
Brand: ATTUNE

## 2025-04-14 RX ORDER — ENOXAPARIN SODIUM 100 MG/ML
40 INJECTION SUBCUTANEOUS DAILY
Status: DISCONTINUED | OUTPATIENT
Start: 2025-04-14 | End: 2025-04-16 | Stop reason: HOSPADM

## 2025-04-14 RX ORDER — CEFAZOLIN SODIUM 2 G/50ML
2000 SOLUTION INTRAVENOUS ONCE
Status: COMPLETED | OUTPATIENT
Start: 2025-04-14 | End: 2025-04-14

## 2025-04-14 RX ORDER — CALCIUM CARBONATE 500 MG/1
1000 TABLET, CHEWABLE ORAL DAILY PRN
Status: DISCONTINUED | OUTPATIENT
Start: 2025-04-14 | End: 2025-04-16 | Stop reason: HOSPADM

## 2025-04-14 RX ORDER — DOCUSATE SODIUM 100 MG/1
100 CAPSULE, LIQUID FILLED ORAL 2 TIMES DAILY
Status: DISCONTINUED | OUTPATIENT
Start: 2025-04-14 | End: 2025-04-16 | Stop reason: HOSPADM

## 2025-04-14 RX ORDER — ROCURONIUM BROMIDE 10 MG/ML
INJECTION, SOLUTION INTRAVENOUS AS NEEDED
Status: DISCONTINUED | OUTPATIENT
Start: 2025-04-14 | End: 2025-04-14

## 2025-04-14 RX ORDER — BUPIVACAINE HYDROCHLORIDE 5 MG/ML
INJECTION, SOLUTION EPIDURAL; INTRACAUDAL; PERINEURAL
Status: COMPLETED | OUTPATIENT
Start: 2025-04-14 | End: 2025-04-14

## 2025-04-14 RX ORDER — HYDROMORPHONE HCL/PF 1 MG/ML
0.5 SYRINGE (ML) INJECTION EVERY 2 HOUR PRN
Status: DISCONTINUED | OUTPATIENT
Start: 2025-04-14 | End: 2025-04-16 | Stop reason: HOSPADM

## 2025-04-14 RX ORDER — GABAPENTIN 100 MG/1
200 CAPSULE ORAL 3 TIMES DAILY
Status: DISCONTINUED | OUTPATIENT
Start: 2025-04-14 | End: 2025-04-16 | Stop reason: HOSPADM

## 2025-04-14 RX ORDER — OXYCODONE HYDROCHLORIDE 5 MG/1
5 TABLET ORAL EVERY 6 HOURS PRN
Qty: 30 TABLET | Refills: 0 | Status: SHIPPED | OUTPATIENT
Start: 2025-04-14 | End: 2025-04-24

## 2025-04-14 RX ORDER — TRANEXAMIC ACID 10 MG/ML
1000 INJECTION, SOLUTION INTRAVENOUS ONCE
Status: COMPLETED | OUTPATIENT
Start: 2025-04-14 | End: 2025-04-14

## 2025-04-14 RX ORDER — FENTANYL CITRATE/PF 50 MCG/ML
50 SYRINGE (ML) INJECTION
Status: DISCONTINUED | OUTPATIENT
Start: 2025-04-14 | End: 2025-04-14 | Stop reason: HOSPADM

## 2025-04-14 RX ORDER — CEFAZOLIN SODIUM 1 G/50ML
1000 SOLUTION INTRAVENOUS EVERY 8 HOURS
Status: COMPLETED | OUTPATIENT
Start: 2025-04-14 | End: 2025-04-15

## 2025-04-14 RX ORDER — DEXAMETHASONE SODIUM PHOSPHATE 10 MG/ML
INJECTION, SOLUTION INTRAMUSCULAR; INTRAVENOUS AS NEEDED
Status: DISCONTINUED | OUTPATIENT
Start: 2025-04-14 | End: 2025-04-14

## 2025-04-14 RX ORDER — LIDOCAINE HYDROCHLORIDE 10 MG/ML
INJECTION, SOLUTION EPIDURAL; INFILTRATION; INTRACAUDAL; PERINEURAL AS NEEDED
Status: DISCONTINUED | OUTPATIENT
Start: 2025-04-14 | End: 2025-04-14

## 2025-04-14 RX ORDER — GABAPENTIN 300 MG/1
600 CAPSULE ORAL
Status: DISCONTINUED | OUTPATIENT
Start: 2025-04-15 | End: 2025-04-16 | Stop reason: HOSPADM

## 2025-04-14 RX ORDER — OXYCODONE HYDROCHLORIDE 5 MG/1
5 TABLET ORAL EVERY 4 HOURS PRN
Status: DISCONTINUED | OUTPATIENT
Start: 2025-04-14 | End: 2025-04-16 | Stop reason: HOSPADM

## 2025-04-14 RX ORDER — HYDROMORPHONE HYDROCHLORIDE 2 MG/ML
INJECTION, SOLUTION INTRAMUSCULAR; INTRAVENOUS; SUBCUTANEOUS AS NEEDED
Status: DISCONTINUED | OUTPATIENT
Start: 2025-04-14 | End: 2025-04-14

## 2025-04-14 RX ORDER — ROPINIROLE 0.25 MG/1
0.25 TABLET, FILM COATED ORAL
Status: DISCONTINUED | OUTPATIENT
Start: 2025-04-14 | End: 2025-04-16 | Stop reason: HOSPADM

## 2025-04-14 RX ORDER — METOCLOPRAMIDE HYDROCHLORIDE 5 MG/ML
10 INJECTION INTRAMUSCULAR; INTRAVENOUS ONCE AS NEEDED
Status: DISCONTINUED | OUTPATIENT
Start: 2025-04-14 | End: 2025-04-14 | Stop reason: HOSPADM

## 2025-04-14 RX ORDER — PAROXETINE 20 MG/1
20 TABLET, FILM COATED ORAL DAILY
Status: DISCONTINUED | OUTPATIENT
Start: 2025-04-14 | End: 2025-04-16 | Stop reason: HOSPADM

## 2025-04-14 RX ORDER — ONDANSETRON 2 MG/ML
INJECTION INTRAMUSCULAR; INTRAVENOUS AS NEEDED
Status: DISCONTINUED | OUTPATIENT
Start: 2025-04-14 | End: 2025-04-14

## 2025-04-14 RX ORDER — ONDANSETRON 2 MG/ML
4 INJECTION INTRAMUSCULAR; INTRAVENOUS EVERY 6 HOURS PRN
Status: DISCONTINUED | OUTPATIENT
Start: 2025-04-14 | End: 2025-04-16 | Stop reason: HOSPADM

## 2025-04-14 RX ORDER — ACETAMINOPHEN 500 MG
1000 TABLET ORAL EVERY 6 HOURS PRN
Qty: 90 TABLET | Refills: 0 | Status: SHIPPED | OUTPATIENT
Start: 2025-04-14

## 2025-04-14 RX ORDER — PROPOFOL 10 MG/ML
INJECTION, EMULSION INTRAVENOUS AS NEEDED
Status: DISCONTINUED | OUTPATIENT
Start: 2025-04-14 | End: 2025-04-14

## 2025-04-14 RX ORDER — MIDAZOLAM HYDROCHLORIDE 2 MG/2ML
INJECTION, SOLUTION INTRAMUSCULAR; INTRAVENOUS
Status: COMPLETED | OUTPATIENT
Start: 2025-04-14 | End: 2025-04-14

## 2025-04-14 RX ORDER — HYDROMORPHONE HCL/PF 1 MG/ML
0.5 SYRINGE (ML) INJECTION
Status: DISCONTINUED | OUTPATIENT
Start: 2025-04-14 | End: 2025-04-14 | Stop reason: HOSPADM

## 2025-04-14 RX ORDER — MAGNESIUM HYDROXIDE 1200 MG/15ML
LIQUID ORAL AS NEEDED
Status: DISCONTINUED | OUTPATIENT
Start: 2025-04-14 | End: 2025-04-14 | Stop reason: HOSPADM

## 2025-04-14 RX ORDER — CHLORHEXIDINE GLUCONATE ORAL RINSE 1.2 MG/ML
15 SOLUTION DENTAL ONCE
Status: COMPLETED | OUTPATIENT
Start: 2025-04-14 | End: 2025-04-14

## 2025-04-14 RX ORDER — ACETAMINOPHEN 325 MG/1
975 TABLET ORAL EVERY 6 HOURS PRN
Status: DISCONTINUED | OUTPATIENT
Start: 2025-04-14 | End: 2025-04-16 | Stop reason: HOSPADM

## 2025-04-14 RX ORDER — TIZANIDINE 2 MG/1
2 TABLET ORAL EVERY 8 HOURS PRN
Qty: 60 TABLET | Refills: 0 | Status: SHIPPED | OUTPATIENT
Start: 2025-04-14

## 2025-04-14 RX ORDER — ALBUTEROL SULFATE 90 UG/1
INHALANT RESPIRATORY (INHALATION) AS NEEDED
Status: DISCONTINUED | OUTPATIENT
Start: 2025-04-14 | End: 2025-04-14

## 2025-04-14 RX ORDER — GABAPENTIN 300 MG/1
300 CAPSULE ORAL ONCE
Status: COMPLETED | OUTPATIENT
Start: 2025-04-14 | End: 2025-04-14

## 2025-04-14 RX ORDER — GLYCOPYRROLATE 0.2 MG/ML
INJECTION INTRAMUSCULAR; INTRAVENOUS AS NEEDED
Status: DISCONTINUED | OUTPATIENT
Start: 2025-04-14 | End: 2025-04-14

## 2025-04-14 RX ORDER — ACETAMINOPHEN 325 MG/1
975 TABLET ORAL ONCE
Status: COMPLETED | OUTPATIENT
Start: 2025-04-14 | End: 2025-04-14

## 2025-04-14 RX ORDER — SODIUM CHLORIDE, SODIUM LACTATE, POTASSIUM CHLORIDE, CALCIUM CHLORIDE 600; 310; 30; 20 MG/100ML; MG/100ML; MG/100ML; MG/100ML
125 INJECTION, SOLUTION INTRAVENOUS CONTINUOUS
Status: DISCONTINUED | OUTPATIENT
Start: 2025-04-14 | End: 2025-04-16 | Stop reason: HOSPADM

## 2025-04-14 RX ORDER — EPHEDRINE SULFATE 50 MG/ML
INJECTION INTRAVENOUS AS NEEDED
Status: DISCONTINUED | OUTPATIENT
Start: 2025-04-14 | End: 2025-04-14

## 2025-04-14 RX ORDER — FENTANYL CITRATE 50 UG/ML
INJECTION, SOLUTION INTRAMUSCULAR; INTRAVENOUS AS NEEDED
Status: DISCONTINUED | OUTPATIENT
Start: 2025-04-14 | End: 2025-04-14

## 2025-04-14 RX ORDER — OXYCODONE HYDROCHLORIDE 10 MG/1
10 TABLET ORAL EVERY 4 HOURS PRN
Status: DISCONTINUED | OUTPATIENT
Start: 2025-04-14 | End: 2025-04-16 | Stop reason: HOSPADM

## 2025-04-14 RX ORDER — METHOCARBAMOL 500 MG/1
500 TABLET, FILM COATED ORAL EVERY 6 HOURS SCHEDULED
Status: DISCONTINUED | OUTPATIENT
Start: 2025-04-14 | End: 2025-04-16 | Stop reason: HOSPADM

## 2025-04-14 RX ADMIN — SODIUM CHLORIDE, SODIUM LACTATE, POTASSIUM CHLORIDE, AND CALCIUM CHLORIDE: .6; .31; .03; .02 INJECTION, SOLUTION INTRAVENOUS at 08:39

## 2025-04-14 RX ADMIN — LIDOCAINE HYDROCHLORIDE 5 ML: 10 SOLUTION INTRAVENOUS at 07:36

## 2025-04-14 RX ADMIN — SUGAMMADEX 176 MG: 100 INJECTION, SOLUTION INTRAVENOUS at 08:47

## 2025-04-14 RX ADMIN — ENOXAPARIN SODIUM 40 MG: 40 INJECTION SUBCUTANEOUS at 20:11

## 2025-04-14 RX ADMIN — DEXAMETHASONE SODIUM PHOSPHATE 10 MG: 10 INJECTION INTRAMUSCULAR; INTRAVENOUS at 07:41

## 2025-04-14 RX ADMIN — TRANEXAMIC ACID 1000 MG: 10 INJECTION, SOLUTION INTRAVENOUS at 07:40

## 2025-04-14 RX ADMIN — EPHEDRINE SULFATE 5 MG: 50 INJECTION, SOLUTION INTRAVENOUS at 08:36

## 2025-04-14 RX ADMIN — METHOCARBAMOL 500 MG: 500 TABLET ORAL at 11:17

## 2025-04-14 RX ADMIN — ROCURONIUM BROMIDE 40 MG: 10 INJECTION, SOLUTION INTRAVENOUS at 07:36

## 2025-04-14 RX ADMIN — CEFAZOLIN SODIUM 2000 MG: 2 SOLUTION INTRAVENOUS at 07:31

## 2025-04-14 RX ADMIN — PROPOFOL 180 MG: 10 INJECTION, EMULSION INTRAVENOUS at 07:36

## 2025-04-14 RX ADMIN — BUPIVACAINE HYDROCHLORIDE 10 ML: 5 INJECTION, SOLUTION EPIDURAL; INTRACAUDAL; PERINEURAL at 07:25

## 2025-04-14 RX ADMIN — GABAPENTIN 300 MG: 300 CAPSULE ORAL at 06:21

## 2025-04-14 RX ADMIN — OXYCODONE HYDROCHLORIDE 5 MG: 5 TABLET ORAL at 23:33

## 2025-04-14 RX ADMIN — ROCURONIUM BROMIDE 10 MG: 10 INJECTION, SOLUTION INTRAVENOUS at 07:53

## 2025-04-14 RX ADMIN — FENTANYL CITRATE 50 MCG: 50 INJECTION INTRAMUSCULAR; INTRAVENOUS at 09:44

## 2025-04-14 RX ADMIN — ACETAMINOPHEN 975 MG: 325 TABLET ORAL at 23:33

## 2025-04-14 RX ADMIN — CHLORHEXIDINE GLUCONATE 15 ML: 1.2 SOLUTION ORAL at 06:20

## 2025-04-14 RX ADMIN — SODIUM CHLORIDE, SODIUM LACTATE, POTASSIUM CHLORIDE, AND CALCIUM CHLORIDE 125 ML/HR: .6; .31; .03; .02 INJECTION, SOLUTION INTRAVENOUS at 06:34

## 2025-04-14 RX ADMIN — ACETAMINOPHEN 975 MG: 325 TABLET ORAL at 06:21

## 2025-04-14 RX ADMIN — ONDANSETRON 4 MG: 2 INJECTION INTRAMUSCULAR; INTRAVENOUS at 07:41

## 2025-04-14 RX ADMIN — GABAPENTIN 200 MG: 100 CAPSULE ORAL at 16:22

## 2025-04-14 RX ADMIN — BUPIVACAINE HYDROCHLORIDE 10 ML: 5 INJECTION, SOLUTION EPIDURAL; INTRACAUDAL; PERINEURAL at 07:20

## 2025-04-14 RX ADMIN — GABAPENTIN 200 MG: 100 CAPSULE ORAL at 20:11

## 2025-04-14 RX ADMIN — ALBUTEROL SULFATE 4 PUFF: 90 AEROSOL, METERED RESPIRATORY (INHALATION) at 09:04

## 2025-04-14 RX ADMIN — DOCUSATE SODIUM 100 MG: 100 CAPSULE, LIQUID FILLED ORAL at 11:17

## 2025-04-14 RX ADMIN — ROPINIROLE HYDROCHLORIDE 0.25 MG: 0.25 TABLET, FILM COATED ORAL at 22:14

## 2025-04-14 RX ADMIN — METHOCARBAMOL 500 MG: 500 TABLET ORAL at 18:10

## 2025-04-14 RX ADMIN — CEFAZOLIN SODIUM 1000 MG: 1 SOLUTION INTRAVENOUS at 23:35

## 2025-04-14 RX ADMIN — SODIUM CHLORIDE, SODIUM LACTATE, POTASSIUM CHLORIDE, AND CALCIUM CHLORIDE 125 ML/HR: .6; .31; .03; .02 INJECTION, SOLUTION INTRAVENOUS at 11:10

## 2025-04-14 RX ADMIN — GLYCOPYRROLATE 0.1 MG: 0.2 INJECTION INTRAMUSCULAR; INTRAVENOUS at 08:12

## 2025-04-14 RX ADMIN — CEFAZOLIN SODIUM 1000 MG: 1 SOLUTION INTRAVENOUS at 16:21

## 2025-04-14 RX ADMIN — MIDAZOLAM 2 MG: 1 INJECTION INTRAMUSCULAR; INTRAVENOUS at 07:15

## 2025-04-14 RX ADMIN — DOCUSATE SODIUM 100 MG: 100 CAPSULE, LIQUID FILLED ORAL at 18:10

## 2025-04-14 RX ADMIN — OXYCODONE HYDROCHLORIDE 5 MG: 5 TABLET ORAL at 11:17

## 2025-04-14 RX ADMIN — ACETAMINOPHEN 975 MG: 325 TABLET ORAL at 11:17

## 2025-04-14 RX ADMIN — SUGAMMADEX 100 MG: 100 INJECTION, SOLUTION INTRAVENOUS at 09:03

## 2025-04-14 RX ADMIN — METHOCARBAMOL 500 MG: 500 TABLET ORAL at 23:33

## 2025-04-14 RX ADMIN — PAROXETINE HYDROCHLORIDE 20 MG: 20 TABLET, FILM COATED ORAL at 11:17

## 2025-04-14 RX ADMIN — OXYCODONE HYDROCHLORIDE 5 MG: 5 TABLET ORAL at 16:21

## 2025-04-14 RX ADMIN — BUPIVACAINE 10 ML: 13.3 INJECTION, SUSPENSION, LIPOSOMAL INFILTRATION at 07:20

## 2025-04-14 RX ADMIN — FENTANYL CITRATE 50 MCG: 50 INJECTION INTRAMUSCULAR; INTRAVENOUS at 07:53

## 2025-04-14 RX ADMIN — FENTANYL CITRATE 50 MCG: 50 INJECTION INTRAMUSCULAR; INTRAVENOUS at 10:25

## 2025-04-14 RX ADMIN — HYDROMORPHONE HYDROCHLORIDE 0.5 MG: 2 INJECTION INTRAMUSCULAR; INTRAVENOUS; SUBCUTANEOUS at 09:14

## 2025-04-14 NOTE — ANESTHESIA POSTPROCEDURE EVALUATION
Post-Op Assessment Note    CV Status:  Stable    Pain management: adequate       Mental Status:  Alert and awake   Hydration Status:  Euvolemic   PONV Controlled:  Controlled   Airway Patency:  Patent     Post Op Vitals Reviewed: Yes    No anethesia notable event occurred.    Staff: Anesthesiologist, with CRNAs           Last Filed PACU Vitals:  Vitals Value Taken Time   Temp 97.4 °F (36.3 °C) 04/14/25 1015   Pulse 50 04/14/25 1039   /75 04/14/25 1031   Resp 18 04/14/25 1039   SpO2 97 % 04/14/25 1039   Vitals shown include unfiled device data.    Modified Lorin:     Vitals Value Taken Time   Activity 2 04/14/25 1015   Respiration 2 04/14/25 1015   Circulation 2 04/14/25 1015   Consciousness 1 04/14/25 1015   Oxygen Saturation 1 04/14/25 1015     Modified Lorin Score: 8

## 2025-04-14 NOTE — CONSULTS
Assessment & Plan  Primary osteoarthritis of right knee  -DVT prophylaxis in place-follow-up CBC in a.m. monitoring postoperative hemoglobin-pain control per primary service-ongoing PT evaluation for disposition-follow closely postoperative hemodynamics-overnight medical support    Hyperlipidemia  Follow low cholester and CCD  Depression  The current medical regimen is effective;  continue present plan and medications.    Prediabetes  CCD    PRE-OP HGB LEVEL:   Lab Results   Component Value Date    HGB 13.4 03/22/2025         Subjective/ HPI: Lola Spangler was seen and examined. Hx of KNEE pain failed out patient conservative measures. Elected to undergo total KNEE arthroplasty We are asked to see patient for post op management of underlying medical co-morbidities as outlined above.           ROS:   A 10 point ROS was performed; negative except as noted above.     Past medical history    Past Medical History:   Diagnosis Date    Abnormal ECG     Ankylosing spondylitis (HCC)     Anxiety     LAST ASSESSED: 12/20/16    Arthritis     Back pain     Carpal tunnel syndrome     Colon polyp     7 years ago with colonoscopy    CPAP (continuous positive airway pressure) dependence     Depression     Encounter for screening mammogram for breast cancer 11/28/2023    Encounter for screening mammogram for malignant neoplasm of breast 05/21/2019    Fibromyalgia     LAST ASSESSED: 12/20/16    Fibromyalgia, primary     Heme positive stool     LAST ASSESSED: 10/22/16    High cholesterol     Hyperlipidemia     under control since weight loss    Impingement syndrome of left shoulder     LAST ASSESSED: 2/21/17    Impingement syndrome of right shoulder     LAST ASSESSED: 2/21/1/7    Long term use of drug     LAST ASSESSED: 12/20/16    Low back pain     Myocardial infarction (HCC)     silent    No known health problems     NO PERTINENT PAST MEDICAL HX    Obesity     RLS (restless legs syndrome)     Rotator cuff injury     right    Sleep  apnea     Spinal stenosis     Spinal stenosis     Spondylitis (HCC)     Spondyloarthritis     RESOLVED: 9/21/16    Vitamin D deficiency        Social History:    Substance Use History:   Social History     Substance and Sexual Activity   Alcohol Use Yes    Comment: An occasional glass of wine     Social History     Tobacco Use   Smoking Status Never   Smokeless Tobacco Never     Social History     Substance and Sexual Activity   Drug Use No       Family History:    Family history non-contributory    Medications Prior to Admission  Current Outpatient Medications on File Prior to Encounter   Medication Sig    acetaminophen (TYLENOL) 650 mg CR tablet Take 1,300 mg by mouth 2 (two) times a day    ascorbic acid (VITAMIN C) 500 MG tablet Take 1 tablet (500 mg total) by mouth 2 (two) times a day    aspirin 81 MG tablet Take 1 tablet by mouth daily    Cholecalciferol (VITAMIN D3) 50 MCG (2000 UT) capsule Vitamin D3 50 mcg (2,000 unit) capsule   Take by oral route.    ferrous sulfate 324 (65 Fe) mg Take 1 tablet (324 mg total) by mouth daily before breakfast    folic acid (FOLVITE) 1 mg tablet Take 1 tablet (1 mg total) by mouth daily    gabapentin (NEURONTIN) 100 mg capsule Take 2 capsules (200 mg total) by mouth 3 (three) times a day    gabapentin (Neurontin) 600 MG tablet Take 1 tablet (600 mg total) by mouth daily at bedtime    Multiple Vitamins-Minerals (multivitamin with minerals) tablet Take 1 tablet by mouth daily    mupirocin (BACTROBAN) 2 % ointment Apply topically to the inside of the left and right nostrils twice daily for 5 days before surgery, including the morning of surgery.    PARoxetine (PAXIL) 20 mg tablet TAKE 1 TABLET BY MOUTH EVERY DAY    cephalexin (KEFLEX) 500 mg capsule cephalexin 500 mg capsule   TAKE 4 CAPSULES BY MOUTH 1 HOUR BEFORE DENTAL APPOINTMENT    Diclofenac Sodium (VOLTAREN) 1 % Apply 4 g topically 4 (four) times a day (Patient taking differently: Apply 4 g topically 4 (four) times a day  "As needed)        Review of Scheduled Meds:  Current Facility-Administered Medications   Medication Dose Route Frequency Provider Last Rate    acetaminophen  975 mg Oral Q6H PRN Tiarra Eric, PA-C      calcium carbonate  1,000 mg Oral Daily PRN Tiarra Joanno, PA-C      cefazolin  1,000 mg Intravenous Q8H Tiarra Eric, PA-C      docusate sodium  100 mg Oral BID Tiarra Eric, PA-C      enoxaparin  40 mg Subcutaneous Daily SUSAN Sanchez-C      gabapentin  200 mg Oral TID SUSAN Sanchez-C      [START ON 4/15/2025] gabapentin  600 mg Oral HS Tiarra Reyes, PA-C      HYDROmorphone  0.5 mg Intravenous Q2H PRN Tiarra Joanno, PA-C      lactated ringers  1,000 mL Intravenous Once PRN Tiarra Joanno, PA-C      And    lactated ringers  1,000 mL Intravenous Once PRN Tiarra Joanno, PA-C      lactated ringers  125 mL/hr Intravenous Continuous Tiarra Joanno, PA-C 125 mL/hr (04/14/25 1110)    lactated ringers  125 mL/hr Intravenous Continuous Tiarra Francono, PA-C Stopped (04/14/25 1045)    methocarbamol  500 mg Oral Q6H OPAL Tiarra Francono, PA-C      ondansetron  4 mg Intravenous Q6H PRN Tiarra Joanno, PA-C      oxyCODONE  10 mg Oral Q4H PRN Tiarra Ciano, PA-C      oxyCODONE  5 mg Oral Q4H PRN Tiarra Ciano, PA-C      PARoxetine  20 mg Oral Daily Tiarra Reyes, PA-C      povidone-iodine (BETADINE) 10 % 20 Application in sodium chloride 0.9 % 500 mL irrigation bottle   Irrigation Once Erich Warren MD      rOPINIRole  0.25 mg Oral HS Tiarra Reyes, PA-C      sodium chloride  1,000 mL Intravenous Once PRN Tiarra Joanno, PA-C      And    sodium chloride  1,000 mL Intravenous Once PRN Tiarra Joanno, PA-C         Labs:               Invalid input(s): \"LABGLOM\", \"CMP\"                   Lab Results   Component Value Date    WOUNDCULT 2+ Growth of Stenotrophomonas maltophilia (A) 10/03/2019    WOUNDCULT 2+ Growth of Beta Hemolytic Streptococcus Group B (A) 10/03/2019    WOUNDCULT 2+ Growth of 10/03/2019       Input and Output Summary (last 24 hours): " "      Intake/Output Summary (Last 24 hours) at 4/14/2025 1427  Last data filed at 4/14/2025 1045  Gross per 24 hour   Intake 1387.5 ml   Output 50 ml   Net 1337.5 ml       Imaging:     No orders to display       *Labs /Radiology studiesLabs reviewed  *Medications reviewed and reconciled as needed  *Please refer to order section for additional ordered labs studies  *Case discussed with primary attending during morning huddle case rounds    Vitals:   /59   Pulse 59   Temp 97.5 °F (36.4 °C) (Temporal)   Resp 16   Ht 5' 1\" (1.549 m)   Wt 88.1 kg (194 lb 3.2 oz)   LMP  (LMP Unknown)   SpO2 94%   BMI 36.69 kg/m²       Physical Exam:   General Appearance: no distress, conversive  HEENT: PERRLA, conjuctiva normal; oropharynx clear; mucous membranes moist;   Neck:  Supple, no lymphadenopathy or thyromegaly  Lungs: clear bilaterally, normal respiratory effort, no retractions, expiratory effort normal  CV: S1 S2, no murmurs rubs or gallops, rate is regular   ABD: soft non tender, +BS x4  EXT: DP pulses intact, no lymphadenopathy, no edema ;  right KNEE dressing in place  Skin: normal turgor, normal texture, no rash  Psych: affect normal, mood normal  Neuro: AAOx3          Invasive Devices       Peripheral Intravenous Line  Duration             Peripheral IV 02/12/25 Right Antecubital 61 days    Peripheral IV 04/14/25 Dorsal (posterior);Left Hand <1 day              Drain  Duration             Closed/Suction Drain Anterior;Right Knee Accordion 10 Fr. <1 day                       Code Status: Level 1 - Full Code  Current Length of Stay: 0 day(s)    Total floor / unit time spent today 30 minutes  Coordination of patient's care was performed in conjunction with primary service. Time invested included review of patient's labs, vitals, and management of their comorbidities with continued monitoring, examination of patient as well as answering patient questions, documenting her findings and creating progress note in " electronic medical record,  ordering appropriate diagnostic testing. Medical decision making for the day was made by supervising physician unless otherwise noted in their attestation statement.    ** Please Note: Fluency Direct voice to text software may have been used in the creation of this document. Audio transcription errors may occur**

## 2025-04-14 NOTE — OP NOTE
OPERATIVE REPORT  PATIENT NAME: Lola Spangler  : 1950  MRN: [id number]  Pt Location:  [facility] OR ROOM 04    Surgery Date: 2025    Surgeons and Role:     * Erich Warren MD - Primary     * Tiarra Reyes PA-C     Preop Diagnosis:  Primary osteoarthritis of right knee [M17.11]  Chronic pain of right knee [M25.561, G89.29]    Post-Op Diagnosis Codes:     * Primary osteoarthritis of right knee [M17.11]     * Chronic pain of right knee [M25.561, G89.29]    Procedure(s):  Right - Robotically assisted right total knee arthroplasty. all associated procedures as indicated    Specimens:  * No specimens in log *    Estimated Blood Loss:   Minimal    Drains:  Closed/Suction Drain Anterior;Right Knee Accordion 10 Fr. (Active)   Number of days: 0       Anesthesia Type:   Choice     Operative Indications:  Primary osteoarthritis of right knee [M17.11]  Chronic pain of right knee [M25.561, G89.29]    Operative Findings:  Depuy attune   Femur-4   Poly-6   Tibia-4   Patella-35    Complications:   None    Knee Technique: Suture (direct) Repair  Knee Approach: Medial Parapatellar    Chronic Narcotic Use:  No      Procedure and Technique:  Following the induction of adequate level of general anesthesia, antibiotics were administered.  The right thigh was not fitted with a thigh-high tourniquet.  The right lower extremity then underwent sterile prep and drape.  The right lower extremity was exsanguinated to gravity, and the tourniquet inflated to 300 mmHg.  A midline knee incision was greater than in flexion.  Full-thickness flaps were raised when I accessed the extensor mechanism.  A medial arthrotomy was created to open up the knee joint.  2 half pins were placed on the proximal tibia, 2 half pins in place with the distal femur.  In doing so, modules were created.  The arrays and attach the modules.  Checkpoints.  The proximal tibia distal femur.  The knee was then registered.  The surgery was planned out on the  computer, the plan was finalized.  The robot was docked.  The first maneuver of all the distal femoral cut followed by anterior posterior cuts.  The chamfer cuts completed the process.  The proximal tibia cut was made next.  Care was taken preserve the intake of the medial collateral, lateral collateral, and posterior structures during these maneuvers.  The box cut was made for the posterior stabilized unit, and remnant medial and lateral meniscectomies then performed.  Trials were inserted, the knee was taken through range of motion, found to be capable full extension, good flexion, stable throughout.  The patella was then resurfaced while utilizing manufactures [employer], is found to be a size 35 mm button.  The trial components removed and the knee was prepared for insertion of cemented components.  The cemented tibia, cemented femur, trial poly-, cemented patella in place.  Excess cement was removed, and the knee was brought to extension.  The cement was cured.  The trial poly was taken out, the knee was packed off.  The tourniquet was deflated, and hemostasis was secured.  The insert poly at the ends and snapped into position.  The knee was taken through a final range of motion, found to be capable full extension, good flexion, stable throughout, as well as excellent patella tracking.  Satisfied with the extent of surgery, the wounds then flushed with saline and closed.  A Betadine soak initiated.  A drain is placed deep brought via separate lateral stab incision.  The arthrotomy was closed with number Vicryl suture.  The subcu tissues were closed with 2-0 Vicryl suture.  The skin was goes to staples.  Sterile dressings were applied.  She was then awakened from general anesthesia, taken recovery room in stable condition with plans to include physical therapy for weightbearing to tolerance.  She will require DVT prophylaxis with Lovenox.  Please note, there is no qualified orthopedic resident was available assist,  the assistance of Tiarra Reyes PA-C was instrumental in the safe execution this patient is surgery.  Start the patient position, patient prepped draped, IntraOp assistance, wound closure, dressing application, patient transfers, all of these were performed under my direct supervision   I was present for the entire procedure.    Patient Disposition:  PACU             SIGNATURE: Erich Warren MD  DATE: April 14, 2025  TIME: 8:44 AM

## 2025-04-14 NOTE — ANESTHESIA POSTPROCEDURE EVALUATION
Post-Op Assessment Note    CV Status:  Stable  Pain Score: 0    Pain management: adequate       Mental Status:  Alert and awake   Hydration Status:  Euvolemic   PONV Controlled:  Controlled   Airway Patency:  Patent     Post Op Vitals Reviewed: Yes    No anethesia notable event occurred.    Staff: Anesthesiologist, CRNA           Last Filed PACU Vitals:  Vitals Value Taken Time   Temp 98.5 °F (36.9 °C) 04/14/25 0915   Pulse 80 04/14/25 0919   /69 04/14/25 0915   Resp 20 04/14/25 0919   SpO2 97 % 04/14/25 0919   Vitals shown include unfiled device data.    Modified Lorin:     Vitals Value Taken Time   Activity 1 04/14/25 0915   Respiration 2 04/14/25 0915   Circulation 2 04/14/25 0915   Consciousness 1 04/14/25 0915   Oxygen Saturation 1 04/14/25 0915     Modified Lorin Score: 7

## 2025-04-14 NOTE — PLAN OF CARE
Problem: PHYSICAL THERAPY ADULT  Goal: Performs mobility at highest level of function for planned discharge setting.  See evaluation for individualized goals.  Description: Treatment/Interventions: Functional transfer training, LE strengthening/ROM, Elevations, Therapeutic exercise, Endurance training, Patient/family training, Equipment eval/education, Bed mobility, Gait training, Compensatory technique education, Continued evaluation, Spoke to nursing, OT          See flowsheet documentation for full assessment, interventions and recommendations.  4/14/2025 5919 by Gail Tinsley PT  Outcome: Progressing  Note: Prognosis: Good  Problem List: Decreased strength, Decreased range of motion, Decreased endurance, Impaired balance, Decreased mobility, Decreased skin integrity, Orthopedic restrictions, Pain  Assessment: Lola is seen for progression of PT this pm.  Continues to require increased assistance overall for mobility and ambulation, although improved from previous.  Gait continues with R knee buckling in stance.  Manual knee blocking with ambulation required along with chair follow. ModA for ambulation needed.  Fatigue noted over distances.  Sit to supine with Nicola of operative leg.  Tolerated few supine ex.  Provided written HEP and education given,  The patient's AM-PAC Basic Mobility Inpatient Short Form Raw Score is 14. A Raw score of less than or equal to 16 suggests the patient may benefit from discharge to post-acute rehabilitation services. Please also refer to the recommendation of the Physical Therapist for safe discharge planning. Continue to monitor progress.  At this time moderate resource intensity considered unless able to achieve IPPT goals for home.  Barriers to Discharge: Inaccessible home environment  Barriers to Discharge Comments: stairs  Rehab Resource Intensity Level, PT: II (Moderate Resource Intensity) (pending progress)    See flowsheet documentation for full assessment.      4/14/2025 1453 by Gail Tinsley, PT  Outcome: Progressing  Note: Prognosis: Good  Problem List: Decreased strength, Decreased range of motion, Decreased endurance, Impaired balance, Decreased mobility, Orthopedic restrictions, Pain  Assessment: Lola Spangler is a 75 y/o female who presents to [facility] on 4/14 for R TKA by Dr. Warren.  PT consulted.  WBAT. Prior to admission resides with spouse in multistory home.  1+1 VI.  Only bathrooms in basement and 2nd floor.  Spouse retired and able to assist. Was independent with use cane only in community, no AD in home.  No hx of falls.  Currently presents with functional limitations related to decreased activity tolerance, impaired posture, limitations to R knee ROM and strength.  Post operative pain.  Decreased balance, functional mobility and locomotion requiring more restrictive AD use.  S for bed mobility. ModA of 2 for transfers and trial of ambulation with RW support.  + R knee buckling in stance.  Increased cues to compensate with limitations.  Vitals stable at 116/68 supine.  113/70 sitting and 133/70 p activity.  Given impairments will benefit from skilled PT in order to progress and achieve mobility goals.  The patient's AM-PAC Basic Mobility Inpatient Short Form Raw Score is 10. A Raw score of less than or equal to 16 suggests the patient may benefit from discharge to post-acute rehabilitation services. Please also refer to the recommendation of the Physical Therapist for safe discharge planning.  At this time would consider moderate resource intensity upon discharge.  Can monitor with progress.  Barriers to Discharge: Inaccessible home environment  Barriers to Discharge Comments: bed and bath only on 2nd floor  Rehab Resource Intensity Level, PT: II (Moderate Resource Intensity) (monitor disposition/resource with progress towards goals.)    See flowsheet documentation for full assessment.     4/14/2025 1453 by Gail Tinsley, [facility]  Outcome:  Progressing  Note: Prognosis: Good  Problem List: Decreased strength, Decreased range of motion, Decreased endurance, Impaired balance, Decreased mobility, Orthopedic restrictions, Pain  Assessment: Lola Spangler is a 73 y/o female who presents to [facility] on 4/14 for R TKA by Dr. Warren.  PT consulted.  WBAT. Prior to admission resides with spouse in multistory home.  1+1 VI.  Only bathrooms in basement and 2nd floor.  Spouse retired and able to assist. Was independent with use cane only in community, no AD in home.  No hx of falls.  Currently presents with functional limitations related to decreased activity tolerance, impaired posture, limitations to R knee ROM and strength.  Post operative pain.  Decreased balance, functional mobility and locomotion requiring more restrictive AD use.  S for bed mobility. ModA of 2 for transfers and trial of ambulation with RW support.  + R knee buckling in stance.  Increased cues to compensate with limitations.  Vitals stable at 116/68 supine.  113/70 sitting and 133/70 p activity.  Given impairments will benefit from skilled PT in order to progress and achieve mobility goals.  The patient's AM-PAC Basic Mobility Inpatient Short Form Raw Score is 10. A Raw score of less than or equal to 16 suggests the patient may benefit from discharge to post-acute rehabilitation services. Please also refer to the recommendation of the Physical Therapist for safe discharge planning.  At this time would consider moderate resource intensity upon discharge.  Can monitor with progress.  Barriers to Discharge: Inaccessible home environment  Barriers to Discharge Comments: bed and bath only on 2nd floor  Rehab Resource Intensity Level, PT: II (Moderate Resource Intensity) (monitor disposition/resource with progress towards goals.)    See flowsheet documentation for full assessment.

## 2025-04-14 NOTE — PLAN OF CARE
Problem: PAIN - ADULT  Goal: Verbalizes/displays adequate comfort level or baseline comfort level  Description: Interventions:- Encourage patient to monitor pain and request assistance- Assess pain using appropriate pain scale- Administer analgesics based on type and severity of pain and evaluate response- Implement non-pharmacological measures as appropriate and evaluate response- Consider cultural and social influences on pain and pain management- Notify physician/advanced practitioner if interventions unsuccessful or patient reports new pain  Outcome: Progressing     Problem: INFECTION - ADULT  Goal: Absence or prevention of progression during hospitalization  Description: INTERVENTIONS:- Assess and monitor for signs and symptoms of infection- Monitor lab/diagnostic results- Monitor all insertion sites, i.e. indwelling lines, tubes, and drains- Monitor endotracheal if appropriate and nasal secretions for changes in amount and color- McDonald appropriate cooling/warming therapies per order- Administer medications as ordered- Instruct and encourage patient and family to use good hand hygiene technique- Identify and instruct in appropriate isolation precautions for identified infection/condition  Outcome: Progressing  Goal: Absence of fever/infection during neutropenic period  Description: INTERVENTIONS:- Monitor WBC  Outcome: Progressing     Problem: SAFETY ADULT  Goal: Patient will remain free of falls  Description: INTERVENTIONS:- Educate patient/family on patient safety including physical limitations- Instruct patient to call for assistance with activity - Consult OT/PT to assist with strengthening/mobility - Keep Call bell within reach- Keep bed low and locked with side rails adjusted as appropriate- Keep care items and personal belongings within reach- Initiate and maintain comfort rounds- Make Fall Risk Sign visible to staff- Offer Toileting every 2 Hours, in advance of need- Initiate/Maintain bed/chair  alarm- Obtain necessary fall risk management equipment: rolling walker- Apply yellow socks and bracelet for high fall risk patients- Consider moving patient to room near nurses station  Outcome: Progressing  Goal: Maintain or return to baseline ADL function  Description: INTERVENTIONS:-  Assess patient's ability to carry out ADLs; assess patient's baseline for ADL function and identify physical deficits which impact ability to perform ADLs (bathing, care of mouth/teeth, toileting, grooming, dressing, etc.)- Assess/evaluate cause of self-care deficits - Assess range of motion- Assess patient's mobility; develop plan if impaired- Assess patient's need for assistive devices and provide as appropriate- Encourage maximum independence but intervene and supervise when necessary- Involve family in performance of ADLs- Assess for home care needs following discharge - Consider OT consult to assist with ADL evaluation and planning for discharge- Provide patient education as appropriate  Outcome: Progressing  Goal: Maintains/Returns to pre admission functional level  Description: INTERVENTIONS:- Perform AM-PAC 6 Click Basic Mobility/ Daily Activity assessment daily.- Set and communicate daily mobility goal to care team and patient/family/caregiver. - Collaborate with rehabilitation services on mobility goals if consulted- Perform Range of Motion 3 times a day.- Reposition patient every 2 hours.- Dangle patient 3 times a day- Stand patient 3 times a day- Ambulate patient 3 times a day- Out of bed to chair 3 times a day - Out of bed for meals 3 times a day- Out of bed for toileting- Record patient progress and toleration of activity level   Outcome: Progressing     Problem: DISCHARGE PLANNING  Goal: Discharge to home or other facility with appropriate resources  Description: INTERVENTIONS:- Identify barriers to discharge w/patient and caregiver- Arrange for needed discharge resources and transportation as appropriate- Identify  discharge learning needs (meds, wound care, etc.)- Arrange for interpretive services to assist at discharge as needed- Refer to Case Management Department for coordinating discharge planning if the patient needs post-hospital services based on physician/advanced practitioner order or complex needs related to functional status, cognitive ability, or social support system  Outcome: Progressing     Problem: Knowledge Deficit  Goal: Patient/family/caregiver demonstrates understanding of disease process, treatment plan, medications, and discharge instructions  Description: Complete learning assessment and assess knowledge base.Interventions:- Provide teaching at level of understanding- Provide teaching via preferred learning methods  Outcome: Progressing

## 2025-04-14 NOTE — PHYSICAL THERAPY NOTE
PT EVALUATION 12:40-12:52  Treat: 12:52-13:05    74 y.o.    [id number]    Primary osteoarthritis of right knee [M17.11]  Chronic pain of right knee [M25.561, G89.29]    Past Medical History:   Diagnosis Date    Abnormal ECG     Ankylosing spondylitis (HCC)     Anxiety     LAST ASSESSED: 16    Arthritis     Back pain     Carpal tunnel syndrome     Colon polyp     7 years ago with colonoscopy    CPAP (continuous positive airway pressure) dependence     Depression     Encounter for screening mammogram for breast cancer 2023    Encounter for screening mammogram for malignant neoplasm of breast 2019    Fibromyalgia     LAST ASSESSED: 16    Fibromyalgia, primary     Heme positive stool     LAST ASSESSED: 10/22/16    High cholesterol     Hyperlipidemia     under control since weight loss    Impingement syndrome of left shoulder     LAST ASSESSED: 17    Impingement syndrome of right shoulder     LAST ASSESSED:     Long term use of drug     LAST ASSESSED: 16    Low back pain     Myocardial infarction (HCC)     silent    No known health problems     NO PERTINENT PAST MEDICAL HX    Obesity     RLS (restless legs syndrome)     Rotator cuff injury     right    Sleep apnea     Spinal stenosis     Spinal stenosis     Spondylitis (HCC)     Spondyloarthritis     RESOLVED: 16    Vitamin D deficiency          Past Surgical History:   Procedure Laterality Date    BACK SURGERY      CARPAL TUNNEL RELEASE Left     CERVICAL CONIZATION   W/ LASER      CERVICAL CONIZATION     SECTION      COLONOSCOPY      DILATION AND CURETTAGE OF UTERUS      FL INJECTION RIGHT HIP (NON ARTHROGRAM)  2019    FL INJECTION RIGHT HIP (NON ARTHROGRAM)  2019    FRACTURE SURGERY      HAND SURGERY      HIP SURGERY      JOINT REPLACEMENT      RTHR    ORTHOPEDIC SURGERY      MT ARTHRODESIS POSTERIOR/PSTLAT TQ 1NTRSPC LUMBAR N/A 2017    Procedure: L2-L5 OPEN DECOMPRESSIVE LUMBAR  LAMINECTOMY W/ PEDICLE SCREW AND NILDA FIXATION FUSION (IMPULSE); REMOVAL OF SKIN TAG MID BACK;  Surgeon: Catracho Fields MD;  [facility];  Service: Neurosurgery    VT ARTHRP ACETBLR/PROX FEM PROSTC AGRFT/ALGRFT Right 11/07/2019    Procedure: ARTHROPLASTY HIP TOTAL Posterior;  Surgeon: Erich Warren MD;  [facility];  Service: Orthopedics    VT COLONOSCOPY FLX DX W/COLLJ SPEC WHEN PFRMD N/A 10/07/2016    Procedure: EGD AND COLONOSCOPY;  Surgeon: Nathan Pierson MD;  Location: [facility];  Service: Gastroenterology    VT NDSC WRST SURG W/RLS TRANSVRS CARPL LIGM Left 04/26/2018    Procedure: RELEASE CARPAL TUNNEL ENDOSCOPIC;  Surgeon: Bon Ferrer MD;  [facility];  Service: Orthopedics    VT NDSC WRST SURG W/RLS TRANSVRS CARPL LIGM Right 06/14/2018    Procedure: RELEASE CARPAL TUNNEL ENDOSCOPIC;  Surgeon: Bon Ferrer MD;  [facility];  Service: Orthopedics    VT RPR AA HERNIA 1ST < 3 CM REDUCIBLE N/A 07/12/2024    Procedure: REPAIR HERNIA UMBILICAL;  Surgeon: Lazaro Moser MD;  [facility];  Service: General    VT RPR AA HERNIA 1ST < 3 CM REDUCIBLE N/A 07/12/2024    Procedure: REPAIR HERNIA VENTRAL;  Surgeon: Lazaro Moser MD;  [facility];  Service: General    RETINAL LASER PROCEDURE      SPINE SURGERY      TUBAL LIGATION      UMBILICAL HERNIA REPAIR  08/01/2024 04/14/25 1240   PT Last Visit   PT Visit Date 04/14/25   Note Type   Note type Evaluation  (and treatment)   Pain Assessment   Pain Score 3   Pain Location/Orientation Orientation: Right   Restrictions/Precautions   RLE Weight Bearing Per Order WBAT   Other Precautions Chair Alarm;Bed Alarm   Home Living   Type of Home House   Home Layout Multi-level;Bed/bath upstairs  (1 VI, + 1 VI.  Grab bar but no rail. 14 steps wtih L HR to bed and bath)   Bathroom Shower/Tub Tub/shower unit  (walk in shower in basement.)   Bathroom Toilet Standard   Bathroom Equipment Toilet  raiser;Commode   Bathroom Accessibility Accessible   Home Equipment Walker;Cane;Reacher   Additional Comments resides with spouse in multistory home.   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   General   Additional Pertinent History Pt is 75 y/o female presents for R TKR.  PT consulted. WBAT.   Family/Caregiver Present No   Cognition   Overall Cognitive Status WFL   Orientation Level Oriented X4   Memory Within functional limits   Following Commands Follows one step commands without difficulty   Comments Pleasant and cooperative.   Subjective   Subjective Feels groggy, but motivated to get moving.   RUE Assessment   RUE Assessment WFL   LUE Assessment   LUE Assessment WFL   RLE Assessment   RLE Assessment X   LLE Assessment   LLE Assessment WFL   Coordination   Movements are Fluid and Coordinated 0   Coordination and Movement Description operative leg buckling.   Light Touch   RLE Light Touch Grossly intact   LLE Light Touch Grossly intact   Bed Mobility   Supine to Sit 5  Supervision   Additional items Increased time required;HOB elevated;Bedrails   Additional Comments Increased time to fully reach EOB.  /68 supine, EOB sitting 113/70   Transfers   Sit to Stand 3  Moderate assistance   Additional items Assist x 2;Increased time required;Verbal cues   Stand to Sit 3  Moderate assistance   Additional items Assist x 2;Increased time required;Verbal cues   Additional Comments Cues for hand placement. Increased time to acheive upright stance with RW support.   Ambulation/Elevation   Gait pattern Improper Weight shift;Decreased foot  clearance;Antalgic;Decreased R stance;Forward Flexion;Excessively slow;Short stride;Foward flexed;R Knee Anne   Gait Assistance 3  Moderate assist   Additional items Assist x 2;Verbal cues;Tactile cues   Assistive Device Rolling walker   Distance 3'x1 bed to BSC.   Ambulation/Elevation Additional Comments Amb with RW 3'x1 bed to BSC.  + R knee buckling. Cues for UE use on RW support.   Balance   Static Sitting Fair +   Dynamic Sitting Fair   Static Standing Fair -   Dynamic Standing Poor -   Ambulatory Poor -   Endurance Deficit   Endurance Deficit Yes   Endurance Deficit Description pain, fatigue, weakness.   Activity Tolerance   Activity Tolerance Patient limited by fatigue;Patient limited by pain   Medical Staff Made Aware NurseLizzie.  OT-Justine: Pt seen for co-evaluation/treatment with skilled Occupational Therapist 2* clinically unstable/unpredictable presentation, medical complexity, fall risk, POD#0,  functional/physical limitations, impaired functional balance, decreased safety awareness, limited activity tolerance which is decline from PLOF and may impact overall functional mobility/mobility safety.   Nurse Made Aware Nurse-yes.   Assessment   Prognosis Good   Problem List Decreased strength;Decreased range of motion;Decreased endurance;Impaired balance;Decreased mobility;Orthopedic restrictions;Pain   Assessment Lola Spangler is a 73 y/o female who presents to [facility] on 4/14 for R TKA by Dr. Warren.  PT consulted.  WBAT. Prior to admission resides with spouse in multistory home.  1+1 VI.  Only bathrooms in basement and 2nd floor.  Spouse retired and able to assist. Was independent with use cane only in community, no AD in home.  No hx of falls.  Currently presents with functional limitations related to decreased activity tolerance, impaired posture, limitations to R knee ROM and strength.  Post operative pain.  Decreased balance, functional mobility and locomotion requiring more restrictive AD use.   S for bed mobility. ModA of 2 for transfers and trial of ambulation with RW support.  + R knee buckling in stance.  Increased cues to compensate with limitations.  Vitals stable at 116/68 supine.  113/70 sitting and 133/70 p activity.  Given impairments will benefit from skilled PT in order to progress and achieve mobility goals.  The patient's AM-PAC Basic Mobility Inpatient Short Form Raw Score is 10. A Raw score of less than or equal to 16 suggests the patient may benefit from discharge to post-acute rehabilitation services. Please also refer to the recommendation of the Physical Therapist for safe discharge planning.  At this time would consider moderate resource intensity upon discharge.  Can monitor with progress.   Barriers to Discharge Inaccessible home environment   Barriers to Discharge Comments bed and bath only on 2nd floor   Goals   Patient Goals get better   STG Expiration Date 04/24/25   Short Term Goal #1 10 days: 1).  Pt will perform bed mobility with Ale demonstrating appropriate technique 100% of the time in order to improve function.2)  Perform all transfers with Ale demonstrating safe and appropriate technique 100% of the time in order to improve ability to negotiate safely in home environment.3) Amb with least restrictive AD > 100'x1 with mod I in order to demonstrate ability to negotiate in home environment.4)  Improve overall strength and balance 1/2 grade in order to optimize ability to perform functional tasks and reduce fall risk.5) Increase activity tolerance to 45 minutes in order to improve endurance to functional tasks.6)  Negotiate stairs using most appropriate technique and S in order to be able to negotiate safely in home environment. 7) PT for ongoing patient and family/caregiver education, DME needs and d/c planning in order to promote highest level of function in least restrictive environment.   PT Treatment Day 1   Plan   Treatment/Interventions Functional transfer training;LE  strengthening/ROM;Elevations;Therapeutic exercise;Endurance training;Patient/family training;Equipment eval/education;Bed mobility;Gait training;Compensatory technique education;Continued evaluation;Spoke to nursing;OT   PT Frequency Twice a day  (prn in order to facilitate progress towards goals for discharge.)   Discharge Recommendation   Rehab Resource Intensity Level, PT II (Moderate Resource Intensity)  (monitor disposition/resource with progress towards goals.)   AM-PAC Basic Mobility Inpatient   Turning in Flat Bed Without Bedrails 3   Lying on Back to Sitting on Edge of Flat Bed Without Bedrails 3   Moving Bed to Chair 1   Standing Up From Chair Using Arms 1   Walk in Room 1   Climb 3-5 Stairs With Railing 1   Basic Mobility Inpatient Raw Score 10   Turning Head Towards Sound 4   Follow Simple Instructions 4   Low Function Basic Mobility Raw Score  18   Low Function Basic Mobility Standardized Score  29.25   [facility] Highest Level Of Mobility   -HL Goal 4: Move to chair/commode   -HLM Achieved 4: Move to chair/commode   Additional Treatment Session   Start Time 1252   End Time 1305   Treatment Assessment Seen for progression of functional mobility. BSC transfers with modA of 2.  Stand tolerance x 30 seconds.  Amb with RW 4'x1 with RW and mod A of 2.  Requires manual blocking of R knee given buckling.  Decreased carryover for cues with UE offloading and compensatory strategies.  ModA of 2 stand to sit for safe transition to chair.  Given impairments will continue to require skilled PT in order to progress and optimize functional outcomes.  At this time consider moderate resource intensity pending progress and ability to acheive goals for safe d/c to home.   Additional Treatment Day 1   Exercises   Ankle Pumps Sitting;15 reps;Bilateral;AROM     Hx/personal factors: VI home environment, steps within home environment, difficulty performing ADLs, difficulty performing IADLs, difficulty performing  transfers/mobility, fall risk , functional decline , increased reliance on DME , and new use of AD for functional transfers/mobility, comorbidities    Examination:decreased strength , decreased LE ROM, joint integrity, decreased balance, decreased activity tolerance, gait deviations, limited functional reach, increased pain, increased body habitus , decreased postural control, impaired posture, WBS, and orthopedic restrictions.     Clinical: unpredictable Trending/abnormal lab values, Risk for falls, bed/chair alarm, Continuous monitoring, Pain management, Decreased activity tolerance compared to baseline, More restrictive AD use than baseline, Decline from PLOF., and WBS.     Complexity: high           Gail Tinsley, PT

## 2025-04-14 NOTE — ANESTHESIA PROCEDURE NOTES
Peripheral Block    Patient location during procedure: holding area  Start time: 4/14/2025 7:15 AM  Reason for block: at surgeon's request and post-op pain management  Staffing  Performed by: Murali Dejesus MD  Authorized by: Murali Dejesus MD    Preanesthetic Checklist  Completed: patient identified, IV checked, site marked, risks and benefits discussed, surgical consent, monitors and equipment checked, pre-op evaluation and timeout performed  Peripheral Block  Patient position: supine  Prep: ChloraPrep  Patient monitoring: frequent blood pressure checks, continuous pulse oximetry and heart rate  Block type: Adductor Canal  Laterality: right  Injection technique: single-shot  Procedures: ultrasound guided, Ultrasound guidance required for the procedure to increase accuracy and safety of medication placement and decrease risk of complications.  Ultrasound permanent image saved  bupivacaine (PF) (MARCAINE) 0.5 % injection 20 mL - Perineural   10 mL - 4/14/2025 7:20:00 AM  bupivacaine liposomal (EXPAREL) 1.3 % injection 20 mL - Perineural   10 mL - 4/14/2025 7:20:00 AM  midazolam (VERSED) injection 0.5 mg - Intravenous   2 mg - 4/14/2025 7:15:00 AM  Needle  Needle type: Stimuplex   Needle gauge: 20 G  Needle length: 4 in  Needle localization: anatomical landmarks and ultrasound guidance  Assessment  Injection assessment: incremental injection, frequent aspiration, injected with ease, negative aspiration, negative for heart rate change, no paresthesia on injection, no symptoms of intraneural/intravenous injection and needle tip visualized at all times  Paresthesia pain: none  Post-procedure:  site cleaned  patient tolerated the procedure well with no immediate complications

## 2025-04-14 NOTE — OCCUPATIONAL THERAPY NOTE
Occupational Therapy Evaluation and Treatment     Patient Name: Lola Spangler  Today's Date: 2025  Problem List  Principal Problem:    Primary osteoarthritis of right knee    Past Medical History  Past Medical History:   Diagnosis Date    Abnormal ECG     Ankylosing spondylitis (HCC)     Anxiety     LAST ASSESSED: 16    Arthritis     Back pain     Carpal tunnel syndrome     Colon polyp     7 years ago with colonoscopy    CPAP (continuous positive airway pressure) dependence     Depression     Encounter for screening mammogram for breast cancer 2023    Encounter for screening mammogram for malignant neoplasm of breast 2019    Fibromyalgia     LAST ASSESSED: 16    Fibromyalgia, primary     Heme positive stool     LAST ASSESSED: 10/22/16    High cholesterol     Hyperlipidemia     under control since weight loss    Impingement syndrome of left shoulder     LAST ASSESSED: 17    Impingement syndrome of right shoulder     LAST ASSESSED:     Long term use of drug     LAST ASSESSED: 16    Low back pain     Myocardial infarction (HCC)     silent    No known health problems     NO PERTINENT PAST MEDICAL HX    Obesity     RLS (restless legs syndrome)     Rotator cuff injury     right    Sleep apnea     Spinal stenosis     Spinal stenosis     Spondylitis (HCC)     Spondyloarthritis     RESOLVED: 16    Vitamin D deficiency      Past Surgical History  Past Surgical History:   Procedure Laterality Date    BACK SURGERY      CARPAL TUNNEL RELEASE Left     CERVICAL CONIZATION   W/ LASER      CERVICAL CONIZATION     SECTION      COLONOSCOPY      DILATION AND CURETTAGE OF UTERUS      FL INJECTION RIGHT HIP (NON ARTHROGRAM)  2019    FL INJECTION RIGHT HIP (NON ARTHROGRAM)  2019    FRACTURE SURGERY      HAND SURGERY      HIP SURGERY      JOINT REPLACEMENT      RTHR    ORTHOPEDIC SURGERY      PA ARTHRODESIS POSTERIOR/PSTLAT TQ 1NTRSPC LUMBAR N/A 2017     Procedure: L2-L5 OPEN DECOMPRESSIVE LUMBAR LAMINECTOMY W/ PEDICLE SCREW AND NILDA FIXATION FUSION (IMPULSE); REMOVAL OF SKIN TAG MID BACK;  Surgeon: Catracho Fields MD;  [facility];  Service: Neurosurgery    DC ARTHRP ACETBLR/PROX FEM PROSTC AGRFT/ALGRFT Right 11/07/2019    Procedure: ARTHROPLASTY HIP TOTAL Posterior;  Surgeon: Erich Warren MD;  [facility];  Service: Orthopedics    DC COLONOSCOPY FLX DX W/COLLJ SPEC WHEN PFRMD N/A 10/07/2016    Procedure: EGD AND COLONOSCOPY;  Surgeon: Nathan Pierson MD;  Location: [facility];  Service: Gastroenterology    DC NDSC WRST SURG W/RLS TRANSVRS CARPL LIGM Left 04/26/2018    Procedure: RELEASE CARPAL TUNNEL ENDOSCOPIC;  Surgeon: Bon Ferrer MD;  [facility];  Service: Orthopedics    DC NDSC WRST SURG W/RLS TRANSVRS CARPL LIGM Right 06/14/2018    Procedure: RELEASE CARPAL TUNNEL ENDOSCOPIC;  Surgeon: Bon Ferrer MD;  [facility];  Service: Orthopedics    DC RPR AA HERNIA 1ST < 3 CM REDUCIBLE N/A 07/12/2024    Procedure: REPAIR HERNIA UMBILICAL;  Surgeon: Lazaro Moser MD;  [facility];  Service: General    DC RPR AA HERNIA 1ST < 3 CM REDUCIBLE N/A 07/12/2024    Procedure: REPAIR HERNIA VENTRAL;  Surgeon: Lazaro Moser MD;  [facility];  Service: General    RETINAL LASER PROCEDURE      SPINE SURGERY      TUBAL LIGATION      UMBILICAL HERNIA REPAIR  08/01/2024 04/14/25 1239   OT Last Visit   OT Visit Date 04/14/25   Note Type   Note type Evaluation and Treatment   Additional Comments pt greeted in supine, agreeable to OT evaluation   Pain Assessment   Pain Assessment Tool 0-10   Pain Score 3   Pain Location/Orientation Orientation: Right;Location: Knee   Hospital Pain Intervention(s) Repositioned;Ambulation/increased activity;Emotional support;Rest;Elevated;Cold applied   Restrictions/Precautions   Weight Bearing Precautions Per Order Yes   RLE Weight Bearing Per Order WBAT    Other Precautions Chair Alarm;Bed Alarm;WBS;Multiple lines;Fall Risk;Pain  (hemovac)   Home Living   Type of Home House   Home Layout Multi-level;Bed/bath upstairs;Performs ADLs on one level;Stairs to enter without rails  (1+1 VI, no bathroom on main level but has BSC. FF w/ L rail to bedroom and bathroom. FF to basement with bathroom.)   Bathroom Shower/Tub Tub/shower unit  (walkin shower in basement)   Bathroom Toilet Standard   Bathroom Equipment Commode;Toilet raiser   Bathroom Accessibility Accessible   Home Equipment Walker;Cane;Reacher   Additional Comments Pt resides with spouse in a mutlievel house with 1+1 VI. Pt has no bathroom on main level. FF to 2nd floor with bedroom/bathroom. Walkin shower in basement.   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[employer])   Comments use of cane for ~9months for community distances   Lifestyle   Autonomy Independent with all ADLs/IADLs, use of cane for community distances   Reciprocal Relationships Spouse   Service to Others Family   Intrinsic Gratification walking, reading   General   Family/Caregiver Present No   ADL   Where Assessed Edge of bed   Eating Assistance 5  Supervision/Setup   Grooming Assistance 5  Supervision/Setup   UB Bathing Assistance 4  Minimal Assistance   LB Bathing Assistance 3  Moderate Assistance   UB Dressing Assistance 4  Minimal Assistance   LB Dressing Assistance 3  Moderate Assistance   LB Dressing Deficit Thread LLE into pants;Pull up over hips;Setup;Requires assistive device for steadying;Verbal cueing   Toileting Assistance  2  Maximal Assistance   Toileting Deficit Setup;Perineal hygiene;Clothing management up;Bedside  commode;Verbal cueing  (pt incontinent upon arrival)   Bed Mobility   Supine to Sit 5  Supervision   Additional items HOB elevated;Increased time required;Verbal cues   Sit to Supine Unable to assess   Additional Comments Pt greeted in supine, BP supine: 116/68. BP EOB: 113/70.   Transfers   Sit to Stand 3  Moderate assistance   Additional items Assist x 2;Bedrails;Increased time required;Verbal cues  (RW)   Stand to Sit 3  Moderate assistance   Additional items Assist x 2;Increased time required;Verbal cues;Armrests  (RW)   Toilet transfer 3  Moderate assistance   Additional items Assist x 2;Increased time required;Verbal cues;Commode;Other  (use of BSC setup near bed, use of Rw)   Additional Comments verbal cues for hand placement and LE management due to RLE knee buckling. ModAx2 with use of RW for transfers. Blocking of RLE required during transfers due to knee buckling. Pt requiring increased verbal cues to extend and straighten operative leg and push through arms with use of RW.   Functional Mobility   Functional Mobility 3  Moderate assistance   Additional Comments Pt completed short functional mobility from bed to BSC and then to chair with modAx2, RW and increased RLE knee buckling throughout session   Additional items Rolling walker   Balance   Static Sitting Fair +   Dynamic Sitting Fair   Static Standing Fair -   Dynamic Standing Poor -   Ambulatory Poor -   Activity Tolerance   Activity Tolerance Patient limited by pain;Other (Comment)  (RLE knee buckling)   Medical Staff Made Aware PT Gail, Pt seen for co-evaluation/treatment with skilled Physical Therapy due to pt's medical complexity, decreased endurance, overall functional level, overall safety, and post surgical day #0.   Nurse Made Aware ERIKA FISHER Assessment   RUE Assessment WFL   LUE Assessment   LUE Assessment WFL   Hand Function   Gross Motor Coordination Functional   Fine Motor Coordination Functional   Cognition   Overall  Cognitive Status WFL   Arousal/Participation Cooperative;Alert   Attention Within functional limits   Orientation Level Oriented X4   Memory Within functional limits   Following Commands Follows one step commands without difficulty   Comments Cooperative   Assessment   Limitation Decreased ADL status;Decreased endurance;Decreased self-care trans;Decreased high-level ADLs   Prognosis Good   Assessment Pt is a 74 y.o. female seen for OT evaluation s/p adm to [facility] on 4/14/2025 w/ Primary osteoarthritis of right knee . Comorbidities affecting pt’s functional performance include a significant PMH of anxiety, depression, fibromyalgia, hyperlipidemia, high cholesterol, hx of MI, VANNESSA, spinal stenosis, spondylosis, ankylosing spondylitis. Pt with active OT orders and activity orders for OOB to chair. Pt lives w/ spouse in a multilevel house with 2 VI. Pt has walkin shower and standard toilets. Pt has no bathroom on main level. At baseline, pt was independent with all ADLs/IADLs. Pt completed supine to sit with S. Upon arrival pt noted to be incontinent supine in bed. Pt completed sit to stand with modAx2. Pt reported she felt she needed to use the bathroom, RLE knee buckling noted with standing with use of RW. BSC setup near bed for safety. Pt completed short functional mobility from bed to BSC with modAx2 and use of RW. RLE knee buckling noted during transfers. See additional treatment session focusing on ADLs/IADLs, functional transfers, functional mobility and LE management. Upon evaluation, pt currently requires Nicola for UB ADLs, modA for LB ADLs, maxA for toileting, S for bed mobility, modA for functional mobility, and modA for transfers 2* the following deficits impacting occupational performance: weakness, decreased strength , decreased balance, decreased activity tolerance, increased pain, orthopedic restrictions, and lethargy . These impairments, as well at pt’s personal factors of: VI home environment,  steps within home environment, difficulty performing ADLs, difficulty performing IADLs, difficulty performing transfers/mobility, WBS, fall risk , and new use of AD for functional transfers/mobility limit pt’s ability to safely engage in all baseline areas of occupation. Based on the aforementioned OT evaluation, functional performance deficits, and assessments, pt has been identified as a high complexity evaluation. Pt to continue to benefit from continued acute OT services during hospital stay to address defined deficits and to maximize level of functional independence in the following Occupational Performance areas: grooming, bathing/shower, toilet hygiene, dressing, health maintenance, functional mobility, community mobility, clothing management, cleaning, meal prep, and household maintenance. From OT standpoint, recommend II; Moderate Resource Intensity (pending progress) upon D/C. OT will continue to follow pt 3-5x/wk.   Goals   Patient Goals to use the bathroom   STG Time Frame 1-3   Short Term Goal #1 Pt will improve activity tolerance to G for min 30 min treatment sessions for increase engagement in functional tasks   Short Term Goal #2 Pt will complete bed mobility at a mod I level w/ G balance/safety demonstrated to decrease caregiver assistance required   Short Term Goal  Pt will complete LB dressing/self care w/ mod I using adaptive device and DME as needed   LTG Time Frame 3-7   Long Term Goal #1 Pt will improve functional transfers to mod I on/off all surfaces using DME as needed w/ G balance/safety   Long Term Goal #2 Pt will complete toileting w/ mod I w/ G hygiene/thoroughness using DME as needed   Long Term Goal Pt will improve functional mobility during ADL/IADL/leisure tasks to mod I using DME as needed w/ G balance/safety   Plan   Treatment Interventions ADL retraining;Functional transfer training;Endurance training;Patient/family training;Equipment evaluation/education;Compensatory technique  education;Continued evaluation;Energy conservation;Activityengagement   Goal Expiration Date 04/21/25   OT Treatment Day 0   OT Frequency 3-5x/wk   Discharge Recommendation   Rehab Resource Intensity Level, OT II (Moderate Resource Intensity)  (pending progress)   Additional Comments  The patient's raw score on the AM-PAC Daily Activity Inpatient Short Form is 16 . A raw score of less than 19 suggests the patient may benefit from discharge to post-acute rehabilitation services. Please refer to the recommendation of the Occupational Therapist for safe discharge planning.   AM-PAC Daily Activity Inpatient   Lower Body Dressing 2   Bathing 2   Toileting 2   Upper Body Dressing 3   Grooming 3   Eating 4   Daily Activity Raw Score 16   Daily Activity Standardized Score (Calc for Raw Score >=11) 35.96   AM-PAC Applied Cognition Inpatient   Following a Speech/Presentation 4   Understanding Ordinary Conversation 4   Taking Medications 4   Remembering Where Things Are Placed or Put Away 4   Remembering List of 4-5 Errands 4   Taking Care of Complicated Tasks 4   Applied Cognition Raw Score 24   Applied Cognition Standardized Score 62.21   Additional Treatment Session   Start Time 1255   End Time 1307   Treatment Assessment Pt seen for OT treatment session focusing on ADLs/IADLs, functional mobility, functional standing tolerance, functional transfers, patient education, continued evaluation. Pt alert and cooperative throughout session.  Pt limited by pain and RLE knee buckling during session. Pt completed toileting on BSC. Pt completed don of underwear w/pad with modA for threading RLE then S for threading LLE. Pt then standing to complete perineal hygiene with maxA due to instability while standing. Pt modA w/ RW to stand, maxA to complete LB don of underwear over hips. Pt completed short functional mobility from BSC to chair with modAx2, significant RLE knee blocking required due to knee buckling. Pt completed stand to sit  into chair with modAx2. Significant verbal cues required during session for LE management and hand placement. Pt reports 3/10 pain and no dizziness. Pt's vitals include: stable.     Pt ended session seated OOB in recliner. Call bell and phone within reach. All needs met and pt reports no further questions at this time. Continue to recommend II; Moderate Resource Intensity (Pending progress) when medically cleared. OT will continue to follow pt on caseload.   Additional Treatment Day 1   End of Consult   Education Provided Yes   Patient Position at End of Consult Bedside chair;Bed/Chair alarm activated;All needs within reach   Nurse Communication Nurse aware of consult   End of Consult Comments Pt seated OOB in chair with chair alarm activated at end of session. Call bell and phone within reach. All needs met and pt reports no further questions for OT at this time.   Justine Negrete, OT

## 2025-04-14 NOTE — ANESTHESIA PROCEDURE NOTES
Peripheral Block    Patient location during procedure: holding area  Start time: 4/14/2025 7:20 AM  Reason for block: at surgeon's request and post-op pain management  Staffing  Performed by: Murali Dejesus MD  Authorized by: Murali Dejesus MD    Preanesthetic Checklist  Completed: patient identified, IV checked, site marked, risks and benefits discussed, surgical consent, monitors and equipment checked, pre-op evaluation and timeout performed  Peripheral Block  Patient position: left lateral  Prep: ChloraPrep  Patient monitoring: frequent blood pressure checks, continuous pulse oximetry and heart rate  Block type: IPACK  Laterality: right  Injection technique: single-shot  Procedures: ultrasound guided, Ultrasound guidance required for the procedure to increase accuracy and safety of medication placement and decrease risk of complications.  Ultrasound permanent image saved  bupivacaine (PF) (MARCAINE) 0.5 % injection 20 mL - Perineural   10 mL - 4/14/2025 7:25:00 AM  bupivacaine liposomal (EXPAREL) 1.3 % injection 20 mL - Perineural   10 mL - 4/14/2025 7:25:00 AM  Needle  Needle type: Stimuplex   Needle gauge: 20 G  Needle length: 4 in  Needle localization: anatomical landmarks and ultrasound guidance  Assessment  Injection assessment: incremental injection, frequent aspiration, injected with ease, negative aspiration, negative for heart rate change, no paresthesia on injection, no symptoms of intraneural/intravenous injection and needle tip visualized at all times  Paresthesia pain: none  Post-procedure:  site cleaned  patient tolerated the procedure well with no immediate complications

## 2025-04-14 NOTE — H&P
H&P Exam - Orthopedics   Lola Spangler 74 y.o. female MRN: [id number]      Assessment/Plan   Assessment:  right Knee Osteoarthritis in this adult female who continues to have weightbearing pain and dysfunction despite appropriate nonsurgical treatments    Plan:  right  Total Knee Arthroplasty.  Patient is familiar with risks, benefits, alternatives    TOTAL KNEE REPLACEMENT INDICATIONS AND RISKS    We had a lengthy discussion with the patient regarding the potential options for treatment. The patient has had an extensive conservative management course up until this time and therefore I do not feel that any additional conservative management will provide additional relief. The patient's symptoms have progressively worsened to the point where they now limit the patient's daily activities and quality of life.     At this time, I feel that this patient would be an excellent candidate for a total knee replacement. The patient has failed non-operative care and continues to have unacceptable symptoms. We discussed the treatment options and alternatives and the risks and benefits of surgery in great detail.    While no guarantees can be made, total knee replacement has a very high success rate in terms of relieving a patient's knee pain and returning them to a more active, independent lifestyle for 10-15 years or more. All surgery carries some risk; for knee replacement, the complication rate is low but may include: death (very rare), infection, bleeding requiring transfusion, blood clots in legs traveling to lungs, nerve and/or blood vessel damage, bone fracture, persistent knee pain and/or stiffness, and repeat surgery(ies). The risk of a major complication is about 1-2 per 1000 cases. Total knee replacement should only be done if conservative treatment has failed. The revision rate for total knee replacements is about 1-2% per year; in other words, 85-95% of knee replacements may last 10 years; 75-85% last 20 years; and  so on, assuming no injury to the knee. Finally we discussed anesthesia related complications which will be discussed in greater detail with the anesthesia team before surgery.     The patient was shown total knee booklets, diagrams and/or models and all of their questions have been answered at the present time. The patient may call or come in if they have any other concerns or questions. The patient also understands the post-operative rehabilitation process and the need for their cooperation and participation, and that their results may be compromised by their lack of compliance. The patient would like to proceed. Patient is encouraged to seek additional opinions if they so desire. The patient voiced their understanding of the surgical plan and potential complications and wishes to proceed with surgery.      History of Present Illness   HPI:  Lola Spangler is a 74 y.o. female who presents with pain in the right knee. Patient is no longer getting adequate relief from non-operative modalities. Patient is continuing to have debilitating pain from their knee, interfering with daily activities and sleep. Patient denies any concerns with infections, new neuropathies, or any acute injuries.     Historical Information  Review Of Systems:   Skin: Normal  Neuro: See HPI  Musculoskeletal: See HPI  14 point review of systems negative except as stated above     Past Medical History:   Past Medical History:   Diagnosis Date    Abnormal ECG     Ankylosing spondylitis (HCC)     Anxiety     LAST ASSESSED: 12/20/16    Arthritis     Back pain     Carpal tunnel syndrome     Colon polyp     7 years ago with colonoscopy    CPAP (continuous positive airway pressure) dependence     Depression     Encounter for screening mammogram for breast cancer 11/28/2023    Encounter for screening mammogram for malignant neoplasm of breast 05/21/2019    Fibromyalgia     LAST ASSESSED: 12/20/16    Fibromyalgia, primary     Heme positive stool     LAST  ASSESSED: 10/22/16    High cholesterol     Hyperlipidemia     under control since weight loss    Impingement syndrome of left shoulder     LAST ASSESSED: 17    Impingement syndrome of right shoulder     LAST ASSESSED:     Long term use of drug     LAST ASSESSED: 16    Low back pain     Myocardial infarction (HCC)     silent    No known health problems     NO PERTINENT PAST MEDICAL HX    Obesity     RLS (restless legs syndrome)     Rotator cuff injury     right    Sleep apnea     Spinal stenosis     Spinal stenosis     Spondylitis (HCC)     Spondyloarthritis     RESOLVED: 16    Vitamin D deficiency        Past Surgical History:   Past Surgical History:   Procedure Laterality Date    BACK SURGERY      CARPAL TUNNEL RELEASE Left     CERVICAL CONIZATION   W/ LASER      CERVICAL CONIZATION     SECTION      COLONOSCOPY      DILATION AND CURETTAGE OF UTERUS      FL INJECTION RIGHT HIP (NON ARTHROGRAM)  2019    FL INJECTION RIGHT HIP (NON ARTHROGRAM)  2019    FRACTURE SURGERY      HAND SURGERY      HIP SURGERY      JOINT REPLACEMENT      RTHR    ORTHOPEDIC SURGERY      NM ARTHRODESIS POSTERIOR/PSTLAT TQ 1NTRSPC LUMBAR N/A 2017    Procedure: L2-L5 OPEN DECOMPRESSIVE LUMBAR LAMINECTOMY W/ PEDICLE SCREW AND NILDA FIXATION FUSION (IMPULSE); REMOVAL OF SKIN TAG MID BACK;  Surgeon: Catracho Fields MD;  [facility];  Service: Neurosurgery    NM ARTHRP ACETBLR/PROX FEM PROSTC AGRFT/ALGRFT Right 2019    Procedure: ARTHROPLASTY HIP TOTAL Posterior;  Surgeon: Erich Warren MD;  [facility];  Service: Orthopedics    NM COLONOSCOPY FLX DX W/COLLJ SPEC WHEN PFRMD N/A 10/07/2016    Procedure: EGD AND COLONOSCOPY;  Surgeon: Nathan Pierson MD;  Location: [facility];  Service: Gastroenterology    NM NDSC WRST SURG W/RLS TRANSVRS CARPL LIGM Left 2018    Procedure: RELEASE CARPAL TUNNEL ENDOSCOPIC;  Surgeon: Bon Ferrer MD;  Location[facility];   Service: Orthopedics    OR NDSC WRST SURG W/RLS TRANSVRS CARPL LIGM Right 06/14/2018    Procedure: RELEASE CARPAL TUNNEL ENDOSCOPIC;  Surgeon: Bon Ferrer MD;  [facility];  Service: Orthopedics    OR RPR AA HERNIA 1ST < 3 CM REDUCIBLE N/A 07/12/2024    Procedure: REPAIR HERNIA UMBILICAL;  Surgeon: Lazaro Moser MD;  [facility];  Service: General    OR RPR AA HERNIA 1ST < 3 CM REDUCIBLE N/A 07/12/2024    Procedure: REPAIR HERNIA VENTRAL;  Surgeon: Lazaro Moser MD;  [facility];  Service: General    RETINAL LASER PROCEDURE      SPINE SURGERY      TUBAL LIGATION      UMBILICAL HERNIA REPAIR  08/01/2024       Family History:  Family history reviewed and non-contributory  Family History   Problem Relation Age of Onset    Arthritis Mother     Breast cancer Mother 72    Hypertension Mother     Heart attack Mother         MYOCARDIAL INFARCTION    Heart disease Mother     Heart attack Father     Hypertension Father     Stroke Father         CEREBROVASCUALR ACCIDENT (CVA)    Heart disease Father     Arthritis Sister     Uterine cancer Sister     Endometrial cancer Sister 60    Hypertension Sister     Cancer Sister         Cervical and uterine    Heart attack Brother     Prostate cancer Brother 62    Arthritis Brother     Melanoma Brother     Cancer Brother         Melanoma    Heart disease Brother     Arthritis Family     Hyperlipidemia Family     Depression Son     Breast cancer Maternal Aunt 40    No Known Problems Daughter     No Known Problems Maternal Grandmother     No Known Problems Maternal Grandfather     No Known Problems Paternal Grandmother     No Known Problems Paternal Grandfather     No Known Problems Brother     Other Brother         hunting accident (shot)    Melanoma Brother     Prostate cancer Brother     Heart attack Brother     Stroke Brother     Hypertension Brother     Arthritis Sister     Heart attack Sister     No Known Problems Son     No Known  Problems Son     Lung cancer Maternal Uncle     Breast cancer additional onset Other 52    Uterine cancer Other        Social History:  Social History     Socioeconomic History    Marital status: /Civil Union     Spouse name: None    Number of children: 3    Years of education: None    Highest education level: None   Occupational History    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     Comment: EMPLOYED   Tobacco Use    Smoking status: Never    Smokeless tobacco: Never   Vaping Use    Vaping status: Never Used   Substance and Sexual Activity    Alcohol use: Yes     Comment: An occasional glass of wine    Drug use: No    Sexual activity: Yes     Partners: Male     Birth control/protection: Post-menopausal, Female Sterilization   Other Topics Concern    None   Social History Narrative    CAFFEINE USE    DOES NOT EXERCISE     Social Drivers of Health     Financial Resource Strain: Low Risk  (1/3/2024)    Overall Financial Resource Strain (CARDIA)     Difficulty of Paying Living Expenses: Not hard at all   Food Insecurity: No Food Insecurity (1/4/2025)    Hunger Vital Sign     Worried About Running Out of Food in the Last Year: Never true     Ran Out of Food in the Last Year: Never true   Transportation Needs: No Transportation Needs (1/4/2025)    PRAPARE - Transportation     Lack of Transportation (Medical): No     Lack of Transportation (Non-Medical): No   Physical Activity: Not on file   Stress: Not on file   Social Connections: Not on file   Intimate Partner Violence: Not on file   Housing Stability: Low Risk  (1/4/2025)    Housing Stability Vital Sign     Unable to Pay for Housing in the Last Year: No     Number of Times Moved in the Last Year: 0     Homeless in the Last Year: No       Allergies:   No Known Allergies        Labs:  0   Lab Value Date/Time    HCT 40.3 03/22/2025 0958    HCT 47.3 (H) 01/15/2025 0936    HCT 42.5 07/03/2024 0837    HCT 39.9 11/28/2015 1120    HCT 41.0 11/28/2014 0851    HGB 13.4 03/22/2025 0958    HGB  "15.8 (H) 01/15/2025 0936    HGB 13.4 07/03/2024 0837    HGB 12.8 11/28/2015 1120    HGB 13.6 11/28/2014 0851    INR 0.90 03/22/2025 0958    WBC 5.65 03/22/2025 0958    WBC 4.82 01/15/2025 0936    WBC 5.13 07/03/2024 0837    WBC 5.04 11/28/2015 1120    WBC 6.85 11/28/2014 0851    ESR 34 (H) 05/14/2024 1116    ESR 18 10/19/2015 1001    CRP 2.7 01/15/2025 0936    CRP <3.0 10/19/2015 1001       Meds:    Current Facility-Administered Medications:     ceFAZolin (ANCEF) IVPB (premix in dextrose) 2,000 mg 50 mL, 2,000 mg, Intravenous, Once, Erich Warren MD    lactated ringers infusion, 125 mL/hr, Intravenous, Continuous, Erich Warren MD, Last Rate: 125 mL/hr at 04/14/25 0634, 125 mL/hr at 04/14/25 0634    tranexamic acid (CYKLOKAPRON) 1000-0.7 MG/100ML-% injection 1,000 mg, 1,000 mg, Intravenous, Once, Erich Warren MD    Blood Culture:   No results found for: \"BLOODCX\"    Wound Culture:   Lab Results   Component Value Date    WOUNDCULT 2+ Growth of Stenotrophomonas maltophilia (A) 10/03/2019    WOUNDCULT 2+ Growth of Beta Hemolytic Streptococcus Group B (A) 10/03/2019    WOUNDCULT 2+ Growth of 10/03/2019       Ins and Outs:  No intake/output data recorded.          Physical Exam  /76   Pulse 81   Temp (!) 97 °F (36.1 °C) (Temporal)   Resp 20   Ht 5' 1\" (1.549 m)   Wt 88.1 kg (194 lb 3.2 oz)   LMP  (LMP Unknown)   SpO2 95%   BMI 36.69 kg/m²   /76   Pulse 81   Temp (!) 97 °F (36.1 °C) (Temporal)   Resp 20   Ht 5' 1\" (1.549 m)   Wt 88.1 kg (194 lb 3.2 oz)   LMP  (LMP Unknown)   SpO2 95%   BMI 36.69 kg/m²   Gen: No acute distress, resting comfortably in bed  HEENT: Eyes clear, moist mucus membranes, hearing intact  Respiratory: No audible wheezing or stridor  Cardiovascular: Well Perfused peripherally, 2+ distal pulse  Abdomen: nondistended, no peritoneal signs  Ortho Exam: limited ROM due to pain and mechanical blocking  Neuro Exam: intact    Lab Results: " Reviewed  Imaging: Reviewed

## 2025-04-14 NOTE — PLAN OF CARE
Problem: OCCUPATIONAL THERAPY ADULT  Goal: Performs self-care activities at highest level of function for planned discharge setting.  See evaluation for individualized goals.  Description: Treatment Interventions: ADL retraining, Functional transfer training, Endurance training, Patient/family training, Equipment evaluation/education, Compensatory technique education, Continued evaluation, Energy conservation, Activityengagement          See flowsheet documentation for full assessment, interventions and recommendations.   Note: Limitation: Decreased ADL status, Decreased endurance, Decreased self-care trans, Decreased high-level ADLs  Prognosis: Good  Assessment: Pt is a 74 y.o. female seen for OT evaluation s/p adm to [facility] on 4/14/2025 w/ Primary osteoarthritis of right knee . Comorbidities affecting pt’s functional performance include a significant PMH of anxiety, depression, fibromyalgia, hyperlipidemia, high cholesterol, hx of MI, VANNESSA, spinal stenosis, spondylosis, ankylosing spondylitis. Pt with active OT orders and activity orders for OOB to chair. Pt lives w/ spouse in a multilevel house with 2 VI. Pt has walkin shower and standard toilets. Pt has no bathroom on main level. At baseline, pt was independent with all ADLs/IADLs. Pt completed supine to sit with S. Upon arrival pt noted to be incontinent supine in bed. Pt completed sit to stand with modAx2. Pt reported she felt she needed to use the bathroom, RLE knee buckling noted with standing with use of RW. BSC setup near bed for safety. Pt completed short functional mobility from bed to BSC with modAx2 and use of RW. RLE knee buckling noted during transfers. See additional treatment session focusing on ADLs/IADLs, functional transfers, functional mobility and LE management. Upon evaluation, pt currently requires Nicola for UB ADLs, modA for LB ADLs, maxA for toileting, S for bed mobility, modA for functional mobility, and modA for transfers 2*  the following deficits impacting occupational performance: weakness, decreased strength , decreased balance, decreased activity tolerance, increased pain, orthopedic restrictions, and lethargy . These impairments, as well at pt’s personal factors of: VI home environment, steps within home environment, difficulty performing ADLs, difficulty performing IADLs, difficulty performing transfers/mobility, WBS, fall risk , and new use of AD for functional transfers/mobility limit pt’s ability to safely engage in all baseline areas of occupation. Based on the aforementioned OT evaluation, functional performance deficits, and assessments, pt has been identified as a high complexity evaluation. Pt to continue to benefit from continued acute OT services during hospital stay to address defined deficits and to maximize level of functional independence in the following Occupational Performance areas: grooming, bathing/shower, toilet hygiene, dressing, health maintenance, functional mobility, community mobility, clothing management, cleaning, meal prep, and household maintenance. From OT standpoint, recommend II; Moderate Resource Intensity (pending progress) upon D/C. OT will continue to follow pt 3-5x/wk.     Rehab Resource Intensity Level, OT: II (Moderate Resource Intensity) (pending progress)

## 2025-04-14 NOTE — ANESTHESIA PREPROCEDURE EVALUATION
Procedure:  Robotically assisted right total knee arthroplasty, all associated procedures as indicated (Right: Knee)    Relevant Problems   CARDIO   (+) Angina pectoris (HCC)   (+) Chest pain   (+) Hyperlipidemia      MUSCULOSKELETAL   (+) Ankylosing spondylitis (HCC)   (+) Chronic bilateral low back pain   (+) Enthesitis related arthritis (HCC)   (+) Fibromyalgia   (+) Impingement syndrome of both shoulders   (+) Neurogenic claudication   (+) Primary generalized (osteo)arthritis   (+) Primary osteoarthritis of right knee      NEURO/PSYCH   (+) Chronic bilateral low back pain   (+) Depression   (+) Fibromyalgia      PULMONARY   (+) VANNESSA (obstructive sleep apnea)        Physical Exam    Airway    Mallampati score: III  TM Distance: >3 FB  Neck ROM: full     Dental   No notable dental hx     Cardiovascular      Pulmonary      Other Findings  post-pubertal.      Anesthesia Plan  ASA Score- 3     Anesthesia Type- general with ASA Monitors.         Additional Monitors:     Airway Plan: ETT.           Plan Factors-Exercise tolerance (METS): >4 METS.    Chart reviewed. EKG reviewed.  Existing labs reviewed. Patient summary reviewed.    Patient is not a current smoker.      There is medical exclusion for perioperative obstructive sleep apnea risk education.        Induction- intravenous.    Postoperative Plan- Plan for postoperative opioid use.         Informed Consent- Anesthetic plan and risks discussed with patient.  I personally reviewed this patient with the CRNA. Discussed and agreed on the Anesthesia Plan with the CRNA..      NPO Status:  Vitals Value Taken Time   Date of last liquid 04/13/25 04/14/25 0612   Time of last liquid 2230 04/14/25 0612   Date of last solid 04/13/25 04/14/25 0612   Time of last solid 2230 04/14/25 0612

## 2025-04-14 NOTE — PROGRESS NOTES
"Progress Note - Orthopedics  Lola Spangler 74 y.o. female MRN: [id number]  Unit/Bed#: WE 2 N -01 Encounter: [id number]    Assessment:  74 y.o F w/ right knee OA s/p right TKA  -VSS, hgb 10.2 (13.4)      Plan:  -Continue WBAT RLE  -Continue HV- to be removed in AM  -gabapentin, robaxin, PRN pain meds.  -Continue Antibiotics  -IVF  -Encourage IS  -Continue DVT ppx- Lovenox        Subjective: Patient resting comfortably in bed, pain controlled. Denies CP/SOB, using IS yet. Passing flatus, denies nausea. Urinating without issues.    Objective:     Blood pressure 108/70, pulse 76, temperature 97.5 °F (36.4 °C), temperature source Temporal, resp. rate 18, height 5' 1\" (1.549 m), weight 88.1 kg (194 lb 3.2 oz), SpO2 93%.,Body mass index is 36.69 kg/m².      Intake/Output Summary (Last 24 hours) at 4/15/2025 0646  Last data filed at 4/14/2025 1801  Gross per 24 hour   Intake 1387.5 ml   Output 900 ml   Net 487.5 ml       Invasive Devices       Peripheral Intravenous Line  Duration             Peripheral IV 04/14/25 Dorsal (posterior);Left Hand 1 day              Drain  Duration             Closed/Suction Drain Anterior;Right Knee Accordion 10 Fr. <1 day                    Physical Exam   Physical Exam:    General: No acute distress and Normal appearance  HEENT/NECK:  Normocephalic. Atraumatic.  No jugular venous distention.    Cardiac: Regular rate and rhythm and No murmurs/rubs/gallops  Pulmonary:  Breath sounds clear bilaterally and No rales/rhonchi/wheezes  Abdomen:  Non-tender, Non-distended, and Normal bowel sounds  Incisions: ACE wrap in place with drain  Extremities: Extremities warm/dry and No edema B/L  Neuro: Alert and oriented X 3 and No focal deficits  Skin: Warm/Dry, without rashes or lesions.       Scheduled Meds:  Current Facility-Administered Medications   Medication Dose Route Frequency Provider Last Rate    acetaminophen  975 mg Oral Q6H PRN Tiarra Reyes PA-C      calcium carbonate  1,000 mg Oral " Daily PRN SUSAN Sanchez-C      docusate sodium  100 mg Oral BID SUSAN Sanchez-C      enoxaparin  40 mg Subcutaneous Daily SUSAN Sanchez-C      gabapentin  200 mg Oral TID SUSAN Sanchez-C      gabapentin  600 mg Oral HS SUSAN Sanchez-C      HYDROmorphone  0.5 mg Intravenous Q2H PRN Tiarra SUSAN Reyes-C      lactated ringers  1,000 mL Intravenous Once PRN SUSAN Sanchez-C      And    lactated ringers  1,000 mL Intravenous Once PRN SUSAN Sanchez-C      lactated ringers  125 mL/hr Intravenous Continuous SUSAN Sanchez-C Stopped (04/15/25 0509)    lactated ringers  125 mL/hr Intravenous Continuous SUSAN Sanchez-C Stopped (04/14/25 1045)    methocarbamol  500 mg Oral Q6H OPAL SUSAN Sanchez-C      ondansetron  4 mg Intravenous Q6H PRN SUSAN Sanchez-C      oxyCODONE  10 mg Oral Q4H PRN SUSAN Sanchez-C      oxyCODONE  5 mg Oral Q4H PRN SUSAN Sanchez-C      PARoxetine  20 mg Oral Daily Tiarra Reyes PA-C      povidone-iodine (BETADINE) 10 % 20 Application in sodium chloride 0.9 % 500 mL irrigation bottle   Irrigation Once Erich Warren MD      rOPINIRole  0.25 mg Oral HS Tiarra Reyes PA-C      sodium chloride  1,000 mL Intravenous Once PRN SUSAN Sanchez-C      And    sodium chloride  1,000 mL Intravenous Once PRN SUSAN Sanchez-C       Continuous Infusions:lactated ringers, 125 mL/hr, Last Rate: Stopped (04/15/25 0509)  lactated ringers, 125 mL/hr, Last Rate: Stopped (04/14/25 1045)      PRN Meds:.  acetaminophen    calcium carbonate    HYDROmorphone    lactated ringers **AND** lactated ringers    ondansetron    oxyCODONE    oxyCODONE    sodium chloride **AND** sodium chloride      Lab, Imaging and other studies:I have personally reviewed pertinent lab results.    VTE Pharmacologic Prophylaxis: Enoxaparin (Lovenox)  VTE Mechanical Prophylaxis: sequential compression device      Jo Ann Gibbs PA-C  4/15/2025 6:46 AM

## 2025-04-14 NOTE — ASSESSMENT & PLAN NOTE
-DVT prophylaxis in place-follow-up CBC in a.m. monitoring postoperative hemoglobin-pain control per primary service-ongoing PT evaluation for disposition-follow closely postoperative hemodynamics-overnight medical support

## 2025-04-14 NOTE — PLAN OF CARE
Problem: PHYSICAL THERAPY ADULT  Goal: Performs mobility at highest level of function for planned discharge setting.  See evaluation for individualized goals.  Description: Treatment/Interventions: Functional transfer training, LE strengthening/ROM, Elevations, Therapeutic exercise, Endurance training, Patient/family training, Equipment eval/education, Bed mobility, Gait training, Compensatory technique education, Continued evaluation, Spoke to nursing, OT          See flowsheet documentation for full assessment, interventions and recommendations.  4/14/2025 1453 by Gail Tinsley PT  Outcome: Progressing  Note: Prognosis: Good  Problem List: Decreased strength, Decreased range of motion, Decreased endurance, Impaired balance, Decreased mobility, Orthopedic restrictions, Pain  Assessment: Lola Spangler is a 73 y/o female who presents to [facility] on 4/14 for R TKA by Dr. Warren.  PT consulted.  WBAT. Prior to admission resides with spouse in multistory home.  1+1 VI.  Only bathrooms in basement and 2nd floor.  Spouse retired and able to assist. Was independent with use cane only in community, no AD in home.  No hx of falls.  Currently presents with functional limitations related to decreased activity tolerance, impaired posture, limitations to R knee ROM and strength.  Post operative pain.  Decreased balance, functional mobility and locomotion requiring more restrictive AD use.  S for bed mobility. ModA of 2 for transfers and trial of ambulation with RW support.  + R knee buckling in stance.  Increased cues to compensate with limitations.  Vitals stable at 116/68 supine.  113/70 sitting and 133/70 p activity.  Given impairments will benefit from skilled PT in order to progress and achieve mobility goals.  The patient's AM-PAC Basic Mobility Inpatient Short Form Raw Score is 10. A Raw score of less than or equal to 16 suggests the patient may benefit from discharge to post-acute rehabilitation services. Please also  refer to the recommendation of the Physical Therapist for safe discharge planning.  At this time would consider moderate resource intensity upon discharge.  Can monitor with progress.  Barriers to Discharge: Inaccessible home environment  Barriers to Discharge Comments: bed and bath only on 2nd floor  Rehab Resource Intensity Level, PT: II (Moderate Resource Intensity) (monitor disposition/resource with progress towards goals.)    See flowsheet documentation for full assessment.     4/14/2025 1453 by Gail Tinsley PT  Outcome: Progressing  Note: Prognosis: Good  Problem List: Decreased strength, Decreased range of motion, Decreased endurance, Impaired balance, Decreased mobility, Orthopedic restrictions, Pain  Assessment: Lola Spangler is a 75 y/o female who presents to [facility] on 4/14 for R TKA by Dr. Warren.  PT consulted.  WBAT. Prior to admission resides with spouse in multistory home.  1+1 IV.  Only bathrooms in basement and 2nd floor.  Spouse retired and able to assist. Was independent with use cane only in community, no AD in home.  No hx of falls.  Currently presents with functional limitations related to decreased activity tolerance, impaired posture, limitations to R knee ROM and strength.  Post operative pain.  Decreased balance, functional mobility and locomotion requiring more restrictive AD use.  S for bed mobility. ModA of 2 for transfers and trial of ambulation with RW support.  + R knee buckling in stance.  Increased cues to compensate with limitations.  Vitals stable at 116/68 supine.  113/70 sitting and 133/70 p activity.  Given impairments will benefit from skilled PT in order to progress and achieve mobility goals.  The patient's AM-PAC Basic Mobility Inpatient Short Form Raw Score is 10. A Raw score of less than or equal to 16 suggests the patient may benefit from discharge to post-acute rehabilitation services. Please also refer to the recommendation of the Physical Therapist for safe  discharge planning.  At this time would consider moderate resource intensity upon discharge.  Can monitor with progress.  Barriers to Discharge: Inaccessible home environment  Barriers to Discharge Comments: bed and bath only on 2nd floor  Rehab Resource Intensity Level, PT: II (Moderate Resource Intensity) (monitor disposition/resource with progress towards goals.)    See flowsheet documentation for full assessment.

## 2025-04-14 NOTE — PHYSICAL THERAPY NOTE
PHYSICAL THERAPY NOTE          Patient Name: Lola Spanglre  Today's Date: 4/14/2025  15:38-16:02       04/14/25 1602   PT Last Visit   PT Visit Date 04/14/25   Note Type   Note Type BID visit/treatment   Pain Assessment   Pain Location/Orientation Orientation: Right;Location: Knee   Pain Rating: FLACC (Rest) - Face 0   Pain Rating: FLACC (Rest) - Legs 0   Pain Rating: FLACC (Rest) - Activity 1   Pain Rating: FLACC (Rest) - Cry 1   Pain Rating: FLACC (Rest) - Consolability 1   Score: FLACC (Rest) 3   Restrictions/Precautions   RLE Weight Bearing Per Order WBAT   Other Precautions Chair Alarm;Bed Alarm;WBS;Multiple lines;Pain;Fall Risk;Limb alert  ([place])   General   Chart Reviewed Yes   Response to Previous Treatment Patient reporting fatigue but able to participate.   Family/Caregiver Present No   Cognition   Overall Cognitive Status WFL   Subjective   Subjective I am motivated, but that was tiresome.   Bed Mobility   Sit to Supine 4  Minimal assistance   Additional items Assist x 1;Increased time required;Verbal cues;LE management   Additional Comments OOB in chair upon arrival.   Transfers   Sit to Stand 4  Minimal assistance   Additional items Increased time required;Verbal cues;Armrests;Assist x 2   Stand to Sit 4  Minimal assistance   Additional items Increased time required;Verbal cues;Assist x 2   Additional Comments Increased time and cues to complete transitions.   Ambulation/Elevation   Gait pattern Improper Weight shift;R Knee Anne;Antalgic;Decreased foot clearance;Decreased R stance;Inconsistent zan;Short stride;Foward flexed;Step to;Excessively slow   Gait Assistance 3  Moderate assist   Additional items Assist x 1;Verbal cues;Tactile cues;Other (Comment)  (2nd person chair follow. Manual R knee blocking required in stance given buckling.)   Assistive Device Rolling walker   Distance Amb with [id number]'x1 with chair  follow.   Ambulation/Elevation Additional Comments gait slowed, R knee buckling requiring manual blocking in stance.   Balance   Static Sitting Good   Dynamic Sitting Fair +   Static Standing Fair -   Dynamic Standing Poor   Ambulatory Poor   Endurance Deficit   Endurance Deficit Yes   Endurance Deficit Description fatigue, pain   Activity Tolerance   Activity Tolerance Patient tolerated treatment well;Patient limited by fatigue;Patient limited by pain   Medical Staff Made Aware NurseLizzie.   Nurse Made Aware yes   Exercises   TKR Supine;10 reps;AROM;AAROM;Right  (Performed AP, QS, AAROM HS in supine.  Reviewed SAQ, LAQ, hiph abd/add with written HEP.)   Neuro re-ed reinforced importance of positions to promote extension of knee.   Assessment   Prognosis Good   Problem List Decreased strength;Decreased range of motion;Decreased endurance;Impaired balance;Decreased mobility;Decreased skin integrity;Orthopedic restrictions;Pain   Assessment Lola is seen for progression of PT this pm.  Continues to require increased assistance overall for mobility and ambulation, although improved from previous.  Gait continues with R knee buckling in stance.  Manual knee blocking with ambulation required along with chair follow. ModA for ambulation needed.  Fatigue noted over distances.  Sit to supine with Nicola of operative leg.  Tolerated few supine ex.  Provided written HEP and education given,  The patient's AM-PAC Basic Mobility Inpatient Short Form Raw Score is 14. A Raw score of less than or equal to 16 suggests the patient may benefit from discharge to post-acute rehabilitation services. Please also refer to the recommendation of the Physical Therapist for safe discharge planning. Continue to monitor progress.  At this time moderate resource intensity considered unless able to achieve IPPT goals for home.   Barriers to Discharge Inaccessible home environment   Barriers to Discharge Comments stairs   Goals   Patient Goals  get better   STG Expiration Date 04/24/25   PT Treatment Day 1   Plan   Treatment/Interventions Functional transfer training;LE strengthening/ROM;Elevations;Therapeutic exercise;Endurance training;Patient/family training;Equipment eval/education;Bed mobility;Gait training;Compensatory technique education;Continued evaluation;Spoke to nursing;OT   Progress Slow progress, decreased activity tolerance   PT Frequency Twice a day  (prn to facilitate aheivement of IPPT goals.)   Discharge Recommendation   Rehab Resource Intensity Level, PT II (Moderate Resource Intensity)  (pending progress)   AM-PAC Basic Mobility Inpatient   Turning in Flat Bed Without Bedrails 3   Lying on Back to Sitting on Edge of Flat Bed Without Bedrails 4   Moving Bed to Chair 2   Standing Up From Chair Using Arms 2   Walk in Room 2   Climb 3-5 Stairs With Railing 1   Basic Mobility Inpatient Raw Score 14   Basic Mobility Standardized Score 35.55   [facility] Highest Level Of Mobility   -HLM Goal 4: Move to chair/commode   -HLM Achieved 6: Walk 10 steps or more   Education   Education Provided Mobility training;Home exercise program;Assistive device   Patient Demonstrates acceptance/verbal understanding   End of Consult   Patient Position at End of Consult Supine;Bed/Chair alarm activated;All needs within reach   End of Consult Comments ice to knee.   Gail Tinsley, PT

## 2025-04-15 LAB
ALBUMIN SERPL BCG-MCNC: 4 G/DL (ref 3.5–5)
ALP SERPL-CCNC: 37 U/L (ref 34–104)
ALT SERPL W P-5'-P-CCNC: 16 U/L (ref 7–52)
ANION GAP SERPL CALCULATED.3IONS-SCNC: 7 MMOL/L (ref 4–13)
AST SERPL W P-5'-P-CCNC: 19 U/L (ref 13–39)
BILIRUB SERPL-MCNC: 0.43 MG/DL (ref 0.2–1)
BUN SERPL-MCNC: 19 MG/DL (ref 5–25)
CALCIUM SERPL-MCNC: 9.2 MG/DL (ref 8.4–10.2)
CHLORIDE SERPL-SCNC: 103 MMOL/L (ref 96–108)
CO2 SERPL-SCNC: 29 MMOL/L (ref 21–32)
CREAT SERPL-MCNC: 0.81 MG/DL (ref 0.6–1.3)
ERYTHROCYTE [DISTWIDTH] IN BLOOD BY AUTOMATED COUNT: 14.8 % (ref 11.6–15.1)
GFR SERPL CREATININE-BSD FRML MDRD: 72 ML/MIN/1.73SQ M
GLUCOSE P FAST SERPL-MCNC: 157 MG/DL (ref 65–99)
GLUCOSE SERPL-MCNC: 157 MG/DL (ref 65–140)
HCT VFR BLD AUTO: 31.2 % (ref 34.8–46.1)
HGB BLD-MCNC: 10.2 G/DL (ref 11.5–15.4)
MCH RBC QN AUTO: 30 PG (ref 26.8–34.3)
MCHC RBC AUTO-ENTMCNC: 32.7 G/DL (ref 31.4–37.4)
MCV RBC AUTO: 92 FL (ref 82–98)
PLATELET # BLD AUTO: 342 THOUSANDS/UL (ref 149–390)
PMV BLD AUTO: 9.3 FL (ref 8.9–12.7)
POTASSIUM SERPL-SCNC: 4.3 MMOL/L (ref 3.5–5.3)
PROT SERPL-MCNC: 6.1 G/DL (ref 6.4–8.4)
RBC # BLD AUTO: 3.4 MILLION/UL (ref 3.81–5.12)
SODIUM SERPL-SCNC: 139 MMOL/L (ref 135–147)
WBC # BLD AUTO: 10.48 THOUSAND/UL (ref 4.31–10.16)

## 2025-04-15 PROCEDURE — 97530 THERAPEUTIC ACTIVITIES: CPT

## 2025-04-15 PROCEDURE — 80053 COMPREHEN METABOLIC PANEL: CPT

## 2025-04-15 PROCEDURE — 97116 GAIT TRAINING THERAPY: CPT

## 2025-04-15 PROCEDURE — 99232 SBSQ HOSP IP/OBS MODERATE 35: CPT | Performed by: INTERNAL MEDICINE

## 2025-04-15 PROCEDURE — 97535 SELF CARE MNGMENT TRAINING: CPT

## 2025-04-15 PROCEDURE — 85027 COMPLETE CBC AUTOMATED: CPT

## 2025-04-15 PROCEDURE — 99024 POSTOP FOLLOW-UP VISIT: CPT | Performed by: PHYSICIAN ASSISTANT

## 2025-04-15 PROCEDURE — 97110 THERAPEUTIC EXERCISES: CPT

## 2025-04-15 RX ADMIN — OXYCODONE HYDROCHLORIDE 5 MG: 5 TABLET ORAL at 09:58

## 2025-04-15 RX ADMIN — GABAPENTIN 200 MG: 100 CAPSULE ORAL at 20:08

## 2025-04-15 RX ADMIN — IRON SUCROSE 200 MG: 20 INJECTION, SOLUTION INTRAVENOUS at 10:13

## 2025-04-15 RX ADMIN — METHOCARBAMOL 500 MG: 500 TABLET ORAL at 11:37

## 2025-04-15 RX ADMIN — ACETAMINOPHEN 975 MG: 325 TABLET ORAL at 14:38

## 2025-04-15 RX ADMIN — GABAPENTIN 200 MG: 100 CAPSULE ORAL at 15:48

## 2025-04-15 RX ADMIN — GABAPENTIN 600 MG: 300 CAPSULE ORAL at 22:14

## 2025-04-15 RX ADMIN — DOCUSATE SODIUM 100 MG: 100 CAPSULE, LIQUID FILLED ORAL at 18:04

## 2025-04-15 RX ADMIN — ENOXAPARIN SODIUM 40 MG: 40 INJECTION SUBCUTANEOUS at 09:58

## 2025-04-15 RX ADMIN — DOCUSATE SODIUM 100 MG: 100 CAPSULE, LIQUID FILLED ORAL at 09:59

## 2025-04-15 RX ADMIN — GABAPENTIN 200 MG: 100 CAPSULE ORAL at 09:59

## 2025-04-15 RX ADMIN — ROPINIROLE HYDROCHLORIDE 0.25 MG: 0.25 TABLET, FILM COATED ORAL at 22:14

## 2025-04-15 RX ADMIN — PAROXETINE HYDROCHLORIDE 20 MG: 20 TABLET, FILM COATED ORAL at 09:59

## 2025-04-15 RX ADMIN — OXYCODONE HYDROCHLORIDE 10 MG: 10 TABLET ORAL at 14:38

## 2025-04-15 RX ADMIN — METHOCARBAMOL 500 MG: 500 TABLET ORAL at 18:04

## 2025-04-15 RX ADMIN — OXYCODONE HYDROCHLORIDE 5 MG: 5 TABLET ORAL at 22:14

## 2025-04-15 RX ADMIN — METHOCARBAMOL 500 MG: 500 TABLET ORAL at 05:09

## 2025-04-15 RX ADMIN — ACETAMINOPHEN 975 MG: 325 TABLET ORAL at 22:15

## 2025-04-15 NOTE — PLAN OF CARE
Problem: PAIN - ADULT  Goal: Verbalizes/displays adequate comfort level or baseline comfort level  Description: Interventions:- Encourage patient to monitor pain and request assistance- Assess pain using appropriate pain scale- Administer analgesics based on type and severity of pain and evaluate response- Implement non-pharmacological measures as appropriate and evaluate response- Consider cultural and social influences on pain and pain management- Notify physician/advanced practitioner if interventions unsuccessful or patient reports new pain  Outcome: Progressing     Problem: INFECTION - ADULT  Goal: Absence or prevention of progression during hospitalization  Description: INTERVENTIONS:- Assess and monitor for signs and symptoms of infection- Monitor lab/diagnostic results- Monitor all insertion sites, i.e. indwelling lines, tubes, and drains- Monitor endotracheal if appropriate and nasal secretions for changes in amount and color- [place] appropriate cooling/warming therapies per order- Administer medications as ordered- Instruct and encourage patient and family to use good hand hygiene technique- Identify and instruct in appropriate isolation precautions for identified infection/condition  Outcome: Progressing  Goal: Absence of fever/infection during neutropenic period  Description: INTERVENTIONS:- Monitor WBC  Outcome: Progressing     Problem: SAFETY ADULT  Goal: Patient will remain free of falls  Description: INTERVENTIONS:- Educate patient/family on patient safety including physical limitations- Instruct patient to call for assistance with activity - Consult OT/PT to assist with strengthening/mobility - Keep Call bell within reach- Keep bed low and locked with side rails adjusted as appropriate- Keep care items and personal belongings within reach- Initiate and maintain comfort rounds- Make Fall Risk Sign visible to staff- Offer Toileting every 2 Hours, in advance of need- Initiate/Maintain bed/chair  alarm- Obtain necessary fall risk management equipment: rolling walker- Apply yellow socks and bracelet for high fall risk patients- Consider moving patient to room near nurses station  Outcome: Progressing  Goal: Maintain or return to baseline ADL function  Description: INTERVENTIONS:-  Assess patient's ability to carry out ADLs; assess patient's baseline for ADL function and identify physical deficits which impact ability to perform ADLs (bathing, care of mouth/teeth, toileting, grooming, dressing, etc.)- Assess/evaluate cause of self-care deficits - Assess range of motion- Assess patient's mobility; develop plan if impaired- Assess patient's need for assistive devices and provide as appropriate- Encourage maximum independence but intervene and supervise when necessary- Involve family in performance of ADLs- Assess for home care needs following discharge - Consider OT consult to assist with ADL evaluation and planning for discharge- Provide patient education as appropriate  Outcome: Progressing  Goal: Maintains/Returns to pre admission functional level  Description: INTERVENTIONS:- Perform AM-PAC 6 Click Basic Mobility/ Daily Activity assessment daily.- Set and communicate daily mobility goal to care team and patient/family/caregiver. - Collaborate with rehabilitation services on mobility goals if consulted- Perform Range of Motion 3 times a day.- Reposition patient every 2 hours.- Dangle patient 3 times a day- Stand patient 3 times a day- Ambulate patient 3 times a day- Out of bed to chair 3 times a day - Out of bed for meals 3 times a day- Out of bed for toileting- Record patient progress and toleration of activity level   Outcome: Progressing     Problem: DISCHARGE PLANNING  Goal: Discharge to home or other facility with appropriate resources  Description: INTERVENTIONS:- Identify barriers to discharge w/patient and caregiver- Arrange for needed discharge resources and transportation as appropriate- Identify  discharge learning needs (meds, wound care, etc.)- Arrange for interpretive services to assist at discharge as needed- Refer to Case Management Department for coordinating discharge planning if the patient needs post-hospital services based on physician/advanced practitioner order or complex needs related to functional status, cognitive ability, or social support system  Outcome: Progressing     Problem: Knowledge Deficit  Goal: Patient/family/caregiver demonstrates understanding of disease process, treatment plan, medications, and discharge instructions  Description: Complete learning assessment and assess knowledge base.Interventions:- Provide teaching at level of understanding- Provide teaching via preferred learning methods  Outcome: Progressing

## 2025-04-15 NOTE — ASSESSMENT & PLAN NOTE
Weight Bearing as tolerated  Up and out of bed with assistance  Incentive spirometer  DVT prophylaxis - mechanical and chemical (Lovenox).  Drain: removed and dressing reinforced  Analgesics  PT/OT  Patient noted to have acute blood loss anemia due to a drop in Hbg of > 2.0g from preop levels, will monitor vital signs and resuscitate with IV fluids as needed  Discharge plan - home today pending PT

## 2025-04-15 NOTE — PROGRESS NOTES
Progress Note - Orthopedics   Name: Lola Spangler 74 y.o. female I MRN: [id number]  Unit/Bed#: WE 2 N -01 I Date of Admission: 4/14/2025   Date of Service: 4/15/2025 I Hospital Day: 0     Assessment & Plan  Primary osteoarthritis of right knee  Weight Bearing as tolerated  Up and out of bed with assistance  Incentive spirometer  DVT prophylaxis - mechanical and chemical (Lovenox).  Drain: removed and dressing reinforced  Analgesics  PT/OT  Patient noted to have acute blood loss anemia due to a drop in Hbg of > 2.0g from preop levels, will monitor vital signs and resuscitate with IV fluids as needed  Discharge plan - home today pending PT   Hyperlipidemia    Depression    Prediabetes        Subjective  74 y.o.female POD 1 right TKA No acute events, no new complaints. Pain well controlled. Denies fevers, chills, CP, SOB, N/V, numbness or tingling. Patient reports no issues with urination or bowel movements. Patient states she wasn't able to do much with PT yesterday because her knee was buckling     Objective :  Temp:  [97.4 °F (36.3 °C)-98.5 °F (36.9 °C)] 97.5 °F (36.4 °C)  HR:  [] 70  BP: (103-165)/(59-80) 119/64  Resp:  [15-30] 18  SpO2:  [89 %-100 %] 100 %  O2 Device: Nasal cannula  Nasal Cannula O2 Flow Rate (L/min):  [2 L/min] 2 L/min    Physical Exam  Musculoskeletal: Right lower extremity  Skin warm and dry . No erythema or ecchymosis.  Dressing C/D/I  Drain in place and functioning  Motor intact to +FHL/EHL, +ankle dorsi/plantar flexion  Sensation intact to saphenous, sural, tibial, superficial peroneal nerve, and deep peroneal  No calf swelling or tenderness to palpation      Lab Results: I have reviewed the following results:  Recent Labs     04/15/25  0516   WBC 10.48*   HGB 10.2*   HCT 31.2*      BUN 19   CREATININE 0.81

## 2025-04-15 NOTE — PROGRESS NOTES
"Progress Note - Internal Medicine   Name: Lola Spangler 74 y.o. female I MRN: [id number]  Unit/Bed#: WE 2 N -01 I Date of Admission: 4/14/2025   Date of Service: 4/15/2025 I Hospital Day: 0     Assessment & Plan  Primary osteoarthritis of right knee  74 year old female POD 2 s/p right TKA  Hgb: 10.2 from 9.1. Asymptomatic; stable     Plan:  - WBAT RLE  - Venofer for >2 drop in Hgb  - Trend labs   - Frequent vitals   - ERAS protocol   - OOB/ambulate   - DVT ppx  - Continue low cholesterol and CCD  - Pain and nausea control PRN    24 Hour Events : No acute overnight events   Subjective : Patient doing well this morning. Patient with minimal amount of pain this morning. She continues to tolerate diet and pain overnight. She denies N/V, fevers or chills.    Objective :  Visit Vitals  /71   Pulse 85   Temp 97.6 °F (36.4 °C)   Resp 14   Ht 5' 1\" (1.549 m)   Wt 88.1 kg (194 lb 3.2 oz)   LMP  (LMP Unknown)   SpO2 98%   BMI 36.69 kg/m²   OB Status Postmenopausal   Smoking Status Never   BSA 1.87 m²        Physical Exam  Constitutional:       General: She is not in acute distress.     Appearance: Normal appearance. She is not ill-appearing, toxic-appearing or diaphoretic.   Cardiovascular:      Rate and Rhythm: Normal rate and regular rhythm.      Pulses: Normal pulses.      Heart sounds: Normal heart sounds. No murmur heard.     No gallop.   Pulmonary:      Effort: Pulmonary effort is normal. No respiratory distress.      Breath sounds: Normal breath sounds.   Abdominal:      General: Abdomen is flat. There is no distension.      Palpations: Abdomen is soft.      Tenderness: There is no abdominal tenderness.   Neurological:      General: No focal deficit present.      Mental Status: She is alert and oriented to person, place, and time.     RLE: Dressings C/D/I. Neurovascularly intact. No surrounding erythema, swelling or drainage.      Lab Results: I have reviewed the following results:  Recent Labs     " 04/15/25  0516 04/16/25  0516   WBC 10.48* 7.66   HGB 10.2* 9.1*    267   SODIUM 139 141   K 4.3 4.1    103   CO2 29 32   BUN 19 17   CREATININE 0.81 0.67   GLUC 157* 114   CALCIUM 9.2 8.8   AST 19 19   ALT 16 13   ALKPHOS 37 33*   TBILI 0.43 0.42          VTE Pharmacologic Prophylaxis: VTE covered by:  enoxaparin, Subcutaneous, 40 mg at 04/15/25 0958     VTE Mechanical Prophylaxis: sequential compression device

## 2025-04-15 NOTE — ASSESSMENT & PLAN NOTE
74 year old female POD 2 s/p right TKA  Hgb: 10.2 from 9.1. Asymptomatic; stable     Plan:  - WBAT RLE  - Venofer for >2 drop in Hgb  - Trend labs   - Frequent vitals   - ERAS protocol   - OOB/ambulate   - DVT ppx  - Continue low cholesterol and CCD  - Pain and nausea control PRN

## 2025-04-15 NOTE — PLAN OF CARE
Problem: PHYSICAL THERAPY ADULT  Goal: Performs mobility at highest level of function for planned discharge setting.  See evaluation for individualized goals.  Description: Treatment/Interventions: Functional transfer training, LE strengthening/ROM, Elevations, Therapeutic exercise, Endurance training, Patient/family training, Equipment eval/education, Bed mobility, Gait training, Compensatory technique education, Continued evaluation, Spoke to nursing, OT          See flowsheet documentation for full assessment, interventions and recommendations.  Outcome: Progressing  Note: Prognosis: Good  Problem List: Decreased strength, Decreased range of motion, Decreased endurance, Impaired balance, Decreased mobility, Decreased skin integrity, Orthopedic restrictions, Pain  Assessment: Seen for progress of PT POC.  Improved quad control in WB this session  Cues for UE use for offloading and sequencing during gait.  Able to increase distances with min A.  Gait slowed, step to pattern,short step. Less assistance for transfers.  Slow steady progress towards goals.  Anticipate ability to progress and achieve goals with family support for discharge to home with minimal resource intensity services.  The patient's AM-PAC Basic Mobility Inpatient Short Form Raw Score is 17. A Raw score of greater than 16 suggests the patient may benefit from discharge to home. Please also refer to the recommendation of the Physical Therapist for safe discharge planning.  Barriers to Discharge: Inaccessible home environment  Barriers to Discharge Comments: stairs  Rehab Resource Intensity Level, PT: III (Minimum Resource Intensity)    See flowsheet documentation for full assessment.

## 2025-04-15 NOTE — PLAN OF CARE
Problem: PHYSICAL THERAPY ADULT  Goal: Performs mobility at highest level of function for planned discharge setting.  See evaluation for individualized goals.  Description: Treatment/Interventions: Functional transfer training, LE strengthening/ROM, Elevations, Therapeutic exercise, Endurance training, Patient/family training, Equipment eval/education, Bed mobility, Gait training, Compensatory technique education, Continued evaluation, Spoke to nursing, OT          See flowsheet documentation for full assessment, interventions and recommendations.  4/15/2025 1624 by Gail Tinsley, PT  Outcome: Progressing  Note: Prognosis: Good  Problem List: Decreased strength, Decreased range of motion, Decreased endurance, Impaired balance, Decreased mobility, Pain  Assessment: Able to initiate stair training.  Has 1+1 Large VI home.  Able to progress from 4 inch-->6inch-->8 inch step with RW support and Nicola.  Increased ambulation distances and tolerance.  One curb step performed with Nicola of 2 using RW support.  Discussed family training prior to home as spouse not present for session.  Plan for am session for family training as well as improved stair management for discharge to home.  The patient's AM-PAC Basic Mobility Inpatient Short Form Raw Score is 19. A Raw score of greater than 16 suggests the patient may benefit from discharge to home. Please also refer to the recommendation of the Physical Therapist for safe discharge planning.  Barriers to Discharge: Inaccessible home environment  Barriers to Discharge Comments: stairs  Rehab Resource Intensity Level, PT: III (Minimum Resource Intensity)    See flowsheet documentation for full assessment.     4/15/2025 1233 by Gail Tinsley, PT  Outcome: Progressing  Note: Prognosis: Good  Problem List: Decreased strength, Decreased range of motion, Decreased endurance, Impaired balance, Decreased mobility, Decreased skin integrity, Orthopedic restrictions, Pain  Assessment:  Seen for progress of PT POC.  Improved quad control in WB this session  Cues for UE use for offloading and sequencing during gait.  Able to increase distances with min A.  Gait slowed, step to pattern,short step. Less assistance for transfers.  Slow steady progress towards goals.  Anticipate ability to progress and achieve goals with family support for discharge to home with minimal resource intensity services.  The patient's AM-PAC Basic Mobility Inpatient Short Form Raw Score is 17. A Raw score of greater than 16 suggests the patient may benefit from discharge to home. Please also refer to the recommendation of the Physical Therapist for safe discharge planning.  Barriers to Discharge: Inaccessible home environment  Barriers to Discharge Comments: stairs  Rehab Resource Intensity Level, PT: III (Minimum Resource Intensity)    See flowsheet documentation for full assessment.

## 2025-04-15 NOTE — PHYSICAL THERAPY NOTE
PHYSICAL THERAPY NOTE          Patient Name: Lola Spangler  Today's Date: 4/15/2025    13:15-14:00   04/15/25 1400   PT Last Visit   PT Visit Date 04/15/25   Note Type   Note Type BID visit/treatment   Pain Assessment   Pain Location/Orientation Orientation: Right;Location: Knee   Pain Rating: FLACC (Rest) - Face 0   Pain Rating: FLACC (Rest) - Legs 0   Pain Rating: FLACC (Rest) - Activity 0   Pain Rating: FLACC (Rest) - Cry 1   Pain Rating: FLACC (Rest) - Consolability 1   Score: FLACC (Rest) 2   Restrictions/Precautions   RLE Weight Bearing Per Order WBAT   Other Precautions Chair Alarm;Bed Alarm;Fall Risk;Pain;Limb alert  (Masimo)   General   Chart Reviewed Yes   Response to Previous Treatment Patient with no complaints from previous session.   Family/Caregiver Present No  (entered toward end of session ( spouse))   Cognition   Overall Cognitive Status WFL   Arousal/Participation Alert   Attention Within functional limits   Following Commands Follows all commands and directions without difficulty   Comments Pleasant and motivated   Subjective   Subjective Biggest concern is the large VI home.   Bed Mobility   Additional Comments received OOB in chair.   Transfers   Sit to Stand 4  Minimal assistance  (from lower surface, S for higher surface.)   Additional items Increased time required;Verbal cues;Armrests   Stand to Sit 5  Supervision   Additional items Increased time required;Verbal cues   Toilet transfer 5  Supervision   Additional items Increased time required;Armrests;Commode  (BSC over toilet)   Additional Comments Increased time to complete transitions.  Performed from recliner, lower chair, toilet.   Ambulation/Elevation   Gait pattern Improper Weight shift;Antalgic;Decreased foot clearance;Decreased R stance;Short stride;Excessively slow;Step to   Gait Assistance 5  Supervision   Additional items Verbal cues   Assistive  Device Rolling walker   Distance Amb with RW 15'x1, 5'x1, 12'x1.  Seated rest followed by single step navigation of 4, 6 and 8 inch steps.  Then amb additional 50'x1, seatd rest then 10'x1, one curb step and + 50'x1.   Stair Management Assistance 4  Minimal assist   Additional items Assist x 1;Verbal cues;Tactile cues;Assist x 2  (curb step with Nicola of 2)   Stair Management Technique Step to pattern;Foreward;With walker   Number of Stairs 1  (one 4 inch, one 6 inch, one 8 inch, followed by curb step with RW support.  Curb step wiht Nicola of 2.)   Ambulation/Elevation Additional Comments Gait slowed, step to pattern.   Balance   Static Sitting Fair +   Dynamic Sitting Fair   Static Standing Fair   Dynamic Standing Fair -   Ambulatory Fair -   Endurance Deficit   Endurance Deficit Yes   Endurance Deficit Description fatigue, pain   Activity Tolerance   Activity Tolerance Patient tolerated treatment well;Patient limited by fatigue;Patient limited by pain   Medical Staff Made Aware NurseLizzie   Nurse Made Aware yes   Assessment   Prognosis Good   Problem List Decreased strength;Decreased range of motion;Decreased endurance;Impaired balance;Decreased mobility;Pain   Assessment Able to initiate stair training.  Has 1+1 Large VI home.  Able to progress from 4 inch-->6inch-->8 inch step with RW support and Nicola.  Increased ambulation distances and tolerance.  One curb step performed with Nicola of 2 using RW support.  Discussed family training prior to home as spouse not present for session.  Plan for am session for family training as well as improved stair management for discharge to home.  The patient's AM-PAC Basic Mobility Inpatient Short Form Raw Score is 19. A Raw score of greater than 16 suggests the patient may benefit from discharge to home. Please also refer to the recommendation of the Physical Therapist for safe discharge planning.   Barriers to Discharge Inaccessible home environment   Goals   Patient Goals  go home   PT Treatment Day 3   Plan   Treatment/Interventions Functional transfer training;LE strengthening/ROM;Elevations;Therapeutic exercise;Endurance training;Patient/family training;Equipment eval/education;Bed mobility;Gait training;Compensatory technique education;Spoke to nursing   Progress Progressing toward goals   PT Frequency Twice a day  (prn to facilitate discharge to home.)   Discharge Recommendation   Rehab Resource Intensity Level, PT III (Minimum Resource Intensity)   AM-PAC Basic Mobility Inpatient   Turning in Flat Bed Without Bedrails 3   Lying on Back to Sitting on Edge of Flat Bed Without Bedrails 3   Moving Bed to Chair 3   Standing Up From Chair Using Arms 3   Walk in Room 4   Climb 3-5 Stairs With Railing 3   Basic Mobility Inpatient Raw Score 19   Basic Mobility Standardized Score 42.48   [facility] Highest Level Of Mobility   -HLM Goal 6: Walk 10 steps or more   -HLM Achieved 7: Walk 25 feet or more   Education   Education Provided Mobility training;Home exercise program;Assistive device   Patient Demonstrates acceptance/verbal understanding   End of Consult   Patient Position at End of Consult Bedside chair;Bed/Chair alarm activated;All needs within reach   Gail Tinsley, PT

## 2025-04-15 NOTE — OCCUPATIONAL THERAPY NOTE
Occupational Therapy Progress Note     Patient Name: Lola Spangler  Today's Date: 4/15/2025  Problem List  Principal Problem:    Primary osteoarthritis of right knee  Active Problems:    Hyperlipidemia    Depression    Prediabetes       04/15/25 0845   OT Last Visit   OT Visit Date 04/15/25   Note Type   Note Type Treatment   Pain Assessment   Pain Assessment Tool 0-10   Pain Score 2   Pain Location/Orientation Orientation: Right;Location: Knee   Hospital Pain Intervention(s) Repositioned;Ambulation/increased activity;Emotional support;Rest   Restrictions/Precautions   Weight Bearing Precautions Per Order Yes   RLE Weight Bearing Per Order WBAT   Other Precautions WBS;Chair Alarm;Fall Risk;Pain;Limb alert   Lifestyle   Autonomy Independent with all ADLs/IADLs, use of cane for community distances   Reciprocal Relationships Spouse   Service to Others Family   Intrinsic Gratification walking, reading   ADL   Where Assessed Chair   Grooming Assistance 4  Minimal Assistance   Grooming Deficit Setup;Verbal cueing;Supervision/safety;Increased time to complete;Wash/dry hands   UB Bathing Assistance 5  Supervision/Setup   UB Bathing Deficit Setup;Verbal cueing;Supervision/safety   LB Bathing Assistance 4  Minimal Assistance   LB Bathing Deficit Setup;Verbal cueing;Supervision/safety;Increased time to complete   UB Dressing Assistance 5  Supervision/Setup   UB Dressing Deficit Setup;Verbal cueing;Supervision/safety;Increased time to complete   LB Dressing Assistance 4  Minimal Assistance   LB Dressing Deficit Setup;Verbal cueing;Supervision/safety;Increased time to complete;Thread RLE into pants;Thread LLE into pants;Thread RLE into underwear;Thread LLE into underwear;Pull up over hips   Toileting Assistance  4  Minimal Assistance   Toileting Deficit Setup;Verbal cueing;Increased time to complete;Supervison/safety;Bedside commode;Clothing management down;Clothing management up  (BSC over standard toilet)   Functional  Standing Tolerance   Time ~3-4 min   Activity standing for grooming at sink and standing for dressing tasks   Comments use of RW for stability   Bed Mobility   Supine to Sit Unable to assess   Sit to Supine Unable to assess   Additional Comments Pt greeted OOB in recliner, ended session in recliner with PT present   Transfers   Sit to Stand 4  Minimal assistance   Additional items Armrests;Increased time required;Verbal cues  (RW)   Stand to Sit 5  Supervision   Additional items Armrests;Increased time required;Verbal cues  (RW)   Toilet transfer 5  Supervision   Additional items Increased time required;Verbal cues;Standard toilet;Commode  (BSC over standard toilet, use of RW)   Additional Comments verbal cues for hand placement and cues for LE management   Functional Mobility   Functional Mobility 4  Minimal assistance   Additional Comments Pt completed in room distance with use of RW and Nicola, no signs of RLE knee buckling.   Additional items Rolling walker   Toilet Transfers   Toilet Transfer From Other (Comment)  (Chair)   Toilet Transfer Type To and from   Toilet Transfer to Standard toilet  (BSC over)   Toilet Transfer Technique Ambulating   Toilet Transfers Supervision   Toilet Transfers Comments use of RW   Cognition   Overall Cognitive Status WFL   Arousal/Participation Cooperative;Alert   Attention Within functional limits   Orientation Level Oriented X4   Memory Within functional limits   Following Commands Follows one step commands without difficulty   Comments Cooperative and motivated   Activity Tolerance   Activity Tolerance Patient tolerated treatment well   Medical Staff Made Aware PT ERIKA Reynolds, Pt seen for treatment with skilled Physical Therapy due to pt's medical complexity, decreased endurance, overall functional level, overall safety, and post surgical day #1.   Assessment   Assessment Pt seen for OT treatment session focusing on ADLs/IADLs, functional mobility, functional standing  tolerance, functional transfers, patient education, continued evaluation. Pt greeted OOB in recliner at start of session. Pt alert and cooperative throughout session. Pt with good sitting balance and fair - dynamic standing balance. Pt tolerated treatment well. Pt completed UB bathing with use of wipes seated in chair with S. Pt then completed LB bathing with Nicola for reaching with use of wipes. Pt completed don of underwear and pants with Nicola from daughter present during session due to decreased ability with threading RLE, S for LLE. Pt with Nicola for sit to stand with use of armrests and RW, verbal cues for hand placement. Pt completed LB bathing perineal hygiene with Nicola due to instability with standing for the first time during session. Pt completed don  of underwear and pants over waist with Nicola to pull over hips with use of one hand on RW and one hand to pull over hips. Pt then seated back into chair with S and use of armrests. Pt educated on all LHAE for independence and safety at home. Pt completed doff of socks with reacher and don of socks with sock aid. Dispensed hip kit for independence at home. Pt then completed functional mobility from chair to bathroom with Nicola and use of RW, no RLE knee buckling noted. Pt completed toileting transfer with Nicola onto BSC over standard toilet. Pt completed toileting with Nicola, able to complete perineal hygiene in standing with S and clothing management with Nicola to pull over waist. Pt complete grooming at sink with Nicola for support and setup with use of RW for stability. Pt educated on all ADLs/IADLs during session. Pt reports 2/10 pain and no dizziness. Pt's vitals include: stable.     Pt ended session seated OOB in recliner. Call bell and phone within reach. All needs met and pt reports no further questions at this time. Continue to recommend III; Minimum Resource Intensity when medically cleared. OT will continue to follow pt on caseload.   Plan   Treatment  Interventions ADL retraining;Functional transfer training;Endurance training;Patient/family training;Equipment evaluation/education;Compensatory technique education;Continued evaluation;Energy conservation;Activityengagement   Goal Expiration Date 04/21/25   OT Treatment Day 2   OT Frequency 3-5x/wk   Discharge Recommendation   Rehab Resource Intensity Level, OT III (Minimum Resource Intensity)   Equipment Recommended Hip Kit ($)   Additional Comments  The patient's raw score on the AM-PAC Daily Activity Inpatient Short Form is 20. A raw score of greater than or equal to 19 suggests the patient may benefit from discharge to home. Please refer to the recommendation of the Occupational Therapist for safe discharge planning.   AM-PAC Daily Activity Inpatient   Lower Body Dressing 3   Bathing 3   Toileting 3   Upper Body Dressing 4   Grooming 3   Eating 4   Daily Activity Raw Score 20   Daily Activity Standardized Score (Calc for Raw Score >=11) 42.03   -[facility] Applied Cognition Inpatient   Following a Speech/Presentation 4   Understanding Ordinary Conversation 4   Taking Medications 4   Remembering Where Things Are Placed or Put Away 4   Remembering List of 4-5 Errands 4   Taking Care of Complicated Tasks 4   Applied Cognition Raw Score 24   Applied Cognition Standardized Score 62.21   End of Consult   Education Provided Yes;Family or social support of family present for education by provider   Patient Position at End of Consult Bedside chair;Bed/Chair alarm activated;All needs within reach   Nurse Communication Nurse aware of consult   End of Consult Comments Pt seated OOB in chair with chair alarm activated at end of session. Call bell and phone within reach. All needs met and pt reports no further questions for OT at this time.   Justine Negrete, OT

## 2025-04-15 NOTE — PLAN OF CARE
Problem: PAIN - ADULT  Goal: Verbalizes/displays adequate comfort level or baseline comfort level  Description: Interventions:- Encourage patient to monitor pain and request assistance- Assess pain using appropriate pain scale- Administer analgesics based on type and severity of pain and evaluate response- Implement non-pharmacological measures as appropriate and evaluate response- Consider cultural and social influences on pain and pain management- Notify physician/advanced practitioner if interventions unsuccessful or patient reports new pain  Outcome: Progressing     Problem: INFECTION - ADULT  Goal: Absence or prevention of progression during hospitalization  Description: INTERVENTIONS:- Assess and monitor for signs and symptoms of infection- Monitor lab/diagnostic results- Monitor all insertion sites, i.e. indwelling lines, tubes, and drains- Monitor endotracheal if appropriate and nasal secretions for changes in amount and color- [place] appropriate cooling/warming therapies per order- Administer medications as ordered- Instruct and encourage patient and family to use good hand hygiene technique- Identify and instruct in appropriate isolation precautions for identified infection/condition  Outcome: Progressing  Goal: Absence of fever/infection during neutropenic period  Description: INTERVENTIONS:- Monitor WBC  Outcome: Progressing     Problem: SAFETY ADULT  Goal: Patient will remain free of falls  Description: INTERVENTIONS:- Educate patient/family on patient safety including physical limitations- Instruct patient to call for assistance with activity - Consult OT/PT to assist with strengthening/mobility - Keep Call bell within reach- Keep bed low and locked with side rails adjusted as appropriate- Keep care items and personal belongings within reach- Initiate and maintain comfort rounds- Make Fall Risk Sign visible to staff- Offer Toileting every 2 Hours, in advance of need- Initiate/Maintain bed/chair  alarm- Obtain necessary fall risk management equipment: rolling walker- Apply yellow socks and bracelet for high fall risk patients- Consider moving patient to room near nurses station  Outcome: Progressing  Goal: Maintain or return to baseline ADL function  Description: INTERVENTIONS:-  Assess patient's ability to carry out ADLs; assess patient's baseline for ADL function and identify physical deficits which impact ability to perform ADLs (bathing, care of mouth/teeth, toileting, grooming, dressing, etc.)- Assess/evaluate cause of self-care deficits - Assess range of motion- Assess patient's mobility; develop plan if impaired- Assess patient's need for assistive devices and provide as appropriate- Encourage maximum independence but intervene and supervise when necessary- Involve family in performance of ADLs- Assess for home care needs following discharge - Consider OT consult to assist with ADL evaluation and planning for discharge- Provide patient education as appropriate  Outcome: Progressing  Goal: Maintains/Returns to pre admission functional level  Description: INTERVENTIONS:- Perform AM-PAC 6 Click Basic Mobility/ Daily Activity assessment daily.- Set and communicate daily mobility goal to care team and patient/family/caregiver. - Collaborate with rehabilitation services on mobility goals if consulted- Perform Range of Motion 3 times a day.- Reposition patient every 2 hours.- Dangle patient 3 times a day- Stand patient 3 times a day- Ambulate patient 3 times a day- Out of bed to chair 3 times a day - Out of bed for meals 3 times a day- Out of bed for toileting- Record patient progress and toleration of activity level   Outcome: Progressing     Problem: DISCHARGE PLANNING  Goal: Discharge to home or other facility with appropriate resources  Description: INTERVENTIONS:- Identify barriers to discharge w/patient and caregiver- Arrange for needed discharge resources and transportation as appropriate- Identify  discharge learning needs (meds, wound care, etc.)- Arrange for interpretive services to assist at discharge as needed- Refer to Case Management Department for coordinating discharge planning if the patient needs post-hospital services based on physician/advanced practitioner order or complex needs related to functional status, cognitive ability, or social support system  Outcome: Progressing     Problem: Knowledge Deficit  Goal: Patient/family/caregiver demonstrates understanding of disease process, treatment plan, medications, and discharge instructions  Description: Complete learning assessment and assess knowledge base.Interventions:- Provide teaching at level of understanding- Provide teaching via preferred learning methods  Outcome: Progressing

## 2025-04-15 NOTE — PHYSICAL THERAPY NOTE
PHYSICAL THERAPY NOTE          Patient Name: Lola Spangler  Today's Date: 4/15/2025  09:15-09:55       04/15/25 0955   PT Last Visit   PT Visit Date 04/15/25   Note Type   Note Type Treatment   Pain Assessment   Pain Location/Orientation Orientation: Right;Location: Knee   Pain Rating: FLACC (Rest) - Face 0   Pain Rating: FLACC (Rest) - Legs 0   Pain Rating: FLACC (Rest) - Activity 0   Pain Rating: FLACC (Rest) - Cry 1   Pain Rating: FLACC (Rest) - Consolability 1   Score: FLACC (Rest) 2   Restrictions/Precautions   RLE Weight Bearing Per Order WBAT   Other Precautions Fall Risk;Pain;Chair Alarm;Bed Alarm;Limb alert;Multiple lines  (Masimo)   General   Chart Reviewed Yes   Response to Previous Treatment Patient with no complaints from previous session.   Family/Caregiver Present Yes  (initially daughter, however left during session.)   Cognition   Overall Cognitive Status WFL   Orientation Level Oriented X4   Following Commands Follows all commands and directions without difficulty   Comments Pleasant and motivated.   Subjective   Subjective Reports knee better today.  Does not feel like it wants to buckle with every step.   Bed Mobility   Additional Comments received OOB in recliner chair upon arrival.   Transfers   Sit to Stand 4  Minimal assistance   Additional items Assist x 1;Increased time required;Armrests   Stand to Sit 5  Supervision   Additional items Assist x 1;Increased time required;Verbal cues;Armrests   Additional Comments Increased time to complete transitions, cues for hand and operative leg placement.   Ambulation/Elevation   Gait pattern Improper Weight shift;Decreased foot clearance;Antalgic;Decreased R stance;Inconsistent zan;Short stride;Excessively slow;Step to   Gait Assistance 4  Minimal assist   Additional items Assist x 1;Assist x 2  (2nd person for safety initially, progressing to Nicola of 1 with  improved quad control this session noted.)   Assistive Device Rolling walker   Distance Amb with RW 15'x1, 5'x1, then 40'x2.  Slowed gait.   Ambulation/Elevation Additional Comments Antalgic, cues for appropriate distances within RW support.   Balance   Static Sitting Fair +   Dynamic Sitting Fair   Static Standing Fair -   Dynamic Standing Poor +   Ambulatory Poor +   Endurance Deficit   Endurance Deficit Yes   Endurance Deficit Description fatigue   Activity Tolerance   Activity Tolerance Patient limited by fatigue;Patient limited by pain;Patient tolerated treatment well   Medical Staff Made Aware Nurse. Evelyn PALACIOS-Justine   Nurse Made Aware yes   Exercises   TKR Sitting;10 reps;AAROM;AROM;Right  (review of written HEP.)   Assessment   Prognosis Good   Problem List Decreased strength;Decreased range of motion;Decreased endurance;Impaired balance;Decreased mobility;Decreased skin integrity;Orthopedic restrictions;Pain   Assessment Seen for progress of PT POC.  Improved quad control in WB this session  Cues for UE use for offloading and sequencing during gait.  Able to increase distances with min A.  Gait slowed, step to pattern,short step. Less assistance for transfers.  Slow steady progress towards goals.  Anticipate ability to progress and achieve goals with family support for discharge to home with minimal resource intensity services.  The patient's AM-PAC Basic Mobility Inpatient Short Form Raw Score is 17. A Raw score of greater than 16 suggests the patient may benefit from discharge to home. Please also refer to the recommendation of the Physical Therapist for safe discharge planning.   Barriers to Discharge Inaccessible home environment   Goals   Patient Goals be able to dance at my son's wedding in September.   PT Treatment Day 2   Plan   Treatment/Interventions Functional transfer training;LE strengthening/ROM;Elevations;Therapeutic exercise;Endurance training;Patient/family training;Equipment  eval/education;Bed mobility;Gait training;Compensatory technique education;Continued evaluation;Spoke to nursing;OT   Progress Progressing toward goals   PT Frequency Twice a day  (prn to facilitate acheivement of goals for discharge to home.)   Discharge Recommendation   Rehab Resource Intensity Level, PT III (Minimum Resource Intensity)   AM-PAC Basic Mobility Inpatient   Turning in Flat Bed Without Bedrails 3   Lying on Back to Sitting on Edge of Flat Bed Without Bedrails 3   Moving Bed to Chair 3   Standing Up From Chair Using Arms 3   Walk in Room 3   Climb 3-5 Stairs With Railing 2   Basic Mobility Inpatient Raw Score 17   Basic Mobility Standardized Score 39.67   [facility] Highest Level Of Mobility   -[facility] Goal 5: Stand one or more mins   -HLM Achieved 7: Walk 25 feet or more   Education   Education Provided Mobility training;Home exercise program;Assistive device   Patient Demonstrates acceptance/verbal understanding   End of Consult   Patient Position at End of Consult Bedside chair;Bed/Chair alarm activated;All needs within reach   Gail Tinsley, PT

## 2025-04-15 NOTE — PLAN OF CARE
Problem: OCCUPATIONAL THERAPY ADULT  Goal: Performs self-care activities at highest level of function for planned discharge setting.  See evaluation for individualized goals.  Description: Treatment Interventions: ADL retraining, Functional transfer training, Endurance training, Patient/family training, Equipment evaluation/education, Compensatory technique education, Continued evaluation, Energy conservation, Activityengagement          See flowsheet documentation for full assessment, interventions and recommendations.   Outcome: Progressing  Note: Limitation: Decreased ADL status, Decreased endurance, Decreased self-care trans, Decreased high-level ADLs  Prognosis: Good  Assessment: Pt seen for OT treatment session focusing on ADLs/IADLs, functional mobility, functional standing tolerance, functional transfers, patient education, continued evaluation. Pt greeted OOB in recliner at start of session. Pt alert and cooperative throughout session. Pt with good sitting balance and fair - dynamic standing balance. Pt tolerated treatment well. Pt completed UB bathing with use of wipes seated in chair with S. Pt then completed LB bathing with Nicola for reaching with use of wipes. Pt completed don of underwear and pants with Nicola from daughter present during session due to decreased ability with threading RLE, S for LLE. Pt with Nicola for sit to stand with use of armrests and RW, verbal cues for hand placement. Pt completed LB bathing perineal hygiene with Nicola due to instability with standing for the first time during session. Pt completed don  of underwear and pants over waist with Nicola to pull over hips with use of one hand on RW and one hand to pull over hips. Pt then seated back into chair with S and use of armrests. Pt educated on all LHAE for independence and safety at home. Pt completed doff of socks with reacher and don of socks with sock aid. Dispensed hip kit for independence at home. Pt then completed functional  mobility from chair to bathroom with Nicola and use of RW, no RLE knee buckling noted. Pt completed toileting transfer with Nicola onto BS over standard toilet. Pt completed toileting with Nicola, able to complete perineal hygiene in standing with S and clothing management with Nicola to pull over waist. Pt complete grooming at sink with Nicola for support and setup with use of RW for stability. Pt educated on all ADLs/IADLs during session. Pt reports 2/10 pain and no dizziness. Pt's vitals include: stable.     Pt ended session seated OOB in recliner. Call bell and phone within reach. All needs met and pt reports no further questions at this time. Continue to recommend III; Minimum Resource Intensity when medically cleared. OT will continue to follow pt on caseload.     Rehab Resource Intensity Level, OT: III (Minimum Resource Intensity)

## 2025-04-16 ENCOUNTER — TELEPHONE (OUTPATIENT)
Dept: OBGYN CLINIC | Facility: HOSPITAL | Age: 75
End: 2025-04-16

## 2025-04-16 VITALS
HEIGHT: 61 IN | DIASTOLIC BLOOD PRESSURE: 52 MMHG | WEIGHT: 194.2 LBS | TEMPERATURE: 99 F | OXYGEN SATURATION: 96 % | HEART RATE: 71 BPM | SYSTOLIC BLOOD PRESSURE: 112 MMHG | BODY MASS INDEX: 36.67 KG/M2 | RESPIRATION RATE: 14 BRPM

## 2025-04-16 LAB
ALBUMIN SERPL BCG-MCNC: 3.5 G/DL (ref 3.5–5)
ALP SERPL-CCNC: 33 U/L (ref 34–104)
ALT SERPL W P-5'-P-CCNC: 13 U/L (ref 7–52)
ANION GAP SERPL CALCULATED.3IONS-SCNC: 6 MMOL/L (ref 4–13)
AST SERPL W P-5'-P-CCNC: 19 U/L (ref 13–39)
BILIRUB SERPL-MCNC: 0.42 MG/DL (ref 0.2–1)
BUN SERPL-MCNC: 17 MG/DL (ref 5–25)
CALCIUM SERPL-MCNC: 8.8 MG/DL (ref 8.4–10.2)
CHLORIDE SERPL-SCNC: 103 MMOL/L (ref 96–108)
CO2 SERPL-SCNC: 32 MMOL/L (ref 21–32)
CREAT SERPL-MCNC: 0.67 MG/DL (ref 0.6–1.3)
ERYTHROCYTE [DISTWIDTH] IN BLOOD BY AUTOMATED COUNT: 15 % (ref 11.6–15.1)
GFR SERPL CREATININE-BSD FRML MDRD: 88 ML/MIN/1.73SQ M
GLUCOSE P FAST SERPL-MCNC: 114 MG/DL (ref 65–99)
GLUCOSE SERPL-MCNC: 114 MG/DL (ref 65–140)
HCT VFR BLD AUTO: 28.4 % (ref 34.8–46.1)
HGB BLD-MCNC: 9.1 G/DL (ref 11.5–15.4)
MCH RBC QN AUTO: 29.9 PG (ref 26.8–34.3)
MCHC RBC AUTO-ENTMCNC: 32 G/DL (ref 31.4–37.4)
MCV RBC AUTO: 93 FL (ref 82–98)
PLATELET # BLD AUTO: 267 THOUSANDS/UL (ref 149–390)
PMV BLD AUTO: 9.2 FL (ref 8.9–12.7)
POTASSIUM SERPL-SCNC: 4.1 MMOL/L (ref 3.5–5.3)
PROT SERPL-MCNC: 5.7 G/DL (ref 6.4–8.4)
RBC # BLD AUTO: 3.04 MILLION/UL (ref 3.81–5.12)
SODIUM SERPL-SCNC: 141 MMOL/L (ref 135–147)
WBC # BLD AUTO: 7.66 THOUSAND/UL (ref 4.31–10.16)

## 2025-04-16 PROCEDURE — 99024 POSTOP FOLLOW-UP VISIT: CPT

## 2025-04-16 PROCEDURE — 99232 SBSQ HOSP IP/OBS MODERATE 35: CPT | Performed by: INTERNAL MEDICINE

## 2025-04-16 PROCEDURE — 97535 SELF CARE MNGMENT TRAINING: CPT

## 2025-04-16 PROCEDURE — 80053 COMPREHEN METABOLIC PANEL: CPT

## 2025-04-16 PROCEDURE — 85027 COMPLETE CBC AUTOMATED: CPT

## 2025-04-16 PROCEDURE — NC001 PR NO CHARGE

## 2025-04-16 PROCEDURE — 97116 GAIT TRAINING THERAPY: CPT

## 2025-04-16 RX ORDER — FERROUS SULFATE 325(65) MG
325 TABLET ORAL EVERY OTHER DAY
Status: DISCONTINUED | OUTPATIENT
Start: 2025-04-16 | End: 2025-04-16 | Stop reason: HOSPADM

## 2025-04-16 RX ADMIN — OXYCODONE HYDROCHLORIDE 5 MG: 5 TABLET ORAL at 07:26

## 2025-04-16 RX ADMIN — ACETAMINOPHEN 975 MG: 325 TABLET ORAL at 07:26

## 2025-04-16 RX ADMIN — ENOXAPARIN SODIUM 40 MG: 40 INJECTION SUBCUTANEOUS at 09:57

## 2025-04-16 RX ADMIN — METHOCARBAMOL 500 MG: 500 TABLET ORAL at 05:16

## 2025-04-16 RX ADMIN — FERROUS SULFATE TAB 325 MG (65 MG ELEMENTAL FE) 325 MG: 325 (65 FE) TAB at 09:59

## 2025-04-16 RX ADMIN — DOCUSATE SODIUM 100 MG: 100 CAPSULE, LIQUID FILLED ORAL at 09:58

## 2025-04-16 RX ADMIN — IRON SUCROSE 200 MG: 20 INJECTION, SOLUTION INTRAVENOUS at 07:48

## 2025-04-16 RX ADMIN — GABAPENTIN 200 MG: 100 CAPSULE ORAL at 09:57

## 2025-04-16 RX ADMIN — PAROXETINE HYDROCHLORIDE 20 MG: 20 TABLET, FILM COATED ORAL at 09:57

## 2025-04-16 NOTE — PLAN OF CARE
Problem: PAIN - ADULT  Goal: Verbalizes/displays adequate comfort level or baseline comfort level  Description: Interventions:- Encourage patient to monitor pain and request assistance- Assess pain using appropriate pain scale- Administer analgesics based on type and severity of pain and evaluate response- Implement non-pharmacological measures as appropriate and evaluate response- Consider cultural and social influences on pain and pain management- Notify physician/advanced practitioner if interventions unsuccessful or patient reports new pain  Outcome: Progressing     Problem: INFECTION - ADULT  Goal: Absence or prevention of progression during hospitalization  Description: INTERVENTIONS:- Assess and monitor for signs and symptoms of infection- Monitor lab/diagnostic results- Monitor all insertion sites, i.e. indwelling lines, tubes, and drains- Monitor endotracheal if appropriate and nasal secretions for changes in amount and color- [place] appropriate cooling/warming therapies per order- Administer medications as ordered- Instruct and encourage patient and family to use good hand hygiene technique- Identify and instruct in appropriate isolation precautions for identified infection/condition  Outcome: Progressing  Goal: Absence of fever/infection during neutropenic period  Description: INTERVENTIONS:- Monitor WBC  Outcome: Progressing     Problem: SAFETY ADULT  Goal: Patient will remain free of falls  Description: INTERVENTIONS:- Educate patient/family on patient safety including physical limitations- Instruct patient to call for assistance with activity - Consult OT/PT to assist with strengthening/mobility - Keep Call bell within reach- Keep bed low and locked with side rails adjusted as appropriate- Keep care items and personal belongings within reach- Initiate and maintain comfort rounds- Make Fall Risk Sign visible to staff- Offer Toileting every 2 Hours, in advance of need- Initiate/Maintain bed/chair  alarm- Obtain necessary fall risk management equipment: rolling walker- Apply yellow socks and bracelet for high fall risk patients- Consider moving patient to room near nurses station  Outcome: Progressing  Goal: Maintain or return to baseline ADL function  Description: INTERVENTIONS:-  Assess patient's ability to carry out ADLs; assess patient's baseline for ADL function and identify physical deficits which impact ability to perform ADLs (bathing, care of mouth/teeth, toileting, grooming, dressing, etc.)- Assess/evaluate cause of self-care deficits - Assess range of motion- Assess patient's mobility; develop plan if impaired- Assess patient's need for assistive devices and provide as appropriate- Encourage maximum independence but intervene and supervise when necessary- Involve family in performance of ADLs- Assess for home care needs following discharge - Consider OT consult to assist with ADL evaluation and planning for discharge- Provide patient education as appropriate  Outcome: Progressing  Goal: Maintains/Returns to pre admission functional level  Description: INTERVENTIONS:- Perform AM-PAC 6 Click Basic Mobility/ Daily Activity assessment daily.- Set and communicate daily mobility goal to care team and patient/family/caregiver. - Collaborate with rehabilitation services on mobility goals if consulted- Perform Range of Motion 3 times a day.- Reposition patient every 2 hours.- Dangle patient 3 times a day- Stand patient 3 times a day- Ambulate patient 3 times a day- Out of bed to chair 3 times a day - Out of bed for meals 3 times a day- Out of bed for toileting- Record patient progress and toleration of activity level   Outcome: Progressing     Problem: DISCHARGE PLANNING  Goal: Discharge to home or other facility with appropriate resources  Description: INTERVENTIONS:- Identify barriers to discharge w/patient and caregiver- Arrange for needed discharge resources and transportation as appropriate- Identify  discharge learning needs (meds, wound care, etc.)- Arrange for interpretive services to assist at discharge as needed- Refer to Case Management Department for coordinating discharge planning if the patient needs post-hospital services based on physician/advanced practitioner order or complex needs related to functional status, cognitive ability, or social support system  Outcome: Progressing     Problem: Knowledge Deficit  Goal: Patient/family/caregiver demonstrates understanding of disease process, treatment plan, medications, and discharge instructions  Description: Complete learning assessment and assess knowledge base.Interventions:- Provide teaching at level of understanding- Provide teaching via preferred learning methods  Outcome: Progressing

## 2025-04-16 NOTE — DISCHARGE SUMMARY
Discharge Summary - Orthopedics   Name: Lola Spangler 74 y.o. female I MRN: [id number]  Unit/Bed#: WE 2 N -01 I Date of Admission: 4/14/2025   Date of Service: 4/16/2025 I Hospital Day: 0    Discharge Summary - Orthopedics   Lola Spangler 74 y.o. female MRN: [id number]  Unit/Bed#: WE 2 N -01    Attending Physician: Dr. Erich Warren    Admitting diagnosis: Primary osteoarthritis of right knee [M17.11]  Chronic pain of right knee [M25.561, G89.29]    Discharge diagnosis: Primary osteoarthritis of right knee [M17.11]  Chronic pain of right knee [M25.561, G89.29]    Date of admission: 4/14/2025    Date of discharge: 04/16/25    Procedure: right TKA     HPI: 74 y.o. female with a history of Primary osteoarthritis of right knee [M17.11]  Chronic pain of right knee [M25.561, G89.29] who has been seen by Dr. Erich Warren in clinic.  Pt has failed previous non operative therapies and was scheduled for right TKA.  Prior to surgery the risks and benefits of surgery were explained and informed consent was obtained.    Hospital course: Pt was taken to the OR on 4/14/2025.  Surgery went without complications and pt was discharged to the PACU in a stable condition and was transferred to the floor.  On discharge date pt was cleared by PT and the orthopedic team and determined to be safe for discharge.  Daily discussion was had with the patient, nursing staff, orthopaedic team, and family members if present.  All questions were answered to the patients satisifaction.      0   Lab Value Date/Time    HGB 9.1 (L) 04/16/2025 0516    HGB 10.2 (L) 04/15/2025 0516    HGB 13.4 03/22/2025 0958    HGB 15.8 (H) 01/15/2025 0936    HGB 13.4 07/03/2024 0837    HGB 13.9 05/14/2024 1116    HGB 13.7 11/09/2023 0902    HGB 13.4 12/30/2022 1230    HGB 13.8 12/23/2021 0958    HGB 12.8 12/26/2019 0851    HGB 13.0 11/08/2019 0454    HGB 13.4 10/15/2019 1513    HGB 13.9 12/31/2018 1104    HGB 13.1 12/22/2017 1010    HGB 12.7 05/15/2017  1135    HGB 10.8 (L) 04/10/2017 0543    HGB 10.6 (L) 04/05/2017 1155    HGB 10.1 (L) 04/04/2017 0500    HGB 13.6 03/06/2017 1111    HGB 13.5 10/24/2016 1021    HGB 12.8 11/28/2015 1120    HGB 13.6 11/28/2014 0851        Discharge Instructions:   As per discharge instructions in epic  Keep dressings clean and dry at all times   Complete DVT prophylaxis as prescribed   Physical therapy  Follow-up as scheduled, otherwise call for appt.     Discharge Medications:  For the complete list of discharge medications, please refer to the patient's medication reconciliation.

## 2025-04-16 NOTE — ASSESSMENT & PLAN NOTE
Weight Bearing as tolerated  Up and out of bed with assistance  Incentive spirometer  DVT prophylaxis - mechanical and chemical (Lovenox).  Analgesics  PT/OT  Patient noted to have acute blood loss anemia due to a drop in Hbg of > 2.0g from preop levels, will monitor vital signs and resuscitate with IV fluids as needed  Discharge plan - home today pending PT

## 2025-04-16 NOTE — PLAN OF CARE
Problem: PHYSICAL THERAPY ADULT  Goal: Performs mobility at highest level of function for planned discharge setting.  See evaluation for individualized goals.  Description: Treatment/Interventions: Functional transfer training, LE strengthening/ROM, Elevations, Therapeutic exercise, Endurance training, Patient/family training, Equipment eval/education, Bed mobility, Gait training, Compensatory technique education, Continued evaluation, Spoke to nursing, OT          See flowsheet documentation for full assessment, interventions and recommendations.  Outcome: Adequate for Discharge  Note: Prognosis: Good  Problem List: Decreased strength, Decreased range of motion, Decreased endurance, Impaired balance, Decreased mobility, Orthopedic restrictions, Pain  Assessment: Improved pain, activity tolerance, balance, functional mobility, strength and ambulation noted this am.  Improved fluency of transfers sit<>stand.  Gait pace improving and over session able to progress from step to into step through pattern.  Negotiates one curb step with RW and Nicola performed x 2 with improved confidence and ease. No knee buckling or blocking required this session. Spouse present and educated on guarding technique.  Also able to complete stair training with rail + cane to simulate home environment when ready to negotiate to 2nd floor for bed and full bath.  Does have first floor ability with [facility] prn.  S for stairs with cane + rail and cues for sequencing with cane only.  Tolerated session well with improvement overall noted with mobility and confidence for discharge to home. Reviewed HEP frequency and progression and assist technique for spouse with LAQ.  All questions addressed for discharge and cleared from PT perspective for discharge to home.  The patient's AM-PAC Basic Mobility Inpatient Short Form Raw Score is 23. A Raw score of greater than 16 suggests the patient may benefit from discharge to home. Please also refer to the  recommendation of the Physical Therapist for safe discharge planning. OPPT planned tomorrow.  Barriers to Discharge: Inaccessible home environment  Barriers to Discharge Comments: stairs  Rehab Resource Intensity Level, PT: III (Minimum Resource Intensity) (OPPT)    See flowsheet documentation for full assessment.

## 2025-04-16 NOTE — PLAN OF CARE
Problem: PAIN - ADULT  Goal: Verbalizes/displays adequate comfort level or baseline comfort level  Description: Interventions:- Encourage patient to monitor pain and request assistance- Assess pain using appropriate pain scale- Administer analgesics based on type and severity of pain and evaluate response- Implement non-pharmacological measures as appropriate and evaluate response- Consider cultural and social influences on pain and pain management- Notify physician/advanced practitioner if interventions unsuccessful or patient reports new pain  Outcome: Progressing     Problem: INFECTION - ADULT  Goal: Absence or prevention of progression during hospitalization  Description: INTERVENTIONS:- Assess and monitor for signs and symptoms of infection- Monitor lab/diagnostic results- Monitor all insertion sites, i.e. indwelling lines, tubes, and drains- Monitor endotracheal if appropriate and nasal secretions for changes in amount and color- [place] appropriate cooling/warming therapies per order- Administer medications as ordered- Instruct and encourage patient and family to use good hand hygiene technique- Identify and instruct in appropriate isolation precautions for identified infection/condition  Outcome: Progressing  Goal: Absence of fever/infection during neutropenic period  Description: INTERVENTIONS:- Monitor WBC  Outcome: Progressing     Problem: SAFETY ADULT  Goal: Patient will remain free of falls  Description: INTERVENTIONS:- Educate patient/family on patient safety including physical limitations- Instruct patient to call for assistance with activity - Consult OT/PT to assist with strengthening/mobility - Keep Call bell within reach- Keep bed low and locked with side rails adjusted as appropriate- Keep care items and personal belongings within reach- Initiate and maintain comfort rounds- Make Fall Risk Sign visible to staff- Offer Toileting every 2 Hours, in advance of need- Initiate/Maintain bed/chair  alarm- Obtain necessary fall risk management equipment: rolling walker- Apply yellow socks and bracelet for high fall risk patients- Consider moving patient to room near nurses station  Outcome: Progressing  Goal: Maintain or return to baseline ADL function  Description: INTERVENTIONS:-  Assess patient's ability to carry out ADLs; assess patient's baseline for ADL function and identify physical deficits which impact ability to perform ADLs (bathing, care of mouth/teeth, toileting, grooming, dressing, etc.)- Assess/evaluate cause of self-care deficits - Assess range of motion- Assess patient's mobility; develop plan if impaired- Assess patient's need for assistive devices and provide as appropriate- Encourage maximum independence but intervene and supervise when necessary- Involve family in performance of ADLs- Assess for home care needs following discharge - Consider OT consult to assist with ADL evaluation and planning for discharge- Provide patient education as appropriate  Outcome: Progressing  Goal: Maintains/Returns to pre admission functional level  Description: INTERVENTIONS:- Perform AM-PAC 6 Click Basic Mobility/ Daily Activity assessment daily.- Set and communicate daily mobility goal to care team and patient/family/caregiver. - Collaborate with rehabilitation services on mobility goals if consulted- Perform Range of Motion 3 times a day.- Reposition patient every 2 hours.- Dangle patient 3 times a day- Stand patient 3 times a day- Ambulate patient 3 times a day- Out of bed to chair 3 times a day - Out of bed for meals 3 times a day- Out of bed for toileting- Record patient progress and toleration of activity level   Outcome: Progressing     Problem: DISCHARGE PLANNING  Goal: Discharge to home or other facility with appropriate resources  Description: INTERVENTIONS:- Identify barriers to discharge w/patient and caregiver- Arrange for needed discharge resources and transportation as appropriate- Identify  discharge learning needs (meds, wound care, etc.)- Arrange for interpretive services to assist at discharge as needed- Refer to Case Management Department for coordinating discharge planning if the patient needs post-hospital services based on physician/advanced practitioner order or complex needs related to functional status, cognitive ability, or social support system  Outcome: Progressing     Problem: Knowledge Deficit  Goal: Patient/family/caregiver demonstrates understanding of disease process, treatment plan, medications, and discharge instructions  Description: Complete learning assessment and assess knowledge base.Interventions:- Provide teaching at level of understanding- Provide teaching via preferred learning methods  Outcome: Progressing

## 2025-04-16 NOTE — PHYSICAL THERAPY NOTE
PHYSICAL THERAPY NOTE          Patient Name: Lola Spangler  Today's Date: 4/16/2025  09:05-09:45       04/16/25 0945   PT Last Visit   PT Visit Date 04/16/25   Note Type   Note Type Treatment   Pain Assessment   Pain Location/Orientation Orientation: Right;Location: Knee   Pain Rating: FLACC (Rest) - Face 0   Pain Rating: FLACC (Rest) - Legs 0   Pain Rating: FLACC (Rest) - Activity 0   Pain Rating: FLACC (Rest) - Cry 1   Pain Rating: FLACC (Rest) - Consolability 0   Score: FLACC (Rest) 1   Restrictions/Precautions   RLE Weight Bearing Per Order WBAT   General   Chart Reviewed Yes   Response to Previous Treatment Patient with no complaints from previous session.   Family/Caregiver Present Yes  (spouse present for family education.)   Bed Mobility   Additional Comments received OOB in chair.   Transfers   Sit to Stand 5  Supervision   Additional items Increased time required;Verbal cues;Armrests   Stand to Sit 5  Supervision   Additional items Increased time required;Armrests   Ambulation/Elevation   Gait pattern Improper Weight shift;Decreased foot clearance;Antalgic;Decreased R stance;Short stride;Step through pattern;Step to  (step to -->step through pattern.)   Gait Assistance 5  Supervision   Additional items Verbal cues   Assistive Device Rolling walker   Distance Amb with RW 60'x2 with stair navigation in between distances.   Stair Management Assistance 4  Minimal assist  (S for stair training, Nicola on curb step without rail and just RW support.)   Additional items Verbal cues;Assist x 1   Stair Management Technique Foreward;One rail L;With cane;With walker  (one step x 2 with just RW and Nicola.  Performed 5 steps x 2 with cane + rail and S.)   Number of Stairs 5  (5 steps x 2 and also one step with RW performed x 2.)   Ambulation/Elevation Additional Comments Gait progressed from step to pattern to step through pattern.   Balance    Static Sitting Good   Dynamic Sitting Fair +   Static Standing Fair   Dynamic Standing Fair -   Ambulatory Fair -   Endurance Deficit   Endurance Deficit Yes   Endurance Deficit Description fatigue, pain   Activity Tolerance   Activity Tolerance Patient tolerated treatment well;Patient limited by fatigue;Patient limited by pain   Medical Staff Made Aware NurseLizzie   Nurse Made Aware yes   Exercises   TKR   (educated on progress and frequency.  Reviewed spouse assist for LAQ with pt and spouse.)   Neuro re-ed reinforced importance of positioning to promote extension.   Balance training  dynamic standing balance activities with S.   Assessment   Prognosis Good   Problem List Decreased strength;Decreased range of motion;Decreased endurance;Impaired balance;Decreased mobility;Orthopedic restrictions;Pain   Assessment Improved pain, activity tolerance, balance, functional mobility, strength and ambulation noted this am.  Improved fluency of transfers sit<>stand.  Gait pace improving and over session able to progress from step to into step through pattern.  Negotiates one curb step with RW and Nicola performed x 2 with improved confidence and ease. No knee buckling or blocking required this session. Spouse present and educated on guarding technique.  Also able to complete stair training with rail + cane to simulate home environment when ready to negotiate to 2nd floor for bed and full bath.  Does have first [place] ability [place] [facility] [place]  S for stairs with cane + rail and cues for sequencing with cane only.  Tolerated session well with improvement overall noted with mobility and confidence for discharge to home. Reviewed HEP frequency and progression and assist technique for spouse with LAQ.  All questions addressed for discharge and cleared from PT perspective for discharge to home.  The patient's AM-PAC Basic Mobility Inpatient Short Form Raw Score is 23. A Raw score of greater than 16 suggests the patient may  benefit from discharge to home. Please also refer to the recommendation of the Physical Therapist for safe discharge planning. OPPT planned tomorrow.   Goals   Patient Goals go home   PT Treatment Day 4   Plan   Treatment/Interventions Functional transfer training;LE strengthening/ROM;Elevations;Therapeutic exercise;Endurance training;Patient/family training;Equipment eval/education;Bed mobility;Gait training;Compensatory technique education;Continued evaluation;Spoke to nursing;Family   Progress Improving as expected   PT Frequency 2-3x/wk   Discharge Recommendation   Rehab Resource Intensity Level, PT III (Minimum Resource Intensity)  (OPPT)   AM-PAC Basic Mobility Inpatient   Turning in Flat Bed Without Bedrails 4   Lying on Back to Sitting on Edge of Flat Bed Without Bedrails 4   Moving Bed to Chair 4   Standing Up From Chair Using Arms 4   Walk in Room 4   Climb 3-5 Stairs With Railing 3   Basic Mobility Inpatient Raw Score 23   Basic Mobility Standardized Score 50.88   [facility] Highest Level Of Mobility   JH-HLM Goal 7: Walk 25 feet or more   JH-HLM Achieved 7: Walk 25 feet or more   Education   Education Provided Mobility training;Home exercise program;Assistive device   Patient Demonstrates acceptance/verbal understanding   End of Consult   Patient Position at End of Consult Bed/Chair alarm activated;All needs within reach;Bedside chair   End of Consult Comments spouse at bedside, awaiting discharge to home.   Gail Tinsley, PT

## 2025-04-16 NOTE — PROGRESS NOTES
Progress Note - Orthopedics   Name: Lola Spangler 74 y.o. female I MRN: [id number]  Unit/Bed#: [facility] 2 N -01 I Date of Admission: 4/14/2025   Date of Service: 4/16/2025 I Hospital Day: 0     Assessment & Plan  Primary osteoarthritis of right knee  Weight Bearing as tolerated  Up and out of bed with assistance  Incentive spirometer  DVT prophylaxis - mechanical and chemical (Lovenox).  Analgesics  PT/OT  Patient noted to have acute blood loss anemia due to a drop in Hbg of > 2.0g from preop levels, will monitor vital signs and resuscitate with IV fluids as needed  Discharge plan - home today pending PT       Subjective  74 y.o.female POD 2 right TKA No acute events, no new complaints. Pain well controlled. Denies fevers, chills, CP, SOB, N/V, numbness or tingling. Patient reports no issues with urination or bowel movements.  Patient states she is doing very well today with no specific orthopedic concerns.  She feels like she is walking well.  She is hopeful for discharge today after working with physical therapy.    Objective :  Temp:  [97.6 °F (36.4 °C)-98.8 °F (37.1 °C)] 97.6 °F (36.4 °C)  HR:  [73-85] 85  BP: (107-126)/(55-71) 107/71  Resp:  [14] 14  SpO2:  [95 %-98 %] 98 %    Physical Exam  Musculoskeletal: Right lower extremity  Skin warm and dry . No erythema or ecchymosis.  Dressing C/D/I  Motor intact to +FHL/EHL, +ankle dorsi/plantar flexion  Sensation intact to saphenous, sural, tibial, superficial peroneal nerve, and deep peroneal  No calf swelling or tenderness to palpation      Lab Results: I have reviewed the following results:  Recent Labs     04/15/25  0516 04/16/25  0516   WBC 10.48* 7.66   HGB 10.2* 9.1*   HCT 31.2* 28.4*    267   BUN 19 17   CREATININE 0.81 0.67

## 2025-04-17 ENCOUNTER — OFFICE VISIT (OUTPATIENT)
Dept: PHYSICAL THERAPY | Facility: REHABILITATION | Age: 75
End: 2025-04-17
Payer: MEDICARE

## 2025-04-17 DIAGNOSIS — Z96.651 AFTERCARE FOLLOWING RIGHT KNEE JOINT REPLACEMENT SURGERY: Primary | ICD-10-CM

## 2025-04-17 DIAGNOSIS — M25.561 CHRONIC PAIN OF RIGHT KNEE: ICD-10-CM

## 2025-04-17 DIAGNOSIS — G89.29 CHRONIC PAIN OF RIGHT KNEE: ICD-10-CM

## 2025-04-17 DIAGNOSIS — Z47.1 AFTERCARE FOLLOWING RIGHT KNEE JOINT REPLACEMENT SURGERY: Primary | ICD-10-CM

## 2025-04-17 DIAGNOSIS — M17.11 PRIMARY OSTEOARTHRITIS OF RIGHT KNEE: ICD-10-CM

## 2025-04-17 PROCEDURE — 97110 THERAPEUTIC EXERCISES: CPT

## 2025-04-17 PROCEDURE — 97112 NEUROMUSCULAR REEDUCATION: CPT

## 2025-04-17 PROCEDURE — 97164 PT RE-EVAL EST PLAN CARE: CPT

## 2025-04-17 PROCEDURE — 97010 HOT OR COLD PACKS THERAPY: CPT

## 2025-04-17 NOTE — PROGRESS NOTES
PT Evaluation     Today's date: 2025  Patient name: Lola Spangler  : 1950  MRN: [id number]  Referring provider: Erich Warren,*  Dx:   Encounter Diagnosis     ICD-10-CM    1. Primary osteoarthritis of right knee  M17.11       2. Aftercare following right knee joint replacement surgery  Z47.1     Z96.651       3. Chronic pain of right knee  M25.561     G89.29           Start Time: 09  Stop Time: 1000  Total time in clinic (min): 55 minutes    Assessment  Impairments: abnormal coordination, abnormal gait, abnormal muscle firing, abnormal muscle tone, abnormal or restricted ROM, activity intolerance, impaired balance, impaired physical strength, lacks appropriate home exercise program, pain with function, weight-bearing intolerance, unable to perform ADL, participation limitations, activity limitations and endurance  Symptom irritability: moderate    Assessment details: Patient pleasantly completed the PT evaluation today well. Patient presents to the clinic today 3 days post op for a R TKA.  Findings of the evaluation include impaired RLE ROM, RLE ROM, and pain with functional movements. Pain and swelling is a limiting factor during today's eval. The patient was educated on the evaluation findings and the POC. HEP was provided and demonstrated. Patient responded well to exercises and interventions performed during the session. Patient noted an increase in pain during mobility and ambulation, but pain did subside during rest. Educated on typical healing patterns and interventions to best help the healing process at home. Reminded to perform ankle pumps to prevent DVTs. This patient is a great candidate for physical therapy to address the impairments, decrease pain, improve functional independence and quality of life.    Understanding of Dx/Px/POC: good     Prognosis: excellent    Goals  ST-6 weeks  Patient's FOTO will increase to 35 to improve functional mobility  Patient reports feeling  25% better since starting therapy to improve QOL  Patient's TUG score will improve to 25 seconds to decrease fall risk  Patient's 5x STS score will improve to 17 seconds to decrease fall risk  Patient's R knee AROM will improve 10-15 degrees to improve functional mobility   Patient will report being compliant with HEP to improve prognosis.     LTG: 10-12 weeks  Patient's FOTO will increase to predicted value or more to improve functional mobility  Patient reports feeling 50% better since starting therapy to improve QOL  Patient will be able to perform ADLs and recreational activities with no more than a 5/10 pain to improve QOL.   Patient's R knee flexion AROM will improve by 25-30 degrees to improve functional mobility    Patient will report being compliant with an advanced HEP to improve prognosis.      Plan  Patient would benefit from: skilled physical therapy  Planned modality interventions: low level laser therapy, manual electrical stimulation, biofeedback, cryotherapy, hydrotherapy, ultrasound, traction and TENS    Planned therapy interventions: IASTM, joint mobilization, kinesiology taping, manual therapy, massage, Zacarias taping, muscle pump exercises, nerve gliding, neuromuscular re-education, patient/caregiver education, postural training, strengthening, stretching, therapeutic activities, therapeutic exercise, therapeutic training, transfer training, functional ROM exercises, flexibility, coordination, body mechanics training, balance/weight bearing training, balance, ADL training, activity modification and abdominal trunk stabilization    Frequency: 2-3x week  Duration in weeks: 8  Treatment plan discussed with: patient        Subjective Evaluation    History of Present Illness  Mechanism of injury: Patient presents to the eval today 3 days post op for a R TKA. Patient notes no noted complications during the surgery. Pain levels remain in the 3-5/10 range. Patient notes difficulty with stairs. Patient  is using a walker for ambulation. Patient does have difficulty sleeping secondary to the pain. Patient does  take OTC and prescription medications to offer short term relief. Patients presents wearing some ace bandages.   Quality of life: good    Patient Goals  Patient goals for therapy: decreased pain, improved balance, increased motion, increased strength and independence with ADLs/IADLs    Pain  Current pain rating: 3  At worst pain ratin  Quality: dull ache and sharp  Relieving factors: ice, medications and rest    Social Support  Steps to enter house: yes  Stairs in house: yes   Lives in: multiple-level home  Lives with: spouse          Objective    Palpation  Observation: step to gait pattern, trendelenburg gait, RW, decreased gait speed    Flowsheet Rows      Flowsheet Row Most Recent Value   PT/OT G-Codes    Current Score 25   Projected Score 54        Lower Extremity ROM  ROM Right   Knee flex 63   Knee ext 12      Lower Extremity Strength  MMT Right   Hip flexion  3-   Hip extension     Hip Abduction NT   Knee Flexion 3-   Knee Extension 3-   Ankle Dorsiflexion 4   Ankle Plantarflexion  NT         Dynamic Balance Tests  5x sit to stand (sec)  >14 sec indicates high risk for falls 4/8= 13 seconds  = 24 seconds, BUE used   TUG (sec)  >14 sec indicates high risk for falls 4/8= 16 seconds, SPC  = 44 seconds, RW            Precautions: R TKA DOS 25,  hx of SHARIF, back surgery, cardiac surgery          POC expires Unit limit Auth Expiration date PT/OT + Visit Limit?   6/15/25 (8) BOMN  BOMN         Visit/Unit Tracking  AUTH Status:  Date 2025        10 Used 1         Remaining             Pertinent Findings:      Test / Measure  2025   FOTO (Predicted 54) 25   R knee ROM decreased   R knee pain     R knee strength decreased     Access Code: [email or web address]  URL: [email or web address]  Date: 2025  Prepared by: Wanda Duque     Exercises  - Seated Ankle Pumps  - 1 x daily -  7 x weekly - 3 sets - 10 reps  - Heel Raises with Counter Support  - 1 x daily - 7 x weekly - 3 sets - 10 reps  - Supine Quad Set  - 1 x daily - 7 x weekly - 3 sets - 10 reps  - Supine Gluteal Sets  - 1 x daily - 7 x weekly - 3 sets - 10 reps  - Supine Bridge  - 1 x daily - 7 x weekly - 3 sets - 10 reps    Manuals 4/8 4/17           PROM knee ext   SW                                     RE  SW           Neuro Re-Ed             POC/HEP SW SW                                                                                         Ther Ex             AP HEP HEP           Quad sets HEP 2x15           Glute sets HEP            Heel raises HEP            bridge HEP            Knee extension   Foam @ heel, ice pack (x1) 7' x2           Heel slides                          Ther Activity             TUG  Performed            5x STS  Perforemd            Gait Training                                       Modalities             Cold pack   7' x1

## 2025-04-17 NOTE — TELEPHONE ENCOUNTER
"Patient contacted for a postoperative follow up assessment. Patient reports doing \"ok\". Patient states current pain level of a  2-3/10  when sitting and 4-5/10 when walking with RW. Patient denies increase in swelling and dressing is clean, dry and intact. Patient is icing the site regularly. Recommended icing every 1-2 hours for 20-30 mins while awake.     We reviewed patients AVS medication list. Patient is taking Tylenol 1000mg every 8 hours, Tizanidine 2mg q8prn, Oxycodone 5mg PRN, ASA 81mg daily,  Lovenox inj daily  (will clarify with surgeon for both anticoags) Colace 100mg BID. Patient has not yet had a BM but is passing gas.      Patient denies nausea, vomiting, abdominal pain, chest pain, shortness of breath, fever, dizziness and calf pain. Patient confirmed post-op appointment with PT today, 4/17 and  surgeon on 04/22 .Patient does not have any other questions or concerns at this time. Pt was encouraged to call with any questions, concerns or issues.    "

## 2025-04-21 ENCOUNTER — OFFICE VISIT (OUTPATIENT)
Dept: PHYSICAL THERAPY | Facility: REHABILITATION | Age: 75
End: 2025-04-21
Payer: MEDICARE

## 2025-04-21 DIAGNOSIS — M25.561 CHRONIC PAIN OF RIGHT KNEE: Primary | ICD-10-CM

## 2025-04-21 DIAGNOSIS — M17.11 PRIMARY OSTEOARTHRITIS OF RIGHT KNEE: ICD-10-CM

## 2025-04-21 DIAGNOSIS — Z47.1 AFTERCARE FOLLOWING RIGHT KNEE JOINT REPLACEMENT SURGERY: ICD-10-CM

## 2025-04-21 DIAGNOSIS — Z96.651 AFTERCARE FOLLOWING RIGHT KNEE JOINT REPLACEMENT SURGERY: ICD-10-CM

## 2025-04-21 DIAGNOSIS — G89.29 CHRONIC PAIN OF RIGHT KNEE: Primary | ICD-10-CM

## 2025-04-21 PROCEDURE — 97110 THERAPEUTIC EXERCISES: CPT

## 2025-04-21 NOTE — PROGRESS NOTES
Daily Note     Today's date: 2025  Patient name: Lola Spangler  : 1950  MRN: [id number]  Referring provider: Erich Warren,*  Dx:   Encounter Diagnosis     ICD-10-CM    1. Chronic pain of right knee  M25.561     G89.29       2. Aftercare following right knee joint replacement surgery  Z47.1     Z96.651       3. Primary osteoarthritis of right knee  M17.11           Start Time: 0949  Stop Time: 1035  Total time in clinic (min): 46 minutes    Subjective: Patient reports her knee feeling sore today.      Objective: See treatment diary below      Assessment: Tolerated treatment well. Rest breaks taken as needed to allow for sx to subside.  Patient exhibited good technique with therapeutic exercises and would benefit from continued PT      Plan: Continue per plan of care.  Progress treatment as tolerated.         POC expires Unit limit Auth Expiration date PT/OT + Visit Limit?   6/15/25 (8) BOMN  BOMN         Visit/Unit Tracking  AUTH Status:  Date 2025       10 Used 1 2        Remaining             Pertinent Findings:      Test / Measure  2025   FOTO (Predicted 54) 25   R knee ROM decreased   R knee pain     R knee strength decreased     Access Code: [email or web address]  URL: [email or web address]  Date: 2025  Prepared by: Wanda Duque     Exercises  - Seated Ankle Pumps  - 1 x daily - 7 x weekly - 3 sets - 10 reps  - Heel Raises with Counter Support  - 1 x daily - 7 x weekly - 3 sets - 10 reps  - Supine Quad Set  - 1 x daily - 7 x weekly - 3 sets - 10 reps  - Supine Gluteal Sets  - 1 x daily - 7 x weekly - 3 sets - 10 reps  - Supine Bridge  - 1 x daily - 7 x weekly - 3 sets - 10 reps    Manuals           PROM knee ext   SW SW                                    RE  SW           Neuro Re-Ed             POC/HEP SW SW                                                                                         Ther Ex             AP HEP HEP           Quad sets HEP  2x15 X20   X10           Glute sets HEP            Heel raises HEP            bridge HEP            Knee extension   Foam @ heel, ice pack (x1) 7' x2 Foam @ heel 7' x2           Heel slides   3x10           SLR   X10           Ther Activity             TUG  Performed            5x STS  Perforemd            Gait Training                                       Modalities             Cold pack   7' x1 5' x1

## 2025-04-22 ENCOUNTER — HOSPITAL ENCOUNTER (OUTPATIENT)
Dept: RADIOLOGY | Facility: HOSPITAL | Age: 75
Discharge: HOME | End: 2025-04-22
Attending: ORTHOPAEDIC SURGERY
Payer: MEDICARE

## 2025-04-22 ENCOUNTER — OFFICE VISIT (OUTPATIENT)
Dept: OBGYN CLINIC | Facility: HOSPITAL | Age: 75
End: 2025-04-22

## 2025-04-22 VITALS — BODY MASS INDEX: 36.69 KG/M2 | HEIGHT: 61 IN

## 2025-04-22 DIAGNOSIS — Z47.1 AFTERCARE FOLLOWING RIGHT KNEE JOINT REPLACEMENT SURGERY: Primary | ICD-10-CM

## 2025-04-22 DIAGNOSIS — Z96.651 AFTERCARE FOLLOWING RIGHT KNEE JOINT REPLACEMENT SURGERY: ICD-10-CM

## 2025-04-22 DIAGNOSIS — Z96.651 AFTERCARE FOLLOWING RIGHT KNEE JOINT REPLACEMENT SURGERY: Primary | ICD-10-CM

## 2025-04-22 DIAGNOSIS — Z47.1 AFTERCARE FOLLOWING RIGHT KNEE JOINT REPLACEMENT SURGERY: ICD-10-CM

## 2025-04-22 PROCEDURE — 99024 POSTOP FOLLOW-UP VISIT: CPT | Performed by: ORTHOPAEDIC SURGERY

## 2025-04-22 PROCEDURE — 73560 X-RAY EXAM OF KNEE 1 OR 2: CPT

## 2025-04-22 NOTE — PROGRESS NOTES
Name: Lola Spangler      : 1950       MRN: [id number]   Encounter Provider: Erich Warren MD   Encounter Date: 25  Encounter [facility]     ASSESSMENT & PLAN:  Assessment & Plan  Aftercare following right knee joint replacement surgery       Continue home exercises and PT  Continue lovenox 40mg QD for dvt prophylaxis  Continue tylenol for pain control  Keflex for dental procedure prophylaxis discussed with the patient  Ok to allow water to run over the incision for showering at this stage, and pat incision dry. Do not submerge incision until 6 weeks after surgery.       To do next visit:  Follow up 1 week for staple removal    _____________________________________________________  CHIEF COMPLAINT:  Chief Complaint   Patient presents with   • Right Knee - Post-op         SUBJECTIVE:  Lola Spangler is a 74 y.o. female who presents 1 week from right total knee arthroplasty. She is doing very well. Only taking her tylenol and tizanidine for pain control at this stage. She is doing PT, and taking her DVT prophylaxis in form of lovenox. Denies numbness tingling fevers chills chest pain shortness of breath.       PAST MEDICAL HISTORY:  Past Medical History:   Diagnosis Date   • Abnormal ECG    • Ankylosing spondylitis (HCC)    • Anxiety     LAST ASSESSED: 16   • Arthritis    • Back pain    • Carpal tunnel syndrome    • Colon polyp     7 years ago with colonoscopy   • CPAP (continuous positive airway pressure) dependence    • Depression    • Encounter for screening mammogram for breast cancer 2023   • Encounter for screening mammogram for malignant neoplasm of breast 2019   • Fibromyalgia     LAST ASSESSED: 16   • Fibromyalgia, primary    • Heme positive stool     LAST ASSESSED: 10/22/16   • High cholesterol    • Hyperlipidemia     under control since weight loss   • Impingement syndrome of left shoulder     LAST ASSESSED:  17   • Impingement syndrome of right shoulder     LAST ASSESSED:    • Long term use of drug     LAST ASSESSED: 16   • Low back pain    • Myocardial infarction (HCC)     silent   • No known health problems     NO PERTINENT PAST MEDICAL HX   • Obesity    • RLS (restless legs syndrome)    • Rotator cuff injury     right   • Sleep apnea    • Spinal stenosis    • Spinal stenosis    • Spondylitis (HCC)    • Spondyloarthritis     RESOLVED: 16   • Vitamin D deficiency        PAST SURGICAL HISTORY:  Past Surgical History:   Procedure Laterality Date   • BACK SURGERY     • CARPAL TUNNEL RELEASE Left    • CERVICAL CONIZATION   W/ LASER      CERVICAL CONIZATION   •  SECTION     • COLONOSCOPY     • DILATION AND CURETTAGE OF UTERUS     • FL INJECTION RIGHT HIP (NON ARTHROGRAM)  2019   • FL INJECTION RIGHT HIP (NON ARTHROGRAM)  2019   • FRACTURE SURGERY     • HAND SURGERY     • HIP SURGERY     • JOINT REPLACEMENT      RTHR   • ORTHOPEDIC SURGERY     • WY ARTHRODESIS POSTERIOR/PSTLAT TQ 1NTRSPC LUMBAR N/A 2017    Procedure: L2-L5 OPEN DECOMPRESSIVE LUMBAR LAMINECTOMY W/ PEDICLE SCREW AND NILDA FIXATION FUSION (IMPULSE); REMOVAL OF SKIN TAG MID BACK;  Surgeon: Catracho Fields MD;  [facility];  Service: Neurosurgery   • WY ARTHRP ACETBLR/PROX FEM PROSTC AGRFT/ALGRFT Right 2019    Procedure: ARTHROPLASTY HIP TOTAL Posterior;  Surgeon: Erich Warren MD;  [facility];  Service: Orthopedics   • WY ARTHRP KNE CONDYLE&PLATU MEDIAL&LAT COMPARTMENTS Right 2025    Procedure: Robotically assisted right total knee arthroplasty, all associated procedures as indicated;  Surgeon: Erich Warren MD;  Location: [facility];  Service: Orthopedics   • WY COLONOSCOPY FLX DX W/COLLJ SPEC WHEN PFRMD N/A 10/07/2016    Procedure: EGD AND COLONOSCOPY;  Surgeon: Nathan Pierson MD;  Location: [facility];  Service: Gastroenterology   • WY NDSC WRST SURG W/RLS TRANSVRS  CARPL LIGM Left 04/26/2018    Procedure: RELEASE CARPAL TUNNEL ENDOSCOPIC;  Surgeon: Bon Ferrer MD;  Location[facility];  Service: Orthopedics   • IN NDSC WRST SURG W/RLS TRANSVRS CARPL LIGM Right 06/14/2018    Procedure: RELEASE CARPAL TUNNEL ENDOSCOPIC;  Surgeon: Bon Ferrer MD;  [facility];  Service: Orthopedics   • IN RPR AA HERNIA 1ST < 3 CM REDUCIBLE N/A 07/12/2024    Procedure: REPAIR HERNIA UMBILICAL;  Surgeon: Lazaro Moser MD;  [facility];  Service: General   • IN RPR AA HERNIA 1ST < 3 CM REDUCIBLE N/A 07/12/2024    Procedure: REPAIR HERNIA VENTRAL;  Surgeon: Lazaro Moser MD;  [facility];  Service: General   • RETINAL LASER PROCEDURE     • SPINE SURGERY     • TUBAL LIGATION     • UMBILICAL HERNIA REPAIR  08/01/2024       FAMILY HISTORY:  Family History   Problem Relation Age of Onset   • Arthritis Mother    • Breast cancer Mother 72   • Hypertension Mother    • Heart attack Mother         MYOCARDIAL INFARCTION   • Heart disease Mother    • Heart attack Father    • Hypertension Father    • Stroke Father         CEREBROVASCUALR ACCIDENT (CVA)   • Heart disease Father    • Arthritis Sister    • Uterine cancer Sister    • Endometrial cancer Sister 60   • Hypertension Sister    • Cancer Sister         Cervical and uterine   • Heart attack Brother    • Prostate cancer Brother 62   • Arthritis Brother    • Melanoma Brother    • Cancer Brother         Melanoma   • Heart disease Brother    • Arthritis Family    • Hyperlipidemia Family    • Depression Son    • Breast cancer Maternal Aunt 40   • No Known Problems Daughter    • No Known Problems Maternal Grandmother    • No Known Problems Maternal Grandfather    • No Known Problems Paternal Grandmother    • No Known Problems Paternal Grandfather    • No Known Problems Brother    • Other Brother         hunting accident (shot)   • Melanoma Brother    • Prostate cancer Brother    • Heart attack Brother     • Stroke Brother    • Hypertension Brother    • Arthritis Sister    • Heart attack Sister    • No Known Problems Son    • No Known Problems Son    • Lung cancer Maternal Uncle    • Breast cancer additional onset Other 52   • Uterine cancer Other        SOCIAL HISTORY:  Social History     Tobacco Use   • Smoking status: Never   • Smokeless tobacco: Never   Vaping Use   • Vaping status: Never Used   Substance Use Topics   • Alcohol use: Yes     Comment: An occasional glass of wine   • Drug use: No       MEDICATIONS:    Current Outpatient Medications:   •  acetaminophen (TYLENOL) 500 mg tablet, Take 2 tablets (1,000 mg total) by mouth every 6 (six) hours as needed for mild pain, Disp: 90 tablet, Rfl: 0  •  aspirin 81 MG tablet, Take 1 tablet by mouth daily, Disp: , Rfl:   •  cephalexin (KEFLEX) 500 mg capsule, cephalexin 500 mg capsule  TAKE 4 CAPSULES BY MOUTH 1 HOUR BEFORE DENTAL APPOINTMENT, Disp: , Rfl:   •  Cholecalciferol (VITAMIN D3) 50 MCG (2000 UT) capsule, Vitamin D3 50 mcg (2,000 unit) capsule  Take by oral route., Disp: , Rfl:   •  enoxaparin (LOVENOX) 40 mg/0.4 mL, Inject 0.4 mL (40 mg total) under the skin daily in the early morning for 28 doses, Disp: 11.2 mL, Rfl: 0  •  gabapentin (NEURONTIN) 100 mg capsule, Take 2 capsules (200 mg total) by mouth 3 (three) times a day, Disp: 180 capsule, Rfl: 5  •  gabapentin (Neurontin) 600 MG tablet, Take 1 tablet (600 mg total) by mouth daily at bedtime, Disp: 90 tablet, Rfl: 3  •  oxyCODONE (Roxicodone) 5 immediate release tablet, Take 1 tablet (5 mg total) by mouth every 6 (six) hours as needed for moderate pain for up to 10 days Max Daily Amount: 20 mg, Disp: 30 tablet, Rfl: 0  •  PARoxetine (PAXIL) 20 mg tablet, TAKE 1 TABLET BY MOUTH EVERY DAY, Disp: 90 tablet, Rfl: 2  •  rOPINIRole (REQUIP) 0.25 mg tablet, TAKE 1 TABLET (0.25 MG TOTAL) BY MOUTH DAILY AT BEDTIME FOR MANAGEMENT OF RESTLESS LEG SYNDROME, Disp: 90 tablet, Rfl: 1  •  tiZANidine (ZANAFLEX) 2 mg  "tablet, Take 1 tablet (2 mg total) by mouth every 8 (eight) hours as needed for muscle spasms, Disp: 60 tablet, Rfl: 0    ALLERGIES:  No Known Allergies    LABS:  HgA1c:   Lab Results   Component Value Date    HGBA1C 6.0 (H) 03/22/2025     BMP:   Lab Results   Component Value Date    GLUCOSE 89 11/28/2015    CALCIUM 8.8 04/16/2025     11/28/2015    K 4.1 04/16/2025    CO2 32 04/16/2025     04/16/2025    BUN 17 04/16/2025    CREATININE 0.67 04/16/2025     CBC: No components found for: \"CBC\"    _____________________________________________________  PHYSICAL EXAMINATION:  Vital signs: Ht 5' 1\" (1.549 m)   LMP  (LMP Unknown)   BMI 36.69 kg/m²   General: No acute distress, awake and alert  Psychiatric: Mood and affect appear appropriate  HEENT: Trachea Midline, No torticollis, no apparent facial trauma  Cardiovascular: No audible murmurs; Extremities appear perfused  Pulmonary: No audible wheezing or stridor  Skin: No open lesions; see further details (if any) below    MUSCULOSKELETAL EXAMINATION:  Incsiion healing, staples in place without erythema or induration  Motion 0 - 85 deg  Stable knee exam  Sensation intact dp sp tib aleisha saph    Motor intact ankle pf df ehl fhl able to straight leg raise, 5/5 motor knee extension    ___________________________________________________  STUDIES REVIEWED:  I personally reviewed the images obtained in office today and my independent interpretation is as follows:  XR right knee shows well aligned PS style right total knee replacement that is well sized andn well bonded to bone. No evidence of fracture or dislocation or loosening.    PROCEDURES PERFORMED:      None performed       Derrick Harris MD    "

## 2025-04-24 ENCOUNTER — OFFICE VISIT (OUTPATIENT)
Dept: PHYSICAL THERAPY | Facility: REHABILITATION | Age: 75
End: 2025-04-24
Payer: MEDICARE

## 2025-04-24 DIAGNOSIS — M17.11 PRIMARY OSTEOARTHRITIS OF RIGHT KNEE: ICD-10-CM

## 2025-04-24 DIAGNOSIS — M25.561 CHRONIC PAIN OF RIGHT KNEE: Primary | ICD-10-CM

## 2025-04-24 DIAGNOSIS — Z47.1 AFTERCARE FOLLOWING RIGHT KNEE JOINT REPLACEMENT SURGERY: ICD-10-CM

## 2025-04-24 DIAGNOSIS — Z96.651 AFTERCARE FOLLOWING RIGHT KNEE JOINT REPLACEMENT SURGERY: ICD-10-CM

## 2025-04-24 DIAGNOSIS — G89.29 CHRONIC PAIN OF RIGHT KNEE: Primary | ICD-10-CM

## 2025-04-24 PROCEDURE — 97140 MANUAL THERAPY 1/> REGIONS: CPT

## 2025-04-24 PROCEDURE — 97110 THERAPEUTIC EXERCISES: CPT

## 2025-04-24 NOTE — PROGRESS NOTES
Daily Note     Today's date: 2025  Patient name: Lola Spangler  : 1950  MRN: [id number]  Referring provider: Erich Warren,*  Dx:   Encounter Diagnosis     ICD-10-CM    1. Chronic pain of right knee  M25.561     G89.29       2. Aftercare following right knee joint replacement surgery  Z47.1     Z96.651       3. Primary osteoarthritis of right knee  M17.11           Start Time: 1049  Stop Time: 1130  Total time in clinic (min): 41 minutes    Subjective: Patient notes that she got a good nights rest last night. Continues to perform HEP.       Objective: See treatment diary below      Assessment: Tolerated treatment well. Patient exhibited good technique with therapeutic exercises and would benefit from continued PT      Plan: Continue per plan of care.        POC expires Unit limit Auth Expiration date PT/OT + Visit Limit?   6/15/25 (8) BOMN  BOMN         Visit/Unit Tracking  AUTH Status:  Date 2025      10 Used 1 2 3       Remaining             Pertinent Findings:      Test / Measure  2025   FOTO (Predicted 54) 25   R knee ROM decreased   R knee pain     R knee strength decreased     Access Code: [email or web address]  URL: [email or web address]  Date: 2025  Prepared by: Wanda Duque     Exercises  - Seated Ankle Pumps  - 1 x daily - 7 x weekly - 3 sets - 10 reps  - Heel Raises with Counter Support  - 1 x daily - 7 x weekly - 3 sets - 10 reps  - Supine Quad Set  - 1 x daily - 7 x weekly - 3 sets - 10 reps  - Supine Gluteal Sets  - 1 x daily - 7 x weekly - 3 sets - 10 reps  - Supine Bridge  - 1 x daily - 7 x weekly - 3 sets - 10 reps    Manuals          PROM knee ext   SW SW SW                                   RE  SW           Neuro Re-Ed             POC/HEP SW SW                                                                                         Ther Ex             AP HEP HEP           Quad sets HEP 2x15 X20   X10  X20          Glute sets HEP  "           Heel raises HEP            bridge HEP            Knee extension   Foam @ heel, ice pack (x1) 7' x2 Foam @ heel 7' x2  Foam @ heel 7' x2          Heel slides   3x10  3x10          SLR   X10  2x10          Step ups    4\" x15         SAQ             NS    6'          Mini squat              Ther Activity             TUG  Performed            5x STS  Perforemd            Gait Training                                       Modalities             Cold pack   7' x1 5' x1                              "

## 2025-04-28 ENCOUNTER — OFFICE VISIT (OUTPATIENT)
Dept: SLEEP CENTER | Facility: CLINIC | Age: 75
End: 2025-04-28
Payer: MEDICARE

## 2025-04-28 ENCOUNTER — OFFICE VISIT (OUTPATIENT)
Dept: PHYSICAL THERAPY | Facility: REHABILITATION | Age: 75
End: 2025-04-28
Payer: MEDICARE

## 2025-04-28 VITALS
HEIGHT: 61 IN | DIASTOLIC BLOOD PRESSURE: 62 MMHG | WEIGHT: 194 LBS | SYSTOLIC BLOOD PRESSURE: 116 MMHG | BODY MASS INDEX: 36.63 KG/M2

## 2025-04-28 DIAGNOSIS — G89.29 CHRONIC PAIN OF RIGHT KNEE: ICD-10-CM

## 2025-04-28 DIAGNOSIS — Z96.651 AFTERCARE FOLLOWING RIGHT KNEE JOINT REPLACEMENT SURGERY: Primary | ICD-10-CM

## 2025-04-28 DIAGNOSIS — Z79.899 CHRONIC PRESCRIPTION BENZODIAZEPINE USE: ICD-10-CM

## 2025-04-28 DIAGNOSIS — G25.81 RESTLESS LEG SYNDROME: ICD-10-CM

## 2025-04-28 DIAGNOSIS — M17.11 PRIMARY OSTEOARTHRITIS OF RIGHT KNEE: ICD-10-CM

## 2025-04-28 DIAGNOSIS — Z47.1 AFTERCARE FOLLOWING RIGHT KNEE JOINT REPLACEMENT SURGERY: Primary | ICD-10-CM

## 2025-04-28 DIAGNOSIS — G47.33 OSA (OBSTRUCTIVE SLEEP APNEA): Primary | ICD-10-CM

## 2025-04-28 DIAGNOSIS — F51.04 CHRONIC INSOMNIA: ICD-10-CM

## 2025-04-28 DIAGNOSIS — M25.561 CHRONIC PAIN OF RIGHT KNEE: ICD-10-CM

## 2025-04-28 PROCEDURE — 99214 OFFICE O/P EST MOD 30 MIN: CPT | Performed by: PSYCHIATRY & NEUROLOGY

## 2025-04-28 PROCEDURE — 97110 THERAPEUTIC EXERCISES: CPT

## 2025-04-28 PROCEDURE — G2211 COMPLEX E/M VISIT ADD ON: HCPCS | Performed by: PSYCHIATRY & NEUROLOGY

## 2025-04-28 NOTE — PROGRESS NOTES
Daily Note     Today's date: 2025  Patient name: Lola Spangler  : 1950  MRN: [id number]  Referring provider: Erich Warren,*  Dx:   Encounter Diagnosis     ICD-10-CM    1. Aftercare following right knee joint replacement surgery  Z47.1     Z96.651       2. Primary osteoarthritis of right knee  M17.11       3. Chronic pain of right knee  M25.561     G89.29           Start Time: 813  Stop Time: 855  Total time in clinic (min): 42 minutes    Subjective: Patient states that her knee is feeling stiff this morning.       Objective: See treatment diary below  ROM Right  R     Knee flex 63 84   Knee ext (12) (9)       Assessment: Tolerated treatment well. Rest breaks taken as needed. Encouraged to increase protein rich diet to encourage healing. Patient exhibited good technique with therapeutic exercises and would benefit from continued PT      Plan: Continue per plan of care.        POC expires Unit limit Auth Expiration date PT/OT + Visit Limit?   6/15/25 (8) BOMN  BOMN         Visit/Unit Tracking  AUTH Status:  Date 2025     10 Used 1 2 3 4      Remaining             Pertinent Findings:      Test / Measure  2025   FOTO (Predicted 54) 25   R knee ROM decreased   R knee pain     R knee strength decreased     Access Code: [email or web address]  URL: [email or web address]  Date: 2025  Prepared by: Wanda Duque     Exercises  - Seated Ankle Pumps  - 1 x daily - 7 x weekly - 3 sets - 10 reps  - Heel Raises with Counter Support  - 1 x daily - 7 x weekly - 3 sets - 10 reps  - Supine Quad Set  - 1 x daily - 7 x weekly - 3 sets - 10 reps  - Supine Gluteal Sets  - 1 x daily - 7 x weekly - 3 sets - 10 reps  - Supine Bridge  - 1 x daily - 7 x weekly - 3 sets - 10 reps    Manuals         PROM knee ext   SW SW SW                                   RE  SW           Neuro Re-Ed             POC/HEP SW SW                                                 "                                         Ther Ex             AP HEP HEP           Quad sets HEP 2x15 X20   X10  X20  2X20         Glute sets HEP            Heel raises HEP            bridge HEP            Knee extension   Foam @ heel, ice pack (x1) 7' x2 Foam @ heel 7' x2  Foam @ heel 7' x2  Foam @ heel 5'         Heel slides   3x10  3x10  3x10         SLR   X10  2x10  2x10         Step ups    4\" x15 4\" x20        SAQ             NS    6'  8'         Mini squat              Ther Activity             TUG  Performed            5x STS  Perforemd            Gait Training                                       Modalities             Cold pack   7' x1 5' x1                                "

## 2025-04-28 NOTE — PROGRESS NOTES
As noted, Lola had very severe obstructive sleep apnea on her diagnostic polysomnogram with an AHI of 64, there was a significant point of central apnea on that test but is classified primarily as obstructive disease..  CPAP was effective during her titration study.  She had a markedly elevated number of PLM.  Regarding her iron level, last ferritin was 77 which is within goal for restless leg syndrome.    Thus far, CPAP has been very beneficial with improvement in sleep quality, snoring, fewer headaches, and less sleepiness.  Compliance data is excellent with an AHI that is essentially at goal and consistent usage.  Likely the residual events may be central based upon her initial test and reading from the machine, in that case we can continue to observe these.

## 2025-04-28 NOTE — ASSESSMENT & PLAN NOTE
- Lola is complaint with CPAP and is benefiting from use. Notes she has more energy and notes resolution of her headaches.Additional benefits include falling asleep within 30 minutes, and fall asleep easily after awakenings.  - CPAP compliance data from 3/24/2025 to 4/22/2025 noted for 97% compliance with average use of 5 hours 32 minutes. Patient currently using AirSense 11 AutoSet 12 to 13 cm H2O.  EPR 1.  95th percentile pressure 12.6, 95th percentile leak at 57.3. Residual AHI of 6.2.  She does endorse benefit from using CPAP.   - Continue APAP 12-13 cmH2O with a full-face mask.  - There was leak noted on the compliance data. We will order for a mask fitting visit today.   - Refill of supplies will be sent to the patient's DME.  - Explained the importance of keeping the machine clean, and that they should be eligible for refills of supplies every 3-6 months depending on insurance.  We also discussed the insurance compliance requirements.  - Encouraged healthy lifestyle with adequate sleep (7-9 hours per night), healthy balanced diet and routine exercise.  Explained the importance of avoiding driving while drowsy.  - Follow-up 3- 6 months.

## 2025-04-28 NOTE — PROGRESS NOTES
Name: Lola Spangler      : 1950      MRN: [id number]  Encounter Provider: SLEEP FELLOW Eduardo JONES  Encounter Date: 2025   Encounter department: [facility]  :  Assessment & Plan  VANNESSA (obstructive sleep apnea)  - Lola is complaint with CPAP and is benefiting from use. Notes she has more energy and notes resolution of her headaches.Additional benefits include falling asleep within 30 minutes, and fall asleep easily after awakenings.  - CPAP compliance data from 3/24/2025 to 2025 noted for 97% compliance with average use of 5 hours 32 minutes. Patient currently using AirSense 11 AutoSet 12 to 13 cm H2O.  EPR 1.  95th percentile pressure 12.6, 95th percentile leak at 57.3. Residual AHI of 6.2.  She does endorse benefit from using CPAP.   - Continue APAP 12-13 cmH2O with a full-face mask.  - There was leak noted on the compliance data. We will order for a mask fitting visit today.   - Refill of supplies will be sent to the patient's DME.  - Explained the importance of keeping the machine clean, and that they should be eligible for refills of supplies every 3-6 months depending on insurance.  We also discussed the insurance compliance requirements.  - Encouraged healthy lifestyle with adequate sleep (7-9 hours per night), healthy balanced diet and routine exercise.  Explained the importance of avoiding driving while drowsy.  - Follow-up 3- 6 months.         Restless leg syndrome  Patient with longstanding history of complex restless leg symptoms.  In the past the patient has been on Klonopin 0.5mg which was weaned off last visit.  Patient is currently taking gabapentin 200 mg 3 times a day for postherpetic neuralgia and gabapentin 600 mg nightly.  After discontinuation of the Klonopin patient notes her restless leg symptoms worsen.  Due to this she reached out to her primary care physician who started her on ropinirole 0.25 mg nightly.  Only taking this medication at 9:30 PM.   "Patient symptoms currently are starting at 8:30 PM when she is watching television.  Patient denies any symptoms of augmentation at this time.  No compulsive gambling or shopping.    We discussed the recent guideline change in terms of using ropinirole in the treatment of restless leg syndrome.  For now as the patient's symptoms are well-controlled we will continue on this medication.  We did advise her to take this 30 minutes prior to her start of symptoms.  She notes with the use of the ropinirole her symptoms have improved significantly.    Plan:  Continue ropinirole 0.25 mg nightly.  This is to be taken 30 minutes prior to her start of her RLS symptoms..  Continue gabapentin 3 times daily, gabapentin 600 mg nightly.  For symptoms of augmentation.  Effects were discussed in detail for gabapentin and ropinirole.         Chronic insomnia  Insomnia has resolved after initiation of CPAP therapy.  Continue to monitor at next visit.       Seen with Dr. Padilla    History of Present Illness   HPI     Lola 74 year-old female with past medical history of obesity BMI 36.69, depression, RLS, vitamin D deficiency, lumbar spinal stenosis at multiple levels who presents for evaluation of sleep apnea currently on CPAP therapy.  Occasions include gabapentin 200 mg 3 times daily and 600 mg nightly, Requip 0.25 mg, vitamin D 2000 IU daily, paroxetine 20 mg daily.    CC: \" I was having worsening RLS symptoms after discontinuing the klonopin, but I follow up with my PCP and was started on ropinirole\":     VANNESSA Hx:  Diagnostic sleep study that was done on 1/17/2025 noted for severe obstructive sleep apnea with a competent of central sleep apnea.  Her AHI was 64.54 events/hour with 13.6events/hr central apnea.  Intermittent baseline hypoxemia was noted.  Increased PLM index of 30.6.  This did contribute to severe sleep disturbance and cortical arousals.  A CPAP titration study was recommended due to sleep apnea and hypoxemia.    Did " have a CPAP titration done 1/29/2025 which was not effective PAP titration study.  With pressures of 13 cm H2O was optimal and resulted in an AHI of 1.8.  Again on this study markedly increased periodic limb index was noted with again these contributed to significant sleep disturbance and cortical arousals.    Interval history: He had a total right knee replaced was done 2 weeks prior.  Then she has been sleeping in a recliner chair and has been using CPAP.  She notes that her use of CPAP is decreased mildly because she is not as comfortable in the recliner whereas she was on her bed.    Patient reports that she is going to bed around 10:30 PM.  She notes its pretty easy for her to go to sleep now which is a significant improvement from her last visit where it could take her 1 to 2 hours to fall asleep.  There is is an improvement after starting the CPAP machine.  She does wake up about 2 times in the middle of the night to use the restroom.  Either to fall back asleep.  With the final awakening of 7 AM.  She notes that 2 days a week she will take a nap.  Takes 1 nap at 2 to 3 PM.  This will nap lasts about 45 minutes.  She notes that she has more energy and her headaches have resolved which she had in the past.  She does feel more energetic during the day.    She goes to bed a little bit later 12:30 to 1 AM in the weekends.  He did not fall asleep.  2 awakenings to use the restroom.  He wakes up at 7 AM.  He goes to [employer] on the weekends.    CPAP compliance data from 3/24/2025 to 4/22/2025 noted for 97% compliance with average use of 5 hours 32 minutes.  Patient currently using AirSense 11 AutoSet 12 to 13 cm H2O.  EPR 1.  95th percentile pressure 12.6, 95th percentile leak at 57.3. Residual AHI of 6.2.  She does endorse benefit from using CPAP.  Noting being able to fall asleep within 30 minutes, and fall asleep easily after awakenings.  Notes she has more energy and notes resolution of her headaches.    Denies  any drowsy driving.  Denies any pain.  Denies any alcohol.  Denies smoking.  Denies any other recreational drugs.  She drinks a 10 ounce cup of coffee in the morning.    RLS: Yes, years. Gabapentin helps-> at the last visit with Dr. Padilla patient was taking clonidine 0.5 mg at 10 PM.  She was taking gabapentin 600 mg at 10 PM.  And then taking gabapentin 200 mg at 10 AM, 2 PM, 6 PM for postherpetic neuralgia.  She notes that she has discontinued the clonidine 0.5 mg.  She had significant worsening of her restless leg symptoms.  She called her primary care physician Dr. Wesley who started her on ropinirole 0.25 mg daily.  She takes this an hour prior to sleep.  She notes that her symptoms started when she is watching television around 8:30 PM.      Sitting and reading: (Patient-Rptd) (P) Slight chance of dozing  Watching TV: (Patient-Rptd) (P) Moderate chance of dozing  Sitting, inactive in a public place (e.g. a theatre or a meeting): (Patient-Rptd) (P) Would never doze  As a passenger in a car for an hour without a break: (Patient-Rptd) (P) Slight chance of dozing  Lying down to rest in the afternoon when circumstances permit: (Patient-Rptd) (P) High chance of dozing  Sitting and talking to someone: (Patient-Rptd) (P) Slight chance of dozing  Sitting quietly after a lunch without alcohol: (Patient-Rptd) (P) Slight chance of dozing  In a car, while stopped for a few minutes in traffic: (Patient-Rptd) (P) Would never doze  Total score: (Patient-Rptd) (P) 9     Review of Systems  Pertinent positives/negatives included in HPI and also as noted:     Past Medical History   Past Medical History:   Diagnosis Date    Abnormal ECG     Ankylosing spondylitis (HCC)     Anxiety     LAST ASSESSED: 12/20/16    Arthritis     Back pain     Carpal tunnel syndrome     Colon polyp     7 years ago with colonoscopy    CPAP (continuous positive airway pressure) dependence     Depression     Encounter for screening mammogram for breast  cancer 2023    Encounter for screening mammogram for malignant neoplasm of breast 2019    Fibromyalgia     LAST ASSESSED: 16    Fibromyalgia, primary     Heme positive stool     LAST ASSESSED: 10/22/16    High cholesterol     Hyperlipidemia     under control since weight loss    Impingement syndrome of left shoulder     LAST ASSESSED: 17    Impingement syndrome of right shoulder     LAST ASSESSED:     Long term use of drug     LAST ASSESSED: 16    Low back pain     Myocardial infarction (HCC)     silent    No known health problems     NO PERTINENT PAST MEDICAL HX    Obesity     RLS (restless legs syndrome)     Rotator cuff injury     right    Sleep apnea     Spinal stenosis     Spinal stenosis     Spondylitis (HCC)     Spondyloarthritis     RESOLVED: 16    Vitamin D deficiency      Past Surgical History:   Procedure Laterality Date    BACK SURGERY      CARPAL TUNNEL RELEASE Left     CERVICAL CONIZATION   W/ LASER      CERVICAL CONIZATION     SECTION      COLONOSCOPY      DILATION AND CURETTAGE OF UTERUS      FL INJECTION RIGHT HIP (NON ARTHROGRAM)  2019    FL INJECTION RIGHT HIP (NON ARTHROGRAM)  2019    FRACTURE SURGERY      HAND SURGERY      HIP SURGERY      JOINT REPLACEMENT      RTHR    ORTHOPEDIC SURGERY      NC ARTHRODESIS POSTERIOR/PSTLAT TQ 1NTRSPC LUMBAR N/A 2017    Procedure: L2-L5 OPEN DECOMPRESSIVE LUMBAR LAMINECTOMY W/ PEDICLE SCREW AND NILDA FIXATION FUSION (IMPULSE); REMOVAL OF SKIN TAG MID BACK;  Surgeon: Catracho Fields MD;  [facility];  Service: Neurosurgery    NC ARTHRP ACETBLR/PROX FEM PROSTC AGRFT/ALGRFT Right 2019    Procedure: ARTHROPLASTY HIP TOTAL Posterior;  Surgeon: Erich Warren MD;  [facility];  Service: Orthopedics    NC ARTHRP KNE CONDYLE&PLATU MEDIAL&LAT COMPARTMENTS Right 2025    Procedure: Robotically assisted right total knee arthroplasty, all associated procedures as indicated;   Surgeon: Erich Warren MD;  [facility];  Service: Orthopedics    HI COLONOSCOPY FLX DX W/COLLJ SPEC WHEN PFRMD N/A 10/07/2016    Procedure: EGD AND COLONOSCOPY;  Surgeon: Nathan Pierson MD;  Location: [facility];  Service: Gastroenterology    HI NDSC WRST SURG W/RLS TRANSVRS CARPL LIGM Left 04/26/2018    Procedure: RELEASE CARPAL TUNNEL ENDOSCOPIC;  Surgeon: Bon Ferrer MD;  Location[facility];  Service: Orthopedics    HI NDSC WRST SURG W/RLS TRANSVRS CARPL LIGM Right 06/14/2018    Procedure: RELEASE CARPAL TUNNEL ENDOSCOPIC;  Surgeon: Bon Ferrer MD;  [facility];  Service: Orthopedics    HI RPR AA HERNIA 1ST < 3 CM REDUCIBLE N/A 07/12/2024    Procedure: REPAIR HERNIA UMBILICAL;  Surgeon: Lazaro Moser MD;  [facility];  Service: General    HI RPR AA HERNIA 1ST < 3 CM REDUCIBLE N/A 07/12/2024    Procedure: REPAIR HERNIA VENTRAL;  Surgeon: Lazaro Moser MD;  [facility];  Service: General    RETINAL LASER PROCEDURE      SPINE SURGERY      TUBAL LIGATION      UMBILICAL HERNIA REPAIR  08/01/2024     Family History   Problem Relation Age of Onset    Arthritis Mother     Breast cancer Mother 72    Hypertension Mother     Heart attack Mother         MYOCARDIAL INFARCTION    Heart disease Mother     Heart attack Father     Hypertension Father     Stroke Father         CEREBROVASCUALR ACCIDENT (CVA)    Heart disease Father     Arthritis Sister     Uterine cancer Sister     Endometrial cancer Sister 60    Hypertension Sister     Cancer Sister         Cervical and uterine    Heart attack Brother     Prostate cancer Brother 62    Arthritis Brother     Melanoma Brother     Cancer Brother         Melanoma    Heart disease Brother     Arthritis Family     Hyperlipidemia Family     Depression Son     Breast cancer Maternal Aunt 40    No Known Problems Daughter     No Known Problems Maternal Grandmother     No Known Problems Maternal Grandfather     No  Known Problems Paternal Grandmother     No Known Problems Paternal Grandfather     No Known Problems Brother     Other Brother         hunting accident (shot)    Melanoma Brother     Prostate cancer Brother     Heart attack Brother     Stroke Brother     Hypertension Brother     Arthritis Sister     Heart attack Sister     No Known Problems Son     No Known Problems Son     Lung cancer Maternal Uncle     Breast cancer additional onset Other 52    Uterine cancer Other       reports that she has never smoked. She has never used smokeless tobacco. She reports current alcohol use. She reports that she does not use drugs.  Current Outpatient Medications   Medication Instructions    acetaminophen (TYLENOL) 1,000 mg, Oral, Every 6 hours PRN    aspirin 81 MG tablet 1 tablet, Daily    cephalexin (KEFLEX) 500 mg capsule cephalexin 500 mg capsule   TAKE 4 CAPSULES BY MOUTH 1 HOUR BEFORE DENTAL APPOINTMENT    Cholecalciferol (VITAMIN D3) 50 MCG (2000 UT) capsule Vitamin D3 50 mcg (2,000 unit) capsule   Take by oral route.    enoxaparin (LOVENOX) 40 mg, Subcutaneous, Daily (early morning)    gabapentin (NEURONTIN) 200 mg, Oral, 3 times daily    gabapentin (NEURONTIN) 600 mg, Oral, Daily at bedtime    PARoxetine (PAXIL) 20 mg, Oral, Daily    rOPINIRole (REQUIP) 0.25 mg, Oral, Daily at bedtime, For management of restless leg syndrome    tiZANidine (ZANAFLEX) 2 mg, Oral, Every 8 hours PRN   No Known Allergies   Current Outpatient Medications on File Prior to Visit   Medication Sig Dispense Refill    acetaminophen (TYLENOL) 500 mg tablet Take 2 tablets (1,000 mg total) by mouth every 6 (six) hours as needed for mild pain 90 tablet 0    aspirin 81 MG tablet Take 1 tablet by mouth daily      cephalexin (KEFLEX) 500 mg capsule cephalexin 500 mg capsule   TAKE 4 CAPSULES BY MOUTH 1 HOUR BEFORE DENTAL APPOINTMENT      Cholecalciferol (VITAMIN D3) 50 MCG (2000 UT) capsule Vitamin D3 50 mcg (2,000 unit) capsule   Take by oral route.       enoxaparin (LOVENOX) 40 mg/0.4 mL Inject 0.4 mL (40 mg total) under the skin daily in the early morning for 28 doses 11.2 mL 0    gabapentin (NEURONTIN) 100 mg capsule Take 2 capsules (200 mg total) by mouth 3 (three) times a day 180 capsule 5    gabapentin (Neurontin) 600 MG tablet Take 1 tablet (600 mg total) by mouth daily at bedtime 90 tablet 3    PARoxetine (PAXIL) 20 mg tablet TAKE 1 TABLET BY MOUTH EVERY DAY 90 tablet 2    rOPINIRole (REQUIP) 0.25 mg tablet TAKE 1 TABLET (0.25 MG TOTAL) BY MOUTH DAILY AT BEDTIME FOR MANAGEMENT OF RESTLESS LEG SYNDROME 90 tablet 1    tiZANidine (ZANAFLEX) 2 mg tablet Take 1 tablet (2 mg total) by mouth every 8 (eight) hours as needed for muscle spasms 60 tablet 0     No current facility-administered medications on file prior to visit.      Social History     Tobacco Use    Smoking status: Never    Smokeless tobacco: Never   Vaping Use    Vaping status: Never Used   Substance and Sexual Activity    Alcohol use: Yes     Comment: An occasional glass of wine    Drug use: No    Sexual activity: Yes     Partners: Male     Birth control/protection: Post-menopausal, Female Sterilization     Objective   LMP  (LMP Unknown)        Physical Exam  Appearance : no distress, alert, cooperative  Mental Status. Memory, and Affect : alert and oriented, normal affect, makes good eye contact, provides a detailed history  Cardiac- : regular rate and rhythm, S1, S2 normalclick, rub or gallop, + murmur  Pulmonary : clear to auscultation bilaterally  Cranial Nerves: CN II-XII grossly intact  No peripheral edema     Data  Lab Results   Component Value Date    HGB 9.1 (L) 04/16/2025    HCT 28.4 (L) 04/16/2025    MCV 93 04/16/2025      Lab Results   Component Value Date    GLUCOSE 89 11/28/2015    CALCIUM 8.8 04/16/2025     11/28/2015    K 4.1 04/16/2025    CO2 32 04/16/2025     04/16/2025    BUN 17 04/16/2025    CREATININE 0.67 04/16/2025     Lab Results   Component Value Date    IRON 103  01/15/2025    TIBC 338.8 01/15/2025    FERRITIN 77 01/15/2025     Lab Results   Component Value Date    AST 19 04/16/2025    ALT 13 04/16/2025       Anjum Israel MD  Sleep Medicine Fellow

## 2025-04-29 ENCOUNTER — OFFICE VISIT (OUTPATIENT)
Dept: OBGYN CLINIC | Facility: HOSPITAL | Age: 75
End: 2025-04-29

## 2025-04-29 VITALS — BODY MASS INDEX: 36.66 KG/M2 | HEIGHT: 61 IN

## 2025-04-29 DIAGNOSIS — Z96.651 AFTERCARE FOLLOWING RIGHT KNEE JOINT REPLACEMENT SURGERY: Primary | ICD-10-CM

## 2025-04-29 DIAGNOSIS — Z47.1 AFTERCARE FOLLOWING RIGHT KNEE JOINT REPLACEMENT SURGERY: Primary | ICD-10-CM

## 2025-04-29 PROCEDURE — 99024 POSTOP FOLLOW-UP VISIT: CPT | Performed by: ORTHOPAEDIC SURGERY

## 2025-04-29 RX ORDER — OXYCODONE HYDROCHLORIDE 5 MG/1
5 TABLET ORAL EVERY 6 HOURS PRN
Qty: 30 TABLET | Refills: 0 | Status: SHIPPED | OUTPATIENT
Start: 2025-04-29

## 2025-04-29 NOTE — PROGRESS NOTES
Name: Lola Spangler      : 1950       MRN: [id number]   Encounter Provider: Erich Warren MD   Encounter Date: 25  Encounter department: [facility]     ASSESSMENT & PLAN:  Assessment & Plan  Aftercare following right knee joint replacement surgery  Continue physical therapy   Continue weight bearing activities as tolerated   Continue pain medications as needed   Continue lovenox as prescribed    Staples removed in office today, incision cleaned, steri-strips applied   Follow up in 4 weeks for 6 week post-op appointment    Orders:  •  oxyCODONE (Roxicodone) 5 immediate release tablet; Take 1 tablet (5 mg total) by mouth every 6 (six) hours as needed for moderate pain Max Daily Amount: 20 mg         To do next visit:  Return in about 4 weeks (around 2025) for 6 week post-op appointment..    _____________________________________________________  CHIEF COMPLAINT:  Chief Complaint   Patient presents with   • Right Knee - Post-op         SUBJECTIVE:  Lola Spangler is a 74 y.o. female who presents 2 weeks status post right total knee arthroplasty.  She is doing well.  She admits to continued pain of the right knee which she has been able to control well with Tylenol and occasional oxycodone.  Refill of oxycodone was sent to her pharmacy today.  She continues to attend physical therapy twice a week and admits to making good progress.  Patient is ambulating well today with a walker.  She continues to take Lovenox daily.      PAST MEDICAL HISTORY:  Past Medical History:   Diagnosis Date   • Abnormal ECG    • Ankylosing spondylitis (HCC)    • Anxiety     LAST ASSESSED: 16   • Arthritis    • Back pain    • Carpal tunnel syndrome    • Colon polyp     7 years ago with colonoscopy   • CPAP (continuous positive airway pressure) dependence    • Depression    • Encounter for screening mammogram for breast cancer 2023   • Encounter for screening mammogram  for malignant neoplasm of breast 2019   • Fibromyalgia     LAST ASSESSED: 16   • Fibromyalgia, primary    • Heme positive stool     LAST ASSESSED: 10/22/16   • High cholesterol    • Hyperlipidemia     under control since weight loss   • Impingement syndrome of left shoulder     LAST ASSESSED: 17   • Impingement syndrome of right shoulder     LAST ASSESSED:    • Long term use of drug     LAST ASSESSED: 16   • Low back pain    • Myocardial infarction (HCC)     silent   • No known health problems     NO PERTINENT PAST MEDICAL HX   • Obesity    • RLS (restless legs syndrome)    • Rotator cuff injury     right   • Sleep apnea    • Spinal stenosis    • Spinal stenosis    • Spondylitis (HCC)    • Spondyloarthritis     RESOLVED: 16   • Vitamin D deficiency        PAST SURGICAL HISTORY:  Past Surgical History:   Procedure Laterality Date   • BACK SURGERY     • CARPAL TUNNEL RELEASE Left    • CERVICAL CONIZATION   W/ LASER      CERVICAL CONIZATION   •  SECTION     • COLONOSCOPY     • DILATION AND CURETTAGE OF UTERUS     • FL INJECTION RIGHT HIP (NON ARTHROGRAM)  2019   • FL INJECTION RIGHT HIP (NON ARTHROGRAM)  2019   • FRACTURE SURGERY     • HAND SURGERY     • HIP SURGERY     • JOINT REPLACEMENT      RTHR   • ORTHOPEDIC SURGERY     • MI ARTHRODESIS POSTERIOR/PSTLAT TQ 1NTRSPC LUMBAR N/A 2017    Procedure: L2-L5 OPEN DECOMPRESSIVE LUMBAR LAMINECTOMY W/ PEDICLE SCREW AND NILDA FIXATION FUSION (IMPULSE); REMOVAL OF SKIN TAG MID BACK;  Surgeon: Catracho Fields MD;  Location: [facility];  Service: Neurosurgery   • MI ARTHRP ACETBLR/PROX FEM PROSTC AGRFT/ALGRFT Right 2019    Procedure: ARTHROPLASTY HIP TOTAL Posterior;  Surgeon: Erich Warren MD;  Location[facility];  Service: Orthopedics   • MI ARTHRP KNE CONDYLE&PLATU MEDIAL&LAT COMPARTMENTS Right 2025    Procedure: Robotically assisted right total knee arthroplasty, all associated procedures as  indicated;  Surgeon: Erich Warren MD;  Location: [facility];  Service: Orthopedics   • MD COLONOSCOPY FLX DX W/COLLJ SPEC WHEN PFRMD N/A 10/07/2016    Procedure: EGD AND COLONOSCOPY;  Surgeon: Nathan Pierson MD;  Location: [facility];  Service: Gastroenterology   • MD NDSC WRST SURG W/RLS TRANSVRS CARPL LIGM Left 04/26/2018    Procedure: RELEASE CARPAL TUNNEL ENDOSCOPIC;  Surgeon: Bon Ferrer MD;  Location: [facility];  Service: Orthopedics   • MD NDSC WRST SURG W/RLS TRANSVRS CARPL LIGM Right 06/14/2018    Procedure: RELEASE CARPAL TUNNEL ENDOSCOPIC;  Surgeon: Bon Ferrer MD;  Location[facility];  Service: Orthopedics   • MD RPR AA HERNIA 1ST < 3 CM REDUCIBLE N/A 07/12/2024    Procedure: REPAIR HERNIA UMBILICAL;  Surgeon: Lazaro Moser MD;  [facility];  Service: General   • MD RPR AA HERNIA 1ST < 3 CM REDUCIBLE N/A 07/12/2024    Procedure: REPAIR HERNIA VENTRAL;  Surgeon: Lazaro Moser MD;  [facility];  Service: General   • RETINAL LASER PROCEDURE     • SPINE SURGERY     • TUBAL LIGATION     • UMBILICAL HERNIA REPAIR  08/01/2024       FAMILY HISTORY:  Family History   Problem Relation Age of Onset   • Arthritis Mother    • Breast cancer Mother 72   • Hypertension Mother    • Heart attack Mother         MYOCARDIAL INFARCTION   • Heart disease Mother    • Heart attack Father    • Hypertension Father    • Stroke Father         CEREBROVASCUALR ACCIDENT (CVA)   • Heart disease Father    • Arthritis Sister    • Uterine cancer Sister    • Endometrial cancer Sister 60   • Hypertension Sister    • Cancer Sister         Cervical and uterine   • Heart attack Brother    • Prostate cancer Brother 62   • Arthritis Brother    • Melanoma Brother    • Cancer Brother         Melanoma   • Heart disease Brother    • Arthritis Family    • Hyperlipidemia Family    • Depression Son    • Breast cancer Maternal Aunt 40   • No Known Problems Daughter    • No Known Problems  Maternal Grandmother    • No Known Problems Maternal Grandfather    • No Known Problems Paternal Grandmother    • No Known Problems Paternal Grandfather    • No Known Problems Brother    • Other Brother         hunting accident (shot)   • Melanoma Brother    • Prostate cancer Brother    • Heart attack Brother    • Stroke Brother    • Hypertension Brother    • Arthritis Sister    • Heart attack Sister    • No Known Problems Son    • No Known Problems Son    • Lung cancer Maternal Uncle    • Breast cancer additional onset Other 52   • Uterine cancer Other        SOCIAL HISTORY:  Social History     Tobacco Use   • Smoking status: Never   • Smokeless tobacco: Never   Vaping Use   • Vaping status: Never Used   Substance Use Topics   • Alcohol use: Yes     Comment: An occasional glass of wine   • Drug use: No       MEDICATIONS:    Current Outpatient Medications:   •  oxyCODONE (Roxicodone) 5 immediate release tablet, Take 1 tablet (5 mg total) by mouth every 6 (six) hours as needed for moderate pain Max Daily Amount: 20 mg, Disp: 30 tablet, Rfl: 0  •  acetaminophen (TYLENOL) 500 mg tablet, Take 2 tablets (1,000 mg total) by mouth every 6 (six) hours as needed for mild pain, Disp: 90 tablet, Rfl: 0  •  aspirin 81 MG tablet, Take 1 tablet by mouth daily, Disp: , Rfl:   •  cephalexin (KEFLEX) 500 mg capsule, cephalexin 500 mg capsule  TAKE 4 CAPSULES BY MOUTH 1 HOUR BEFORE DENTAL APPOINTMENT, Disp: , Rfl:   •  Cholecalciferol (VITAMIN D3) 50 MCG (2000 UT) capsule, Vitamin D3 50 mcg (2,000 unit) capsule  Take by oral route., Disp: , Rfl:   •  enoxaparin (LOVENOX) 40 mg/0.4 mL, Inject 0.4 mL (40 mg total) under the skin daily in the early morning for 28 doses, Disp: 11.2 mL, Rfl: 0  •  gabapentin (NEURONTIN) 100 mg capsule, Take 2 capsules (200 mg total) by mouth 3 (three) times a day, Disp: 180 capsule, Rfl: 5  •  gabapentin (Neurontin) 600 MG tablet, Take 1 tablet (600 mg total) by mouth daily at bedtime, Disp: 90 tablet,  "Rfl: 3  •  PARoxetine (PAXIL) 20 mg tablet, TAKE 1 TABLET BY MOUTH EVERY DAY, Disp: 90 tablet, Rfl: 2  •  rOPINIRole (REQUIP) 0.25 mg tablet, TAKE 1 TABLET (0.25 MG TOTAL) BY MOUTH DAILY AT BEDTIME FOR MANAGEMENT OF RESTLESS LEG SYNDROME, Disp: 90 tablet, Rfl: 1  •  tiZANidine (ZANAFLEX) 2 mg tablet, Take 1 tablet (2 mg total) by mouth every 8 (eight) hours as needed for muscle spasms, Disp: 60 tablet, Rfl: 0    ALLERGIES:  No Known Allergies    LABS:  HgA1c:   Lab Results   Component Value Date    HGBA1C 6.0 (H) 03/22/2025     BMP:   Lab Results   Component Value Date    GLUCOSE 89 11/28/2015    CALCIUM 8.8 04/16/2025     11/28/2015    K 4.1 04/16/2025    CO2 32 04/16/2025     04/16/2025    BUN 17 04/16/2025    CREATININE 0.67 04/16/2025     CBC: No components found for: \"CBC\"    _____________________________________________________  PHYSICAL EXAMINATION:  Vital signs: Ht 5' 1\" (1.549 m)   LMP  (LMP Unknown)   BMI 36.66 kg/m²   General: No acute distress, awake and alert  Psychiatric: Mood and affect appear appropriate  HEENT: Trachea Midline, No torticollis, no apparent facial trauma  Cardiovascular: No audible murmurs; Extremities appear perfused  Pulmonary: No audible wheezing or stridor  Skin: No open lesions; see further details (if any) below    MUSCULOSKELETAL EXAMINATION:  Ortho Exam  Right knee:  Incision clean dry and intact  Staples well approximated; removed in office today  Incision cleaned, steri-strips applied    No erythema    moderate ecchymosis of leg  Appropriate swelling of lower limb  Appropriate warmth of knee  Extensor mechanism intact  Extension 0  Flexion 90  Calf compartments soft and supple  Sensation intact  Toes are warm sensate and mobile     ___________________________________________________  STUDIES REVIEWED:  I personally reviewed the images obtained in office today and my independent interpretation is as follows:    None performed      PROCEDURES PERFORMED:    None " stevie Reyes PA-C

## 2025-05-02 ENCOUNTER — OFFICE VISIT (OUTPATIENT)
Dept: PHYSICAL THERAPY | Facility: REHABILITATION | Age: 75
End: 2025-05-02
Payer: MEDICARE

## 2025-05-02 DIAGNOSIS — G89.29 CHRONIC PAIN OF RIGHT KNEE: ICD-10-CM

## 2025-05-02 DIAGNOSIS — M25.561 CHRONIC PAIN OF RIGHT KNEE: ICD-10-CM

## 2025-05-02 DIAGNOSIS — Z47.1 AFTERCARE FOLLOWING RIGHT KNEE JOINT REPLACEMENT SURGERY: Primary | ICD-10-CM

## 2025-05-02 DIAGNOSIS — Z96.651 AFTERCARE FOLLOWING RIGHT KNEE JOINT REPLACEMENT SURGERY: Primary | ICD-10-CM

## 2025-05-02 DIAGNOSIS — M17.11 PRIMARY OSTEOARTHRITIS OF RIGHT KNEE: ICD-10-CM

## 2025-05-02 PROCEDURE — 97110 THERAPEUTIC EXERCISES: CPT

## 2025-05-02 NOTE — PROGRESS NOTES
Daily Note     Today's date: 2025  Patient name: Lola Spangler  : 1950  MRN: [id number]  Referring provider: Erich Warren,*  Dx:   Encounter Diagnosis     ICD-10-CM    1. Aftercare following right knee joint replacement surgery  Z47.1     Z96.651       2. Primary osteoarthritis of right knee  M17.11       3. Chronic pain of right knee  M25.561     G89.29           Start Time: 1027  Stop Time: 1115  Total time in clinic (min): 48 minutes    Subjective: Patient states that her knee feels better since they removed the staples.       Objective: See treatment diary below      Assessment: Tolerated treatment well. Patient demonstrated fatigue post treatment, exhibited good technique with therapeutic exercises, and would benefit from continued PT      Plan: Continue per plan of care.        POC expires Unit limit Auth Expiration date PT/OT + Visit Limit?   6/15/25 (8) BOMN  BOMN         Visit/Unit Tracking  AUTH Status:  Date 2025 5/2     10 Used 1 2 3 4 5     Remaining             Pertinent Findings:      Test / Measure  2025   FOTO (Predicted 54) 25   R knee ROM decreased   R knee pain     R knee strength decreased     Access Code: [email or web address]  URL: [email or web address]  Date: 2025  Prepared by: Wanda Duque     Exercises  - Seated Ankle Pumps  - 1 x daily - 7 x weekly - 3 sets - 10 reps  - Heel Raises with Counter Support  - 1 x daily - 7 x weekly - 3 sets - 10 reps  - Supine Quad Set  - 1 x daily - 7 x weekly - 3 sets - 10 reps  - Supine Gluteal Sets  - 1 x daily - 7 x weekly - 3 sets - 10 reps  - Supine Bridge  - 1 x daily - 7 x weekly - 3 sets - 10 reps    Manuals        PROM knee ext   SW SW SW  SW with foam                                  RE  SW           Neuro Re-Ed             POC/HEP SW SW                                                                                         Ther Ex             High knees/butt  "kickers      1 laps inside, RW       AP HEP HEP           Quad sets HEP 2x15 X20   X10  X20  2X20  2x20        Glute sets HEP            Heel raises HEP            bridge HEP            Knee extension   Foam @ heel, ice pack (x1) 7' x2 Foam @ heel 7' x2  Foam @ heel 7' x2  Foam @ heel 5'  Foam @ heel 5' , with OP       Heel slides   3x10  3x10  3x10  3x10        SLR   X10  2x10  2x10  2x10        Step ups    4\" x15 4\" x20 4\" x20       SAQ/LAQ             NS    6'  8'  8'        Mini squat       2 foam 3x5        Ther Activity             TUG  Performed            5x STS  Perforemd            Gait Training                                       Modalities             Cold pack   7' x1 5' x1                                  "

## 2025-05-06 ENCOUNTER — OFFICE VISIT (OUTPATIENT)
Dept: PHYSICAL THERAPY | Facility: REHABILITATION | Age: 75
End: 2025-05-06
Payer: MEDICARE

## 2025-05-06 DIAGNOSIS — M25.561 CHRONIC PAIN OF RIGHT KNEE: ICD-10-CM

## 2025-05-06 DIAGNOSIS — Z47.1 AFTERCARE FOLLOWING RIGHT KNEE JOINT REPLACEMENT SURGERY: Primary | ICD-10-CM

## 2025-05-06 DIAGNOSIS — G89.29 CHRONIC PAIN OF RIGHT KNEE: ICD-10-CM

## 2025-05-06 DIAGNOSIS — Z96.651 AFTERCARE FOLLOWING RIGHT KNEE JOINT REPLACEMENT SURGERY: Primary | ICD-10-CM

## 2025-05-06 DIAGNOSIS — M17.11 PRIMARY OSTEOARTHRITIS OF RIGHT KNEE: ICD-10-CM

## 2025-05-06 PROCEDURE — 97110 THERAPEUTIC EXERCISES: CPT

## 2025-05-06 NOTE — PROGRESS NOTES
Daily Note     Today's date: 2025  Patient name: Lola Spangler  : 1950  MRN: [id number]  Referring provider: Erich Warren,*  Dx:   Encounter Diagnosis     ICD-10-CM    1. Aftercare following right knee joint replacement surgery  Z47.1     Z96.651       2. Chronic pain of right knee  M25.561     G89.29       3. Primary osteoarthritis of right knee  M17.11           Start Time: 1000  Stop Time: 1045  Total time in clinic (min): 45 minutes    Subjective: Patient states she has been compliant with HEP and her knee is doing good.       Objective: See treatment diary below      Assessment: Tolerated treatment well. Cuing used for proper form during standing TKE.  Patient demonstrated fatigue post treatment, exhibited good technique with therapeutic exercises, and would benefit from continued PT      Plan: Continue per plan of care.        POC expires Unit limit Auth Expiration date PT/OT + Visit Limit?   6/15/25 (8) BOMN  BOMN         Visit/Unit Tracking  AUTH Status:  Date 2025 5/2  5/6   10 Used 1 2 3 4 5 6    Remaining             Pertinent Findings:      Test / Measure  2025   FOTO (Predicted 54) 25 49   R knee ROM decreased    R knee pain      R knee strength decreased      Access Code: [email or web address]  URL: [email or web address]  Date: 2025  Prepared by: Wanda Duque     Exercises  - Seated Ankle Pumps  - 1 x daily - 7 x weekly - 3 sets - 10 reps  - Heel Raises with Counter Support  - 1 x daily - 7 x weekly - 3 sets - 10 reps  - Supine Quad Set  - 1 x daily - 7 x weekly - 3 sets - 10 reps  - Supine Gluteal Sets  - 1 x daily - 7 x weekly - 3 sets - 10 reps  - Supine Bridge  - 1 x daily - 7 x weekly - 3 sets - 10 reps    Manuals  5 5/6      PROM knee ext   SW SW SW  SW with foam  SW with foam                                 RE  SW           Neuro Re-Ed             POC/HEP SW SW                                                    "                                      Ther Ex             High knees/butt kickers      1 laps inside, RW       AP HEP HEP           Quad sets HEP 2x15 X20   X10  X20  2X20  2x20  HEP      Glute sets HEP            Heel raises HEP      X20       bridge HEP            Knee extension   Foam @ heel, ice pack (x1) 7' x2 Foam @ heel 7' x2  Foam @ heel 7' x2  Foam @ heel 5'  Foam @ heel 5' , with OP Foam @ heel 5' , with OP      Heel slides   3x10  3x10  3x10  3x10  x20      SLR   X10  2x10  2x10  2x10  2x10       Step ups    4\" x15 4\" x20 4\" x20 4\" x20      SAQ/LAQ       Red bolster 2x10       NS    6'  8'  8'  8'       TKE       X15 13#      Mini squat       2 foam 3x5  X15 2 foams x10 1 foam      Ther Activity             TUG  Performed            5x STS  Perforemd            Gait Training                                       Modalities             Cold pack   7' x1 5' x1                                    "

## 2025-05-09 ENCOUNTER — OFFICE VISIT (OUTPATIENT)
Dept: PHYSICAL THERAPY | Facility: REHABILITATION | Age: 75
End: 2025-05-09
Payer: MEDICARE

## 2025-05-09 DIAGNOSIS — G89.29 CHRONIC PAIN OF RIGHT KNEE: ICD-10-CM

## 2025-05-09 DIAGNOSIS — M17.11 PRIMARY OSTEOARTHRITIS OF RIGHT KNEE: ICD-10-CM

## 2025-05-09 DIAGNOSIS — Z47.1 AFTERCARE FOLLOWING RIGHT KNEE JOINT REPLACEMENT SURGERY: Primary | ICD-10-CM

## 2025-05-09 DIAGNOSIS — Z96.651 AFTERCARE FOLLOWING RIGHT KNEE JOINT REPLACEMENT SURGERY: Primary | ICD-10-CM

## 2025-05-09 DIAGNOSIS — M25.561 CHRONIC PAIN OF RIGHT KNEE: ICD-10-CM

## 2025-05-09 PROCEDURE — 97140 MANUAL THERAPY 1/> REGIONS: CPT

## 2025-05-09 PROCEDURE — 97110 THERAPEUTIC EXERCISES: CPT

## 2025-05-09 NOTE — PROGRESS NOTES
Daily Note     Today's date: 2025  Patient name: Lola Spangler  : 1950  MRN: [id number]  Referring provider: Erich Warren,*  Dx:   Encounter Diagnosis     ICD-10-CM    1. Aftercare following right knee joint replacement surgery  Z47.1     Z96.651       2. Chronic pain of right knee  M25.561     G89.29       3. Primary osteoarthritis of right knee  M17.11           Start Time: 0930  Stop Time: 1024  Total time in clinic (min): 54 minutes    Subjective: Presents to therapy noting no soreness following last session and no irritation this morning. States she would like to focus more on the stairs, as she has to go upstairs to get to her shower. Reports she does not rely on her walker as much, sometimes leaving it around the house, but she does not know when it is okay to transition to the cane.      Objective: See treatment diary below      Assessment: Patient tolerated today's session well, focusing on right knee extension range of motion and quad strengthening. Initial tactile cueing and demonstrations to review proper hip hinging technique with mini squats to reduce anterior knee translation and upper extremity support; good carryover demonstrated performing second set. May benefit from trial of 6'' step up next visit secondary to decreased challenge using 4''. Educated and reviewed proper modified two point gait pattern with single point cane. Patient acknowledged understanding and displayed good stability with progression to cane. Patient subjectively reported slightly increased knee stiffness with appropriate exercise fatigue upon leaving clinic. Patient would benefit from continued PT to improve function.       Plan: Continue per plan of care.        POC expires Unit limit Auth Expiration date PT/OT + Visit Limit?   6/15/25 (8) BOMN  BOMN         Visit/Unit Tracking  AUTH Status:  Date 2025 5/  5/6 5/   10 Used 1 2 3 4 5 6 7    Remaining              Pertinent  "Findings:      Test / Measure  4/17/2025 5/6   FOTO (Predicted 54) 85 03   R knee ROM decreased    R knee pain      R knee strength decreased      Access Code: [email or web address]  URL: [email or web address]  Date: 04/08/2025  Prepared by: Wanda Duque     Exercises  - Seated Ankle Pumps  - 1 x daily - 7 x weekly - 3 sets - 10 reps  - Heel Raises with Counter Support  - 1 x daily - 7 x weekly - 3 sets - 10 reps  - Supine Quad Set  - 1 x daily - 7 x weekly - 3 sets - 10 reps  - Supine Gluteal Sets  - 1 x daily - 7 x weekly - 3 sets - 10 reps  - Supine Bridge  - 1 x daily - 7 x weekly - 3 sets - 10 reps    Patient 1:1 treatment from 9:30am-10:15am  Manuals 4/8 4/17 4/21 4/24 4/28 5/1 5/6 5/9     PROM knee ext   SW SW SW  SW with foam  SW with foam  JS with foam                               RE  SW           Neuro Re-Ed             POC/HEP SW SW                                                                                         Ther Ex             High knees/butt kickers      1 laps inside, RW       AP HEP HEP           Quad sets HEP 2x15 X20   X10  X20  2X20  2x20  HEP      Glute sets HEP            Heel raises HEP      X20       bridge HEP            Knee extension   Foam @ heel, ice pack (x1) 7' x2 Foam @ heel 7' x2  Foam @ heel 7' x2  Foam @ heel 5'  Foam @ heel 5' , with OP Foam @ heel 5' , with OP Foam @ heel, 2# wt  3'     Heel slides   3x10  3x10  3x10  3x10  x20      SLR   X10  2x10  2x10  2x10  2x10  2x10     Step ups    4\" x15 4\" x20 4\" x20 4\" x20 4'' 2x10 6''    SAQ/LAQ       Red bolster 2x10       NS    6'  8'  8'  8'  8'     TKE       X15 13#      Mini squat       2 foam 3x5  X15 2 foams x10 1 foam 2x10  1 foam     Ther Activity             TUG  Performed            5x STS  Perforemd            Gait Training        SPC 3 laps around clinic                               Modalities             Cold pack   7' x1 5' x1                                      "

## 2025-05-13 ENCOUNTER — OFFICE VISIT (OUTPATIENT)
Dept: PHYSICAL THERAPY | Facility: REHABILITATION | Age: 75
End: 2025-05-13
Payer: MEDICARE

## 2025-05-13 DIAGNOSIS — Z96.651 AFTERCARE FOLLOWING RIGHT KNEE JOINT REPLACEMENT SURGERY: Primary | ICD-10-CM

## 2025-05-13 DIAGNOSIS — G89.29 CHRONIC PAIN OF RIGHT KNEE: ICD-10-CM

## 2025-05-13 DIAGNOSIS — Z47.1 AFTERCARE FOLLOWING RIGHT KNEE JOINT REPLACEMENT SURGERY: Primary | ICD-10-CM

## 2025-05-13 DIAGNOSIS — M25.561 CHRONIC PAIN OF RIGHT KNEE: ICD-10-CM

## 2025-05-13 DIAGNOSIS — M17.11 PRIMARY OSTEOARTHRITIS OF RIGHT KNEE: ICD-10-CM

## 2025-05-13 PROCEDURE — 97110 THERAPEUTIC EXERCISES: CPT

## 2025-05-13 PROCEDURE — 97112 NEUROMUSCULAR REEDUCATION: CPT

## 2025-05-13 NOTE — PROGRESS NOTES
Daily Note     Today's date: 2025  Patient name: Lola Spangler  : 1950  MRN: [id number]  Referring provider: Erich Warren,*  Dx:   Encounter Diagnosis     ICD-10-CM    1. Aftercare following right knee joint replacement surgery  Z47.1     Z96.651       2. Chronic pain of right knee  M25.561     G89.29       3. Primary osteoarthritis of right knee  M17.11           Start Time: 09  Stop Time: 1045  Total time in clinic (min): 49 minutes    Subjective: Patient states she was sore after LV, but walking on her cane felt good.       Objective: See treatment diary below      Assessment: Tolerated treatment well. Patient exhibited good technique with therapeutic exercises and would benefit from continued PT. Patient's SPC was properly fit. Supervision during balance for safety.       Plan: Continue per plan of care.        POC expires Unit limit Auth Expiration date PT/OT + Visit Limit?   6/15/25 (8) BOMN  BOMN         Visit/Unit Tracking  AUTH Status:  Date /   10 Used 8 2 3 4 5 6 7    Remaining              Pertinent Findings:      Test / Measure  2025   FOTO (Predicted 54) 25 49   R knee ROM decreased    R knee pain      R knee strength decreased      Access Code: [email or web address]  URL: [email or web address]  Date: 2025  Prepared by: Wanda Duque     Exercises  - Seated Ankle Pumps  - 1 x daily - 7 x weekly - 3 sets - 10 reps  - Heel Raises with Counter Support  - 1 x daily - 7 x weekly - 3 sets - 10 reps  - Supine Quad Set  - 1 x daily - 7 x weekly - 3 sets - 10 reps  - Supine Gluteal Sets  - 1 x daily - 7 x weekly - 3 sets - 10 reps  - Supine Bridge  - 1 x daily - 7 x weekly - 3 sets - 10 reps    Patient 1:1 treatment from 9:30am-10:15am  Manuals  51 5/6     PROM knee ext   SW SW SW  SW with foam  SW with foam  JS with foam SW with foam                               RE  SW           Neuro Re-Ed            "  POC/HEP SW SW           BAPs         1' each     Hurdles          SS 5 laps                                                         Ther Ex             High knees/butt kickers      1 laps inside, RW       AP HEP HEP           Quad sets HEP 2x15 X20   X10  X20  2X20  2x20  HEP      Glute sets HEP            Heel raises HEP      X20       bridge HEP            Knee extension   Foam @ heel, ice pack (x1) 7' x2 Foam @ heel 7' x2  Foam @ heel 7' x2  Foam @ heel 5'  Foam @ heel 5' , with OP Foam @ heel 5' , with OP Foam @ heel, 2# wt  3' Foam @ heel 5' , with OP    Heel slides   3x10  3x10  3x10  3x10  x20      SLR   X10  2x10  2x10  2x10  2x10  2x10 2x10    Step ups    4\" x15 4\" x20 4\" x20 4\" x20 4'' 2x10 6'' x20     Sitting marching          2x10     SAQ/LAQ       Red bolster 2x10   LAQ 2x10     NS    6'  8'  8'  8'  8' 8'     TKE       X15 13#      Mini squat       2 foam 3x5  X15 2 foams x10 1 foam 2x10  1 foam HHA 2x10     Ther Activity             TUG  Performed            5x STS  Perforemd            Gait Training        SPC 3 laps around clinic                               Modalities             Cold pack   7' x1 5' x1                                        " 36.4

## 2025-05-16 ENCOUNTER — OFFICE VISIT (OUTPATIENT)
Dept: PHYSICAL THERAPY | Facility: REHABILITATION | Age: 75
End: 2025-05-16
Payer: MEDICARE

## 2025-05-16 DIAGNOSIS — G89.29 CHRONIC PAIN OF RIGHT KNEE: ICD-10-CM

## 2025-05-16 DIAGNOSIS — Z47.1 AFTERCARE FOLLOWING RIGHT KNEE JOINT REPLACEMENT SURGERY: Primary | ICD-10-CM

## 2025-05-16 DIAGNOSIS — Z96.651 AFTERCARE FOLLOWING RIGHT KNEE JOINT REPLACEMENT SURGERY: Primary | ICD-10-CM

## 2025-05-16 DIAGNOSIS — M17.11 PRIMARY OSTEOARTHRITIS OF RIGHT KNEE: ICD-10-CM

## 2025-05-16 DIAGNOSIS — M25.561 CHRONIC PAIN OF RIGHT KNEE: ICD-10-CM

## 2025-05-16 PROCEDURE — 97110 THERAPEUTIC EXERCISES: CPT

## 2025-05-16 PROCEDURE — 97112 NEUROMUSCULAR REEDUCATION: CPT

## 2025-05-16 NOTE — PROGRESS NOTES
Daily Note     Today's date: 2025  Patient name: Lola Spangler  : 1950  MRN: [id number]  Referring provider: Erich Warren,*  Dx:   Encounter Diagnosis     ICD-10-CM    1. Aftercare following right knee joint replacement surgery  Z47.1     Z96.651       2. Chronic pain of right knee  M25.561     G89.29       3. Primary osteoarthritis of right knee  M17.11           Start Time: 959  Stop Time: 104  Total time in clinic (min): 45 minutes    Subjective: Patient states her knee is feeling a little sore today. Pt states she has some soreness after last visit.       Objective: See treatment diary below      Assessment: Tolerated treatment well. Patient exhibited good technique with therapeutic exercises and would benefit from continued PT. Patient seen 1:1 with PTs for entirety of session.         Plan: Continue per plan of care.  Progress note during next visit.        POC expires Unit limit Auth Expiration date PT/OT + Visit Limit?   6/15/25 (8) BOMN  BOMN         Visit/Unit Tracking  AUTH Status:  Date    10 Used 8 9 3 4 5 6 7    Remaining              Pertinent Findings:      Test / Measure  2025   FOTO (Predicted 54) 25 49   R knee ROM decreased    R knee pain      R knee strength decreased      Access Code: [email or web address]  URL: [email or web address]  Date: 2025  Prepared by: Wanda Duque     Exercises  - Seated Ankle Pumps  - 1 x daily - 7 x weekly - 3 sets - 10 reps  - Heel Raises with Counter Support  - 1 x daily - 7 x weekly - 3 sets - 10 reps  - Supine Quad Set  - 1 x daily - 7 x weekly - 3 sets - 10 reps  - Supine Gluteal Sets  - 1 x daily - 7 x weekly - 3 sets - 10 reps  - Supine Bridge  - 1 x daily - 7 x weekly - 3 sets - 10 reps      Manuals  56 59    PROM knee ext   SW SW SW  SW with foam  SW with foam  JS with foam SW with foam  SW with foam 3'                             RE  SW          "  Neuro Re-Ed             POC/HEP SW SW           BAPs         1' each  1' each    Hurdles          SS 5 laps  SS 5 laps                                                        Ther Ex             High knees/butt kickers      1 laps inside, RW       AP HEP HEP           Quad sets HEP 2x15 X20   X10  X20  2X20  2x20  HEP      Glute sets HEP            Heel raises HEP      X20       bridge HEP            Knee extension   Foam @ heel, ice pack (x1) 7' x2 Foam @ heel 7' x2  Foam @ heel 7' x2  Foam @ heel 5'  Foam @ heel 5' , with OP Foam @ heel 5' , with OP Foam @ heel, 2# wt  3' Foam @ heel 5' , with OP Foam @ heel 5' , with OP   Heel slides   3x10  3x10  3x10  3x10  x20      SLR   X10  2x10  2x10  2x10  2x10  2x10 2x10 2x10 2#   Step ups    4\" x15 4\" x20 4\" x20 4\" x20 4'' 2x10 6'' x20  6\" x10  Step down 6\" x10    Sitting marching          2x10  2x10    SAQ/LAQ       Red bolster 2x10   LAQ 2x10  2# 2x10    NS    6'  8'  8'  8'  8' 8'  8'    TKE       X15 13#      Mini squat       2 foam 3x5  X15 2 foams x10 1 foam 2x10  1 foam HHA 2x10  HHA 2x10    Ther Activity             TUG  Performed            5x STS  Perforemd            Gait Training        SPC 3 laps around clinic                               Modalities             Cold pack   7' x1 5' x1                                          "

## 2025-05-20 ENCOUNTER — OFFICE VISIT (OUTPATIENT)
Dept: PHYSICAL THERAPY | Facility: REHABILITATION | Age: 75
End: 2025-05-20
Payer: MEDICARE

## 2025-05-20 DIAGNOSIS — M17.11 PRIMARY OSTEOARTHRITIS OF RIGHT KNEE: ICD-10-CM

## 2025-05-20 DIAGNOSIS — Z96.651 AFTERCARE FOLLOWING RIGHT KNEE JOINT REPLACEMENT SURGERY: Primary | ICD-10-CM

## 2025-05-20 DIAGNOSIS — Z47.1 AFTERCARE FOLLOWING RIGHT KNEE JOINT REPLACEMENT SURGERY: Primary | ICD-10-CM

## 2025-05-20 DIAGNOSIS — G89.29 CHRONIC PAIN OF RIGHT KNEE: ICD-10-CM

## 2025-05-20 DIAGNOSIS — M25.561 CHRONIC PAIN OF RIGHT KNEE: ICD-10-CM

## 2025-05-20 PROCEDURE — 97112 NEUROMUSCULAR REEDUCATION: CPT

## 2025-05-20 PROCEDURE — 97110 THERAPEUTIC EXERCISES: CPT

## 2025-05-20 NOTE — PROGRESS NOTES
Daily Note     Today's date: 2025  Patient name: Lola Spangler  : 1950  MRN: [id number]  Referring provider: Erich Warren,*  Dx:   Encounter Diagnosis     ICD-10-CM    1. Aftercare following right knee joint replacement surgery  Z47.1     Z96.651       2. Chronic pain of right knee  M25.561     G89.29       3. Primary osteoarthritis of right knee  M17.11           Start Time: 1003  Stop Time: 1048  Total time in clinic (min): 45 minutes    Subjective: Patient notes that her knee continues to feel better.       Objective: See treatment diary below      Assessment: Tolerated treatment well. Patient exhibited good technique with therapeutic exercises and would benefit from continued PT. Continues to need cueing for proper weight distribution.       Plan: Progress note during next visit.        POC expires Unit limit Auth Expiration date PT/OT + Visit Limit?   6/15/25 (8) BOMN  BOMN         Visit/Unit Tracking  AUTH Status:  Date    10 Used 8 9 10 4 5 6 7    Remaining              Pertinent Findings:      Test / Measure  2025   FOTO (Predicted 54) 25 49   R knee ROM decreased    R knee pain      R knee strength decreased      Access Code: [email or web address]  URL: [email or web address]  Date: 2025  Prepared by: Wanda Duque     Exercises  - Seated Ankle Pumps  - 1 x daily - 7 x weekly - 3 sets - 10 reps  - Heel Raises with Counter Support  - 1 x daily - 7 x weekly - 3 sets - 10 reps  - Supine Quad Set  - 1 x daily - 7 x weekly - 3 sets - 10 reps  - Supine Gluteal Sets  - 1 x daily - 7 x weekly - 3 sets - 10 reps  - Supine Bridge  - 1 x daily - 7 x weekly - 3 sets - 10 reps      Manuals    PROM knee ext     SW  SW with foam  SW with foam  JS with foam SW with foam  SW with foam 3'                             RE             Neuro Re-Ed             POC/HEP SW            BAPs 1' ea        1' each  1' each   "  Hurdles          SS 5 laps  SS 5 laps    Bi[employer]x  5'                                                   Ther Ex             High knees/butt kickers      1 laps inside, RW       AAROM knee flexion  10x 5\"             Quad sets    X20  2X20  2x20  HEP      Glute sets             Heel raises       X20       bridge             Knee extension  Foam @ heel 5' , with OP   Foam @ heel 7' x2  Foam @ heel 5'  Foam @ heel 5' , with OP Foam @ heel 5' , with OP Foam @ heel, 2# wt  3' Foam @ heel 5' , with OP Foam @ heel 5' , with OP   Heel slides X20    3x10  3x10  3x10  x20      SLR 2x10 2#   2x10  2x10  2x10  2x10  2x10 2x10 2x10 2#   Step ups 4\" RLE lead x20    4\" x15 4\" x20 4\" x20 4\" x20 4'' 2x10 6'' x20  6\" x10  Step down 6\" x10    Sitting marching          2x10  2x10    SAQ/LAQ 2x10 2# LAQ      Red bolster 2x10   LAQ 2x10  2# 2x10    NS 8'    6'  8'  8'  8'  8' 8'  8'    TKE       X15 13#      Mini squat  HHA 2x10      2 foam 3x5  X15 2 foams x10 1 foam 2x10  1 foam HHA 2x10  HHA 2x10    Ther Activity             TUG             5x STS             Gait Training        SPC 3 laps around clinic                               Modalities             Cold pack                                                "

## 2025-05-22 ENCOUNTER — OFFICE VISIT (OUTPATIENT)
Dept: CARDIOLOGY CLINIC | Facility: CLINIC | Age: 75
End: 2025-05-22
Payer: MEDICARE

## 2025-05-22 VITALS
HEIGHT: 61 IN | OXYGEN SATURATION: 98 % | HEART RATE: 100 BPM | SYSTOLIC BLOOD PRESSURE: 100 MMHG | WEIGHT: 196.3 LBS | DIASTOLIC BLOOD PRESSURE: 60 MMHG | BODY MASS INDEX: 37.06 KG/M2

## 2025-05-22 DIAGNOSIS — E78.2 MIXED HYPERLIPIDEMIA: ICD-10-CM

## 2025-05-22 DIAGNOSIS — R07.2 PRECORDIAL PAIN: Primary | ICD-10-CM

## 2025-05-22 DIAGNOSIS — Z82.49 FAMILY HISTORY OF HEART DISEASE: ICD-10-CM

## 2025-05-22 PROCEDURE — 99214 OFFICE O/P EST MOD 30 MIN: CPT | Performed by: INTERNAL MEDICINE

## 2025-05-22 NOTE — PROGRESS NOTES
[facility]    Name:Lola Spangler   DOS: 5/22/2025     Chief Complaint:   Chief Complaint   Patient presents with    Follow-up     3 month ov. NM Stress: 2/12 and Echo: 2/18.        HISTORY OF PRESENT ILLNESS:    HPI:  Lola Spangler is a 74 y.o. female. She  has a past medical history of Abnormal ECG, Ankylosing spondylitis (HCC), Anxiety, Arthritis, Back pain, Carpal tunnel syndrome, Colon polyp, CPAP (continuous positive airway pressure) dependence, Depression, Encounter for screening mammogram for breast cancer (11/28/2023), Encounter for screening mammogram for malignant neoplasm of breast (05/21/2019), Fibromyalgia, Fibromyalgia, primary, Heme positive stool, High cholesterol, Hyperlipidemia, Impingement syndrome of left shoulder, Impingement syndrome of right shoulder, Long term use of drug, Low back pain, Myocardial infarction (HCC), No known health problems, Obesity, RLS (restless legs syndrome), Rotator cuff injury, Sleep apnea, Spinal stenosis, Spinal stenosis, Spondylitis (HCC), Spondyloarthritis, and Vitamin D deficiency.    She presents for follow-up evaluation.  She last saw me in the office on 2/3/2025.  At that time, she had presented for evaluation of chest discomfort at the recommendation of her PCP Dr. Montesinos.  She had described:  a dull pressure in the center of her chest without clear radiation. This is waxing and waning, occurring intermittently. Symptoms are self-limiting with no clear aggravating or relieving factor. She reports associated nausea with these episodes. Episodes typically occur at rest with no clear relation to exertion although can sometimes occur while she is exerting herself. She otherwise denies shortness of breath, diaphoresis, dizziness, orthopnea, edema. She has had no syncope. Does report occasional intermittent palpitations/fluttering, rare.     Based upon her presenting symptoms, I had referred her for cardiac stress testing.  This was done by  nuclear PET myocardial perfusion study.  The study was completed on 2/12/2025, personally reviewed by me in the office today.  The study demonstrates homogenous radiotracer uptake in all visualized myocardial segments with no evidence of ischemia or infarction.     Today, she reports no new cardiopulmonary complaints.  She tells me that she has had no recurrent chest pain since her stress test.  She also denies shortness of breath, diaphoresis, dizziness, palpitations, orthopnea, edema.  She has had no syncopal episodes.     ROS    ROS: Pertinent positives and negatives as described in History of Present Illness. Remainder of a 14 point review of systems was negative.     Allergies[1]     Medications Ordered Prior to Encounter[2]    Past Medical History[3]    Past Surgical History[4]    Family History[5]    Social History     Socioeconomic History    Marital status: /Civil Union     Spouse name: Not on file    Number of children: 3    Years of education: Not on file    Highest education level: Not on file   Occupational History    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     Comment: EMPLOYED   Tobacco Use    Smoking status: Never    Smokeless tobacco: Never   Vaping Use    Vaping status: Never Used   Substance and Sexual Activity    Alcohol use: Yes     Comment: An occasional glass of wine    Drug use: No    Sexual activity: Yes     Partners: Male     Birth control/protection: Post-menopausal, Female Sterilization   Other Topics Concern    Not on file   Social History Narrative    CAFFEINE USE    DOES NOT EXERCISE     Social Drivers of Health     Financial Resource Strain: Low Risk  (1/3/2024)    Overall Financial Resource Strain (CARDIA)     Difficulty of Paying Living Expenses: Not hard at all   Food Insecurity: No Food Insecurity (4/14/2025)    Nursing - Inadequate Food Risk Classification     Worried About Running Out of Food in the Last Year: Never true     Ran Out of Food in the Last Year: Never true     Ran Out of Food in  "the Last Year: Never true   Transportation Needs: No Transportation Needs (2025)    Nursing - Transportation Risk Classification     Lack of Transportation: Not on file     Lack of Transportation: No   Physical Activity: Not on file   Stress: Not on file   Social Connections: Not on file   Intimate Partner Violence: Unknown (2025)    Nursing IPS     Feels Physically and Emotionally Safe: Not on file     Physically Hurt by Someone: Not on file     Humiliated or Emotionally Abused by Someone: Not on file     Physically Hurt by Someone: No     Hurt or Threatened by Someone: No   Housing Stability: Unknown (2025)    Nursing: Inadequate Housing Risk Classification     Has Housing: Not on file     Worried About Losing Housing: Not on file     Unable to Get Utilities: Not on file     Unable to Pay for Housing in the Last Year: No     Has Housin       OBJECTIVE:    /60 (BP Location: Left arm, Patient Position: Sitting, Cuff Size: Large)   Pulse 100   Ht 5' 1\" (1.549 m) Comment: verbal  Wt 89 kg (196 lb 4.8 oz)   LMP  (LMP Unknown)   SpO2 98%   BMI 37.09 kg/m²      BP Readings from Last 3 Encounters:   25 100/60   25 116/62   25 112/52       Wt Readings from Last 3 Encounters:   25 89 kg (196 lb 4.8 oz)   25 88 kg (194 lb)   25 88.1 kg (194 lb 3.2 oz)         Physical Exam  Vitals reviewed.   Constitutional:       General: She is not in acute distress.     Appearance: Normal appearance. She is not diaphoretic.   HENT:      Head: Normocephalic and atraumatic.     Eyes:      Conjunctiva/sclera: Conjunctivae normal.     Neck:      Vascular: No JVD.     Cardiovascular:      Rate and Rhythm: Normal rate and regular rhythm.      Pulses: Normal pulses.      Heart sounds: Normal heart sounds. No murmur heard.     No friction rub. No gallop.   Pulmonary:      Effort: Pulmonary effort is normal.      Breath sounds: Normal breath sounds. No wheezing, rhonchi or rales. "   Abdominal:      General: Abdomen is flat. Bowel sounds are normal.     Musculoskeletal:      Right lower leg: No edema.      Left lower leg: No edema.     Skin:     General: Skin is warm and dry.     Neurological:      General: No focal deficit present.      Mental Status: She is alert and oriented to person, place, and time.     Psychiatric:         Mood and Affect: Mood normal.         Behavior: Behavior normal.                                                       Cardiac testing:       Nuclear PET Stress Test 2/12/25  Normal study  No perfusion defects seen.  Myocardial flow reserve is normal (see table below). MFR:   2.8  No relative reduction in myocardial flow reserve in any coronary artery vascular territory.   Normal resting EF with normal increase in EF with stress. Rest EF: 64% ; Stress EF: 78%  No ischemic ECG changes noted with stress. Sensitivity is limited due to pharmacological protocol. No arrhythmia noted.   No significant coronary calcification seen on cardiac CT (note that calcium score was not performed).  Please see separate radiology report for extracardiac CT findings.      ECHO 2/18/25  Left Ventricle Left ventricular cavity size is normal. Wall thickness is normal. The left ventricular ejection fraction is 65%. Systolic function is normal. Wall motion is normal. Diastolic function is mildly abnormal, consistent with grade I (abnormal) relaxation.   Right Ventricle Right ventricular cavity size is normal. Systolic function is normal.   Left Atrium The atrium is normal in size.   Right Atrium The atrium is normal in size.   Aortic Valve The aortic valve is trileaflet. The leaflets are mildly thickened. There is no evidence of regurgitation. The aortic valve has no significant stenosis.   Mitral Valve There is mild thickening.  There is trace regurgitation. There is no evidence of stenosis.   Tricuspid Valve Tricuspid valve structure is normal. There is mild regurgitation. There is no  evidence of stenosis. The right ventricular systolic pressure is normal. The estimated right ventricular systolic pressure is 33.00 mmHg.   Pulmonic Valve Pulmonic valve structure is normal. There is mild regurgitation. There is no evidence of stenosis.   Ascending Aorta The aortic root is normal in size. The ascending aorta is normal in size.   IVC/SVC The right atrial pressure is estimated at 3.0 mmHg. The inferior vena cava is normal in size. Respirophasic changes were normal.        Myocardial Blood Flow Results:       Peak Flow (ml/gm/min) Rest Flow (ml/gm/min) Flow Reserve    LAD 2.7. 1 2.7   LCX 2.7 1 2.7   RCA 3.3 1.1 3.1   Global 2.8 1 2.8              LABS:  Lab Results   Component Value Date    GLUCOSE 89 11/28/2015    BUN 17 04/16/2025    CREATININE 0.67 04/16/2025    CALCIUM 8.8 04/16/2025     11/28/2015    K 4.1 04/16/2025    CO2 32 04/16/2025     04/16/2025    ALKPHOS 33 (L) 04/16/2025    BILITOT 0.50 11/28/2015    PROT 6.7 11/28/2015    AST 19 04/16/2025    ALT 13 04/16/2025    ANIONGAP 6 11/28/2015        Lab Results   Component Value Date    WBC 7.66 04/16/2025    HGB 9.1 (L) 04/16/2025    HCT 28.4 (L) 04/16/2025    MCV 93 04/16/2025     04/16/2025       Lab Results   Component Value Date    CHOL 176 11/28/2015    HDL 50 01/15/2025    LDLCALC 132 (H) 01/15/2025    TRIG 121 01/15/2025       Lab Results   Component Value Date    HGBA1C 6.0 (H) 03/22/2025         ASSESSMENT/PLAN:  Diagnoses and all orders for this visit:    Precordial pain    Mixed hyperlipidemia    Family history of heart disease    74-year-old female presenting for follow-up evaluation.  Doing well overall with no current active cardiopulmonary complaints.  She reports complete resolution of the previously reported chest pain that she had initially seen me for.    We reviewed the results of her cardiac stress test which demonstrated no evidence of ischemia or infarction.    I advised the patient that stress tests  "are generally useful in detecting significant flow-limiting coronary disease, however they are not reliably able to detect non-flow limiting disease.  As such, we reviewed the pathophysiology of arterial plaques and spontaneous plaque rupture that can result in a myocardial infarction. The patient is aware that a negative stress test does not exclude the possibility of them having an MI.    I have encouraged her to continue watching what she eats, follow a heart healthy diet.  Once her knee has healed, she will begin physical activity at the gym.  At that point, I recommend repeating her lipids.    1 year follow-up interval.          Madhav Wilder MD [facility]      Portions of the record may have been created with voice recognition software. Occasional wrong word or \"sound alike\" substitutions may have occurred due to the inherent limitations of voice recognition software. Please review the chart carefully and recognize, using context, where substitutions/typographical errors may have occurred.          [1] No Known Allergies  [2]   Current Outpatient Medications on File Prior to Visit   Medication Sig Dispense Refill    acetaminophen (TYLENOL) 500 mg tablet Take 2 tablets (1,000 mg total) by mouth every 6 (six) hours as needed for mild pain 90 tablet 0    aspirin 81 MG tablet Take 1 tablet by mouth in the morning.      cephalexin (KEFLEX) 500 mg capsule       Cholecalciferol (VITAMIN D3) 50 MCG (2000 UT) capsule       gabapentin (NEURONTIN) 100 mg capsule Take 2 capsules (200 mg total) by mouth 3 (three) times a day 180 capsule 5    gabapentin (Neurontin) 600 MG tablet Take 1 tablet (600 mg total) by mouth daily at bedtime 90 tablet 3    oxyCODONE (Roxicodone) 5 immediate release tablet Take 1 tablet (5 mg total) by mouth every 6 (six) hours as needed for moderate pain Max Daily Amount: 20 mg 30 tablet 0    PARoxetine (PAXIL) 20 mg tablet TAKE 1 TABLET BY MOUTH EVERY DAY 90 tablet 2    rOPINIRole (REQUIP) 0.25 mg " tablet TAKE 1 TABLET (0.25 MG TOTAL) BY MOUTH DAILY AT BEDTIME FOR MANAGEMENT OF RESTLESS LEG SYNDROME 90 tablet 1    tiZANidine (ZANAFLEX) 2 mg tablet Take 1 tablet (2 mg total) by mouth every 8 (eight) hours as needed for muscle spasms 60 tablet 0    enoxaparin (LOVENOX) 40 mg/0.4 mL Inject 0.4 mL (40 mg total) under the skin daily in the early morning for 28 doses 11.2 mL 0     No current facility-administered medications on file prior to visit.   [3]   Past Medical History:  Diagnosis Date    Abnormal ECG     Ankylosing spondylitis (HCC)     Anxiety     LAST ASSESSED: 16    Arthritis     Back pain     Carpal tunnel syndrome     Colon polyp     7 years ago with colonoscopy    CPAP (continuous positive airway pressure) dependence     Depression     Encounter for screening mammogram for breast cancer 2023    Encounter for screening mammogram for malignant neoplasm of breast 2019    Fibromyalgia     LAST ASSESSED: 16    Fibromyalgia, primary     Heme positive stool     LAST ASSESSED: 10/22/16    High cholesterol     Hyperlipidemia     under control since weight loss    Impingement syndrome of left shoulder     LAST ASSESSED: 17    Impingement syndrome of right shoulder     LAST ASSESSED:     Long term use of drug     LAST ASSESSED: 16    Low back pain     Myocardial infarction (HCC)     silent    No known health problems     NO PERTINENT PAST MEDICAL HX    Obesity     RLS (restless legs syndrome)     Rotator cuff injury     right    Sleep apnea     Spinal stenosis     Spinal stenosis     Spondylitis (HCC)     Spondyloarthritis     RESOLVED: 16    Vitamin D deficiency    [4]   Past Surgical History:  Procedure Laterality Date    BACK SURGERY      CARPAL TUNNEL RELEASE Left     CERVICAL CONIZATION   W/ LASER      CERVICAL CONIZATION     SECTION      COLONOSCOPY      DILATION AND CURETTAGE OF UTERUS      FL INJECTION RIGHT HIP (NON ARTHROGRAM)  2019    FL  INJECTION RIGHT HIP (NON ARTHROGRAM)  07/24/2019    FRACTURE SURGERY      HAND SURGERY      HIP SURGERY      JOINT REPLACEMENT  2020    RTHR    ORTHOPEDIC SURGERY      HI ARTHRODESIS POSTERIOR/PSTLAT TQ 1NTRSPC LUMBAR N/A 04/03/2017    Procedure: L2-L5 OPEN DECOMPRESSIVE LUMBAR LAMINECTOMY W/ PEDICLE SCREW AND NILDA FIXATION FUSION (IMPULSE); REMOVAL OF SKIN TAG MID BACK;  Surgeon: Catracho Fields MD;  [facility];  Service: Neurosurgery    HI ARTHRP ACETBLR/PROX FEM PROSTC AGRFT/ALGRFT Right 11/07/2019    Procedure: ARTHROPLASTY HIP TOTAL Posterior;  Surgeon: Erich Warren MD;  [facility];  Service: Orthopedics    HI ARTHRP KNE CONDYLE&PLATU MEDIAL&LAT COMPARTMENTS Right 4/14/2025    Procedure: Robotically assisted right total knee arthroplasty, all associated procedures as indicated;  Surgeon: Erich Warren MD;  Location: [facility];  Service: Orthopedics    HI COLONOSCOPY FLX DX W/COLLJ SPEC WHEN PFRMD N/A 10/07/2016    Procedure: EGD AND COLONOSCOPY;  Surgeon: Nathan Pierson MD;  Location: [facility];  Service: Gastroenterology    HI NDSC WRST SURG W/RLS TRANSVRS CARPL LIGM Left 04/26/2018    Procedure: RELEASE CARPAL TUNNEL ENDOSCOPIC;  Surgeon: Bon Ferrer MD;  Location[facility];  Service: Orthopedics    HI NDSC WRST SURG W/RLS TRANSVRS CARPL LIGM Right 06/14/2018    Procedure: RELEASE CARPAL TUNNEL ENDOSCOPIC;  Surgeon: Bon Ferrer MD;  [facility];  Service: Orthopedics    HI RPR AA HERNIA 1ST < 3 CM REDUCIBLE N/A 07/12/2024    Procedure: REPAIR HERNIA UMBILICAL;  Surgeon: Lazaro Moser MD;  [facility];  Service: General    HI RPR AA HERNIA 1ST < 3 CM REDUCIBLE N/A 07/12/2024    Procedure: REPAIR HERNIA VENTRAL;  Surgeon: Lazaro Moser MD;  [facility];  Service: General    RETINAL LASER PROCEDURE      SPINE SURGERY      TUBAL LIGATION      UMBILICAL HERNIA REPAIR  08/01/2024   [5]   Family History  Problem Relation  Name Age of Onset    Arthritis Mother Olive     Breast cancer Mother Olive 72    Hypertension Mother Olive     Heart attack Mother Olive         MYOCARDIAL INFARCTION    Heart disease Mother Olive     Heart attack Father Pelon     Hypertension Father Pelon     Stroke Father Pelon         CEREBROVASCUALR ACCIDENT (CVA)    Heart disease Father Pelon     Arthritis Sister Megan     Uterine cancer Sister Megan     Endometrial cancer Sister Megan 60    Hypertension Sister Megan     Cancer Sister Megan         Cervical and uterine    Heart attack Brother Koby     Prostate cancer Brother Koby 62    Arthritis Brother Koby     Melanoma Brother Koby     Cancer Brother Koby         Melanoma    Heart disease Brother Koby     Arthritis Family      Hyperlipidemia Family      Depression Son      Breast cancer Maternal Aunt Tonya 40    No Known Problems Daughter      No Known Problems Maternal Grandmother      No Known Problems Maternal Grandfather      No Known Problems Paternal Grandmother      No Known Problems Paternal Grandfather      No Known Problems Brother      Other Brother          hunting accident (shot)    Melanoma Brother      Prostate cancer Brother Magnus     Heart attack Brother Magnus     Stroke Brother Magnus     Hypertension Brother Magnus     Arthritis Sister Silva     Heart attack Sister Silva     No Known Problems Son      No Known Problems Son      Lung cancer Maternal Uncle      Breast cancer additional onset Other niece 52    Uterine cancer Other niece

## 2025-05-22 NOTE — PATIENT INSTRUCTIONS
You were seen today in the Cardiology office for follow up evaluation.     Below is a summary of the recommendations based upon today's visit:    1. Dietary modifications - Follow a Mediterranean diet - one that is low in saturated fats (avoid red meat, dairy, cheeses), emphasize chicken, fish, turkey, tofu, vegetables, fruit (moderate quantities); also avoid simple carbs/starch/sugars, use whole wheat/grain/bran/oatmeal, snack on small quantities of nuts and fruits.     2. Exercise is very important. Ideally, AT LEAST four to five times weekly for 30 to 60 minutes per session - both cardiovascular and resistance work is OK. Listen to your body and rest when needed.    3. Continue all present medications.    4. Testing prior to your next visit includes: None    5. Remember the ABCDEs to cardiovascular health for patients:  A: Awareness of cardiovascular symptoms  B: Blood pressure control  C: Cholesterol control  C: Cigarette avoidance  D: Diabetes control  D: Dietary choices  E: Exercise    6. Return in 12 months     Any testing ordered in office today will be available for patient review via [email or web address], and reviewed with me at the follow-up office visit as scheduled.    Thank you for choosing [facility].    Please call our office or use [email or web address] with any questions.

## 2025-05-23 ENCOUNTER — EVALUATION (OUTPATIENT)
Dept: PHYSICAL THERAPY | Facility: REHABILITATION | Age: 75
End: 2025-05-23
Payer: MEDICARE

## 2025-05-23 DIAGNOSIS — G89.29 CHRONIC PAIN OF RIGHT KNEE: ICD-10-CM

## 2025-05-23 DIAGNOSIS — M25.561 CHRONIC PAIN OF RIGHT KNEE: ICD-10-CM

## 2025-05-23 DIAGNOSIS — Z47.1 AFTERCARE FOLLOWING RIGHT KNEE JOINT REPLACEMENT SURGERY: Primary | ICD-10-CM

## 2025-05-23 DIAGNOSIS — Z96.651 AFTERCARE FOLLOWING RIGHT KNEE JOINT REPLACEMENT SURGERY: Primary | ICD-10-CM

## 2025-05-23 DIAGNOSIS — M17.11 PRIMARY OSTEOARTHRITIS OF RIGHT KNEE: ICD-10-CM

## 2025-05-23 PROCEDURE — 97112 NEUROMUSCULAR REEDUCATION: CPT

## 2025-05-23 PROCEDURE — 97164 PT RE-EVAL EST PLAN CARE: CPT

## 2025-05-23 PROCEDURE — 97110 THERAPEUTIC EXERCISES: CPT

## 2025-05-23 NOTE — PROGRESS NOTES
Daily Note/Re-eval      Today's date: 2025  Patient name: Lola Spangler  : 1950  MRN: [id number]  Referring provider: Erich Warren,*  Dx:   Encounter Diagnosis     ICD-10-CM    1. Aftercare following right knee joint replacement surgery  Z47.1     Z96.651       2. Chronic pain of right knee  M25.561     G89.29       3. Primary osteoarthritis of right knee  M17.11           Start Time: 1000  Stop Time: 1055  Total time in clinic (min): 55 minutes    Subjective: Since starting therapy, patient reports feeling 85% better. Continues to feel pain during end range flexion and extension. Has noticed improvement with ambulation, stairs, and shopping/walking longer distances.    Pain at night 2 1/2 /10  Pain during day 1 /10      Objective: See treatment diary below        Lower Extremity ROM  ROM Right/Left   Knee flex 110/125   Knee ext 5/0      Lower Extremity Strength  MMT Right   Hip flexion  4   Hip extension  NT    Hip Abduction NT   Knee Flexion 4   Knee Extension 4+   Ankle Dorsiflexion 4   Ankle Plantarflexion  NT         Dynamic Balance Tests  5x sit to stand (sec)  >14 sec indicates high risk for falls 4/8= 13 seconds  = 24 seconds, BUE used  =11 no UE   TUG (sec)  >14 sec indicates high risk for falls 4/8= 16 seconds, SPC  = 44 seconds, RW  = 14, ND        Assessment: Tolerated treatment and re-eval well. Patients strength and dynamic balance progressions shown above. Patient exhibited good technique with therapeutic exercises and would benefit from continued PT to address personal and therapeutic goals to increase functional mobility, improve quality of life, decrease fall risk and return to PLOF.     STG 5-6 weeks  Patient's FOTO will increase to 35 to improve functional mobility MET  Patient reports feeling 25% better since starting therapy to improve QOL MET  Patient's TUG score will improve to 25 seconds to decrease fall risk MET  Patient's 5x STS score will improve  to 17 seconds to decrease fall risk MET  Patient's R knee AROM will improve 10-15 degrees to improve functional mobility MET  Patient will report being compliant with HEP to improve prognosis. MET     LTG: 10-12 weeks  Patient's FOTO will increase to predicted value or more to improve functional mobility IN PROGRESS  Patient reports feeling 50% better since starting therapy to improve QOL MET  Patient will be able to perform ADLs and recreational activities with no more than a 5/10 pain to improve QOL. MET  Patient's R knee flexion AROM will improve by 25-30 degrees to improve functional mobility  MET  Patient will report being compliant with an advanced HEP to improve prognosis.  IN PROGRESS       Plan: Continue per plan of care.  1-2x a week for 6 weeks       POC expires Unit limit Auth Expiration date PT/OT + Visit Limit?   6/15/25 (8) BOMN  BOMN         Visit/Unit Tracking  AUTH Status:  Date 5/12 5/16 5/20 5/23 5/2 5/6 5/9   10 Used 8 9 10 11 5 6 7    Remaining     RE         Pertinent Findings:      Test / Measure  4/17/2025 5/6   FOTO (Predicted 54) 25 49   R knee ROM decreased    R knee pain      R knee strength decreased      Access Code: [email or web address]  URL: [email or web address]  Date: 04/08/2025  Prepared by: Wanda Duque     Exercises  - Seated Ankle Pumps  - 1 x daily - 7 x weekly - 3 sets - 10 reps  - Heel Raises with Counter Support  - 1 x daily - 7 x weekly - 3 sets - 10 reps  - Supine Quad Set  - 1 x daily - 7 x weekly - 3 sets - 10 reps  - Supine Gluteal Sets  - 1 x daily - 7 x weekly - 3 sets - 10 reps  - Supine Bridge  - 1 x daily - 7 x weekly - 3 sets - 10 reps      Manuals 5/20 5/22 4/24 4/28 5/1 5/6 5/9 5/12 5/16   PROM knee ext     SW  SW with foam  SW with foam  JS with foam SW with foam  SW with foam 3'                             RE  SW           Neuro Re-Ed             POC/HEP SW            BAPs 1' ea 1' each        1' each  1' each    Hurdles   4 laps HHA       SS 5 laps   "SS 5 laps    Biodex  5' 5'                                                  Ther Ex             High knees/butt kickers      1 laps inside, RW       AAROM knee flexion  10x 5\"  10x 5\"            Quad sets    X20  2X20  2x20  HEP      Glute sets             Heel raises       X20       bridge             Knee extension  Foam @ heel 5' , with OP Prone 5' 2#  Foam @ heel 7' x2  Foam @ heel 5'  Foam @ heel 5' , with OP Foam @ heel 5' , with OP Foam @ heel, 2# wt  3' Foam @ heel 5' , with OP Foam @ heel 5' , with OP   Heel slides X20    3x10  3x10  3x10  x20      SLR 2x10 2# 2x10 2#  2x10  2x10  2x10  2x10  2x10 2x10 2x10 2#   Step ups 4\" RLE lead x20  4\" RLE lead x20   4\" x15 4\" x20 4\" x20 4\" x20 4'' 2x10 6'' x20  6\" x10  Step down 6\" x10    Sitting marching   2x10        2x10  2x10    SAQ/LAQ 2x10 2# LAQ 2x10 2# LAQ     Red bolster 2x10   LAQ 2x10  2# 2x10    NS 8'  7'  6'  8'  8'  8'  8' 8'  8'    TKE       X15 13#      Mini squat  HHA 2x10  HHA x20     2 foam 3x5  X15 2 foams x10 1 foam 2x10  1 foam HHA 2x10  HHA 2x10    Ther Activity             TUG             5x STS             Gait Training        SPC 3 laps around clinic                               Modalities             Cold pack                                                  "

## 2025-05-27 ENCOUNTER — OFFICE VISIT (OUTPATIENT)
Dept: PHYSICAL THERAPY | Facility: REHABILITATION | Age: 75
End: 2025-05-27
Payer: MEDICARE

## 2025-05-27 DIAGNOSIS — Z96.651 AFTERCARE FOLLOWING RIGHT KNEE JOINT REPLACEMENT SURGERY: Primary | ICD-10-CM

## 2025-05-27 DIAGNOSIS — Z47.1 AFTERCARE FOLLOWING RIGHT KNEE JOINT REPLACEMENT SURGERY: Primary | ICD-10-CM

## 2025-05-27 DIAGNOSIS — M17.11 PRIMARY OSTEOARTHRITIS OF RIGHT KNEE: ICD-10-CM

## 2025-05-27 DIAGNOSIS — G89.29 CHRONIC PAIN OF RIGHT KNEE: ICD-10-CM

## 2025-05-27 DIAGNOSIS — M25.561 CHRONIC PAIN OF RIGHT KNEE: ICD-10-CM

## 2025-05-27 PROCEDURE — 97110 THERAPEUTIC EXERCISES: CPT

## 2025-05-27 PROCEDURE — 97112 NEUROMUSCULAR REEDUCATION: CPT

## 2025-05-27 NOTE — PROGRESS NOTES
Daily Note     Today's date: 2025  Patient name: Lola Spangler  : 1950  MRN: [id number]  Referring provider: Erich Warren,*  Dx:   Encounter Diagnosis     ICD-10-CM    1. Aftercare following right knee joint replacement surgery  Z47.1     Z96.651       2. Chronic pain of right knee  M25.561     G89.29       3. Primary osteoarthritis of right knee  M17.11           Start Time: 856  Stop Time: 947  Total time in clinic (min): 51 minutes    Subjective: Patient states that her knee is doing well. States she has been using stairs more and more at home.       Objective: See treatment diary below      Assessment: Tolerated treatment well. Patient exhibited good technique with therapeutic exercises and would benefit from continued PT. Progressions made to balance exercises. Encouraged to continue with mobility exercises at home.       Plan: Continue per plan of care.        POC expires Unit limit Auth Expiration date PT/OT + Visit Limit?   6/15/25 (8) BOMN  BOMN         Visit/Unit Tracking  AUTH Status:  Date    10 Used 8 9 10 11 12 6 7    Remaining     RE         Pertinent Findings:      Test / Measure  2025   FOTO (Predicted 54) 25 49   R knee ROM decreased    R knee pain      R knee strength decreased      Access Code: [email or web address]  URL: [email or web address]  Date: 2025  Prepared by: Wanda Duque     Exercises  - Seated Ankle Pumps  - 1 x daily - 7 x weekly - 3 sets - 10 reps  - Heel Raises with Counter Support  - 1 x daily - 7 x weekly - 3 sets - 10 reps  - Supine Quad Set  - 1 x daily - 7 x weekly - 3 sets - 10 reps  - Supine Gluteal Sets  - 1 x daily - 7 x weekly - 3 sets - 10 reps  - Supine Bridge  - 1 x daily - 7 x weekly - 3 sets - 10 reps      Manuals    PROM knee ext       SW with foam  SW with foam  JS with foam SW with foam  SW with foam 3'                             RE  SW          "  Neuro Re-Ed             POC/HEP SW            BAPs 1' ea 1' each  1' each       1' each  1' each    Hurdles   4 laps HHA 4 laps HHA    4 laps SS       SS 5 laps  SS 5 laps    Biodex  5' 5' 5'           Airex    X10 abd/ext                                    Ther Ex             High knees/butt kickers      1 laps inside, RW       AAROM knee flexion  10x 5\"  10x 5\"  HEP          Quad sets      2x20  HEP      Glute sets             Prone ham curls    X10     X20       bridge   3x5           Knee extension  Foam @ heel 5' , with OP Prone 5' 2# 3# 3' prone   Foam @ heel 5' , with OP Foam @ heel 5' , with OP Foam @ heel, 2# wt  3' Foam @ heel 5' , with OP Foam @ heel 5' , with OP   SL hip abd             SLR 2x10 2# 2x10 2# 2x10 3#    2x10  2x10  2x10 2x10 2x10 2#   Step ups 4\" RLE lead x20  4\" RLE lead x20  4\" RLE lead x20   LLE lead x20   4\" x20 4\" x20 4'' 2x10 6'' x20  6\" x10  Step down 6\" x10    Sitting marching   2x10  2x10       2x10  2x10    SAQ/LAQ 2x10 2# LAQ 2x10 2# LAQ 2x10 3# LAQ    Red bolster 2x10   LAQ 2x10  2# 2x10    NS 8'  7' 7' lvl 7    8'  8'  8' 8'  8'    TKE       X15 13#      Mini squat  HHA 2x10  HHA x20  HHA x15    2 foam 3x5  X15 2 foams x10 1 foam 2x10  1 foam HHA 2x10  HHA 2x10    Ther Activity             TUG             5x STS             Gait Training        SPC 3 laps around clinic                               Modalities             Cold pack                                                    "

## 2025-05-28 ENCOUNTER — OFFICE VISIT (OUTPATIENT)
Dept: OBGYN CLINIC | Facility: HOSPITAL | Age: 75
End: 2025-05-28

## 2025-05-28 VITALS — WEIGHT: 197 LBS | HEIGHT: 61 IN | BODY MASS INDEX: 37.19 KG/M2

## 2025-05-28 DIAGNOSIS — Z96.651 AFTERCARE FOLLOWING RIGHT KNEE JOINT REPLACEMENT SURGERY: Primary | ICD-10-CM

## 2025-05-28 DIAGNOSIS — Z47.1 AFTERCARE FOLLOWING RIGHT KNEE JOINT REPLACEMENT SURGERY: Primary | ICD-10-CM

## 2025-05-28 PROCEDURE — 99024 POSTOP FOLLOW-UP VISIT: CPT | Performed by: ORTHOPAEDIC SURGERY

## 2025-05-28 RX ORDER — SENNOSIDES 8.6 MG
CAPSULE ORAL
COMMUNITY

## 2025-05-28 NOTE — PROGRESS NOTES
"Encounter Provider: Erich Warren MD   Encounter Date: 05/28/25  Encounter department: [facility] [place]         ASSESSMENT & PLAN:  Assessment & Plan  Aftercare following right knee joint replacement surgery  6 weeks s/p right TKA with robot, 4/14/2025.    She is doing well.    She should continue physical therapy.    She should follow up in 6 weeks              To do next visit:  Return in about 6 weeks (around 7/9/2025) for re-check with x-rays.    Scribe Attestation      I,:  Vimal Wills MA am acting as a scribe while in the presence of the attending physician.:       I,:  Erich Warren MD personally performed the services described in this documentation    as scribed in my presence.:             ____________________________________________________  CHIEF COMPLAINT:  Chief Complaint   Patient presents with    Right Knee - Post-op         SUBJECTIVE:  Lola Spangler is a 74 y.o. female who presents 6 weeks s/p right TKA with robot, 4/14/2025.  She is doing well.  Today she complains of mild right knee soreness.  She continues with physical therapy with benefit.  She does use Tylneol 2x/day with benefit.  She does use SPC.  He denies fever, chills or shortness of breath.          PAST MEDICAL HISTORY:  Past Medical History[1]    PAST SURGICAL HISTORY:  Past Surgical History[2]    FAMILY HISTORY:  Family History[3]    SOCIAL HISTORY:  Social History[4]    MEDICATIONS:  Current Medications[5]    ALLERGIES:  Allergies[6]    LABS:  HgA1c:   Lab Results   Component Value Date    HGBA1C 6.0 (H) 03/22/2025     BMP:   Lab Results   Component Value Date    GLUCOSE 89 11/28/2015    CALCIUM 8.8 04/16/2025     11/28/2015    K 4.1 04/16/2025    CO2 32 04/16/2025     04/16/2025    BUN 17 04/16/2025    CREATININE 0.67 04/16/2025     CBC: No components found for: \"CBC\"    _____________________________________________________  PHYSICAL EXAMINATION:  Vital signs: Ht 5' " "1\" (1.549 m)   Wt 89.4 kg (197 lb)   LMP  (LMP Unknown)   BMI 37.22 kg/m²   General: No acute distress, awake and alert  Psychiatric: Mood and affect appear appropriate  HEENT: Trachea Midline, No torticollis, no apparent facial trauma  Cardiovascular: No audible murmurs; Extremities appear perfused  Pulmonary: No audible wheezing or stridor  Skin: No open lesions; see further details (if any) below    Ortho Exam:  Right knee:  Well healed anterior incision  No erythema and no ecchymosis  Appropriate swelling of lower limb  Appropriate warmth of knee  Extensor mechanism intact  Extension full  Flexion 120  Calf compartments soft and supple  Sensation intact  Toes are warm sensate and mobile          _____________________________________________________  STUDIES REVIEWED:    None performed     PROCEDURES PERFORMED:  Procedures      None Preformed       Portions of the record may have been created with voice recognition software.  Occasional wrong word or \"sound a like\" substitutions may have occurred due to the inherent limitations of voice recognition software.  Read the chart carefully and recognize, using context, where substitutions have occurred.             [1]   Past Medical History:  Diagnosis Date    Abnormal ECG     Ankylosing spondylitis (HCC)     Anxiety     LAST ASSESSED: 12/20/16    Arthritis     Back pain     Carpal tunnel syndrome     Colon polyp     7 years ago with colonoscopy    CPAP (continuous positive airway pressure) dependence     Depression     Encounter for screening mammogram for breast cancer 11/28/2023    Encounter for screening mammogram for malignant neoplasm of breast 05/21/2019    Fibromyalgia     LAST ASSESSED: 12/20/16    Fibromyalgia, primary     Heme positive stool     LAST ASSESSED: 10/22/16    High cholesterol     Hyperlipidemia     under control since weight loss    Impingement syndrome of left shoulder     LAST ASSESSED: 2/21/17    Impingement syndrome of right shoulder     " LAST ASSESSED:     Long term use of drug     LAST ASSESSED: 16    Low back pain     Myocardial infarction (HCC)     silent    No known health problems     NO PERTINENT PAST MEDICAL HX    Obesity     RLS (restless legs syndrome)     Rotator cuff injury     right    Sleep apnea     Spinal stenosis     Spinal stenosis     Spondylitis (HCC)     Spondyloarthritis     RESOLVED: 16    Vitamin D deficiency    [2]   Past Surgical History:  Procedure Laterality Date    BACK SURGERY      CARPAL TUNNEL RELEASE Left     CERVICAL CONIZATION   W/ LASER      CERVICAL CONIZATION     SECTION      COLONOSCOPY      DILATION AND CURETTAGE OF UTERUS      FL INJECTION RIGHT HIP (NON ARTHROGRAM)  2019    FL INJECTION RIGHT HIP (NON ARTHROGRAM)  2019    FRACTURE SURGERY      HAND SURGERY      HIP SURGERY      JOINT REPLACEMENT      RTHR    ORTHOPEDIC SURGERY      KY ARTHRODESIS POSTERIOR/PSTLAT TQ 1NTRSPC LUMBAR N/A 2017    Procedure: L2-L5 OPEN DECOMPRESSIVE LUMBAR LAMINECTOMY W/ PEDICLE SCREW AND NILDA FIXATION FUSION (IMPULSE); REMOVAL OF SKIN TAG MID BACK;  Surgeon: Catracho Fields MD;  [facility];  Service: Neurosurgery    KY ARTHRP ACETBLR/PROX FEM PROSTC AGRFT/ALGRFT Right 2019    Procedure: ARTHROPLASTY HIP TOTAL Posterior;  Surgeon: Erich Warren MD;  [facility];  Service: Orthopedics    KY ARTHRP KNE CONDYLE&PLATU MEDIAL&LAT COMPARTMENTS Right 2025    Procedure: Robotically assisted right total knee arthroplasty, all associated procedures as indicated;  Surgeon: Erich Warren MD;  Location: [facility];  Service: Orthopedics    KY COLONOSCOPY FLX DX W/COLLJ SPEC WHEN PFRMD N/A 10/07/2016    Procedure: EGD AND COLONOSCOPY;  Surgeon: Nathan Pierson MD;  Location: [facility];  Service: Gastroenterology    KY NDSC WRST SURG W/RLS TRANSVRS CARPL LIGM Left 2018    Procedure: RELEASE CARPAL TUNNEL ENDOSCOPIC;  Surgeon: Bon Ferrer MD;   Location: [facility];  Service: Orthopedics    MA NDSC WRST SURG W/RLS TRANSVRS CARPL LIGM Right 06/14/2018    Procedure: RELEASE CARPAL TUNNEL ENDOSCOPIC;  Surgeon: Bon Ferrer MD;  [facility];  Service: Orthopedics    MA RPR AA HERNIA 1ST < 3 CM REDUCIBLE N/A 07/12/2024    Procedure: REPAIR HERNIA UMBILICAL;  Surgeon: Lazaro Moser MD;  [facility];  Service: General    MA RPR AA HERNIA 1ST < 3 CM REDUCIBLE N/A 07/12/2024    Procedure: REPAIR HERNIA VENTRAL;  Surgeon: Lazaro Moser MD;  [facility];  Service: General    RETINAL LASER PROCEDURE      SPINE SURGERY      TUBAL LIGATION      UMBILICAL HERNIA REPAIR  08/01/2024   [3]   Family History  Problem Relation Name Age of Onset    Arthritis Mother Olive     Breast cancer Mother Olive 72    Hypertension Mother Olive     Heart attack Mother Olive         MYOCARDIAL INFARCTION    Heart disease Mother Olive     Heart attack Father Pelon     Hypertension Father Pelon     Stroke Father Pelon         CEREBROVASCUALR ACCIDENT (CVA)    Heart disease Father Pelon     Arthritis Sister Megan     Uterine cancer Sister Megan     Endometrial cancer Sister Megan 60    Hypertension Sister Megan     Cancer Sister Megan         Cervical and uterine    Heart attack Brother Koby     Prostate cancer Brother Koby 62    Arthritis Brother Koby     Melanoma Brother Koby     Cancer Brother Koby         Melanoma    Heart disease Brother Koby     Arthritis Family      Hyperlipidemia Family      Depression Son      Breast cancer Maternal Aunt Tonya 40    No Known Problems Daughter      No Known Problems Maternal Grandmother      No Known Problems Maternal Grandfather      No Known Problems Paternal Grandmother      No Known Problems Paternal Grandfather      No Known Problems Brother      Other Brother          hunting accident (shot)    Melanoma Brother      Prostate cancer Brother Magnus     Heart attack Brother  Magnus     Stroke Brother Magnus     Hypertension Brother Magnus     Arthritis Sister Silva     Heart attack Sister Silva     No Known Problems Son      No Known Problems Son      Lung cancer Maternal Uncle      Breast cancer additional onset Other niece 52    Uterine cancer Other niece    [4]   Social History  Tobacco Use    Smoking status: Never    Smokeless tobacco: Never   Vaping Use    Vaping status: Never Used   Substance Use Topics    Alcohol use: Yes     Comment: An occasional glass of wine    Drug use: No   [5]   Current Outpatient Medications:     acetaminophen (TYLENOL) 650 mg CR tablet, , Disp: , Rfl:     aspirin 81 MG tablet, Take 1 tablet by mouth in the morning., Disp: , Rfl:     cephalexin (KEFLEX) 500 mg capsule, , Disp: , Rfl:     Cholecalciferol (VITAMIN D3) 50 MCG (2000 UT) capsule, , Disp: , Rfl:     gabapentin (NEURONTIN) 100 mg capsule, Take 2 capsules (200 mg total) by mouth 3 (three) times a day, Disp: 180 capsule, Rfl: 5    gabapentin (Neurontin) 600 MG tablet, Take 1 tablet (600 mg total) by mouth daily at bedtime, Disp: 90 tablet, Rfl: 3    oxyCODONE (Roxicodone) 5 immediate release tablet, Take 1 tablet (5 mg total) by mouth every 6 (six) hours as needed for moderate pain Max Daily Amount: 20 mg, Disp: 30 tablet, Rfl: 0    PARoxetine (PAXIL) 20 mg tablet, TAKE 1 TABLET BY MOUTH EVERY DAY, Disp: 90 tablet, Rfl: 2    rOPINIRole (REQUIP) 0.25 mg tablet, TAKE 1 TABLET (0.25 MG TOTAL) BY MOUTH DAILY AT BEDTIME FOR MANAGEMENT OF RESTLESS LEG SYNDROME, Disp: 90 tablet, Rfl: 1    tiZANidine (ZANAFLEX) 2 mg tablet, Take 1 tablet (2 mg total) by mouth every 8 (eight) hours as needed for muscle spasms, Disp: 60 tablet, Rfl: 0  [6] No Known Allergies

## 2025-05-29 ENCOUNTER — OFFICE VISIT (OUTPATIENT)
Dept: PHYSICAL THERAPY | Facility: REHABILITATION | Age: 75
End: 2025-05-29
Payer: MEDICARE

## 2025-05-29 DIAGNOSIS — M17.11 PRIMARY OSTEOARTHRITIS OF RIGHT KNEE: ICD-10-CM

## 2025-05-29 DIAGNOSIS — M25.561 CHRONIC PAIN OF RIGHT KNEE: ICD-10-CM

## 2025-05-29 DIAGNOSIS — G89.29 CHRONIC PAIN OF RIGHT KNEE: ICD-10-CM

## 2025-05-29 DIAGNOSIS — Z47.1 AFTERCARE FOLLOWING RIGHT KNEE JOINT REPLACEMENT SURGERY: Primary | ICD-10-CM

## 2025-05-29 DIAGNOSIS — Z96.651 AFTERCARE FOLLOWING RIGHT KNEE JOINT REPLACEMENT SURGERY: Primary | ICD-10-CM

## 2025-05-29 PROCEDURE — 97112 NEUROMUSCULAR REEDUCATION: CPT

## 2025-05-29 PROCEDURE — 97110 THERAPEUTIC EXERCISES: CPT

## 2025-05-29 NOTE — PROGRESS NOTES
Daily Note     Today's date: 2025  Patient name: Lola Spangler  : 1950  MRN: [id number]  Referring provider: Erich Warren,*  Dx:   Encounter Diagnosis     ICD-10-CM    1. Aftercare following right knee joint replacement surgery  Z47.1     Z96.651       2. Chronic pain of right knee  M25.561     G89.29       3. Primary osteoarthritis of right knee  M17.11           Start Time: 1015  Stop Time: 1100  Total time in clinic (min): 45 minutes    Subjective: Patient states her appt with the surgeon went well and that she is on the right track.       Objective: See treatment diary below      Assessment: Tolerated treatment well. Patient exhibited good technique with therapeutic exercises and would benefit from continued PT. Continues to progress exercises as tolerated to increase proprioception and gross strength.       Plan: Continue per plan of care.  Progress treatment as tolerated.         POC expires Unit limit Auth Expiration date PT/OT + Visit Limit?   6/15/25 (8) BOMN  BOMN         Visit/Unit Tracking  AUTH Status:  Date    10 Used 8 9 10 11 12 13 7    Remaining     RE         Pertinent Findings:      Test / Measure  2025   FOTO (Predicted 54) 25 49   R knee ROM decreased    R knee pain      R knee strength decreased      Access Code: [email or web address]  URL: [email or web address]  Date: 2025  Prepared by: Wanda Duque     Exercises  - Seated Ankle Pumps  - 1 x daily - 7 x weekly - 3 sets - 10 reps  - Heel Raises with Counter Support  - 1 x daily - 7 x weekly - 3 sets - 10 reps  - Supine Quad Set  - 1 x daily - 7 x weekly - 3 sets - 10 reps  - Supine Gluteal Sets  - 1 x daily - 7 x weekly - 3 sets - 10 reps  - Supine Bridge  - 1 x daily - 7 x weekly - 3 sets - 10 reps      Manuals    PROM knee ext       SW with foam  SW with foam  JS with foam SW with foam  SW with foam 3'                         "     RE  SW           Neuro Re-Ed             POC/HEP SW            BAPs 1' ea 1' each  1' each  1' each      1' each  1' each    Hurdles   4 laps HHA 4 laps HHA    4 laps SS       SS 5 laps  SS 5 laps    Biodex  5' 5' 5'  6'           Airex    X10 abd/ext                                    Ther Ex             High knees/butt kickers      1 laps inside, RW       AAROM knee flexion  10x 5\"  10x 5\"  HEP 10x 5\"         Quad sets      2x20  HEP      Glute sets             Prone ham curls    X10  x15   X20       bridge   3x5           Knee extension  Foam @ heel 5' , with OP Prone 5' 2# 3# 3' prone 3# 3' prone    3# supine foam at heel 5'  Foam @ heel 5' , with OP Foam @ heel 5' , with OP Foam @ heel, 2# wt  3' Foam @ heel 5' , with OP Foam @ heel 5' , with OP   SL hip abd             SLR 2x10 2# 2x10 2# 2x10 3#    2x10  2x10  2x10 2x10 2x10 2#   Step ups 4\" RLE lead x20  4\" RLE lead x20  4\" RLE lead x20   LLE lead x20   4\" x20 4\" x20 4'' 2x10 6'' x20  6\" x10  Step down 6\" x10    Sitting marching   2x10  2x10  2x10 3#     2x10  2x10    SAQ/LAQ 2x10 2# LAQ 2x10 2# LAQ 2x10 3# LAQ 2x10 3# LAQ   Red bolster 2x10   LAQ 2x10  2# 2x10    NS 8'  7' 7' lvl 7  7' lvl 7   8'  8'  8' 8'  8'    TKE       X15 13#      Mini squat  HHA 2x10  HHA x20  HHA x15  HHA x15   2 foam 3x5  X15 2 foams x10 1 foam 2x10  1 foam HHA 2x10  HHA 2x10    Ther Activity             TUG             5x STS             Gait Training        SPC 3 laps around clinic                               Modalities             Cold pack                                                      "

## 2025-06-02 ENCOUNTER — APPOINTMENT (OUTPATIENT)
Dept: PHYSICAL THERAPY | Facility: REHABILITATION | Age: 75
End: 2025-06-02
Payer: MEDICARE

## 2025-06-03 ENCOUNTER — OFFICE VISIT (OUTPATIENT)
Dept: PHYSICAL THERAPY | Facility: REHABILITATION | Age: 75
End: 2025-06-03
Payer: MEDICARE

## 2025-06-03 DIAGNOSIS — Z96.651 AFTERCARE FOLLOWING RIGHT KNEE JOINT REPLACEMENT SURGERY: Primary | ICD-10-CM

## 2025-06-03 DIAGNOSIS — M25.561 CHRONIC PAIN OF RIGHT KNEE: ICD-10-CM

## 2025-06-03 DIAGNOSIS — G89.29 CHRONIC PAIN OF RIGHT KNEE: ICD-10-CM

## 2025-06-03 DIAGNOSIS — M17.11 PRIMARY OSTEOARTHRITIS OF RIGHT KNEE: ICD-10-CM

## 2025-06-03 DIAGNOSIS — Z47.1 AFTERCARE FOLLOWING RIGHT KNEE JOINT REPLACEMENT SURGERY: Primary | ICD-10-CM

## 2025-06-03 PROCEDURE — 97110 THERAPEUTIC EXERCISES: CPT

## 2025-06-03 PROCEDURE — 97112 NEUROMUSCULAR REEDUCATION: CPT

## 2025-06-03 NOTE — PROGRESS NOTES
"Daily Note     Today's date: 6/3/2025  Patient name: Lola Spangler  : 1950  MRN: [id number]  Referring provider: Erich Warren,*  Dx:   Encounter Diagnosis     ICD-10-CM    1. Aftercare following right knee joint replacement surgery  Z47.1     Z96.651       2. Chronic pain of right knee  M25.561     G89.29       3. Primary osteoarthritis of right knee  M17.11           Start Time: 1016  Stop Time: 1105  Total time in clinic (min): 49 minutes    Subjective: Patient states that her knee is a little sore today as she had a busy weekend.       Objective: See treatment diary below      Assessment: Tolerated treatment well. Patient exhibited good technique with therapeutic exercises and would benefit from continued PT. Patient ascended 6\" step with less reliance on railing. VC and verbal encouragement used during step ups.       Plan: Continue per plan of care.        POC expires Unit limit Auth Expiration date PT/OT + Visit Limit?   6/15/25 (8) BOMN  BOMN         Visit/Unit Tracking  AUTH Status:  Date 5/12 5/16 5/20 5/23 5/27 5/29  6/3   10 Used 8 9 10 11 12 13 14    Remaining     RE         Pertinent Findings:      Test / Measure  2025   FOTO (Predicted 54) 25 49   R knee ROM decreased    R knee pain      R knee strength decreased      Access Code: [email or web address]  URL: [email or web address]  Date: 2025  Prepared by: Wanda Duque     Exercises  - Seated Ankle Pumps  - 1 x daily - 7 x weekly - 3 sets - 10 reps  - Heel Raises with Counter Support  - 1 x daily - 7 x weekly - 3 sets - 10 reps  - Supine Quad Set  - 1 x daily - 7 x weekly - 3 sets - 10 reps  - Supine Gluteal Sets  - 1 x daily - 7 x weekly - 3 sets - 10 reps  - Supine Bridge  - 1 x daily - 7 x weekly - 3 sets - 10 reps      Manuals 5/20 5/22 5/27  5/29 6/3 5/1 5/6 5/9 5/12 5/16   PROM knee ext       SW with foam  SW with foam  JS with foam SW with foam  SW with foam 3'                             RE  SW         " "  Neuro Re-Ed             POC/HEP SW            BAPs 1' ea 1' each  1' each  1' each  1' each     1' each  1' each    Hurdles   4 laps HHA 4 laps HHA    4 laps SS       SS 5 laps  SS 5 laps    Biodex  5' 5' 5'  6'   6'         Airex    X10 abd/ext  X10 abd/ext                                  Ther Ex             High knees/butt kickers      1 laps inside, RW       AAROM knee flexion  10x 5\"  10x 5\"  HEP 10x 5\" 10x 5\" with LAQ        Quad sets      2x20  HEP      Glute sets                          Prone ham curls    X10  x15   X20       bridge   3x5           Knee extension  Foam @ heel 5' , with OP Prone 5' 2# 3# 3' prone 3# 3' prone    3# supine foam at heel 5'   3# supine foam at heel 5' Foam @ heel 5' , with OP Foam @ heel 5' , with OP Foam @ heel, 2# wt  3' Foam @ heel 5' , with OP Foam @ heel 5' , with OP   SL hip abd             SLR 2x10 2# 2x10 2# 2x10 3#   2x10 3# 2x10  2x10  2x10 2x10 2x10 2#   Step ups 4\" RLE lead x20  4\" RLE lead x20  4\" RLE lead x20   LLE lead x20  6\" RLE step up x20  4\" x20 4\" x20 4'' 2x10 6'' x20  6\" x10  Step down 6\" x10    Sitting marching   2x10  2x10  2x10 3#     2x10  2x10    SAQ/LAQ 2x10 2# LAQ 2x10 2# LAQ 2x10 3# LAQ 2x10 3# LAQ 2x10 4# LAQ  Red bolster 2x10   LAQ 2x10  2# 2x10    NS 8'  7' 7' lvl 7  7' lvl 7  7' lvl 7  8'  8'  8' 8'  8'    TKE       X15 13#      Mini squat  HHA 2x10  HHA x20  HHA x15  HHA x15  HHA x15  2 foam 3x5  X15 2 foams x10 1 foam 2x10  1 foam HHA 2x10  HHA 2x10    Ther Activity             TUG             5x STS             Gait Training        SPC 3 laps around clinic                               Modalities             Cold pack                                                        "

## 2025-06-05 ENCOUNTER — APPOINTMENT (OUTPATIENT)
Dept: PHYSICAL THERAPY | Facility: REHABILITATION | Age: 75
End: 2025-06-05
Payer: MEDICARE

## 2025-06-06 ENCOUNTER — OFFICE VISIT (OUTPATIENT)
Dept: PHYSICAL THERAPY | Facility: REHABILITATION | Age: 75
End: 2025-06-06
Payer: MEDICARE

## 2025-06-06 DIAGNOSIS — G89.29 CHRONIC PAIN OF RIGHT KNEE: ICD-10-CM

## 2025-06-06 DIAGNOSIS — M17.11 PRIMARY OSTEOARTHRITIS OF RIGHT KNEE: ICD-10-CM

## 2025-06-06 DIAGNOSIS — M25.561 CHRONIC PAIN OF RIGHT KNEE: ICD-10-CM

## 2025-06-06 DIAGNOSIS — Z96.651 AFTERCARE FOLLOWING RIGHT KNEE JOINT REPLACEMENT SURGERY: Primary | ICD-10-CM

## 2025-06-06 DIAGNOSIS — Z47.1 AFTERCARE FOLLOWING RIGHT KNEE JOINT REPLACEMENT SURGERY: Primary | ICD-10-CM

## 2025-06-06 PROCEDURE — 97112 NEUROMUSCULAR REEDUCATION: CPT

## 2025-06-06 PROCEDURE — 97110 THERAPEUTIC EXERCISES: CPT

## 2025-06-06 NOTE — PROGRESS NOTES
Daily Note     Today's date: 2025  Patient name: Lola Spangler  : 1950  MRN: [id number]  Referring provider: Erich Warren,*  Dx:   Encounter Diagnosis     ICD-10-CM    1. Aftercare following right knee joint replacement surgery  Z47.1     Z96.651       2. Chronic pain of right knee  M25.561     G89.29       3. Primary osteoarthritis of right knee  M17.11           Start Time: 1015  Stop Time: 1100  Total time in clinic (min): 45 minutes    Subjective: Patient states that her knee is doing well.       Objective: See treatment diary below      Assessment: Tolerated treatment well. Patient demonstrated fatigue post treatment, exhibited good technique with therapeutic exercises, and would benefit from continued PT. Continues to progress well. New SPC fitted and adjusted.       Plan: Continue per plan of care.        POC expires Unit limit Auth Expiration date PT/OT + Visit Limit?   6/15/25 (8) BOMN  BOMN         Visit/Unit Tracking  AUTH Status:  Date 6/6 5/16 5/20 5/23 5/27 5/29  6/3   10 Used 15 9 10 11 12 13 14    Remaining     RE         Pertinent Findings:      Test / Measure  2025   FOTO (Predicted 54) 25 49   R knee ROM decreased    R knee pain      R knee strength decreased      Access Code: [email or web address]  URL: [email or web address]  Date: 2025  Prepared by: Wanda Dquue     Exercises  - Seated Ankle Pumps  - 1 x daily - 7 x weekly - 3 sets - 10 reps  - Heel Raises with Counter Support  - 1 x daily - 7 x weekly - 3 sets - 10 reps  - Supine Quad Set  - 1 x daily - 7 x weekly - 3 sets - 10 reps  - Supine Gluteal Sets  - 1 x daily - 7 x weekly - 3 sets - 10 reps  - Supine Bridge  - 1 x daily - 7 x weekly - 3 sets - 10 reps      Manuals 5/20 5/22 5/27  5/29 6/3 6/6   5/12 5/16   PROM knee ext          SW with foam  SW with foam 3'                             RE  SW           Neuro Re-Ed             POC/HEP SW            BAPs 1' ea 1' each  1' each  1' each  1' each   "1' each    1' each  1' each    Hurdles   4 laps HHA 4 laps HHA    4 laps SS    4 laps HHA  4 laps SS HHA    SS 5 laps  SS 5 laps    Biodex  5' 5' 5'  6'   6'  6'       Airex    X10 abd/ext  X10 abd/ext X10 abd/ext                                 Ther Ex             High knees/butt kickers             AAROM knee flexion  10x 5\"  10x 5\"  HEP 10x 5\" 10x 5\" with LAQ 20x 5\" with LAQ       Quad sets             Glute sets                          Prone ham curls    X10  x15         bridge   3x5    3x5        Knee extension  Foam @ heel 5' , with OP Prone 5' 2# 3# 3' prone 3# 3' prone    3# supine foam at heel 5'   3# supine foam at heel 5'   3# supine foam at heel 3'   Foam @ heel 5' , with OP Foam @ heel 5' , with OP   SL hip abd             SLR 2x10 2# 2x10 2# 2x10 3#   2x10 3# 3# 2x10    2x10 2x10 2#   Step ups 4\" RLE lead x20  4\" RLE lead x20  4\" RLE lead x20   LLE lead x20  6\" RLE step up x20  6\" RLE step up x10    Descend x8 mix R/L lead   6'' x20  6\" x10  Step down 6\" x10    Sitting marching   2x10  2x10  2x10 3#     2x10  2x10    SAQ/LAQ 2x10 2# LAQ 2x10 2# LAQ 2x10 3# LAQ 2x10 3# LAQ 2x10 4# LAQ 2x10 4# LAQ   LAQ 2x10  2# 2x10    NS 8'  7' 7' lvl 7  7' lvl 7  7' lvl 7  7' lvl 7    8'  8'    TKE             Mini squat  HHA 2x10  HHA x20  HHA x15  HHA x15  HHA x15  HHA 2x10   HHA 2x10  HHA 2x10    Ther Activity             TUG             5x STS             Gait Training                                       Modalities             Cold pack                                                          "

## 2025-06-09 DIAGNOSIS — F32.A DEPRESSION, UNSPECIFIED DEPRESSION TYPE: ICD-10-CM

## 2025-06-09 RX ORDER — PAROXETINE 20 MG/1
20 TABLET, FILM COATED ORAL DAILY
Qty: 90 TABLET | Refills: 2 | Status: SHIPPED | OUTPATIENT
Start: 2025-06-09

## 2025-06-10 ENCOUNTER — OFFICE VISIT (OUTPATIENT)
Dept: PHYSICAL THERAPY | Facility: REHABILITATION | Age: 75
End: 2025-06-10
Payer: MEDICARE

## 2025-06-10 DIAGNOSIS — Z47.1 AFTERCARE FOLLOWING RIGHT KNEE JOINT REPLACEMENT SURGERY: Primary | ICD-10-CM

## 2025-06-10 DIAGNOSIS — G89.29 CHRONIC PAIN OF RIGHT KNEE: ICD-10-CM

## 2025-06-10 DIAGNOSIS — M17.11 PRIMARY OSTEOARTHRITIS OF RIGHT KNEE: ICD-10-CM

## 2025-06-10 DIAGNOSIS — Z96.651 AFTERCARE FOLLOWING RIGHT KNEE JOINT REPLACEMENT SURGERY: Primary | ICD-10-CM

## 2025-06-10 DIAGNOSIS — M25.561 CHRONIC PAIN OF RIGHT KNEE: ICD-10-CM

## 2025-06-10 PROCEDURE — 97112 NEUROMUSCULAR REEDUCATION: CPT

## 2025-06-10 PROCEDURE — 97110 THERAPEUTIC EXERCISES: CPT

## 2025-06-10 NOTE — PROGRESS NOTES
Daily Note     Today's date: 6/10/2025  Patient name: Lola Spangler  : 1950  MRN: [id number]  Referring provider: Erich Warren,*  Dx:   Encounter Diagnosis     ICD-10-CM    1. Aftercare following right knee joint replacement surgery  Z47.1     Z96.651       2. Chronic pain of right knee  M25.561     G89.29       3. Primary osteoarthritis of right knee  M17.11           Start Time: 1723          Subjective: Patient notes that her knee is doing better today. She reports that yesterday was a tough day for all her joints, but overall she is doing better today      Objective: See treatment diary below      Assessment: Tolerated treatment well. Patient exhibited good technique with therapeutic exercises and would benefit from continued PT. Continues to progress well.       Plan: Continue per plan of care.        POC expires Unit limit Auth Expiration date PT/OT + Visit Limit?   6/15/25 (8) BOMN  BOMN         Visit/Unit Tracking  AUTH Status:  Date 6/6 6/10  5/20 5/23 5/27 5/29  6/3   10 Used 15 16 10 11 12 13 14    Remaining     RE         Pertinent Findings:      Test / Measure  2025   FOTO (Predicted 54) 25 49   R knee ROM decreased    R knee pain      R knee strength decreased      Access Code: [email or web address]  URL: [email or web address]  Date: 2025  Prepared by: Wanda Duque     Exercises  - Seated Ankle Pumps  - 1 x daily - 7 x weekly - 3 sets - 10 reps  - Heel Raises with Counter Support  - 1 x daily - 7 x weekly - 3 sets - 10 reps  - Supine Quad Set  - 1 x daily - 7 x weekly - 3 sets - 10 reps  - Supine Gluteal Sets  - 1 x daily - 7 x weekly - 3 sets - 10 reps  - Supine Bridge  - 1 x daily - 7 x weekly - 3 sets - 10 reps      Manuals 5/20 5/22 5/27  5/29 6/3 6/6 6/10   5/12 5/16   PROM knee ext          SW with foam  SW with foam 3'                             RE  SW           Neuro Re-Ed             POC/HEP SW            BAPs 1' ea 1' each  1' each  1' each  1' each  1'  "each  1' each   1' each  1' each    Hurdles   4 laps HHA 4 laps HHA    4 laps SS    4 laps HHA  4 laps SS HHA  4 laps HHA  4 laps SS HHA   SS 5 laps  SS 5 laps    Biodex  5' 5' 5'  6'   6'  6' 6'       Airex    X10 abd/ext  X10 abd/ext X10 abd/ext X10 abd/ext 2#                                 Ther Ex             High knees/butt kickers             AAROM knee flexion  10x 5\"  10x 5\"  HEP 10x 5\" 10x 5\" with LAQ 20x 5\" with LAQ 20x 5\" with LAQ      Quad sets             Glute sets                          Prone ham curls    X10  x15         bridge   3x5    3x5  3x5       Knee extension  Foam @ heel 5' , with OP Prone 5' 2# 3# 3' prone 3# 3' prone    3# supine foam at heel 5'   3# supine foam at heel 5'   3# supine foam at heel 3'   Foam @ heel 5' , with OP Foam @ heel 5' , with OP   SL hip abd             SLR 2x10 2# 2x10 2# 2x10 3#   2x10 3# 3# 2x10  3# 2x10   2x10 2x10 2#   Step ups 4\" RLE lead x20  4\" RLE lead x20  4\" RLE lead x20   LLE lead x20  6\" RLE step up x20  6\" RLE step up x10    Descend x8 mix R/L lead 6\" step up and over x10   6'' x20  6\" x10  Step down 6\" x10    Sitting marching   2x10  2x10  2x10 3#     2x10  2x10    SAQ/LAQ 2x10 2# LAQ 2x10 2# LAQ 2x10 3# LAQ 2x10 3# LAQ 2x10 4# LAQ 2x10 4# LAQ 2x10 4# LAQ  LAQ 2x10  2# 2x10    NS 8'  7' 7' lvl 7  7' lvl 7  7' lvl 7  7' lvl 7  7' lvl 7   8'  8'    TKE             Mini squat  HHA 2x10  HHA x20  HHA x15  HHA x15  HHA x15  HHA 2x10 HHA 2x10  HHA 2x10  HHA 2x10    Ther Activity             TUG             5x STS             Gait Training                                       Modalities             Cold pack                                                            "

## 2025-06-13 ENCOUNTER — APPOINTMENT (OUTPATIENT)
Dept: PHYSICAL THERAPY | Facility: REHABILITATION | Age: 75
End: 2025-06-13
Payer: MEDICARE

## 2025-06-17 ENCOUNTER — OFFICE VISIT (OUTPATIENT)
Dept: PHYSICAL THERAPY | Facility: REHABILITATION | Age: 75
End: 2025-06-17
Payer: MEDICARE

## 2025-06-17 DIAGNOSIS — Z96.651 AFTERCARE FOLLOWING RIGHT KNEE JOINT REPLACEMENT SURGERY: Primary | ICD-10-CM

## 2025-06-17 DIAGNOSIS — M17.11 PRIMARY OSTEOARTHRITIS OF RIGHT KNEE: ICD-10-CM

## 2025-06-17 DIAGNOSIS — G89.29 CHRONIC PAIN OF RIGHT KNEE: ICD-10-CM

## 2025-06-17 DIAGNOSIS — M25.561 CHRONIC PAIN OF RIGHT KNEE: ICD-10-CM

## 2025-06-17 DIAGNOSIS — Z47.1 AFTERCARE FOLLOWING RIGHT KNEE JOINT REPLACEMENT SURGERY: Primary | ICD-10-CM

## 2025-06-17 PROCEDURE — 97112 NEUROMUSCULAR REEDUCATION: CPT

## 2025-06-17 PROCEDURE — 97110 THERAPEUTIC EXERCISES: CPT

## 2025-06-17 NOTE — PROGRESS NOTES
Daily Note     Today's date: 2025  Patient name: Lola Spangler  : 1950  MRN: [id number]  Referring provider: Erich Warren,*  Dx:   Encounter Diagnosis     ICD-10-CM    1. Aftercare following right knee joint replacement surgery  Z47.1     Z96.651       2. Chronic pain of right knee  M25.561     G89.29       3. Primary osteoarthritis of right knee  M17.11           Start Time: 09  Stop Time: 104  Total time in clinic (min): 47 minutes    Subjective: Patient states she was able to go swimming over the weekend and it helped her knee a lot. States the knee is a little stiff this morning.       Objective: See treatment diary below      Assessment: Tolerated treatment well. Patient demonstrated fatigue post treatment, exhibited good technique with therapeutic exercises, and would benefit from continued PT. Continues to progress strengthening and balance interventions to tolerance. Patient continues to perform a step-to pattern when ascending and descending steps.       Plan: Continue per plan of care.        POC expires Unit limit Auth Expiration date PT/OT + Visit Limit?   6/15/25 (8) BOMN  BOMN         Visit/Unit Tracking  AUTH Status:  Date 6/6 6/10  6/17 5/23 5/27 5/29  6/3   10 Used 15 16 17 11 12 13 14    Remaining     RE         Pertinent Findings:      Test / Measure  2025   FOTO (Predicted 54) 25 49   R knee ROM decreased    R knee pain      R knee strength decreased      Access Code: [email or web address]  URL: [email or web address]  Date: 2025  Prepared by: Wanda Duque     Exercises  - Seated Ankle Pumps  - 1 x daily - 7 x weekly - 3 sets - 10 reps  - Heel Raises with Counter Support  - 1 x daily - 7 x weekly - 3 sets - 10 reps  - Supine Quad Set  - 1 x daily - 7 x weekly - 3 sets - 10 reps  - Supine Gluteal Sets  - 1 x daily - 7 x weekly - 3 sets - 10 reps  - Supine Bridge  - 1 x daily - 7 x weekly - 3 sets - 10 reps      Manuals 5/20 5/22 5/27  5/29 6/3 6/6 6/10   "6/17      PROM knee ext                                        RE  SW           Neuro Re-Ed             POC/HEP SW            BAPs 1' ea 1' each  1' each  1' each  1' each  1' each  1' each  1' each      Hurdles   4 laps HHA 4 laps HHA    4 laps SS    4 laps HHA  4 laps SS HHA  4 laps HHA  4 laps SS HHA  4 laps HHA  4 laps SS HHA      Biodex  5' 5' 5'  6'   6'  6' 6'       Airex    X10 abd/ext  X10 abd/ext X10 abd/ext X10 abd/ext 2#  X10 abd/ext 3#                                Ther Ex             High knees/butt kickers             AAROM knee flexion  10x 5\"  10x 5\"  HEP 10x 5\" 10x 5\" with LAQ 20x 5\" with LAQ 20x 5\" with LAQ 20x 5\" with LAQ     Quad sets             Glute sets                          Prone ham curls    X10  x15         bridge   3x5    3x5  3x5  4x5      Knee extension  Foam @ heel 5' , with OP Prone 5' 2# 3# 3' prone 3# 3' prone    3# supine foam at heel 5'   3# supine foam at heel 5'   3# supine foam at heel 3'       SL hip abd        3# 2x10      SLR 2x10 2# 2x10 2# 2x10 3#   2x10 3# 3# 2x10  3# 2x10  3# 2x10      Step ups 4\" RLE lead x20  4\" RLE lead x20  4\" RLE lead x20   LLE lead x20  6\" RLE step up x20  6\" RLE step up x10    Descend x8 mix R/L lead 6\" step up and over x10  6\" step up and over x10      STS        2x5 15#     SAQ/LAQ 2x10 2# LAQ 2x10 2# LAQ 2x10 3# LAQ 2x10 3# LAQ 2x10 4# LAQ 2x10 4# LAQ 2x10 4# LAQ 2x10 3# LAQ     NS 8'  7' 7' lvl 7  7' lvl 7  7' lvl 7  7' lvl 7  7' lvl 7  7' lvl 7     TKE             Mini squat  HHA 2x10  HHA x20  HHA x15  HHA x15  HHA x15  HHA 2x10 HHA 2x10 2x10 HHA     Ther Activity             TUG             5x STS             Gait Training                                       Modalities             Cold pack                                                              "

## 2025-06-20 ENCOUNTER — OFFICE VISIT (OUTPATIENT)
Dept: PHYSICAL THERAPY | Facility: REHABILITATION | Age: 75
End: 2025-06-20
Payer: MEDICARE

## 2025-06-20 DIAGNOSIS — M17.11 PRIMARY OSTEOARTHRITIS OF RIGHT KNEE: ICD-10-CM

## 2025-06-20 DIAGNOSIS — G89.29 CHRONIC PAIN OF RIGHT KNEE: ICD-10-CM

## 2025-06-20 DIAGNOSIS — M25.561 CHRONIC PAIN OF RIGHT KNEE: ICD-10-CM

## 2025-06-20 DIAGNOSIS — Z47.1 AFTERCARE FOLLOWING RIGHT KNEE JOINT REPLACEMENT SURGERY: Primary | ICD-10-CM

## 2025-06-20 DIAGNOSIS — Z96.651 AFTERCARE FOLLOWING RIGHT KNEE JOINT REPLACEMENT SURGERY: Primary | ICD-10-CM

## 2025-06-20 PROCEDURE — 97110 THERAPEUTIC EXERCISES: CPT

## 2025-06-20 PROCEDURE — 97112 NEUROMUSCULAR REEDUCATION: CPT

## 2025-06-20 NOTE — PROGRESS NOTES
Daily Note     Today's date: 2025  Patient name: Lola Spangler  : 1950  MRN: [id number]  Referring provider: Erich Warren,*  Dx:   Encounter Diagnosis     ICD-10-CM    1. Aftercare following right knee joint replacement surgery  Z47.1     Z96.651       2. Chronic pain of right knee  M25.561     G89.29       3. Primary osteoarthritis of right knee  M17.11           Start Time: 0946  Stop Time: 1035  Total time in clinic (min): 49 minutes    Subjective: Patient states that her knee is doing well today.       Objective: See treatment diary below      Assessment: Tolerated treatment well. Patient exhibited good technique with therapeutic exercises and would benefit from continued PT. Patient continues to progress well. Patient seen 1:1 with PT from 945-1015 portion of session.         Plan: Continue per plan of care.        POC expires Unit limit Auth Expiration date PT/OT + Visit Limit?   6/15/25 (8) BOMN  BOMN         Visit/Unit Tracking  AUTH Status:  Date 6/6 6/10  6/17 6/20  5/27 5/29  6/3   10 Used 15 16 17 18  12 13 14    Remaining              Pertinent Findings:      Test / Measure  2025   FOTO (Predicted 54) 25 49   R knee ROM decreased    R knee pain      R knee strength decreased      Access Code: [email or web address]  URL: [email or web address]  Date: 2025  Prepared by: Wanda Duque     Exercises  - Seated Ankle Pumps  - 1 x daily - 7 x weekly - 3 sets - 10 reps  - Heel Raises with Counter Support  - 1 x daily - 7 x weekly - 3 sets - 10 reps  - Supine Quad Set  - 1 x daily - 7 x weekly - 3 sets - 10 reps  - Supine Gluteal Sets  - 1 x daily - 7 x weekly - 3 sets - 10 reps  - Supine Bridge  - 1 x daily - 7 x weekly - 3 sets - 10 reps      Manuals 5/20 5/22 5/27  5/29 6/3 6/6 6/10  6/17  6/20     PROM knee ext                                        RE  SW           Neuro Re-Ed             POC/HEP SW            BAPs 1' ea 1' each  1' each  1' each  1' each  1' each   "1' each  1' each  1' each     Hurdles   4 laps HHA 4 laps HHA    4 laps SS    4 laps HHA  4 laps SS HHA  4 laps HHA  4 laps SS HHA  4 laps HHA  4 laps SS HHA  4 laps HHA 3# AW     4 laps SS HHA     Biodex  5' 5' 5'  6'   6'  6' 6'       Airex    X10 abd/ext  X10 abd/ext X10 abd/ext X10 abd/ext 2#  X10 abd/ext 3#  2X10 abd/ext 3#                               Ther Ex             High knees/butt kickers             AAROM knee flexion  10x 5\"  10x 5\"  HEP 10x 5\" 10x 5\" with LAQ 20x 5\" with LAQ 20x 5\" with LAQ 20x 5\" with LAQ 20x 5\" with LAQ    Quad sets             Glute sets                          Prone ham curls    X10  x15         bridge   3x5    3x5  3x5  4x5      Knee extension  Foam @ heel 5' , with OP Prone 5' 2# 3# 3' prone 3# 3' prone    3# supine foam at heel 5'   3# supine foam at heel 5'   3# supine foam at heel 3'       SL hip abd        3# 2x10  3# 2x10     SLR 2x10 2# 2x10 2# 2x10 3#   2x10 3# 3# 2x10  3# 2x10  3# 2x10  3# 2x10     Step ups 4\" RLE lead x20  4\" RLE lead x20  4\" RLE lead x20   LLE lead x20  6\" RLE step up x20  6\" RLE step up x10    Descend x8 mix R/L lead 6\" step up and over x10  6\" step up and over x10      STS        2x5 15# 2x5 15#    SAQ/LAQ 2x10 2# LAQ 2x10 2# LAQ 2x10 3# LAQ 2x10 3# LAQ 2x10 4# LAQ 2x10 4# LAQ 2x10 4# LAQ 2x10 3# LAQ 2x10 3# LAQ    NS 8'  7' 7' lvl 7  7' lvl 7  7' lvl 7  7' lvl 7  7' lvl 7  7' lvl 7 ' lvl 7    TKE             Mini squat  HHA 2x10  HHA x20  HHA x15  HHA x15  HHA x15  HHA 2x10 HHA 2x10 2x10 HHA 2x10 HHA    Ther Activity             TUG             5x STS             Gait Training                                       Modalities             Cold pack                                                                "

## 2025-06-24 ENCOUNTER — OFFICE VISIT (OUTPATIENT)
Dept: PHYSICAL THERAPY | Facility: REHABILITATION | Age: 75
End: 2025-06-24
Payer: MEDICARE

## 2025-06-24 DIAGNOSIS — M25.561 CHRONIC PAIN OF RIGHT KNEE: ICD-10-CM

## 2025-06-24 DIAGNOSIS — M17.11 PRIMARY OSTEOARTHRITIS OF RIGHT KNEE: ICD-10-CM

## 2025-06-24 DIAGNOSIS — Z96.651 AFTERCARE FOLLOWING RIGHT KNEE JOINT REPLACEMENT SURGERY: Primary | ICD-10-CM

## 2025-06-24 DIAGNOSIS — G89.29 CHRONIC PAIN OF RIGHT KNEE: ICD-10-CM

## 2025-06-24 DIAGNOSIS — Z47.1 AFTERCARE FOLLOWING RIGHT KNEE JOINT REPLACEMENT SURGERY: Primary | ICD-10-CM

## 2025-06-24 PROCEDURE — 97110 THERAPEUTIC EXERCISES: CPT

## 2025-06-24 PROCEDURE — 97112 NEUROMUSCULAR REEDUCATION: CPT

## 2025-06-24 NOTE — PROGRESS NOTES
Daily Note     Today's date: 2025  Patient name: Lola Spangler  : 1950  MRN: [id number]  Referring provider: Erich Warren,*  Dx:   Encounter Diagnosis     ICD-10-CM    1. Aftercare following right knee joint replacement surgery  Z47.1     Z96.651       2. Chronic pain of right knee  M25.561     G89.29       3. Primary osteoarthritis of right knee  M17.11           Start Time: 851  Stop Time: 946  Total time in clinic (min): 55 minutes    Subjective: Patient states that her knee is getting better, but still has some lingering swelling.       Objective: See treatment diary below      Assessment: Tolerated treatment well. Patient exhibited good technique with therapeutic exercises and would benefit from continued PT. Progressions made to tolerance.       Plan: Continue per plan of care.        POC expires Unit limit Auth Expiration date PT/OT + Visit Limit?   6/15/25 (8) BOMN  BOMN         Visit/Unit Tracking  AUTH Status:  Date 6/6 6/10  6/17 6/20  6/24 5/29  6/3   10 Used 15 16 17 18  19 13 14    Remaining              Pertinent Findings:      Test / Measure  2025   FOTO (Predicted 54) 25 49 61   R knee ROM decreased     R knee pain       R knee strength decreased       Access Code: [email or web address]  URL: [email or web address]  Date: 2025  Prepared by: Wanda Duque     Exercises  - Seated Ankle Pumps  - 1 x daily - 7 x weekly - 3 sets - 10 reps  - Heel Raises with Counter Support  - 1 x daily - 7 x weekly - 3 sets - 10 reps  - Supine Quad Set  - 1 x daily - 7 x weekly - 3 sets - 10 reps  - Supine Gluteal Sets  - 1 x daily - 7 x weekly - 3 sets - 10 reps  - Supine Bridge  - 1 x daily - 7 x weekly - 3 sets - 10 reps      Manuals 5/20 5/22 5/27  5/29 6/3 6/6 6/10  6/17  6/20  6/24   PROM knee ext                                        RE  SW           Neuro Re-Ed             POC/HEP SW            BAPs 1' ea 1' each  1' each  1' each  1' each  1' each  1' each  1'  "each  1' each  1' each    Hurdles   4 laps HHA 4 laps HHA    4 laps SS    4 laps HHA  4 laps SS HHA  4 laps HHA  4 laps SS HHA  4 laps HHA  4 laps SS HHA  4 laps HHA 3# AW     4 laps SS HHA  5 laps HHA  5 laps SS HHA    Biodex  5' 5' 5'  6'   6'  6' 6'       Airex    X10 abd/ext  X10 abd/ext X10 abd/ext X10 abd/ext 2#  X10 abd/ext 3#  2X10 abd/ext 3#  2X10 abd/ext 3#                              Ther Ex             High knees/butt kickers             AAROM knee flexion  10x 5\"  10x 5\"  HEP 10x 5\" 10x 5\" with LAQ 20x 5\" with LAQ 20x 5\" with LAQ 20x 5\" with LAQ 20x 5\" with LAQ 20x 5\" with LAQ   Quad sets             Glute sets                          Prone ham curls    X10  x15         bridge   3x5    3x5  3x5  4x5   4x5    Knee extension  Foam @ heel 5' , with OP Prone 5' 2# 3# 3' prone 3# 3' prone    3# supine foam at heel 5'   3# supine foam at heel 5'   3# supine foam at heel 3'       SL hip abd        3# 2x10  3# 2x10  4# 2x10 BLE    SLR 2x10 2# 2x10 2# 2x10 3#   2x10 3# 3# 2x10  3# 2x10  3# 2x10  3# 2x10  4# 2x10 RLE    Step ups 4\" RLE lead x20  4\" RLE lead x20  4\" RLE lead x20   LLE lead x20  6\" RLE step up x20  6\" RLE step up x10    Descend x8 mix R/L lead 6\" step up and over x10  6\" step up and over x10   6\" step up and over x10   STS        2x5 15# 2x5 15# 3x5 15#    SAQ/LAQ 2x10 2# LAQ 2x10 2# LAQ 2x10 3# LAQ 2x10 3# LAQ 2x10 4# LAQ 2x10 4# LAQ 2x10 4# LAQ 2x10 3# LAQ 2x10 3# LAQ 2x10 4# LAQ   NS 8'  7' 7' lvl 7  7' lvl 7  7' lvl 7  7' lvl 7  7' lvl 7  7' lvl 7 7' lvl 7 7' lvl 7   TKE             Mini squat  HHA 2x10  HHA x20  HHA x15  HHA x15  HHA x15  HHA 2x10 HHA 2x10 2x10 HHA 2x10 HHA 2x10 HHA   Ther Activity             TUG             5x STS             Gait Training                                       Modalities             Cold pack                                                                  "

## 2025-06-27 ENCOUNTER — OFFICE VISIT (OUTPATIENT)
Dept: PHYSICAL THERAPY | Facility: REHABILITATION | Age: 75
End: 2025-06-27
Payer: MEDICARE

## 2025-06-27 DIAGNOSIS — M25.561 CHRONIC PAIN OF RIGHT KNEE: ICD-10-CM

## 2025-06-27 DIAGNOSIS — M17.11 PRIMARY OSTEOARTHRITIS OF RIGHT KNEE: ICD-10-CM

## 2025-06-27 DIAGNOSIS — Z96.651 AFTERCARE FOLLOWING RIGHT KNEE JOINT REPLACEMENT SURGERY: Primary | ICD-10-CM

## 2025-06-27 DIAGNOSIS — Z47.1 AFTERCARE FOLLOWING RIGHT KNEE JOINT REPLACEMENT SURGERY: Primary | ICD-10-CM

## 2025-06-27 DIAGNOSIS — G89.29 CHRONIC PAIN OF RIGHT KNEE: ICD-10-CM

## 2025-06-27 PROCEDURE — 97110 THERAPEUTIC EXERCISES: CPT

## 2025-06-27 PROCEDURE — 97112 NEUROMUSCULAR REEDUCATION: CPT

## 2025-06-27 NOTE — PROGRESS NOTES
Daily Note     Today's date: 2025  Patient name: Lola Spangler  : 1950  MRN: [id number]  Referring provider: Erich Warren,*  Dx:   Encounter Diagnosis     ICD-10-CM    1. Aftercare following right knee joint replacement surgery  Z47.1     Z96.651       2. Chronic pain of right knee  M25.561     G89.29       3. Primary osteoarthritis of right knee  M17.11           Start Time: 0900  Stop Time: 0950  Total time in clinic (min): 50 minutes    Subjective: Patient states that was sore after LV but then the soreness subsided. States she has been walking more with less pain.       Objective: See treatment diary below      Assessment: Tolerated treatment well. Patient demonstrated fatigue post treatment, exhibited good technique with therapeutic exercises, and would benefit from continued PT. Patient seen 1:1 with PTs for entirety of session.         Plan: Continue per plan of care.        POC expires Unit limit Auth Expiration date PT/OT + Visit Limit?   6/15/25 (8) BOMN  BOMN         Visit/Unit Tracking  AUTH Status:  Date 6/6 6/10  6/17 6/20  6/24 6/27  6/3   10 Used 15 16 17 18  19 20  14    Remaining              Pertinent Findings:      Test / Measure  2025   FOTO (Predicted 54) 25 49 61   R knee ROM decreased     R knee pain       R knee strength decreased       Access Code: [email or web address]  URL: [email or web address]  Date: 2025  Prepared by: Wanda Duque     Exercises  - Seated Ankle Pumps  - 1 x daily - 7 x weekly - 3 sets - 10 reps  - Heel Raises with Counter Support  - 1 x daily - 7 x weekly - 3 sets - 10 reps  - Supine Quad Set  - 1 x daily - 7 x weekly - 3 sets - 10 reps  - Supine Gluteal Sets  - 1 x daily - 7 x weekly - 3 sets - 10 reps  - Supine Bridge  - 1 x daily - 7 x weekly - 3 sets - 10 reps      Manuals 6/27  5/22 5/27  5/29 6/3 6/6 6/10  6/17  6/20  6/24   PROM knee ext                                        RE  SW           Neuro Re-Ed            "  POC/HEP             BAPs 1' ea 1' each  1' each  1' each  1' each  1' each  1' each  1' each  1' each  1' each    Hurdles  5 laps HHA  5 laps SS HHA  4 laps HHA 4 laps HHA    4 laps SS    4 laps HHA  4 laps SS HHA  4 laps HHA  4 laps SS HHA  4 laps HHA  4 laps SS HHA  4 laps HHA 3# AW     4 laps SS HHA  5 laps HHA  5 laps SS HHA    Biodex  5' 5' 5'  6'   6'  6' 6'       Airex  2X10 abd/ext 3#  X10 abd/ext  X10 abd/ext X10 abd/ext X10 abd/ext 2#  X10 abd/ext 3#  2X10 abd/ext 3#  2X10 abd/ext 3#                              Ther Ex             High knees/butt kickers             AAROM knee flexion   10x 5\"  HEP 10x 5\" 10x 5\" with LAQ 20x 5\" with LAQ 20x 5\" with LAQ 20x 5\" with LAQ 20x 5\" with LAQ 20x 5\" with LAQ   Quad sets             Glute sets             LSd 6\" 3x5             Prone ham curls    X10  x15         bridge 4x5   3x5    3x5  3x5  4x5   4x5    Knee extension   Prone 5' 2# 3# 3' prone 3# 3' prone    3# supine foam at heel 5'   3# supine foam at heel 5'   3# supine foam at heel 3'       SL hip abd 3# 2x10 RLE       3# 2x10  3# 2x10  4# 2x10 BLE    SLR 3# 2x10 RLE  2x10 2# 2x10 3#   2x10 3# 3# 2x10  3# 2x10  3# 2x10  3# 2x10  4# 2x10 RLE    Step ups  4\" RLE lead x20  4\" RLE lead x20   LLE lead x20  6\" RLE step up x20  6\" RLE step up x10    Descend x8 mix R/L lead 6\" step up and over x10  6\" step up and over x10   6\" step up and over x10   STS 3x5 15#       2x5 15# 2x5 15# 3x5 15#    SAQ/LAQ  2x10 2# LAQ 2x10 3# LAQ 2x10 3# LAQ 2x10 4# LAQ 2x10 4# LAQ 2x10 4# LAQ 2x10 3# LAQ 2x10 3# LAQ 2x10 4# LAQ   NS 7'  7' 7' lvl 7  7' lvl 7  7' lvl 7  7' lvl 7  7' lvl 7  7' lvl 7 7' lvl 7 7' lvl 7   TKE             Mini squat  2x10 HHA HHA x20  HHA x15  HHA x15  HHA x15  HHA 2x10 HHA 2x10 2x10 HHA 2x10 HHA 2x10 HHA   Ther Activity             TUG             5x STS             Gait Training                                       Modalities             Cold pack                         "

## 2025-06-30 DIAGNOSIS — Z47.1 AFTERCARE FOLLOWING RIGHT KNEE JOINT REPLACEMENT SURGERY: Primary | ICD-10-CM

## 2025-06-30 DIAGNOSIS — Z96.651 AFTERCARE FOLLOWING RIGHT KNEE JOINT REPLACEMENT SURGERY: Primary | ICD-10-CM

## 2025-07-01 ENCOUNTER — OFFICE VISIT (OUTPATIENT)
Dept: PHYSICAL THERAPY | Facility: REHABILITATION | Age: 75
End: 2025-07-01
Payer: MEDICARE

## 2025-07-01 DIAGNOSIS — G89.29 CHRONIC PAIN OF RIGHT KNEE: ICD-10-CM

## 2025-07-01 DIAGNOSIS — Z96.651 AFTERCARE FOLLOWING RIGHT KNEE JOINT REPLACEMENT SURGERY: Primary | ICD-10-CM

## 2025-07-01 DIAGNOSIS — Z47.1 AFTERCARE FOLLOWING RIGHT KNEE JOINT REPLACEMENT SURGERY: Primary | ICD-10-CM

## 2025-07-01 DIAGNOSIS — M25.561 CHRONIC PAIN OF RIGHT KNEE: ICD-10-CM

## 2025-07-01 DIAGNOSIS — M17.11 PRIMARY OSTEOARTHRITIS OF RIGHT KNEE: ICD-10-CM

## 2025-07-01 PROCEDURE — 97110 THERAPEUTIC EXERCISES: CPT

## 2025-07-01 PROCEDURE — 97112 NEUROMUSCULAR REEDUCATION: CPT

## 2025-07-01 NOTE — PROGRESS NOTES
Daily Note     Today's date: 2025  Patient name: Lola Spangler  : 1950  MRN: [id number]  Referring provider: Erich Warren,*  Dx:   Encounter Diagnosis     ICD-10-CM    1. Aftercare following right knee joint replacement surgery  Z47.1     Z96.651       2. Chronic pain of right knee  M25.561     G89.29       3. Primary osteoarthritis of right knee  M17.11           Start Time: 0900  Stop Time: 09  Total time in clinic (min): 48 minutes    Subjective: Patient states her knee is a little achy today.       Objective: See treatment diary below      Assessment: Tolerated treatment well. Patient demonstrated fatigue post treatment, exhibited good technique with therapeutic exercises, and would benefit from continued PT. Patient seen 1:1 with PT from 910-948 of session.         Plan: Continue per plan of care.  Re-eval NV       POC expires Unit limit Auth Expiration date PT/OT + Visit Limit?   6/15/25 (8) BOMN  BOMN         Visit/Unit Tracking  AUTH Status:  Date 6/6 6/10  6/17 6/20  6/24 6/27  7/1   10 Used 15 16 17 18  19 20  21    Remaining              Pertinent Findings:      Test / Measure  2025   FOTO (Predicted 54) 25 49 61   R knee ROM decreased     R knee pain       R knee strength decreased       Access Code: [email or web address]  URL: [email or web address]  Date: 2025  Prepared by: Wanda Duque     Exercises  - Seated Ankle Pumps  - 1 x daily - 7 x weekly - 3 sets - 10 reps  - Heel Raises with Counter Support  - 1 x daily - 7 x weekly - 3 sets - 10 reps  - Supine Quad Set  - 1 x daily - 7 x weekly - 3 sets - 10 reps  - Supine Gluteal Sets  - 1 x daily - 7 x weekly - 3 sets - 10 reps  - Supine Bridge  - 1 x daily - 7 x weekly - 3 sets - 10 reps      Manuals 6/27  7/1 5/27  5/29 6/3 6/6 6/10  6/17  6/20  6/24   PROM knee ext                                        RE             Neuro Re-Ed             POC/HEP             BAPs 1' ea 1' each  1' each  1' each  1'  "each  1' each  1' each  1' each  1' each  1' each    Hurdles  5 laps HHA  5 laps SS HHA  5 laps SS hurdles on foam  4 laps HHA    4 laps SS    4 laps HHA  4 laps SS HHA  4 laps HHA  4 laps SS HHA  4 laps HHA  4 laps SS HHA  4 laps HHA 3# AW     4 laps SS HHA  5 laps HHA  5 laps SS HHA    Biodex  5'  5'  6'   6'  6' 6'       Airex  2X10 abd/ext 3# 2X10 abd/ext 3# X10 abd/ext  X10 abd/ext X10 abd/ext X10 abd/ext 2#  X10 abd/ext 3#  2X10 abd/ext 3#  2X10 abd/ext 3#    Foam beam   Tandem 4 laps                         Ther Ex             High knees/butt kickers             AAROM knee flexion   20x 5\" with LAQ HEP 10x 5\" 10x 5\" with LAQ 20x 5\" with LAQ 20x 5\" with LAQ 20x 5\" with LAQ 20x 5\" with LAQ 20x 5\" with LAQ   Quad sets             Glute sets             LSd 6\" 3x5             Prone ham curls    X10  x15         bridge 4x5  4x5  3x5    3x5  3x5  4x5   4x5    Knee extension    3# 3' prone 3# 3' prone    3# supine foam at heel 5'   3# supine foam at heel 5'   3# supine foam at heel 3'       SL hip abd 3# 2x10 RLE 3# 2x10 RLE      3# 2x10  3# 2x10  4# 2x10 BLE    SLR 3# 2x10 RLE  3# 2x10 RLE  2x10 3#   2x10 3# 3# 2x10  3# 2x10  3# 2x10  3# 2x10  4# 2x10 RLE    Step ups  6\" step up and over x10  4\" RLE lead x20   LLE lead x20  6\" RLE step up x20  6\" RLE step up x10    Descend x8 mix R/L lead 6\" step up and over x10  6\" step up and over x10   6\" step up and over x10   STS 3x5 15# 8# 3x5       2x5 15# 2x5 15# 3x5 15#    SAQ/LAQ  2x10 4# LAQ 2x10 3# LAQ 2x10 3# LAQ 2x10 4# LAQ 2x10 4# LAQ 2x10 4# LAQ 2x10 3# LAQ 2x10 3# LAQ 2x10 4# LAQ   NS 7'  7' lvl 7  7' lvl 7  7' lvl 7  7' lvl 7  7' lvl 7  7' lvl 7  7' lvl 7 7' lvl 7 7' lvl 7   TKE             Mini squat  2x10 HHA 2x20 HHA  HHA x15  HHA x15  HHA x15  HHA 2x10 HHA 2x10 2x10 HHA 2x10 HHA 2x10 HHA   Ther Activity             TUG             5x STS             Gait Training                                       Modalities             Cold pack                         "

## 2025-07-03 ENCOUNTER — OFFICE VISIT (OUTPATIENT)
Dept: PHYSICAL THERAPY | Facility: REHABILITATION | Age: 75
End: 2025-07-03
Payer: MEDICARE

## 2025-07-03 DIAGNOSIS — M17.11 PRIMARY OSTEOARTHRITIS OF RIGHT KNEE: ICD-10-CM

## 2025-07-03 DIAGNOSIS — M25.561 CHRONIC PAIN OF RIGHT KNEE: ICD-10-CM

## 2025-07-03 DIAGNOSIS — Z96.651 AFTERCARE FOLLOWING RIGHT KNEE JOINT REPLACEMENT SURGERY: Primary | ICD-10-CM

## 2025-07-03 DIAGNOSIS — G89.29 CHRONIC PAIN OF RIGHT KNEE: ICD-10-CM

## 2025-07-03 DIAGNOSIS — Z47.1 AFTERCARE FOLLOWING RIGHT KNEE JOINT REPLACEMENT SURGERY: Primary | ICD-10-CM

## 2025-07-03 PROCEDURE — 97112 NEUROMUSCULAR REEDUCATION: CPT | Performed by: PHYSICAL THERAPY ASSISTANT

## 2025-07-03 PROCEDURE — 97110 THERAPEUTIC EXERCISES: CPT | Performed by: PHYSICAL THERAPY ASSISTANT

## 2025-07-03 NOTE — PROGRESS NOTES
Daily Note     Today's date: 7/3/2025  Patient name: Lola Spangler  : 1950  MRN: [id number]  Referring provider: Erich Warren,*  Dx:   Encounter Diagnosis     ICD-10-CM    1. Aftercare following right knee joint replacement surgery  Z47.1     Z96.651       2. Chronic pain of right knee  M25.561     G89.29       3. Primary osteoarthritis of right knee  M17.11                      Subjective: No new complaints.  Pt reports she is feeling good. Pt reports good compliance with HEP.       Objective: See treatment diary below      Assessment: Tolerated treatment well. Patient demonstrated fatigue post treatment, exhibited good technique with therapeutic exercises, and would benefit from continued PT. Progressed weights with SLR and ABD today with good tolerance. Pt appropriately fatigued post exercise. NO reports of pain.       Plan: Continue per plan of care.  Re-eval NV       POC expires Unit limit Auth Expiration date PT/OT + Visit Limit?   6/15/25 (8) BOMN  BOMN         Visit/Unit Tracking  AUTH Status:  Date 6/6 6/10  6/17 6/20  6/24 6/27  7/1 7/3   10 Used 15 16 17 18  19 20  21 22    Remaining               Pertinent Findings:      Test / Measure  2025   FOTO (Predicted 54) 25 49 61   R knee ROM decreased     R knee pain       R knee strength decreased       Access Code: [email or web address]  URL: [email or web address]  Date: 2025  Prepared by: Wanda Duque     Exercises  - Seated Ankle Pumps  - 1 x daily - 7 x weekly - 3 sets - 10 reps  - Heel Raises with Counter Support  - 1 x daily - 7 x weekly - 3 sets - 10 reps  - Supine Quad Set  - 1 x daily - 7 x weekly - 3 sets - 10 reps  - Supine Gluteal Sets  - 1 x daily - 7 x weekly - 3 sets - 10 reps  - Supine Bridge  - 1 x daily - 7 x weekly - 3 sets - 10 reps      Manuals 6/27  7/1 7/3    6/10  6/17  6/20  6/24   PROM knee ext                                        RE             Neuro Re-Ed             POC/HEP            "  BAPs 1' ea 1' each  1' ea    1' each  1' each  1' each  1' each    Hurdles  5 laps HHA  5 laps SS HHA  5 laps SS hurdles on foam  5 laps fwd and lat    4 laps HHA  4 laps SS HHA  4 laps HHA  4 laps SS HHA  4 laps HHA 3# AW     4 laps SS HHA  5 laps HHA  5 laps SS HHA    Biodex  5'      6'       Airex  2X10 abd/ext 3# 2X10 abd/ext 3# 2x10 abd/ext 3#    X10 abd/ext 2#  X10 abd/ext 3#  2X10 abd/ext 3#  2X10 abd/ext 3#    Foam beam   Tandem 4 laps  Tandem 4 laps                        Ther Ex             High knees/butt kickers             AAROM knee flexion   20x 5\" with LAQ 20x 5\" with LAQ    20x 5\" with LAQ 20x 5\" with LAQ 20x 5\" with LAQ 20x 5\" with LAQ   Quad sets             Glute sets             LSd 6\" 3x5   6\" 3x5          Prone ham curls              bridge 4x5  4x5  2x10    3x5  4x5   4x5    Knee extension              SL hip abd 3# 2x10 RLE 3# 2x10 RLE 4# 2x10 RLE     3# 2x10  3# 2x10  4# 2x10 BLE    SLR 3# 2x10 RLE  3# 2x10 RLE  4# 2x10 RLE    3# 2x10  3# 2x10  3# 2x10  4# 2x10 RLE    Step ups  6\" step up and over x10  6\" step up and over x10    6\" step up and over x10  6\" step up and over x10   6\" step up and over x10   STS 3x5 15# 8# 3x5  8# 3x5     2x5 15# 2x5 15# 3x5 15#    SAQ/LAQ  2x10 4# LAQ 2x10 4# LAQ    2x10 4# LAQ 2x10 3# LAQ 2x10 3# LAQ 2x10 4# LAQ   NS 7'  7' lvl 7  7' L7    7' lvl 7  7' lvl 7 7' lvl 7 7' lvl 7   TKE             Mini squat  2x10 HHA 2x20 HHA  2x20 HHA    HHA 2x10 2x10 HHA 2x10 HHA 2x10 HHA   Ther Activity             TUG             5x STS             Gait Training                                       Modalities             Cold pack                                                                      "

## 2025-07-08 ENCOUNTER — EVALUATION (OUTPATIENT)
Dept: PHYSICAL THERAPY | Facility: REHABILITATION | Age: 75
End: 2025-07-08
Payer: MEDICARE

## 2025-07-08 DIAGNOSIS — M17.11 PRIMARY OSTEOARTHRITIS OF RIGHT KNEE: ICD-10-CM

## 2025-07-08 DIAGNOSIS — Z96.651 AFTERCARE FOLLOWING RIGHT KNEE JOINT REPLACEMENT SURGERY: Primary | ICD-10-CM

## 2025-07-08 DIAGNOSIS — M25.561 CHRONIC PAIN OF RIGHT KNEE: ICD-10-CM

## 2025-07-08 DIAGNOSIS — Z47.1 AFTERCARE FOLLOWING RIGHT KNEE JOINT REPLACEMENT SURGERY: Primary | ICD-10-CM

## 2025-07-08 DIAGNOSIS — G89.29 CHRONIC PAIN OF RIGHT KNEE: ICD-10-CM

## 2025-07-08 PROCEDURE — 97110 THERAPEUTIC EXERCISES: CPT

## 2025-07-08 PROCEDURE — 97164 PT RE-EVAL EST PLAN CARE: CPT

## 2025-07-08 PROCEDURE — 97112 NEUROMUSCULAR REEDUCATION: CPT

## 2025-07-08 NOTE — PROGRESS NOTES
PT Re-Evaluation     Today's date: 2025  Patient name: Lola Spangler  : 1950  MRN: [id number]  Referring provider: Erich Warren,*  Dx:   Encounter Diagnosis     ICD-10-CM    1. Aftercare following right knee joint replacement surgery  Z47.1     Z96.651       2. Chronic pain of right knee  M25.561     G89.29       3. Primary osteoarthritis of right knee  M17.11           Start Time: 930  Stop Time: 101  Total time in clinic (min): 45 minutes    Subjective: Since starting therapy, patient reports feeling 85% better. She feels that her ROM and strength has been getting better but she continues to have most difficulty with going up/down steps reciprocally. She mentioned that she no longer has pain during the day but has some increased pain at night which she believes may be due to her mattress at times. She also wants to be able to get in and out of her tub at home.       Objective: See treatment diary below        Lower Extremity ROM  ROM Right/Left   Knee flex 115/125   Knee ext 5/0      Lower Extremity Strength  MMT Right   Hip flexion  4+   Hip extension  NT    Hip Abduction NT   Knee Flexion 4   Knee Extension 4+   Ankle Dorsiflexion 4   Ankle Plantarflexion  NT         Dynamic Balance Tests  5x sit to stand (sec)  >14 sec indicates high risk for falls = 13 seconds  = 24 seconds, BUE used  =11 no UE   = 9s   TUG (sec)  >14 sec indicates high risk for falls = 16 seconds, SPC  = 44 seconds, RW  = 14, ND   = 10.9s        Assessment: Lola Spangler is a 75 y.o. female attending physical therapy for chronic pain of R knee and s/p R TKA on 25. Pt has been responding well to physical therapy as displayed by decreasing 5xSTS and TUG values. Primary impairments continue to include decreased R knee strength, R knee AROM dysfunction, quad/posterior chain motor control dysfunction, and impaired static/dynamic balance which is limiting her ability to perform ADLs and  recreational activities without pain or functional restrictions. Pt would benefit from continued skilled PT interventions to increase functional R knee strength, increase pain free ROM, decrease fall risk and facilitate return to recreational activities and ADL management/independence with less limitations and pain. 1:1 with Deonte Gooden DPT entirety of tx.      STG 5-6 weeks  Patient's FOTO will increase to 35 to improve functional mobility MET  Patient reports feeling 25% better since starting therapy to improve QOL MET  Patient's TUG score will improve to 25 seconds to decrease fall risk MET  Patient's 5x STS score will improve to 17 seconds to decrease fall risk MET  Patient's R knee AROM will improve 10-15 degrees to improve functional mobility MET  Patient will report being compliant with HEP to improve prognosis. MET     LTG: 10-12 weeks  Patient's FOTO will increase to predicted value or more to improve functional mobility IN PROGRESS  Patient reports feeling 50% better since starting therapy to improve QOL MET  Patient will be able to perform ADLs and recreational activities with no more than a 5/10 pain to improve QOL. MET  Patient's R knee flexion AROM will improve by 25-30 degrees to improve functional mobility  MET  Patient will report being compliant with an advanced HEP to improve prognosis.  IN PROGRESS  New goal as of 7/8/2025 = Patient will be able to perform steps reciprocally at home with minimal to no difficulty.        Plan: Continue per plan of care.  1-2x a week for 6 weeks     Past Surgical History[1]   POC expires Unit limit Auth Expiration date PT/OT + Visit Limit?   6/15/25 (8) BOMN  BOMN         Visit/Unit Tracking  AUTH Status:  Date 6/6 6/10  6/17 6/20  6/24 6/27  7/1 7/3 7/8 - RE   10 Used 15 16 17 18  19 20  21 22 23    Remaining                Pertinent Findings:      Test / Measure  4/17/2025 5/6 6/24   FOTO (Predicted 54) 25 49 61   R knee ROM decreased     R knee pain       R  "knee strength decreased       Access Code: [email or web address]  URL: [email or web address]  Date: 04/08/2025  Prepared by: Wanda Duque     Exercises  - Seated Ankle Pumps  - 1 x daily - 7 x weekly - 3 sets - 10 reps  - Heel Raises with Counter Support  - 1 x daily - 7 x weekly - 3 sets - 10 reps  - Supine Quad Set  - 1 x daily - 7 x weekly - 3 sets - 10 reps  - Supine Gluteal Sets  - 1 x daily - 7 x weekly - 3 sets - 10 reps  - Supine Bridge  - 1 x daily - 7 x weekly - 3 sets - 10 reps      Manuals 6/27  7/1 7/3 7/8 - RE     PROM knee ext                            RE         Neuro Re-Ed         POC/HEP         BAPs 1' ea 1' each  1' ea 1' ea     Hurdles  5 laps HHA  5 laps SS HHA  5 laps SS hurdles on foam  5 laps fwd and lat NV     Biodex  5'        Airex  2X10 abd/ext 3# 2X10 abd/ext 3# 2x10 abd/ext 3# 2x10 abd/ext 3#     Foam beam   Tandem 4 laps  Tandem 4 laps  Tandem 4 laps               Ther Ex         High knees/butt kickers         AAROM knee flexion   20x 5\" with LAQ 20x 5\" with LAQ 20x 5\" with LAQ     Quad sets         Glute sets         LSd 6\" 3x5   6\" 3x5 6\" 3x5     Prone ham curls          bridge 4x5  4x5  2x10 2x10     Knee extension          SL hip abd 3# 2x10 RLE 3# 2x10 RLE 4# 2x10 RLE 4# 2x10 RLE     SLR 3# 2x10 RLE  3# 2x10 RLE  4# 2x10 RLE 4# 2x10 RLE     Step ups  6\" step up and over x10  6\" step up and over x10 6\" step up and over x10     STS 3x5 15# 8# 3x5  8# 3x5 8# 3x5     SAQ/LAQ  2x10 4# LAQ 2x10 4# LAQ 2x12 5# LAQ     NS 7'  7' lvl 7  7' L7 7' L7     TKE         Mini squat  2x10 HHA 2x20 HHA  2x20 HHA 2x20 HHA     Leg Press         Modified Lunge?         Ther Activity         TUG         5x STS         Gait Training                           Modalities         Cold pack                                                                       [1]   Past Surgical History:  Procedure Laterality Date    BACK SURGERY      CARPAL TUNNEL RELEASE Left     CERVICAL CONIZATION   W/ LASER      " CERVICAL CONIZATION     SECTION      COLONOSCOPY      DILATION AND CURETTAGE OF UTERUS      FL INJECTION RIGHT HIP (NON ARTHROGRAM)  2019    FL INJECTION RIGHT HIP (NON ARTHROGRAM)  2019    FRACTURE SURGERY      HAND SURGERY      HIP SURGERY      JOINT REPLACEMENT      RT    ORTHOPEDIC SURGERY      VT ARTHRODESIS POSTERIOR/PSTLAT TQ 1NTRSPC LUMBAR N/A 2017    Procedure: L2-L5 OPEN DECOMPRESSIVE LUMBAR LAMINECTOMY W/ PEDICLE SCREW AND NILDA FIXATION FUSION (IMPULSE); REMOVAL OF SKIN TAG MID BACK;  Surgeon: Catracho Fields MD;  [facility];  Service: Neurosurgery    VT ARTHRP ACETBLR/PROX FEM PROSTC AGRFT/ALGRFT Right 2019    Procedure: ARTHROPLASTY HIP TOTAL Posterior;  Surgeon: Erich Warren MD;  [facility];  Service: Orthopedics    VT ARTHRP KNE CONDYLE&PLATU MEDIAL&LAT COMPARTMENTS Right 2025    Procedure: Robotically assisted right total knee arthroplasty, all associated procedures as indicated;  Surgeon: Erich Warren MD;  Location: [facility];  Service: Orthopedics    VT COLONOSCOPY FLX DX W/COLLJ SPEC WHEN PFRMD N/A 10/07/2016    Procedure: EGD AND COLONOSCOPY;  Surgeon: Nathan Pierson MD;  Location: [facility];  Service: Gastroenterology    VT NDSC WRST SURG W/RLS TRANSVRS CARPL LIGM Left 2018    Procedure: RELEASE CARPAL TUNNEL ENDOSCOPIC;  Surgeon: Bon Ferrer MD;  Location: [facility];  Service: Orthopedics    VT NDSC WRST SURG W/RLS TRANSVRS CARPL LIGM Right 2018    Procedure: RELEASE CARPAL TUNNEL ENDOSCOPIC;  Surgeon: Bon Ferrer MD;  [facility];  Service: Orthopedics    VT RPR AA HERNIA 1ST < 3 CM REDUCIBLE N/A 2024    Procedure: REPAIR HERNIA UMBILICAL;  Surgeon: Lazaro Moser MD;  [facility];  Service: General    VT RPR AA HERNIA 1ST < 3 CM REDUCIBLE N/A 2024    Procedure: REPAIR HERNIA VENTRAL;  Surgeon: Lazaro Moser MD;  [facility];  Service:  General    RETINAL LASER PROCEDURE      SPINE SURGERY      TUBAL LIGATION      UMBILICAL HERNIA REPAIR  08/01/2024

## 2025-07-09 ENCOUNTER — OFFICE VISIT (OUTPATIENT)
Dept: OBGYN CLINIC | Facility: HOSPITAL | Age: 75
End: 2025-07-09

## 2025-07-09 ENCOUNTER — HOSPITAL ENCOUNTER (OUTPATIENT)
Dept: RADIOLOGY | Facility: HOSPITAL | Age: 75
Discharge: HOME | End: 2025-07-09
Attending: ORTHOPAEDIC SURGERY
Payer: MEDICARE

## 2025-07-09 VITALS — HEIGHT: 61 IN | BODY MASS INDEX: 37.22 KG/M2

## 2025-07-09 DIAGNOSIS — Z96.651 AFTERCARE FOLLOWING RIGHT KNEE JOINT REPLACEMENT SURGERY: Primary | ICD-10-CM

## 2025-07-09 DIAGNOSIS — Z96.651 AFTERCARE FOLLOWING RIGHT KNEE JOINT REPLACEMENT SURGERY: ICD-10-CM

## 2025-07-09 DIAGNOSIS — Z47.1 AFTERCARE FOLLOWING RIGHT KNEE JOINT REPLACEMENT SURGERY: Primary | ICD-10-CM

## 2025-07-09 DIAGNOSIS — Z47.1 AFTERCARE FOLLOWING RIGHT KNEE JOINT REPLACEMENT SURGERY: ICD-10-CM

## 2025-07-09 PROCEDURE — 73560 X-RAY EXAM OF KNEE 1 OR 2: CPT

## 2025-07-09 PROCEDURE — 99024 POSTOP FOLLOW-UP VISIT: CPT | Performed by: ORTHOPAEDIC SURGERY

## 2025-07-09 RX ORDER — CEPHALEXIN 500 MG/1
CAPSULE ORAL
Qty: 20 CAPSULE | Refills: 1 | Status: SHIPPED | OUTPATIENT
Start: 2025-07-09 | End: 2027-05-01

## 2025-07-09 NOTE — PROGRESS NOTES
Name: Lola Spangler      : 1950       MRN: [id number]   Encounter Provider: Erich Warren MD   Encounter Date: 25  Encounter department: [facility] [place]     ASSESSMENT & PLAN:  Assessment & Plan  Aftercare following right knee joint replacement surgery  DOS 2025            ___________________________________________________  X-rays taken and reviewed, physical exam performed.  Doing very well 3-month status post robotically assisted right total knee arthroplasty.  Continue physical therapy to maximize overall improvement as able to attend.  Weightbearing and activities as tolerated.  Total knee precautions with antibiotics as discussed until the 2-year postop erik, prescription of Keflex sent to her pharmacy of record.      To do next visit:  Return in about 3 months (around 10/9/2025) for re-check with x-rays upon arrival right knee.  ____________________________________________________  CHIEF COMPLAINT:  Chief Complaint   Patient presents with    Right Knee - Post-op         SUBJECTIVE:  Lola Spangler is a 75 y.o. female who presents today for repeat evaluation 3-month status post robotically assisted right total knee arthroplasty, date of surgery 2025.  Overall she is doing very well.  She has been using a cane for ambulatory assistance however left in the car this afternoon.  She has been attending physical therapy and progressing.  She denies any calf or thigh pain.  Denies any fevers or chills.  She states as a result of her knee replacement surgery she is able to ambulate without any pain at her right knee.  She does continue to have difficulty ambulating stairs especially in an alternating fashion.    She does have persistent chronic lower back pain.          PAST MEDICAL HISTORY:  Past Medical History[1]    PAST SURGICAL HISTORY:  Past Surgical History[2]    FAMILY HISTORY:  Family History[3]    SOCIAL HISTORY:  Social  "History[4]    MEDICATIONS:  Current Medications[5]    ALLERGIES:  Allergies[6]    LABS:  HgA1c:   Lab Results   Component Value Date    HGBA1C 6.0 (H) 03/22/2025     BMP:   Lab Results   Component Value Date    GLUCOSE 89 11/28/2015    CALCIUM 8.8 04/16/2025     11/28/2015    K 4.1 04/16/2025    CO2 32 04/16/2025     04/16/2025    BUN 17 04/16/2025    CREATININE 0.67 04/16/2025     CBC: No components found for: \"CBC\"    _____________________________________________________  PHYSICAL EXAMINATION:  Vital signs: Ht 5' 1\" (1.549 m)   LMP  (LMP Unknown)   BMI 37.22 kg/m²   General: No acute distress, awake and alert  Psychiatric: Mood and affect appear appropriate  HEENT: Trachea Midline, No torticollis, no apparent facial trauma  Cardiovascular: No audible murmurs; Extremities appear perfused  Pulmonary: No audible wheezing or stridor  Skin: Intact, no open lesions; see further details (if any) below    MUSCULOSKELETAL EXAMINATION:    Right Knee Exam     Range of Motion   Extension:  0   Right knee flexion: 115, patella tracks well.     Other   Erythema: absent  Swelling: moderate    Comments:    Intact extensor mechanism  Well healed anterior incision, minimal warmth  No gross signs of infection            _____________________________________________________  STUDIES REVIEWED:    The attending physician has personally reviewed the pertinent films in PACS and their interpretation is as follows:    Right knee x-rays taken and reviewed in the office today and show: Well aligned, positioned, bonded right total knee prosthesis without signs of loosening or stress shielding.    PROCEDURES PERFORMED:    Procedures    None Performed      Scribe Attestation      I,:  Donnell Izquierdo am acting as a scribe while in the presence of the attending physician.:       I,:  Erich Warren MD personally performed the services described in this documentation    as scribed in my presence.:                [1]   Past " Medical History:  Diagnosis Date    Abnormal ECG     Ankylosing spondylitis (HCC)     Anxiety     LAST ASSESSED: 16    Arthritis     Back pain     Carpal tunnel syndrome     Colon polyp     7 years ago with colonoscopy    CPAP (continuous positive airway pressure) dependence     Depression     Encounter for screening mammogram for breast cancer 2023    Encounter for screening mammogram for malignant neoplasm of breast 2019    Fibromyalgia     LAST ASSESSED: 16    Fibromyalgia, primary     Heme positive stool     LAST ASSESSED: 10/22/16    High cholesterol     Hyperlipidemia     under control since weight loss    Impingement syndrome of left shoulder     LAST ASSESSED: 17    Impingement syndrome of right shoulder     LAST ASSESSED:     Long term use of drug     LAST ASSESSED: 16    Low back pain     Myocardial infarction (HCC)     silent    No known health problems     NO PERTINENT PAST MEDICAL HX    Obesity     RLS (restless legs syndrome)     Rotator cuff injury     right    Sleep apnea     Spinal stenosis     Spinal stenosis     Spondylitis (HCC)     Spondyloarthritis     RESOLVED: 16    Vitamin D deficiency    [2]   Past Surgical History:  Procedure Laterality Date    BACK SURGERY      CARPAL TUNNEL RELEASE Left     CERVICAL CONIZATION   W/ LASER      CERVICAL CONIZATION     SECTION      COLONOSCOPY      DILATION AND CURETTAGE OF UTERUS      FL INJECTION RIGHT HIP (NON ARTHROGRAM)  2019    FL INJECTION RIGHT HIP (NON ARTHROGRAM)  2019    FRACTURE SURGERY      HAND SURGERY      HIP SURGERY      JOINT REPLACEMENT      RTHR    ORTHOPEDIC SURGERY      [place] ARTHRODESIS POSTERIOR/PSTLAT TQ 1NTRSPC LUMBAR N/A 2017    Procedure: L2-L5 OPEN DECOMPRESSIVE LUMBAR LAMINECTOMY W/ PEDICLE SCREW AND NILDA FIXATION FUSION (IMPULSE); REMOVAL OF SKIN TAG MID BACK;  Surgeon: Catracho Fields MD;  [facility];  Service: Neurosurgery    VA ARTHRP  ACETBLR/PROX FEM PROSTC AGRFT/ALGRFT Right 11/07/2019    Procedure: ARTHROPLASTY HIP TOTAL Posterior;  Surgeon: Erich Warren MD;  [facility];  Service: Orthopedics    AL ARTHRP KNE CONDYLE&PLATU MEDIAL&LAT COMPARTMENTS Right 4/14/2025    Procedure: Robotically assisted right total knee arthroplasty, all associated procedures as indicated;  Surgeon: Erich Warren MD;  [facility];  Service: Orthopedics    AL COLONOSCOPY FLX DX W/COLLJ SPEC WHEN PFRMD N/A 10/07/2016    Procedure: EGD AND COLONOSCOPY;  Surgeon: Nathan Pierson MD;  [facility];  Service: Gastroenterology    AL NDSC WRST SURG W/RLS TRANSVRS CARPL LIGM Left 04/26/2018    Procedure: RELEASE CARPAL TUNNEL ENDOSCOPIC;  Surgeon: Bon Ferrer MD;  [facility];  Service: Orthopedics    AL NDSC WRST SURG W/RLS TRANSVRS CARPL LIGM Right 06/14/2018    Procedure: RELEASE CARPAL TUNNEL ENDOSCOPIC;  Surgeon: Bon Ferrer MD;  [facility];  Service: Orthopedics    AL RPR AA HERNIA 1ST < 3 CM REDUCIBLE N/A 07/12/2024    Procedure: REPAIR HERNIA UMBILICAL;  Surgeon: Lazaro Moser MD;  [facility];  Service: General    AL RPR AA HERNIA 1ST < 3 CM REDUCIBLE N/A 07/12/2024    Procedure: REPAIR HERNIA VENTRAL;  Surgeon: Lazaro Moser MD;  [facility];  Service: General    RETINAL LASER PROCEDURE      SPINE SURGERY      TUBAL LIGATION      UMBILICAL HERNIA REPAIR  08/01/2024   [3]   Family History  Problem Relation Name Age of Onset    Arthritis Mother Olive     Breast cancer Mother Olive 72    Hypertension Mother Olive     Heart attack Mother Olive         MYOCARDIAL INFARCTION    Heart disease Mother Olive     Heart attack Father Pelon     Hypertension Father Pelon     Stroke Father Pelon         CEREBROVASCUALR ACCIDENT (CVA)    Heart disease Father Pelon     Arthritis Sister Megan     Uterine cancer Sister Megan     Endometrial cancer Sister Megan 60     Hypertension Sister Megan     Cancer Sister Megan         Cervical and uterine    Heart attack Brother Koby     Prostate cancer Brother Koby 62    Arthritis Brother Koby     Melanoma Brother Koby     Cancer Brother Koby         Melanoma    Heart disease Brother Koby     Arthritis Family      Hyperlipidemia Family      Depression Son      Breast cancer Maternal Aunt Tonya 40    No Known Problems Daughter      No Known Problems Maternal Grandmother      No Known Problems Maternal Grandfather      No Known Problems Paternal Grandmother      No Known Problems Paternal Grandfather      No Known Problems Brother      Other Brother          hunting accident (shot)    Melanoma Brother      Prostate cancer Brother Magnus     Heart attack Brother Magnus     Stroke Brother Magnus     Hypertension Brother Magnus     Arthritis Sister Silva     Heart attack Sister Silva     No Known Problems Son      No Known Problems Son      Lung cancer Maternal Uncle      Breast cancer additional onset Other niece 52    Uterine cancer Other niece    [4]   Social History  Tobacco Use    Smoking status: Never    Smokeless tobacco: Never   Vaping Use    Vaping status: Never Used   Substance Use Topics    Alcohol use: Yes     Comment: An occasional glass of wine    Drug use: No   [5]   Current Outpatient Medications:     acetaminophen (TYLENOL) 650 mg CR tablet, , Disp: , Rfl:     aspirin 81 MG tablet, Take 1 tablet by mouth in the morning., Disp: , Rfl:     cephalexin (KEFLEX) 500 mg capsule, , Disp: , Rfl:     Cholecalciferol (VITAMIN D3) 50 MCG (2000 UT) capsule, , Disp: , Rfl:     gabapentin (NEURONTIN) 100 mg capsule, Take 2 capsules (200 mg total) by mouth 3 (three) times a day, Disp: 180 capsule, Rfl: 5    gabapentin (Neurontin) 600 MG tablet, Take 1 tablet (600 mg total) by mouth daily at bedtime, Disp: 90 tablet, Rfl: 3    PARoxetine (PAXIL) 20 mg tablet, TAKE 1 TABLET BY MOUTH EVERY DAY, Disp: 90 tablet, Rfl: 2     rOPINIRole (REQUIP) 0.25 mg tablet, TAKE 1 TABLET (0.25 MG TOTAL) BY MOUTH DAILY AT BEDTIME FOR MANAGEMENT OF RESTLESS LEG SYNDROME, Disp: 90 tablet, Rfl: 1    tiZANidine (ZANAFLEX) 2 mg tablet, Take 1 tablet (2 mg total) by mouth every 8 (eight) hours as needed for muscle spasms, Disp: 60 tablet, Rfl: 0  [6] No Known Allergies

## 2025-07-11 ENCOUNTER — OFFICE VISIT (OUTPATIENT)
Dept: PHYSICAL THERAPY | Facility: REHABILITATION | Age: 75
End: 2025-07-11
Payer: MEDICARE

## 2025-07-11 DIAGNOSIS — Z96.651 AFTERCARE FOLLOWING RIGHT KNEE JOINT REPLACEMENT SURGERY: Primary | ICD-10-CM

## 2025-07-11 DIAGNOSIS — Z47.1 AFTERCARE FOLLOWING RIGHT KNEE JOINT REPLACEMENT SURGERY: Primary | ICD-10-CM

## 2025-07-11 DIAGNOSIS — G89.29 CHRONIC PAIN OF RIGHT KNEE: ICD-10-CM

## 2025-07-11 DIAGNOSIS — M17.11 PRIMARY OSTEOARTHRITIS OF RIGHT KNEE: ICD-10-CM

## 2025-07-11 DIAGNOSIS — M25.561 CHRONIC PAIN OF RIGHT KNEE: ICD-10-CM

## 2025-07-11 PROCEDURE — 97110 THERAPEUTIC EXERCISES: CPT

## 2025-07-11 PROCEDURE — 97112 NEUROMUSCULAR REEDUCATION: CPT

## 2025-07-11 NOTE — PROGRESS NOTES
Daily Note     Today's date: 2025  Patient name: Lola Spangler  : 1950  MRN: [id number]  Referring provider: Erich Warren,*  Dx:   Encounter Diagnosis     ICD-10-CM    1. Aftercare following right knee joint replacement surgery  Z47.1     Z96.651       2. Chronic pain of right knee  M25.561     G89.29       3. Primary osteoarthritis of right knee  M17.11           Start Time: 0900  Stop Time: 0950  Total time in clinic (min): 50 minutes    Subjective: Patient states that she is happy with her progress and would like to decrease to 1x a week.       Objective: See treatment diary below      Assessment: Tolerated treatment well. Patient demonstrated fatigue post treatment, exhibited good technique with therapeutic exercises, and would benefit from continued PT. Continues to progress well.       Plan: Continue per plan of care.      Past Surgical History[1]   POC expires Unit limit Auth Expiration date PT/OT + Visit Limit?   6/15/25 (8) BOMN  BOMN         Visit/Unit Tracking  AUTH Status:  Date 7/11 6/10  6/17 6/20  6/24 6/27  7/1 7/3 7/8 - RE   10 Used 24 16 17 18  19 20  21 22 23    Remaining                Pertinent Findings:      Test / Measure  2025   FOTO (Predicted 54) 25 49 61   R knee ROM decreased     R knee pain       R knee strength decreased       Access Code: [email or web address]  URL: [email or web address]  Date: 2025  Prepared by: Wanda Duque     Exercises  - Seated Ankle Pumps  - 1 x daily - 7 x weekly - 3 sets - 10 reps  - Heel Raises with Counter Support  - 1 x daily - 7 x weekly - 3 sets - 10 reps  - Supine Quad Set  - 1 x daily - 7 x weekly - 3 sets - 10 reps  - Supine Gluteal Sets  - 1 x daily - 7 x weekly - 3 sets - 10 reps  - Supine Bridge  - 1 x daily - 7 x weekly - 3 sets - 10 reps      Manuals 6/27  7/1 7/3 7/8 - RE     PROM knee ext                            RE         Neuro Re-Ed         POC/HEP         BAPs 1' ea 1' each  1' ea 1' ea 1'  "each     Hurdles  5 laps HHA  5 laps SS HHA  5 laps SS hurdles on foam  5 laps fwd and lat NV 5 laps fwd and lat 3#     Biodex  5'        Airex  2X10 abd/ext 3# 2X10 abd/ext 3# 2x10 abd/ext 3# 2x10 abd/ext 3# 2x10 3# bl tband     Foam beam   Tandem 4 laps  Tandem 4 laps  Tandem 4 laps  Tandem 4 laps              Ther Ex         High knees/butt kickers         AAROM knee flexion   20x 5\" with LAQ 20x 5\" with LAQ 20x 5\" with LAQ 20x 5\" with LAQ    Quad sets         Glute sets         LSd 6\" 3x5   6\" 3x5 6\" 3x5     Prone ham curls          bridge 4x5  4x5  2x10 2x10 2x10     Knee extension          SL hip abd 3# 2x10 RLE 3# 2x10 RLE 4# 2x10 RLE 4# 2x10 RLE     SLR 3# 2x10 RLE  3# 2x10 RLE  4# 2x10 RLE 4# 2x10 RLE     Step ups  6\" step up and over x10  6\" step up and over x10 6\" step up and over x10 3-9\" step ups x10     STS 3x5 15# 8# 3x5  8# 3x5 8# 3x5     SAQ/LAQ  2x10 4# LAQ 2x10 4# LAQ 2x12 5# LAQ 2x12 5# LAQ    NS 7'  7' lvl 7  7' L7 7' L7 7' L7    TKE         Mini squat  2x10 HHA 2x20 HHA  2x20 HHA 2x20 HHA 2x10 HHA    Leg Press                  Modified Lunge?         Ther Activity         TUG         5x STS         Gait Training                           Modalities         Cold pack                                                                          [1]   Past Surgical History:  Procedure Laterality Date    BACK SURGERY      CARPAL TUNNEL RELEASE Left     CERVICAL CONIZATION   W/ LASER      CERVICAL CONIZATION     SECTION      COLONOSCOPY      DILATION AND CURETTAGE OF UTERUS      FL INJECTION RIGHT HIP (NON ARTHROGRAM)  2019    FL INJECTION RIGHT HIP (NON ARTHROGRAM)  2019    FRACTURE SURGERY      HAND SURGERY      HIP SURGERY      JOINT REPLACEMENT      RTHR    ORTHOPEDIC SURGERY      [place] ARTHRODESIS POSTERIOR/PSTLAT TQ 1NTRSPC LUMBAR N/A 2017    Procedure: L2-L5 OPEN DECOMPRESSIVE LUMBAR LAMINECTOMY W/ PEDICLE SCREW AND NILDA FIXATION FUSION (IMPULSE); REMOVAL OF SKIN TAG MID " BACK;  Surgeon: Catracho Fields MD;  [facility];  Service: Neurosurgery    [place] ARTHRP ACETBLR/PROX FEM PROSTC AGRFT/ALGRFT Right 11/07/2019    Procedure: ARTHROPLASTY HIP TOTAL Posterior;  Surgeon: Erich Warren MD;  [facility];  Service: Orthopedics    IL ARTHRP KNE CONDYLE&PLATU MEDIAL&LAT COMPARTMENTS Right 4/14/2025    Procedure: Robotically assisted right total knee arthroplasty, all associated procedures as indicated;  Surgeon: Erich Warren MD;  [facility];  Service: Orthopedics    IL COLONOSCOPY FLX DX W/COLLJ SPEC WHEN PFRMD N/A 10/07/2016    Procedure: EGD AND COLONOSCOPY;  Surgeon: Nathan Pierson MD;  [facility];  Service: Gastroenterology    IL NDSC WRST SURG W/RLS TRANSVRS CARPL LIGM Left 04/26/2018    Procedure: RELEASE CARPAL TUNNEL ENDOSCOPIC;  Surgeon: Bon Ferrer MD;  [facility];  Service: Orthopedics    IL NDSC WRST SURG W/RLS TRANSVRS CARPL LIGM Right 06/14/2018    Procedure: RELEASE CARPAL TUNNEL ENDOSCOPIC;  Surgeon: Bon Ferrer MD;  [facility];  Service: Orthopedics    IL RPR AA HERNIA 1ST < 3 CM REDUCIBLE N/A 07/12/2024    Procedure: REPAIR HERNIA UMBILICAL;  Surgeon: Lazaro Moser MD;  [facility];  Service: General    IL RPR AA HERNIA 1ST < 3 CM REDUCIBLE N/A 07/12/2024    Procedure: REPAIR HERNIA VENTRAL;  Surgeon: Lazaro Moser MD;  [facility];  Service: General    RETINAL LASER PROCEDURE      SPINE SURGERY      TUBAL LIGATION      UMBILICAL HERNIA REPAIR  08/01/2024

## 2025-07-18 ENCOUNTER — OFFICE VISIT (OUTPATIENT)
Dept: PHYSICAL THERAPY | Facility: REHABILITATION | Age: 75
End: 2025-07-18
Payer: MEDICARE

## 2025-07-18 DIAGNOSIS — Z96.651 AFTERCARE FOLLOWING RIGHT KNEE JOINT REPLACEMENT SURGERY: Primary | ICD-10-CM

## 2025-07-18 DIAGNOSIS — M17.11 PRIMARY OSTEOARTHRITIS OF RIGHT KNEE: ICD-10-CM

## 2025-07-18 DIAGNOSIS — Z47.1 AFTERCARE FOLLOWING RIGHT KNEE JOINT REPLACEMENT SURGERY: Primary | ICD-10-CM

## 2025-07-18 DIAGNOSIS — M25.561 CHRONIC PAIN OF RIGHT KNEE: ICD-10-CM

## 2025-07-18 DIAGNOSIS — G89.29 CHRONIC PAIN OF RIGHT KNEE: ICD-10-CM

## 2025-07-18 PROCEDURE — 97112 NEUROMUSCULAR REEDUCATION: CPT

## 2025-07-18 PROCEDURE — 97110 THERAPEUTIC EXERCISES: CPT

## 2025-07-18 NOTE — PROGRESS NOTES
Daily Note     Today's date: 2025  Patient name: Lola Spangler  : 1950  MRN: [id number]  Referring provider: Erich Warren,*  Dx:   Encounter Diagnosis     ICD-10-CM    1. Aftercare following right knee joint replacement surgery  Z47.1     Z96.651       2. Chronic pain of right knee  M25.561     G89.29       3. Primary osteoarthritis of right knee  M17.11           Start Time: 0945  Stop Time: 1040  Total time in clinic (min): 55 minutes    Subjective: Pt notes that her knee has been feeling okay and she recently went to [place] and began swimming and mentioned this went well.       Objective: See treatment diary below      Assessment: Tolerated treatment well. Patient would benefit from continued PT. Pt was introduced to recumbent bike to improve knee ROM and tolerance and responded well without excessive increase in pain or soreness. She continues to be most challenged by step ups with elevated height due to decreased R knee strength and ROM. She continues to respond well to quad and posterior chain activation with step ups as she shows improved movement quality following. Continue to progress as tolerated, 1:1 with Deonte Gooden DPT entirety of tx.      Plan: Continue per plan of care.      Past Surgical History[1]   POC expires Unit limit Auth Expiration date PT/OT + Visit Limit?   6/15/25 (8) BOMN  BOMN         Visit/Unit Tracking  AUTH Status:  Date 7/11 6/10  6/17 6/20  6/24 6/27  7/1 7/3 7/8 - RE    10 Used 24 16 17 18  19 20  21 22 23 24    Remaining                 Pertinent Findings:      Test / Measure  2025   FOTO (Predicted 54) 25 49 61   R knee ROM decreased     R knee pain       R knee strength decreased       Access Code: [email or web address]  URL: [email or web address]  Date: 2025  Prepared by: Wanda Duque     Exercises  - Seated Ankle Pumps  - 1 x daily - 7 x weekly - 3 sets - 10 reps  - Heel Raises with Counter Support  - 1 x daily - 7 x weekly  "- 3 sets - 10 reps  - Supine Quad Set  - 1 x daily - 7 x weekly - 3 sets - 10 reps  - Supine Gluteal Sets  - 1 x daily - 7 x weekly - 3 sets - 10 reps  - Supine Bridge  - 1 x daily - 7 x weekly - 3 sets - 10 reps      Manuals 6/27  7/1 7/3 7/8 - RE 7/11 7/18   PROM knee ext                            RE         Neuro Re-Ed         POC/HEP         BAPs 1' ea 1' each  1' ea 1' ea 1' each  1' each    Hurdles  5 laps HHA  5 laps SS HHA  5 laps SS hurdles on foam  5 laps fwd and lat NV 5 laps fwd and lat 3#  5 laps fwd and lat 3#    Biodex  5'        Airex  2X10 abd/ext 3# 2X10 abd/ext 3# 2x10 abd/ext 3# 2x10 abd/ext 3# 2x10 3# bl tband  2x10 3# bl tband    Foam beam   Tandem 4 laps  Tandem 4 laps  Tandem 4 laps  Tandem 4 laps  Tandem 4 laps             Ther Ex         High knees/butt kickers         AAROM knee flexion   20x 5\" with LAQ 20x 5\" with LAQ 20x 5\" with LAQ 20x 5\" with LAQ 20x 5\" with LAQ   Quad sets         Glute sets         LSd 6\" 3x5   6\" 3x5 6\" 3x5     Prone ham curls          bridge 4x5  4x5  2x10 2x10 2x10  2x10    Knee extension          SL hip abd 3# 2x10 RLE 3# 2x10 RLE 4# 2x10 RLE 4# 2x10 RLE     SLR 3# 2x10 RLE  3# 2x10 RLE  4# 2x10 RLE 4# 2x10 RLE  4# 2x10 RLE   Step ups  6\" step up and over x10  6\" step up and over x10 6\" step up and over x10 3-9\" step ups x10  3-9\" step ups x10   STS 3x5 15# 8# 3x5  8# 3x5 8# 3x5  2x5, 8#   SAQ/LAQ  2x10 4# LAQ 2x10 4# LAQ 2x12 5# LAQ 2x12 5# LAQ 3x10 6# LAQ   NS 7'  7' lvl 7  7' L7 7' L7 7' L7 RB no resistance 5'   TKE         Mini squat  2x10 HHA 2x20 HHA  2x20 HHA 2x20 HHA 2x10 HHA 2x10, HHA   Leg Press                  Modified Lunge?         Ther Activity         TUG         5x STS         Gait Training                           Modalities         Cold pack                                                                           [1]   Past Surgical History:  Procedure Laterality Date    BACK SURGERY      CARPAL TUNNEL RELEASE Left     CERVICAL CONIZATION   " W/ LASER      CERVICAL CONIZATION     SECTION      COLONOSCOPY      DILATION AND CURETTAGE OF UTERUS      FL INJECTION RIGHT HIP (NON ARTHROGRAM)  2019    FL INJECTION RIGHT HIP (NON ARTHROGRAM)  2019    FRACTURE SURGERY      HAND SURGERY      HIP SURGERY      JOINT REPLACEMENT      RT    ORTHOPEDIC SURGERY      [place] ARTHRODESIS POSTERIOR/PSTLAT TQ 1NTRSPC LUMBAR N/A 2017    Procedure: L2-L5 OPEN DECOMPRESSIVE LUMBAR LAMINECTOMY W/ PEDICLE SCREW AND NILDA FIXATION FUSION (IMPULSE); REMOVAL OF SKIN TAG MID BACK;  Surgeon: Catracho Fields MD;  [facility];  Service: Neurosurgery    CO ARTHRP ACETBLR/PROX FEM PROSTC AGRFT/ALGRFT Right 2019    Procedure: ARTHROPLASTY HIP TOTAL Posterior;  Surgeon: Erich Warren MD;  [facility];  Service: Orthopedics    CO ARTHRP KNE CONDYLE&PLATU MEDIAL&LAT COMPARTMENTS Right 2025    Procedure: Robotically assisted right total knee arthroplasty, all associated procedures as indicated;  Surgeon: Erich Warren MD;  [facility];  Service: Orthopedics    CO COLONOSCOPY FLX DX W/COLLJ SPEC WHEN PFRMD N/A 10/07/2016    Procedure: EGD AND COLONOSCOPY;  Surgeon: Nathan Pierson MD;  [facility];  Service: Gastroenterology    CO NDSC WRST SURG W/RLS TRANSVRS CARPL LIGM Left 2018    Procedure: RELEASE CARPAL TUNNEL ENDOSCOPIC;  Surgeon: Bon Ferrer MD;  [facility];  Service: Orthopedics    CO NDSC WRST SURG W/RLS TRANSVRS CARPL LIGM Right 2018    Procedure: RELEASE CARPAL TUNNEL ENDOSCOPIC;  Surgeon: Bon Ferrer MD;  [facility];  Service: Orthopedics    CO RPR AA HERNIA 1ST < 3 CM REDUCIBLE N/A 2024    Procedure: REPAIR HERNIA UMBILICAL;  Surgeon: Lazaro Moser MD;  [facility];  Service: General    CO RPR AA HERNIA 1ST < 3 CM REDUCIBLE N/A 2024    Procedure: REPAIR HERNIA VENTRAL;  Surgeon: Lazaro Moser MD;  [facility];  Service: General    RETINAL LASER PROCEDURE      SPINE SURGERY      TUBAL LIGATION      UMBILICAL HERNIA REPAIR  08/01/2024

## 2025-07-25 ENCOUNTER — OFFICE VISIT (OUTPATIENT)
Dept: PHYSICAL THERAPY | Facility: REHABILITATION | Age: 75
End: 2025-07-25
Payer: MEDICARE

## 2025-07-25 DIAGNOSIS — Z96.651 AFTERCARE FOLLOWING RIGHT KNEE JOINT REPLACEMENT SURGERY: Primary | ICD-10-CM

## 2025-07-25 DIAGNOSIS — M17.11 PRIMARY OSTEOARTHRITIS OF RIGHT KNEE: ICD-10-CM

## 2025-07-25 DIAGNOSIS — G89.29 CHRONIC PAIN OF RIGHT KNEE: ICD-10-CM

## 2025-07-25 DIAGNOSIS — Z47.1 AFTERCARE FOLLOWING RIGHT KNEE JOINT REPLACEMENT SURGERY: Primary | ICD-10-CM

## 2025-07-25 DIAGNOSIS — M25.561 CHRONIC PAIN OF RIGHT KNEE: ICD-10-CM

## 2025-07-25 PROCEDURE — 97110 THERAPEUTIC EXERCISES: CPT

## 2025-07-25 PROCEDURE — 97112 NEUROMUSCULAR REEDUCATION: CPT

## 2025-07-25 NOTE — PROGRESS NOTES
Daily Note     Today's date: 2025  Patient name: Lola Spangler  : 1950  MRN: [id number]  Referring provider: Erich Wraren,*  Dx:   Encounter Diagnosis     ICD-10-CM    1. Aftercare following right knee joint replacement surgery  Z47.1     Z96.651       2. Chronic pain of right knee  M25.561     G89.29       3. Primary osteoarthritis of right knee  M17.11           Start Time: 0930  Stop Time: 1015  Total time in clinic (min): 45 minutes    Subjective: Patient states that her knee continues to do well.       Objective: See treatment diary below      Assessment: Tolerated treatment well. Patient demonstrated fatigue post treatment, exhibited good technique with therapeutic exercises, and would benefit from continued PT. Progressions made well. Patient can ambulate without SPC. Patient seen 1:1 with PT from 930-1008a portion of session.         Plan: Continue per plan of care.  DC next visit with updated POC.      Past Surgical History[1]   POC expires Unit limit Auth Expiration date PT/OT + Visit Limit?   6/15/25 (8) BOMN  BOMN         Visit/Unit Tracking  AUTH Status:  Date 7/25 6/10  6/17 6/20  6/24 6/27  7/1 7/3 7/8 - RE    10 Used 25 16 17 18  19 20  21 22 23 24    Remaining                 Pertinent Findings:      Test / Measure  2025   FOTO (Predicted 54) 25 49 61   R knee ROM decreased     R knee pain       R knee strength decreased       Access Code: [email or web address]  URL: [email or web address]  Date: 2025  Prepared by: Wanda Duque     Exercises  - Seated Ankle Pumps  - 1 x daily - 7 x weekly - 3 sets - 10 reps  - Heel Raises with Counter Support  - 1 x daily - 7 x weekly - 3 sets - 10 reps  - Supine Quad Set  - 1 x daily - 7 x weekly - 3 sets - 10 reps  - Supine Gluteal Sets  - 1 x daily - 7 x weekly - 3 sets - 10 reps  - Supine Bridge  - 1 x daily - 7 x weekly - 3 sets - 10 reps      Manuals 6/27  7/1 7/3 7/8 - RE    PROM knee ext         "                       RE          Neuro Re-Ed          POC/HEP          BAPs 1' ea 1' each  1' ea 1' ea 1' each  1' each  1' each    Hurdles  5 laps HHA  5 laps SS HHA  5 laps SS hurdles on foam  5 laps fwd and lat NV 5 laps fwd and lat 3#  5 laps fwd and lat 3#  4 laps fwd and lat 4#    Biodex  5'         Airex  2X10 abd/ext 3# 2X10 abd/ext 3# 2x10 abd/ext 3# 2x10 abd/ext 3# 2x10 3# bl tband  2x10 3# bl tband  2x30\" romberg EC     1x30\" FTEO   Foam beam   Tandem 4 laps  Tandem 4 laps  Tandem 4 laps  Tandem 4 laps  Tandem 4 laps  Tandem over hurdles 4 laps    Hip abd/ext       Bl tband, 2x10 4# AW   Ther Ex          High knees/butt kickers          AAROM knee flexion   20x 5\" with LAQ 20x 5\" with LAQ 20x 5\" with LAQ 20x 5\" with LAQ 20x 5\" with LAQ 20x 5\" with LAQ   Quad sets          Glute sets          LSd 6\" 3x5   6\" 3x5 6\" 3x5      Prone ham curls           bridge 4x5  4x5  2x10 2x10 2x10  2x10  2x10    Knee extension           SL hip abd 3# 2x10 RLE 3# 2x10 RLE 4# 2x10 RLE 4# 2x10 RLE      SLR 3# 2x10 RLE  3# 2x10 RLE  4# 2x10 RLE 4# 2x10 RLE  4# 2x10 RLE 4# 2x10 RLE   Step ups  6\" step up and over x10  6\" step up and over x10 6\" step up and over x10 3-9\" step ups x10  3-9\" step ups x10    STS 3x5 15# 8# 3x5  8# 3x5 8# 3x5  2x5, 8#    SAQ/LAQ  2x10 4# LAQ 2x10 4# LAQ 2x12 5# LAQ 2x12 5# LAQ 3x10 6# LAQ 5# LAQ 3x10    NS 7'  7' lvl 7  7' L7 7' L7 7' L7 RB no resistance 5' RB 5'    TKE          Mini squat  2x10 HHA 2x20 HHA  2x20 HHA 2x20 HHA 2x10 HHA 2x10, HHA 2x10 HHA    Leg Press       70# 4x10              Modified Lunge?          Ther Activity          TUG          5x STS          Gait Training                              Modalities          Cold pack                                                                                  [1]   Past Surgical History:  Procedure Laterality Date    BACK SURGERY      CARPAL TUNNEL RELEASE Left     CERVICAL CONIZATION   W/ LASER      CERVICAL CONIZATION     " SECTION      COLONOSCOPY      DILATION AND CURETTAGE OF UTERUS      FL INJECTION RIGHT HIP (NON ARTHROGRAM)  04/24/2019    FL INJECTION RIGHT HIP (NON ARTHROGRAM)  07/24/2019    FRACTURE SURGERY      HAND SURGERY      HIP SURGERY      JOINT REPLACEMENT  2020    RTHR    ORTHOPEDIC SURGERY      DC ARTHRODESIS POSTERIOR/PSTLAT TQ 1NTRSPC LUMBAR N/A 04/03/2017    Procedure: L2-L5 OPEN DECOMPRESSIVE LUMBAR LAMINECTOMY W/ PEDICLE SCREW AND NILDA FIXATION FUSION (IMPULSE); REMOVAL OF SKIN TAG MID BACK;  Surgeon: Catracho Fields MD;  [facility];  Service: Neurosurgery    DC ARTHRP ACETBLR/PROX FEM PROSTC AGRFT/ALGRFT Right 11/07/2019    Procedure: ARTHROPLASTY HIP TOTAL Posterior;  Surgeon: Erich Warren MD;  Location[facility];  Service: Orthopedics    DC ARTHRP KNE CONDYLE&PLATU MEDIAL&LAT COMPARTMENTS Right 4/14/2025    Procedure: Robotically assisted right total knee arthroplasty, all associated procedures as indicated;  Surgeon: Erich Warren MD;  Location: [facility];  Service: Orthopedics    DC COLONOSCOPY FLX DX W/COLLJ SPEC WHEN PFRMD N/A 10/07/2016    Procedure: EGD AND COLONOSCOPY;  Surgeon: Nathan Pierson MD;  Location: [facility];  Service: Gastroenterology    DC NDSC WRST SURG W/RLS TRANSVRS CARPL LIGM Left 04/26/2018    Procedure: RELEASE CARPAL TUNNEL ENDOSCOPIC;  Surgeon: Bon Ferrer MD;  Location: [facility];  Service: Orthopedics    DC NDSC WRST SURG W/RLS TRANSVRS CARPL LIGM Right 06/14/2018    Procedure: RELEASE CARPAL TUNNEL ENDOSCOPIC;  Surgeon: Bon Ferrer MD;  [facility];  Service: Orthopedics    DC RPR AA HERNIA 1ST < 3 CM REDUCIBLE N/A 07/12/2024    Procedure: REPAIR HERNIA UMBILICAL;  Surgeon: Lazaro Moser MD;  [facility];  Service: General    DC RPR AA HERNIA 1ST < 3 CM REDUCIBLE N/A 07/12/2024    Procedure: REPAIR HERNIA VENTRAL;  Surgeon: Lazaro Moser MD;  [facility];  Service: General    RETINAL LASER  PROCEDURE      SPINE SURGERY      TUBAL LIGATION      UMBILICAL HERNIA REPAIR  08/01/2024

## 2025-07-28 DIAGNOSIS — G25.81 RESTLESS LEG SYNDROME: ICD-10-CM

## 2025-07-29 RX ORDER — ROPINIROLE 0.25 MG/1
0.25 TABLET, FILM COATED ORAL
Qty: 90 TABLET | Refills: 1 | Status: SHIPPED | OUTPATIENT
Start: 2025-07-29

## 2025-08-01 ENCOUNTER — OFFICE VISIT (OUTPATIENT)
Dept: PHYSICAL THERAPY | Facility: REHABILITATION | Age: 75
End: 2025-08-01
Payer: MEDICARE

## 2025-08-01 DIAGNOSIS — G89.29 CHRONIC PAIN OF RIGHT KNEE: ICD-10-CM

## 2025-08-01 DIAGNOSIS — M25.561 CHRONIC PAIN OF RIGHT KNEE: ICD-10-CM

## 2025-08-01 DIAGNOSIS — M17.11 PRIMARY OSTEOARTHRITIS OF RIGHT KNEE: ICD-10-CM

## 2025-08-01 DIAGNOSIS — Z96.651 AFTERCARE FOLLOWING RIGHT KNEE JOINT REPLACEMENT SURGERY: Primary | ICD-10-CM

## 2025-08-01 DIAGNOSIS — Z47.1 AFTERCARE FOLLOWING RIGHT KNEE JOINT REPLACEMENT SURGERY: Primary | ICD-10-CM

## 2025-08-01 PROCEDURE — 97110 THERAPEUTIC EXERCISES: CPT

## 2025-08-01 PROCEDURE — 97112 NEUROMUSCULAR REEDUCATION: CPT

## 2025-08-14 ENCOUNTER — OFFICE VISIT (OUTPATIENT)
Age: 75
End: 2025-08-14
Payer: MEDICARE

## 2025-08-20 DIAGNOSIS — G62.9 NEUROPATHY: ICD-10-CM

## 2025-08-20 RX ORDER — GABAPENTIN 100 MG/1
200 CAPSULE ORAL 3 TIMES DAILY
Qty: 180 CAPSULE | Refills: 5 | Status: SHIPPED | OUTPATIENT
Start: 2025-08-20

## (undated) DEVICE — WET SKIN PREP TRAY: Brand: MEDLINE INDUSTRIES, INC.

## (undated) DEVICE — THE SIMPULSE SOLO SYSTEM WITH ULTREX RETRACTABLE SPLASH SHIELD TIP: Brand: SIMPULSE SOLO

## (undated) DEVICE — SMOKE EVAC FLUID SUCTION HEPA FILTER

## (undated) DEVICE — DRAPE EQUIPMENT RF WAND

## (undated) DEVICE — GLOVE SRG BIOGEL 8

## (undated) DEVICE — BETHLEHEM UNIV TOTAL KNEE, KIT: Brand: CARDINAL HEALTH

## (undated) DEVICE — BIPOLAR SEALER 23-113-1 AQM 2.3: Brand: AQUAMANTYS™

## (undated) DEVICE — GLOVE INDICATOR PI UNDERGLOVE SZ 8 BLUE

## (undated) DEVICE — SPONGE PVP SCRUB WING STERILE

## (undated) DEVICE — HOOD WITH PEEL AWAY FACE SHIELD: Brand: T7PLUS

## (undated) DEVICE — RETROGRADE KNIFE BOX OF 6: Brand: ECTRA

## (undated) DEVICE — CHLORAPREP HI-LITE 26ML ORANGE

## (undated) DEVICE — STANDARD SURGICAL GOWN, L: Brand: CONVERTORS

## (undated) DEVICE — SCD SEQUENTIAL COMPRESSION COMFORT SLEEVE MEDIUM KNEE LENGTH: Brand: KENDALL SCD

## (undated) DEVICE — NEEDLE 20 G X 1 1/2

## (undated) DEVICE — GLOVE SRG BIOGEL ORTHOPEDIC 8

## (undated) DEVICE — DRAPE SHEET X-LG

## (undated) DEVICE — GLOVE INDICATOR PI UNDERGLOVE SZ 8.5 BLUE

## (undated) DEVICE — FLOSEAL MATRIX IS INDICATED IN SURGICAL PROCEDURES (OTHER THAN IN OPHTHALMIC) AS AN ADJUNCT TO HEMOSTASIS WHEN CONTROL OF BLEEDING BY LIGATURE OR CONVENTIONAL PROCEDURES IS INEFFECTIVE OR IMPRACTICAL.: Brand: FLOSEAL HEMOSTATIC MATRIX

## (undated) DEVICE — SUT VICRYL 3-0 SH 27 IN J416H

## (undated) DEVICE — GLOVE SRG BIOGEL 7.5

## (undated) DEVICE — PAD GROUNDING ADULT

## (undated) DEVICE — NEEDLE 25G X 1 1/2

## (undated) DEVICE — SUT MONOCRYL 4-0 PS-2 27 IN Y426H

## (undated) DEVICE — LIGHT HANDLE COVER CAMERA DISP

## (undated) DEVICE — STRL COTTON TIP APPLCTR 6IN PK: Brand: CARDINAL HEALTH

## (undated) DEVICE — HOOD: Brand: T7PLUS

## (undated) DEVICE — TRAY FOLEY 16FR URIMETER SURESTEP

## (undated) DEVICE — 3M™ TEGADERM™ TRANSPARENT FILM DRESSING FRAME STYLE, 1626W, 4 IN X 4-3/4 IN (10 CM X 12 CM), 50/CT 4CT/CASE: Brand: 3M™ TEGADERM™

## (undated) DEVICE — SPECIMEN CONTAINER STERILE PEEL PACK

## (undated) DEVICE — JP PERF DRN SIL FLT 7MM FULL: Brand: CARDINAL HEALTH

## (undated) DEVICE — ELECTRODE BLADE MOD E-Z CLEAN  2.75IN 7CM -0012AM

## (undated) DEVICE — INTENDED FOR TISSUE SEPARATION, AND OTHER PROCEDURES THAT REQUIRE A SHARP SURGICAL BLADE TO PUNCTURE OR CUT.: Brand: BARD-PARKER SAFETY BLADES SIZE 15, STERILE

## (undated) DEVICE — BETHLEHEM UNIVERSAL MINOR GEN: Brand: CARDINAL HEALTH

## (undated) DEVICE — SILVER-COATED ANTIMICROBIAL BARRIER DRESSING: Brand: ACTICOAT   4" X 8"

## (undated) DEVICE — ANTIBACTERIAL VIOLET BRAIDED (POLYGLACTIN 910), SYNTHETIC ABSORBABLE SUTURE: Brand: COATED VICRYL

## (undated) DEVICE — BETHLEHEM TOTAL HIP, KIT: Brand: CARDINAL HEALTH

## (undated) DEVICE — TIBURON SPLIT SHEET: Brand: CONVERTORS

## (undated) DEVICE — PENCIL ELECTROSURG E-Z CLEAN -0035H

## (undated) DEVICE — 3M™ IOBAN™ 2 ANTIMICROBIAL INCISE DRAPE 6650EZ: Brand: IOBAN™ 2

## (undated) DEVICE — GROUNDING PAD RFL

## (undated) DEVICE — DECANTER: Brand: UNBRANDED

## (undated) DEVICE — TRAY FOLEY 16FR URIMETER SILICONE SURESTEP

## (undated) DEVICE — DRESSING MEPILEX AG BORDER 4 X 12 IN

## (undated) DEVICE — PLUMEPEN PRO 10FT

## (undated) DEVICE — PAD CAST 6 IN COTTON NON STERILE

## (undated) DEVICE — SUT VICRYL 0 CT-2 18 IN J727D

## (undated) DEVICE — VELYS ROBOT-ASSISTED SOLUTION ARRAY DRILL PIN 125 MM X 4 MM: Brand: VELYS

## (undated) DEVICE — INTENDED FOR TISSUE SEPARATION, AND OTHER PROCEDURES THAT REQUIRE A SHARP SURGICAL BLADE TO PUNCTURE OR CUT.: Brand: BARD-PARKER SAFETY BLADES SIZE 10, STERILE

## (undated) DEVICE — JACKSON-PRATT 100CC BULB RESERVOIR: Brand: CARDINAL HEALTH

## (undated) DEVICE — SAW BLADE OSCILLATING BRAZOL 167

## (undated) DEVICE — INTENDED FOR TISSUE SEPARATION, AND OTHER PROCEDURES THAT REQUIRE A SHARP SURGICAL BLADE TO PUNCTURE OR CUT.: Brand: BARD-PARKER ® CARBON RIB-BACK BLADES

## (undated) DEVICE — BLADE SAW RECIP 12.5 X 76MM 0.9/0.9MM THCK

## (undated) DEVICE — HANDPIECE SET WITH RETRACTABLE COAXIAL FAN SPRAY TIP AND SUCTION TUBE: Brand: INTERPULSE

## (undated) DEVICE — UNIVERSAL SPINE,KIT: Brand: CARDINAL HEALTH

## (undated) DEVICE — ACE WRAP 6 IN STERILE

## (undated) DEVICE — SNAP KOVER: Brand: UNBRANDED

## (undated) DEVICE — NEPTUNE E-SEP SMOKE EVACUATION PENCIL, COATED, 70MM BLADE, PUSH BUTTON SWITCH: Brand: NEPTUNE E-SEP

## (undated) DEVICE — SUT VICRYL PLUS 2-0 54IN VCP286G

## (undated) DEVICE — TOOL 14MH30 LEGEND 14CM 3MM: Brand: MIDAS REX ™

## (undated) DEVICE — SUT VICRYL PLUS 2-0 CTB-1 27 IN VCPB259H

## (undated) DEVICE — 3 BONE CEMENT MIXER: Brand: MIXEVAC

## (undated) DEVICE — BOWL ASSY BM210 DUAL BLADE DISPOSABLE: Brand: MIDAS REX™

## (undated) DEVICE — 3M™ STERI-STRIP™ REINFORCED ADHESIVE SKIN CLOSURES, R1547, 1/2 IN X 4 IN (12 MM X 100 MM), 6 STRIPS/ENVELOPE: Brand: 3M™ STERI-STRIP™

## (undated) DEVICE — CAPIT HIP MOP -METAL ON POLY

## (undated) DEVICE — STERILE COTTON TIPPED APPLICATORS: Brand: PURITAN

## (undated) DEVICE — PACK PLASTIC HAND PBDS

## (undated) DEVICE — ASTOUND STANDARD SURGICAL GOWN, XL: Brand: CONVERTORS

## (undated) DEVICE — JP 3-SPRING RES W/10FR PVC DRAIN/TR: Brand: CARDINAL HEALTH

## (undated) DEVICE — VELYS BLADE SAW OSC 85 X 19 X 2MM

## (undated) DEVICE — 2000CC GUARDIAN II: Brand: GUARDIAN

## (undated) DEVICE — SUT PROLENE 4-0 PC-3 18 IN 8634G

## (undated) DEVICE — DRAPE C-ARMOUR

## (undated) DEVICE — SUT VICRYL PLUS 1 CTB-1 36 IN VCPB947H

## (undated) DEVICE — BLADE ELECTRODE: Brand: EDGE

## (undated) DEVICE — STERILE BETHLEHEM PLASTIC HAND: Brand: CARDINAL HEALTH

## (undated) DEVICE — SUT VICRYL 1 CT-1 27 IN J261H

## (undated) DEVICE — GLOVE INDICATOR PI UNDERGLOVE SZ 7.5 BLUE

## (undated) DEVICE — 2108 SERIES SAGITTAL BLADE (18.6 X 0.64 X 61.1MM)

## (undated) DEVICE — CAPIT KNEE ATTUNE FB W/DOM AOX

## (undated) DEVICE — GLOVE INDICATOR PI UNDERGLOVE SZ 7 BLUE

## (undated) DEVICE — GLOVE SRG BIOGEL 7

## (undated) DEVICE — VELYS SATEL DRAPE

## (undated) DEVICE — STIRRUP STRAP ADULT DISP

## (undated) DEVICE — VELYS ROBOT DRAPE

## (undated) DEVICE — PROXIMATE PLUS MD MULTI-DIRECTIONAL RELEASE SKIN STAPLERS CONTAINS 35 STAINLESS STEEL STAPLES APPROXIMATE CLOSED DIMENSIONS: 6.9MM X 3.9MM WIDE: Brand: PROXIMATE

## (undated) DEVICE — BAG POLY CLEAR 12 X 8 X 30

## (undated) DEVICE — PREP SURGICAL PURPREP 26ML

## (undated) DEVICE — GLOVE SRG BIOGEL ECLIPSE 7

## (undated) DEVICE — 1840 FOAM BLOCK NEEDLE COUNTER: Brand: DEVON

## (undated) DEVICE — DRESSING MEPILEX AG BORDER 4 X 8 IN

## (undated) DEVICE — STOCKINETTE: Brand: DEROYAL

## (undated) DEVICE — GLOVE SRG BIOGEL 6.5

## (undated) DEVICE — VIOLET BRAIDED (POLYGLACTIN 910), SYNTHETIC ABSORBABLE SUTURE: Brand: COATED VICRYL

## (undated) DEVICE — HOOD: Brand: FLYTE, SURGICOOL

## (undated) DEVICE — PADDING CAST 4 IN  COTTON STRL

## (undated) DEVICE — NEURO PATTIES 1/2 X 1 1/2

## (undated) DEVICE — TOWEL SET X-RAY

## (undated) DEVICE — STOCKINETTE REGULAR

## (undated) DEVICE — VELYS ROBOTIC-ASSISTED SOLUTION ARRAY SET - KNEE: Brand: VELYS

## (undated) DEVICE — SUT PROLENE 3-0 V-7 36 IN 8976H

## (undated) DEVICE — ACE WRAP 6 IN UNSTERILE

## (undated) DEVICE — EXOFIN PRECISION PEN HIGH VISCOSITY TOPICAL SKIN ADHESIVE: Brand: EXOFIN PRECISION PEN, 1G

## (undated) DEVICE — UNDYED BRAIDED (POLYGLACTIN 910), SYNTHETIC ABSORBABLE SUTURE: Brand: COATED VICRYL

## (undated) DEVICE — NO-SCRATCH ™ SMALL WHITNEY CURETTE ™ IS A SINGLE-USE, PLASTIC CURETTE FOR QUICKLY APPLYING, MANIPULATING AND REMOVING BONE CEMENT DURING HIP AND KNEE REPLACEMENT SURGERY. THE PLASTIC IS SOFTER THAN STEEL INSTRUMENTS, REDUCING THE RISK OF DAMAGING THE PROSTHESIS WITH METAL INSTRUMENTS.  THE CURETTE’S 6MM TIP REMOVES EXCESS CEMENT FROM REPLACEMENT HIPS AND KNEES. EASY-TO-MANEUVER, THE SMALL BLUE CURETTE LETS YOU REMOVE CEMENT FROM ALL EDGES OF THE PROSTHESIS.NO-SCRATCH WHITNEY SMALL CURETTE FEATURES:SAFER THAN STEEL- MADE OF PLASTIC - STURDY YET SOFTER THAN SURGICAL STEEL.HANDIER- EACH TOOL HAS A MOLDED-IN THUMB INDENTATION INSTANTLY ORIENTING THE TOOL.- EASIER TO MANEUVER IN HARD TO SEE PLACES.- COLOR-CODED FOR EASY IDENTIFICATION.FASTER- COMES INDIVIDUALLY PACKAGED IN STERILE, PEEL OPEN POUCH, READY TO GO.- APPLIES, MANIPULATES, OR REMOVES CEMENT WITH FINGERTIP PRECISION.ECONOMICAL- THE COST OF A SINGLE REVISION DWARFS THE COST OF A SINGLE-USE CURETTE. - DISPOSABLE – THERE’S NO NEED TO WASTE TIME REMOVING HARDENED CEMENT OR RE-STERILIZING TOOLS.- LESS EXPENSIVE TO BUY AND INVENTORY - ORDER ONLY THE TOOL YOU USE.- PACKAGED 25 INDIVIDUALLY WRAPPED TOOLS TO A CARTON FOR CONVENIENT SHELF STORAGE.: Brand: WHITNEY NO-SCRATCH CURETTE (SMALL)

## (undated) DEVICE — ABDOMINAL PAD: Brand: DERMACEA